# Patient Record
Sex: FEMALE | Race: WHITE | NOT HISPANIC OR LATINO | Employment: OTHER | URBAN - METROPOLITAN AREA
[De-identification: names, ages, dates, MRNs, and addresses within clinical notes are randomized per-mention and may not be internally consistent; named-entity substitution may affect disease eponyms.]

---

## 2023-03-08 LAB
ALBUMIN (G/DL) IN SER/PLAS: 4.2 G/DL (ref 3.4–5)
ANION GAP IN SER/PLAS: 13 MMOL/L (ref 10–20)
CALCIUM (MG/DL) IN SER/PLAS: 9.5 MG/DL (ref 8.6–10.3)
CARBON DIOXIDE, TOTAL (MMOL/L) IN SER/PLAS: 27 MMOL/L (ref 21–32)
CHLORIDE (MMOL/L) IN SER/PLAS: 105 MMOL/L (ref 98–107)
CREATININE (MG/DL) IN SER/PLAS: 1.02 MG/DL (ref 0.5–1.05)
GFR FEMALE: 59 ML/MIN/1.73M2
GLUCOSE (MG/DL) IN SER/PLAS: 87 MG/DL (ref 74–99)
PHOSPHATE (MG/DL) IN SER/PLAS: 4.2 MG/DL (ref 2.5–4.9)
POTASSIUM (MMOL/L) IN SER/PLAS: 4.6 MMOL/L (ref 3.5–5.3)
SODIUM (MMOL/L) IN SER/PLAS: 140 MMOL/L (ref 136–145)
UREA NITROGEN (MG/DL) IN SER/PLAS: 22 MG/DL (ref 6–23)

## 2023-03-09 LAB
ALBUMIN (MG/L) IN URINE: 9.4 MG/L
ALBUMIN/CREATININE (UG/MG) IN URINE: 12.2 UG/MG CRT (ref 0–30)
CREATININE (MG/DL) IN URINE: 77.2 MG/DL (ref 20–320)

## 2023-03-24 LAB
ALBUMIN (G/DL) IN SER/PLAS: 4.4 G/DL (ref 3.4–5)
ANION GAP IN SER/PLAS: 11 MMOL/L (ref 10–20)
CALCIUM (MG/DL) IN SER/PLAS: 9.5 MG/DL (ref 8.6–10.3)
CARBON DIOXIDE, TOTAL (MMOL/L) IN SER/PLAS: 34 MMOL/L (ref 21–32)
CHLORIDE (MMOL/L) IN SER/PLAS: 98 MMOL/L (ref 98–107)
CREATININE (MG/DL) IN SER/PLAS: 1.11 MG/DL (ref 0.5–1.05)
GFR FEMALE: 53 ML/MIN/1.73M2
GLUCOSE (MG/DL) IN SER/PLAS: 86 MG/DL (ref 74–99)
PHOSPHATE (MG/DL) IN SER/PLAS: 3.3 MG/DL (ref 2.5–4.9)
POTASSIUM (MMOL/L) IN SER/PLAS: 3.4 MMOL/L (ref 3.5–5.3)
SODIUM (MMOL/L) IN SER/PLAS: 140 MMOL/L (ref 136–145)
UREA NITROGEN (MG/DL) IN SER/PLAS: 22 MG/DL (ref 6–23)

## 2023-10-11 ENCOUNTER — OFFICE VISIT (OUTPATIENT)
Dept: ORTHOPEDIC SURGERY | Facility: CLINIC | Age: 73
End: 2023-10-11
Payer: MEDICARE

## 2023-10-11 DIAGNOSIS — M17.11 PRIMARY LOCALIZED OSTEOARTHRITIS OF RIGHT KNEE: Primary | ICD-10-CM

## 2023-10-11 PROCEDURE — 1036F TOBACCO NON-USER: CPT | Performed by: STUDENT IN AN ORGANIZED HEALTH CARE EDUCATION/TRAINING PROGRAM

## 2023-10-11 PROCEDURE — 1157F ADVNC CARE PLAN IN RCRD: CPT | Performed by: STUDENT IN AN ORGANIZED HEALTH CARE EDUCATION/TRAINING PROGRAM

## 2023-10-11 PROCEDURE — 1159F MED LIST DOCD IN RCRD: CPT | Performed by: STUDENT IN AN ORGANIZED HEALTH CARE EDUCATION/TRAINING PROGRAM

## 2023-10-11 PROCEDURE — 99213 OFFICE O/P EST LOW 20 MIN: CPT | Performed by: STUDENT IN AN ORGANIZED HEALTH CARE EDUCATION/TRAINING PROGRAM

## 2023-10-11 PROCEDURE — 1126F AMNT PAIN NOTED NONE PRSNT: CPT | Performed by: STUDENT IN AN ORGANIZED HEALTH CARE EDUCATION/TRAINING PROGRAM

## 2023-10-22 PROBLEM — N13.30 HYDRONEPHROSIS: Status: ACTIVE | Noted: 2023-10-22

## 2023-10-22 PROBLEM — M19.90 ARTHRITIS: Status: ACTIVE | Noted: 2023-10-22

## 2023-10-22 PROBLEM — H81.10 BENIGN PAROXYSMAL POSITIONAL VERTIGO: Status: ACTIVE | Noted: 2023-10-22

## 2023-10-22 PROBLEM — H02.9 BENIGN LESION OF EYELID: Status: ACTIVE | Noted: 2023-10-22

## 2023-10-22 PROBLEM — D23.30 SEBACEOUS ADENOMA OF FACE: Status: ACTIVE | Noted: 2023-10-22

## 2023-10-22 PROBLEM — M54.50 LOWER BACK PAIN: Status: ACTIVE | Noted: 2023-10-22

## 2023-10-22 PROBLEM — M17.11 PRIMARY LOCALIZED OSTEOARTHRITIS OF RIGHT KNEE: Status: ACTIVE | Noted: 2023-10-22

## 2023-10-22 PROBLEM — H35.89 MACULAR RPE MOTTLING: Status: ACTIVE | Noted: 2021-05-18

## 2023-10-22 PROBLEM — H25.813 COMBINED FORMS OF AGE-RELATED CATARACT, BILATERAL: Status: ACTIVE | Noted: 2021-05-18

## 2023-10-22 PROBLEM — R31.0 GROSS HEMATURIA: Status: ACTIVE | Noted: 2023-10-22

## 2023-10-22 PROBLEM — M25.551 RIGHT HIP PAIN: Status: ACTIVE | Noted: 2023-10-22

## 2023-10-22 PROBLEM — I10 HYPERTENSION: Status: ACTIVE | Noted: 2023-10-22

## 2023-10-22 PROBLEM — J06.9 URI (UPPER RESPIRATORY INFECTION): Status: ACTIVE | Noted: 2023-10-22

## 2023-10-22 PROBLEM — G89.29 CHRONIC PAIN OF RIGHT KNEE: Status: ACTIVE | Noted: 2021-04-08

## 2023-10-22 PROBLEM — I10 ESSENTIAL HYPERTENSION: Status: ACTIVE | Noted: 2021-04-08

## 2023-10-22 PROBLEM — E66.811 OBESITY, CLASS I, BMI 30-34.9: Status: ACTIVE | Noted: 2022-08-22

## 2023-10-22 PROBLEM — N28.9 NONFUNCTIONING KIDNEY: Status: ACTIVE | Noted: 2023-10-22

## 2023-10-22 PROBLEM — H02.403 ACQUIRED INVOLUTIONAL PTOSIS OF BOTH EYELIDS: Status: ACTIVE | Noted: 2023-10-22

## 2023-10-22 PROBLEM — E66.9 OBESITY, CLASS I, BMI 30-34.9: Status: ACTIVE | Noted: 2022-08-22

## 2023-10-22 PROBLEM — N28.89 URETERAL MASS: Status: ACTIVE | Noted: 2023-10-22

## 2023-10-22 PROBLEM — M25.561 KNEE PAIN, RIGHT: Status: ACTIVE | Noted: 2023-10-22

## 2023-10-22 PROBLEM — R31.9 HEMATURIA: Status: ACTIVE | Noted: 2023-10-22

## 2023-10-22 PROBLEM — G56.20 CUBITAL TUNNEL SYNDROME: Status: ACTIVE | Noted: 2023-10-22

## 2023-10-22 PROBLEM — E87.6 HYPOKALEMIA: Status: ACTIVE | Noted: 2023-10-22

## 2023-10-22 PROBLEM — M53.3 SACRAL PAIN: Status: ACTIVE | Noted: 2023-10-22

## 2023-10-22 PROBLEM — R30.0 BURNING WITH URINATION: Status: ACTIVE | Noted: 2023-10-22

## 2023-10-22 PROBLEM — F41.8 DEPRESSION WITH ANXIETY: Status: ACTIVE | Noted: 2023-10-22

## 2023-10-22 PROBLEM — H02.834 DERMATOCHALASIS OF LEFT UPPER EYELID: Status: ACTIVE | Noted: 2023-10-22

## 2023-10-22 PROBLEM — H02.831 DERMATOCHALASIS OF RIGHT UPPER EYELID: Status: ACTIVE | Noted: 2023-10-22

## 2023-10-22 PROBLEM — Z78.0 ASYMPTOMATIC POSTMENOPAUSAL STATE: Status: ACTIVE | Noted: 2023-10-22

## 2023-10-22 PROBLEM — M25.561 CHRONIC PAIN OF RIGHT KNEE: Status: ACTIVE | Noted: 2021-04-08

## 2023-10-22 PROBLEM — H57.813 BROW PTOSIS, BILATERAL: Status: ACTIVE | Noted: 2023-10-22

## 2023-10-22 RX ORDER — ACETAMINOPHEN 500 MG
1000 TABLET ORAL EVERY 8 HOURS PRN
COMMUNITY
Start: 2021-04-08 | End: 2024-02-29 | Stop reason: ALTCHOICE

## 2023-10-22 RX ORDER — CEPHALEXIN 500 MG/1
500 CAPSULE ORAL
COMMUNITY
Start: 2022-03-04 | End: 2024-04-11 | Stop reason: WASHOUT

## 2023-10-22 RX ORDER — AMLODIPINE BESYLATE 5 MG/1
TABLET ORAL EVERY 24 HOURS
COMMUNITY
Start: 2022-11-21 | End: 2024-02-29 | Stop reason: ALTCHOICE

## 2023-10-22 RX ORDER — HYDROCHLOROTHIAZIDE 25 MG/1
25 TABLET ORAL DAILY
COMMUNITY
End: 2024-04-11 | Stop reason: WASHOUT

## 2023-10-22 RX ORDER — HYDROCHLOROTHIAZIDE 12.5 MG/1
CAPSULE ORAL EVERY 24 HOURS
COMMUNITY
Start: 2022-12-05 | End: 2024-02-29 | Stop reason: ALTCHOICE

## 2023-10-22 RX ORDER — POTASSIUM CHLORIDE 750 MG/1
10 TABLET, EXTENDED RELEASE ORAL DAILY
COMMUNITY
End: 2024-05-01 | Stop reason: SDUPTHER

## 2023-10-22 RX ORDER — FAMOTIDINE 40 MG/1
TABLET, FILM COATED ORAL EVERY 24 HOURS
COMMUNITY
Start: 2022-12-05 | End: 2024-02-29 | Stop reason: ALTCHOICE

## 2023-10-22 RX ORDER — ESCITALOPRAM OXALATE 10 MG/1
10 TABLET ORAL DAILY
COMMUNITY
Start: 2023-08-20 | End: 2024-02-29 | Stop reason: ALTCHOICE

## 2023-10-22 RX ORDER — IBUPROFEN 200 MG
TABLET ORAL
COMMUNITY
End: 2024-02-29 | Stop reason: ALTCHOICE

## 2023-10-22 RX ORDER — TURMERIC 400 MG
CAPSULE ORAL DAILY
COMMUNITY
Start: 2021-06-10

## 2023-10-22 RX ORDER — VIT A/VIT C/VIT E/ZINC/COPPER 4296-226
CAPSULE ORAL
COMMUNITY

## 2023-10-22 RX ORDER — ERYTHROMYCIN 5 MG/G
OINTMENT OPHTHALMIC 4 TIMES DAILY
COMMUNITY
Start: 2023-03-28 | End: 2024-02-29 | Stop reason: ALTCHOICE

## 2023-10-24 ENCOUNTER — OFFICE VISIT (OUTPATIENT)
Dept: ORTHOPEDIC SURGERY | Facility: CLINIC | Age: 73
End: 2023-10-24
Payer: MEDICARE

## 2023-10-24 DIAGNOSIS — M17.11 PRIMARY OSTEOARTHRITIS OF RIGHT KNEE: Primary | ICD-10-CM

## 2023-10-24 PROCEDURE — 1036F TOBACCO NON-USER: CPT | Performed by: FAMILY MEDICINE

## 2023-10-24 PROCEDURE — 1160F RVW MEDS BY RX/DR IN RCRD: CPT | Performed by: FAMILY MEDICINE

## 2023-10-24 PROCEDURE — 1126F AMNT PAIN NOTED NONE PRSNT: CPT | Performed by: FAMILY MEDICINE

## 2023-10-24 PROCEDURE — 20610 DRAIN/INJ JOINT/BURSA W/O US: CPT | Performed by: FAMILY MEDICINE

## 2023-10-24 PROCEDURE — 99203 OFFICE O/P NEW LOW 30 MIN: CPT | Performed by: FAMILY MEDICINE

## 2023-10-24 PROCEDURE — 1159F MED LIST DOCD IN RCRD: CPT | Performed by: FAMILY MEDICINE

## 2023-10-24 RX ORDER — TRIAMCINOLONE ACETONIDE 40 MG/ML
40 INJECTION, SUSPENSION INTRA-ARTICULAR; INTRAMUSCULAR
Status: COMPLETED | OUTPATIENT
Start: 2023-10-24 | End: 2023-10-24

## 2023-10-24 RX ORDER — LIDOCAINE HYDROCHLORIDE 10 MG/ML
4 INJECTION INFILTRATION; PERINEURAL
Status: COMPLETED | OUTPATIENT
Start: 2023-10-24 | End: 2023-10-24

## 2023-10-24 RX ADMIN — LIDOCAINE HYDROCHLORIDE 4 ML: 10 INJECTION INFILTRATION; PERINEURAL at 12:08

## 2023-10-24 RX ADMIN — TRIAMCINOLONE ACETONIDE 40 MG: 40 INJECTION, SUSPENSION INTRA-ARTICULAR; INTRAMUSCULAR at 12:08

## 2023-10-24 NOTE — PROGRESS NOTES
** Please excuse any errors in grammar or translation related to this dictation. Voice recognition software was utilized to prepare this document. **    Assessment & Plan:  Patient referred by Dr. Reg Cuenca for consideration of viscosupplement injection.  Hx of right knee arthritis.  To this point, has been managing symptoms with otc analgesics and steroid injection. Has never received viscosupplement injection previously.  Discussed with patient how a viscosupplement injection differs from steroid along with risks and benefits of the procedure.  Patient is considered a good candidate for completion of viscosupplement injection. We will obtain prior authorization to complete and patient will be scheduled once approved.  If the injection provides symptomatic relief, can repeat every 6 months.  If needed, can also alternate with steroid injections. Was offered to have steroid injection today while awaiting prior auth and she agreed to do so.  All questions answered and patient agreeable to this plan of care.    A copy of today's report will be electronically sent to referring clinician.       Chief complaint: right knee pain    HPI:  74 y/o patient, hx of HTN, presents with right knee pain.  This complaint has been ongoing for a few years.  No mechanism of injury reported at onset. Symptoms have progressively worsened with time, particularly over the past year.  Pain is most prominent at lateral and posterior knee.  She feels the knee may be swollen.  Symptoms are aggravated by walking and standing. To date, patient has tried a variety of treatments to include cortisone injection, advil, biofreeze, and other ointmetns, heating pad with little sustained effect. Previously saw Dr. King for this with last visit being 10/11/23. Last steroid injection completed May 2023 which helped for about 1 month. Currently using advil and topicals for symptom control. Denies previous surgery to this site.     Patient reports  being referred here by Dr. Reg Cuenca to discuss gel injections.  Also reports that she had discussed candidacy for knee replacement however wants to continue with nonoperative treatment at this time.    Exam:  Right Knee examined. No effusion, ecchymosis, or erythema.  AROM from 0 to 130 deg with 5/5 strength. SILT overlying knee. Motion crepitus present. Tenderness along lateral joint line.  No popliteal mass palpated. Negative anterior and posterior drawer.  No laxity to varus or valgus stress at 0 or 30 deg.  No patellar apprehension.-Marquis    Results:  X-rays of right knee obtained May 2023 reviewed and independently interpreted as moderate tricompartmental degenerative changes most prominent in the lateral compartment.    Reviewed patient's previous encounter with Dr. Reg Cuenca    Procedure:  Patient ID: Terra Alexis is a 73 y.o. female.    L Inj/Asp: R knee on 10/24/2023 12:08 PM  Indications: pain  Details: 25 G needle, anterolateral approach  Medications: 40 mg triamcinolone acetonide 40 mg/mL; 4 mL lidocaine 10 mg/mL (1 %)  Outcome: tolerated well, no immediate complications  Procedure, treatment alternatives, risks and benefits explained, specific risks discussed. Consent was given by the patient. Immediately prior to procedure a time out was called to verify the correct patient, procedure, equipment, support staff and site/side marked as required. Patient was prepped and draped in the usual sterile fashion.

## 2023-10-24 NOTE — LETTER
October 24, 2023     Demarcus King MD  [address] [place] [address]    Patient: Terra Alexis   YOB: 1950   Date of Visit: 10/24/2023       Dear Dr. Demarcus King MD:    Thank you for referring Terra Alexis to me for evaluation. Below are my notes for this consultation.  If you have questions, please do not hesitate to call me. I look forward to following your patient along with you.       Sincerely,     Lion Guzman, DO      CC: No Recipients  ______________________________________________________________________________________    ** Please excuse any errors in grammar or translation related to this dictation. Voice recognition software was utilized to prepare this document. **    Assessment & Plan:  Patient referred by Dr. Reg Cuenca for consideration of viscosupplement injection.  Hx of right knee arthritis.  To this point, has been managing symptoms with otc analgesics and steroid injection. Has never received viscosupplement injection previously.  Discussed with patient how a viscosupplement injection differs from steroid along with risks and benefits of the procedure.  Patient is considered a good candidate for completion of viscosupplement injection. We will obtain prior authorization to complete and patient will be scheduled once approved.  If the injection provides symptomatic relief, can repeat every 6 months.  If needed, can also alternate with steroid injections. Was offered to have steroid injection today while awaiting prior auth and she agreed to do so.  All questions answered and patient agreeable to this plan of care.    A copy of today's report will be electronically sent to referring clinician.       Chief complaint: right knee pain    HPI:  74 y/o patient, hx of HTN, presents with right knee pain.  This complaint has been ongoing for a few years.  No mechanism of injury reported at onset. Symptoms have progressively worsened with time, particularly over the  past year.  Pain is most prominent at lateral and posterior knee.  She feels the knee may be swollen.  Symptoms are aggravated by walking and standing. To date, patient has tried a variety of treatments to include cortisone injection, advil, biofreeze, and other ointmetns, heating pad with little sustained effect. Previously saw Dr. King for this with last visit being 10/11/23. Last steroid injection completed May 2023 which helped for about 1 month. Currently using advil and topicals for symptom control. Denies previous surgery to this site.     Patient reports being referred here by Dr. Reg Cuenca to discuss gel injections.  Also reports that she had discussed candidacy for knee replacement however wants to continue with nonoperative treatment at this time.    Exam:  Right Knee examined. No effusion, ecchymosis, or erythema.  AROM from 0 to 130 deg with 5/5 strength. SILT overlying knee. Motion crepitus present. Tenderness along lateral joint line.  No popliteal mass palpated. Negative anterior and posterior drawer.  No laxity to varus or valgus stress at 0 or 30 deg.  No patellar apprehension.-Marquis    Results:  X-rays of right knee obtained May 2023 reviewed and independently interpreted as moderate tricompartmental degenerative changes most prominent in the lateral compartment.    Reviewed patient's previous encounter with Dr. Reg Cuenca    Procedure:  Patient ID: Terra Alexis is a 73 y.o. female.    L Inj/Asp: R knee on 10/24/2023 12:08 PM  Indications: pain  Details: 25 G needle, anterolateral approach  Medications: 40 mg triamcinolone acetonide 40 mg/mL; 4 mL lidocaine 10 mg/mL (1 %)  Outcome: tolerated well, no immediate complications  Procedure, treatment alternatives, risks and benefits explained, specific risks discussed. Consent was given by the patient. Immediately prior to procedure a time out was called to verify the correct patient, procedure, equipment, support staff and site/side  marked as required. Patient was prepped and draped in the usual sterile fashion.

## 2023-10-25 DIAGNOSIS — Z12.11 COLON CANCER SCREENING: ICD-10-CM

## 2023-10-25 RX ORDER — POLYETHYLENE GLYCOL 3350, SODIUM SULFATE ANHYDROUS, SODIUM BICARBONATE, SODIUM CHLORIDE, POTASSIUM CHLORIDE 236; 22.74; 6.74; 5.86; 2.97 G/4L; G/4L; G/4L; G/4L; G/4L
4000 POWDER, FOR SOLUTION ORAL ONCE
Qty: 4000 ML | Refills: 0 | Status: SHIPPED | OUTPATIENT
Start: 2023-10-25 | End: 2023-10-25

## 2023-11-01 ENCOUNTER — LAB (OUTPATIENT)
Dept: LAB | Facility: LAB | Age: 73
End: 2023-11-01
Payer: MEDICARE

## 2023-11-01 DIAGNOSIS — I10 ESSENTIAL (PRIMARY) HYPERTENSION: ICD-10-CM

## 2023-11-01 DIAGNOSIS — Z91.89 OTHER SPECIFIED PERSONAL RISK FACTORS, NOT ELSEWHERE CLASSIFIED: ICD-10-CM

## 2023-11-01 DIAGNOSIS — E55.9 VITAMIN D DEFICIENCY, UNSPECIFIED: Primary | ICD-10-CM

## 2023-11-01 LAB
25(OH)D3 SERPL-MCNC: 34 NG/ML (ref 30–100)
ALBUMIN SERPL BCP-MCNC: 4 G/DL (ref 3.4–5)
ANION GAP SERPL CALC-SCNC: 12 MMOL/L (ref 10–20)
BUN SERPL-MCNC: 34 MG/DL (ref 6–23)
CALCIUM SERPL-MCNC: 9.2 MG/DL (ref 8.6–10.3)
CHLORIDE SERPL-SCNC: 105 MMOL/L (ref 98–107)
CO2 SERPL-SCNC: 27 MMOL/L (ref 21–32)
CREAT SERPL-MCNC: 1.14 MG/DL (ref 0.5–1.05)
ERYTHROCYTE [DISTWIDTH] IN BLOOD BY AUTOMATED COUNT: 13.7 % (ref 11.5–14.5)
GFR SERPL CREATININE-BSD FRML MDRD: 51 ML/MIN/1.73M*2
GLUCOSE SERPL-MCNC: 67 MG/DL (ref 74–99)
HCT VFR BLD AUTO: 40 % (ref 36–46)
HGB BLD-MCNC: 12.7 G/DL (ref 12–16)
MCH RBC QN AUTO: 30.4 PG (ref 26–34)
MCHC RBC AUTO-ENTMCNC: 31.8 G/DL (ref 32–36)
MCV RBC AUTO: 96 FL (ref 80–100)
NRBC BLD-RTO: 0 /100 WBCS (ref 0–0)
PHOSPHATE SERPL-MCNC: 3.8 MG/DL (ref 2.5–4.9)
PLATELET # BLD AUTO: 257 X10*3/UL (ref 150–450)
POTASSIUM SERPL-SCNC: 3.7 MMOL/L (ref 3.5–5.3)
RBC # BLD AUTO: 4.18 X10*6/UL (ref 4–5.2)
SODIUM SERPL-SCNC: 140 MMOL/L (ref 136–145)
WBC # BLD AUTO: 6.2 X10*3/UL (ref 4.4–11.3)

## 2023-11-01 PROCEDURE — 36415 COLL VENOUS BLD VENIPUNCTURE: CPT

## 2023-11-01 PROCEDURE — 85027 COMPLETE CBC AUTOMATED: CPT

## 2023-11-01 PROCEDURE — 82306 VITAMIN D 25 HYDROXY: CPT

## 2023-11-01 PROCEDURE — 80069 RENAL FUNCTION PANEL: CPT

## 2023-11-08 ENCOUNTER — OFFICE VISIT (OUTPATIENT)
Dept: ORTHOPEDIC SURGERY | Facility: CLINIC | Age: 73
End: 2023-11-08
Payer: MEDICARE

## 2023-11-08 DIAGNOSIS — M17.11 PRIMARY OSTEOARTHRITIS OF RIGHT KNEE: Primary | ICD-10-CM

## 2023-11-08 PROCEDURE — 20611 DRAIN/INJ JOINT/BURSA W/US: CPT | Performed by: FAMILY MEDICINE

## 2023-11-08 PROCEDURE — 3077F SYST BP >= 140 MM HG: CPT | Performed by: FAMILY MEDICINE

## 2023-11-08 PROCEDURE — 3079F DIAST BP 80-89 MM HG: CPT | Performed by: FAMILY MEDICINE

## 2023-11-08 PROCEDURE — 1160F RVW MEDS BY RX/DR IN RCRD: CPT | Performed by: FAMILY MEDICINE

## 2023-11-08 PROCEDURE — 1126F AMNT PAIN NOTED NONE PRSNT: CPT | Performed by: FAMILY MEDICINE

## 2023-11-08 PROCEDURE — 1036F TOBACCO NON-USER: CPT | Performed by: FAMILY MEDICINE

## 2023-11-08 PROCEDURE — 1159F MED LIST DOCD IN RCRD: CPT | Performed by: FAMILY MEDICINE

## 2023-11-08 RX ORDER — LIDOCAINE HYDROCHLORIDE 10 MG/ML
2 INJECTION INFILTRATION; PERINEURAL
Status: COMPLETED | OUTPATIENT
Start: 2023-11-08 | End: 2023-11-08

## 2023-11-08 RX ADMIN — LIDOCAINE HYDROCHLORIDE 2 ML: 10 INJECTION INFILTRATION; PERINEURAL at 10:04

## 2023-11-08 NOTE — PROGRESS NOTES
** Please excuse any errors in grammar or translation related to this dictation. Voice recognition software was utilized to prepare this document. **    Assessment & Plan:  Durolane injection completed in right knee as below. Discussed with patient that it can take 2-3 weeks for pain relief to occur. This injection can be repeated again in 6 months if providing symptomatic relief. Patient asked to contact clinic in 5 months to start prior authorization process. If has increasing pain prior to then that is not controlled by oral analgesics, can f/u to have CSI completed.  All questions answered and patient is agreeable to this plan.    Chief complaint: right knee pain    HPI:  11/8/23:  Patient presents for right knee Durolane injection. Reports steroid injection completed last month did help reduce pain symptoms until she aggravated it doing yard work.     10/24/23: 72 y/o patient, hx of HTN, presents with right knee pain.  This complaint has been ongoing for a few years.  No mechanism of injury reported at onset. Symptoms have progressively worsened with time, particularly over the past year.  Pain is most prominent at lateral and posterior knee.  She feels the knee may be swollen.  Symptoms are aggravated by walking and standing. To date, patient has tried a variety of treatments to include cortisone injection, advil, biofreeze, and other ointmetns, heating pad with little sustained effect. Previously saw Dr. King for this with last visit being 10/11/23. Last steroid injection completed May 2023 which helped for about 1 month. Currently using advil and topicals for symptom control. Denies previous surgery to this site.     Patient reports being referred here by Dr. Reg Cuenca to discuss gel injections.  Also reports that she had discussed candidacy for knee replacement however wants to continue with nonoperative treatment at this time.    Exam:  No erythema, warmth or skin breakdown overlying injection site  of R knee.    Results:  X-rays of right knee obtained May 2023 demonstrate moderate tricompartmental degenerative changes most prominent in the lateral compartment.    Procedure:  Patient ID: Terra Alexis is a 73 y.o. female.    L Inj/Asp: R knee on 11/8/2023 10:04 AM  Details: 21 G needle, ultrasound-guided superolateral approach  Medications: 60 mg sodium hyaluronate 60 mg/3 mL; 2 mL lidocaine 10 mg/mL (1 %)  Outcome: tolerated well, no immediate complications  Procedure, treatment alternatives, risks and benefits explained, specific risks discussed. Immediately prior to procedure a time out was called to verify the correct patient, procedure, equipment, support staff and site/side marked as required. Patient was prepped and draped in the usual sterile fashion.

## 2023-11-14 ENCOUNTER — APPOINTMENT (OUTPATIENT)
Dept: GASTROENTEROLOGY | Facility: EXTERNAL LOCATION | Age: 73
End: 2023-11-14
Payer: MEDICARE

## 2023-11-16 ENCOUNTER — APPOINTMENT (OUTPATIENT)
Dept: UROLOGY | Facility: CLINIC | Age: 73
End: 2023-11-16
Payer: MEDICARE

## 2023-12-06 ENCOUNTER — TRANSCRIBE ORDERS (OUTPATIENT)
Dept: ORTHOPEDIC SURGERY | Facility: HOSPITAL | Age: 73
End: 2023-12-06
Payer: MEDICARE

## 2023-12-06 DIAGNOSIS — M54.50 LOW BACK PAIN, UNSPECIFIED BACK PAIN LATERALITY, UNSPECIFIED CHRONICITY, UNSPECIFIED WHETHER SCIATICA PRESENT: ICD-10-CM

## 2023-12-08 ENCOUNTER — DOCUMENTATION (OUTPATIENT)
Dept: ORTHOPEDIC SURGERY | Facility: HOSPITAL | Age: 73
End: 2023-12-08

## 2023-12-08 ENCOUNTER — APPOINTMENT (OUTPATIENT)
Dept: ORTHOPEDIC SURGERY | Facility: CLINIC | Age: 73
End: 2023-12-08
Payer: MEDICARE

## 2023-12-08 NOTE — PROGRESS NOTES
Patient called on 12/07/23 confused and upset (yelling) because [facility] told her she needed X-rays.   I explained that we'll get the x-rays at the time of her visit. She hung up on me while I attempted to explain why her X-Ray was ordered ahead of time.     This Morning 12/08/23   Patient came to - did not want to sign consent that was printed .  Patient indicated she wanted to make changes on consent, crossed out and added words.  Michael the  rep informed the patient that the  consent was required to be seen and no personal changes can be made. She then stated she would not want to be seen and left.     Her Consent form was scanned to her chart under Media.

## 2024-01-25 ENCOUNTER — APPOINTMENT (OUTPATIENT)
Dept: PRIMARY CARE | Facility: CLINIC | Age: 74
End: 2024-01-25
Payer: MEDICARE

## 2024-02-18 NOTE — PROGRESS NOTES
PRIMARY CARE PHYSICIAN: Shannon Rodriguez MD  REFERRING PROVIDER: No referring provider defined for this encounter.       SUBJECTIVE  CHIEF COMPLAINT: Right knee pain    HPI: Terra Alexis is a pleasant 73 y.o. year-old female who is seen today for follow-up evaluation of right knee pain.  The injection which I administered in May provided her with about 1 month of relief.  Tylenol is ineffective at controlling her pain.  Advil does provide her with some relief however, she has been advised to limit the amount that she uses.  She denies instability.  Pain is mostly lateral.  She uses IcyHot and Biofreeze topically.    REVIEW OF SYSTEMS  There has been no interval change in this patient's past medical, surgical, medications, allergies, family history or social history since the most recent visit to a provider within our department.  14 point review of systems was performed, reviewed, and negative except for pertinent positives documented in the history of present illness.    Past Medical History:   Diagnosis Date    Other specified disorders of kidney and ureter 03/04/2022    Ureteral mass    Personal history of malignant neoplasm of cervix uteri 11/21/2022    History of malignant neoplasm of cervix        Allergies   Allergen Reactions    Codeine GI Upset, Other and Nausea/vomiting        Past Surgical History:   Procedure Laterality Date    OTHER SURGICAL HISTORY  10/07/2021    Hysterectomy        Family History   Problem Relation Name Age of Onset    Macular degeneration Mother      Glaucoma Other grandparent     Macular degeneration Other grandparent         Social History     Socioeconomic History    Marital status: Single     Spouse name: Not on file    Number of children: Not on file    Years of education: Not on file    Highest education level: Not on file   Occupational History    Not on file   Tobacco Use    Smoking status: Never    Smokeless tobacco: Never   Substance and Sexual Activity    Alcohol use:  Never    Drug use: Never    Sexual activity: Not on file   Other Topics Concern    Not on file   Social History Narrative    Not on file     Social Determinants of Health     Financial Resource Strain: Not on file   Food Insecurity: Not on file   Transportation Needs: Not on file   Physical Activity: Not on file   Stress: Not on file   Social Connections: Not on file   Intimate Partner Violence: Not on file   Housing Stability: Not on file        CURRENT MEDICATIONS:   Current Outpatient Medications   Medication Sig Dispense Refill    acetaminophen (Tylenol) 500 mg tablet Take 2 tablets (1,000 mg) by mouth every 8 hours if needed (pain).      amLODIPine (Norvasc) 5 mg tablet Take by mouth once every 24 hours.      beneprotein Take by mouth. Protein Oral Powder Refills: 0 Active      cartilage/collagen/bor/hyalur (JOINT HEALTH ORAL) Take by mouth.  Joint Health CAPS Refills: 0 Active      cephalexin (Keflex) 500 mg capsule Take 1 capsule (500 mg) by mouth. TAKE 1 CAPSULE Once Please take after stent removal      erythromycin (Romycin) 5 mg/gram (0.5 %) ophthalmic ointment 4 times a day.  apply 1 inch ribbon to both upper lids 4 times a day      escitalopram (Lexapro) 10 mg tablet Take 1 tablet (10 mg) by mouth once daily.      famotidine (Pepcid) 40 mg tablet Take by mouth once every 24 hours.      hydroCHLOROthiazide (HYDRODiuril) 25 mg tablet Take 1 tablet (25 mg) by mouth once daily.      hydroCHLOROthiazide (Microzide) 12.5 mg capsule Take by mouth once every 24 hours.      MELATONIN ORAL Take by mouth if needed.      NON FORMULARY Multi Collagen Protein daily      NON FORMULARY HA Joint formula daily      potassium chloride CR 10 mEq ER tablet Take 1 tablet (10 mEq) by mouth once daily.      turmeric 400 mg capsule once daily.      vit A/vit C/vit E/zinc/copper (VITAMINS A,C,E-ZINC-COPPER ORAL) Take by mouth.  PreserVision AREDS CAPS Refills: 0 Active      vitamins A,C,E-zinc-copper (PreserVision AREDS) 4,296  mcg-226 mg-90 mg capsule Take by mouth.       No current facility-administered medications for this visit.        OBJECTIVE    PHYSICAL EXAM  There is no height or weight on file to calculate BMI.    General: Well-appearing female in no acute distress.  Awake, alert and oriented.  Pleasant and cooperative.  Respiratory: Non-labored breathing  Mood: Euthymic   Gait: Antalgic  Assistive Device: None     Affected Right Knee  Limb Alignment: Mild valgus  ROM: 5-120  Stable to varus and valgus stress at full extension and 30 degrees of flexion.  Correctable  Skin: Intact, no abrasions or draining sinuses  Effusion: None  Quad Strength: 5/5  Hamstring Strength: 5/5  Patella Crepitus: None  Patella Grind: Negative  Tenderness: Lateral joint line  Sensation: Intact to light touch distally  Motor function: Able to fire TA, EHL, G/S  Pulses: Palpable DP pulse    Unaffected Left Knee  Skin: Intact  ROM: 0-120  Effusion: None  No tenderness to palpation on exam    IMAGING:  None today      ASSESSMENT & PLAN    IMPRESSION:  Terra Alexis is a 73 y.o. female who I am seeing today for right knee pain in the setting of osteoarthritis.  The patient is not yet ready to consider total joint arthroplasty.    PLAN:  Intra-articular corticosteroid injections have been ineffective to this point.  Accordingly, I referred her to my partner for consideration of hyaluronic acid injections.  I believe the patient is an appropriate candidate for hyaluronic acid injections as she has tried several nonsurgical treatment measures which have been ineffective to this point and she is not yet ready to move forward with total joint arthroplasty.    Follow-up with me on an as-needed basis for consideration of total joint arthroplasty.    *This note was created using voice recognition software and was not corrected for typographical or grammatical errors.*

## 2024-02-25 ENCOUNTER — APPOINTMENT (OUTPATIENT)
Dept: RADIOLOGY | Facility: HOSPITAL | Age: 74
End: 2024-02-25
Payer: MEDICARE

## 2024-02-25 ENCOUNTER — HOSPITAL ENCOUNTER (EMERGENCY)
Facility: HOSPITAL | Age: 74
Discharge: HOME | End: 2024-02-25
Attending: EMERGENCY MEDICINE
Payer: MEDICARE

## 2024-02-25 VITALS
TEMPERATURE: 98.2 F | BODY MASS INDEX: 24.99 KG/M2 | DIASTOLIC BLOOD PRESSURE: 74 MMHG | HEIGHT: 65 IN | OXYGEN SATURATION: 95 % | WEIGHT: 150 LBS | SYSTOLIC BLOOD PRESSURE: 140 MMHG | RESPIRATION RATE: 16 BRPM | HEART RATE: 80 BPM

## 2024-02-25 DIAGNOSIS — N39.0 UTI (URINARY TRACT INFECTION), UNCOMPLICATED: ICD-10-CM

## 2024-02-25 DIAGNOSIS — N28.89 MASS OF LEFT KIDNEY: Primary | ICD-10-CM

## 2024-02-25 LAB
ALBUMIN SERPL BCP-MCNC: 4 G/DL (ref 3.4–5)
ALP SERPL-CCNC: 71 U/L (ref 33–136)
ALT SERPL W P-5'-P-CCNC: 12 U/L (ref 7–45)
ANION GAP SERPL CALC-SCNC: 12 MMOL/L (ref 10–20)
APPEARANCE UR: ABNORMAL
AST SERPL W P-5'-P-CCNC: 13 U/L (ref 9–39)
BACTERIA #/AREA URNS AUTO: ABNORMAL /HPF
BASOPHILS # BLD AUTO: 0.02 X10*3/UL (ref 0–0.1)
BASOPHILS NFR BLD AUTO: 0.2 %
BILIRUB SERPL-MCNC: 0.6 MG/DL (ref 0–1.2)
BILIRUB UR STRIP.AUTO-MCNC: NEGATIVE MG/DL
BUN SERPL-MCNC: 21 MG/DL (ref 6–23)
CALCIUM SERPL-MCNC: 9.1 MG/DL (ref 8.6–10.3)
CHLORIDE SERPL-SCNC: 102 MMOL/L (ref 98–107)
CO2 SERPL-SCNC: 28 MMOL/L (ref 21–32)
COLOR UR: ABNORMAL
CREAT SERPL-MCNC: 1.28 MG/DL (ref 0.5–1.05)
EGFRCR SERPLBLD CKD-EPI 2021: 45 ML/MIN/1.73M*2
EOSINOPHIL # BLD AUTO: 0.01 X10*3/UL (ref 0–0.4)
EOSINOPHIL NFR BLD AUTO: 0.1 %
ERYTHROCYTE [DISTWIDTH] IN BLOOD BY AUTOMATED COUNT: 13.6 % (ref 11.5–14.5)
GLUCOSE SERPL-MCNC: 121 MG/DL (ref 74–99)
GLUCOSE UR STRIP.AUTO-MCNC: NEGATIVE MG/DL
HCT VFR BLD AUTO: 36 % (ref 36–46)
HGB BLD-MCNC: 11.8 G/DL (ref 12–16)
HOLD SPECIMEN: NORMAL
IMM GRANULOCYTES # BLD AUTO: 0.05 X10*3/UL (ref 0–0.5)
IMM GRANULOCYTES NFR BLD AUTO: 0.5 % (ref 0–0.9)
KETONES UR STRIP.AUTO-MCNC: NEGATIVE MG/DL
LEUKOCYTE ESTERASE UR QL STRIP.AUTO: NEGATIVE
LYMPHOCYTES # BLD AUTO: 0.83 X10*3/UL (ref 0.8–3)
LYMPHOCYTES NFR BLD AUTO: 8.2 %
MCH RBC QN AUTO: 30.3 PG (ref 26–34)
MCHC RBC AUTO-ENTMCNC: 32.8 G/DL (ref 32–36)
MCV RBC AUTO: 92 FL (ref 80–100)
MONOCYTES # BLD AUTO: 0.81 X10*3/UL (ref 0.05–0.8)
MONOCYTES NFR BLD AUTO: 8 %
NEUTROPHILS # BLD AUTO: 8.4 X10*3/UL (ref 1.6–5.5)
NEUTROPHILS NFR BLD AUTO: 83 %
NITRITE UR QL STRIP.AUTO: NEGATIVE
NRBC BLD-RTO: 0 /100 WBCS (ref 0–0)
PH UR STRIP.AUTO: 6 [PH]
PLATELET # BLD AUTO: 214 X10*3/UL (ref 150–450)
POTASSIUM SERPL-SCNC: 3.5 MMOL/L (ref 3.5–5.3)
PROT SERPL-MCNC: 6.9 G/DL (ref 6.4–8.2)
PROT UR STRIP.AUTO-MCNC: ABNORMAL MG/DL
RBC # BLD AUTO: 3.9 X10*6/UL (ref 4–5.2)
RBC # UR STRIP.AUTO: ABNORMAL /UL
RBC #/AREA URNS AUTO: >20 /HPF
SODIUM SERPL-SCNC: 138 MMOL/L (ref 136–145)
SP GR UR STRIP.AUTO: 1.02
UROBILINOGEN UR STRIP.AUTO-MCNC: <2 MG/DL
WBC # BLD AUTO: 10.1 X10*3/UL (ref 4.4–11.3)
WBC #/AREA URNS AUTO: >50 /HPF

## 2024-02-25 PROCEDURE — 2500000004 HC RX 250 GENERAL PHARMACY W/ HCPCS (ALT 636 FOR OP/ED): Performed by: NURSE PRACTITIONER

## 2024-02-25 PROCEDURE — 74176 CT ABD & PELVIS W/O CONTRAST: CPT | Performed by: RADIOLOGY

## 2024-02-25 PROCEDURE — 36415 COLL VENOUS BLD VENIPUNCTURE: CPT | Performed by: NURSE PRACTITIONER

## 2024-02-25 PROCEDURE — 87086 URINE CULTURE/COLONY COUNT: CPT | Mod: GEALAB | Performed by: NURSE PRACTITIONER

## 2024-02-25 PROCEDURE — 96375 TX/PRO/DX INJ NEW DRUG ADDON: CPT

## 2024-02-25 PROCEDURE — 85025 COMPLETE CBC W/AUTO DIFF WBC: CPT | Performed by: NURSE PRACTITIONER

## 2024-02-25 PROCEDURE — 99284 EMERGENCY DEPT VISIT MOD MDM: CPT | Mod: 25

## 2024-02-25 PROCEDURE — 74176 CT ABD & PELVIS W/O CONTRAST: CPT

## 2024-02-25 PROCEDURE — 84075 ASSAY ALKALINE PHOSPHATASE: CPT | Performed by: NURSE PRACTITIONER

## 2024-02-25 PROCEDURE — 81003 URINALYSIS AUTO W/O SCOPE: CPT | Performed by: NURSE PRACTITIONER

## 2024-02-25 PROCEDURE — 96374 THER/PROPH/DIAG INJ IV PUSH: CPT

## 2024-02-25 RX ORDER — OXYCODONE AND ACETAMINOPHEN 5; 325 MG/1; MG/1
1 TABLET ORAL EVERY 6 HOURS PRN
Qty: 5 TABLET | Refills: 0 | OUTPATIENT
Start: 2024-02-25 | End: 2024-02-28

## 2024-02-25 RX ORDER — CEPHALEXIN 500 MG/1
500 CAPSULE ORAL 4 TIMES DAILY
Qty: 40 CAPSULE | Refills: 0 | Status: SHIPPED | OUTPATIENT
Start: 2024-02-25 | End: 2024-02-29 | Stop reason: ALTCHOICE

## 2024-02-25 RX ORDER — KETOROLAC TROMETHAMINE 15 MG/ML
15 INJECTION, SOLUTION INTRAMUSCULAR; INTRAVENOUS ONCE
Status: COMPLETED | OUTPATIENT
Start: 2024-02-25 | End: 2024-02-25

## 2024-02-25 RX ORDER — ONDANSETRON HYDROCHLORIDE 2 MG/ML
4 INJECTION, SOLUTION INTRAVENOUS ONCE
Status: COMPLETED | OUTPATIENT
Start: 2024-02-25 | End: 2024-02-25

## 2024-02-25 RX ORDER — HYDROMORPHONE HYDROCHLORIDE 1 MG/ML
1 INJECTION, SOLUTION INTRAMUSCULAR; INTRAVENOUS; SUBCUTANEOUS ONCE
Status: COMPLETED | OUTPATIENT
Start: 2024-02-25 | End: 2024-02-25

## 2024-02-25 RX ADMIN — KETOROLAC TROMETHAMINE 15 MG: 15 INJECTION, SOLUTION INTRAMUSCULAR; INTRAVENOUS at 09:29

## 2024-02-25 RX ADMIN — ONDANSETRON 4 MG: 2 INJECTION INTRAMUSCULAR; INTRAVENOUS at 09:29

## 2024-02-25 RX ADMIN — SODIUM CHLORIDE 1000 ML: 9 INJECTION, SOLUTION INTRAVENOUS at 09:29

## 2024-02-25 RX ADMIN — HYDROMORPHONE HYDROCHLORIDE 1 MG: 1 INJECTION, SOLUTION INTRAMUSCULAR; INTRAVENOUS; SUBCUTANEOUS at 09:55

## 2024-02-25 ASSESSMENT — PAIN - FUNCTIONAL ASSESSMENT
PAIN_FUNCTIONAL_ASSESSMENT: 0-10
PAIN_FUNCTIONAL_ASSESSMENT: 0-10

## 2024-02-25 ASSESSMENT — COLUMBIA-SUICIDE SEVERITY RATING SCALE - C-SSRS
2. HAVE YOU ACTUALLY HAD ANY THOUGHTS OF KILLING YOURSELF?: NO
1. IN THE PAST MONTH, HAVE YOU WISHED YOU WERE DEAD OR WISHED YOU COULD GO TO SLEEP AND NOT WAKE UP?: NO
6. HAVE YOU EVER DONE ANYTHING, STARTED TO DO ANYTHING, OR PREPARED TO DO ANYTHING TO END YOUR LIFE?: NO

## 2024-02-25 ASSESSMENT — LIFESTYLE VARIABLES
HAVE PEOPLE ANNOYED YOU BY CRITICIZING YOUR DRINKING: NO
EVER FELT BAD OR GUILTY ABOUT YOUR DRINKING: NO
EVER HAD A DRINK FIRST THING IN THE MORNING TO STEADY YOUR NERVES TO GET RID OF A HANGOVER: NO
HAVE YOU EVER FELT YOU SHOULD CUT DOWN ON YOUR DRINKING: NO

## 2024-02-25 ASSESSMENT — PAIN DESCRIPTION - FREQUENCY: FREQUENCY: CONSTANT/CONTINUOUS

## 2024-02-25 ASSESSMENT — PAIN DESCRIPTION - DESCRIPTORS: DESCRIPTORS: SHARP;SHOOTING

## 2024-02-25 ASSESSMENT — PAIN DESCRIPTION - ORIENTATION: ORIENTATION: LEFT;LOWER

## 2024-02-25 ASSESSMENT — PAIN SCALES - GENERAL
PAINLEVEL_OUTOF10: 10 - WORST POSSIBLE PAIN
PAINLEVEL_OUTOF10: 4
PAINLEVEL_OUTOF10: 3

## 2024-02-25 ASSESSMENT — PAIN DESCRIPTION - PAIN TYPE
TYPE: ACUTE PAIN

## 2024-02-25 ASSESSMENT — PAIN DESCRIPTION - LOCATION
LOCATION: ABDOMEN
LOCATION: BACK

## 2024-02-25 NOTE — ED PROVIDER NOTES
HPI   Chief Complaint   Patient presents with    Flank Pain     Left flank pain with hematuria that has worsened over night. Hx of stones        73-year-old female with a history of kidney stones and blockage of the left kidney presents today with acute left flank pain.  It started last night.  She was experiencing some cramping over the last 2 weeks.  She denies history of urinary tract infections.  She has been working with Dr. Cordova [facility] for her prior history of blockage of her kidney.  He is in [facility] and is not affiliated with the [facility] or [facility].  She endorses a feeling of nausea.  She denies headache.  She denies chest pain.  She denies dyspnea.  She also endorses slight left lower quadrant pain.  She denies right upper, right lower, or or left upper quadrant pain.  She had a normal bowel movement last night.  She has endorsed a decrease in urine output.      History provided by:  Patient and spouse   used: No                        [place] Coma Scale Score: 15                     Patient History   Past Medical History:   Diagnosis Date    Other specified disorders of kidney and ureter 03/04/2022    Ureteral mass    Personal history of malignant neoplasm of cervix uteri 11/21/2022    History of malignant neoplasm of cervix     Past Surgical History:   Procedure Laterality Date    OTHER SURGICAL HISTORY  10/07/2021    Hysterectomy     Family History   Problem Relation Name Age of Onset    Macular degeneration Mother      Glaucoma Other grandparent     Macular degeneration Other grandparent      Social History     Tobacco Use    Smoking status: Never    Smokeless tobacco: Never   Substance Use Topics    Alcohol use: Never    Drug use: Never       Physical Exam   ED Triage Vitals [02/25/24 0900]   Temperature Heart Rate Respirations BP   36.4 °C (97.5 °F) 96 19 177/78      Pulse Ox Temp Source Heart Rate Source Patient Position   100 % Tympanic  -- --      BP Location FiO2 (%)     -- --       Physical Exam  Constitutional:       Appearance: Normal appearance.   HENT:      Head: Normocephalic and atraumatic.      Nose: Nose normal.      Mouth/Throat:      Mouth: Mucous membranes are dry.   Eyes:      Extraocular Movements: Extraocular movements intact.      Pupils: Pupils are equal, round, and reactive to light.   Cardiovascular:      Rate and Rhythm: Normal rate and regular rhythm.      Pulses: Normal pulses.      Heart sounds: Normal heart sounds.   Pulmonary:      Effort: Pulmonary effort is normal.      Breath sounds: Normal breath sounds.   Abdominal:      Tenderness: There is abdominal tenderness. There is left CVA tenderness.      Comments: Tenderness to the left lower quadrant and the left CVA   Musculoskeletal:         General: Normal range of motion.      Cervical back: Normal range of motion.   Skin:     General: Skin is warm.      Capillary Refill: Capillary refill takes less than 2 seconds.   Neurological:      General: No focal deficit present.      Mental Status: She is alert.   Psychiatric:         Mood and Affect: Mood normal.         Behavior: Behavior normal.         ED Course & MDM   Diagnoses as of 02/25/24 1125   Mass of left kidney   UTI (urinary tract infection), uncomplicated       Medical Decision Making  Patient was rating her pain 10 out of 10.  I wrote for 15 mg of Toradol, 1 L normal saline 4 mg of Zofran and basic labs were ordered along with a CT noncontrast with concern for either pyelonephritis, nephrolithiasis, hydronephrosis, or urinary tract infection.  The 15 mg of Toradol barely touch her pain I gave her 1 mg of Dilaudid and she was now pain-free.  Urinalysis had +3 blood in the urine.  The appearance of the urine was hazy.  She had over 50 white blood cells.  I started patient on Keflex.  Her CBC was negative for leukocytosis or left shift and her hemoglobin hematocrit were baseline at 11.8 and 36.  Her platelets were  normal.  CT had abnormal findings with soft tissue mass encasing the distal segment of the left ureter.  This is seen on a study from 12/6/2021.  There was hemorrhagic products within the left ureteral proximal to the mass extending to the left renal pelvis.  There was severe left renal atrophy.  The CT was reviewed by Dr. Guerrero and he offered her 2 approaches.  We could get interventional radiology in to place the nephrostomy tube for relief of pain or we can have patient follow with Dr. Guerrero outpatient for planned nephrectomy and he indicates that the left kidney will have to be removed.  I discussed this with patient and she chose the plan for having the nephrectomy and she did not want a nephrostomy tube at this time.  I sent Percocet every 6 hours to cover her for the next 3 days and I also sent Keflex to cover for 10 days.  She was seen and staffed with attending and she understands the importance of return precautions.  Dr. Guerrero was updated the patient is choosing the plan to see him outpatient and plan nephrectomy.    Amount and/or Complexity of Data Reviewed  Labs: ordered.  Radiology: ordered.        Procedure  Procedures     Lalito Mccall, BASIL-CNP  02/25/24 1126

## 2024-02-26 LAB — BACTERIA UR CULT: NORMAL

## 2024-02-28 ENCOUNTER — APPOINTMENT (OUTPATIENT)
Dept: RADIOLOGY | Facility: HOSPITAL | Age: 74
End: 2024-02-28
Payer: MEDICARE

## 2024-02-28 ENCOUNTER — HOSPITAL ENCOUNTER (EMERGENCY)
Facility: HOSPITAL | Age: 74
Discharge: HOME | End: 2024-02-28
Attending: STUDENT IN AN ORGANIZED HEALTH CARE EDUCATION/TRAINING PROGRAM
Payer: MEDICARE

## 2024-02-28 VITALS
HEART RATE: 82 BPM | SYSTOLIC BLOOD PRESSURE: 145 MMHG | BODY MASS INDEX: 29.07 KG/M2 | DIASTOLIC BLOOD PRESSURE: 71 MMHG | WEIGHT: 154 LBS | OXYGEN SATURATION: 100 % | HEIGHT: 61 IN | TEMPERATURE: 97.9 F | RESPIRATION RATE: 16 BRPM

## 2024-02-28 DIAGNOSIS — N28.89 URETERAL MASS: Primary | ICD-10-CM

## 2024-02-28 DIAGNOSIS — R31.9 HEMATURIA, UNSPECIFIED TYPE: ICD-10-CM

## 2024-02-28 LAB
ALBUMIN SERPL BCP-MCNC: 3.7 G/DL (ref 3.4–5)
ALP SERPL-CCNC: 71 U/L (ref 33–136)
ALT SERPL W P-5'-P-CCNC: 10 U/L (ref 7–45)
ANION GAP SERPL CALC-SCNC: 13 MMOL/L (ref 10–20)
APPEARANCE UR: ABNORMAL
AST SERPL W P-5'-P-CCNC: 10 U/L (ref 9–39)
BASOPHILS # BLD AUTO: 0.03 X10*3/UL (ref 0–0.1)
BASOPHILS NFR BLD AUTO: 0.4 %
BILIRUB SERPL-MCNC: 0.5 MG/DL (ref 0–1.2)
BILIRUB UR STRIP.AUTO-MCNC: NEGATIVE MG/DL
BUN SERPL-MCNC: 20 MG/DL (ref 6–23)
CALCIUM SERPL-MCNC: 9.1 MG/DL (ref 8.6–10.3)
CHLORIDE SERPL-SCNC: 101 MMOL/L (ref 98–107)
CO2 SERPL-SCNC: 27 MMOL/L (ref 21–32)
COLOR UR: ABNORMAL
CREAT SERPL-MCNC: 1.14 MG/DL (ref 0.5–1.05)
EGFRCR SERPLBLD CKD-EPI 2021: 51 ML/MIN/1.73M*2
EOSINOPHIL # BLD AUTO: 0.07 X10*3/UL (ref 0–0.4)
EOSINOPHIL NFR BLD AUTO: 1 %
ERYTHROCYTE [DISTWIDTH] IN BLOOD BY AUTOMATED COUNT: 13.4 % (ref 11.5–14.5)
GLUCOSE SERPL-MCNC: 91 MG/DL (ref 74–99)
GLUCOSE UR STRIP.AUTO-MCNC: NEGATIVE MG/DL
HCT VFR BLD AUTO: 33.1 % (ref 36–46)
HGB BLD-MCNC: 10.8 G/DL (ref 12–16)
IMM GRANULOCYTES # BLD AUTO: 0.04 X10*3/UL (ref 0–0.5)
IMM GRANULOCYTES NFR BLD AUTO: 0.6 % (ref 0–0.9)
KETONES UR STRIP.AUTO-MCNC: ABNORMAL MG/DL
LEUKOCYTE ESTERASE UR QL STRIP.AUTO: NEGATIVE
LYMPHOCYTES # BLD AUTO: 0.92 X10*3/UL (ref 0.8–3)
LYMPHOCYTES NFR BLD AUTO: 13.1 %
MAGNESIUM SERPL-MCNC: 1.92 MG/DL (ref 1.6–2.4)
MCH RBC QN AUTO: 30.2 PG (ref 26–34)
MCHC RBC AUTO-ENTMCNC: 32.6 G/DL (ref 32–36)
MCV RBC AUTO: 93 FL (ref 80–100)
MONOCYTES # BLD AUTO: 0.68 X10*3/UL (ref 0.05–0.8)
MONOCYTES NFR BLD AUTO: 9.7 %
MUCOUS THREADS #/AREA URNS AUTO: ABNORMAL /LPF
NEUTROPHILS # BLD AUTO: 5.28 X10*3/UL (ref 1.6–5.5)
NEUTROPHILS NFR BLD AUTO: 75.2 %
NITRITE UR QL STRIP.AUTO: NEGATIVE
NRBC BLD-RTO: 0 /100 WBCS (ref 0–0)
PH UR STRIP.AUTO: 5 [PH]
PLATELET # BLD AUTO: 222 X10*3/UL (ref 150–450)
POTASSIUM SERPL-SCNC: 3.3 MMOL/L (ref 3.5–5.3)
PROT SERPL-MCNC: 7 G/DL (ref 6.4–8.2)
PROT UR STRIP.AUTO-MCNC: ABNORMAL MG/DL
RBC # BLD AUTO: 3.58 X10*6/UL (ref 4–5.2)
RBC # UR STRIP.AUTO: ABNORMAL /UL
RBC #/AREA URNS AUTO: >20 /HPF
SODIUM SERPL-SCNC: 138 MMOL/L (ref 136–145)
SP GR UR STRIP.AUTO: 1.02
SQUAMOUS #/AREA URNS AUTO: ABNORMAL /HPF
UROBILINOGEN UR STRIP.AUTO-MCNC: <2 MG/DL
WBC # BLD AUTO: 7 X10*3/UL (ref 4.4–11.3)
WBC #/AREA URNS AUTO: ABNORMAL /HPF

## 2024-02-28 PROCEDURE — 2500000004 HC RX 250 GENERAL PHARMACY W/ HCPCS (ALT 636 FOR OP/ED): Performed by: STUDENT IN AN ORGANIZED HEALTH CARE EDUCATION/TRAINING PROGRAM

## 2024-02-28 PROCEDURE — 36415 COLL VENOUS BLD VENIPUNCTURE: CPT | Performed by: STUDENT IN AN ORGANIZED HEALTH CARE EDUCATION/TRAINING PROGRAM

## 2024-02-28 PROCEDURE — 71260 CT THORAX DX C+: CPT

## 2024-02-28 PROCEDURE — 96374 THER/PROPH/DIAG INJ IV PUSH: CPT

## 2024-02-28 PROCEDURE — 2550000001 HC RX 255 CONTRASTS: Performed by: STUDENT IN AN ORGANIZED HEALTH CARE EDUCATION/TRAINING PROGRAM

## 2024-02-28 PROCEDURE — 80053 COMPREHEN METABOLIC PANEL: CPT | Performed by: STUDENT IN AN ORGANIZED HEALTH CARE EDUCATION/TRAINING PROGRAM

## 2024-02-28 PROCEDURE — 99284 EMERGENCY DEPT VISIT MOD MDM: CPT | Mod: 25

## 2024-02-28 PROCEDURE — 96376 TX/PRO/DX INJ SAME DRUG ADON: CPT

## 2024-02-28 PROCEDURE — 74178 CT ABD&PLV WO CNTR FLWD CNTR: CPT

## 2024-02-28 PROCEDURE — 71260 CT THORAX DX C+: CPT | Performed by: RADIOLOGY

## 2024-02-28 PROCEDURE — 81001 URINALYSIS AUTO W/SCOPE: CPT | Performed by: STUDENT IN AN ORGANIZED HEALTH CARE EDUCATION/TRAINING PROGRAM

## 2024-02-28 PROCEDURE — 2500000004 HC RX 250 GENERAL PHARMACY W/ HCPCS (ALT 636 FOR OP/ED)

## 2024-02-28 PROCEDURE — 76376 3D RENDER W/INTRP POSTPROCES: CPT | Performed by: RADIOLOGY

## 2024-02-28 PROCEDURE — 87086 URINE CULTURE/COLONY COUNT: CPT | Mod: GEALAB | Performed by: STUDENT IN AN ORGANIZED HEALTH CARE EDUCATION/TRAINING PROGRAM

## 2024-02-28 PROCEDURE — 83735 ASSAY OF MAGNESIUM: CPT | Performed by: STUDENT IN AN ORGANIZED HEALTH CARE EDUCATION/TRAINING PROGRAM

## 2024-02-28 PROCEDURE — 74178 CT ABD&PLV WO CNTR FLWD CNTR: CPT | Performed by: RADIOLOGY

## 2024-02-28 PROCEDURE — 85025 COMPLETE CBC W/AUTO DIFF WBC: CPT | Performed by: STUDENT IN AN ORGANIZED HEALTH CARE EDUCATION/TRAINING PROGRAM

## 2024-02-28 PROCEDURE — 96375 TX/PRO/DX INJ NEW DRUG ADDON: CPT

## 2024-02-28 RX ORDER — MORPHINE SULFATE 4 MG/ML
4 INJECTION INTRAVENOUS ONCE
Status: COMPLETED | OUTPATIENT
Start: 2024-02-28 | End: 2024-02-28

## 2024-02-28 RX ORDER — MORPHINE SULFATE 4 MG/ML
INJECTION INTRAVENOUS
Status: COMPLETED
Start: 2024-02-28 | End: 2024-02-28

## 2024-02-28 RX ORDER — DOCUSATE SODIUM 100 MG/1
100 CAPSULE, LIQUID FILLED ORAL 2 TIMES DAILY
Qty: 60 CAPSULE | Refills: 0 | Status: SHIPPED | OUTPATIENT
Start: 2024-02-28

## 2024-02-28 RX ORDER — OXYCODONE AND ACETAMINOPHEN 5; 325 MG/1; MG/1
1 TABLET ORAL EVERY 6 HOURS PRN
Qty: 5 TABLET | Refills: 0 | Status: SHIPPED | OUTPATIENT
Start: 2024-02-28 | End: 2024-03-02

## 2024-02-28 RX ORDER — ONDANSETRON HYDROCHLORIDE 2 MG/ML
4 INJECTION, SOLUTION INTRAVENOUS ONCE
Status: COMPLETED | OUTPATIENT
Start: 2024-02-28 | End: 2024-02-28

## 2024-02-28 RX ADMIN — MORPHINE SULFATE 4 MG: 4 INJECTION INTRAVENOUS at 10:55

## 2024-02-28 RX ADMIN — MORPHINE SULFATE 4 MG: 4 INJECTION INTRAVENOUS at 16:14

## 2024-02-28 RX ADMIN — IOHEXOL 80 ML: 350 INJECTION, SOLUTION INTRAVENOUS at 14:22

## 2024-02-28 RX ADMIN — ONDANSETRON 4 MG: 2 INJECTION INTRAMUSCULAR; INTRAVENOUS at 10:55

## 2024-02-28 ASSESSMENT — LIFESTYLE VARIABLES
EVER FELT BAD OR GUILTY ABOUT YOUR DRINKING: NO
HAVE YOU EVER FELT YOU SHOULD CUT DOWN ON YOUR DRINKING: NO
HAVE PEOPLE ANNOYED YOU BY CRITICIZING YOUR DRINKING: NO
EVER HAD A DRINK FIRST THING IN THE MORNING TO STEADY YOUR NERVES TO GET RID OF A HANGOVER: NO

## 2024-02-28 ASSESSMENT — PAIN DESCRIPTION - PAIN TYPE: TYPE: ACUTE PAIN

## 2024-02-28 ASSESSMENT — PAIN DESCRIPTION - DESCRIPTORS: DESCRIPTORS: ACHING

## 2024-02-28 ASSESSMENT — COLUMBIA-SUICIDE SEVERITY RATING SCALE - C-SSRS
1. IN THE PAST MONTH, HAVE YOU WISHED YOU WERE DEAD OR WISHED YOU COULD GO TO SLEEP AND NOT WAKE UP?: NO
6. HAVE YOU EVER DONE ANYTHING, STARTED TO DO ANYTHING, OR PREPARED TO DO ANYTHING TO END YOUR LIFE?: NO
2. HAVE YOU ACTUALLY HAD ANY THOUGHTS OF KILLING YOURSELF?: NO

## 2024-02-28 ASSESSMENT — PAIN DESCRIPTION - ORIENTATION: ORIENTATION: LEFT

## 2024-02-28 ASSESSMENT — PAIN SCALES - GENERAL: PAINLEVEL_OUTOF10: 8

## 2024-02-28 ASSESSMENT — PAIN - FUNCTIONAL ASSESSMENT: PAIN_FUNCTIONAL_ASSESSMENT: 0-10

## 2024-02-28 ASSESSMENT — PAIN DESCRIPTION - LOCATION: LOCATION: ABDOMEN

## 2024-02-28 NOTE — ED PROVIDER NOTES
HPI   Chief Complaint   Patient presents with    Flank Pain       HPI     Patient is a 73-year-old female presents the emergency department today in the setting of flank pain.  Was recently diagnosed with a renal mass with outpatient plans for nephrectomy with Dr. Patten.  Was seen here on the weekend was discharged with Percocet and Keflex for possible UTI as well.  Has been taking her medications as out of pain management medications at this time and has not been unable to get a hold of Dr. Urban to schedule this appointment.  Prompted to come back in today.  Denies any fevers, chills still having urinary output, hematuria noted as well.               [place] Coma Scale Score: 15                     Patient History   Past Medical History:   Diagnosis Date    Other specified disorders of kidney and ureter 03/04/2022    Ureteral mass    Personal history of malignant neoplasm of cervix uteri 11/21/2022    History of malignant neoplasm of cervix     Past Surgical History:   Procedure Laterality Date    OTHER SURGICAL HISTORY  10/07/2021    Hysterectomy     Family History   Problem Relation Name Age of Onset    Macular degeneration Mother      Glaucoma Other grandparent     Macular degeneration Other grandparent      Social History     Tobacco Use    Smoking status: Never    Smokeless tobacco: Never   Substance Use Topics    Alcohol use: Never    Drug use: Never       Physical Exam   ED Triage Vitals [02/28/24 1036]   Temperature Heart Rate Respirations BP   36.6 °C (97.9 °F) 89 18 160/85      Pulse Ox Temp Source Heart Rate Source Patient Position   96 % Temporal Monitor --      BP Location FiO2 (%)     -- --       Physical Exam  Vitals and nursing note reviewed.   Constitutional:       General: She is not in acute distress.     Appearance: She is well-developed.   HENT:      Head: Normocephalic and atraumatic.   Eyes:      Conjunctiva/sclera: Conjunctivae normal.   Cardiovascular:      Rate and Rhythm: Normal rate  and regular rhythm.      Heart sounds: No murmur heard.  Pulmonary:      Effort: Pulmonary effort is normal. No respiratory distress.      Breath sounds: Normal breath sounds.   Abdominal:      Palpations: Abdomen is soft.      Tenderness: There is abdominal tenderness. There is left CVA tenderness.   Musculoskeletal:         General: No swelling.      Cervical back: Neck supple.   Skin:     General: Skin is warm and dry.      Capillary Refill: Capillary refill takes less than 2 seconds.   Neurological:      Mental Status: She is alert.   Psychiatric:         Mood and Affect: Mood normal.         ED Course & MDM   Diagnoses as of 03/02/24 0045   Ureteral mass   Hematuria, unspecified type       Medical Decision Making  Female presenting as above.  Arrival hemodynamically stable afebrile, nontachycardic and mildly hypertensive on bedside assessment she is uncomfortable but nontoxic-appearing.  She has left-sided flank tenderness as well as suprapubic tenderness.  This is continued pain and she is out of her pain management medications repeat labs, urinalysis and pain management pursued here.  Will try to get a hold of Dr. Weaver for plans.  Did discuss the plan for possible IR for nephrostomy tube however patient states unaware if this is an option, we will reconvene with Dr. Urban and present options to her.    \Workup reviewed, discussed with Dr. Weaver who states that patient is actually  urologist patient, discussed with Dr. Ponce who recommends increased scan CT chest and CT urogram which were ordered.  These are pending and patient would be able to see Dr. Ponce close outpatient he states.  Pending CTs for disposition and reconvening with Dr. Chow, signed out.    Procedure  Procedures     Neha Nevarez MD  03/02/24 0046

## 2024-02-28 NOTE — PROGRESS NOTES
This was a signout on February 28, 2024 at 1300 hrs.  The patient was pending a CT angio gram.  I spoke with Dr. Angélica humhprey via epic chat he will see her tomorrow morning at 9 AM.  Patient was given a second dose of morphine for pain.  She feels good to go home.  She will be given a prescription for Percocet and Colace.    Anastasia Vanessa, DO

## 2024-02-28 NOTE — ED TRIAGE NOTES
Patient was seen here on Sunday for left flank pain and hematuria, she was diagnosed with a left kidney mass, sent home with pain meds and a urology follow up. Patient was unable to get a follow up appointment with urology and ran out of her pain meds. Patient is still c/o left flank pain, hematuria and OTC pain meds have not helped.   
Patient

## 2024-02-29 ENCOUNTER — OFFICE VISIT (OUTPATIENT)
Dept: UROLOGY | Facility: CLINIC | Age: 74
End: 2024-02-29
Payer: MEDICARE

## 2024-02-29 ENCOUNTER — ANESTHESIA EVENT (OUTPATIENT)
Dept: OPERATING ROOM | Facility: HOSPITAL | Age: 74
End: 2024-02-29
Payer: MEDICARE

## 2024-02-29 ENCOUNTER — PRE-ADMISSION TESTING (OUTPATIENT)
Dept: PREADMISSION TESTING | Facility: HOSPITAL | Age: 74
End: 2024-02-29
Payer: MEDICARE

## 2024-02-29 DIAGNOSIS — N13.30 HYDRONEPHROSIS, LEFT: ICD-10-CM

## 2024-02-29 DIAGNOSIS — N26.1 ATROPHY OF LEFT KIDNEY: ICD-10-CM

## 2024-02-29 DIAGNOSIS — N28.89 URETERAL MASS: Primary | ICD-10-CM

## 2024-02-29 LAB
BACTERIA UR CULT: NO GROWTH
HOLD SPECIMEN: NORMAL

## 2024-02-29 PROCEDURE — 1126F AMNT PAIN NOTED NONE PRSNT: CPT | Performed by: STUDENT IN AN ORGANIZED HEALTH CARE EDUCATION/TRAINING PROGRAM

## 2024-02-29 PROCEDURE — 1036F TOBACCO NON-USER: CPT | Performed by: STUDENT IN AN ORGANIZED HEALTH CARE EDUCATION/TRAINING PROGRAM

## 2024-02-29 PROCEDURE — 1157F ADVNC CARE PLAN IN RCRD: CPT | Performed by: STUDENT IN AN ORGANIZED HEALTH CARE EDUCATION/TRAINING PROGRAM

## 2024-02-29 PROCEDURE — 99215 OFFICE O/P EST HI 40 MIN: CPT | Performed by: STUDENT IN AN ORGANIZED HEALTH CARE EDUCATION/TRAINING PROGRAM

## 2024-02-29 PROCEDURE — 1159F MED LIST DOCD IN RCRD: CPT | Performed by: STUDENT IN AN ORGANIZED HEALTH CARE EDUCATION/TRAINING PROGRAM

## 2024-02-29 RX ORDER — CEFAZOLIN SODIUM 2 G/100ML
2 INJECTION, SOLUTION INTRAVENOUS ONCE
Status: CANCELLED | OUTPATIENT
Start: 2024-02-29 | End: 2024-02-29

## 2024-02-29 ASSESSMENT — DUKE ACTIVITY SCORE INDEX (DASI)
CAN YOU RUN A SHORT DISTANCE: NO
CAN YOU DO MODERATE WORK AROUND THE HOUSE LIKE VACUUMING, SWEEPING FLOORS OR CARRYING GROCERIES: NO
CAN YOU HAVE SEXUAL RELATIONS: YES
TOTAL_SCORE: 20.7
CAN YOU DO YARD WORK LIKE RAKING LEAVES, WEEDING OR PUSHING A MOWER: NO
CAN YOU WALK INDOORS, SUCH AS AROUND YOUR HOUSE: YES
CAN YOU DO LIGHT WORK AROUND THE HOUSE LIKE DUSTING OR WASHING DISHES: YES
CAN YOU PARTICIPATE IN STRENOUS SPORTS LIKE SWIMMING, SINGLES TENNIS, FOOTBALL, BASKETBALL, OR SKIING: NO
DASI METS SCORE: 5.3
CAN YOU PARTICIPATE IN MODERATE RECREATIONAL ACTIVITIES LIKE GOLF, BOWLING, DANCING, DOUBLES TENNIS OR THROWING A BASEBALL OR FOOTBALL: NO
CAN YOU DO HEAVY WORK AROUND THE HOUSE LIKE SCRUBBING FLOORS OR LIFTING AND MOVING HEAVY FURNITURE: NO
CAN YOU CLIMB A FLIGHT OF STAIRS OR WALK UP A HILL: YES
CAN YOU WALK A BLOCK OR TWO ON LEVEL GROUND: YES
CAN YOU TAKE CARE OF YOURSELF (EAT, DRESS, BATHE, OR USE TOILET): YES

## 2024-02-29 ASSESSMENT — ENCOUNTER SYMPTOMS
HEMATURIA: 1
FLANK PAIN: 1
CONSTIPATION: 1
BACK PAIN: 1

## 2024-02-29 NOTE — PREPROCEDURE INSTRUCTIONS
Medication List            Accurate as of February 29, 2024 12:04 PM. Always use your most recent med list.                cephalexin 500 mg capsule  Commonly known as: Keflex  Medication Adjustments for Surgery: Take morning of surgery with sip of water, no other fluids     docusate sodium 100 mg capsule  Commonly known as: Colace  Take 1 capsule (100 mg) by mouth 2 times a day.  Medication Adjustments for Surgery: Take morning of surgery with sip of water, no other fluids     hydroCHLOROthiazide 25 mg tablet  Commonly known as: HYDRODiuril  Medication Adjustments for Surgery: Take morning of surgery with sip of water, no other fluids     JOINT HEALTH ORAL  Medication Adjustments for Surgery: Take morning of surgery with sip of water, no other fluids     MELATONIN ORAL  Medication Adjustments for Surgery: Continue until night before surgery     oxyCODONE-acetaminophen 5-325 mg tablet  Commonly known as: Percocet  Take 1 tablet by mouth every 6 hours if needed for severe pain (7 - 10) for up to 3 days.  Medication Adjustments for Surgery: Take morning of surgery with sip of water, no other fluids     potassium chloride CR 10 mEq ER tablet  Commonly known as: Klor-Con  Medication Adjustments for Surgery: Take morning of surgery with sip of water, no other fluids     turmeric 400 mg capsule  Medication Adjustments for Surgery: Continue until night before surgery     * VITAMINS A,C,E-ZINC-COPPER ORAL  Medication Adjustments for Surgery: Continue until night before surgery     * PreserVision AREDS 4,296 mcg-226 mg-90 mg capsule  Generic drug: vitamins A,C,E-zinc-copper  Medication Adjustments for Surgery: Continue until night before surgery           * This list has 2 medication(s) that are the same as other medications prescribed for you. Read the directions carefully, and ask your doctor or other care provider to review them with you.                                  NPO Instructions:    Do not eat any food after  midnight the night before your surgery/procedure.  You may have clear liquids until TWO hours before surgery/procedure. This includes water, black tea/coffee, (no milk or cream) apple juice and electrolyte drinks (Gatorade).  You may chew gum up to TWO hours before your surgery/procedure.    Additional Instructions:     Wear  comfortable loose fitting clothing  Do not use moisturizers, creams, lotions or perfume  All jewelry and valuables should be left at home  We will call you the business day before your procedure between 1-3p to confirm your procedure and arrival time  Please park in the back of the hospital, in the ED parking and proceed to the second floor  Please make arrangements to have a responsible adult take you home and stay with you for 24 hours  Please bring your ID and insurance card to your procedure    When checked in to the outpatient unit, you will be asked to change into a hospital gown and remove all clothing, including underwear  An IV will be inserted   Your family or  will be asked to wait in the waiting room or cafeteria and will be notified when able to come sit with you  Please call [phone] with any further questions

## 2024-02-29 NOTE — ANESTHESIA PREPROCEDURE EVALUATION
Patient: Terra Alexis    Procedure Information       Date/Time: 03/01/24 1200    Procedure: Biopsy Ureter (Left)    Location: [facility]    Surgeons: Teddy Ross MD            Relevant Problems   Cardiovascular   (+) Essential hypertension   (+) Hypertension      /Renal   (+) Hydronephrosis   (+) Nonfunctioning kidney      Neuro/Psych   (+) Cubital tunnel syndrome   (+) Depression with anxiety      Musculoskeletal   (+) Primary localized osteoarthritis of right knee      Infectious Disease   (+) URI (upper respiratory infection)      Other   (+) Arthritis       Clinical information reviewed:    Allergies  Meds             Chart reviewed.  No clearances ordered.    There were no vitals filed for this visit.    Past Surgical History:   Procedure Laterality Date    OTHER SURGICAL HISTORY  10/07/2021    Hysterectomy     Past Medical History:   Diagnosis Date    Other specified disorders of kidney and ureter 03/04/2022    Ureteral mass    Personal history of malignant neoplasm of cervix uteri 11/21/2022    History of malignant neoplasm of cervix       Current Outpatient Medications:     cartilage/collagen/bor/hyalur (JOINT HEALTH ORAL), Take by mouth.  Joint Health CAPS Refills: 0 Active, Disp: , Rfl:     cephalexin (Keflex) 500 mg capsule, Take 1 capsule (500 mg) by mouth. TAKE 1 CAPSULE Once Please take after stent removal, Disp: , Rfl:     docusate sodium (Colace) 100 mg capsule, Take 1 capsule (100 mg) by mouth 2 times a day., Disp: 60 capsule, Rfl: 0    hydroCHLOROthiazide (HYDRODiuril) 25 mg tablet, Take 1 tablet (25 mg) by mouth once daily., Disp: , Rfl:     MELATONIN ORAL, Take by mouth if needed., Disp: , Rfl:     oxyCODONE-acetaminophen (Percocet) 5-325 mg tablet, Take 1 tablet by mouth every 6 hours if needed for severe pain (7 - 10) for up to 3 days., Disp: 5 tablet, Rfl: 0    potassium chloride CR 10 mEq ER tablet, Take 1 tablet (10 mEq) by mouth once daily., Disp: , Rfl:      turmeric 400 mg capsule, once daily., Disp: , Rfl:     vit A/vit C/vit E/zinc/copper (VITAMINS A,C,E-ZINC-COPPER ORAL), Take by mouth.  PreserVision AREDS CAPS Refills: 0 Active, Disp: , Rfl:     vitamins A,C,E-zinc-copper (PreserVision AREDS) 4,296 mcg-226 mg-90 mg capsule, Take by mouth., Disp: , Rfl:   No current facility-administered medications for this visit.  Prior to Admission medications    Medication Sig Start Date End Date Taking? Authorizing Provider   cartilage/collagen/bor/hyalur (JOINT HEALTH ORAL) Take by mouth.  Joint Health CAPS Refills: 0 Active   Yes Historical Provider, MD   cephalexin (Keflex) 500 mg capsule Take 1 capsule (500 mg) by mouth. TAKE 1 CAPSULE Once Please take after stent removal 3/4/22  Yes Historical Provider, MD   docusate sodium (Colace) 100 mg capsule Take 1 capsule (100 mg) by mouth 2 times a day. 2/28/24  Yes Anastasia Vanessa, DO   hydroCHLOROthiazide (HYDRODiuril) 25 mg tablet Take 1 tablet (25 mg) by mouth once daily.   Yes Historical Provider, MD   MELATONIN ORAL Take by mouth if needed.   Yes Historical Provider, MD   oxyCODONE-acetaminophen (Percocet) 5-325 mg tablet Take 1 tablet by mouth every 6 hours if needed for severe pain (7 - 10) for up to 3 days. 2/28/24 3/2/24 Yes Anastasia Vanessa, DO   potassium chloride CR 10 mEq ER tablet Take 1 tablet (10 mEq) by mouth once daily.   Yes Historical Provider, MD   turmeric 400 mg capsule once daily. 6/10/21  Yes Historical Provider, MD   vit A/vit C/vit E/zinc/copper (VITAMINS A,C,E-ZINC-COPPER ORAL) Take by mouth.  PreserVision AREDS CAPS Refills: 0 Active   Yes Historical Provider, MD   vitamins A,C,E-zinc-copper (PreserVision AREDS) 4,296 mcg-226 mg-90 mg capsule Take by mouth.   Yes Historical Provider, MD   acetaminophen (Tylenol) 500 mg tablet Take 2 tablets (1,000 mg) by mouth every 8 hours if needed (pain). 4/8/21 2/29/24  Historical Provider, MD   amLODIPine (Norvasc) 5 mg tablet Take by mouth once  every 24 hours. 11/21/22 2/29/24  Historical Provider, MD   beneprotein Take by mouth. Protein Oral Powder Refills: 0 Active  2/29/24  Historical Provider, MD   cephalexin (Keflex) 500 mg capsule Take 1 capsule (500 mg) by mouth 4 times a day for 10 days. 2/25/24 2/29/24  [facility] Jaramillo   erythromycin (Romycin) 5 mg/gram (0.5 %) ophthalmic ointment 4 times a day.  apply 1 inch ribbon to both upper lids 4 times a day 3/28/23 2/29/24  Historical Provider, MD   escitalopram (Lexapro) 10 mg tablet Take 1 tablet (10 mg) by mouth once daily. 8/20/23 2/29/24  Historical Provider, MD   famotidine (Pepcid) 40 mg tablet Take by mouth once every 24 hours. 12/5/22 2/29/24  Historical Provider, MD   hydroCHLOROthiazide (Microzide) 12.5 mg capsule Take by mouth once every 24 hours. 12/5/22 2/29/24  Historical Provider, MD   NON FORMULARY Multi Collagen Protein daily 8/17/21 2/29/24  Historical Provider, MD   NON FORMULARY HA Joint formula daily 6/10/21 2/29/24  Historical Provider, MD   oxyCODONE-acetaminophen (Percocet) 5-325 mg tablet Take 1 tablet by mouth every 6 hours if needed for severe pain (7 - 10) for up to 3 days. 2/25/24 2/28/24  [facility] Jaramillo     Allergies   Allergen Reactions    Codeine GI Upset, Other and Nausea/vomiting     Social History     Tobacco Use    Smoking status: Never    Smokeless tobacco: Never   Substance Use Topics    Alcohol use: Never         Chemistry    Lab Results   Component Value Date/Time     02/28/2024 1053    K 3.3 (L) 02/28/2024 1053     02/28/2024 1053    CO2 27 02/28/2024 1053    BUN 20 02/28/2024 1053    CREATININE 1.14 (H) 02/28/2024 1053    Lab Results   Component Value Date/Time    CALCIUM 9.1 02/28/2024 1053    ALKPHOS 71 02/28/2024 1053    AST 10 02/28/2024 1053    ALT 10 02/28/2024 1053    BILITOT 0.5 02/28/2024 1053          Lab Results   Component Value Date/Time    WBC 7.0 02/28/2024 1053    HGB 10.8 (L) 02/28/2024 1053    HCT 33.1 (L)  02/28/2024 1053     02/28/2024 1053     Lab Results   Component Value Date/Time    PROTIME 10.5 01/31/2022 1039    INR 0.9 01/31/2022 1039       NPO Detail:  No data recorded     Physical Exam    Airway  Mallampati: II  TM distance: >3 FB  Neck ROM: full     Cardiovascular    Dental - normal exam     Pulmonary    Abdominal            Anesthesia Plan    History of general anesthesia?: yes  History of complications of general anesthesia?: no    ASA 3     general     The patient is not a current smoker.    intravenous induction   Anesthetic plan and risks discussed with patient.

## 2024-02-29 NOTE — PROGRESS NOTES
Subjective   Patient ID: Terra Alexis is a 73 y.o. female who presents with few days of severe flank pain and hematuria. Has had constant back pain in the left kidney region for the past few months which she attributed to arthritis. Also reports hematuria, bloating, and constipation since 2/23/2024.    Seen at ED on 2/25/2024 and 2/28/2024 for left flank pain and hematuria. Was discharged with Percocet and Keflex for possible UTI.    CT in ED showed an enlarged left ureteral mass with possible invasion of the iliac vessels and mesenteric vessels.    Patient had hematuria one year ago, saw Dr. Brizuela and Dr. Stephens. At that point she had stricture in ureter. Biopsy was negative. Had two stents placed.  With progression of her situation, the hydronephrosis got worse and a MAG3 scan showed 8% split renal function in the left side.   Had cervical cancer was 25 years ago, she had complete hysterectomy, chemotherapy, and radiation for treatment.     No hx of smoking, she dyes her hair.      2/28/2024, CT chest and CT urography  IMPRESSION:  1.  Solid enhancing mass inseparable from and potentially arising from the distal left ureter which probably encases and could invade  adjacent iliac vessels with mild newly seen pelvic vascular collaterals. The mass is also inseparable from adjacent mesenteric vessels.  2. Marked left-sided hydronephrosis and postobstructive changes left kidney.  3. There is probable blood product in proximal ureter, intrarenal collecting system, urinary bladder potentially distal ureter versus additional tumor within the distal ureter extending to the intramural portion of the bladder trigone. Attention recommended on follow-up assessment.  4. No CT evidence of distant metastases.  5. Probably benign liver hypodensities 1 of which contains fat and 1 of which is too small to characterize. Attention recommended on  follow-up assessment.  6. Grossly stable mild probably benign gallbladder wall  prominence likely related to chronic benign wall prominence. Attention recommended on follow-up assessment. Consider gallbladder ultrasound correlation.  7. Bibasilar platelike atelectasis.  8. Probably benign fissural nodule left lower lung most likely incidental. Attention recommended on follow-up assessment.  9. Incompletely image 14 x 10 mm nodule left submandibular region potentially lymph node. Attention recommended on follow-up assessment.    2/25/2024, CT abdomen pelvis  IMPRESSION:  1.  Soft tissue mass encasing the distal segment of the left ureter as above which corresponds to the area of previously reported distal ureteral mass on prior study from 12/06/2021 with associated severe hydronephrosis and proximal hydroureter.  2. Hemorrhagic products within the left ureter proximal to the mass extending to the left renal pelvis.  3. Severe left renal atrophy.  4. Postoperative changes related to prior hysterectomy. Correlation with surgical history recommended.  5. Suspect cholelithiasis. Correlation with ultrasound findings suggested.      12/06/2021, CT urography  IMPRESSION:  OBSTRUCTING DISTAL LEFT URETERAL FILLING DEFECT DESCRIBED ABOVE IN  GREATER DETAIL IS CAUSING THE MILD LEFT HYDRONEPHROSIS AND  HYDROURETER AS WELL AS DELAYED NEPHROGRAM FROM THE DEGREE OF  OBSTRUCTIVE UROPATHY ON THE LEFT SIDE. While it could theoretically  be blood products, there is measurable enhancement (as I have  annotated on a few axial, sagittal and coronal images from each of  the three phases of this exam) within the filling defect, highly  suspect for urothelial neoplasm     THE ONLY STONE IN THE ENTIRE URINARY TRACT IS A LESS THAN 3 MM (TOO  SMALL TO ACCURATELY MEASURE ATTENUATION) NONOBSTRUCTING LEFT RENAL  STONE     NO OTHER ACUTE OR CONTRIBUTORY ABNORMALITY     NO SYNCHRONOUS ADDITIONAL UROTHELIAL MASS. THE ENTIRE RIGHT SIDE OF  THE UPPER TRACT AND THE ENTIRETY OF THE URINARY BLADDER LUMEN WERE  BOTH WELL OPACIFIED  WITH EXCRETED CONTRAST DURING THE EXCRETORY PHASE  AND CLEAR. UNFORTUNATELY, DUE TO THE OBSTRUCTIVE UROPATHY ON THE LEFT  SIDE, SIGNIFICANT PORTIONS OF THE (NONDEPENDENT) LEFT COLLECTING  SYSTEM AND ESSENTIALLY THE ENTIRETY OF THE LEFT URETER COULD NOT BE  EVALUATED FOR A SYNCHRONOUS MASS. THE LEFT URETERAL MASS IS EVIDENT  DUE TO ITS SIZE AND ABRUPT TRANSITION POINT IN THE CALIBER OF THE  URETER     NO METASTATIC DISEASE IN THE ABDOMEN OR PELVIS     NO RIGHT HYDROURETERONEPHROSIS     NO EVIDENCE OF ACUTE INFLAMMATION ANYWHERE IN THE URINARY TRACT     NO SOLID RENAL PARENCHYMAL MASS     NO VARIANT ANATOMY SUCH AS URACHAL REMNANT OR DUPLICATED/ECTOPIC  URETERS     NO ACUTE UNANTICIPATED PROCESS IN THE ABDOMEN OR PELVIS OUTSIDE THE  URINARY TRACT, ONLY THE 1 CM OR SMALLER, ENTIRELY FAT COMPOSITION  MEDIAL SEGMENT LEFT LOBE HEPATIC LESION MOST CONSISTENT WITH A LIPOMA      2/25/2024 and 2/28/2024, Urinalysis microscopic  Component      Latest Ref Rng 2/25/2024 2/28/2024   WBC, Urine      1-5, NONE /HPF >50 !  11-20 !    RBC, Urine      NONE, 1-2, 3-5 /HPF >20 !  >20 !    Squamous Epithelial Cells, Urine      Reference range not established. /HPF  1-9 (SPARSE)    Mucus, Urine      Reference range not established. /LPF  FEW       Legend:  ! Abnormal    2/25/2024 and 2/28/2024, Urinalysis with reflex culture and microscopic  Component      Latest Ref Rng 2/25/2024 2/28/2024   Color, Urine      Straw, Yellow  Red ! (N)  Eliza ! (N)    Appearance, Urine      Clear  Hazy ! (N)  Hazy ! (N)    Specific Gravity, Urine      1.005 - 1.035  1.018  1.025    pH, Urine      5.0, 5.5, 6.0, 6.5, 7.0, 7.5, 8.0  6.0  5.0    Protein, Urine      NEGATIVE mg/dL 100 (2+) ! (N)  >=500 (3+) ! (N)    Glucose, Urine      NEGATIVE mg/dL NEGATIVE  NEGATIVE    Blood, Urine      NEGATIVE  LARGE (3+) !  LARGE (3+) !    Ketones, Urine      NEGATIVE mg/dL NEGATIVE  5 (TRACE) !    Bilirubin, Urine      NEGATIVE  NEGATIVE  NEGATIVE    Urobilinogen,  Urine      <2.0 mg/dL <2.0  <2.0    Nitrite, Urine      NEGATIVE  NEGATIVE  NEGATIVE    Leukocyte Esterase, Urine      NEGATIVE  NEGATIVE  NEGATIVE       Legend:  ! (N) Normal  ! Abnormal    2/25/2024 and 2/28/2024, CMP  Component      Latest Ref Rn 2/25/2024 2/28/2024   GLUCOSE      74 - 99 mg/dL 121 (H)  91    SODIUM      136 - 145 mmol/L 138  138    POTASSIUM      3.5 - 5.3 mmol/L 3.5  3.3 (L)    CHLORIDE      98 - 107 mmol/L 102  101    Bicarbonate      21 - 32 mmol/L 28  27    Anion Gap      10 - 20 mmol/L 12  13    Blood Urea Nitrogen      6 - 23 mg/dL 21  20    Creatinine      0.50 - 1.05 mg/dL 1.28 (H)  1.14 (H)    Calcium      8.6 - 10.3 mg/dL 9.1  9.1    Albumin      3.4 - 5.0 g/dL 4.0  3.7    Alkaline Phosphatase      33 - 136 U/L 71  71    Total Protein      6.4 - 8.2 g/dL 6.9  7.0    AST      9 - 39 U/L 13  10    Bilirubin Total      0.0 - 1.2 mg/dL 0.6  0.5    ALT      7 - 45 U/L 12  10    EGFR      >60 mL/min/1.73m*2 45 (L)  51 (L)       Legend:  (H) High  (L) Low    2/25/2024 and 2/28/2024, CBC and Auto Differential  Component      Latest Ref Rng 2/25/2024 2/28/2024   WBC      4.4 - 11.3 x10*3/uL 10.1  7.0    RBC      4.00 - 5.20 x10*6/uL 3.90 (L)  3.58 (L)    HEMOGLOBIN      12.0 - 16.0 g/dL 11.8 (L)  10.8 (L)    HEMATOCRIT      36.0 - 46.0 % 36.0  33.1 (L)    MCV      80 - 100 fL 92  93    MCHC      32.0 - 36.0 g/dL 32.8  32.6    Platelets      150 - 450 x10*3/uL 214  222    RED CELL DISTRIBUTION WIDTH      11.5 - 14.5 % 13.6  13.4    Neutrophils %      40.0 - 80.0 % 83.0  75.2    Immature Granulocytes %, Automated      0.0 - 0.9 % 0.5  0.6    Lymphocytes %      13.0 - 44.0 % 8.2  13.1    Monocytes %      2.0 - 10.0 % 8.0  9.7    Eosinophils %      0.0 - 6.0 % 0.1  1.0    Basophils %      0.0 - 2.0 % 0.2  0.4    Neutrophils Absolute      1.60 - 5.50 x10*3/uL 8.40 (H)  5.28    Lymphocytes Absolute      0.80 - 3.00 x10*3/uL 0.83  0.92    Monocytes Absolute      0.05 - 0.80 x10*3/uL 0.81 (H)  0.68     Eosinophils Absolute      0.00 - 0.40 x10*3/uL 0.01  0.07    Basophils Absolute      0.00 - 0.10 x10*3/uL 0.02  0.03    nRBC      0.0 - 0.0 /100 WBCs 0.0  0.0    MCH      26.0 - 34.0 pg 30.3  30.2    Immature Granulocytes Absolute, Automated      0.00 - 0.50 x10*3/uL 0.05  0.04       Legend:  (L) Low  (H) High    Review of Systems   Gastrointestinal:  Positive for constipation.   Genitourinary:  Positive for flank pain and hematuria.        Positive for bloating.   Musculoskeletal:  Positive for back pain.       Objective   Physical Exam  NAD  Abdomen soft nontender  No CVA tenderness bilaterally      Assessment/Plan   73-year-old female with history of ureteral stricture and left ureteral obstruction, now presenting with worsening situation indicative of the hydronephrotic and atrophic left kidney, along with a significantly growing mass in the left distal ureter.  She is in significant pain and has hematuria.    I personally reviewed the medical records of the patient including the note of the referring physician including the CT images as well as report.    I had a long discussion with the patient about her older and recent scans, and approaching the problems of ureteral obstruction and subsequent hematuria and pain, as well as about the differential diagnosis of the mass, cancer being on top of this, and the need for tissue diagnosis in order to decide on the way to proceed with some therapy prior to a potential surgery.  Also explained to her that at this point we do not see any metastatic disease.    I explained to the patient that we will expedite the process by starting with a ureteroscopy and possible stent placement tomorrow.  She is very appreciative and would like to proceed as such.    Diagnoses and all orders for this visit:  Ureteral mass  -     Case Request Operating Room: Biopsy Ureter; Standing  -     Request for Pre-Admission Testing Visit; Future  Other orders  -     Weigh patient; Standing  -      Measure height and length; Standing  -     Place in outpatient/hospital ambulatory surgery; Standing  -     Full code; Standing  -     ceFAZolin in dextrose (iso-os) (Ancef) IVPB 2 g    Plan:  Schedule for biopsy and nephrostomy  Recommended colace once daily to promote bowel movement.    I spent 40 minutes on the direct patient care, including time to review the images and history, by face-to-face time with the patient, coordination of care, and documentation.    Estela Humphrey 02/29/24 10:08 AM     Scribe Attestation  By signing my name below, I, Avinash Higgins attest that this documentation has been prepared under the direction and in the presence of Teddy Ross MD.

## 2024-03-01 ENCOUNTER — ANESTHESIA (OUTPATIENT)
Dept: OPERATING ROOM | Facility: HOSPITAL | Age: 74
End: 2024-03-01
Payer: MEDICARE

## 2024-03-01 ENCOUNTER — HOSPITAL ENCOUNTER (OUTPATIENT)
Facility: HOSPITAL | Age: 74
Setting detail: OUTPATIENT SURGERY
Discharge: HOME | End: 2024-03-01
Attending: STUDENT IN AN ORGANIZED HEALTH CARE EDUCATION/TRAINING PROGRAM | Admitting: STUDENT IN AN ORGANIZED HEALTH CARE EDUCATION/TRAINING PROGRAM
Payer: MEDICARE

## 2024-03-01 ENCOUNTER — APPOINTMENT (OUTPATIENT)
Dept: RADIOLOGY | Facility: HOSPITAL | Age: 74
End: 2024-03-01
Payer: MEDICARE

## 2024-03-01 ENCOUNTER — APPOINTMENT (OUTPATIENT)
Dept: CARDIOLOGY | Facility: HOSPITAL | Age: 74
End: 2024-03-01
Payer: MEDICARE

## 2024-03-01 VITALS
BODY MASS INDEX: 29.07 KG/M2 | TEMPERATURE: 98.4 F | HEART RATE: 73 BPM | HEIGHT: 61 IN | RESPIRATION RATE: 18 BRPM | DIASTOLIC BLOOD PRESSURE: 75 MMHG | OXYGEN SATURATION: 98 % | SYSTOLIC BLOOD PRESSURE: 158 MMHG | WEIGHT: 154 LBS

## 2024-03-01 DIAGNOSIS — N28.89 URETERAL MASS: Primary | ICD-10-CM

## 2024-03-01 DIAGNOSIS — N13.39 OTHER HYDRONEPHROSIS: ICD-10-CM

## 2024-03-01 PROCEDURE — 7100000009 HC PHASE TWO TIME - INITIAL BASE CHARGE: Performed by: STUDENT IN AN ORGANIZED HEALTH CARE EDUCATION/TRAINING PROGRAM

## 2024-03-01 PROCEDURE — 2720000007 HC OR 272 NO HCPCS: Performed by: STUDENT IN AN ORGANIZED HEALTH CARE EDUCATION/TRAINING PROGRAM

## 2024-03-01 PROCEDURE — C1769 GUIDE WIRE: HCPCS | Performed by: STUDENT IN AN ORGANIZED HEALTH CARE EDUCATION/TRAINING PROGRAM

## 2024-03-01 PROCEDURE — 88112 CYTOPATH CELL ENHANCE TECH: CPT | Mod: TC,MCY,GENLAB | Performed by: STUDENT IN AN ORGANIZED HEALTH CARE EDUCATION/TRAINING PROGRAM

## 2024-03-01 PROCEDURE — 74420 UROGRAPHY RTRGR +-KUB: CPT | Performed by: STUDENT IN AN ORGANIZED HEALTH CARE EDUCATION/TRAINING PROGRAM

## 2024-03-01 PROCEDURE — 3600000003 HC OR TIME - INITIAL BASE CHARGE - PROCEDURE LEVEL THREE: Performed by: STUDENT IN AN ORGANIZED HEALTH CARE EDUCATION/TRAINING PROGRAM

## 2024-03-01 PROCEDURE — 3700000002 HC GENERAL ANESTHESIA TIME - EACH INCREMENTAL 1 MINUTE: Performed by: STUDENT IN AN ORGANIZED HEALTH CARE EDUCATION/TRAINING PROGRAM

## 2024-03-01 PROCEDURE — 76000 FLUOROSCOPY <1 HR PHYS/QHP: CPT | Mod: CCI

## 2024-03-01 PROCEDURE — 3600000008 HC OR TIME - EACH INCREMENTAL 1 MINUTE - PROCEDURE LEVEL THREE: Performed by: STUDENT IN AN ORGANIZED HEALTH CARE EDUCATION/TRAINING PROGRAM

## 2024-03-01 PROCEDURE — 2780000003 HC OR 278 NO HCPCS: Performed by: STUDENT IN AN ORGANIZED HEALTH CARE EDUCATION/TRAINING PROGRAM

## 2024-03-01 PROCEDURE — 88342 IMHCHEM/IMCYTCHM 1ST ANTB: CPT | Performed by: PATHOLOGY

## 2024-03-01 PROCEDURE — 88341 IMHCHEM/IMCYTCHM EA ADD ANTB: CPT | Performed by: PATHOLOGY

## 2024-03-01 PROCEDURE — 93010 ELECTROCARDIOGRAM REPORT: CPT | Performed by: STUDENT IN AN ORGANIZED HEALTH CARE EDUCATION/TRAINING PROGRAM

## 2024-03-01 PROCEDURE — 93005 ELECTROCARDIOGRAM TRACING: CPT

## 2024-03-01 PROCEDURE — 52332 CYSTOSCOPY AND TREATMENT: CPT | Performed by: STUDENT IN AN ORGANIZED HEALTH CARE EDUCATION/TRAINING PROGRAM

## 2024-03-01 PROCEDURE — 3700000001 HC GENERAL ANESTHESIA TIME - INITIAL BASE CHARGE: Performed by: STUDENT IN AN ORGANIZED HEALTH CARE EDUCATION/TRAINING PROGRAM

## 2024-03-01 PROCEDURE — 88305 TISSUE EXAM BY PATHOLOGIST: CPT | Performed by: PATHOLOGY

## 2024-03-01 PROCEDURE — 2500000004 HC RX 250 GENERAL PHARMACY W/ HCPCS (ALT 636 FOR OP/ED): Performed by: STUDENT IN AN ORGANIZED HEALTH CARE EDUCATION/TRAINING PROGRAM

## 2024-03-01 PROCEDURE — 2500000004 HC RX 250 GENERAL PHARMACY W/ HCPCS (ALT 636 FOR OP/ED): Performed by: REGISTERED NURSE

## 2024-03-01 PROCEDURE — 2500000005 HC RX 250 GENERAL PHARMACY W/O HCPCS: Performed by: LICENSED PRACTICAL NURSE

## 2024-03-01 PROCEDURE — 52354 CYSTOURETERO W/BIOPSY: CPT | Performed by: STUDENT IN AN ORGANIZED HEALTH CARE EDUCATION/TRAINING PROGRAM

## 2024-03-01 PROCEDURE — 7100000010 HC PHASE TWO TIME - EACH INCREMENTAL 1 MINUTE: Performed by: STUDENT IN AN ORGANIZED HEALTH CARE EDUCATION/TRAINING PROGRAM

## 2024-03-01 PROCEDURE — 7100000001 HC RECOVERY ROOM TIME - INITIAL BASE CHARGE: Performed by: STUDENT IN AN ORGANIZED HEALTH CARE EDUCATION/TRAINING PROGRAM

## 2024-03-01 PROCEDURE — 2550000001 HC RX 255 CONTRASTS: Performed by: STUDENT IN AN ORGANIZED HEALTH CARE EDUCATION/TRAINING PROGRAM

## 2024-03-01 PROCEDURE — 88305 TISSUE EXAM BY PATHOLOGIST: CPT | Mod: TC,SUR,GENLAB | Performed by: STUDENT IN AN ORGANIZED HEALTH CARE EDUCATION/TRAINING PROGRAM

## 2024-03-01 PROCEDURE — 2500000004 HC RX 250 GENERAL PHARMACY W/ HCPCS (ALT 636 FOR OP/ED): Performed by: LICENSED PRACTICAL NURSE

## 2024-03-01 PROCEDURE — 88112 CYTOPATH CELL ENHANCE TECH: CPT | Performed by: PATHOLOGY

## 2024-03-01 PROCEDURE — C2617 STENT, NON-COR, TEM W/O DEL: HCPCS | Performed by: STUDENT IN AN ORGANIZED HEALTH CARE EDUCATION/TRAINING PROGRAM

## 2024-03-01 PROCEDURE — 7100000002 HC RECOVERY ROOM TIME - EACH INCREMENTAL 1 MINUTE: Performed by: STUDENT IN AN ORGANIZED HEALTH CARE EDUCATION/TRAINING PROGRAM

## 2024-03-01 DEVICE — URETERAL STENT
Type: IMPLANTABLE DEVICE | Site: URETER | Status: FUNCTIONAL
Brand: POLARIS™ ULTRA

## 2024-03-01 RX ORDER — ONDANSETRON HYDROCHLORIDE 2 MG/ML
4 INJECTION, SOLUTION INTRAVENOUS ONCE AS NEEDED
Status: COMPLETED | OUTPATIENT
Start: 2024-03-01 | End: 2024-03-01

## 2024-03-01 RX ORDER — SODIUM CHLORIDE, SODIUM LACTATE, POTASSIUM CHLORIDE, CALCIUM CHLORIDE 600; 310; 30; 20 MG/100ML; MG/100ML; MG/100ML; MG/100ML
30 INJECTION, SOLUTION INTRAVENOUS CONTINUOUS
Status: DISCONTINUED | OUTPATIENT
Start: 2024-03-01 | End: 2024-03-01 | Stop reason: HOSPADM

## 2024-03-01 RX ORDER — DEXAMETHASONE SODIUM PHOSPHATE 4 MG/ML
INJECTION, SOLUTION INTRA-ARTICULAR; INTRALESIONAL; INTRAMUSCULAR; INTRAVENOUS; SOFT TISSUE AS NEEDED
Status: DISCONTINUED | OUTPATIENT
Start: 2024-03-01 | End: 2024-03-01

## 2024-03-01 RX ORDER — PHENAZOPYRIDINE HYDROCHLORIDE 100 MG/1
100 TABLET, FILM COATED ORAL 3 TIMES DAILY PRN
Qty: 30 TABLET | Refills: 0 | Status: SHIPPED | OUTPATIENT
Start: 2024-03-01 | End: 2024-04-11 | Stop reason: ALTCHOICE

## 2024-03-01 RX ORDER — KETOROLAC TROMETHAMINE 30 MG/ML
INJECTION, SOLUTION INTRAMUSCULAR; INTRAVENOUS AS NEEDED
Status: DISCONTINUED | OUTPATIENT
Start: 2024-03-01 | End: 2024-03-01

## 2024-03-01 RX ORDER — ONDANSETRON HYDROCHLORIDE 2 MG/ML
INJECTION, SOLUTION INTRAVENOUS AS NEEDED
Status: DISCONTINUED | OUTPATIENT
Start: 2024-03-01 | End: 2024-03-01

## 2024-03-01 RX ORDER — PROPOFOL 10 MG/ML
INJECTION, EMULSION INTRAVENOUS AS NEEDED
Status: DISCONTINUED | OUTPATIENT
Start: 2024-03-01 | End: 2024-03-01

## 2024-03-01 RX ORDER — SODIUM CHLORIDE, SODIUM LACTATE, POTASSIUM CHLORIDE, CALCIUM CHLORIDE 600; 310; 30; 20 MG/100ML; MG/100ML; MG/100ML; MG/100ML
100 INJECTION, SOLUTION INTRAVENOUS CONTINUOUS
Status: DISCONTINUED | OUTPATIENT
Start: 2024-03-01 | End: 2024-03-01 | Stop reason: HOSPADM

## 2024-03-01 RX ORDER — HYDROMORPHONE HYDROCHLORIDE 1 MG/ML
1 INJECTION, SOLUTION INTRAMUSCULAR; INTRAVENOUS; SUBCUTANEOUS EVERY 5 MIN PRN
Status: DISCONTINUED | OUTPATIENT
Start: 2024-03-01 | End: 2024-03-01 | Stop reason: HOSPADM

## 2024-03-01 RX ORDER — ROCURONIUM BROMIDE 10 MG/ML
INJECTION, SOLUTION INTRAVENOUS AS NEEDED
Status: DISCONTINUED | OUTPATIENT
Start: 2024-03-01 | End: 2024-03-01

## 2024-03-01 RX ORDER — OXYCODONE HYDROCHLORIDE 5 MG/1
5 TABLET ORAL EVERY 4 HOURS PRN
Status: DISCONTINUED | OUTPATIENT
Start: 2024-03-01 | End: 2024-03-01 | Stop reason: HOSPADM

## 2024-03-01 RX ORDER — FENTANYL CITRATE 50 UG/ML
INJECTION, SOLUTION INTRAMUSCULAR; INTRAVENOUS AS NEEDED
Status: DISCONTINUED | OUTPATIENT
Start: 2024-03-01 | End: 2024-03-01

## 2024-03-01 RX ORDER — BISMUTH SUBSALICYLATE 262 MG
1 TABLET,CHEWABLE ORAL DAILY
COMMUNITY
End: 2024-04-11 | Stop reason: WASHOUT

## 2024-03-01 RX ORDER — ACETAMINOPHEN 325 MG/1
650 TABLET ORAL EVERY 4 HOURS PRN
Status: DISCONTINUED | OUTPATIENT
Start: 2024-03-01 | End: 2024-03-01 | Stop reason: HOSPADM

## 2024-03-01 RX ORDER — LIDOCAINE HYDROCHLORIDE 20 MG/ML
INJECTION, SOLUTION INFILTRATION; PERINEURAL AS NEEDED
Status: DISCONTINUED | OUTPATIENT
Start: 2024-03-01 | End: 2024-03-01

## 2024-03-01 RX ORDER — OXYBUTYNIN CHLORIDE 5 MG/1
5 TABLET ORAL 3 TIMES DAILY
Qty: 90 TABLET | Refills: 0 | Status: SHIPPED | OUTPATIENT
Start: 2024-03-01 | End: 2024-03-31

## 2024-03-01 RX ORDER — CEFAZOLIN SODIUM 2 G/50ML
2 SOLUTION INTRAVENOUS ONCE
Status: COMPLETED | OUTPATIENT
Start: 2024-03-01 | End: 2024-03-01

## 2024-03-01 RX ADMIN — PROPOFOL 200 MG: 10 INJECTION, EMULSION INTRAVENOUS at 11:54

## 2024-03-01 RX ADMIN — DEXAMETHASONE SODIUM PHOSPHATE 4 MG: 4 INJECTION, SOLUTION INTRAMUSCULAR; INTRAVENOUS at 12:40

## 2024-03-01 RX ADMIN — ROCURONIUM BROMIDE 20 MG: 10 INJECTION, SOLUTION INTRAVENOUS at 12:08

## 2024-03-01 RX ADMIN — HYDROMORPHONE HYDROCHLORIDE 0.25 MG: 1 INJECTION, SOLUTION INTRAMUSCULAR; INTRAVENOUS; SUBCUTANEOUS at 13:01

## 2024-03-01 RX ADMIN — SODIUM CHLORIDE, POTASSIUM CHLORIDE, SODIUM LACTATE AND CALCIUM CHLORIDE 30 ML/HR: 600; 310; 30; 20 INJECTION, SOLUTION INTRAVENOUS at 10:39

## 2024-03-01 RX ADMIN — DEXAMETHASONE SODIUM PHOSPHATE 8 MG: 4 INJECTION, SOLUTION INTRAMUSCULAR; INTRAVENOUS at 11:58

## 2024-03-01 RX ADMIN — SUGAMMADEX 200 MG: 100 INJECTION, SOLUTION INTRAVENOUS at 13:01

## 2024-03-01 RX ADMIN — LIDOCAINE HYDROCHLORIDE 40 MG: 20 INJECTION, SOLUTION INFILTRATION; PERINEURAL at 11:54

## 2024-03-01 RX ADMIN — HYDROMORPHONE HYDROCHLORIDE 0.25 MG: 1 INJECTION, SOLUTION INTRAMUSCULAR; INTRAVENOUS; SUBCUTANEOUS at 12:57

## 2024-03-01 RX ADMIN — CEFAZOLIN SODIUM 2 G: 2 SOLUTION INTRAVENOUS at 11:49

## 2024-03-01 RX ADMIN — PROMETHAZINE HYDROCHLORIDE 6.25 MG: 25 INJECTION INTRAMUSCULAR; INTRAVENOUS at 14:29

## 2024-03-01 RX ADMIN — ROCURONIUM BROMIDE 30 MG: 10 INJECTION, SOLUTION INTRAVENOUS at 11:54

## 2024-03-01 RX ADMIN — FENTANYL CITRATE 50 MCG: 50 INJECTION, SOLUTION INTRAMUSCULAR; INTRAVENOUS at 11:54

## 2024-03-01 RX ADMIN — FENTANYL CITRATE 50 MCG: 50 INJECTION, SOLUTION INTRAMUSCULAR; INTRAVENOUS at 12:34

## 2024-03-01 RX ADMIN — KETOROLAC TROMETHAMINE 15 MG: 30 INJECTION, SOLUTION INTRAMUSCULAR; INTRAVENOUS at 13:01

## 2024-03-01 RX ADMIN — ONDANSETRON 4 MG: 2 INJECTION INTRAMUSCULAR; INTRAVENOUS at 14:03

## 2024-03-01 RX ADMIN — ONDANSETRON 4 MG: 2 INJECTION, SOLUTION INTRAMUSCULAR; INTRAVENOUS at 13:01

## 2024-03-01 SDOH — HEALTH STABILITY: MENTAL HEALTH: CURRENT SMOKER: 0

## 2024-03-01 ASSESSMENT — PAIN - FUNCTIONAL ASSESSMENT
PAIN_FUNCTIONAL_ASSESSMENT: 0-10

## 2024-03-01 ASSESSMENT — PAIN SCALES - GENERAL
PAINLEVEL_OUTOF10: 0 - NO PAIN
PAIN_LEVEL: 2
PAINLEVEL_OUTOF10: 0 - NO PAIN
PAINLEVEL_OUTOF10: 0 - NO PAIN
PAINLEVEL_OUTOF10: 6
PAINLEVEL_OUTOF10: 0 - NO PAIN
PAINLEVEL_OUTOF10: 0 - NO PAIN

## 2024-03-01 NOTE — INTERVAL H&P NOTE
H&P reviewed. The patient was examined and there are no changes to the H&P.  Patient denies any new c/o or concerns, denies any recent falls, denies sob, cp, n/v/d, states she is still experiencing left flank pain.  She has taken one percocet since receiving Rx on 2/29/24.  States she has not had a good bm since last Saturday, took her colace as prescribed and had a small bm this morning.

## 2024-03-01 NOTE — ANESTHESIA PROCEDURE NOTES
Airway  Date/Time: 3/1/2024 11:55 AM  Urgency: elective    Airway not difficult    Staffing  Performed: CRNA   Authorized by: NELSON Cotter    Performed by: NELSON Cotter  Patient location during procedure: OR    Indications and Patient Condition  Indications for airway management: anesthesia  Spontaneous ventilation: present  Sedation level: deep  Preoxygenated: yes  Patient position: sniffing  Mask difficulty assessment: 1 - vent by mask  Planned trial extubation    Final Airway Details  Final airway type: endotracheal airway      Successful airway: ETT  Cuffed: yes   Successful intubation technique: video laryngoscopy  Facilitating devices/methods: intubating stylet  Endotracheal tube insertion site: oral  Blade: Ivis  Blade size: #3  ETT size (mm): 7.5  Cormack-Lehane Classification: grade IIa - partial view of glottis  Placement verified by: chest auscultation and capnometry   Measured from: lips  ETT to lips (cm): 22  Number of attempts at approach: 1    Additional Comments  Atraumatic, dentition and lips intact.

## 2024-03-01 NOTE — OP NOTE
Cystoscopy and left ureteroscopy with ureteral biopsy and ureteral stent insertion with retrograde pyelogram (L) Operative Note     Date: 3/1/2024  OR Location: [facility]    Name: Terra Alexis, : 1950, Age: 73 y.o., MRN: [id number], Sex: female    Diagnosis  Pre-op Diagnosis     * Ureteral mass [N28.89] Post-op Diagnosis     * Ureteral mass [N28.89]     Procedures  Cystoscopy and left ureteroscopy with ureteral biopsy and ureteral stent insertion with retrograde pyelogram  77547 - IA CYSTO/PYELOSCOPY BX&/FULGURATION PELIVC LESION    IA CYSTO W/INSERT URETERAL STENT [01391]  CHG UROGRAPHY RETROGRADE WITH/WO KUB [44930]  Surgeons      * Teddy Ross - Primary    Resident/Fellow/Other Assistant:  Surgeon(s) and Role:    Procedure Summary  Anesthesia: General  ASA: III  Anesthesia Staff: CRNA: BASIL Cotter-CRNA  Estimated Blood Loss: 5mL  Intra-op Medications:   Administrations occurring from 1200 to 1300 on 24:   Medication Name Total Dose   iohexol (OMNIPaque) 240 mg iodine/mL solution 35 mL              Anesthesia Record               Intraprocedure I/O Totals       None           Specimen:   ID Type Source Tests Collected by Time   1 : URINE CYTOLOGY Non-Gynecologic Cytology URINE CATHETERIZED CYTOLOGY CONSULTATION (NON-GYNECOLOGIC) Teddy Ross MD 3/1/2024 1224   2 : LEFT URETER BIOPSY Tissue URETER BIOPSY LEFT SURGICAL PATHOLOGY EXAM Teddy Ross MD 3/1/2024 1228        Staff:   Circulator: Carmen Murrell RN; Kinga Reynolds RN; Hazel Salazar RN  Relief Circulator: Arabella Mcarthur RN  Scrub Person: Carmen Camargo RN; Lisa Gamino RN         Drains and/or Catheters: * None in log *    Tourniquet Times:         Implants:  Implants       Type Name Action Serial No.      Stent STENT, POLARIS ULTRA, 6FR 26CM, W/O WIRE - [id number] Implanted               Findings: Large filling defect in mid to distal ureter, significant amount of old blood in left proximal ureter and  collecting system    Indications: Terra Alexis is an 73 y.o. female who is having surgery for Ureteral mass [N28.89].     The patient was seen in the preoperative area. The risks, benefits, complications, treatment options, non-operative alternatives, expected recovery and outcomes were discussed with the patient. The possibilities of reaction to medication, pulmonary aspiration, injury to surrounding structures, bleeding, recurrent infection, the need for additional procedures, failure to diagnose a condition, and creating a complication requiring transfusion or operation were discussed with the patient. The patient concurred with the proposed plan, giving informed consent.  The site of surgery was properly noted/marked if necessary per policy. The patient has been actively warmed in preoperative area. Preoperative antibiotics have been ordered and given within 1 hours of incision. Venous thrombosis prophylaxis have been ordered including bilateral sequential compression devices    Procedure Details: Patient was consented and antibiotics were started on call to OR.  In the operating room, under general anesthesia, with the patient in dorsolithotomy position, genitalia was prepped and draped in the usual manner.  No.22 cystoscope was inserted down the urethra into the bladder, and cystoscopy revealed no stones or tumors. The ureteral orifices were identified in the normal orthotopic position.  The ureteral catheter was advanced through the cystoscope and a guidewire was inserted through the left ureteral orifice.  The ureteral catheter and the cystoscope were removed and the rigid ureteroscope was inserted parallel to the wire into the left ureter, and advanced to the level of the distal-ureter where I could not be further advanced because of the severely stenotic ureter. NSS was injected and aspirated back and sent for cytology.    Contrast was injected through the ureteroscope visualizing a severe filling  defect and a dilated tortuous ureter and significantly hydronephrotic renal pelvis.      Then another ultra track wire was inserted through the ureteroscope, bypassing the tumor into the renal pelvis, then advancing the ureteroscope was not successful.  The rigid ureteroscope was removed and the ureteral access sheath was inserted and again it could not be advanced beyond the area of tumor.  The flexible ureteroscope was inserted over one of the guidewires however it could not be advanced beyond the tumor.  Using the Piranha forceps, several biopsies were taken from the mucosa of the ureter just distal to the narrowing, unlikely to represent tumor histopathologically.    Then the cystoscope was reinserted over the wire and a 6-26 double-J stent was advanced over the wire, with the proximal curl of visualized in the renal pelvis topography using fluoroscopy, while the distal curl was visualized in the bladder.    The bladder was emptied.  This concluded the procedure.    Patient tolerated the procedure well and was transferred to PACU in stable condition.    Complications:  None; patient tolerated the procedure well.    Disposition: PACU - hemodynamically stable.  Condition: stable         Attending Attestation: I performed the procedure.    Teddy Ross  Phone Number: [phone]

## 2024-03-01 NOTE — DISCHARGE INSTRUCTIONS
You can take ibuprofen for pain/discomfort  If you have any questions or concerns, please call the office

## 2024-03-01 NOTE — BRIEF OP NOTE
Date: 3/1/2024  OR Location: [facility]    Name: Terra Alexis, : 1950, Age: 73 y.o., MRN: [id number], Sex: female    Diagnosis  Pre-op Diagnosis     * Ureteral mass [N28.89] Post-op Diagnosis     * Ureteral mass [N28.89]     Procedures  Cystoscopy and left ureteroscopy with ureteral biopsy and ureteral stent insertion with retrograde pyelogram  30926 - CA CYSTO/PYELOSCOPY BX&/FULGURATION PELIVC LESION    CA CYSTO W/INSERT URETERAL STENT [[id number]]  CHG UROGRAPHY RETROGRADE WITH/WO KUB [[id number]]  Surgeons      * Teddy Ross - Primary    Resident/Fellow/Other Assistant:  Surgeon(s) and Role:    Procedure Summary  Anesthesia: General  ASA: III  Anesthesia Staff: CRNA: BASIL Cotter-CRNA  Estimated Blood Loss: 5mL  Intra-op Medications:   Administrations occurring from 1200 to 1300 on 24:   Medication Name Total Dose   iohexol (OMNIPaque) 240 mg iodine/mL solution 35 mL              Anesthesia Record               Intraprocedure I/O Totals       None           Specimen:   ID Type Source Tests Collected by Time   1 : URINE CYTOLOGY Non-Gynecologic Cytology URINE CATHETERIZED CYTOLOGY CONSULTATION (NON-GYNECOLOGIC) Teddy Ross MD 3/1/2024 1229   2 : LEFT URETER BIOPSY Tissue URETER BIOPSY LEFT SURGICAL PATHOLOGY EXAM Teddy Ross MD 3/1/2024 1228        Staff:   Circulator: Carmen Murrell RN; Kinga Reynolds, JENNIFER; Hazel Salazar RN  Relief Circulator: Arabella Mcarthur RN  Scrub Person: Carmen Camargo RN; Lisa Gamino RN          Findings: Left ureteral filling defect, could not go past area of narrowing    Complications:  None; patient tolerated the procedure well.     Disposition: PACU - hemodynamically stable.  Condition: stable  Specimens Collected:   ID Type Source Tests Collected by Time   1 : URINE CYTOLOGY Non-Gynecologic Cytology URINE CATHETERIZED CYTOLOGY CONSULTATION (NON-GYNECOLOGIC) Teddy Ross MD 3/1/2024 1224   2 : LEFT URETER BIOPSY Tissue URETER BIOPSY LEFT  SURGICAL PATHOLOGY EXAM Teddy Ross MD 3/1/2024 3827     Attending Attestation: I performed the procedure.    Teddy Ross  Phone Number: [phone]

## 2024-03-01 NOTE — ANESTHESIA POSTPROCEDURE EVALUATION
Patient: Terra CUNHA Alexis    Procedure Summary       Date: 03/01/24 Room / Location: [facility] 03 / [facility]    Anesthesia Start: 1149 Anesthesia Stop: 1319    Procedure: Cystoscopy and left ureteroscopy with ureteral biopsy and ureteral stent insertion with retrograde pyelogram (Left: Ureter) Diagnosis:       Ureteral mass      (Ureteral mass [N28.89])    Surgeons: Teddy Ross MD Responsible Provider: NELSON Cotter    Anesthesia Type: general ASA Status: 3            Anesthesia Type: general    Vitals Value Taken Time   /67 03/01/24 1342   Temp 36.9 °C (98.4 °F) 03/01/24 1312   Pulse 76 03/01/24 1342   Resp 18 03/01/24 1342   SpO2 94 % 03/01/24 1342       Anesthesia Post Evaluation    Patient location during evaluation: PACU  Patient participation: complete - patient participated  Level of consciousness: awake and alert  Pain score: 2  Pain management: adequate  Airway patency: patent  Cardiovascular status: acceptable  Respiratory status: acceptable  Hydration status: acceptable  Postoperative Nausea and Vomiting: none        There were no known notable events for this encounter.

## 2024-03-04 LAB
ATRIAL RATE: 79 BPM
LABORATORY COMMENT REPORT: NORMAL
LABORATORY COMMENT REPORT: NORMAL
P AXIS: 8 DEGREES
P OFFSET: 189 MS
P ONSET: 135 MS
PATH REPORT.FINAL DX SPEC: NORMAL
PATH REPORT.GROSS SPEC: NORMAL
PATH REPORT.RELEVANT HX SPEC: NORMAL
PATH REPORT.TOTAL CANCER: NORMAL
PR INTERVAL: 176 MS
Q ONSET: 223 MS
QRS COUNT: 13 BEATS
QRS DURATION: 86 MS
QT INTERVAL: 386 MS
QTC CALCULATION(BAZETT): 442 MS
QTC FREDERICIA: 423 MS
R AXIS: -13 DEGREES
T AXIS: 3 DEGREES
T OFFSET: 416 MS
VENTRICULAR RATE: 79 BPM

## 2024-03-04 ASSESSMENT — PAIN SCALES - GENERAL: PAINLEVEL_OUTOF10: 0 - NO PAIN

## 2024-03-05 DIAGNOSIS — N28.89 URETERAL MASS: ICD-10-CM

## 2024-03-06 DIAGNOSIS — N28.89 URETERAL MASS: Primary | ICD-10-CM

## 2024-03-06 DIAGNOSIS — N36.8 MASS OF URETHRA: ICD-10-CM

## 2024-03-07 RX ORDER — FUROSEMIDE 10 MG/ML
20 INJECTION INTRAMUSCULAR; INTRAVENOUS ONCE
Status: SHIPPED | OUTPATIENT
Start: 2024-03-07

## 2024-03-11 ENCOUNTER — HOSPITAL ENCOUNTER (OUTPATIENT)
Dept: RADIOLOGY | Facility: HOSPITAL | Age: 74
Discharge: HOME | End: 2024-03-11
Payer: MEDICARE

## 2024-03-11 DIAGNOSIS — N28.89 URETERAL MASS: ICD-10-CM

## 2024-03-11 PROCEDURE — A9575 INJ GADOTERATE MEGLUMI 0.1ML: HCPCS | Performed by: STUDENT IN AN ORGANIZED HEALTH CARE EDUCATION/TRAINING PROGRAM

## 2024-03-11 PROCEDURE — 2500000004 HC RX 250 GENERAL PHARMACY W/ HCPCS (ALT 636 FOR OP/ED): Performed by: STUDENT IN AN ORGANIZED HEALTH CARE EDUCATION/TRAINING PROGRAM

## 2024-03-11 PROCEDURE — 74183 MRI ABD W/O CNTR FLWD CNTR: CPT

## 2024-03-11 PROCEDURE — 74183 MRI ABD W/O CNTR FLWD CNTR: CPT | Performed by: RADIOLOGY

## 2024-03-11 PROCEDURE — 2500000004 HC RX 250 GENERAL PHARMACY W/ HCPCS (ALT 636 FOR OP/ED)

## 2024-03-11 RX ORDER — FUROSEMIDE 10 MG/ML
INJECTION INTRAMUSCULAR; INTRAVENOUS
Status: COMPLETED
Start: 2024-03-11 | End: 2024-03-11

## 2024-03-11 RX ORDER — GADOTERATE MEGLUMINE 376.9 MG/ML
13 INJECTION INTRAVENOUS
Status: COMPLETED | OUTPATIENT
Start: 2024-03-11 | End: 2024-03-11

## 2024-03-11 RX ORDER — FUROSEMIDE 10 MG/ML
20 INJECTION INTRAMUSCULAR; INTRAVENOUS ONCE
Status: COMPLETED | OUTPATIENT
Start: 2024-03-11 | End: 2024-03-11

## 2024-03-11 RX ADMIN — FUROSEMIDE 20 MG: 10 INJECTION, SOLUTION INTRAMUSCULAR; INTRAVENOUS at 10:40

## 2024-03-11 RX ADMIN — FUROSEMIDE 20 MG: 10 INJECTION INTRAMUSCULAR; INTRAVENOUS at 10:40

## 2024-03-11 RX ADMIN — GADOTERATE MEGLUMINE 13 ML: 376.9 INJECTION INTRAVENOUS at 10:41

## 2024-03-14 DIAGNOSIS — N28.89 OTHER SPECIFIED DISORDERS OF KIDNEY AND URETER: Primary | ICD-10-CM

## 2024-03-18 ENCOUNTER — LAB (OUTPATIENT)
Dept: LAB | Facility: LAB | Age: 74
End: 2024-03-18
Payer: MEDICARE

## 2024-03-18 DIAGNOSIS — N28.89 OTHER SPECIFIED DISORDERS OF KIDNEY AND URETER: ICD-10-CM

## 2024-03-18 LAB
INR PPP: 1 (ref 0.9–1.1)
LAB AP ASR DISCLAIMER: NORMAL
LABORATORY COMMENT REPORT: NORMAL
PATH REPORT.FINAL DX SPEC: NORMAL
PATH REPORT.GROSS SPEC: NORMAL
PATH REPORT.RELEVANT HX SPEC: NORMAL
PATH REPORT.TOTAL CANCER: NORMAL
PROTHROMBIN TIME: 10.8 SECONDS (ref 9.8–12.8)

## 2024-03-18 PROCEDURE — 85610 PROTHROMBIN TIME: CPT

## 2024-03-18 PROCEDURE — 36415 COLL VENOUS BLD VENIPUNCTURE: CPT

## 2024-03-22 ENCOUNTER — HOSPITAL ENCOUNTER (OUTPATIENT)
Dept: RADIOLOGY | Facility: HOSPITAL | Age: 74
Discharge: HOME | End: 2024-03-22
Payer: MEDICARE

## 2024-03-22 VITALS
RESPIRATION RATE: 16 BRPM | TEMPERATURE: 97.7 F | SYSTOLIC BLOOD PRESSURE: 133 MMHG | DIASTOLIC BLOOD PRESSURE: 75 MMHG | HEART RATE: 70 BPM | OXYGEN SATURATION: 97 %

## 2024-03-22 DIAGNOSIS — N28.89 URETERAL MASS: ICD-10-CM

## 2024-03-22 PROCEDURE — 77012 CT SCAN FOR NEEDLE BIOPSY: CPT | Performed by: RADIOLOGY

## 2024-03-22 PROCEDURE — 2500000004 HC RX 250 GENERAL PHARMACY W/ HCPCS (ALT 636 FOR OP/ED): Performed by: RADIOLOGY

## 2024-03-22 PROCEDURE — 99152 MOD SED SAME PHYS/QHP 5/>YRS: CPT

## 2024-03-22 PROCEDURE — 99152 MOD SED SAME PHYS/QHP 5/>YRS: CPT | Performed by: RADIOLOGY

## 2024-03-22 PROCEDURE — 99153 MOD SED SAME PHYS/QHP EA: CPT | Performed by: RADIOLOGY

## 2024-03-22 PROCEDURE — 88341 IMHCHEM/IMCYTCHM EA ADD ANTB: CPT | Performed by: STUDENT IN AN ORGANIZED HEALTH CARE EDUCATION/TRAINING PROGRAM

## 2024-03-22 PROCEDURE — 77012 CT SCAN FOR NEEDLE BIOPSY: CPT

## 2024-03-22 PROCEDURE — 99153 MOD SED SAME PHYS/QHP EA: CPT

## 2024-03-22 PROCEDURE — 88307 TISSUE EXAM BY PATHOLOGIST: CPT | Performed by: STUDENT IN AN ORGANIZED HEALTH CARE EDUCATION/TRAINING PROGRAM

## 2024-03-22 PROCEDURE — 49180 BIOPSY ABDOMINAL MASS: CPT | Performed by: RADIOLOGY

## 2024-03-22 PROCEDURE — 88342 IMHCHEM/IMCYTCHM 1ST ANTB: CPT | Performed by: STUDENT IN AN ORGANIZED HEALTH CARE EDUCATION/TRAINING PROGRAM

## 2024-03-22 PROCEDURE — 88307 TISSUE EXAM BY PATHOLOGIST: CPT | Mod: TC,SUR | Performed by: STUDENT IN AN ORGANIZED HEALTH CARE EDUCATION/TRAINING PROGRAM

## 2024-03-22 PROCEDURE — 2720000007 HC OR 272 NO HCPCS

## 2024-03-22 PROCEDURE — 88360 TUMOR IMMUNOHISTOCHEM/MANUAL: CPT | Performed by: STUDENT IN AN ORGANIZED HEALTH CARE EDUCATION/TRAINING PROGRAM

## 2024-03-22 PROCEDURE — C1819 TISSUE LOCALIZATION-EXCISION: HCPCS

## 2024-03-22 RX ORDER — FENTANYL CITRATE 50 UG/ML
INJECTION, SOLUTION INTRAMUSCULAR; INTRAVENOUS
Status: COMPLETED | OUTPATIENT
Start: 2024-03-22 | End: 2024-03-22

## 2024-03-22 RX ORDER — MIDAZOLAM HYDROCHLORIDE 1 MG/ML
INJECTION INTRAMUSCULAR; INTRAVENOUS
Status: COMPLETED | OUTPATIENT
Start: 2024-03-22 | End: 2024-03-22

## 2024-03-22 RX ADMIN — MIDAZOLAM HYDROCHLORIDE 1 MG: 1 INJECTION, SOLUTION INTRAMUSCULAR; INTRAVENOUS at 08:46

## 2024-03-22 RX ADMIN — FENTANYL CITRATE 50 MCG: 50 INJECTION, SOLUTION INTRAMUSCULAR; INTRAVENOUS at 08:46

## 2024-03-22 RX ADMIN — MIDAZOLAM HYDROCHLORIDE 1 MG: 1 INJECTION, SOLUTION INTRAMUSCULAR; INTRAVENOUS at 08:41

## 2024-03-22 RX ADMIN — FENTANYL CITRATE 50 MCG: 50 INJECTION, SOLUTION INTRAMUSCULAR; INTRAVENOUS at 08:41

## 2024-03-22 ASSESSMENT — PAIN - FUNCTIONAL ASSESSMENT
PAIN_FUNCTIONAL_ASSESSMENT: 0-10

## 2024-03-22 ASSESSMENT — PAIN SCALES - GENERAL
PAINLEVEL_OUTOF10: 0 - NO PAIN
PAINLEVEL_OUTOF10: 9

## 2024-03-22 NOTE — DISCHARGE INSTRUCTIONS
You received moderate sedation:  - Do not drive a car, or operate any machinery or power tools of any kind.  - Do not drink any alcoholic drinks.  - Do not take any over the counter medications that may cause drowsiness.  - Do not make any important decisions or sign any legal documents.  - You need to have a responsible adult accompany you home.  - You may resume your normal diet.  - We strongly suggest that a responsible adult be with you for the rest of the day and also during the night. This is for your protection and safety.     For questions related to your procedure:  Please call [phone] between the hours of 7:00am-5:00pm Monday through Friday.  Please call [phone] after 5:00pm and on weekends and holidays.     In the event of an emergency call 911 or go to your nearest emergency room.

## 2024-03-22 NOTE — POST-PROCEDURE NOTE
Interventional Radiology Brief Postprocedure Note    Attending: Dr. Ganesh MD    Assistant: Ezio Arita MD   PGY-3, Vascular & Interventional Radiology       Diagnosis: Left ureteral mass     Description of procedure: CT guided biopsy of left ureteral mass x2 cores. No immediate complications.     Anesthesia:  MAC    Complications: None    Estimated Blood Loss: none    Medications (Filter: Administrations occurring from 0830 to 0855 on 03/22/24) As of 03/22/24 0855      fentaNYL PF (Sublimaze) injection (mcg) Total dose:  100 mcg      Date/Time Rate/Dose/Volume Action       03/22/24  0841 50 mcg Given      0846 50 mcg Given               midazolam (Versed) injection (mg) Total dose:  2 mg      Date/Time Rate/Dose/Volume Action       03/22/24  0841 1 mg Given      0846 1 mg Given                       See detailed result report with images in PACS.    The patient tolerated the procedure well without incident or complication and is in stable condition.

## 2024-03-22 NOTE — Clinical Note
L pelvic mass bx done; 2 cores taken; sample sent to lab. Pt received 2mg versed and 100mcg fentanyl for sedation; tolerated well. VSS throughout procedure. Dressing is clean, dry, and intact. Recovery time 2hrs. Pt to RPCU; report to RPCU RN.

## 2024-03-22 NOTE — PRE-PROCEDURE NOTE
Interventional Radiology Preprocedure Note    Indication for procedure: The encounter diagnosis was Ureteral mass.    72 y/o with left distal ureter mass, presents here for biopsy .     Relevant review of systems: NA    Relevant Labs:   Lab Results   Component Value Date    CREATININE 1.14 (H) 02/28/2024    EGFR 51 (L) 02/28/2024    INR 1.0 03/18/2024    PROTIME 10.8 03/18/2024       Planned Sedation/Anesthesia: Moderate    Airway assessment: normal    Directed physical examination:    Focused Exam: Unremarkable.    Mallampati: II (hard and soft palate, upper portion of tonsils anduvula visible)    ASA Score: ASA 2 - Patient with mild systemic disease with no functional limitations    Benefits, risks and alternatives of procedure and planned sedation have been discussed with the patient and/or their representative. All questions answered and they agree to proceed.

## 2024-03-25 ENCOUNTER — APPOINTMENT (OUTPATIENT)
Dept: RADIOLOGY | Facility: HOSPITAL | Age: 74
End: 2024-03-25
Payer: MEDICARE

## 2024-03-29 LAB
LAB AP ASR DISCLAIMER: NORMAL
LABORATORY COMMENT REPORT: NORMAL
PATH REPORT.FINAL DX SPEC: NORMAL
PATH REPORT.GROSS SPEC: NORMAL
PATH REPORT.RELEVANT HX SPEC: NORMAL
PATH REPORT.TOTAL CANCER: NORMAL

## 2024-04-01 ENCOUNTER — PATIENT OUTREACH (OUTPATIENT)
Dept: HEMATOLOGY/ONCOLOGY | Facility: HOSPITAL | Age: 74
End: 2024-04-01
Payer: MEDICARE

## 2024-04-01 NOTE — PROGRESS NOTES
04/01/2024 1409: Patient is referred by Dr. Ross to meet with Dr. Boone for her recently diagnosed invasive carcinoma, may represent urothelial origin vs h/o cervical cancer. Patient presented to the ER in February of this year with c/o flank pain imaging revealed soft tissue mass encasing the distal segment of the left ureter. Ureteral biopsy performed in IR on 03/22/2024 revealed the above diagnosis. History of cervical cancer as below:    1998 Chemoradiation, with weekly cisplatin, and radiation completed on 1/4/99.   2/99 Total abdominal hysterectomy and bilateral salpingo-oophorectomy, with no residual carcinoma found.   1/23/08 Category 1 mammogram  11/25/08 LGSIL ANTHONY 1 consistent with HPV effect  1/13/09 MID VAGINAL APEX, BIOPSY: MILD VAGINAL INTRAEPITHELIAL NEOPLASIA. Appointment confirmed for patient to meet with Dr. Boone 04/03/2024 @ 8:00AM. All of her information is in the system. Dony Macias RN.

## 2024-04-03 ENCOUNTER — LAB (OUTPATIENT)
Dept: LAB | Facility: CLINIC | Age: 74
End: 2024-04-03
Payer: MEDICARE

## 2024-04-03 ENCOUNTER — OFFICE VISIT (OUTPATIENT)
Dept: HEMATOLOGY/ONCOLOGY | Facility: CLINIC | Age: 74
End: 2024-04-03
Payer: MEDICARE

## 2024-04-03 VITALS
RESPIRATION RATE: 18 BRPM | TEMPERATURE: 97.3 F | SYSTOLIC BLOOD PRESSURE: 179 MMHG | WEIGHT: 154.98 LBS | OXYGEN SATURATION: 98 % | DIASTOLIC BLOOD PRESSURE: 89 MMHG | BODY MASS INDEX: 29.28 KG/M2 | HEART RATE: 78 BPM

## 2024-04-03 DIAGNOSIS — C68.9 UROTHELIAL CARCINOMA (MULTI): ICD-10-CM

## 2024-04-03 DIAGNOSIS — R94.6 THYROID FUNCTION STUDY ABNORMALITY: ICD-10-CM

## 2024-04-03 DIAGNOSIS — C68.9 UROTHELIAL CARCINOMA (MULTI): Primary | ICD-10-CM

## 2024-04-03 LAB
ALBUMIN SERPL BCP-MCNC: 4.2 G/DL (ref 3.4–5)
ALP SERPL-CCNC: 78 U/L (ref 33–136)
ALT SERPL W P-5'-P-CCNC: 10 U/L (ref 7–45)
ANION GAP SERPL CALC-SCNC: 14 MMOL/L (ref 10–20)
AST SERPL W P-5'-P-CCNC: 11 U/L (ref 9–39)
BASOPHILS # BLD AUTO: 0.03 X10*3/UL (ref 0–0.1)
BASOPHILS NFR BLD AUTO: 0.5 %
BILIRUB SERPL-MCNC: 0.4 MG/DL (ref 0–1.2)
BUN SERPL-MCNC: 16 MG/DL (ref 6–23)
CALCIUM SERPL-MCNC: 9.4 MG/DL (ref 8.6–10.6)
CHLORIDE SERPL-SCNC: 110 MMOL/L (ref 98–107)
CO2 SERPL-SCNC: 23 MMOL/L (ref 21–32)
CREAT SERPL-MCNC: 1.05 MG/DL (ref 0.5–1.05)
EGFRCR SERPLBLD CKD-EPI 2021: 57 ML/MIN/1.73M*2
EOSINOPHIL # BLD AUTO: 0.17 X10*3/UL (ref 0–0.4)
EOSINOPHIL NFR BLD AUTO: 2.8 %
ERYTHROCYTE [DISTWIDTH] IN BLOOD BY AUTOMATED COUNT: 14.5 % (ref 11.5–14.5)
GLUCOSE SERPL-MCNC: 91 MG/DL (ref 74–99)
HCT VFR BLD AUTO: 35.8 % (ref 36–46)
HGB BLD-MCNC: 11.5 G/DL (ref 12–16)
IMM GRANULOCYTES # BLD AUTO: 0.03 X10*3/UL (ref 0–0.5)
IMM GRANULOCYTES NFR BLD AUTO: 0.5 % (ref 0–0.9)
LYMPHOCYTES # BLD AUTO: 1.16 X10*3/UL (ref 0.8–3)
LYMPHOCYTES NFR BLD AUTO: 19.4 %
MCH RBC QN AUTO: 29.9 PG (ref 26–34)
MCHC RBC AUTO-ENTMCNC: 32.1 G/DL (ref 32–36)
MCV RBC AUTO: 93 FL (ref 80–100)
MONOCYTES # BLD AUTO: 0.41 X10*3/UL (ref 0.05–0.8)
MONOCYTES NFR BLD AUTO: 6.9 %
NEUTROPHILS # BLD AUTO: 4.17 X10*3/UL (ref 1.6–5.5)
NEUTROPHILS NFR BLD AUTO: 69.9 %
NRBC BLD-RTO: ABNORMAL /100{WBCS}
PLATELET # BLD AUTO: 243 X10*3/UL (ref 150–450)
POTASSIUM SERPL-SCNC: 4.2 MMOL/L (ref 3.5–5.3)
PROT SERPL-MCNC: 6.9 G/DL (ref 6.4–8.2)
RBC # BLD AUTO: 3.85 X10*6/UL (ref 4–5.2)
SODIUM SERPL-SCNC: 143 MMOL/L (ref 136–145)
TSH SERPL-ACNC: 2.56 MIU/L (ref 0.44–3.98)
WBC # BLD AUTO: 6 X10*3/UL (ref 4.4–11.3)

## 2024-04-03 PROCEDURE — 85025 COMPLETE CBC W/AUTO DIFF WBC: CPT

## 2024-04-03 PROCEDURE — 99215 OFFICE O/P EST HI 40 MIN: CPT | Performed by: STUDENT IN AN ORGANIZED HEALTH CARE EDUCATION/TRAINING PROGRAM

## 2024-04-03 PROCEDURE — 84443 ASSAY THYROID STIM HORMONE: CPT | Performed by: STUDENT IN AN ORGANIZED HEALTH CARE EDUCATION/TRAINING PROGRAM

## 2024-04-03 PROCEDURE — 99205 OFFICE O/P NEW HI 60 MIN: CPT | Performed by: STUDENT IN AN ORGANIZED HEALTH CARE EDUCATION/TRAINING PROGRAM

## 2024-04-03 PROCEDURE — 3079F DIAST BP 80-89 MM HG: CPT | Performed by: STUDENT IN AN ORGANIZED HEALTH CARE EDUCATION/TRAINING PROGRAM

## 2024-04-03 PROCEDURE — 1125F AMNT PAIN NOTED PAIN PRSNT: CPT | Performed by: STUDENT IN AN ORGANIZED HEALTH CARE EDUCATION/TRAINING PROGRAM

## 2024-04-03 PROCEDURE — 1159F MED LIST DOCD IN RCRD: CPT | Performed by: STUDENT IN AN ORGANIZED HEALTH CARE EDUCATION/TRAINING PROGRAM

## 2024-04-03 PROCEDURE — 84075 ASSAY ALKALINE PHOSPHATASE: CPT | Performed by: STUDENT IN AN ORGANIZED HEALTH CARE EDUCATION/TRAINING PROGRAM

## 2024-04-03 PROCEDURE — 3077F SYST BP >= 140 MM HG: CPT | Performed by: STUDENT IN AN ORGANIZED HEALTH CARE EDUCATION/TRAINING PROGRAM

## 2024-04-03 PROCEDURE — 36415 COLL VENOUS BLD VENIPUNCTURE: CPT

## 2024-04-03 PROCEDURE — 1157F ADVNC CARE PLAN IN RCRD: CPT | Performed by: STUDENT IN AN ORGANIZED HEALTH CARE EDUCATION/TRAINING PROGRAM

## 2024-04-03 RX ORDER — ONDANSETRON HYDROCHLORIDE 2 MG/ML
8 INJECTION, SOLUTION INTRAVENOUS ONCE
Status: CANCELLED | OUTPATIENT
Start: 2024-04-11

## 2024-04-03 RX ORDER — PROCHLORPERAZINE EDISYLATE 5 MG/ML
10 INJECTION INTRAMUSCULAR; INTRAVENOUS EVERY 6 HOURS PRN
Status: CANCELLED | OUTPATIENT
Start: 2024-04-11

## 2024-04-03 RX ORDER — ONDANSETRON HYDROCHLORIDE 2 MG/ML
8 INJECTION, SOLUTION INTRAVENOUS ONCE
Status: CANCELLED | OUTPATIENT
Start: 2024-04-18

## 2024-04-03 RX ORDER — FAMOTIDINE 10 MG/ML
20 INJECTION INTRAVENOUS ONCE AS NEEDED
Status: CANCELLED | OUTPATIENT
Start: 2024-04-18

## 2024-04-03 RX ORDER — PROCHLORPERAZINE MALEATE 10 MG
10 TABLET ORAL EVERY 6 HOURS PRN
Status: CANCELLED | OUTPATIENT
Start: 2024-04-18

## 2024-04-03 RX ORDER — PROCHLORPERAZINE MALEATE 10 MG
10 TABLET ORAL EVERY 6 HOURS PRN
Status: CANCELLED | OUTPATIENT
Start: 2024-04-11

## 2024-04-03 RX ORDER — EPINEPHRINE 0.3 MG/.3ML
0.3 INJECTION SUBCUTANEOUS EVERY 5 MIN PRN
Status: CANCELLED | OUTPATIENT
Start: 2024-04-11

## 2024-04-03 RX ORDER — EPINEPHRINE 0.3 MG/.3ML
0.3 INJECTION SUBCUTANEOUS EVERY 5 MIN PRN
Status: CANCELLED | OUTPATIENT
Start: 2024-04-18

## 2024-04-03 RX ORDER — PROCHLORPERAZINE EDISYLATE 5 MG/ML
10 INJECTION INTRAMUSCULAR; INTRAVENOUS EVERY 6 HOURS PRN
Status: CANCELLED | OUTPATIENT
Start: 2024-04-18

## 2024-04-03 RX ORDER — DIPHENHYDRAMINE HYDROCHLORIDE 50 MG/ML
50 INJECTION INTRAMUSCULAR; INTRAVENOUS AS NEEDED
Status: CANCELLED | OUTPATIENT
Start: 2024-04-11

## 2024-04-03 RX ORDER — FAMOTIDINE 10 MG/ML
20 INJECTION INTRAVENOUS ONCE AS NEEDED
Status: CANCELLED | OUTPATIENT
Start: 2024-04-11

## 2024-04-03 RX ORDER — ALBUTEROL SULFATE 0.83 MG/ML
3 SOLUTION RESPIRATORY (INHALATION) AS NEEDED
Status: CANCELLED | OUTPATIENT
Start: 2024-04-11

## 2024-04-03 RX ORDER — DIPHENHYDRAMINE HYDROCHLORIDE 50 MG/ML
50 INJECTION INTRAMUSCULAR; INTRAVENOUS AS NEEDED
Status: CANCELLED | OUTPATIENT
Start: 2024-04-18

## 2024-04-03 RX ORDER — ALBUTEROL SULFATE 0.83 MG/ML
3 SOLUTION RESPIRATORY (INHALATION) AS NEEDED
Status: CANCELLED | OUTPATIENT
Start: 2024-04-18

## 2024-04-03 ASSESSMENT — PAIN SCALES - GENERAL: PAINLEVEL: 6

## 2024-04-03 NOTE — PROGRESS NOTES
Patient ID: Terra Alexis is a 73 y.o. female.  Diagnosis: irresectable UC  Referral: Dr. Ross    HISTORY OF PRESENT ILLNESS:  04/01/2024 - Patient is referred by Dr. Ross to meet with Dr. Boone for her recently diagnosed invasive carcinoma, may represent urothelial origin vs h/o cervical cancer. Patient presented to the ER in February of this year with c/o flank pain imaging revealed soft tissue mass encasing the distal segment of the left ureter. Ureteral biopsy performed in IR on 03/22/2024 revealed the above diagnosis. History of cervical cancer as below:     Oncologic Summary  1998 Cervical cancer, tx Chemoradiation, with weekly cisplatin, and radiation completed on 1/4/99.   2/99 Total abdominal hysterectomy and bilateral salpingo-oophorectomy, with no residual carcinoma found.   1/23/08 Category 1 mammogram  11/25/08 LGSIL ANTHONY 1 consistent with HPV effect  1/13/09 MID VAGINAL APEX, BIOPSY: MILD VAGINAL INTRAEPITHELIAL NEOPLASIA.  03/2024 - pelvic irresectable distal urothelial mass, bx proven UC    Surgical History:  Terra has a past surgical history that includes Other surgical history (10/07/2021).    Social History:  Terra reports that she has never smoked. She has never used smokeless tobacco. She reports that she does not drink alcohol and does not use drugs.    Family History:    Family History   Problem Relation Name Age of Onset    Macular degeneration Mother      Glaucoma Other grandparent     Macular degeneration Other grandparent      OBJECTIVE:  VS / Pain:  /89   Pulse 78   Temp 36.3 °C (97.3 °F)   Resp 18   Wt 70.3 kg (154 lb 15.7 oz)   SpO2 98%   BMI 29.28 kg/m²   BSA: 1.74 meters squared    Performance Status:  ECOG Score: 0- Fully active, able to carry on all pre-disease performance w/o restriction.    Diagnostic Results   2/28/24 -      Interpreted By:  Sal Zimmerman,   STUDY:  CT UROGRAPHY WITH 3D VOLUME RENDERED IMAGING; CT CHEST W IV CONTRAST;  2/28/2024 2:19  pm      INDICATION:  Signs/Symptoms:renal mass eval; Signs/Symptoms:c/f cancer, renal mass.      COMPARISON:  Selected examinations as far back as December 6, 2021 CT urogram  including October 4, 2022 renal ultrasound, October 26, 2022 Lasix  renogram, February 25, 2024 CT abdomen and pelvis and February 28, 2024 chest CT.      ACCESSION NUMBER(S):  [id number]; [id number]      ORDERING CLINICIAN:  KEVIN GLASGOW      TECHNIQUE:  CT of the chest abdomen and pelvis was performed.  TRIPLE PHASE  UROGRAM TECHNIQUE and single-phase chest technique: Contiguous axial  images of the abdomen and pelvis were obtained at 3mm slice thickness  before, 110sec and 10min after intravenous contrast administration.  Oral ingestion of up to 900ml or water was encouraged prior to the  study. 3D volume rendering as well as multiplanar reformats in  coronal and sagittal reconstructions at 3 mm slice thickness were  performed. 80 mL of Omnipaque 350 followed by 80 mL of saline were  administered intravenously without immediate complication. 700 mL of  oral water ingestion encouraged prior to the examination.      FINDINGS:  CHEST:  Lungs: Compared with February 25, 2024 increased bibasilar platelike  atelectasis.  4 mm flat fissural nodule left lower chest series 205, image 126 not  imaged on December 6, 2021 CT urogram. The pattern is probably benign  fissural nodule most typically due to lymph node. Otherwise no  suspicious nodule, consolidative pneumonia, edema, or effusion.  Visualized portion of the neck and body wall there is a 14 x 10 mm  nodule left submandibular region only partially imaged series 21,  image 1 suggestive of a submandibular lymph node. Visualized portion  of the thyroid is unremarkable. No separate detected mass or  lymphadenopathy.  Cardiovascular structures: Heart size within normal limits. No  aneurysm or pericardial effusion. Mild atherosclerosis involving the  thoracic aorta and coronary arteries.       Esophagus: Unremarkable.      Mediastinal and hilar lymph nodes: Within normal limits.    ABDOMEN:  KIDNEYS AND URETERS:  Redemonstrated severe left-sided hydronephrosis with left renal  postobstructive cortical atrophy and hypoenhancement.      Redemonstrated solid enhancing mass centered along the distal left  ureter measuring 3.1 x 3.5 x 4.1 cm axial series 401, image 98,  coronal series were arterial image 71.      The mass is inseparable from the adjacent vessels including the left  internal iliac vessels which could be directly involved by neoplasm  with newly seen since 2021 mild left pelvic vascular collateral  series 602, image 352. The mass also is inseparable from adjacent  mesenteric vessels..      There is abrupt tapering of the dilated left ureter at the level of  the mass. Redemonstrated extensive tubular intraluminal material  present within the dilated left ureter and intrarenal collecting  system most likely extensive blood product which could obscure  underlying mass.      The left ureter distal to the enhancing mass is mildly prominent  caliber with slight mural prominence which is poorly delineated due  to artifact coronal series 402, image 76. The mural prominence could  be due to neoplasm, or blood product extending to the intramural  portion of the bladder trigone.      BLADDER:  There is curvilinear lobulated structure dependent portion of the  urinary bladder partially adherent to the wall sagittal series 604  images 67 and 68 measuring 2.2 cm craniocaudal diameter most likely  due to blood product.      LIVER:  Stable 1 cm benign-appearing fat containing nodule segment 4A. 5 mm  segment 8 hypodensity image 35 compares with 3 mm 2021 examination  image 29 most likely incidental such as a cyst although too small to  characterize. No suspicious liver lesion is identified.      BILE DUCTS:  Stable appearance of the intra and extrahepatic bile ducts. The  common bile duct measures 7 mm series  support 2 image 54. No detected  obstructing mass or calculus.      GALLBLADDER:  Stable mild prominence of portions of the gallbladder wall including  portion superiorly measuring 4 mm thickness series 402, image 33 no  calculi or surrounding inflammatory change.      PANCREAS:  The pancreas appears unremarkable.     BONE  No suspicious osseous lesions. Scattered degenerative changes.      IMPRESSION:  1.  Solid enhancing mass inseparable from and potentially arising  from the distal left ureter which probably encases and could invade  adjacent iliac vessels with mild newly seen pelvic vascular  collaterals. The mass is also inseparable from adjacent mesenteric  vessels.  2. Marked left-sided hydronephrosis and postobstructive changes left  kidney.  3. There is probable blood product in proximal ureter, intrarenal  collecting system, urinary bladder potentially distal ureter versus  additional tumor within the distal ureter extending to the intramural  portion of the bladder trigone. Attention recommended on follow-up  assessment.  4. No CT evidence of distant metastases.  5. Probably benign liver hypodensities 1 of which contains fat and 1  of which is too small to characterize. Attention recommended on  follow-up assessment.  6. Grossly stable mild probably benign gallbladder wall prominence  likely related to chronic benign wall prominence. Attention  recommended on follow-up assessment. Consider gallbladder ultrasound  correlation.  7. Bibasilar platelike atelectasis.  8. Probably benign fissural nodule left lower lung most likely  incidental. Attention recommended on follow-up assessment.  9. Incompletely image 14 x 10 mm nodule left submandibular region  potentially lymph node. Attention recommended on follow-up assessment.       @=== 03/22/24 ===  CT GUIDED PERCUTANEOUS BIOPSY RETROPERITONEUM  - Impression -  Challenging but technically successful CT-guided biopsy of left  pelvic periureteral lesion as  detailed above.  I was present for and/or performed the critical portions of the  procedure and immediately available throughout the entire procedure.    A. Soft tissue, mass, biopsy:    -- Involved by invasive carcinoma. See note.     Note: Patient's imaging finding of a soft tissue mass encasing the distal ureter and remote history of cervical cancer (per clinical notes) is noted. Histological sections show cores of fibroconnective tissue involved by infiltrative nests of high grade carcinoma cells, which are immunohistochemically positive for pancytokeratin AE1/AE3, CAM5.2, GATA3 and p63 and are negative for PAX8 and ER. The morphological and immunohistochemical findings are not entirely specific. Given the clinical context of a distal ureteral mass, this might represent invasive carcinoma of urothelial origin. Clinical correlation is recommended.    Assessment/Plan   I personally saw patient and counselled all visit  on his diagnosis, estelita history and coordination of his care.   This is a 73 y.o. female  with hx cervical cancer s/p chemoRT 1998, now found with pelvic irresectable distal urothelial mass, bx proven UC. We discussed the clinical significance of diagnosis, goals of care and treatment plan in detail. We would offer her systemic treatment with EV+pembro and then reassess opportunity for surgery. Will do baseline tissue NGS, baseline labs and plan to start soon.   Thank you for the opportunity to be involved in the care of Terra Alexis. Please do not hesitate to reach out with any questions. Thank you.   -------------------------------------------------------------------------------------------------------------------------------  Elgin Boone MD, Msc, FACP  [facility]  Co-Leader Genitourinary () Disease Team  Director of  Medical Oncology Research Program   [facility]  Associate Professor of Medicine  [facility]  [address], [address]  [place] [id number]  Phone: [phone]  [email or web address]

## 2024-04-04 RX ORDER — ONDANSETRON HYDROCHLORIDE 8 MG/1
8 TABLET, FILM COATED ORAL EVERY 8 HOURS PRN
Qty: 30 TABLET | Refills: 5 | Status: SHIPPED | OUTPATIENT
Start: 2024-04-04

## 2024-04-09 NOTE — PROGRESS NOTES
Patient ID: Terra Alexis is a 73 y.o. female.  Diagnosis:  irresectable UC   MedOnc: Dr. Boone   Urologist: Dr. Ross  Gyn: Dr. Nathan Noyola - [facility]     Patient Care Team:  Shannon Rodriguez MD as PCP - General  Elgin Boone MD as Consulting Physician (Hematology and Oncology)    Current Therapy: EV + Pembro    ONCOLOGIC HISTORY  No matching staging information was found for the patient.    Patient is referred by Dr. Ross for recently diagnosed invasive carcinoma, may represent urothelial origin vs h/o cervical cancer. Patient presented to the ER in February 2024 with c/o flank pain imaging revealed soft tissue mass encasing the distal segment of the left ureter. Ureteral biopsy performed in IR on 03/22/2024 revealed the above diagnosis.      1998 Cervical cancer, tx Chemoradiation, with weekly cisplatin, and radiation completed on 1/4/99.   2/1999 Total abdominal hysterectomy and bilateral salpingo-oophorectomy, with no residual carcinoma found.   1/23/08 Category 1 mammogram  11/25/08 LGSIL ANTHONY 1 consistent with HPV effect  1/13/09 MID VAGINAL APEX, BIOPSY: MILD VAGINAL INTRAEPITHELIAL NEOPLASIA.  03/2024 - pelvic irresectable distal urothelial mass, bx proven UC  4/11/24 - Cycle 1 EV + Pembro    Oncology History   Urothelial carcinoma (CMS/HCC)   4/3/2024 Initial Diagnosis    Urothelial carcinoma (CMS/HCC)     4/11/2024 -  Chemotherapy    Enfortumab vedotin + Pembrolizumab, 21 Day Cycles        Other Contributing History  Cervical cancer - s/p chemoradiation and surgery    Subjective      Interval History:  Terra Alexis is a 73 y.o. female who presents today for follow up of UC. Patient of Elgin Boone MD currently here to start EV + Pembro. Has some urinary discomfort likely from stent. Also constipation issues that she is managing with OTC stool softener, becoming more regular. Has some left flank/side pain that radiates, aching and pelvic cramping. Relief with Advil TID. No hematuria  recently. Has arthritic back pain. Recently ran out of BP meds. Does not wish to follow up with PCP. We will monitor BP. Some baseline left leg neuropathy, intermittent, feels it's related to arthritis in back.     Review of Systems   Constitutional:  Negative for appetite change, chills, fatigue, fever and unexpected weight change.   HENT:  Negative.     Eyes: Negative.    Respiratory:  Negative for cough and shortness of breath.    Cardiovascular:  Negative for chest pain and leg swelling.   Gastrointestinal:  Negative for abdominal pain, constipation, diarrhea, nausea and vomiting.   Endocrine: Negative.    Genitourinary:  Positive for dysuria and pelvic pain. Negative for difficulty urinating, hematuria and vaginal bleeding.         Related to stent, pelvic cramping   Musculoskeletal:  Positive for arthralgias, back pain and flank pain. Negative for myalgias and neck pain.   Skin:  Negative for itching, rash and wound.   Neurological:  Positive for numbness. Negative for dizziness, headaches and light-headedness.   Hematological: Negative.    Psychiatric/Behavioral: Negative.       Objective      /81   Pulse 72   Temp 36.8 °C (98.2 °F)   Resp 18   Wt 69.8 kg (153 lb 14.1 oz)   SpO2 97%   BMI 29.08 kg/m²   BSA: 1.73 meters squared    Wt Readings from Last 5 Encounters:   04/11/24 69.8 kg (153 lb 14.1 oz)   04/03/24 70.3 kg (154 lb 15.7 oz)   03/01/24 69.9 kg (154 lb)   02/28/24 69.9 kg (154 lb)   02/25/24 68 kg (150 lb)     Performance Status:  ECOG Score: 0- Fully active, able to carry on all pre-disease performance w/o restriction.  Karnofsky Score: 90 - Able to carry on normal activity; minor signs or symptoms of disease     Physical Exam  Physical Exam  Constitutional:       General: She is not in acute distress.     Appearance: Normal appearance. She is not toxic-appearing.   HENT:      Head: Normocephalic and atraumatic.      Mouth/Throat:      Mouth: Mucous membranes are moist.      Pharynx:  Oropharynx is clear.   Eyes:      Pupils: Pupils are equal, round, and reactive to light.   Pulmonary:      Effort: Pulmonary effort is normal.   Musculoskeletal:         General: Normal range of motion.      Cervical back: Normal range of motion.      Right lower leg: No edema.      Left lower leg: No edema.   Skin:     General: Skin is warm and dry.      Findings: No rash.   Neurological:      General: No focal deficit present.      Mental Status: She is alert and oriented to person, place, and time.      Motor: No weakness.   Psychiatric:         Mood and Affect: Mood normal.         Behavior: Behavior normal.         Thought Content: Thought content normal.         Judgment: Judgment normal.     Allergies  Allergies   Allergen Reactions    Codeine GI Upset, Other and Nausea/vomiting      Medications  Current Outpatient Medications   Medication Instructions    cartilage/collagen/bor/hyalur (JO[employer] HEALTH ORAL) oral,  [employer] Health CAPS Refills: 0 Active<BR>    cephalexin (KEFLEX) 500 mg, oral, TAKE 1 CAPSULE Once Please take after stent removal<BR>    docusate sodium (COLACE) 100 mg, oral, 2 times daily    hydroCHLOROthiazide (HYDRODIURIL) 25 mg, oral, Daily    MELATONIN ORAL oral, As needed    multivitamin tablet 1 tablet, oral, Daily    ondansetron (ZOFRAN) 8 mg, oral, Every 8 hours PRN    phenazopyridine (PYRIDIUM) 100 mg, oral, 3 times daily PRN    potassium chloride CR 10 mEq ER tablet 10 mEq, oral, Daily    turmeric 400 mg capsule Daily    vit A/vit C/vit E/zinc/copper (VITAMINS A,C,E-ZINC-COPPER ORAL) oral,  PreserVision AREDS CAPS Refills: 0 Active<BR>    vitamins A,C,E-zinc-copper (PreserVision AREDS) 4,296 mcg-226 mg-90 mg capsule oral        Diagnostic Results   Recent Labs  No results found for this or any previous visit (from the past 96 hour(s)).    Surgical Path 3/22/24   FINAL DIAGNOSIS   A. Soft tissue, mass, biopsy:    -- Involved by invasive carcinoma. See note.     Note: Patient's imaging  finding of a soft tissue mass encasing the distal ureter and remote history of cervical cancer (per clinical notes) is noted. Histological sections show cores of fibroconnective tissue involved by infiltrative nests of high grade carcinoma cells, which are immunohistochemically positive for pancytokeratin AE1/AE3, CAM5.2, GATA3 and p63 and are negative for PAX8 and ER. The morphological and immunohistochemical findings are not entirely specific. Given the clinical context of a distal ureteral mass, this might represent invasive carcinoma of urothelial origin. Clinical correlation is recommended.       Lab Results   Component Value Date    WBC 6.0 04/03/2024    HGB 11.5 (L) 04/03/2024    HCT 35.8 (L) 04/03/2024    MCV 93 04/03/2024     04/03/2024       Chemistry    Lab Results   Component Value Date/Time     04/03/2024 1126    K 4.2 04/03/2024 1126     (H) 04/03/2024 1126    CO2 23 04/03/2024 1126    BUN 16 04/03/2024 1126    CREATININE 1.05 04/03/2024 1126    Lab Results   Component Value Date/Time    CALCIUM 9.4 04/03/2024 1126    ALKPHOS 78 04/03/2024 1126    AST 11 04/03/2024 1126    ALT 10 04/03/2024 1126    BILITOT 0.4 04/03/2024 1126        Lab Results   Component Value Date    TSH 2.56 04/03/2024     Recent Imaging   2/28/24 CT urography/chest  IMPRESSION:  1.  Solid enhancing mass inseparable from and potentially arising  from the distal left ureter which probably encases and could invade  adjacent iliac vessels with mild newly seen pelvic vascular  collaterals. The mass is also inseparable from adjacent mesenteric  vessels.  2. Marked left-sided hydronephrosis and postobstructive changes left  kidney.  3. There is probable blood product in proximal ureter, intrarenal  collecting system, urinary bladder potentially distal ureter versus  additional tumor within the distal ureter extending to the intramural  portion of the bladder trigone. Attention recommended on follow-up  assessment.  4.  No CT evidence of distant metastases.  5. Probably benign liver hypodensities 1 of which contains fat and 1  of which is too small to characterize. Attention recommended on  follow-up assessment.  6. Grossly stable mild probably benign gallbladder wall prominence  likely related to chronic benign wall prominence. Attention  recommended on follow-up assessment. Consider gallbladder ultrasound  correlation.  7. Bibasilar platelike atelectasis.  8. Probably benign fissural nodule left lower lung most likely  incidental. Attention recommended on follow-up assessment.  9. Incompletely image 14 x 10 mm nodule left submandibular region  potentially lymph node. Attention recommended on follow-up assessment.    Assessment/Plan   Terra Alexis is a 73 y.o.  female with hx cervical cancer s/p chemoRT 1998, now found with pelvic irresectable distal urothelial mass, bx proven UC. We discussed the clinical significance of diagnosis, goals of care and treatment plan in detail. We would offer her systemic treatment with EV+pembro and then reassess opportunity for surgery. Will do baseline tissue NGS, baseline labs and plan to start soon.     She presents in follow up today to start EV + Pembro. Will monitor for constipation and neuropathy. Has zofran as needed. Signaterna monitoring.     Discussed probable side effects of chemotherapy and immunotherapy which include but are not limited to nausea, vomiting, fatigue, hair loss, pancytopenia, febrile neutropenia, bone marrow suppression, weakened immune system, infection complications, inflammation, colitis, skin issues, renal failure, liver failure, neurological complications, neuropathy, secondary malignancies, and potential organ failure and death.      Terra was seen today for follow-up.  Diagnoses and all orders for this visit:  Urothelial carcinoma (CMS/HCC) (Primary)  -     Signatera; Senthil - Miscellaneous Genetics Test; Future  -     Clinic Appointment  Request    Treatment Plan:  Enfortumab vedotin + Pembrolizumab, 21 Day Cycles  - Proceed with Cycle 1 EV + Pembro today  - Signatera today and Q6 weeks  - Scans after cycle 2   - ondansetron as needed for nausea   - Monitor constipation - may need to add Miralax  - Monitor BP - off antihypertensives     Follow up in 3 weeks with NP and Cycle 2, labs prior.     Patient verbalizes understanding of above plan. Time provided for patient's questions. All questions answered to patient's satisfaction in office. Patient instructed to reach out for any new concerning issues at [phone].    Soco Dietz MSN, APRN, A-GNP-C, OCN   Oncology   [facility] [place] [id number]   Epic Secure Chat   Phone: [phone]  [email or web address]

## 2024-04-11 ENCOUNTER — OFFICE VISIT (OUTPATIENT)
Dept: HEMATOLOGY/ONCOLOGY | Facility: CLINIC | Age: 74
End: 2024-04-11
Payer: MEDICARE

## 2024-04-11 ENCOUNTER — INFUSION (OUTPATIENT)
Dept: HEMATOLOGY/ONCOLOGY | Facility: CLINIC | Age: 74
End: 2024-04-11
Payer: MEDICARE

## 2024-04-11 VITALS
HEART RATE: 72 BPM | SYSTOLIC BLOOD PRESSURE: 172 MMHG | OXYGEN SATURATION: 97 % | WEIGHT: 153.88 LBS | DIASTOLIC BLOOD PRESSURE: 81 MMHG | BODY MASS INDEX: 29.08 KG/M2 | TEMPERATURE: 98.2 F | RESPIRATION RATE: 18 BRPM

## 2024-04-11 VITALS — BODY MASS INDEX: 30.29 KG/M2 | HEIGHT: 60 IN

## 2024-04-11 DIAGNOSIS — C68.9 UROTHELIAL CARCINOMA (MULTI): ICD-10-CM

## 2024-04-11 DIAGNOSIS — C68.9 UROTHELIAL CARCINOMA (MULTI): Primary | ICD-10-CM

## 2024-04-11 LAB
ALBUMIN SERPL BCP-MCNC: 3.8 G/DL (ref 3.4–5)
ALP SERPL-CCNC: 74 U/L (ref 33–136)
ALT SERPL W P-5'-P-CCNC: 8 U/L (ref 7–45)
ANION GAP SERPL CALC-SCNC: 13 MMOL/L (ref 10–20)
AST SERPL W P-5'-P-CCNC: 10 U/L (ref 9–39)
BASOPHILS # BLD AUTO: 0.02 X10*3/UL (ref 0–0.1)
BASOPHILS NFR BLD AUTO: 0.4 %
BILIRUB SERPL-MCNC: 0.4 MG/DL (ref 0–1.2)
BUN SERPL-MCNC: 20 MG/DL (ref 6–23)
CALCIUM SERPL-MCNC: 9.2 MG/DL (ref 8.6–10.6)
CHLORIDE SERPL-SCNC: 110 MMOL/L (ref 98–107)
CO2 SERPL-SCNC: 22 MMOL/L (ref 21–32)
CORTIS AM PEAK SERPL-MSCNC: 5.4 UG/DL (ref 5–20)
CREAT SERPL-MCNC: 1.17 MG/DL (ref 0.5–1.05)
EGFRCR SERPLBLD CKD-EPI 2021: 50 ML/MIN/1.73M*2
EOSINOPHIL # BLD AUTO: 0.17 X10*3/UL (ref 0–0.4)
EOSINOPHIL NFR BLD AUTO: 3.2 %
ERYTHROCYTE [DISTWIDTH] IN BLOOD BY AUTOMATED COUNT: 14.5 % (ref 11.5–14.5)
GLUCOSE SERPL-MCNC: 84 MG/DL (ref 74–99)
HBV CORE AB SER QL: NONREACTIVE
HBV SURFACE AB SER-ACNC: <3.1 MIU/ML
HBV SURFACE AG SERPL QL IA: NONREACTIVE
HCT VFR BLD AUTO: 33.2 % (ref 36–46)
HGB BLD-MCNC: 10.5 G/DL (ref 12–16)
IMM GRANULOCYTES # BLD AUTO: 0.02 X10*3/UL (ref 0–0.5)
IMM GRANULOCYTES NFR BLD AUTO: 0.4 % (ref 0–0.9)
LYMPHOCYTES # BLD AUTO: 1.44 X10*3/UL (ref 0.8–3)
LYMPHOCYTES NFR BLD AUTO: 27.4 %
MCH RBC QN AUTO: 30.1 PG (ref 26–34)
MCHC RBC AUTO-ENTMCNC: 31.6 G/DL (ref 32–36)
MCV RBC AUTO: 95 FL (ref 80–100)
MONOCYTES # BLD AUTO: 0.42 X10*3/UL (ref 0.05–0.8)
MONOCYTES NFR BLD AUTO: 8 %
NEUTROPHILS # BLD AUTO: 3.19 X10*3/UL (ref 1.6–5.5)
NEUTROPHILS NFR BLD AUTO: 60.6 %
NRBC BLD-RTO: ABNORMAL /100{WBCS}
PLATELET # BLD AUTO: 230 X10*3/UL (ref 150–450)
POTASSIUM SERPL-SCNC: 4.1 MMOL/L (ref 3.5–5.3)
PROT SERPL-MCNC: 6.2 G/DL (ref 6.4–8.2)
RBC # BLD AUTO: 3.49 X10*6/UL (ref 4–5.2)
SODIUM SERPL-SCNC: 141 MMOL/L (ref 136–145)
TSH SERPL-ACNC: 1.65 MIU/L (ref 0.44–3.98)
WBC # BLD AUTO: 5.3 X10*3/UL (ref 4.4–11.3)

## 2024-04-11 PROCEDURE — 99215 OFFICE O/P EST HI 40 MIN: CPT

## 2024-04-11 PROCEDURE — 84443 ASSAY THYROID STIM HORMONE: CPT | Performed by: STUDENT IN AN ORGANIZED HEALTH CARE EDUCATION/TRAINING PROGRAM

## 2024-04-11 PROCEDURE — 3077F SYST BP >= 140 MM HG: CPT

## 2024-04-11 PROCEDURE — 96417 CHEMO IV INFUS EACH ADDL SEQ: CPT

## 2024-04-11 PROCEDURE — 1157F ADVNC CARE PLAN IN RCRD: CPT

## 2024-04-11 PROCEDURE — 3079F DIAST BP 80-89 MM HG: CPT

## 2024-04-11 PROCEDURE — G2212 PROLONG OUTPT/OFFICE VIS: HCPCS

## 2024-04-11 PROCEDURE — 86706 HEP B SURFACE ANTIBODY: CPT | Performed by: STUDENT IN AN ORGANIZED HEALTH CARE EDUCATION/TRAINING PROGRAM

## 2024-04-11 PROCEDURE — 80053 COMPREHEN METABOLIC PANEL: CPT | Performed by: STUDENT IN AN ORGANIZED HEALTH CARE EDUCATION/TRAINING PROGRAM

## 2024-04-11 PROCEDURE — 82533 TOTAL CORTISOL: CPT | Performed by: STUDENT IN AN ORGANIZED HEALTH CARE EDUCATION/TRAINING PROGRAM

## 2024-04-11 PROCEDURE — 2500000004 HC RX 250 GENERAL PHARMACY W/ HCPCS (ALT 636 FOR OP/ED): Performed by: STUDENT IN AN ORGANIZED HEALTH CARE EDUCATION/TRAINING PROGRAM

## 2024-04-11 PROCEDURE — 82024 ASSAY OF ACTH: CPT | Performed by: STUDENT IN AN ORGANIZED HEALTH CARE EDUCATION/TRAINING PROGRAM

## 2024-04-11 PROCEDURE — 85025 COMPLETE CBC W/AUTO DIFF WBC: CPT | Performed by: STUDENT IN AN ORGANIZED HEALTH CARE EDUCATION/TRAINING PROGRAM

## 2024-04-11 PROCEDURE — 96375 TX/PRO/DX INJ NEW DRUG ADDON: CPT | Mod: INF

## 2024-04-11 PROCEDURE — 87340 HEPATITIS B SURFACE AG IA: CPT | Performed by: STUDENT IN AN ORGANIZED HEALTH CARE EDUCATION/TRAINING PROGRAM

## 2024-04-11 PROCEDURE — 86704 HEP B CORE ANTIBODY TOTAL: CPT | Performed by: STUDENT IN AN ORGANIZED HEALTH CARE EDUCATION/TRAINING PROGRAM

## 2024-04-11 PROCEDURE — 1159F MED LIST DOCD IN RCRD: CPT

## 2024-04-11 PROCEDURE — 96413 CHEMO IV INFUSION 1 HR: CPT

## 2024-04-11 PROCEDURE — 1125F AMNT PAIN NOTED PAIN PRSNT: CPT

## 2024-04-11 RX ORDER — EPINEPHRINE 0.3 MG/.3ML
0.3 INJECTION SUBCUTANEOUS EVERY 5 MIN PRN
Status: DISCONTINUED | OUTPATIENT
Start: 2024-04-11 | End: 2024-04-11 | Stop reason: HOSPADM

## 2024-04-11 RX ORDER — FAMOTIDINE 10 MG/ML
20 INJECTION INTRAVENOUS ONCE AS NEEDED
Status: DISCONTINUED | OUTPATIENT
Start: 2024-04-11 | End: 2024-04-11 | Stop reason: HOSPADM

## 2024-04-11 RX ORDER — DIPHENHYDRAMINE HYDROCHLORIDE 50 MG/ML
50 INJECTION INTRAMUSCULAR; INTRAVENOUS AS NEEDED
Status: DISCONTINUED | OUTPATIENT
Start: 2024-04-11 | End: 2024-04-11 | Stop reason: HOSPADM

## 2024-04-11 RX ORDER — PROCHLORPERAZINE EDISYLATE 5 MG/ML
10 INJECTION INTRAMUSCULAR; INTRAVENOUS EVERY 6 HOURS PRN
Status: DISCONTINUED | OUTPATIENT
Start: 2024-04-11 | End: 2024-04-11 | Stop reason: HOSPADM

## 2024-04-11 RX ORDER — PROCHLORPERAZINE MALEATE 10 MG
10 TABLET ORAL EVERY 6 HOURS PRN
Status: DISCONTINUED | OUTPATIENT
Start: 2024-04-11 | End: 2024-04-11 | Stop reason: HOSPADM

## 2024-04-11 RX ORDER — ALBUTEROL SULFATE 0.83 MG/ML
3 SOLUTION RESPIRATORY (INHALATION) AS NEEDED
Status: DISCONTINUED | OUTPATIENT
Start: 2024-04-11 | End: 2024-04-11 | Stop reason: HOSPADM

## 2024-04-11 RX ORDER — ONDANSETRON HYDROCHLORIDE 2 MG/ML
8 INJECTION, SOLUTION INTRAVENOUS ONCE
Status: COMPLETED | OUTPATIENT
Start: 2024-04-11 | End: 2024-04-11

## 2024-04-11 RX ADMIN — SODIUM CHLORIDE 200 MG: 9 INJECTION, SOLUTION INTRAVENOUS at 14:23

## 2024-04-11 RX ADMIN — ONDANSETRON 8 MG: 2 INJECTION INTRAMUSCULAR; INTRAVENOUS at 13:07

## 2024-04-11 RX ADMIN — ENFORTUMAB VEDOTIN 90 MG: 20 INJECTION, POWDER, LYOPHILIZED, FOR SOLUTION INTRAVENOUS at 13:33

## 2024-04-11 ASSESSMENT — ENCOUNTER SYMPTOMS
UNEXPECTED WEIGHT CHANGE: 0
FLANK PAIN: 1
ENDOCRINE NEGATIVE: 1
PSYCHIATRIC NEGATIVE: 1
ARTHRALGIAS: 1
NUMBNESS: 1
DIFFICULTY URINATING: 0
CHILLS: 0
COUGH: 0
DIARRHEA: 0
SHORTNESS OF BREATH: 0
BACK PAIN: 1
NAUSEA: 0
NECK PAIN: 0
EYES NEGATIVE: 1
WOUND: 0
HEADACHES: 0
CONSTIPATION: 0
DYSURIA: 1
HEMATURIA: 0
LEG SWELLING: 0
MYALGIAS: 0
HEMATOLOGIC/LYMPHATIC NEGATIVE: 1
ABDOMINAL PAIN: 0
DIZZINESS: 0
FEVER: 0
VOMITING: 0
FATIGUE: 0
LIGHT-HEADEDNESS: 0
APPETITE CHANGE: 0

## 2024-04-11 ASSESSMENT — PAIN SCALES - GENERAL: PAINLEVEL: 4

## 2024-04-13 ENCOUNTER — HOSPITAL ENCOUNTER (EMERGENCY)
Facility: HOSPITAL | Age: 74
Discharge: HOME | End: 2024-04-13
Attending: EMERGENCY MEDICINE
Payer: MEDICARE

## 2024-04-13 ENCOUNTER — APPOINTMENT (OUTPATIENT)
Dept: RADIOLOGY | Facility: HOSPITAL | Age: 74
End: 2024-04-13
Payer: MEDICARE

## 2024-04-13 VITALS
TEMPERATURE: 98.8 F | OXYGEN SATURATION: 98 % | HEIGHT: 60 IN | RESPIRATION RATE: 16 BRPM | WEIGHT: 152 LBS | DIASTOLIC BLOOD PRESSURE: 82 MMHG | SYSTOLIC BLOOD PRESSURE: 144 MMHG | BODY MASS INDEX: 29.84 KG/M2 | HEART RATE: 88 BPM

## 2024-04-13 DIAGNOSIS — R31.0 GROSS HEMATURIA: ICD-10-CM

## 2024-04-13 DIAGNOSIS — R19.00 PELVIC MASS: ICD-10-CM

## 2024-04-13 DIAGNOSIS — N39.0 ACUTE UTI: Primary | ICD-10-CM

## 2024-04-13 LAB
ACTH PLAS-MCNC: 6 PG/ML (ref 7.2–63.3)
ALBUMIN SERPL BCP-MCNC: 3.8 G/DL (ref 3.4–5)
ALP SERPL-CCNC: 81 U/L (ref 33–136)
ALT SERPL W P-5'-P-CCNC: 7 U/L (ref 7–45)
ANION GAP SERPL CALC-SCNC: 14 MMOL/L (ref 10–20)
APPEARANCE UR: ABNORMAL
AST SERPL W P-5'-P-CCNC: 14 U/L (ref 9–39)
BASOPHILS # BLD AUTO: 0.03 X10*3/UL (ref 0–0.1)
BASOPHILS NFR BLD AUTO: 0.5 %
BILIRUB SERPL-MCNC: 0.4 MG/DL (ref 0–1.2)
BILIRUB UR STRIP.AUTO-MCNC: NEGATIVE MG/DL
BUN SERPL-MCNC: 18 MG/DL (ref 6–23)
CALCIUM SERPL-MCNC: 9.1 MG/DL (ref 8.6–10.3)
CHLORIDE SERPL-SCNC: 107 MMOL/L (ref 98–107)
CO2 SERPL-SCNC: 21 MMOL/L (ref 21–32)
COLOR UR: ABNORMAL
CREAT SERPL-MCNC: 1.11 MG/DL (ref 0.5–1.05)
EGFRCR SERPLBLD CKD-EPI 2021: 53 ML/MIN/1.73M*2
EOSINOPHIL # BLD AUTO: 0.18 X10*3/UL (ref 0–0.4)
EOSINOPHIL NFR BLD AUTO: 3.1 %
ERYTHROCYTE [DISTWIDTH] IN BLOOD BY AUTOMATED COUNT: 14.1 % (ref 11.5–14.5)
GLUCOSE SERPL-MCNC: 99 MG/DL (ref 74–99)
GLUCOSE UR STRIP.AUTO-MCNC: ABNORMAL MG/DL
HCT VFR BLD AUTO: 35.4 % (ref 36–46)
HGB BLD-MCNC: 11.3 G/DL (ref 12–16)
HOLD SPECIMEN: NORMAL
IMM GRANULOCYTES # BLD AUTO: 0.02 X10*3/UL (ref 0–0.5)
IMM GRANULOCYTES NFR BLD AUTO: 0.3 % (ref 0–0.9)
KETONES UR STRIP.AUTO-MCNC: ABNORMAL MG/DL
LACTATE SERPL-SCNC: 0.7 MMOL/L (ref 0.4–2)
LEUKOCYTE ESTERASE UR QL STRIP.AUTO: NEGATIVE
LYMPHOCYTES # BLD AUTO: 0.57 X10*3/UL (ref 0.8–3)
LYMPHOCYTES NFR BLD AUTO: 9.8 %
MAGNESIUM SERPL-MCNC: 1.88 MG/DL (ref 1.6–2.4)
MCH RBC QN AUTO: 29.9 PG (ref 26–34)
MCHC RBC AUTO-ENTMCNC: 31.9 G/DL (ref 32–36)
MCV RBC AUTO: 94 FL (ref 80–100)
MONOCYTES # BLD AUTO: 0.59 X10*3/UL (ref 0.05–0.8)
MONOCYTES NFR BLD AUTO: 10.2 %
MUCOUS THREADS #/AREA URNS AUTO: ABNORMAL /LPF
NEUTROPHILS # BLD AUTO: 4.41 X10*3/UL (ref 1.6–5.5)
NEUTROPHILS NFR BLD AUTO: 76.1 %
NITRITE UR QL STRIP.AUTO: POSITIVE
NRBC BLD-RTO: 0 /100 WBCS (ref 0–0)
PH UR STRIP.AUTO: 5 [PH]
PLATELET # BLD AUTO: 190 X10*3/UL (ref 150–450)
POTASSIUM SERPL-SCNC: 3.6 MMOL/L (ref 3.5–5.3)
PROT SERPL-MCNC: 6.6 G/DL (ref 6.4–8.2)
PROT UR STRIP.AUTO-MCNC: ABNORMAL MG/DL
RBC # BLD AUTO: 3.78 X10*6/UL (ref 4–5.2)
RBC # UR STRIP.AUTO: ABNORMAL /UL
RBC #/AREA URNS AUTO: >20 /HPF
SODIUM SERPL-SCNC: 138 MMOL/L (ref 136–145)
SP GR UR STRIP.AUTO: 1.02
UROBILINOGEN UR STRIP.AUTO-MCNC: <2 MG/DL
WBC # BLD AUTO: 5.8 X10*3/UL (ref 4.4–11.3)
WBC #/AREA URNS AUTO: >50 /HPF

## 2024-04-13 PROCEDURE — 74176 CT ABD & PELVIS W/O CONTRAST: CPT

## 2024-04-13 PROCEDURE — 2500000001 HC RX 250 WO HCPCS SELF ADMINISTERED DRUGS (ALT 637 FOR MEDICARE OP): Performed by: EMERGENCY MEDICINE

## 2024-04-13 PROCEDURE — 96360 HYDRATION IV INFUSION INIT: CPT

## 2024-04-13 PROCEDURE — 87086 URINE CULTURE/COLONY COUNT: CPT | Mod: GEALAB | Performed by: EMERGENCY MEDICINE

## 2024-04-13 PROCEDURE — 2500000004 HC RX 250 GENERAL PHARMACY W/ HCPCS (ALT 636 FOR OP/ED): Performed by: EMERGENCY MEDICINE

## 2024-04-13 PROCEDURE — 81001 URINALYSIS AUTO W/SCOPE: CPT | Performed by: EMERGENCY MEDICINE

## 2024-04-13 PROCEDURE — 36415 COLL VENOUS BLD VENIPUNCTURE: CPT | Performed by: EMERGENCY MEDICINE

## 2024-04-13 PROCEDURE — 99284 EMERGENCY DEPT VISIT MOD MDM: CPT | Mod: 25

## 2024-04-13 PROCEDURE — 85025 COMPLETE CBC W/AUTO DIFF WBC: CPT | Performed by: EMERGENCY MEDICINE

## 2024-04-13 PROCEDURE — 83735 ASSAY OF MAGNESIUM: CPT | Performed by: EMERGENCY MEDICINE

## 2024-04-13 PROCEDURE — 74176 CT ABD & PELVIS W/O CONTRAST: CPT | Performed by: STUDENT IN AN ORGANIZED HEALTH CARE EDUCATION/TRAINING PROGRAM

## 2024-04-13 PROCEDURE — 83605 ASSAY OF LACTIC ACID: CPT | Performed by: EMERGENCY MEDICINE

## 2024-04-13 PROCEDURE — 80053 COMPREHEN METABOLIC PANEL: CPT | Performed by: EMERGENCY MEDICINE

## 2024-04-13 RX ORDER — CEPHALEXIN 500 MG/1
500 CAPSULE ORAL 3 TIMES DAILY
Qty: 30 CAPSULE | Refills: 0 | Status: SHIPPED | OUTPATIENT
Start: 2024-04-13 | End: 2024-04-23

## 2024-04-13 RX ORDER — CEPHALEXIN 500 MG/1
500 CAPSULE ORAL ONCE
Status: COMPLETED | OUTPATIENT
Start: 2024-04-13 | End: 2024-04-13

## 2024-04-13 RX ADMIN — SODIUM CHLORIDE 1000 ML: 9 INJECTION, SOLUTION INTRAVENOUS at 08:11

## 2024-04-13 RX ADMIN — CEPHALEXIN 500 MG: 500 CAPSULE ORAL at 10:33

## 2024-04-13 ASSESSMENT — PAIN DESCRIPTION - LOCATION: LOCATION: ABDOMEN

## 2024-04-13 ASSESSMENT — LIFESTYLE VARIABLES
EVER HAD A DRINK FIRST THING IN THE MORNING TO STEADY YOUR NERVES TO GET RID OF A HANGOVER: NO
HAVE YOU EVER FELT YOU SHOULD CUT DOWN ON YOUR DRINKING: NO
TOTAL SCORE: 0
EVER FELT BAD OR GUILTY ABOUT YOUR DRINKING: NO
HAVE PEOPLE ANNOYED YOU BY CRITICIZING YOUR DRINKING: NO

## 2024-04-13 ASSESSMENT — COLUMBIA-SUICIDE SEVERITY RATING SCALE - C-SSRS
1. IN THE PAST MONTH, HAVE YOU WISHED YOU WERE DEAD OR WISHED YOU COULD GO TO SLEEP AND NOT WAKE UP?: NO
2. HAVE YOU ACTUALLY HAD ANY THOUGHTS OF KILLING YOURSELF?: NO
6. HAVE YOU EVER DONE ANYTHING, STARTED TO DO ANYTHING, OR PREPARED TO DO ANYTHING TO END YOUR LIFE?: NO

## 2024-04-13 ASSESSMENT — PAIN - FUNCTIONAL ASSESSMENT: PAIN_FUNCTIONAL_ASSESSMENT: 0-10

## 2024-04-13 ASSESSMENT — PAIN DESCRIPTION - ORIENTATION: ORIENTATION: LEFT

## 2024-04-13 ASSESSMENT — PAIN SCALES - GENERAL
PAINLEVEL_OUTOF10: 0 - NO PAIN
PAINLEVEL_OUTOF10: 2
PAINLEVEL_OUTOF10: 8

## 2024-04-13 ASSESSMENT — PAIN DESCRIPTION - DIRECTION: RADIATING_TOWARDS: BACK

## 2024-04-13 NOTE — ED PROVIDER NOTES
HPI   Chief Complaint   Patient presents with    Blood in Urine     Pt having blood in urine since Thursday. Pt having lower abd pain that radiates to lt back.       73-year-old female here for chief complaint of blood in her urine since Thursday.  She started a new chemo treatment her first 1 on Thursday and the blood started shortly thereafter.  The nurse practitioner did not think it was due to the chemotherapy.  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  She also has a stent in her left kidney ureter and states that Dr. Angélica humphrey does not want to take that out yet.  She denies any fevers chills dysuria urgency or frequency.  She had pain in the left lower and left pelvic area.  Currently she does not have any pain though.  That pain started this morning and is now gone.                          [place] Coma Scale Score: 15                     Patient History   Past Medical History:   Diagnosis Date    Other specified disorders of kidney and ureter 03/04/2022    Ureteral mass    Personal history of malignant neoplasm of cervix uteri 11/21/2022    History of malignant neoplasm of cervix     Past Surgical History:   Procedure Laterality Date    OTHER SURGICAL HISTORY  10/07/2021    Hysterectomy     Family History   Problem Relation Name Age of Onset    Macular degeneration Mother      Glaucoma Other grandparent     Macular degeneration Other grandparent      Social History     Tobacco Use    Smoking status: Never    Smokeless tobacco: Never   Vaping Use    Vaping status: Never Used   Substance Use Topics    Alcohol use: Never    Drug use: Never       Physical Exam   ED Triage Vitals [04/13/24 0750]   Temperature Heart Rate Respirations BP   37.2 °C (99 °F) 91 18 151/84      Pulse Ox Temp src Heart Rate Source Patient Position   97 % -- -- --      BP Location FiO2 (%)     Left arm --       Physical Exam  Vitals and nursing note reviewed.   Constitutional:       Appearance: Normal appearance.   HENT:      Head:  Normocephalic and atraumatic.      Nose: Nose normal.      Mouth/Throat:      Mouth: Mucous membranes are moist.   Eyes:      Extraocular Movements: Extraocular movements intact.      Pupils: Pupils are equal, round, and reactive to light.   Cardiovascular:      Rate and Rhythm: Normal rate and regular rhythm.   Pulmonary:      Effort: Pulmonary effort is normal.      Breath sounds: Normal breath sounds.   Abdominal:      General: Abdomen is flat.      Palpations: Abdomen is soft.   Musculoskeletal:         General: Normal range of motion.      Cervical back: Normal range of motion.   Skin:     General: Skin is warm and dry.      Capillary Refill: Capillary refill takes less than 2 seconds.   Neurological:      General: No focal deficit present.      Mental Status: She is alert.   Psychiatric:         Mood and Affect: Mood normal.         ED Course & MDM   Diagnoses as of 04/13/24 1015   Acute UTI   Gross hematuria   Pelvic mass       Medical Decision Making  Medical Decision Making: On exam I cannot reproduce the pain to palpation. Lab work shows slightly high creatinine, 1.1, normal white count. CT shows stent in place, pelvic mass slightly bigger, and inflammatory changes in left ureter. I spoke with Dr. Ross via epic chat regarding this ct findings. We decided to do keflex to cover for possible uti pending culture results. She has enough pain control at home. She feels good to go home, hematuria is due to stent, and will eventually stop.   [unfilled]     EKG interpreted by myself (ED attending physician): N/A    Differential Diagnoses Considered: uti, kidney stone, hydronephrosis    Chronic Medical Conditions Significantly Affecting Care: invasive carcinoma    External Records Reviewed: I reviewed recent and relevant outside records including: previous ct's    Social Determinants of Health Significantly Affecting Care: lives with family     Diagnostic testing considered: US kidneys    Anastasia  GISELA Vanessa.  Emergency Medicine          Procedure  Procedures     Anastasia Vanessa,   04/13/24 1014

## 2024-04-14 LAB — BACTERIA UR CULT: NORMAL

## 2024-04-17 ENCOUNTER — LAB (OUTPATIENT)
Dept: LAB | Facility: LAB | Age: 74
End: 2024-04-17
Payer: MEDICARE

## 2024-04-17 DIAGNOSIS — C68.9 UROTHELIAL CARCINOMA (MULTI): ICD-10-CM

## 2024-04-17 LAB
BASOPHILS # BLD AUTO: 0.03 X10*3/UL (ref 0–0.1)
BASOPHILS NFR BLD AUTO: 0.6 %
EOSINOPHIL # BLD AUTO: 0.14 X10*3/UL (ref 0–0.4)
EOSINOPHIL NFR BLD AUTO: 2.8 %
ERYTHROCYTE [DISTWIDTH] IN BLOOD BY AUTOMATED COUNT: 13.9 % (ref 11.5–14.5)
GLUCOSE SERPL-MCNC: 87 MG/DL (ref 74–99)
HCT VFR BLD AUTO: 35.4 % (ref 36–46)
HGB BLD-MCNC: 10.8 G/DL (ref 12–16)
IMM GRANULOCYTES # BLD AUTO: 0.02 X10*3/UL (ref 0–0.5)
IMM GRANULOCYTES NFR BLD AUTO: 0.4 % (ref 0–0.9)
LYMPHOCYTES # BLD AUTO: 0.85 X10*3/UL (ref 0.8–3)
LYMPHOCYTES NFR BLD AUTO: 16.7 %
MCH RBC QN AUTO: 29.7 PG (ref 26–34)
MCHC RBC AUTO-ENTMCNC: 30.5 G/DL (ref 32–36)
MCV RBC AUTO: 97 FL (ref 80–100)
MONOCYTES # BLD AUTO: 0.53 X10*3/UL (ref 0.05–0.8)
MONOCYTES NFR BLD AUTO: 10.4 %
NEUTROPHILS # BLD AUTO: 3.52 X10*3/UL (ref 1.6–5.5)
NEUTROPHILS NFR BLD AUTO: 69.1 %
NRBC BLD-RTO: 0 /100 WBCS (ref 0–0)
PLATELET # BLD AUTO: 253 X10*3/UL (ref 150–450)
RBC # BLD AUTO: 3.64 X10*6/UL (ref 4–5.2)
WBC # BLD AUTO: 5.1 X10*3/UL (ref 4.4–11.3)

## 2024-04-17 PROCEDURE — 82947 ASSAY GLUCOSE BLOOD QUANT: CPT

## 2024-04-17 PROCEDURE — 85025 COMPLETE CBC W/AUTO DIFF WBC: CPT

## 2024-04-17 PROCEDURE — 36415 COLL VENOUS BLD VENIPUNCTURE: CPT

## 2024-04-18 ENCOUNTER — INFUSION (OUTPATIENT)
Dept: HEMATOLOGY/ONCOLOGY | Facility: CLINIC | Age: 74
End: 2024-04-18
Payer: MEDICARE

## 2024-04-18 ENCOUNTER — APPOINTMENT (OUTPATIENT)
Dept: HEMATOLOGY/ONCOLOGY | Facility: CLINIC | Age: 74
End: 2024-04-18
Payer: MEDICARE

## 2024-04-18 VITALS
RESPIRATION RATE: 18 BRPM | HEART RATE: 83 BPM | TEMPERATURE: 97.7 F | WEIGHT: 150 LBS | BODY MASS INDEX: 29.29 KG/M2 | DIASTOLIC BLOOD PRESSURE: 74 MMHG | OXYGEN SATURATION: 94 % | SYSTOLIC BLOOD PRESSURE: 155 MMHG

## 2024-04-18 DIAGNOSIS — C68.9 UROTHELIAL CARCINOMA (MULTI): ICD-10-CM

## 2024-04-18 PROCEDURE — 96413 CHEMO IV INFUSION 1 HR: CPT

## 2024-04-18 PROCEDURE — 96375 TX/PRO/DX INJ NEW DRUG ADDON: CPT | Mod: INF

## 2024-04-18 PROCEDURE — 2500000004 HC RX 250 GENERAL PHARMACY W/ HCPCS (ALT 636 FOR OP/ED): Performed by: STUDENT IN AN ORGANIZED HEALTH CARE EDUCATION/TRAINING PROGRAM

## 2024-04-18 RX ORDER — FAMOTIDINE 10 MG/ML
20 INJECTION INTRAVENOUS ONCE AS NEEDED
Status: DISCONTINUED | OUTPATIENT
Start: 2024-04-18 | End: 2024-04-18 | Stop reason: HOSPADM

## 2024-04-18 RX ORDER — HEPARIN 100 UNIT/ML
500 SYRINGE INTRAVENOUS AS NEEDED
Status: CANCELLED | OUTPATIENT
Start: 2024-04-18

## 2024-04-18 RX ORDER — DIPHENHYDRAMINE HYDROCHLORIDE 50 MG/ML
50 INJECTION INTRAMUSCULAR; INTRAVENOUS AS NEEDED
Status: DISCONTINUED | OUTPATIENT
Start: 2024-04-18 | End: 2024-04-18 | Stop reason: HOSPADM

## 2024-04-18 RX ORDER — ALBUTEROL SULFATE 0.83 MG/ML
3 SOLUTION RESPIRATORY (INHALATION) AS NEEDED
Status: DISCONTINUED | OUTPATIENT
Start: 2024-04-18 | End: 2024-04-18 | Stop reason: HOSPADM

## 2024-04-18 RX ORDER — EPINEPHRINE 0.3 MG/.3ML
0.3 INJECTION SUBCUTANEOUS EVERY 5 MIN PRN
Status: DISCONTINUED | OUTPATIENT
Start: 2024-04-18 | End: 2024-04-18 | Stop reason: HOSPADM

## 2024-04-18 RX ORDER — ONDANSETRON HYDROCHLORIDE 2 MG/ML
8 INJECTION, SOLUTION INTRAVENOUS ONCE
Status: COMPLETED | OUTPATIENT
Start: 2024-04-18 | End: 2024-04-18

## 2024-04-18 RX ORDER — HEPARIN SODIUM,PORCINE/PF 10 UNIT/ML
50 SYRINGE (ML) INTRAVENOUS AS NEEDED
Status: CANCELLED | OUTPATIENT
Start: 2024-04-18

## 2024-04-18 RX ORDER — PROCHLORPERAZINE EDISYLATE 5 MG/ML
10 INJECTION INTRAMUSCULAR; INTRAVENOUS EVERY 6 HOURS PRN
Status: DISCONTINUED | OUTPATIENT
Start: 2024-04-18 | End: 2024-04-18 | Stop reason: HOSPADM

## 2024-04-18 RX ORDER — PROCHLORPERAZINE MALEATE 10 MG
10 TABLET ORAL EVERY 6 HOURS PRN
Status: DISCONTINUED | OUTPATIENT
Start: 2024-04-18 | End: 2024-04-18 | Stop reason: HOSPADM

## 2024-04-18 RX ADMIN — ENFORTUMAB VEDOTIN 90 MG: 30 INJECTION, POWDER, LYOPHILIZED, FOR SOLUTION INTRAVENOUS at 11:26

## 2024-04-18 RX ADMIN — ONDANSETRON 8 MG: 2 INJECTION INTRAMUSCULAR; INTRAVENOUS at 10:54

## 2024-04-18 ASSESSMENT — PAIN SCALES - GENERAL: PAINLEVEL: 0-NO PAIN

## 2024-04-18 NOTE — SIGNIFICANT EVENT
04/18/24 1033   Prechemo Checklist   Has the patient been in the hospital, ED, or urgent care since last date of service Yes  (Soco Dietz aware)   Chemo/Immuno Consent Signed Yes   Protocol/Indications Verified Yes   Confirmed to previous date/time of medication Yes   Compared to previous dose Yes   All medications are dated accurately Yes   Pregnancy Test Negative Not applicable   Parameters Met Yes   BSA/Weight-Height Verified Yes   Dose Calculations Verified (current, total, cumulative) Yes

## 2024-04-19 DIAGNOSIS — R39.15 URGENCY OF URINATION: ICD-10-CM

## 2024-04-22 RX ORDER — OXYBUTYNIN CHLORIDE 10 MG/1
10 TABLET, EXTENDED RELEASE ORAL DAILY
Qty: 30 TABLET | Refills: 11 | Status: SHIPPED | OUTPATIENT
Start: 2024-04-22 | End: 2025-04-22

## 2024-04-29 ENCOUNTER — NURSE TRIAGE (OUTPATIENT)
Dept: HEMATOLOGY/ONCOLOGY | Facility: HOSPITAL | Age: 74
End: 2024-04-29
Payer: MEDICARE

## 2024-04-29 RX ORDER — DIPHENHYDRAMINE HYDROCHLORIDE 50 MG/ML
50 INJECTION INTRAMUSCULAR; INTRAVENOUS AS NEEDED
Status: CANCELLED | OUTPATIENT
Start: 2024-05-01

## 2024-04-29 RX ORDER — ALBUTEROL SULFATE 0.83 MG/ML
3 SOLUTION RESPIRATORY (INHALATION) AS NEEDED
Status: CANCELLED | OUTPATIENT
Start: 2024-05-01

## 2024-04-29 RX ORDER — FAMOTIDINE 10 MG/ML
20 INJECTION INTRAVENOUS ONCE AS NEEDED
Status: CANCELLED | OUTPATIENT
Start: 2024-05-08

## 2024-04-29 RX ORDER — PROCHLORPERAZINE MALEATE 10 MG
10 TABLET ORAL EVERY 6 HOURS PRN
Status: CANCELLED | OUTPATIENT
Start: 2024-05-08

## 2024-04-29 RX ORDER — ONDANSETRON HYDROCHLORIDE 2 MG/ML
8 INJECTION, SOLUTION INTRAVENOUS ONCE
Status: CANCELLED | OUTPATIENT
Start: 2024-05-08

## 2024-04-29 RX ORDER — EPINEPHRINE 0.3 MG/.3ML
0.3 INJECTION SUBCUTANEOUS EVERY 5 MIN PRN
Status: CANCELLED | OUTPATIENT
Start: 2024-05-08

## 2024-04-29 RX ORDER — PROCHLORPERAZINE MALEATE 10 MG
10 TABLET ORAL EVERY 6 HOURS PRN
Status: CANCELLED | OUTPATIENT
Start: 2024-05-01

## 2024-04-29 RX ORDER — PROCHLORPERAZINE EDISYLATE 5 MG/ML
10 INJECTION INTRAMUSCULAR; INTRAVENOUS EVERY 6 HOURS PRN
Status: CANCELLED | OUTPATIENT
Start: 2024-05-01

## 2024-04-29 RX ORDER — FAMOTIDINE 10 MG/ML
20 INJECTION INTRAVENOUS ONCE AS NEEDED
Status: CANCELLED | OUTPATIENT
Start: 2024-05-01

## 2024-04-29 RX ORDER — EPINEPHRINE 0.3 MG/.3ML
0.3 INJECTION SUBCUTANEOUS EVERY 5 MIN PRN
Status: CANCELLED | OUTPATIENT
Start: 2024-05-01

## 2024-04-29 RX ORDER — ONDANSETRON HYDROCHLORIDE 2 MG/ML
8 INJECTION, SOLUTION INTRAVENOUS ONCE
Status: CANCELLED | OUTPATIENT
Start: 2024-05-01

## 2024-04-29 RX ORDER — ALBUTEROL SULFATE 0.83 MG/ML
3 SOLUTION RESPIRATORY (INHALATION) AS NEEDED
Status: CANCELLED | OUTPATIENT
Start: 2024-05-08

## 2024-04-29 RX ORDER — DIPHENHYDRAMINE HYDROCHLORIDE 50 MG/ML
50 INJECTION INTRAMUSCULAR; INTRAVENOUS AS NEEDED
Status: CANCELLED | OUTPATIENT
Start: 2024-05-08

## 2024-04-29 RX ORDER — PROCHLORPERAZINE EDISYLATE 5 MG/ML
10 INJECTION INTRAMUSCULAR; INTRAVENOUS EVERY 6 HOURS PRN
Status: CANCELLED | OUTPATIENT
Start: 2024-05-08

## 2024-04-29 ASSESSMENT — ENCOUNTER SYMPTOMS
ABDOMINAL PAIN: 0
NAUSEA: 0
LEG SWELLING: 0
HEMATURIA: 0
DIFFICULTY URINATING: 0
NECK PAIN: 0
FEVER: 0
BACK PAIN: 1
CHILLS: 0
WOUND: 0
FLANK PAIN: 1
HEADACHES: 0
DIARRHEA: 0
DIZZINESS: 0
EYES NEGATIVE: 1
NUMBNESS: 1
COUGH: 0
UNEXPECTED WEIGHT CHANGE: 0
DYSURIA: 1
FATIGUE: 0
SHORTNESS OF BREATH: 0
MYALGIAS: 0
HEMATOLOGIC/LYMPHATIC NEGATIVE: 1
ARTHRALGIAS: 1
LIGHT-HEADEDNESS: 0
ENDOCRINE NEGATIVE: 1

## 2024-04-29 NOTE — PROGRESS NOTES
Patient ID: Terra Alexis is a 73 y.o. female.  Diagnosis:  irresectable UC   MedOnc: Dr. Boone   Urologist: Dr. Ross  Gyn: Dr. Nathan Noyola - [facility]     Patient Care Team:  Elgin Boone MD as Consulting Physician (Hematology and Oncology)    Current Therapy: EV + Pembro    ONCOLOGIC HISTORY  No matching staging information was found for the patient.    Patient is referred by Dr. Ross for recently diagnosed invasive carcinoma, may represent urothelial origin vs h/o cervical cancer. Patient presented to the ER in February 2024 with c/o flank pain imaging revealed soft tissue mass encasing the distal segment of the left ureter. Ureteral biopsy performed in IR on 03/22/2024 revealed the above diagnosis.      1998 Cervical cancer, tx Chemoradiation, with weekly cisplatin, and radiation completed on 1/4/99.   2/1999 Total abdominal hysterectomy and bilateral salpingo-oophorectomy, with no residual carcinoma found.   1/23/08 Category 1 mammogram  11/25/08 LGSIL ANTHONY 1 consistent with HPV effect  1/13/09 MID VAGINAL APEX, BIOPSY: MILD VAGINAL INTRAEPITHELIAL NEOPLASIA.  03/2024 - pelvic irresectable distal urothelial mass, bx proven UC  4/11/24 - Cycle 1 EV + Pembro  5/1/24 - Cycle 2 EV + Pembro    Oncology History   Urothelial carcinoma (Multi)   4/3/2024 Initial Diagnosis    Urothelial carcinoma (CMS/HCC)     4/11/2024 -  Chemotherapy    Enfortumab vedotin + Pembrolizumab, 21 Day Cycles        Other Contributing History  Cervical cancer - s/p chemoradiation and surgery    Subjective      Interval History:  Terra Alexis is a 73 y.o. female who presents today for follow up of UC. Patient of Elgin Boone MD currently on EV + Pembro. Tolerating treatment with itching, constipation, poor appetite, fatigue. She isn't sleeping well. ED visit for hematuria following Cycle 1 - resolved - reminded this may happen intermittently. Continues to have urinary discomfort likely from stent and abd cramping and aching.  Constipation is not a new issue - she is only on a stool softener. BP is good today. No issues with neuropathy. Reports a rash/blotchiness developed and was itchy, now resolved - used regular lotion. Patient does not wish to start any medications even supportive meds to control symptoms. She has Miralax at home that she will add to her softener. Vomited 2x - no nausea associated.     Review of Systems   Constitutional:  Positive for appetite change. Negative for chills, fatigue, fever and unexpected weight change.   HENT:  Negative.     Eyes: Negative.    Respiratory:  Negative for cough and shortness of breath.    Cardiovascular:  Negative for chest pain and leg swelling.   Gastrointestinal:  Positive for constipation and vomiting. Negative for abdominal pain, diarrhea and nausea.   Endocrine: Negative.    Genitourinary:  Positive for dysuria and pelvic pain. Negative for difficulty urinating, hematuria and vaginal bleeding.         Related to stent, pelvic cramping   Musculoskeletal:  Positive for arthralgias, back pain and flank pain. Negative for myalgias and neck pain.   Skin:  Negative for itching, rash and wound.   Neurological:  Positive for numbness. Negative for dizziness, headaches and light-headedness.   Hematological: Negative.    Psychiatric/Behavioral:  Positive for sleep disturbance.      Objective      /79   Pulse 92   Temp 36.1 °C (97 °F) (Core)   Resp 18   Wt 67.1 kg (147 lb 14.9 oz)   SpO2 94%   BMI 28.89 kg/m²   BSA: 1.69 meters squared    Wt Readings from Last 5 Encounters:   05/01/24 67.1 kg (147 lb 14.9 oz)   04/18/24 68 kg (150 lb)   04/13/24 68.9 kg (152 lb)   04/11/24 69.8 kg (153 lb 14.1 oz)   04/03/24 70.3 kg (154 lb 15.7 oz)     Performance Status:  ECOG Score: 0- Fully active, able to carry on all pre-disease performance w/o restriction.  Karnofsky Score: 90 - Able to carry on normal activity; minor signs or symptoms of disease     Physical Exam  Physical  Exam  Constitutional:       General: She is not in acute distress.     Appearance: Normal appearance. She is not toxic-appearing.   HENT:      Head: Normocephalic and atraumatic.      Mouth/Throat:      Mouth: Mucous membranes are moist.      Pharynx: Oropharynx is clear.   Eyes:      Pupils: Pupils are equal, round, and reactive to light.   Pulmonary:      Effort: Pulmonary effort is normal.   Musculoskeletal:         General: Normal range of motion.      Cervical back: Normal range of motion.      Right lower leg: No edema.      Left lower leg: No edema.   Skin:     General: Skin is warm and dry.      Findings: No rash.   Neurological:      General: No focal deficit present.      Mental Status: She is alert and oriented to person, place, and time.      Motor: No weakness.   Psychiatric:         Mood and Affect: Mood normal.         Behavior: Behavior normal.         Thought Content: Thought content normal.         Judgment: Judgment normal.     Allergies  Allergies   Allergen Reactions    Codeine GI Upset, Other and Nausea/vomiting    Percocet [Oxycodone-Acetaminophen] Dizziness and Nausea/vomiting      Medications  Current Outpatient Medications   Medication Instructions    cartilage/collagen/bor/hyalur (JO[employer] HEALTH ORAL) oral,  [employer] Health CAPS Refills: 0 Active<BR>    docusate sodium (COLACE) 100 mg, oral, 2 times daily    ondansetron (ZOFRAN) 8 mg, oral, Every 8 hours PRN    oxybutynin XL (DITROPAN-XL) 10 mg, oral, Daily, Do not crush, chew, or split.    potassium chloride CR 10 mEq ER tablet 10 mEq, oral, Daily    turmeric 400 mg capsule Daily    vit A/vit C/vit E/zinc/copper (VITAMINS A,C,E-ZINC-COPPER ORAL) oral,  PreserVision AREDS CAPS Refills: 0 Active<BR>    vitamins A,C,E-zinc-copper (PreserVision AREDS) 4,296 mcg-226 mg-90 mg capsule oral        Diagnostic Results   Recent Labs  Results for orders placed or performed in visit on 04/30/24 (from the past 96 hour(s))   CBC and Auto Differential    Result Value Ref Range    WBC 6.4 4.4 - 11.3 x10*3/uL    nRBC 0.0 0.0 - 0.0 /100 WBCs    RBC 3.60 (L) 4.00 - 5.20 x10*6/uL    Hemoglobin 10.5 (L) 12.0 - 16.0 g/dL    Hematocrit 33.8 (L) 36.0 - 46.0 %    MCV 94 80 - 100 fL    MCH 29.2 26.0 - 34.0 pg    MCHC 31.1 (L) 32.0 - 36.0 g/dL    RDW 13.7 11.5 - 14.5 %    Platelets 354 150 - 450 x10*3/uL    Neutrophils % 62.5 40.0 - 80.0 %    Immature Granulocytes %, Automated 2.4 (H) 0.0 - 0.9 %    Lymphocytes % 19.6 13.0 - 44.0 %    Monocytes % 8.6 2.0 - 10.0 %    Eosinophils % 5.5 0.0 - 6.0 %    Basophils % 1.4 0.0 - 2.0 %    Neutrophils Absolute 3.99 1.60 - 5.50 x10*3/uL    Immature Granulocytes Absolute, Automated 0.15 0.00 - 0.50 x10*3/uL    Lymphocytes Absolute 1.25 0.80 - 3.00 x10*3/uL    Monocytes Absolute 0.55 0.05 - 0.80 x10*3/uL    Eosinophils Absolute 0.35 0.00 - 0.40 x10*3/uL    Basophils Absolute 0.09 0.00 - 0.10 x10*3/uL   Comprehensive metabolic panel   Result Value Ref Range    Glucose 94 74 - 99 mg/dL    Sodium 139 136 - 145 mmol/L    Potassium 3.3 (L) 3.5 - 5.3 mmol/L    Chloride 102 98 - 107 mmol/L    Bicarbonate 24 21 - 32 mmol/L    Anion Gap 16 10 - 20 mmol/L    Urea Nitrogen 20 6 - 23 mg/dL    Creatinine 1.32 (H) 0.50 - 1.05 mg/dL    eGFR 43 (L) >60 mL/min/1.73m*2    Calcium 9.2 8.6 - 10.3 mg/dL    Albumin 3.7 3.4 - 5.0 g/dL    Alkaline Phosphatase 87 33 - 136 U/L    Total Protein 6.3 (L) 6.4 - 8.2 g/dL    AST 15 9 - 39 U/L    Bilirubin, Total 0.3 0.0 - 1.2 mg/dL    ALT 17 7 - 45 U/L       Pathology  Surgical Path 3/22/24   FINAL DIAGNOSIS   A. Soft tissue, mass, biopsy:    -- Involved by invasive carcinoma. See note.     Note: Patient's imaging finding of a soft tissue mass encasing the distal ureter and remote history of cervical cancer (per clinical notes) is noted. Histological sections show cores of fibroconnective tissue involved by infiltrative nests of high grade carcinoma cells, which are immunohistochemically positive for pancytokeratin  AE1/AE3, CAM5.2, GATA3 and p63 and are negative for PAX8 and ER. The morphological and immunohistochemical findings are not entirely specific. Given the clinical context of a distal ureteral mass, this might represent invasive carcinoma of urothelial origin. Clinical correlation is recommended.     Recent Imaging   2/28/24 CT urography/chest  IMPRESSION:  1.  Solid enhancing mass inseparable from and potentially arising  from the distal left ureter which probably encases and could invade  adjacent iliac vessels with mild newly seen pelvic vascular  collaterals. The mass is also inseparable from adjacent mesenteric  vessels.  2. Marked left-sided hydronephrosis and postobstructive changes left  kidney.  3. There is probable blood product in proximal ureter, intrarenal  collecting system, urinary bladder potentially distal ureter versus  additional tumor within the distal ureter extending to the intramural  portion of the bladder trigone. Attention recommended on follow-up  assessment.  4. No CT evidence of distant metastases.  5. Probably benign liver hypodensities 1 of which contains fat and 1  of which is too small to characterize. Attention recommended on  follow-up assessment.  6. Grossly stable mild probably benign gallbladder wall prominence  likely related to chronic benign wall prominence. Attention  recommended on follow-up assessment. Consider gallbladder ultrasound  correlation.  7. Bibasilar platelike atelectasis.  8. Probably benign fissural nodule left lower lung most likely  incidental. Attention recommended on follow-up assessment.  9. Incompletely image 14 x 10 mm nodule left submandibular region  potentially lymph node. Attention recommended on follow-up assessment.    Assessment/Plan   Terra Alexis is a 73 y.o.  female with hx cervical cancer s/p chemoRT 1998, now found with pelvic irresectable distal urothelial mass, bx proven UC. We discussed the clinical significance of diagnosis,  goals of care and treatment plan in detail. We would offer her systemic treatment with EV+pembro and then reassess opportunity for surgery. Will do baseline tissue NGS, baseline labs and plan to start soon.     She presents in follow up today for evaluation prior to C2 EV + Pembro. Having issues with constipation, fatigue, pain, sleep disturbance, rash/pruritus, vomiting, decreased appetite . Feeling well today other than constipation. Offered many supportive options - does not want to add in any medications at this time. Rash has resolved- will monitor. No worsening neuropathy. No signs of infection. I believe constipation is leading to her vomiting, appetite issues, pain, and insomnia. Patient is hoping to resolve on her own. Encouraged Miralax and exercise - may need MOM to get things moving. Offered Senna S - does not want new pills. Recommended protein drinks for her decreased appetite with weight loss. Will monitor for constipation and neuropathy. Signaterna monitoring. Abs acceptable for treatment - needs K replacement - was on in th past with PCP - has now been off - I will restart.     Terra was seen today for follow-up.  Diagnoses and all orders for this visit:  Urothelial carcinoma (Multi)  -     Clinic Appointment Request [facility]; [facility] [id number]  Other orders  -     ondansetron (Zofran) injection 8 mg  -     prochlorperazine (Compazine) tablet 10 mg  -     prochlorperazine (Compazine) injection 10 mg  -     enfortumab vedotin (Padcev) 90 mg in sodium chloride 0.9% 59 mL IV  -     pembrolizumab (Keytruda) 200 mg in sodium chloride 0.9% 108 mL IV  -     sodium chloride 0.9 % bolus 500 mL  -     dextrose 5 % in water (D5W) bolus  -     diphenhydrAMINE (BENADryl) injection 50 mg  -     methylPREDNISolone sod succinate (SOLU-Medrol) 40 mg/mL injection 40 mg  -     famotidine PF (Pepcid) injection 20 mg  -     EPINEPHrine (Epipen) injection syringe 0.3 mg  -     albuterol 2.5 mg /3 mL (0.083  %) nebulizer solution 3 mL  -     ondansetron (Zofran) injection 8 mg  -     prochlorperazine (Compazine) tablet 10 mg  -     prochlorperazine (Compazine) injection 10 mg  -     enfortumab vedotin (Padcev) 90 mg in sodium chloride 0.9% 59 mL IV  -     sodium chloride 0.9 % bolus 500 mL  -     dextrose 5 % in water (D5W) bolus  -     diphenhydrAMINE (BENADryl) injection 50 mg  -     methylPREDNISolone sod succinate (SOLU-Medrol) 40 mg/mL injection 40 mg  -     famotidine PF (Pepcid) injection 20 mg  -     EPINEPHrine (Epipen) injection syringe 0.3 mg  -     albuterol 2.5 mg /3 mL (0.083 %) nebulizer solution 3 mL      Treatment Plan:  Enfortumab vedotin + Pembrolizumab, 21 Day Cycles  Venous Access Orders  - Proceed with Cycle 2 EV + Pembro today  - Signatera Q6 weeks - initial is pending   - Scans after cycle 2   - ondansetron as needed for nausea   - Monitor constipation - add Miralax  - Monitor BP - off antihypertensives   - Potassium replacement 10 meq daily    Follow up in 3 weeks with MD and Cycle 3, labs and scans prior.     Patient verbalizes understanding of above plan. Time provided for patient's questions. All questions answered to patient's satisfaction in office. Patient instructed to reach out for any new concerning issues at [phone].    Soco Dietz MSN, APRN, A-GNP-C, OCN   Oncology   [facility] [address] [address]   Epic Secure Chat   Phone: [phone]  [email or web address]

## 2024-04-29 NOTE — TELEPHONE ENCOUNTER
Offered [place] [facility] [place] Patient declined and will keep Wednesday apt as currently scheduled.

## 2024-04-29 NOTE — TELEPHONE ENCOUNTER
Patient calling in with cramping pain in low abdomen/pelvis for months. States advil helps but only for a few hrs. Oxybutinin not helping at all. Small hard BM this am, difficult to pass. No nausea at this time but 2 episodes of vomiting in the last week. Tolerating fluids well. States no appetite, no energy. Blotchy pink rash with tiny blisters on feet, legs, arms, back and chest.     Additional Information   Commented on: Where is the pain? Is there more than one place where you're having pain?     Across lower abdomen/pelvis   Commented on: How long have they been going on?     months   Commented on: What helps these problems?     2 advil helps for a few hrs. Oxybutynin 10mg qday prescribed by Dr Ponce do not seem to be helping   Commented on: Is the rash raised, flat, a blister? If yes to blisters, are they draining any fluid?     Blotchy, pink with tiny little blisters   Commented on: If the rash itches, is it getting worse? Does anything help relieve the itching?     Epsom salt baths and body lotion helps. Has not tried anything specifically for itching   Commented on: How many times have you thrown up?     2x in last week   Commented on: What color was your vomit?     Clear and sticky   Commented on: Tell me what you've been eating and drinking over the past few days     Salad, orange juice, a couple glasses of water per day, mushroom ravioli, pizza    Protocols used: Pain, Rash, Constipation

## 2024-04-30 ENCOUNTER — LAB (OUTPATIENT)
Dept: LAB | Facility: LAB | Age: 74
End: 2024-04-30
Payer: MEDICARE

## 2024-04-30 DIAGNOSIS — C68.9 UROTHELIAL CARCINOMA (MULTI): ICD-10-CM

## 2024-04-30 LAB
ALBUMIN SERPL BCP-MCNC: 3.7 G/DL (ref 3.4–5)
ALP SERPL-CCNC: 87 U/L (ref 33–136)
ALT SERPL W P-5'-P-CCNC: 17 U/L (ref 7–45)
ANION GAP SERPL CALC-SCNC: 16 MMOL/L (ref 10–20)
AST SERPL W P-5'-P-CCNC: 15 U/L (ref 9–39)
BASOPHILS # BLD AUTO: 0.09 X10*3/UL (ref 0–0.1)
BASOPHILS NFR BLD AUTO: 1.4 %
BILIRUB SERPL-MCNC: 0.3 MG/DL (ref 0–1.2)
BUN SERPL-MCNC: 20 MG/DL (ref 6–23)
CALCIUM SERPL-MCNC: 9.2 MG/DL (ref 8.6–10.3)
CHLORIDE SERPL-SCNC: 102 MMOL/L (ref 98–107)
CO2 SERPL-SCNC: 24 MMOL/L (ref 21–32)
CREAT SERPL-MCNC: 1.32 MG/DL (ref 0.5–1.05)
EGFRCR SERPLBLD CKD-EPI 2021: 43 ML/MIN/1.73M*2
EOSINOPHIL # BLD AUTO: 0.35 X10*3/UL (ref 0–0.4)
EOSINOPHIL NFR BLD AUTO: 5.5 %
ERYTHROCYTE [DISTWIDTH] IN BLOOD BY AUTOMATED COUNT: 13.7 % (ref 11.5–14.5)
GLUCOSE SERPL-MCNC: 94 MG/DL (ref 74–99)
HCT VFR BLD AUTO: 33.8 % (ref 36–46)
HGB BLD-MCNC: 10.5 G/DL (ref 12–16)
IMM GRANULOCYTES # BLD AUTO: 0.15 X10*3/UL (ref 0–0.5)
IMM GRANULOCYTES NFR BLD AUTO: 2.4 % (ref 0–0.9)
LYMPHOCYTES # BLD AUTO: 1.25 X10*3/UL (ref 0.8–3)
LYMPHOCYTES NFR BLD AUTO: 19.6 %
MCH RBC QN AUTO: 29.2 PG (ref 26–34)
MCHC RBC AUTO-ENTMCNC: 31.1 G/DL (ref 32–36)
MCV RBC AUTO: 94 FL (ref 80–100)
MONOCYTES # BLD AUTO: 0.55 X10*3/UL (ref 0.05–0.8)
MONOCYTES NFR BLD AUTO: 8.6 %
NEUTROPHILS # BLD AUTO: 3.99 X10*3/UL (ref 1.6–5.5)
NEUTROPHILS NFR BLD AUTO: 62.5 %
NRBC BLD-RTO: 0 /100 WBCS (ref 0–0)
PLATELET # BLD AUTO: 354 X10*3/UL (ref 150–450)
POTASSIUM SERPL-SCNC: 3.3 MMOL/L (ref 3.5–5.3)
PROT SERPL-MCNC: 6.3 G/DL (ref 6.4–8.2)
RBC # BLD AUTO: 3.6 X10*6/UL (ref 4–5.2)
SODIUM SERPL-SCNC: 139 MMOL/L (ref 136–145)
WBC # BLD AUTO: 6.4 X10*3/UL (ref 4.4–11.3)

## 2024-04-30 PROCEDURE — 85025 COMPLETE CBC W/AUTO DIFF WBC: CPT

## 2024-04-30 PROCEDURE — 80053 COMPREHEN METABOLIC PANEL: CPT

## 2024-04-30 PROCEDURE — 36415 COLL VENOUS BLD VENIPUNCTURE: CPT

## 2024-05-01 ENCOUNTER — OFFICE VISIT (OUTPATIENT)
Dept: HEMATOLOGY/ONCOLOGY | Facility: CLINIC | Age: 74
End: 2024-05-01
Payer: MEDICARE

## 2024-05-01 ENCOUNTER — INFUSION (OUTPATIENT)
Dept: HEMATOLOGY/ONCOLOGY | Facility: CLINIC | Age: 74
End: 2024-05-01
Payer: MEDICARE

## 2024-05-01 VITALS
BODY MASS INDEX: 28.89 KG/M2 | WEIGHT: 147.93 LBS | RESPIRATION RATE: 18 BRPM | OXYGEN SATURATION: 94 % | TEMPERATURE: 97 F | DIASTOLIC BLOOD PRESSURE: 79 MMHG | SYSTOLIC BLOOD PRESSURE: 131 MMHG | HEART RATE: 92 BPM

## 2024-05-01 DIAGNOSIS — C68.9 UROTHELIAL CARCINOMA (MULTI): ICD-10-CM

## 2024-05-01 DIAGNOSIS — E87.6 HYPOKALEMIA: ICD-10-CM

## 2024-05-01 DIAGNOSIS — C68.9 UROTHELIAL CARCINOMA (MULTI): Primary | ICD-10-CM

## 2024-05-01 PROCEDURE — 1125F AMNT PAIN NOTED PAIN PRSNT: CPT

## 2024-05-01 PROCEDURE — 2500000004 HC RX 250 GENERAL PHARMACY W/ HCPCS (ALT 636 FOR OP/ED)

## 2024-05-01 PROCEDURE — 1159F MED LIST DOCD IN RCRD: CPT

## 2024-05-01 PROCEDURE — 3075F SYST BP GE 130 - 139MM HG: CPT

## 2024-05-01 PROCEDURE — 99215 OFFICE O/P EST HI 40 MIN: CPT

## 2024-05-01 PROCEDURE — 96413 CHEMO IV INFUSION 1 HR: CPT

## 2024-05-01 PROCEDURE — 3078F DIAST BP <80 MM HG: CPT

## 2024-05-01 PROCEDURE — 1157F ADVNC CARE PLAN IN RCRD: CPT

## 2024-05-01 PROCEDURE — 96417 CHEMO IV INFUS EACH ADDL SEQ: CPT

## 2024-05-01 PROCEDURE — 96375 TX/PRO/DX INJ NEW DRUG ADDON: CPT | Mod: INF

## 2024-05-01 RX ORDER — PROCHLORPERAZINE MALEATE 10 MG
10 TABLET ORAL EVERY 6 HOURS PRN
Status: DISCONTINUED | OUTPATIENT
Start: 2024-05-01 | End: 2024-05-01 | Stop reason: HOSPADM

## 2024-05-01 RX ORDER — ALBUTEROL SULFATE 0.83 MG/ML
3 SOLUTION RESPIRATORY (INHALATION) AS NEEDED
Status: DISCONTINUED | OUTPATIENT
Start: 2024-05-01 | End: 2024-05-01 | Stop reason: HOSPADM

## 2024-05-01 RX ORDER — POTASSIUM CHLORIDE 750 MG/1
10 TABLET, EXTENDED RELEASE ORAL DAILY
Qty: 30 TABLET | Refills: 11 | Status: SHIPPED | OUTPATIENT
Start: 2024-05-01

## 2024-05-01 RX ORDER — PROCHLORPERAZINE EDISYLATE 5 MG/ML
10 INJECTION INTRAMUSCULAR; INTRAVENOUS EVERY 6 HOURS PRN
Status: DISCONTINUED | OUTPATIENT
Start: 2024-05-01 | End: 2024-05-01 | Stop reason: HOSPADM

## 2024-05-01 RX ORDER — DIPHENHYDRAMINE HYDROCHLORIDE 50 MG/ML
50 INJECTION INTRAMUSCULAR; INTRAVENOUS AS NEEDED
Status: DISCONTINUED | OUTPATIENT
Start: 2024-05-01 | End: 2024-05-01 | Stop reason: HOSPADM

## 2024-05-01 RX ORDER — FAMOTIDINE 10 MG/ML
20 INJECTION INTRAVENOUS ONCE AS NEEDED
Status: DISCONTINUED | OUTPATIENT
Start: 2024-05-01 | End: 2024-05-01 | Stop reason: HOSPADM

## 2024-05-01 RX ORDER — EPINEPHRINE 0.3 MG/.3ML
0.3 INJECTION SUBCUTANEOUS EVERY 5 MIN PRN
Status: DISCONTINUED | OUTPATIENT
Start: 2024-05-01 | End: 2024-05-01 | Stop reason: HOSPADM

## 2024-05-01 RX ORDER — ONDANSETRON HYDROCHLORIDE 2 MG/ML
8 INJECTION, SOLUTION INTRAVENOUS ONCE
Status: COMPLETED | OUTPATIENT
Start: 2024-05-01 | End: 2024-05-01

## 2024-05-01 RX ADMIN — SODIUM CHLORIDE 200 MG: 9 INJECTION, SOLUTION INTRAVENOUS at 12:20

## 2024-05-01 RX ADMIN — ONDANSETRON 8 MG: 2 INJECTION INTRAMUSCULAR; INTRAVENOUS at 10:57

## 2024-05-01 RX ADMIN — ENFORTUMAB VEDOTIN 90 MG: 30 INJECTION, POWDER, LYOPHILIZED, FOR SOLUTION INTRAVENOUS at 11:32

## 2024-05-01 ASSESSMENT — PAIN SCALES - GENERAL: PAINLEVEL: 6

## 2024-05-01 ASSESSMENT — ENCOUNTER SYMPTOMS
CONSTIPATION: 1
VOMITING: 1
LOSS OF SENSATION IN FEET: 0
APPETITE CHANGE: 1
OCCASIONAL FEELINGS OF UNSTEADINESS: 0
SLEEP DISTURBANCE: 1
DEPRESSION: 0

## 2024-05-06 ENCOUNTER — LAB (OUTPATIENT)
Dept: LAB | Facility: LAB | Age: 74
End: 2024-05-06
Payer: MEDICARE

## 2024-05-06 DIAGNOSIS — C68.9 UROTHELIAL CARCINOMA (MULTI): ICD-10-CM

## 2024-05-06 LAB
ALBUMIN SERPL BCP-MCNC: 4 G/DL (ref 3.4–5)
ALP SERPL-CCNC: 95 U/L (ref 33–136)
ALT SERPL W P-5'-P-CCNC: 11 U/L (ref 7–45)
ANION GAP SERPL CALC-SCNC: 14 MMOL/L (ref 10–20)
AST SERPL W P-5'-P-CCNC: 15 U/L (ref 9–39)
BASOPHILS # BLD AUTO: 0.08 X10*3/UL (ref 0–0.1)
BASOPHILS NFR BLD AUTO: 1.5 %
BILIRUB SERPL-MCNC: 0.3 MG/DL (ref 0–1.2)
BUN SERPL-MCNC: 15 MG/DL (ref 6–23)
CALCIUM SERPL-MCNC: 9.4 MG/DL (ref 8.6–10.3)
CHLORIDE SERPL-SCNC: 102 MMOL/L (ref 98–107)
CO2 SERPL-SCNC: 29 MMOL/L (ref 21–32)
CREAT SERPL-MCNC: 1.23 MG/DL (ref 0.5–1.05)
EGFRCR SERPLBLD CKD-EPI 2021: 47 ML/MIN/1.73M*2
EOSINOPHIL # BLD AUTO: 0.08 X10*3/UL (ref 0–0.4)
EOSINOPHIL NFR BLD AUTO: 1.5 %
ERYTHROCYTE [DISTWIDTH] IN BLOOD BY AUTOMATED COUNT: 14.2 % (ref 11.5–14.5)
GLUCOSE SERPL-MCNC: 100 MG/DL (ref 74–99)
HCT VFR BLD AUTO: 37.6 % (ref 36–46)
HGB BLD-MCNC: 11.7 G/DL (ref 12–16)
IMM GRANULOCYTES # BLD AUTO: 0.06 X10*3/UL (ref 0–0.5)
IMM GRANULOCYTES NFR BLD AUTO: 1.1 % (ref 0–0.9)
LYMPHOCYTES # BLD AUTO: 1.25 X10*3/UL (ref 0.8–3)
LYMPHOCYTES NFR BLD AUTO: 23.2 %
MCH RBC QN AUTO: 29.5 PG (ref 26–34)
MCHC RBC AUTO-ENTMCNC: 31.1 G/DL (ref 32–36)
MCV RBC AUTO: 95 FL (ref 80–100)
MONOCYTES # BLD AUTO: 0.63 X10*3/UL (ref 0.05–0.8)
MONOCYTES NFR BLD AUTO: 11.7 %
NEUTROPHILS # BLD AUTO: 3.28 X10*3/UL (ref 1.6–5.5)
NEUTROPHILS NFR BLD AUTO: 61 %
NRBC BLD-RTO: 0 /100 WBCS (ref 0–0)
PLATELET # BLD AUTO: 320 X10*3/UL (ref 150–450)
POTASSIUM SERPL-SCNC: 4.3 MMOL/L (ref 3.5–5.3)
PROT SERPL-MCNC: 6.9 G/DL (ref 6.4–8.2)
RBC # BLD AUTO: 3.96 X10*6/UL (ref 4–5.2)
SODIUM SERPL-SCNC: 141 MMOL/L (ref 136–145)
TSH SERPL-ACNC: 0.74 MIU/L (ref 0.44–3.98)
WBC # BLD AUTO: 5.4 X10*3/UL (ref 4.4–11.3)

## 2024-05-06 PROCEDURE — 80053 COMPREHEN METABOLIC PANEL: CPT

## 2024-05-06 PROCEDURE — 85025 COMPLETE CBC W/AUTO DIFF WBC: CPT

## 2024-05-06 PROCEDURE — 84443 ASSAY THYROID STIM HORMONE: CPT

## 2024-05-06 PROCEDURE — 36415 COLL VENOUS BLD VENIPUNCTURE: CPT

## 2024-05-07 DIAGNOSIS — C68.9 UROTHELIAL CARCINOMA (MULTI): Primary | ICD-10-CM

## 2024-05-07 LAB — SCAN RESULT: NORMAL

## 2024-05-08 ENCOUNTER — INFUSION (OUTPATIENT)
Dept: HEMATOLOGY/ONCOLOGY | Facility: CLINIC | Age: 74
End: 2024-05-08
Payer: MEDICARE

## 2024-05-08 VITALS
WEIGHT: 145.5 LBS | RESPIRATION RATE: 18 BRPM | TEMPERATURE: 96.8 F | SYSTOLIC BLOOD PRESSURE: 148 MMHG | OXYGEN SATURATION: 97 % | BODY MASS INDEX: 28.42 KG/M2 | HEART RATE: 71 BPM | DIASTOLIC BLOOD PRESSURE: 82 MMHG

## 2024-05-08 DIAGNOSIS — C68.9 UROTHELIAL CARCINOMA (MULTI): ICD-10-CM

## 2024-05-08 PROCEDURE — 2500000004 HC RX 250 GENERAL PHARMACY W/ HCPCS (ALT 636 FOR OP/ED)

## 2024-05-08 PROCEDURE — 96375 TX/PRO/DX INJ NEW DRUG ADDON: CPT | Mod: INF

## 2024-05-08 PROCEDURE — 2500000004 HC RX 250 GENERAL PHARMACY W/ HCPCS (ALT 636 FOR OP/ED): Mod: JZ,JG

## 2024-05-08 PROCEDURE — 96413 CHEMO IV INFUSION 1 HR: CPT

## 2024-05-08 RX ORDER — HEPARIN 100 UNIT/ML
500 SYRINGE INTRAVENOUS AS NEEDED
Status: DISCONTINUED | OUTPATIENT
Start: 2024-05-08 | End: 2024-05-08 | Stop reason: HOSPADM

## 2024-05-08 RX ORDER — ALBUTEROL SULFATE 0.83 MG/ML
3 SOLUTION RESPIRATORY (INHALATION) AS NEEDED
Status: DISCONTINUED | OUTPATIENT
Start: 2024-05-08 | End: 2024-05-08 | Stop reason: HOSPADM

## 2024-05-08 RX ORDER — PROCHLORPERAZINE EDISYLATE 5 MG/ML
10 INJECTION INTRAMUSCULAR; INTRAVENOUS EVERY 6 HOURS PRN
Status: DISCONTINUED | OUTPATIENT
Start: 2024-05-08 | End: 2024-05-08 | Stop reason: HOSPADM

## 2024-05-08 RX ORDER — EPINEPHRINE 0.3 MG/.3ML
0.3 INJECTION SUBCUTANEOUS EVERY 5 MIN PRN
Status: DISCONTINUED | OUTPATIENT
Start: 2024-05-08 | End: 2024-05-08 | Stop reason: HOSPADM

## 2024-05-08 RX ORDER — HEPARIN SODIUM,PORCINE/PF 10 UNIT/ML
50 SYRINGE (ML) INTRAVENOUS AS NEEDED
Status: CANCELLED | OUTPATIENT
Start: 2024-05-08

## 2024-05-08 RX ORDER — DIPHENHYDRAMINE HYDROCHLORIDE 50 MG/ML
50 INJECTION INTRAMUSCULAR; INTRAVENOUS AS NEEDED
Status: DISCONTINUED | OUTPATIENT
Start: 2024-05-08 | End: 2024-05-08 | Stop reason: HOSPADM

## 2024-05-08 RX ORDER — ONDANSETRON HYDROCHLORIDE 2 MG/ML
8 INJECTION, SOLUTION INTRAVENOUS ONCE
Status: COMPLETED | OUTPATIENT
Start: 2024-05-08 | End: 2024-05-08

## 2024-05-08 RX ORDER — HEPARIN 100 UNIT/ML
500 SYRINGE INTRAVENOUS AS NEEDED
Status: CANCELLED | OUTPATIENT
Start: 2024-05-08

## 2024-05-08 RX ORDER — HEPARIN SODIUM,PORCINE/PF 10 UNIT/ML
50 SYRINGE (ML) INTRAVENOUS AS NEEDED
Status: DISCONTINUED | OUTPATIENT
Start: 2024-05-08 | End: 2024-05-08 | Stop reason: HOSPADM

## 2024-05-08 RX ORDER — PROCHLORPERAZINE MALEATE 10 MG
10 TABLET ORAL EVERY 6 HOURS PRN
Status: DISCONTINUED | OUTPATIENT
Start: 2024-05-08 | End: 2024-05-08 | Stop reason: HOSPADM

## 2024-05-08 RX ORDER — FAMOTIDINE 10 MG/ML
20 INJECTION INTRAVENOUS ONCE AS NEEDED
Status: DISCONTINUED | OUTPATIENT
Start: 2024-05-08 | End: 2024-05-08 | Stop reason: HOSPADM

## 2024-05-08 RX ADMIN — ENFORTUMAB VEDOTIN 90 MG: 30 INJECTION, POWDER, LYOPHILIZED, FOR SOLUTION INTRAVENOUS at 10:22

## 2024-05-08 RX ADMIN — ONDANSETRON 8 MG: 2 INJECTION INTRAMUSCULAR; INTRAVENOUS at 10:05

## 2024-05-13 ENCOUNTER — NURSE TRIAGE (OUTPATIENT)
Dept: ADMISSION | Facility: HOSPITAL | Age: 74
End: 2024-05-13
Payer: MEDICARE

## 2024-05-13 ENCOUNTER — LAB (OUTPATIENT)
Dept: LAB | Facility: LAB | Age: 74
End: 2024-05-13
Payer: MEDICARE

## 2024-05-13 DIAGNOSIS — C68.9 UROTHELIAL CARCINOMA (MULTI): Primary | ICD-10-CM

## 2024-05-13 DIAGNOSIS — C68.9 UROTHELIAL CARCINOMA (MULTI): ICD-10-CM

## 2024-05-13 LAB
APPEARANCE UR: ABNORMAL
BILIRUB UR STRIP.AUTO-MCNC: NEGATIVE MG/DL
COLOR UR: YELLOW
GLUCOSE UR STRIP.AUTO-MCNC: NEGATIVE MG/DL
KETONES UR STRIP.AUTO-MCNC: NEGATIVE MG/DL
LEUKOCYTE ESTERASE UR QL STRIP.AUTO: ABNORMAL
NITRITE UR QL STRIP.AUTO: ABNORMAL
PH UR STRIP.AUTO: 6.5 [PH]
PROT UR STRIP.AUTO-MCNC: ABNORMAL MG/DL
RBC # UR STRIP.AUTO: ABNORMAL /UL
SP GR UR STRIP.AUTO: 1.02
UROBILINOGEN UR STRIP.AUTO-MCNC: ABNORMAL MG/DL

## 2024-05-13 PROCEDURE — 81003 URINALYSIS AUTO W/O SCOPE: CPT

## 2024-05-13 PROCEDURE — 87086 URINE CULTURE/COLONY COUNT: CPT

## 2024-05-13 PROCEDURE — 87186 SC STD MICRODIL/AGAR DIL: CPT

## 2024-05-13 ASSESSMENT — ENCOUNTER SYMPTOMS
EYES NEGATIVE: 1
NUMBNESS: 1
FATIGUE: 0
WOUND: 0
NAUSEA: 0
SLEEP DISTURBANCE: 1
FLANK PAIN: 1
DIFFICULTY URINATING: 0
DIARRHEA: 0
LIGHT-HEADEDNESS: 0
HEADACHES: 0
APPETITE CHANGE: 1
FEVER: 0
DYSURIA: 1
CHILLS: 0
COUGH: 0
ARTHRALGIAS: 1
HEMATOLOGIC/LYMPHATIC NEGATIVE: 1
LEG SWELLING: 0
HEMATURIA: 0
ABDOMINAL PAIN: 0
MYALGIAS: 0
BACK PAIN: 1
ENDOCRINE NEGATIVE: 1
DIZZINESS: 0
SHORTNESS OF BREATH: 0
NECK PAIN: 0

## 2024-05-13 NOTE — TELEPHONE ENCOUNTER
Patient called with concerns that have been lasting a few months but worsening.  She states she is constantly urinating/dribbling.  She states she stands up and urinates constantly.  No painful urination.  No blood in urine.  Clear/light yellow urine.  No odor.  Also complains of chills right before urinating. No fever. Stent bothering her and walks abnormal.    Also, complains of constant cramping in abdomen for many months - rating cramping discomfort 9/10, taking Advil with slight relief x few hrs.  Previous thought related to no BM but now having +BM.    Also, complaining of vaginal itching intermittent, using wipes and vagisil. No fever.  Drinking and eating decreased.  Forcing self to eat.  No chest pain, no SOB.  Patient states she is housebound and lays around all day due to her symptoms.  Has CT, FUV and infusion next week but does not feel like she can wait.  Message sent to team    Additional Information   Commented on: Please tell me more about the problem you are having. the more detail you provide, the better.     Urinary frequency and constant urination.  Abdominal cramping.   Commented on: When did these problems start?     Few months ago and now worsening    Protocols used: Other

## 2024-05-13 NOTE — PROGRESS NOTES
Patient ID: Terra Alexis is a 73 y.o. female.  Diagnosis:  irresectable UC   MedOnc: Dr. Boone   Urologist: Dr. Ross  Gyn: Dr. Nathan Noyola - [facility]     Patient Care Team:  Elgin Boone MD as Consulting Physician (Hematology and Oncology)    Current Therapy: EV + Pembro    ONCOLOGIC HISTORY  No matching staging information was found for the patient.    Patient is referred by Dr. Ross for recently diagnosed invasive carcinoma, may represent urothelial origin vs h/o cervical cancer. Patient presented to the ER in February 2024 with c/o flank pain imaging revealed soft tissue mass encasing the distal segment of the left ureter. Ureteral biopsy performed in IR on 03/22/2024 revealed the above diagnosis.      1998 Cervical cancer, tx Chemoradiation, with weekly cisplatin, and radiation completed on 1/4/99.   2/1999 Total abdominal hysterectomy and bilateral salpingo-oophorectomy, with no residual carcinoma found.   1/23/08 Category 1 mammogram  11/25/08 LGSIL ANTHONY 1 consistent with HPV effect  1/13/09 MID VAGINAL APEX, BIOPSY: MILD VAGINAL INTRAEPITHELIAL NEOPLASIA.  03/2024 - pelvic irresectable distal urothelial mass, bx proven UC  4/11/24 - Cycle 1 EV + Pembro  5/1/24 - Cycle 2 EV + Pembro  5/14/24 - sick visit call    Oncology History   Urothelial carcinoma (Multi)   4/3/2024 Initial Diagnosis    Urothelial carcinoma (CMS/HCC)     4/11/2024 -  Chemotherapy    Enfortumab vedotin + Pembrolizumab, 21 Day Cycles        Other Contributing History  Cervical cancer - s/p chemoradiation and surgery    Subjective      Interval History:  Terra Alexis is a 73 y.o. female who presents today for follow up of UC. Patient of Dr. Boone currently on EV + Pembro. Continues to have urinary symptoms and cramping pain, not on oxybutynin. Stent bothers her- not leaving the house due to pain and fatigue. Sleep is interrupted. Also has UTI on UA - culture pending, low grade fevers . Cramping did not improve with resolved  constipation. She also is complaining of vaginal itching, no discharge, tried vagasil without relief. Decreased appetite- added protein shakes - ,yesterday only had OJ crackers and soup. Some things tast bland, some things are too strong. She doesn't feel hungry and is losing weight. Some headaches, none today. No other new concerns at this time.     Review of Systems   Constitutional:  Positive for appetite change and unexpected weight change. Negative for chills, fatigue and fever.   HENT:  Negative.     Eyes: Negative.    Respiratory:  Negative for cough and shortness of breath.    Cardiovascular:  Negative for chest pain and leg swelling.   Gastrointestinal:  Negative for abdominal pain, constipation, diarrhea, nausea and vomiting.   Endocrine: Negative.    Genitourinary:  Positive for dysuria and pelvic pain. Negative for difficulty urinating, hematuria, vaginal bleeding and vaginal discharge.         Related to stent, pelvic cramping, vaginal itching   Musculoskeletal:  Positive for arthralgias, back pain and flank pain. Negative for myalgias and neck pain.   Skin:  Negative for itching, rash and wound.   Neurological:  Positive for numbness. Negative for dizziness, headaches and light-headedness.   Hematological: Negative.    Psychiatric/Behavioral:  Positive for sleep disturbance.      Objective      There were no vitals taken for this visit.  BSA: There is no height or weight on file to calculate BSA.    Wt Readings from Last 5 Encounters:   05/08/24 66 kg (145 lb 8.1 oz)   05/01/24 67.1 kg (147 lb 14.9 oz)   04/18/24 68 kg (150 lb)   04/13/24 68.9 kg (152 lb)   04/11/24 69.8 kg (153 lb 14.1 oz)     Performance Status:  ECOG Score: 0- Fully active, able to carry on all pre-disease performance w/o restriction.  Karnofsky Score: 90 - Able to carry on normal activity; minor signs or symptoms of disease     Physical Exam  Physical Exam  Constitutional:       Comments: Alert and oriented x3, no signs of  distress, alert and cooperative, voice is clear, no slurred speech, speaking in full sentences with organized thought pattern. No coughing or audible wheezing during phone visit.     Allergies  Allergies   Allergen Reactions    Codeine GI Upset, Other and Nausea/vomiting    Percocet [Oxycodone-Acetaminophen] Dizziness and Nausea/vomiting      Medications  Current Outpatient Medications   Medication Instructions    cartilage/collagen/bor/hyalur (JOINT HEALTH ORAL) oral,  Joint Health CAPS Refills: 0 Active<BR>    docusate sodium (COLACE) 100 mg, oral, 2 times daily    ondansetron (ZOFRAN) 8 mg, oral, Every 8 hours PRN    oxybutynin XL (DITROPAN-XL) 10 mg, oral, Daily, Do not crush, chew, or split.    potassium chloride CR 10 mEq ER tablet 10 mEq, oral, Daily    turmeric 400 mg capsule Daily    vit A/vit C/vit E/zinc/copper (VITAMINS A,C,E-ZINC-COPPER ORAL) oral,  PreserVision AREDS CAPS Refills: 0 Active<BR>    vitamins A,C,E-zinc-copper (PreserVision AREDS) 4,296 mcg-226 mg-90 mg capsule oral        Diagnostic Results   Recent Labs  No results found for this or any previous visit (from the past 96 hour(s)).    Component  Ref Range & Units 1 d ago  (5/13/24) 1 mo ago  (4/13/24) 2 mo ago  (2/28/24) 2 mo ago  (2/25/24)   Color, Urine  Straw, Yellow Yellow Eliza Normal (N) Eliza Normal (N) Red Normal (N)   Appearance, Urine  Clear Hazy Normal (N) Hazy Normal (N) Hazy Normal (N) Hazy Normal (N)   Specific Gravity, Urine  1.005 - 1.035 1.025 1.024 1.025 1.018   pH, Urine  5.0, 5.5, 6.0, 6.5, 7.0, 7.5, 8.0 6.5 5.0 5.0 6.0   Protein, Urine  NEGATIVE, 10 (TRACE) mg/dL 100 (2+) Abnormal  100 (2+) Normal (N) R >=500 (3+) Normal (N) R 100 (2+) Normal (N) R   Glucose, Urine  NEGATIVE mg/dL NEGATIVE 50 (1+) Abnormal  NEGATIVE NEGATIVE   Blood, Urine  NEGATIVE 0.2 (2+) Abnormal  LARGE (3+) Abnormal  LARGE (3+) Abnormal  LARGE (3+) Abnormal    Ketones, Urine  NEGATIVE mg/dL NEGATIVE 5 (TRACE) Abnormal  5 (TRACE) Abnormal  NEGATIVE    Bilirubin, Urine  NEGATIVE NEGATIVE NEGATIVE NEGATIVE NEGATIVE   Urobilinogen, Urine  NORM mg/dL NORM <2.0 R <2.0 R <2.0 R   Nitrite, Urine  NEGATIVE 2+ Abnormal  POSITIVE Abnormal  NEGATIVE NEGATIVE   Leukocyte Esterase, Urine  NEGATIVE 500 Alice/µL Abnormal         Genetics   Signatera 4/11/24 0.45     Pathology  Surgical Path 3/22/24   FINAL DIAGNOSIS   A. Soft tissue, mass, biopsy:    -- Involved by invasive carcinoma. See note.     Note: Patient's imaging finding of a soft tissue mass encasing the distal ureter and remote history of cervical cancer (per clinical notes) is noted. Histological sections show cores of fibroconnective tissue involved by infiltrative nests of high grade carcinoma cells, which are immunohistochemically positive for pancytokeratin AE1/AE3, CAM5.2, GATA3 and p63 and are negative for PAX8 and ER. The morphological and immunohistochemical findings are not entirely specific. Given the clinical context of a distal ureteral mass, this might represent invasive carcinoma of urothelial origin. Clinical correlation is recommended.     Recent Imaging   2/28/24 CT urography/chest  IMPRESSION:  1.  Solid enhancing mass inseparable from and potentially arising  from the distal left ureter which probably encases and could invade  adjacent iliac vessels with mild newly seen pelvic vascular  collaterals. The mass is also inseparable from adjacent mesenteric  vessels.  2. Marked left-sided hydronephrosis and postobstructive changes left  kidney.  3. There is probable blood product in proximal ureter, intrarenal  collecting system, urinary bladder potentially distal ureter versus  additional tumor within the distal ureter extending to the intramural  portion of the bladder trigone. Attention recommended on follow-up  assessment.  4. No CT evidence of distant metastases.  5. Probably benign liver hypodensities 1 of which contains fat and 1  of which is too small to characterize. Attention recommended  on  follow-up assessment.  6. Grossly stable mild probably benign gallbladder wall prominence  likely related to chronic benign wall prominence. Attention  recommended on follow-up assessment. Consider gallbladder ultrasound  correlation.  7. Bibasilar platelike atelectasis.  8. Probably benign fissural nodule left lower lung most likely  incidental. Attention recommended on follow-up assessment.  9. Incompletely image 14 x 10 mm nodule left submandibular region  potentially lymph node. Attention recommended on follow-up assessment.    Assessment/Plan   Terra Alexis is a 73 y.o.  female with hx cervical cancer s/p chemoRT 1998, now found with pelvic irresectable distal urothelial mass, bx proven UC. We discussed the clinical significance of diagnosis, goals of care and treatment plan in detail. We would offer her systemic treatment with EV+pembro and then reassess opportunity for surgery. Will do baseline tissue NGS, baseline labs and plan to start soon.     She presents in follow up today on EV + Pembro - having some side effects. Urinary symptoms, pelvic pain and cramping, decreased appetite and fatigue. She has a UTI on UA, culture pending. Patient having increased bladder pain and cramping. She is not taking oxybutynin. Recommended to continue protein drinks for her decreased appetite with weight loss - if continuing with chemo after scans we will consult dietician.     Continue to monitor for constipation and neuropathy. Signaterna monitoring.      There are no diagnoses linked to this encounter.    Treatment Plan:  Enfortumab vedotin + Pembrolizumab, 21 Day Cycles  Venous Access Orders  - Continue EV + Pembro   - Signatera Q6 weeks   - Scans after cycle 2   - Restart oxybutynin daily  - Oxycodone 5 mg every 6 hours as needed for abd pain  - Macrobid for 7 days for UTI - culture pending  - OTC Monistat for possible yeast infection - monitor   - ondansetron as needed for nausea   - Monitor  constipation - add Miralax  - Monitor BP - off antihypertensives   - Potassium replacement 10 meq daily  - Dietician consult if continuing chemo     Follow up with MD and Cycle 3 next week, labs and scans prior.       Verbal consent was requested and obtained from patient on this date for a telehealth visit.    Patient verbalizes understanding of above plan. Time provided for patient's questions. All questions answered to patient's satisfaction in office. Patient instructed to reach out for any new concerning issues at [phone].    Soco Dietz MSN, APRN, A-GNP-C, OCN   Oncology   [facility] [address] [address]   Epic Secure Chat   Phone: [phone]  [email or web address]

## 2024-05-13 NOTE — TELEPHONE ENCOUNTER
RN called patient back.  Advised patient of advice and information above. Patient only wants to do virtual visit - scheduled for tomorrow at 1030.  Advised to get urine sample today prior to visit tomorrow - patient agreed and will go today for sample.  Terra states she does drink protein shakes and will continue to try to have one a day. Denies additional questions/concerns at this time.

## 2024-05-14 ENCOUNTER — TELEMEDICINE (OUTPATIENT)
Dept: HEMATOLOGY/ONCOLOGY | Facility: HOSPITAL | Age: 74
End: 2024-05-14
Payer: MEDICARE

## 2024-05-14 DIAGNOSIS — N30.01 ACUTE CYSTITIS WITH HEMATURIA: ICD-10-CM

## 2024-05-14 DIAGNOSIS — C68.9 UROTHELIAL CARCINOMA (MULTI): Primary | ICD-10-CM

## 2024-05-14 DIAGNOSIS — R39.89 BLADDER PAIN: ICD-10-CM

## 2024-05-14 LAB — HOLD SPECIMEN: NORMAL

## 2024-05-14 PROCEDURE — 1159F MED LIST DOCD IN RCRD: CPT

## 2024-05-14 PROCEDURE — 99442 PR PHYS/QHP TELEPHONE EVALUATION 11-20 MIN: CPT

## 2024-05-14 PROCEDURE — 1157F ADVNC CARE PLAN IN RCRD: CPT

## 2024-05-14 RX ORDER — OXYCODONE HYDROCHLORIDE 5 MG/1
5 TABLET ORAL EVERY 6 HOURS PRN
Qty: 15 TABLET | Refills: 0 | Status: SHIPPED | OUTPATIENT
Start: 2024-05-14

## 2024-05-14 RX ORDER — NITROFURANTOIN 25; 75 MG/1; MG/1
100 CAPSULE ORAL 2 TIMES DAILY
Qty: 14 CAPSULE | Refills: 0 | Status: SHIPPED | OUTPATIENT
Start: 2024-05-14 | End: 2024-05-21

## 2024-05-14 ASSESSMENT — ENCOUNTER SYMPTOMS
UNEXPECTED WEIGHT CHANGE: 1
VOMITING: 0
CONSTIPATION: 0

## 2024-05-16 LAB
BACTERIA UR CULT: ABNORMAL
BACTERIA UR CULT: ABNORMAL

## 2024-05-21 ENCOUNTER — LAB (OUTPATIENT)
Dept: LAB | Facility: LAB | Age: 74
End: 2024-05-21
Payer: MEDICARE

## 2024-05-21 ENCOUNTER — HOSPITAL ENCOUNTER (OUTPATIENT)
Dept: RADIOLOGY | Facility: HOSPITAL | Age: 74
Discharge: HOME | End: 2024-05-21
Payer: MEDICARE

## 2024-05-21 DIAGNOSIS — C68.9 UROTHELIAL CARCINOMA (MULTI): ICD-10-CM

## 2024-05-21 LAB
ALBUMIN SERPL BCP-MCNC: 3.8 G/DL (ref 3.4–5)
ALP SERPL-CCNC: 92 U/L (ref 33–136)
ALT SERPL W P-5'-P-CCNC: 20 U/L (ref 7–45)
ANION GAP SERPL CALC-SCNC: 15 MMOL/L (ref 10–20)
AST SERPL W P-5'-P-CCNC: 22 U/L (ref 9–39)
BASOPHILS # BLD AUTO: 0.07 X10*3/UL (ref 0–0.1)
BASOPHILS NFR BLD AUTO: 1.1 %
BILIRUB SERPL-MCNC: 0.4 MG/DL (ref 0–1.2)
BUN SERPL-MCNC: 15 MG/DL (ref 6–23)
CALCIUM SERPL-MCNC: 9.4 MG/DL (ref 8.6–10.3)
CHLORIDE SERPL-SCNC: 97 MMOL/L (ref 98–107)
CO2 SERPL-SCNC: 26 MMOL/L (ref 21–32)
CREAT SERPL-MCNC: 1.28 MG/DL (ref 0.5–1.05)
EGFRCR SERPLBLD CKD-EPI 2021: 45 ML/MIN/1.73M*2
EOSINOPHIL # BLD AUTO: 0.11 X10*3/UL (ref 0–0.4)
EOSINOPHIL NFR BLD AUTO: 1.8 %
ERYTHROCYTE [DISTWIDTH] IN BLOOD BY AUTOMATED COUNT: 15.6 % (ref 11.5–14.5)
GLUCOSE SERPL-MCNC: 91 MG/DL (ref 74–99)
HCT VFR BLD AUTO: 35.3 % (ref 36–46)
HGB BLD-MCNC: 10.7 G/DL (ref 12–16)
IMM GRANULOCYTES # BLD AUTO: 0.16 X10*3/UL (ref 0–0.5)
IMM GRANULOCYTES NFR BLD AUTO: 2.6 % (ref 0–0.9)
LYMPHOCYTES # BLD AUTO: 0.96 X10*3/UL (ref 0.8–3)
LYMPHOCYTES NFR BLD AUTO: 15.4 %
MCH RBC QN AUTO: 29.2 PG (ref 26–34)
MCHC RBC AUTO-ENTMCNC: 30.3 G/DL (ref 32–36)
MCV RBC AUTO: 96 FL (ref 80–100)
MONOCYTES # BLD AUTO: 0.77 X10*3/UL (ref 0.05–0.8)
MONOCYTES NFR BLD AUTO: 12.4 %
NEUTROPHILS # BLD AUTO: 4.16 X10*3/UL (ref 1.6–5.5)
NEUTROPHILS NFR BLD AUTO: 66.7 %
NRBC BLD-RTO: 0 /100 WBCS (ref 0–0)
PLATELET # BLD AUTO: 297 X10*3/UL (ref 150–450)
POTASSIUM SERPL-SCNC: 4.3 MMOL/L (ref 3.5–5.3)
PROT SERPL-MCNC: 6.5 G/DL (ref 6.4–8.2)
RBC # BLD AUTO: 3.66 X10*6/UL (ref 4–5.2)
SODIUM SERPL-SCNC: 134 MMOL/L (ref 136–145)
WBC # BLD AUTO: 6.2 X10*3/UL (ref 4.4–11.3)

## 2024-05-21 PROCEDURE — 85025 COMPLETE CBC W/AUTO DIFF WBC: CPT

## 2024-05-21 PROCEDURE — 74177 CT ABD & PELVIS W/CONTRAST: CPT | Performed by: STUDENT IN AN ORGANIZED HEALTH CARE EDUCATION/TRAINING PROGRAM

## 2024-05-21 PROCEDURE — 36415 COLL VENOUS BLD VENIPUNCTURE: CPT

## 2024-05-21 PROCEDURE — 71260 CT THORAX DX C+: CPT | Performed by: STUDENT IN AN ORGANIZED HEALTH CARE EDUCATION/TRAINING PROGRAM

## 2024-05-21 PROCEDURE — 74177 CT ABD & PELVIS W/CONTRAST: CPT

## 2024-05-21 PROCEDURE — 80053 COMPREHEN METABOLIC PANEL: CPT

## 2024-05-21 PROCEDURE — 2550000001 HC RX 255 CONTRASTS

## 2024-05-21 RX ADMIN — IOHEXOL 75 ML: 350 INJECTION, SOLUTION INTRAVENOUS at 10:09

## 2024-05-23 ENCOUNTER — OFFICE VISIT (OUTPATIENT)
Dept: HEMATOLOGY/ONCOLOGY | Facility: CLINIC | Age: 74
End: 2024-05-23
Payer: MEDICARE

## 2024-05-23 ENCOUNTER — INFUSION (OUTPATIENT)
Dept: HEMATOLOGY/ONCOLOGY | Facility: CLINIC | Age: 74
End: 2024-05-23
Payer: MEDICARE

## 2024-05-23 VITALS
HEART RATE: 101 BPM | OXYGEN SATURATION: 94 % | RESPIRATION RATE: 18 BRPM | SYSTOLIC BLOOD PRESSURE: 145 MMHG | BODY MASS INDEX: 27.47 KG/M2 | TEMPERATURE: 97.5 F | DIASTOLIC BLOOD PRESSURE: 92 MMHG | WEIGHT: 140.65 LBS

## 2024-05-23 DIAGNOSIS — C68.9 UROTHELIAL CARCINOMA (MULTI): Primary | ICD-10-CM

## 2024-05-23 DIAGNOSIS — C68.9 UROTHELIAL CARCINOMA (MULTI): ICD-10-CM

## 2024-05-23 PROCEDURE — 99214 OFFICE O/P EST MOD 30 MIN: CPT | Performed by: STUDENT IN AN ORGANIZED HEALTH CARE EDUCATION/TRAINING PROGRAM

## 2024-05-23 PROCEDURE — 96417 CHEMO IV INFUS EACH ADDL SEQ: CPT

## 2024-05-23 PROCEDURE — 2500000004 HC RX 250 GENERAL PHARMACY W/ HCPCS (ALT 636 FOR OP/ED): Performed by: STUDENT IN AN ORGANIZED HEALTH CARE EDUCATION/TRAINING PROGRAM

## 2024-05-23 PROCEDURE — 1157F ADVNC CARE PLAN IN RCRD: CPT | Performed by: STUDENT IN AN ORGANIZED HEALTH CARE EDUCATION/TRAINING PROGRAM

## 2024-05-23 PROCEDURE — 3077F SYST BP >= 140 MM HG: CPT | Performed by: STUDENT IN AN ORGANIZED HEALTH CARE EDUCATION/TRAINING PROGRAM

## 2024-05-23 PROCEDURE — 96413 CHEMO IV INFUSION 1 HR: CPT

## 2024-05-23 PROCEDURE — 96375 TX/PRO/DX INJ NEW DRUG ADDON: CPT | Mod: INF

## 2024-05-23 PROCEDURE — 1159F MED LIST DOCD IN RCRD: CPT | Performed by: STUDENT IN AN ORGANIZED HEALTH CARE EDUCATION/TRAINING PROGRAM

## 2024-05-23 PROCEDURE — 3080F DIAST BP >= 90 MM HG: CPT | Performed by: STUDENT IN AN ORGANIZED HEALTH CARE EDUCATION/TRAINING PROGRAM

## 2024-05-23 PROCEDURE — 1125F AMNT PAIN NOTED PAIN PRSNT: CPT | Performed by: STUDENT IN AN ORGANIZED HEALTH CARE EDUCATION/TRAINING PROGRAM

## 2024-05-23 PROCEDURE — 1036F TOBACCO NON-USER: CPT | Performed by: STUDENT IN AN ORGANIZED HEALTH CARE EDUCATION/TRAINING PROGRAM

## 2024-05-23 RX ORDER — ONDANSETRON HYDROCHLORIDE 2 MG/ML
8 INJECTION, SOLUTION INTRAVENOUS ONCE
Status: COMPLETED | OUTPATIENT
Start: 2024-05-23 | End: 2024-05-23

## 2024-05-23 RX ORDER — ALBUTEROL SULFATE 0.83 MG/ML
3 SOLUTION RESPIRATORY (INHALATION) AS NEEDED
Status: CANCELLED | OUTPATIENT
Start: 2024-05-23

## 2024-05-23 RX ORDER — PROCHLORPERAZINE MALEATE 10 MG
10 TABLET ORAL EVERY 6 HOURS PRN
Status: CANCELLED | OUTPATIENT
Start: 2024-05-23

## 2024-05-23 RX ORDER — ALBUTEROL SULFATE 0.83 MG/ML
3 SOLUTION RESPIRATORY (INHALATION) AS NEEDED
Status: CANCELLED | OUTPATIENT
Start: 2024-05-30

## 2024-05-23 RX ORDER — FAMOTIDINE 10 MG/ML
20 INJECTION INTRAVENOUS ONCE AS NEEDED
Status: DISCONTINUED | OUTPATIENT
Start: 2024-05-23 | End: 2024-05-23 | Stop reason: HOSPADM

## 2024-05-23 RX ORDER — EPINEPHRINE 0.3 MG/.3ML
0.3 INJECTION SUBCUTANEOUS EVERY 5 MIN PRN
Status: DISCONTINUED | OUTPATIENT
Start: 2024-05-23 | End: 2024-05-23 | Stop reason: HOSPADM

## 2024-05-23 RX ORDER — ONDANSETRON HYDROCHLORIDE 2 MG/ML
8 INJECTION, SOLUTION INTRAVENOUS ONCE
Status: CANCELLED | OUTPATIENT
Start: 2024-05-23

## 2024-05-23 RX ORDER — ONDANSETRON HYDROCHLORIDE 2 MG/ML
8 INJECTION, SOLUTION INTRAVENOUS ONCE
Status: CANCELLED | OUTPATIENT
Start: 2024-05-30

## 2024-05-23 RX ORDER — EPINEPHRINE 0.3 MG/.3ML
0.3 INJECTION SUBCUTANEOUS EVERY 5 MIN PRN
Status: CANCELLED | OUTPATIENT
Start: 2024-05-23

## 2024-05-23 RX ORDER — PROCHLORPERAZINE EDISYLATE 5 MG/ML
10 INJECTION INTRAMUSCULAR; INTRAVENOUS EVERY 6 HOURS PRN
Status: DISCONTINUED | OUTPATIENT
Start: 2024-05-23 | End: 2024-05-23 | Stop reason: HOSPADM

## 2024-05-23 RX ORDER — FAMOTIDINE 10 MG/ML
20 INJECTION INTRAVENOUS ONCE AS NEEDED
Status: CANCELLED | OUTPATIENT
Start: 2024-05-23

## 2024-05-23 RX ORDER — PROCHLORPERAZINE EDISYLATE 5 MG/ML
10 INJECTION INTRAMUSCULAR; INTRAVENOUS EVERY 6 HOURS PRN
Status: CANCELLED | OUTPATIENT
Start: 2024-05-30

## 2024-05-23 RX ORDER — DIPHENHYDRAMINE HYDROCHLORIDE 50 MG/ML
50 INJECTION INTRAMUSCULAR; INTRAVENOUS AS NEEDED
Status: DISCONTINUED | OUTPATIENT
Start: 2024-05-23 | End: 2024-05-23 | Stop reason: HOSPADM

## 2024-05-23 RX ORDER — PROCHLORPERAZINE MALEATE 10 MG
10 TABLET ORAL EVERY 6 HOURS PRN
Status: CANCELLED | OUTPATIENT
Start: 2024-05-30

## 2024-05-23 RX ORDER — DIPHENHYDRAMINE HYDROCHLORIDE 50 MG/ML
50 INJECTION INTRAMUSCULAR; INTRAVENOUS AS NEEDED
Status: CANCELLED | OUTPATIENT
Start: 2024-05-30

## 2024-05-23 RX ORDER — EPINEPHRINE 0.3 MG/.3ML
0.3 INJECTION SUBCUTANEOUS EVERY 5 MIN PRN
Status: CANCELLED | OUTPATIENT
Start: 2024-05-30

## 2024-05-23 RX ORDER — FAMOTIDINE 10 MG/ML
20 INJECTION INTRAVENOUS ONCE AS NEEDED
Status: CANCELLED | OUTPATIENT
Start: 2024-05-30

## 2024-05-23 RX ORDER — DIPHENHYDRAMINE HYDROCHLORIDE 50 MG/ML
50 INJECTION INTRAMUSCULAR; INTRAVENOUS AS NEEDED
Status: CANCELLED | OUTPATIENT
Start: 2024-05-23

## 2024-05-23 RX ORDER — ALBUTEROL SULFATE 0.83 MG/ML
3 SOLUTION RESPIRATORY (INHALATION) AS NEEDED
Status: DISCONTINUED | OUTPATIENT
Start: 2024-05-23 | End: 2024-05-23 | Stop reason: HOSPADM

## 2024-05-23 RX ORDER — PROCHLORPERAZINE MALEATE 10 MG
10 TABLET ORAL EVERY 6 HOURS PRN
Status: DISCONTINUED | OUTPATIENT
Start: 2024-05-23 | End: 2024-05-23 | Stop reason: HOSPADM

## 2024-05-23 RX ORDER — PROCHLORPERAZINE EDISYLATE 5 MG/ML
10 INJECTION INTRAMUSCULAR; INTRAVENOUS EVERY 6 HOURS PRN
Status: CANCELLED | OUTPATIENT
Start: 2024-05-23

## 2024-05-23 RX ADMIN — SODIUM CHLORIDE 200 MG: 9 INJECTION, SOLUTION INTRAVENOUS at 15:45

## 2024-05-23 RX ADMIN — ONDANSETRON 8 MG: 2 INJECTION INTRAMUSCULAR; INTRAVENOUS at 14:26

## 2024-05-23 RX ADMIN — ENFORTUMAB VEDOTIN 80 MG: 30 INJECTION, POWDER, LYOPHILIZED, FOR SOLUTION INTRAVENOUS at 14:50

## 2024-05-23 ASSESSMENT — ENCOUNTER SYMPTOMS
WOUND: 0
FATIGUE: 0
VOMITING: 0
MYALGIAS: 0
LIGHT-HEADEDNESS: 0
ARTHRALGIAS: 1
SHORTNESS OF BREATH: 0
CONSTIPATION: 0
UNEXPECTED WEIGHT CHANGE: 1
SLEEP DISTURBANCE: 1
DIFFICULTY URINATING: 0
COUGH: 0
ABDOMINAL PAIN: 0
ENDOCRINE NEGATIVE: 1
DIZZINESS: 0
FLANK PAIN: 1
HEADACHES: 0
NECK PAIN: 0
DYSURIA: 1
CHILLS: 0
FEVER: 0
HEMATURIA: 0
NAUSEA: 0
APPETITE CHANGE: 1
NUMBNESS: 1
DIARRHEA: 0
BACK PAIN: 1
EYES NEGATIVE: 1
LEG SWELLING: 0
HEMATOLOGIC/LYMPHATIC NEGATIVE: 1

## 2024-05-23 ASSESSMENT — PAIN SCALES - GENERAL: PAINLEVEL: 8

## 2024-05-23 NOTE — PROGRESS NOTES
Patient ID: Terra Alexis is a 73 y.o. female.  Diagnosis:  irresectable UC   MedOnc: Dr. Boone   Urologist: Dr. Ross  Gyn: Dr. Nathan Noyola - [facility]     Patient Care Team:  No Assigned Pcp Generic Provider, MD as PCP - General (General Practice)  Elgin Boone MD as Consulting Physician (Hematology and Oncology)    Current Therapy: EV + Pembro    ONCOLOGIC HISTORY  No matching staging information was found for the patient.    Patient is referred by Dr. Ross for recently diagnosed invasive carcinoma, may represent urothelial origin vs h/o cervical cancer. Patient presented to the ER in February 2024 with c/o flank pain imaging revealed soft tissue mass encasing the distal segment of the left ureter. Ureteral biopsy performed in IR on 03/22/2024 revealed the above diagnosis.      1998 Cervical cancer, tx Chemoradiation, with weekly cisplatin, and radiation completed on 1/4/99.   2/1999 Total abdominal hysterectomy and bilateral salpingo-oophorectomy, with no residual carcinoma found.   1/23/08 Category 1 mammogram  11/25/08 LGSIL ANTHONY 1 consistent with HPV effect  1/13/09 MID VAGINAL APEX, BIOPSY: MILD VAGINAL INTRAEPITHELIAL NEOPLASIA.  03/2024 - pelvic irresectable distal urothelial mass, bx proven UC  4/11/24 - Cycle 1 EV + Pembro  5/1/24 - Cycle 2 EV + Pembro  5/23/24 - Scans show DE. C3 EV+pembro    Oncology History   Urothelial carcinoma (Multi)   4/3/2024 Initial Diagnosis    Urothelial carcinoma (CMS/HCC)     4/11/2024 -  Chemotherapy    Enfortumab vedotin + Pembrolizumab, 21 Day Cycles        Other Contributing History  Cervical cancer - s/p chemoradiation and surgery    Subjective      Interval History:  Terra Alexis is a 73 y.o. female who presents today for follow up of UC. Patient of Dr. Boone currently on EV + Pembro. Continues to have urinary symptoms and cramping pain, not on oxybutynin. Stent bothers her- not leaving the house due to pain and fatigue. Sleep is interrupted. Also has UTI on  UA - culture pending, low grade fevers . Cramping did not improve with resolved constipation. She also is complaining of vaginal itching, no discharge, tried vagasil without relief. Decreased appetite- added protein shakes - ,yesterday only had OJ crackers and soup. Some things tast bland, some things are too strong. She doesn't feel hungry and is losing weight. Some headaches, none today. No other new concerns at this time.     Review of Systems   Constitutional:  Positive for appetite change and unexpected weight change. Negative for chills, fatigue and fever.   HENT:  Negative.     Eyes: Negative.    Respiratory:  Negative for cough and shortness of breath.    Cardiovascular:  Negative for chest pain and leg swelling.   Gastrointestinal:  Negative for abdominal pain, constipation, diarrhea, nausea and vomiting.   Endocrine: Negative.    Genitourinary:  Positive for dysuria and pelvic pain. Negative for difficulty urinating, hematuria, vaginal bleeding and vaginal discharge.         Related to stent, pelvic cramping, vaginal itching   Musculoskeletal:  Positive for arthralgias, back pain and flank pain. Negative for myalgias and neck pain.   Skin:  Negative for itching, rash and wound.   Neurological:  Positive for numbness. Negative for dizziness, headaches and light-headedness.   Hematological: Negative.    Psychiatric/Behavioral:  Positive for sleep disturbance.      Objective      There were no vitals taken for this visit.  BSA: There is no height or weight on file to calculate BSA.    Wt Readings from Last 5 Encounters:   05/08/24 66 kg (145 lb 8.1 oz)   05/01/24 67.1 kg (147 lb 14.9 oz)   04/18/24 68 kg (150 lb)   04/13/24 68.9 kg (152 lb)   04/11/24 69.8 kg (153 lb 14.1 oz)     Performance Status:  ECOG Score: 0- Fully active, able to carry on all pre-disease performance w/o restriction.  Karnofsky Score: 90 - Able to carry on normal activity; minor signs or symptoms of disease     Physical  Exam  Physical Exam  Constitutional:       Comments: Alert and oriented x3, no signs of distress, alert and cooperative, voice is clear, no slurred speech, speaking in full sentences with organized thought pattern. No coughing or audible wheezing during phone visit.     Allergies  Allergies   Allergen Reactions    Codeine GI Upset, Other and Nausea/vomiting    Percocet [Oxycodone-Acetaminophen] Dizziness and Nausea/vomiting      Medications  Current Outpatient Medications   Medication Instructions    cartilage/collagen/bor/hyalur (JOINT HEALTH ORAL) oral,  Joint Health CAPS Refills: 0 Active<BR>    docusate sodium (COLACE) 100 mg, oral, 2 times daily    ondansetron (ZOFRAN) 8 mg, oral, Every 8 hours PRN    oxybutynin XL (DITROPAN-XL) 10 mg, oral, Daily, Do not crush, chew, or split.    oxyCODONE (ROXICODONE) 5 mg, oral, Every 6 hours PRN    potassium chloride CR 10 mEq ER tablet 10 mEq, oral, Daily    turmeric 400 mg capsule Daily    vit A/vit C/vit E/zinc/copper (VITAMINS A,C,E-ZINC-COPPER ORAL) oral,  PreserVision AREDS CAPS Refills: 0 Active<BR>    vitamins A,C,E-zinc-copper (PreserVision AREDS) 4,296 mcg-226 mg-90 mg capsule oral        Diagnostic Results   Recent Labs  Results for orders placed or performed in visit on 05/21/24 (from the past 96 hour(s))   CBC and Auto Differential   Result Value Ref Range    WBC 6.2 4.4 - 11.3 x10*3/uL    nRBC 0.0 0.0 - 0.0 /100 WBCs    RBC 3.66 (L) 4.00 - 5.20 x10*6/uL    Hemoglobin 10.7 (L) 12.0 - 16.0 g/dL    Hematocrit 35.3 (L) 36.0 - 46.0 %    MCV 96 80 - 100 fL    MCH 29.2 26.0 - 34.0 pg    MCHC 30.3 (L) 32.0 - 36.0 g/dL    RDW 15.6 (H) 11.5 - 14.5 %    Platelets 297 150 - 450 x10*3/uL    Neutrophils % 66.7 40.0 - 80.0 %    Immature Granulocytes %, Automated 2.6 (H) 0.0 - 0.9 %    Lymphocytes % 15.4 13.0 - 44.0 %    Monocytes % 12.4 2.0 - 10.0 %    Eosinophils % 1.8 0.0 - 6.0 %    Basophils % 1.1 0.0 - 2.0 %    Neutrophils Absolute 4.16 1.60 - 5.50 x10*3/uL    Immature  Granulocytes Absolute, Automated 0.16 0.00 - 0.50 x10*3/uL    Lymphocytes Absolute 0.96 0.80 - 3.00 x10*3/uL    Monocytes Absolute 0.77 0.05 - 0.80 x10*3/uL    Eosinophils Absolute 0.11 0.00 - 0.40 x10*3/uL    Basophils Absolute 0.07 0.00 - 0.10 x10*3/uL   Comprehensive Metabolic Panel   Result Value Ref Range    Glucose 91 74 - 99 mg/dL    Sodium 134 (L) 136 - 145 mmol/L    Potassium 4.3 3.5 - 5.3 mmol/L    Chloride 97 (L) 98 - 107 mmol/L    Bicarbonate 26 21 - 32 mmol/L    Anion Gap 15 10 - 20 mmol/L    Urea Nitrogen 15 6 - 23 mg/dL    Creatinine 1.28 (H) 0.50 - 1.05 mg/dL    eGFR 45 (L) >60 mL/min/1.73m*2    Calcium 9.4 8.6 - 10.3 mg/dL    Albumin 3.8 3.4 - 5.0 g/dL    Alkaline Phosphatase 92 33 - 136 U/L    Total Protein 6.5 6.4 - 8.2 g/dL    AST 22 9 - 39 U/L    Bilirubin, Total 0.4 0.0 - 1.2 mg/dL    ALT 20 7 - 45 U/L       Component  Ref Range & Units 1 d ago  (5/13/24) 1 mo ago  (4/13/24) 2 mo ago  (2/28/24) 2 mo ago  (2/25/24)   Color, Urine  Straw, Yellow Yellow Eliza Normal (N) Eliza Normal (N) Red Normal (N)   Appearance, Urine  Clear Hazy Normal (N) Hazy Normal (N) Hazy Normal (N) Hazy Normal (N)   Specific Gravity, Urine  1.005 - 1.035 1.025 1.024 1.025 1.018   pH, Urine  5.0, 5.5, 6.0, 6.5, 7.0, 7.5, 8.0 6.5 5.0 5.0 6.0   Protein, Urine  NEGATIVE, 10 (TRACE) mg/dL 100 (2+) Abnormal  100 (2+) Normal (N) R >=500 (3+) Normal (N) R 100 (2+) Normal (N) R   Glucose, Urine  NEGATIVE mg/dL NEGATIVE 50 (1+) Abnormal  NEGATIVE NEGATIVE   Blood, Urine  NEGATIVE 0.2 (2+) Abnormal  LARGE (3+) Abnormal  LARGE (3+) Abnormal  LARGE (3+) Abnormal    Ketones, Urine  NEGATIVE mg/dL NEGATIVE 5 (TRACE) Abnormal  5 (TRACE) Abnormal  NEGATIVE   Bilirubin, Urine  NEGATIVE NEGATIVE NEGATIVE NEGATIVE NEGATIVE   Urobilinogen, Urine  NORM mg/dL NORM <2.0 R <2.0 R <2.0 R   Nitrite, Urine  NEGATIVE 2+ Abnormal  POSITIVE Abnormal  NEGATIVE NEGATIVE   Leukocyte Esterase, Urine  NEGATIVE 500 Alice/µL Abnormal         Genetics    Carmen 4/11/24 0.45     Pathology  Surgical Path 3/22/24   FINAL DIAGNOSIS   A. Soft tissue, mass, biopsy:    -- Involved by invasive carcinoma. See note.     Note: Patient's imaging finding of a soft tissue mass encasing the distal ureter and remote history of cervical cancer (per clinical notes) is noted. Histological sections show cores of fibroconnective tissue involved by infiltrative nests of high grade carcinoma cells, which are immunohistochemically positive for pancytokeratin AE1/AE3, CAM5.2, GATA3 and p63 and are negative for PAX8 and ER. The morphological and immunohistochemical findings are not entirely specific. Given the clinical context of a distal ureteral mass, this might represent invasive carcinoma of urothelial origin. Clinical correlation is recommended.     Recent Imaging   2/28/24 CT urography/chest  IMPRESSION:  1.  Solid enhancing mass inseparable from and potentially arising  from the distal left ureter which probably encases and could invade  adjacent iliac vessels with mild newly seen pelvic vascular  collaterals. The mass is also inseparable from adjacent mesenteric  vessels.  2. Marked left-sided hydronephrosis and postobstructive changes left  kidney.  3. There is probable blood product in proximal ureter, intrarenal  collecting system, urinary bladder potentially distal ureter versus  additional tumor within the distal ureter extending to the intramural  portion of the bladder trigone. Attention recommended on follow-up  assessment.  4. No CT evidence of distant metastases.  5. Probably benign liver hypodensities 1 of which contains fat and 1  of which is too small to characterize. Attention recommended on  follow-up assessment.  6. Grossly stable mild probably benign gallbladder wall prominence  likely related to chronic benign wall prominence. Attention  recommended on follow-up assessment. Consider gallbladder ultrasound  correlation.  7. Bibasilar platelike atelectasis.  8.  Probably benign fissural nodule left lower lung most likely  incidental. Attention recommended on follow-up assessment.  9. Incompletely image 14 x 10 mm nodule left submandibular region  potentially lymph node. Attention recommended on follow-up assessment.    Assessment/Plan   Terra Alexis is a 73 y.o.  female with hx cervical cancer s/p chemoRT 1998, now found with pelvic irresectable distal urothelial mass, bx proven UC. Now post EV/pembro with some tumor shrinkage (around 30%). Baseline signtera+. We will do 2 more cycles and likely reassess resectability with Dr Francis.  I saw and evaluated the patient. I personally obtained the key and critical portions of the history and physical exam or was physically present for key and critical portions performed by the resident/fellow. I reviewed the resident/fellow's documentation and discussed the patient with the resident/fellow. I agree with the resident/fellow's medical decision making as documented in the note.   Elgin Boone MD MSc FACP  [employer] [facility] Cancer Research   in Medicine [employer] [employer]  Director Clinical  Medical Oncology Research Program   [place] [facility]  Cell [phone]  Office [phone]

## 2024-05-28 ENCOUNTER — LAB (OUTPATIENT)
Dept: LAB | Facility: LAB | Age: 74
End: 2024-05-28
Payer: MEDICARE

## 2024-05-28 DIAGNOSIS — C68.9 UROTHELIAL CARCINOMA (MULTI): ICD-10-CM

## 2024-05-28 LAB
BASOPHILS # BLD AUTO: 0.06 X10*3/UL (ref 0–0.1)
BASOPHILS NFR BLD AUTO: 0.9 %
EOSINOPHIL # BLD AUTO: 0.02 X10*3/UL (ref 0–0.4)
EOSINOPHIL NFR BLD AUTO: 0.3 %
ERYTHROCYTE [DISTWIDTH] IN BLOOD BY AUTOMATED COUNT: 15.8 % (ref 11.5–14.5)
GLUCOSE SERPL-MCNC: 101 MG/DL (ref 74–99)
HCT VFR BLD AUTO: 37.3 % (ref 36–46)
HGB BLD-MCNC: 11.5 G/DL (ref 12–16)
IMM GRANULOCYTES # BLD AUTO: 0.04 X10*3/UL (ref 0–0.5)
IMM GRANULOCYTES NFR BLD AUTO: 0.6 % (ref 0–0.9)
LYMPHOCYTES # BLD AUTO: 1.16 X10*3/UL (ref 0.8–3)
LYMPHOCYTES NFR BLD AUTO: 18.2 %
MCH RBC QN AUTO: 29.2 PG (ref 26–34)
MCHC RBC AUTO-ENTMCNC: 30.8 G/DL (ref 32–36)
MCV RBC AUTO: 95 FL (ref 80–100)
MONOCYTES # BLD AUTO: 0.8 X10*3/UL (ref 0.05–0.8)
MONOCYTES NFR BLD AUTO: 12.6 %
NEUTROPHILS # BLD AUTO: 4.29 X10*3/UL (ref 1.6–5.5)
NEUTROPHILS NFR BLD AUTO: 67.4 %
NRBC BLD-RTO: 0 /100 WBCS (ref 0–0)
PLATELET # BLD AUTO: 286 X10*3/UL (ref 150–450)
RBC # BLD AUTO: 3.94 X10*6/UL (ref 4–5.2)
WBC # BLD AUTO: 6.4 X10*3/UL (ref 4.4–11.3)

## 2024-05-28 PROCEDURE — 82947 ASSAY GLUCOSE BLOOD QUANT: CPT

## 2024-05-28 PROCEDURE — 36415 COLL VENOUS BLD VENIPUNCTURE: CPT

## 2024-05-28 PROCEDURE — 85025 COMPLETE CBC W/AUTO DIFF WBC: CPT

## 2024-05-30 ENCOUNTER — INFUSION (OUTPATIENT)
Dept: HEMATOLOGY/ONCOLOGY | Facility: CLINIC | Age: 74
End: 2024-05-30
Payer: MEDICARE

## 2024-05-30 ENCOUNTER — APPOINTMENT (OUTPATIENT)
Dept: HEMATOLOGY/ONCOLOGY | Facility: CLINIC | Age: 74
End: 2024-05-30
Payer: MEDICARE

## 2024-05-30 VITALS
TEMPERATURE: 97.7 F | WEIGHT: 139.99 LBS | HEART RATE: 79 BPM | BODY MASS INDEX: 27.34 KG/M2 | DIASTOLIC BLOOD PRESSURE: 76 MMHG | SYSTOLIC BLOOD PRESSURE: 129 MMHG | OXYGEN SATURATION: 96 % | RESPIRATION RATE: 18 BRPM

## 2024-05-30 DIAGNOSIS — C68.9 UROTHELIAL CARCINOMA (MULTI): ICD-10-CM

## 2024-05-30 PROCEDURE — 2500000004 HC RX 250 GENERAL PHARMACY W/ HCPCS (ALT 636 FOR OP/ED): Performed by: STUDENT IN AN ORGANIZED HEALTH CARE EDUCATION/TRAINING PROGRAM

## 2024-05-30 PROCEDURE — 2500000004 HC RX 250 GENERAL PHARMACY W/ HCPCS (ALT 636 FOR OP/ED): Mod: JZ,JG | Performed by: STUDENT IN AN ORGANIZED HEALTH CARE EDUCATION/TRAINING PROGRAM

## 2024-05-30 PROCEDURE — 96413 CHEMO IV INFUSION 1 HR: CPT

## 2024-05-30 PROCEDURE — 96375 TX/PRO/DX INJ NEW DRUG ADDON: CPT | Mod: INF

## 2024-05-30 RX ORDER — EPINEPHRINE 0.3 MG/.3ML
0.3 INJECTION SUBCUTANEOUS EVERY 5 MIN PRN
Status: DISCONTINUED | OUTPATIENT
Start: 2024-05-30 | End: 2024-05-30 | Stop reason: HOSPADM

## 2024-05-30 RX ORDER — ONDANSETRON HYDROCHLORIDE 2 MG/ML
8 INJECTION, SOLUTION INTRAVENOUS ONCE
Status: COMPLETED | OUTPATIENT
Start: 2024-05-30 | End: 2024-05-30

## 2024-05-30 RX ORDER — FAMOTIDINE 10 MG/ML
20 INJECTION INTRAVENOUS ONCE AS NEEDED
Status: DISCONTINUED | OUTPATIENT
Start: 2024-05-30 | End: 2024-05-30 | Stop reason: HOSPADM

## 2024-05-30 RX ORDER — DIPHENHYDRAMINE HYDROCHLORIDE 50 MG/ML
50 INJECTION INTRAMUSCULAR; INTRAVENOUS AS NEEDED
Status: DISCONTINUED | OUTPATIENT
Start: 2024-05-30 | End: 2024-05-30 | Stop reason: HOSPADM

## 2024-05-30 RX ORDER — ALBUTEROL SULFATE 0.83 MG/ML
3 SOLUTION RESPIRATORY (INHALATION) AS NEEDED
Status: DISCONTINUED | OUTPATIENT
Start: 2024-05-30 | End: 2024-05-30 | Stop reason: HOSPADM

## 2024-05-30 RX ORDER — HEPARIN SODIUM,PORCINE/PF 10 UNIT/ML
50 SYRINGE (ML) INTRAVENOUS AS NEEDED
OUTPATIENT
Start: 2024-05-30

## 2024-05-30 RX ORDER — HEPARIN 100 UNIT/ML
500 SYRINGE INTRAVENOUS AS NEEDED
OUTPATIENT
Start: 2024-05-30

## 2024-05-30 RX ORDER — PROCHLORPERAZINE EDISYLATE 5 MG/ML
10 INJECTION INTRAMUSCULAR; INTRAVENOUS EVERY 6 HOURS PRN
Status: DISCONTINUED | OUTPATIENT
Start: 2024-05-30 | End: 2024-05-30 | Stop reason: HOSPADM

## 2024-05-30 RX ORDER — PROCHLORPERAZINE MALEATE 10 MG
10 TABLET ORAL EVERY 6 HOURS PRN
Status: DISCONTINUED | OUTPATIENT
Start: 2024-05-30 | End: 2024-05-30 | Stop reason: HOSPADM

## 2024-05-30 RX ADMIN — ONDANSETRON 8 MG: 2 INJECTION INTRAMUSCULAR; INTRAVENOUS at 10:26

## 2024-05-30 RX ADMIN — ENFORTUMAB VEDOTIN 80 MG: 20 INJECTION, POWDER, LYOPHILIZED, FOR SOLUTION INTRAVENOUS at 11:12

## 2024-06-04 ENCOUNTER — NURSE TRIAGE (OUTPATIENT)
Dept: ADMISSION | Facility: HOSPITAL | Age: 74
End: 2024-06-04
Payer: MEDICARE

## 2024-06-04 NOTE — TELEPHONE ENCOUNTER
"Pt offered ACC visit- pt refused.  She is not taking zofran \"I don't  have nausea, I'm just throwing up\"  She has taken 2 doses of pepto bismol without relief.   Pt yelled at this RN- \"I am not going anywhere, I am just going to take imodium\"  Encouraged pt to call back or report to ED if new/worsening symptoms.   Team updated.   "

## 2024-06-04 NOTE — TELEPHONE ENCOUNTER
"Pt called very upset- she has had n/v/d x 2 days.   Emesis is bile colored to clear with bits of food- no bloody or coffee ground emesis.   Diarrhea is explosive, uncontrolled, liquid. This morning 1st stool was dark brown, 2nd stool was black. Pt denies any bright red blood, foul odor.   Pt took 2 doses of pepto bismol which she threw back up. No other medications being taken.   Pt denies fever, headache.   Pt does have chills and extreme fatigue.   Pt unable to quantify how much liquid she is taking in \"everything I take in I either throw up or have diarrhea\". Urine is pale yellow in color and normal quantity- no burning, pain, frequency.   Advised pt on ED for evaluation pt states \" I am not going to ED and you can tell them that\"  Last Infusion 5/23 (enfortumab vedotin/pembro)- next FUV and infusion 6/13.     Pt was very angry that she had to call 5 to 6 times to reach  and  message was breaking up- supportive listening provided-   "

## 2024-06-07 ASSESSMENT — ENCOUNTER SYMPTOMS
SLEEP DISTURBANCE: 1
HEMATURIA: 0
LEG SWELLING: 0
NECK PAIN: 0
HEADACHES: 0
MYALGIAS: 0
NUMBNESS: 1
LIGHT-HEADEDNESS: 0
UNEXPECTED WEIGHT CHANGE: 1
CHILLS: 0
ENDOCRINE NEGATIVE: 1
ARTHRALGIAS: 1
COUGH: 0
APPETITE CHANGE: 1
FLANK PAIN: 1
FEVER: 0
WOUND: 0
SHORTNESS OF BREATH: 0
HEMATOLOGIC/LYMPHATIC NEGATIVE: 1
DYSURIA: 1
NAUSEA: 0
DIFFICULTY URINATING: 0
BACK PAIN: 1
DIZZINESS: 0

## 2024-06-07 NOTE — PROGRESS NOTES
Patient ID: Terra Alexis is a 73 y.o. female.  Diagnosis:  irresectable UC   MedOnc: Dr. Boone   Urologist: Dr. Ross  Gyn: Dr. Nathan Noyola - [facility]     Patient Care Team:  No Assigned Pcp Generic Provider, MD as PCP - General (General Practice)  Shannon Rodriguez MD as PCP - [facility] ACO Attributed Provider  Elgin Boone MD as Consulting Physician (Hematology and Oncology)    Current Therapy: EV + Pembro    ONCOLOGIC HISTORY  No matching staging information was found for the patient.    Patient is referred by Dr. Ross for recently diagnosed invasive carcinoma, may represent urothelial origin vs h/o cervical cancer. Patient presented to the ER in February 2024 with c/o flank pain imaging revealed soft tissue mass encasing the distal segment of the left ureter. Ureteral biopsy performed in IR on 03/22/2024 revealed the above diagnosis.      1998 Cervical cancer, tx Chemoradiation, with weekly cisplatin, and radiation completed on 1/4/99.   2/1999 Total abdominal hysterectomy and bilateral salpingo-oophorectomy, with no residual carcinoma found.   1/23/08 Category 1 mammogram  11/25/08 LGSIL ANTHONY 1 consistent with HPV effect  1/13/09 MID VAGINAL APEX, BIOPSY: MILD VAGINAL INTRAEPITHELIAL NEOPLASIA.  03/2024 - pelvic irresectable distal urothelial mass, bx proven UC  4/11/24 - Cycle 1 EV + Pembro  5/1/24 - Cycle 2 EV + Pembro  5/14/24 - sick visit call  5/23/24 - Scans show VT. C3 EV (reduced to 1.125 mg/kg)+pembro   6/13/24 - C4 EV (1.125 mg/kg) + Pembro    Oncology History   Urothelial carcinoma (Multi)   4/3/2024 Initial Diagnosis    Urothelial carcinoma (CMS/HCC)     4/11/2024 -  Chemotherapy    Enfortumab vedotin + Pembrolizumab, 21 Day Cycles        Other Contributing History  Cervical cancer - s/p chemoradiation and surgery    Subjective      Interval History:  Terra Alexis is a 73 y.o. female who presents today for follow up of UC. Patient of Dr. Boone currently on EV + Pembro. After cycle 3 she has  had many GI symptoms. Diarrhea, vomiting, no taste, acid reflux, can't standing cooking or smells, abd cramping. Diarrhea relieved with imodium but then bloated and backed up, feels gassy but can't pass it and then has diarrhea again. Continues to lost weight and struggles to eat. Appetite is gone and taste is bad. Lactose intolerant and boost may be contributing to diarrhea. She noticed numbness in her finger tips and a little in her big toes. Still able to button buttons, not painful. She has had some blood tinged urine intermittently. She feels like she has a sunburn on her arms and thighs, no rash, not hot or painful just soreness. Her eyes are itchy. She denies signs of infection including UTI symptoms. No issues with breathing, cough. Stent bothers her and bladder pain - not taking any supportive medications.     Review of Systems   Constitutional:  Positive for appetite change, fatigue and unexpected weight change. Negative for chills and fever.   HENT:  Negative.     Eyes:  Positive for eye problems.   Respiratory:  Negative for cough and shortness of breath.    Cardiovascular:  Negative for chest pain and leg swelling.   Gastrointestinal:  Positive for abdominal pain, constipation, diarrhea and vomiting. Negative for blood in stool and nausea.        Reflux, food aversion, bloating   Endocrine: Negative.    Genitourinary:  Positive for dysuria and pelvic pain. Negative for difficulty urinating, hematuria, vaginal bleeding and vaginal discharge.         Related to stent, pelvic cramping   Musculoskeletal:  Positive for arthralgias, back pain and flank pain. Negative for myalgias and neck pain.   Skin:  Negative for itching, rash and wound.        Soreness on arms and thighs   Neurological:  Positive for numbness. Negative for dizziness, headaches and light-headedness.   Hematological: Negative.    Psychiatric/Behavioral:  Positive for sleep disturbance.      Objective      /85   Pulse 100   Temp 36.6  °C (97.9 °F)   Resp 18   Wt 61.6 kg (135 lb 12.9 oz)   SpO2 95%   BMI 26.52 kg/m²   BSA: 1.61 meters squared    Wt Readings from Last 5 Encounters:   06/13/24 61.6 kg (135 lb 12.9 oz)   05/30/24 63.5 kg (139 lb 15.9 oz)   05/23/24 63.8 kg (140 lb 10.5 oz)   05/08/24 66 kg (145 lb 8.1 oz)   05/01/24 67.1 kg (147 lb 14.9 oz)     Performance Status:  ECOG Score: 0- Fully active, able to carry on all pre-disease performance w/o restriction.  Karnofsky Score: 90 - Able to carry on normal activity; minor signs or symptoms of disease     Physical Exam  Physical Exam  Constitutional:       General: She is not in acute distress.     Appearance: Normal appearance. She is not toxic-appearing.   HENT:      Head: Normocephalic and atraumatic.      Mouth/Throat:      Mouth: Mucous membranes are moist.      Pharynx: Oropharynx is clear.   Eyes:      Pupils: Pupils are equal, round, and reactive to light.   Pulmonary:      Effort: Pulmonary effort is normal.   Musculoskeletal:         General: Normal range of motion.      Cervical back: Normal range of motion.      Right lower leg: No edema.      Left lower leg: No edema.   Skin:     General: Skin is warm and dry.      Findings: No rash.   Neurological:      General: No focal deficit present.      Mental Status: She is alert and oriented to person, place, and time.      Motor: No weakness.   Psychiatric:         Mood and Affect: Mood normal.         Behavior: Behavior normal.         Thought Content: Thought content normal.         Judgment: Judgment normal.     Allergies  Allergies   Allergen Reactions    Codeine GI Upset, Other and Nausea/vomiting    Percocet [Oxycodone-Acetaminophen] Dizziness and Nausea/vomiting      Medications  Current Outpatient Medications   Medication Instructions    cartilage/collagen/bor/hyalur (JOINT HEALTH ORAL) oral,  Joint Health CAPS Refills: 0 Active<BR>    docusate sodium (COLACE) 100 mg, oral, 2 times daily    ondansetron (ZOFRAN) 8 mg, oral,  Every 8 hours PRN    oxybutynin XL (DITROPAN-XL) 10 mg, oral, Daily, Do not crush, chew, or split.    oxyCODONE (ROXICODONE) 5 mg, oral, Every 6 hours PRN    potassium chloride CR 10 mEq ER tablet 10 mEq, oral, Daily    turmeric 400 mg capsule Daily    vit A/vit C/vit E/zinc/copper (VITAMINS A,C,E-ZINC-COPPER ORAL) oral,  PreserVision AREDS CAPS Refills: 0 Active<BR>    vitamins A,C,E-zinc-copper (PreserVision AREDS) 4,296 mcg-226 mg-90 mg capsule oral        Diagnostic Results   Recent Labs  Results for orders placed or performed in visit on 06/11/24 (from the past 96 hour(s))   CBC and Auto Differential   Result Value Ref Range    WBC 3.7 (L) 4.4 - 11.3 x10*3/uL    nRBC 0.0 0.0 - 0.0 /100 WBCs    RBC 3.79 (L) 4.00 - 5.20 x10*6/uL    Hemoglobin 10.9 (L) 12.0 - 16.0 g/dL    Hematocrit 34.8 (L) 36.0 - 46.0 %    MCV 92 80 - 100 fL    MCH 28.8 26.0 - 34.0 pg    MCHC 31.3 (L) 32.0 - 36.0 g/dL    RDW 16.0 (H) 11.5 - 14.5 %    Platelets 257 150 - 450 x10*3/uL    Neutrophils % 57.9 40.0 - 80.0 %    Immature Granulocytes %, Automated 0.8 0.0 - 0.9 %    Lymphocytes % 27.3 13.0 - 44.0 %    Monocytes % 12.9 2.0 - 10.0 %    Eosinophils % 0.3 0.0 - 6.0 %    Basophils % 0.8 0.0 - 2.0 %    Neutrophils Absolute 2.16 1.60 - 5.50 x10*3/uL    Immature Granulocytes Absolute, Automated 0.03 0.00 - 0.50 x10*3/uL    Lymphocytes Absolute 1.02 0.80 - 3.00 x10*3/uL    Monocytes Absolute 0.48 0.05 - 0.80 x10*3/uL    Eosinophils Absolute 0.01 0.00 - 0.40 x10*3/uL    Basophils Absolute 0.03 0.00 - 0.10 x10*3/uL   Comprehensive metabolic panel   Result Value Ref Range    Glucose 87 74 - 99 mg/dL    Sodium 141 136 - 145 mmol/L    Potassium 3.7 3.5 - 5.3 mmol/L    Chloride 103 98 - 107 mmol/L    Bicarbonate 28 21 - 32 mmol/L    Anion Gap 14 10 - 20 mmol/L    Urea Nitrogen 9 6 - 23 mg/dL    Creatinine 0.99 0.50 - 1.05 mg/dL    eGFR 61 >60 mL/min/1.73m*2    Calcium 9.4 8.6 - 10.3 mg/dL    Albumin 3.8 3.4 - 5.0 g/dL    Alkaline Phosphatase 87 33 -  136 U/L    Total Protein 6.2 (L) 6.4 - 8.2 g/dL    AST 25 9 - 39 U/L    Bilirubin, Total 0.3 0.0 - 1.2 mg/dL    ALT 24 7 - 45 U/L       Genetics   Signatera 4/11/24 0.45     Pathology  Surgical Path 3/22/24   FINAL DIAGNOSIS   A. Soft tissue, mass, biopsy:    -- Involved by invasive carcinoma. See note.     Note: Patient's imaging finding of a soft tissue mass encasing the distal ureter and remote history of cervical cancer (per clinical notes) is noted. Histological sections show cores of fibroconnective tissue involved by infiltrative nests of high grade carcinoma cells, which are immunohistochemically positive for pancytokeratin AE1/AE3, CAM5.2, GATA3 and p63 and are negative for PAX8 and ER. The morphological and immunohistochemical findings are not entirely specific. Given the clinical context of a distal ureteral mass, this might represent invasive carcinoma of urothelial origin. Clinical correlation is recommended.     Recent Imaging   CT chest abdomen pelvis w IV contrast    Result Date: 5/22/2024  Impression: CHEST: 1. No intrathoracic metastatic disease.   ABDOMEN-PELVIS: 1. Redemonstrated distal left ureteric partially necrotic enhancing soft tissue mass consistent with the known invasive urothelial carcinoma measuring 3.6 x  3.0 x 2.7 cm with a stable relation to the adjacent iliac vessels. The mass has mildly decreased in size from prior MRI dated 03/11/2024 as it was measuring 4.0 x 3.9 x 3.7 cm allowing for differences in techniques. Left-sided double-J catheter noted in place with no significant hydronephrosis. Improved previously seen surrounding inflammatory changes. 2. No new concerning findings in the abdomen or pelvis. No new enlarged retroperitoneal or pelvic lymphadenopathy 3. Hepatic steatosis.     MACRO: None   Signed by: Александр Carrillo 5/22/2024 9:13 AM Dictation workstation:   [id number]      Assessment/Plan   Terra Alexis is a 73 y.o.  female with hx cervical cancer s/p  chemoRT 1998, now found with pelvic irresectable distal urothelial mass, bx proven UC. Now post EV/pembro with some tumor shrinkage (around 30%). Baseline signtera+. We will do 2 more cycles and likely reassess resectability with Dr Francis. Gabriel monitoring.      She presents in follow up today on EV + Pembro - continues to have side effects despite dose reduction. Mostly GI issues - diarrhea, food aversion, vomiting without nausea, reflux, cramping, gas pains. She has lost a significant amount of weight and has no appetite, taste changes. She has lactose intolerance - Boost may have been contributing to diarrhea. Diarrhea resolves with imodium but then she is bloated and backed up. She has grade 1 neuropathy in her fingers. Offered additional dose reduction today - patient declined. She is not using supportive meds for nausea or bladder spasms - I have stressed I think these will be helpful. Also recommended trying a half dose of imodium to prevent her from getting backed up. She may benefit from IVF, IV antiemetics, and IV steroids if her diarrhea/GI continue this cycle - discussed acute care clinic as an option for her and patient is agreeable to go to [facility] if needed. Patient may have continued issues with GI side effects - she is aware of the risk but would like to proceed with the same dose as last cycle to attempt to get enough tumor shrinkage to get to the OR.       Terra was seen today for follow-up.  Diagnoses and all orders for this visit:  Urothelial carcinoma (Multi) (Primary)  -     CBC and Auto Differential; Future  -     Comprehensive metabolic panel; Future  -     Clinic Appointment Request [facility]; [facility] [id number]    Treatment Plan:  Enfortumab vedotin + Pembrolizumab, 21 Day Cycles  Venous Access Orders  - Continue with C4 EV + Pembro   - Dose reduction due to toxicities EV to 1.125 mg/kg  - Scans again after C4   - Signatera Q6 weeks   - 1/2 tab of imodium at onset of  diarrhea  - Make own lactose free protein shakes  - Encouraged oxybutynin and antiemetics - patient resistant   - Oxycodone 5 mg every 6 hours as needed for abd pain - not taking  - monitor neuropathy   - Dietician consult - deferred - reconsider   - Great candidate for acute care if toxicities continue this cycle    Follow up in 3 weeks with MD and Cycle 5, labs and scans prior. T  Low threshold for evaluation in ACC if GI symptoms continue.     Patient verbalizes understanding of above plan. Time provided for patient's questions. All questions answered to patient's satisfaction in office. Patient instructed to reach out for any new concerning issues at [phone].    Soco Dietz MSN, APRN, A-GNP-C, OCN   Oncology   [facility] [place] [id number]   Epic Secure Chat   Phone: [phone]  [email or web address]

## 2024-06-11 ENCOUNTER — LAB (OUTPATIENT)
Dept: LAB | Facility: LAB | Age: 74
End: 2024-06-11
Payer: MEDICARE

## 2024-06-11 DIAGNOSIS — C68.9 UROTHELIAL CARCINOMA (MULTI): ICD-10-CM

## 2024-06-11 LAB
ALBUMIN SERPL BCP-MCNC: 3.8 G/DL (ref 3.4–5)
ALP SERPL-CCNC: 87 U/L (ref 33–136)
ALT SERPL W P-5'-P-CCNC: 24 U/L (ref 7–45)
ANION GAP SERPL CALC-SCNC: 14 MMOL/L (ref 10–20)
AST SERPL W P-5'-P-CCNC: 25 U/L (ref 9–39)
BASOPHILS # BLD AUTO: 0.03 X10*3/UL (ref 0–0.1)
BASOPHILS NFR BLD AUTO: 0.8 %
BILIRUB SERPL-MCNC: 0.3 MG/DL (ref 0–1.2)
BUN SERPL-MCNC: 9 MG/DL (ref 6–23)
CALCIUM SERPL-MCNC: 9.4 MG/DL (ref 8.6–10.3)
CHLORIDE SERPL-SCNC: 103 MMOL/L (ref 98–107)
CO2 SERPL-SCNC: 28 MMOL/L (ref 21–32)
CREAT SERPL-MCNC: 0.99 MG/DL (ref 0.5–1.05)
EGFRCR SERPLBLD CKD-EPI 2021: 61 ML/MIN/1.73M*2
EOSINOPHIL # BLD AUTO: 0.01 X10*3/UL (ref 0–0.4)
EOSINOPHIL NFR BLD AUTO: 0.3 %
ERYTHROCYTE [DISTWIDTH] IN BLOOD BY AUTOMATED COUNT: 16 % (ref 11.5–14.5)
GLUCOSE SERPL-MCNC: 87 MG/DL (ref 74–99)
HCT VFR BLD AUTO: 34.8 % (ref 36–46)
HGB BLD-MCNC: 10.9 G/DL (ref 12–16)
IMM GRANULOCYTES # BLD AUTO: 0.03 X10*3/UL (ref 0–0.5)
IMM GRANULOCYTES NFR BLD AUTO: 0.8 % (ref 0–0.9)
LYMPHOCYTES # BLD AUTO: 1.02 X10*3/UL (ref 0.8–3)
LYMPHOCYTES NFR BLD AUTO: 27.3 %
MCH RBC QN AUTO: 28.8 PG (ref 26–34)
MCHC RBC AUTO-ENTMCNC: 31.3 G/DL (ref 32–36)
MCV RBC AUTO: 92 FL (ref 80–100)
MONOCYTES # BLD AUTO: 0.48 X10*3/UL (ref 0.05–0.8)
MONOCYTES NFR BLD AUTO: 12.9 %
NEUTROPHILS # BLD AUTO: 2.16 X10*3/UL (ref 1.6–5.5)
NEUTROPHILS NFR BLD AUTO: 57.9 %
NRBC BLD-RTO: 0 /100 WBCS (ref 0–0)
PLATELET # BLD AUTO: 257 X10*3/UL (ref 150–450)
POTASSIUM SERPL-SCNC: 3.7 MMOL/L (ref 3.5–5.3)
PROT SERPL-MCNC: 6.2 G/DL (ref 6.4–8.2)
RBC # BLD AUTO: 3.79 X10*6/UL (ref 4–5.2)
SODIUM SERPL-SCNC: 141 MMOL/L (ref 136–145)
WBC # BLD AUTO: 3.7 X10*3/UL (ref 4.4–11.3)

## 2024-06-11 PROCEDURE — 85025 COMPLETE CBC W/AUTO DIFF WBC: CPT

## 2024-06-11 PROCEDURE — 80053 COMPREHEN METABOLIC PANEL: CPT

## 2024-06-11 PROCEDURE — 36415 COLL VENOUS BLD VENIPUNCTURE: CPT

## 2024-06-13 ENCOUNTER — INFUSION (OUTPATIENT)
Dept: HEMATOLOGY/ONCOLOGY | Facility: CLINIC | Age: 74
End: 2024-06-13
Payer: MEDICARE

## 2024-06-13 ENCOUNTER — OFFICE VISIT (OUTPATIENT)
Dept: HEMATOLOGY/ONCOLOGY | Facility: CLINIC | Age: 74
End: 2024-06-13
Payer: MEDICARE

## 2024-06-13 VITALS
BODY MASS INDEX: 26.52 KG/M2 | TEMPERATURE: 97.9 F | WEIGHT: 135.8 LBS | RESPIRATION RATE: 18 BRPM | OXYGEN SATURATION: 95 % | SYSTOLIC BLOOD PRESSURE: 138 MMHG | DIASTOLIC BLOOD PRESSURE: 85 MMHG | HEART RATE: 100 BPM

## 2024-06-13 DIAGNOSIS — C68.9 UROTHELIAL CARCINOMA (MULTI): ICD-10-CM

## 2024-06-13 DIAGNOSIS — C68.9 UROTHELIAL CARCINOMA (MULTI): Primary | ICD-10-CM

## 2024-06-13 PROCEDURE — 1125F AMNT PAIN NOTED PAIN PRSNT: CPT

## 2024-06-13 PROCEDURE — 2500000004 HC RX 250 GENERAL PHARMACY W/ HCPCS (ALT 636 FOR OP/ED)

## 2024-06-13 PROCEDURE — 96417 CHEMO IV INFUS EACH ADDL SEQ: CPT

## 2024-06-13 PROCEDURE — 96413 CHEMO IV INFUSION 1 HR: CPT

## 2024-06-13 PROCEDURE — 1036F TOBACCO NON-USER: CPT

## 2024-06-13 PROCEDURE — 1159F MED LIST DOCD IN RCRD: CPT

## 2024-06-13 PROCEDURE — 3075F SYST BP GE 130 - 139MM HG: CPT

## 2024-06-13 PROCEDURE — 3079F DIAST BP 80-89 MM HG: CPT

## 2024-06-13 PROCEDURE — 99215 OFFICE O/P EST HI 40 MIN: CPT | Mod: 25

## 2024-06-13 PROCEDURE — 1157F ADVNC CARE PLAN IN RCRD: CPT

## 2024-06-13 PROCEDURE — 1160F RVW MEDS BY RX/DR IN RCRD: CPT

## 2024-06-13 PROCEDURE — 2500000004 HC RX 250 GENERAL PHARMACY W/ HCPCS (ALT 636 FOR OP/ED): Mod: JZ,JG

## 2024-06-13 PROCEDURE — 99215 OFFICE O/P EST HI 40 MIN: CPT

## 2024-06-13 PROCEDURE — 96375 TX/PRO/DX INJ NEW DRUG ADDON: CPT | Mod: INF

## 2024-06-13 RX ORDER — PROCHLORPERAZINE MALEATE 10 MG
10 TABLET ORAL EVERY 6 HOURS PRN
Status: DISCONTINUED | OUTPATIENT
Start: 2024-06-13 | End: 2024-06-13 | Stop reason: HOSPADM

## 2024-06-13 RX ORDER — ONDANSETRON HYDROCHLORIDE 2 MG/ML
8 INJECTION, SOLUTION INTRAVENOUS ONCE
Status: CANCELLED | OUTPATIENT
Start: 2024-06-13

## 2024-06-13 RX ORDER — PROCHLORPERAZINE EDISYLATE 5 MG/ML
10 INJECTION INTRAMUSCULAR; INTRAVENOUS EVERY 6 HOURS PRN
OUTPATIENT
Start: 2024-06-20

## 2024-06-13 RX ORDER — FAMOTIDINE 10 MG/ML
20 INJECTION INTRAVENOUS ONCE AS NEEDED
Status: DISCONTINUED | OUTPATIENT
Start: 2024-06-13 | End: 2024-06-13 | Stop reason: HOSPADM

## 2024-06-13 RX ORDER — PROCHLORPERAZINE MALEATE 10 MG
10 TABLET ORAL EVERY 6 HOURS PRN
Status: CANCELLED | OUTPATIENT
Start: 2024-06-13

## 2024-06-13 RX ORDER — EPINEPHRINE 0.3 MG/.3ML
0.3 INJECTION SUBCUTANEOUS EVERY 5 MIN PRN
Status: DISCONTINUED | OUTPATIENT
Start: 2024-06-13 | End: 2024-06-13 | Stop reason: HOSPADM

## 2024-06-13 RX ORDER — PROCHLORPERAZINE EDISYLATE 5 MG/ML
10 INJECTION INTRAMUSCULAR; INTRAVENOUS EVERY 6 HOURS PRN
Status: DISCONTINUED | OUTPATIENT
Start: 2024-06-13 | End: 2024-06-13 | Stop reason: HOSPADM

## 2024-06-13 RX ORDER — ONDANSETRON HYDROCHLORIDE 2 MG/ML
8 INJECTION, SOLUTION INTRAVENOUS ONCE
OUTPATIENT
Start: 2024-06-20

## 2024-06-13 RX ORDER — ALBUTEROL SULFATE 0.83 MG/ML
3 SOLUTION RESPIRATORY (INHALATION) AS NEEDED
OUTPATIENT
Start: 2024-06-20

## 2024-06-13 RX ORDER — PROCHLORPERAZINE EDISYLATE 5 MG/ML
10 INJECTION INTRAMUSCULAR; INTRAVENOUS EVERY 6 HOURS PRN
Status: CANCELLED | OUTPATIENT
Start: 2024-06-13

## 2024-06-13 RX ORDER — ALBUTEROL SULFATE 0.83 MG/ML
3 SOLUTION RESPIRATORY (INHALATION) AS NEEDED
Status: DISCONTINUED | OUTPATIENT
Start: 2024-06-13 | End: 2024-06-13 | Stop reason: HOSPADM

## 2024-06-13 RX ORDER — EPINEPHRINE 0.3 MG/.3ML
0.3 INJECTION SUBCUTANEOUS EVERY 5 MIN PRN
Status: CANCELLED | OUTPATIENT
Start: 2024-06-13

## 2024-06-13 RX ORDER — EPINEPHRINE 0.3 MG/.3ML
0.3 INJECTION SUBCUTANEOUS EVERY 5 MIN PRN
OUTPATIENT
Start: 2024-06-20

## 2024-06-13 RX ORDER — PROCHLORPERAZINE MALEATE 10 MG
10 TABLET ORAL EVERY 6 HOURS PRN
OUTPATIENT
Start: 2024-06-20

## 2024-06-13 RX ORDER — DIPHENHYDRAMINE HYDROCHLORIDE 50 MG/ML
50 INJECTION INTRAMUSCULAR; INTRAVENOUS AS NEEDED
OUTPATIENT
Start: 2024-06-20

## 2024-06-13 RX ORDER — ALBUTEROL SULFATE 0.83 MG/ML
3 SOLUTION RESPIRATORY (INHALATION) AS NEEDED
Status: CANCELLED | OUTPATIENT
Start: 2024-06-13

## 2024-06-13 RX ORDER — FAMOTIDINE 10 MG/ML
20 INJECTION INTRAVENOUS ONCE AS NEEDED
Status: CANCELLED | OUTPATIENT
Start: 2024-06-13

## 2024-06-13 RX ORDER — DIPHENHYDRAMINE HYDROCHLORIDE 50 MG/ML
50 INJECTION INTRAMUSCULAR; INTRAVENOUS AS NEEDED
Status: DISCONTINUED | OUTPATIENT
Start: 2024-06-13 | End: 2024-06-13 | Stop reason: HOSPADM

## 2024-06-13 RX ORDER — FAMOTIDINE 10 MG/ML
20 INJECTION INTRAVENOUS ONCE AS NEEDED
OUTPATIENT
Start: 2024-06-20

## 2024-06-13 RX ORDER — DIPHENHYDRAMINE HYDROCHLORIDE 50 MG/ML
50 INJECTION INTRAMUSCULAR; INTRAVENOUS AS NEEDED
Status: CANCELLED | OUTPATIENT
Start: 2024-06-13

## 2024-06-13 RX ORDER — ONDANSETRON HYDROCHLORIDE 2 MG/ML
8 INJECTION, SOLUTION INTRAVENOUS ONCE
Status: COMPLETED | OUTPATIENT
Start: 2024-06-13 | End: 2024-06-13

## 2024-06-13 ASSESSMENT — ENCOUNTER SYMPTOMS
DIARRHEA: 1
FATIGUE: 1
DEPRESSION: 0
EYE PROBLEMS: 1
LOSS OF SENSATION IN FEET: 0
CONSTIPATION: 1
OCCASIONAL FEELINGS OF UNSTEADINESS: 0
ABDOMINAL PAIN: 1
VOMITING: 1
BLOOD IN STOOL: 0

## 2024-06-13 ASSESSMENT — PAIN SCALES - GENERAL: PAINLEVEL: 5

## 2024-06-13 NOTE — SIGNIFICANT EVENT
06/13/24 1129   Prechemo Checklist   Has the patient been in the hospital, ED, or urgent care since last date of service No   Chemo/Immuno Consent Completed and Signed Yes   Protocol/Indications Verified Yes   Confirmed to previous date/time of medication Yes   Compared to previous dose Yes   Pregnancy Test Negative Not applicable   Parameters Met Yes   BSA/Weight-Height Verified Yes   Dose Calculations Verified (current, total, cumulative) Yes

## 2024-06-14 ENCOUNTER — TELEPHONE (OUTPATIENT)
Dept: ADMISSION | Facility: HOSPITAL | Age: 74
End: 2024-06-14
Payer: MEDICARE

## 2024-06-14 NOTE — TELEPHONE ENCOUNTER
The patient called back to confirm with Jo-Ann that she can do the CT and MD visit on 7/1.  Message routed to Jo-Ann as a confirmation

## 2024-06-19 ENCOUNTER — LAB (OUTPATIENT)
Dept: LAB | Facility: LAB | Age: 74
End: 2024-06-19
Payer: MEDICARE

## 2024-06-19 DIAGNOSIS — C68.9 UROTHELIAL CARCINOMA (MULTI): ICD-10-CM

## 2024-06-19 LAB
BASOPHILS # BLD AUTO: 0.04 X10*3/UL (ref 0–0.1)
BASOPHILS NFR BLD AUTO: 1 %
EOSINOPHIL # BLD AUTO: 0 X10*3/UL (ref 0–0.4)
EOSINOPHIL NFR BLD AUTO: 0 %
ERYTHROCYTE [DISTWIDTH] IN BLOOD BY AUTOMATED COUNT: 16.3 % (ref 11.5–14.5)
GLUCOSE SERPL-MCNC: 99 MG/DL (ref 74–99)
HCT VFR BLD AUTO: 38.1 % (ref 36–46)
HGB BLD-MCNC: 11.9 G/DL (ref 12–16)
IMM GRANULOCYTES # BLD AUTO: 0.03 X10*3/UL (ref 0–0.5)
IMM GRANULOCYTES NFR BLD AUTO: 0.7 % (ref 0–0.9)
LYMPHOCYTES # BLD AUTO: 1.01 X10*3/UL (ref 0.8–3)
LYMPHOCYTES NFR BLD AUTO: 24.8 %
MCH RBC QN AUTO: 29.5 PG (ref 26–34)
MCHC RBC AUTO-ENTMCNC: 31.2 G/DL (ref 32–36)
MCV RBC AUTO: 94 FL (ref 80–100)
MONOCYTES # BLD AUTO: 0.58 X10*3/UL (ref 0.05–0.8)
MONOCYTES NFR BLD AUTO: 14.3 %
NEUTROPHILS # BLD AUTO: 2.41 X10*3/UL (ref 1.6–5.5)
NEUTROPHILS NFR BLD AUTO: 59.2 %
NRBC BLD-RTO: 0 /100 WBCS (ref 0–0)
PLATELET # BLD AUTO: 269 X10*3/UL (ref 150–450)
RBC # BLD AUTO: 4.04 X10*6/UL (ref 4–5.2)
WBC # BLD AUTO: 4.1 X10*3/UL (ref 4.4–11.3)

## 2024-06-19 PROCEDURE — 36415 COLL VENOUS BLD VENIPUNCTURE: CPT

## 2024-06-19 PROCEDURE — 82947 ASSAY GLUCOSE BLOOD QUANT: CPT

## 2024-06-19 PROCEDURE — 85025 COMPLETE CBC W/AUTO DIFF WBC: CPT

## 2024-06-20 ENCOUNTER — INFUSION (OUTPATIENT)
Dept: HEMATOLOGY/ONCOLOGY | Facility: CLINIC | Age: 74
End: 2024-06-20
Payer: MEDICARE

## 2024-06-20 VITALS
RESPIRATION RATE: 16 BRPM | SYSTOLIC BLOOD PRESSURE: 130 MMHG | DIASTOLIC BLOOD PRESSURE: 77 MMHG | TEMPERATURE: 97.9 F | WEIGHT: 130.29 LBS | HEART RATE: 118 BPM | BODY MASS INDEX: 25.45 KG/M2

## 2024-06-20 DIAGNOSIS — C68.9 UROTHELIAL CARCINOMA (MULTI): ICD-10-CM

## 2024-06-20 PROCEDURE — 96413 CHEMO IV INFUSION 1 HR: CPT

## 2024-06-20 PROCEDURE — 96375 TX/PRO/DX INJ NEW DRUG ADDON: CPT | Mod: INF

## 2024-06-20 PROCEDURE — 2500000004 HC RX 250 GENERAL PHARMACY W/ HCPCS (ALT 636 FOR OP/ED)

## 2024-06-20 RX ORDER — ONDANSETRON HYDROCHLORIDE 2 MG/ML
8 INJECTION, SOLUTION INTRAVENOUS ONCE
Status: COMPLETED | OUTPATIENT
Start: 2024-06-20 | End: 2024-06-20

## 2024-06-20 RX ORDER — ALBUTEROL SULFATE 0.83 MG/ML
3 SOLUTION RESPIRATORY (INHALATION) AS NEEDED
Status: DISCONTINUED | OUTPATIENT
Start: 2024-06-20 | End: 2024-06-20 | Stop reason: HOSPADM

## 2024-06-20 RX ORDER — PROCHLORPERAZINE EDISYLATE 5 MG/ML
10 INJECTION INTRAMUSCULAR; INTRAVENOUS EVERY 6 HOURS PRN
Status: DISCONTINUED | OUTPATIENT
Start: 2024-06-20 | End: 2024-06-20 | Stop reason: HOSPADM

## 2024-06-20 RX ORDER — EPINEPHRINE 0.3 MG/.3ML
0.3 INJECTION SUBCUTANEOUS EVERY 5 MIN PRN
Status: DISCONTINUED | OUTPATIENT
Start: 2024-06-20 | End: 2024-06-20 | Stop reason: HOSPADM

## 2024-06-20 RX ORDER — FAMOTIDINE 10 MG/ML
20 INJECTION INTRAVENOUS ONCE AS NEEDED
Status: DISCONTINUED | OUTPATIENT
Start: 2024-06-20 | End: 2024-06-20 | Stop reason: HOSPADM

## 2024-06-20 RX ORDER — PROCHLORPERAZINE MALEATE 10 MG
10 TABLET ORAL EVERY 6 HOURS PRN
Status: DISCONTINUED | OUTPATIENT
Start: 2024-06-20 | End: 2024-06-20 | Stop reason: HOSPADM

## 2024-06-20 RX ORDER — DIPHENHYDRAMINE HYDROCHLORIDE 50 MG/ML
50 INJECTION INTRAMUSCULAR; INTRAVENOUS AS NEEDED
Status: DISCONTINUED | OUTPATIENT
Start: 2024-06-20 | End: 2024-06-20 | Stop reason: HOSPADM

## 2024-06-20 ASSESSMENT — PAIN SCALES - GENERAL: PAINLEVEL: 6

## 2024-06-27 ENCOUNTER — APPOINTMENT (OUTPATIENT)
Dept: RADIOLOGY | Facility: HOSPITAL | Age: 74
End: 2024-06-27
Payer: MEDICARE

## 2024-07-01 ENCOUNTER — HOSPITAL ENCOUNTER (OUTPATIENT)
Dept: RADIOLOGY | Facility: CLINIC | Age: 74
Discharge: HOME | End: 2024-07-01
Payer: MEDICARE

## 2024-07-01 ENCOUNTER — OFFICE VISIT (OUTPATIENT)
Dept: HEMATOLOGY/ONCOLOGY | Facility: CLINIC | Age: 74
End: 2024-07-01
Payer: MEDICARE

## 2024-07-01 VITALS
TEMPERATURE: 96.4 F | BODY MASS INDEX: 24.41 KG/M2 | OXYGEN SATURATION: 96 % | RESPIRATION RATE: 18 BRPM | DIASTOLIC BLOOD PRESSURE: 75 MMHG | WEIGHT: 125 LBS | HEART RATE: 117 BPM | SYSTOLIC BLOOD PRESSURE: 135 MMHG

## 2024-07-01 DIAGNOSIS — R19.7 DIARRHEA, UNSPECIFIED TYPE: ICD-10-CM

## 2024-07-01 DIAGNOSIS — C68.9 UROTHELIAL CARCINOMA (MULTI): ICD-10-CM

## 2024-07-01 DIAGNOSIS — T45.1X5A CHEMOTHERAPY INDUCED NAUSEA AND VOMITING: Primary | ICD-10-CM

## 2024-07-01 DIAGNOSIS — R11.2 CHEMOTHERAPY INDUCED NAUSEA AND VOMITING: Primary | ICD-10-CM

## 2024-07-01 DIAGNOSIS — C68.9 UROTHELIAL CARCINOMA (MULTI): Primary | ICD-10-CM

## 2024-07-01 PROCEDURE — 3075F SYST BP GE 130 - 139MM HG: CPT | Performed by: STUDENT IN AN ORGANIZED HEALTH CARE EDUCATION/TRAINING PROGRAM

## 2024-07-01 PROCEDURE — 99215 OFFICE O/P EST HI 40 MIN: CPT | Performed by: STUDENT IN AN ORGANIZED HEALTH CARE EDUCATION/TRAINING PROGRAM

## 2024-07-01 PROCEDURE — 2550000001 HC RX 255 CONTRASTS

## 2024-07-01 PROCEDURE — 3078F DIAST BP <80 MM HG: CPT | Performed by: STUDENT IN AN ORGANIZED HEALTH CARE EDUCATION/TRAINING PROGRAM

## 2024-07-01 PROCEDURE — 74177 CT ABD & PELVIS W/CONTRAST: CPT | Performed by: RADIOLOGY

## 2024-07-01 PROCEDURE — 1126F AMNT PAIN NOTED NONE PRSNT: CPT | Performed by: STUDENT IN AN ORGANIZED HEALTH CARE EDUCATION/TRAINING PROGRAM

## 2024-07-01 PROCEDURE — 71260 CT THORAX DX C+: CPT | Performed by: RADIOLOGY

## 2024-07-01 PROCEDURE — 74177 CT ABD & PELVIS W/CONTRAST: CPT

## 2024-07-01 PROCEDURE — 1157F ADVNC CARE PLAN IN RCRD: CPT | Performed by: STUDENT IN AN ORGANIZED HEALTH CARE EDUCATION/TRAINING PROGRAM

## 2024-07-01 RX ORDER — ONDANSETRON HYDROCHLORIDE 8 MG/1
8 TABLET, FILM COATED ORAL 2 TIMES DAILY PRN
Qty: 30 TABLET | Refills: 1 | Status: SHIPPED | OUTPATIENT
Start: 2024-07-01 | End: 2024-07-22

## 2024-07-01 RX ORDER — DIPHENOXYLATE HYDROCHLORIDE AND ATROPINE SULFATE 2.5; .025 MG/1; MG/1
1 TABLET ORAL 4 TIMES DAILY PRN
Qty: 20 TABLET | Refills: 2 | Status: SHIPPED | OUTPATIENT
Start: 2024-07-01 | End: 2024-07-16

## 2024-07-01 ASSESSMENT — ENCOUNTER SYMPTOMS
SLEEP DISTURBANCE: 1
ABDOMINAL PAIN: 1
LIGHT-HEADEDNESS: 0
DIZZINESS: 0
LEG SWELLING: 0
BACK PAIN: 1
CONSTIPATION: 1
CHILLS: 0
EYE PROBLEMS: 1
FEVER: 0
MYALGIAS: 0
BLOOD IN STOOL: 0
NECK PAIN: 0
NAUSEA: 0
NUMBNESS: 1
APPETITE CHANGE: 1
ARTHRALGIAS: 1
VOMITING: 1
ENDOCRINE NEGATIVE: 1
WOUND: 0
COUGH: 0
DIFFICULTY URINATING: 0
DYSURIA: 1
FATIGUE: 1
SHORTNESS OF BREATH: 0
HEMATOLOGIC/LYMPHATIC NEGATIVE: 1
HEMATURIA: 0
FLANK PAIN: 1
UNEXPECTED WEIGHT CHANGE: 1
DIARRHEA: 1
HEADACHES: 0

## 2024-07-01 ASSESSMENT — PAIN SCALES - GENERAL: PAINLEVEL: 0-NO PAIN

## 2024-07-01 NOTE — PATIENT INSTRUCTIONS
Labs before Friday.  Schedule MRI to be completed in 2-3 weeks.  Bring [employer] lab kit to infusion nurse on 7/5/24

## 2024-07-01 NOTE — PROGRESS NOTES
Patient ID: Terra Alexis is a 73 y.o. female.  Diagnosis:  irresectable UC   MedOnc: Dr. Boone   Urologist: Dr. Ross  Gyn: Dr. Nathan Noyola - [facility]     Patient Care Team:  No Assigned Pcp Generic Provider, MD as PCP - General (General Practice)  Shannon Rodriguez MD as PCP - [facility] ACO Attributed Provider  Elgin Boone MD as Consulting Physician (Hematology and Oncology)    Current Therapy: EV + Pembro    ONCOLOGIC HISTORY  No matching staging information was found for the patient.    Patient is referred by Dr. Ross for recently diagnosed invasive carcinoma, may represent urothelial origin vs h/o cervical cancer. Patient presented to the ER in February 2024 with c/o flank pain imaging revealed soft tissue mass encasing the distal segment of the left ureter. Ureteral biopsy performed in IR on 03/22/2024 revealed the above diagnosis.      1998 Cervical cancer, tx Chemoradiation, with weekly cisplatin, and radiation completed on 1/4/99.   2/1999 Total abdominal hysterectomy and bilateral salpingo-oophorectomy, with no residual carcinoma found.   1/23/08 Category 1 mammogram  11/25/08 LGSIL ANTHONY 1 consistent with HPV effect  1/13/09 MID VAGINAL APEX, BIOPSY: MILD VAGINAL INTRAEPITHELIAL NEOPLASIA.  03/2024 - pelvic irresectable distal urothelial mass, bx proven UC  4/11/24 - Cycle 1 EV + Pembro  5/1/24 - Cycle 2 EV + Pembro  5/14/24 - sick visit call  5/23/24 - Scans show CO. C3 EV (reduced to 1.125 mg/kg)+pembro   6/13/24 - C4 EV (1.125 mg/kg) + Pembro  7/5/24 - C5 EV pembro     Oncology History   Urothelial carcinoma (Multi)   4/3/2024 Initial Diagnosis    Urothelial carcinoma (CMS/HCC)     4/11/2024 -  Chemotherapy    Enfortumab vedotin + Pembrolizumab, 21 Day Cycles        Other Contributing History  Cervical cancer - s/p chemoradiation and surgery    Subjective      Interval History:  Terra Alexis is a 73 y.o. female who presents today for follow up of UC. Patient of Dr. Boone currently on EV + Pembro.  After cycle 3 she has had many GI symptoms. Diarrhea, vomiting, no taste, acid reflux, can't standing cooking or smells, abd cramping. Diarrhea relieved with imodium but then bloated and backed up, feels gassy but can't pass it and then has diarrhea again. Continues to lost weight and struggles to eat. Appetite is gone and taste is bad. Lactose intolerant and boost may be contributing to diarrhea. She noticed numbness in her finger tips and a little in her big toes. Still able to button buttons, not painful. She has had some blood tinged urine intermittently. She feels like she has a sunburn on her arms and thighs, no rash, not hot or painful just soreness. Her eyes are itchy. She denies signs of infection including UTI symptoms. No issues with breathing, cough. Stent bothers her and bladder pain - not taking any supportive medications.     Review of Systems   Constitutional:  Positive for appetite change, fatigue and unexpected weight change. Negative for chills and fever.   HENT:  Negative.     Eyes:  Positive for eye problems.   Respiratory:  Negative for cough and shortness of breath.    Cardiovascular:  Negative for chest pain and leg swelling.   Gastrointestinal:  Positive for abdominal pain, constipation, diarrhea and vomiting. Negative for blood in stool and nausea.        Reflux, food aversion, bloating   Endocrine: Negative.    Genitourinary:  Positive for dysuria and pelvic pain. Negative for difficulty urinating, hematuria, vaginal bleeding and vaginal discharge.         Related to stent, pelvic cramping   Musculoskeletal:  Positive for arthralgias, back pain and flank pain. Negative for myalgias and neck pain.   Skin:  Negative for itching, rash and wound.        Soreness on arms and thighs   Neurological:  Positive for numbness. Negative for dizziness, headaches and light-headedness.   Hematological: Negative.    Psychiatric/Behavioral:  Positive for sleep disturbance.      Objective      There were no  vitals taken for this visit.  BSA: There is no height or weight on file to calculate BSA.    Wt Readings from Last 5 Encounters:   06/20/24 59.1 kg (130 lb 4.7 oz)   06/13/24 61.6 kg (135 lb 12.9 oz)   05/30/24 63.5 kg (139 lb 15.9 oz)   05/23/24 63.8 kg (140 lb 10.5 oz)   05/08/24 66 kg (145 lb 8.1 oz)     Performance Status:  ECOG Score: 0- Fully active, able to carry on all pre-disease performance w/o restriction.  Karnofsky Score: 90 - Able to carry on normal activity; minor signs or symptoms of disease     Physical Exam  Physical Exam  Constitutional:       General: She is not in acute distress.     Appearance: Normal appearance. She is not toxic-appearing.   HENT:      Head: Normocephalic and atraumatic.      Mouth/Throat:      Mouth: Mucous membranes are moist.      Pharynx: Oropharynx is clear.   Eyes:      Pupils: Pupils are equal, round, and reactive to light.   Pulmonary:      Effort: Pulmonary effort is normal.   Musculoskeletal:         General: Normal range of motion.      Cervical back: Normal range of motion.      Right lower leg: No edema.      Left lower leg: No edema.   Skin:     General: Skin is warm and dry.      Findings: No rash.   Neurological:      General: No focal deficit present.      Mental Status: She is alert and oriented to person, place, and time.      Motor: No weakness.   Psychiatric:         Mood and Affect: Mood normal.         Behavior: Behavior normal.         Thought Content: Thought content normal.         Judgment: Judgment normal.     Allergies  Allergies   Allergen Reactions    Codeine GI Upset, Other and Nausea/vomiting    Percocet [Oxycodone-Acetaminophen] Dizziness and Nausea/vomiting      Medications  Current Outpatient Medications   Medication Instructions    cartilage/collagen/bor/hyalur (JOINT HEALTH ORAL) oral,  Joint Health CAPS Refills: 0 Active<BR>    docusate sodium (COLACE) 100 mg, oral, 2 times daily    ondansetron (ZOFRAN) 8 mg, oral, Every 8 hours PRN     oxybutynin XL (DITROPAN-XL) 10 mg, oral, Daily, Do not crush, chew, or split.    oxyCODONE (ROXICODONE) 5 mg, oral, Every 6 hours PRN    potassium chloride CR 10 mEq ER tablet 10 mEq, oral, Daily    turmeric 400 mg capsule Daily    vit A/vit C/vit E/zinc/copper (VITAMINS A,C,E-ZINC-COPPER ORAL) oral,  PreserVision AREDS CAPS Refills: 0 Active<BR>    vitamins A,C,E-zinc-copper (PreserVision AREDS) 4,296 mcg-226 mg-90 mg capsule oral        Diagnostic Results   Recent Labs  No results found for this or any previous visit (from the past 96 hour(s)).      Genetics   Signatera 4/11/24 0.45     Pathology  Surgical Path 3/22/24   FINAL DIAGNOSIS   A. Soft tissue, mass, biopsy:    -- Involved by invasive carcinoma. See note.     Note: Patient's imaging finding of a soft tissue mass encasing the distal ureter and remote history of cervical cancer (per clinical notes) is noted. Histological sections show cores of fibroconnective tissue involved by infiltrative nests of high grade carcinoma cells, which are immunohistochemically positive for pancytokeratin AE1/AE3, CAM5.2, GATA3 and p63 and are negative for PAX8 and ER. The morphological and immunohistochemical findings are not entirely specific. Given the clinical context of a distal ureteral mass, this might represent invasive carcinoma of urothelial origin. Clinical correlation is recommended.     Recent Imaging   CT chest abdomen pelvis w IV contrast    Result Date: 5/22/2024  Impression: CHEST: 1. No intrathoracic metastatic disease.   ABDOMEN-PELVIS: 1. Redemonstrated distal left ureteric partially necrotic enhancing soft tissue mass consistent with the known invasive urothelial carcinoma measuring 3.6 x  3.0 x 2.7 cm with a stable relation to the adjacent iliac vessels. The mass has mildly decreased in size from prior MRI dated 03/11/2024 as it was measuring 4.0 x 3.9 x 3.7 cm allowing for differences in techniques. Left-sided double-J catheter noted in place with no  significant hydronephrosis. Improved previously seen surrounding inflammatory changes. 2. No new concerning findings in the abdomen or pelvis. No new enlarged retroperitoneal or pelvic lymphadenopathy 3. Hepatic steatosis.     MACRO: None   Signed by: Александр Carrillo 5/22/2024 9:13 AM Dictation workstation:   [id number]      Assessment/Plan   Terra Alexis is a 73 y.o.  female with hx cervical cancer s/p chemoRT 1998, now found with pelvic irresectable distal urothelial mass, bx proven UC. Now post EV/pembro with tumor shrinkage (around 30%). Baseline signtera+. We will do 2 more cycles and likely reassess resectability with Dr Francis. Signatera monitoring.    C5 and will get MRI pelvis to assess with Dr Francis resectability. Some labs pending   I saw and evaluated the patient. I personally obtained the key and critical portions of the history and physical exam or was physically present for key and critical portions performed by the resident/fellow. I reviewed the resident/fellow's documentation and discussed the patient with the resident/fellow. I agree with the resident/fellow's medical decision making as documented in the note.   Elgin Boone MD MSc FACP  [facility] [facility] in Cancer Research   in Medicine [facility] [employer]  Director Clinical  Medical Oncology Research Program   [facility]  Cell [phone]  Office [phone]

## 2024-07-02 ENCOUNTER — LAB (OUTPATIENT)
Dept: LAB | Facility: LAB | Age: 74
End: 2024-07-02
Payer: MEDICARE

## 2024-07-02 DIAGNOSIS — C68.9 UROTHELIAL CARCINOMA (MULTI): ICD-10-CM

## 2024-07-02 LAB
ALBUMIN SERPL BCP-MCNC: 4 G/DL (ref 3.4–5)
ALP SERPL-CCNC: 77 U/L (ref 33–136)
ALT SERPL W P-5'-P-CCNC: 20 U/L (ref 7–45)
ANION GAP SERPL CALC-SCNC: 18 MMOL/L (ref 10–20)
AST SERPL W P-5'-P-CCNC: 25 U/L (ref 9–39)
BASOPHILS # BLD AUTO: 0.04 X10*3/UL (ref 0–0.1)
BASOPHILS NFR BLD AUTO: 1.1 %
BILIRUB SERPL-MCNC: 0.5 MG/DL (ref 0–1.2)
BUN SERPL-MCNC: 13 MG/DL (ref 6–23)
CALCIUM SERPL-MCNC: 9.6 MG/DL (ref 8.6–10.3)
CHLORIDE SERPL-SCNC: 100 MMOL/L (ref 98–107)
CO2 SERPL-SCNC: 23 MMOL/L (ref 21–32)
CREAT SERPL-MCNC: 1.1 MG/DL (ref 0.5–1.05)
EGFRCR SERPLBLD CKD-EPI 2021: 53 ML/MIN/1.73M*2
EOSINOPHIL # BLD AUTO: 0 X10*3/UL (ref 0–0.4)
EOSINOPHIL NFR BLD AUTO: 0 %
ERYTHROCYTE [DISTWIDTH] IN BLOOD BY AUTOMATED COUNT: 16.6 % (ref 11.5–14.5)
GLUCOSE SERPL-MCNC: 84 MG/DL (ref 74–99)
HCT VFR BLD AUTO: 36.5 % (ref 36–46)
HGB BLD-MCNC: 11.3 G/DL (ref 12–16)
IMM GRANULOCYTES # BLD AUTO: 0.05 X10*3/UL (ref 0–0.5)
IMM GRANULOCYTES NFR BLD AUTO: 1.3 % (ref 0–0.9)
LYMPHOCYTES # BLD AUTO: 0.87 X10*3/UL (ref 0.8–3)
LYMPHOCYTES NFR BLD AUTO: 23.3 %
MCH RBC QN AUTO: 29 PG (ref 26–34)
MCHC RBC AUTO-ENTMCNC: 31 G/DL (ref 32–36)
MCV RBC AUTO: 94 FL (ref 80–100)
MONOCYTES # BLD AUTO: 0.41 X10*3/UL (ref 0.05–0.8)
MONOCYTES NFR BLD AUTO: 11 %
NEUTROPHILS # BLD AUTO: 2.37 X10*3/UL (ref 1.6–5.5)
NEUTROPHILS NFR BLD AUTO: 63.3 %
NRBC BLD-RTO: 0 /100 WBCS (ref 0–0)
PLATELET # BLD AUTO: 302 X10*3/UL (ref 150–450)
POTASSIUM SERPL-SCNC: 3.2 MMOL/L (ref 3.5–5.3)
PROT SERPL-MCNC: 6.7 G/DL (ref 6.4–8.2)
RBC # BLD AUTO: 3.9 X10*6/UL (ref 4–5.2)
SODIUM SERPL-SCNC: 138 MMOL/L (ref 136–145)
T4 FREE SERPL-MCNC: 1.38 NG/DL (ref 0.61–1.12)
TSH SERPL-ACNC: 0.02 MIU/L (ref 0.44–3.98)
WBC # BLD AUTO: 3.7 X10*3/UL (ref 4.4–11.3)

## 2024-07-02 PROCEDURE — 36415 COLL VENOUS BLD VENIPUNCTURE: CPT

## 2024-07-02 PROCEDURE — 84439 ASSAY OF FREE THYROXINE: CPT

## 2024-07-02 PROCEDURE — 85025 COMPLETE CBC W/AUTO DIFF WBC: CPT

## 2024-07-02 PROCEDURE — 84443 ASSAY THYROID STIM HORMONE: CPT

## 2024-07-02 PROCEDURE — 80053 COMPREHEN METABOLIC PANEL: CPT

## 2024-07-03 RX ORDER — ALBUTEROL SULFATE 0.83 MG/ML
3 SOLUTION RESPIRATORY (INHALATION) AS NEEDED
OUTPATIENT
Start: 2024-07-12

## 2024-07-03 RX ORDER — PROCHLORPERAZINE EDISYLATE 5 MG/ML
10 INJECTION INTRAMUSCULAR; INTRAVENOUS EVERY 6 HOURS PRN
OUTPATIENT
Start: 2024-07-12

## 2024-07-03 RX ORDER — DIPHENHYDRAMINE HYDROCHLORIDE 50 MG/ML
50 INJECTION INTRAMUSCULAR; INTRAVENOUS AS NEEDED
OUTPATIENT
Start: 2024-07-12

## 2024-07-03 RX ORDER — ONDANSETRON HYDROCHLORIDE 2 MG/ML
8 INJECTION, SOLUTION INTRAVENOUS ONCE
OUTPATIENT
Start: 2024-07-12

## 2024-07-03 RX ORDER — ONDANSETRON HYDROCHLORIDE 2 MG/ML
8 INJECTION, SOLUTION INTRAVENOUS ONCE
Status: CANCELLED | OUTPATIENT
Start: 2024-07-05

## 2024-07-03 RX ORDER — PROCHLORPERAZINE EDISYLATE 5 MG/ML
10 INJECTION INTRAMUSCULAR; INTRAVENOUS EVERY 6 HOURS PRN
Status: CANCELLED | OUTPATIENT
Start: 2024-07-05

## 2024-07-03 RX ORDER — EPINEPHRINE 0.3 MG/.3ML
0.3 INJECTION SUBCUTANEOUS EVERY 5 MIN PRN
OUTPATIENT
Start: 2024-07-12

## 2024-07-03 RX ORDER — FAMOTIDINE 10 MG/ML
20 INJECTION INTRAVENOUS ONCE AS NEEDED
Status: CANCELLED | OUTPATIENT
Start: 2024-07-05

## 2024-07-03 RX ORDER — FAMOTIDINE 10 MG/ML
20 INJECTION INTRAVENOUS ONCE AS NEEDED
OUTPATIENT
Start: 2024-07-12

## 2024-07-03 RX ORDER — EPINEPHRINE 0.3 MG/.3ML
0.3 INJECTION SUBCUTANEOUS EVERY 5 MIN PRN
Status: CANCELLED | OUTPATIENT
Start: 2024-07-05

## 2024-07-03 RX ORDER — PROCHLORPERAZINE MALEATE 10 MG
10 TABLET ORAL EVERY 6 HOURS PRN
OUTPATIENT
Start: 2024-07-12

## 2024-07-03 RX ORDER — PROCHLORPERAZINE MALEATE 10 MG
10 TABLET ORAL EVERY 6 HOURS PRN
Status: CANCELLED | OUTPATIENT
Start: 2024-07-05

## 2024-07-03 RX ORDER — ALBUTEROL SULFATE 0.83 MG/ML
3 SOLUTION RESPIRATORY (INHALATION) AS NEEDED
Status: CANCELLED | OUTPATIENT
Start: 2024-07-05

## 2024-07-03 RX ORDER — DIPHENHYDRAMINE HYDROCHLORIDE 50 MG/ML
50 INJECTION INTRAMUSCULAR; INTRAVENOUS AS NEEDED
Status: CANCELLED | OUTPATIENT
Start: 2024-07-05

## 2024-07-05 ENCOUNTER — TELEPHONE (OUTPATIENT)
Dept: HEMATOLOGY/ONCOLOGY | Facility: HOSPITAL | Age: 74
End: 2024-07-05

## 2024-07-05 ENCOUNTER — INFUSION (OUTPATIENT)
Dept: HEMATOLOGY/ONCOLOGY | Facility: CLINIC | Age: 74
End: 2024-07-05
Payer: MEDICARE

## 2024-07-05 ENCOUNTER — NUTRITION (OUTPATIENT)
Dept: HEMATOLOGY/ONCOLOGY | Facility: CLINIC | Age: 74
End: 2024-07-05

## 2024-07-05 VITALS
DIASTOLIC BLOOD PRESSURE: 70 MMHG | RESPIRATION RATE: 16 BRPM | SYSTOLIC BLOOD PRESSURE: 123 MMHG | WEIGHT: 123.3 LBS | TEMPERATURE: 97.3 F | BODY MASS INDEX: 24.08 KG/M2 | HEART RATE: 87 BPM

## 2024-07-05 VITALS — WEIGHT: 123.24 LBS | HEIGHT: 60 IN | BODY MASS INDEX: 24.19 KG/M2

## 2024-07-05 DIAGNOSIS — C68.9 UROTHELIAL CARCINOMA (MULTI): ICD-10-CM

## 2024-07-05 PROCEDURE — 96375 TX/PRO/DX INJ NEW DRUG ADDON: CPT | Mod: INF

## 2024-07-05 PROCEDURE — 96413 CHEMO IV INFUSION 1 HR: CPT

## 2024-07-05 PROCEDURE — 2500000004 HC RX 250 GENERAL PHARMACY W/ HCPCS (ALT 636 FOR OP/ED): Mod: JZ,JG | Performed by: STUDENT IN AN ORGANIZED HEALTH CARE EDUCATION/TRAINING PROGRAM

## 2024-07-05 PROCEDURE — 2500000004 HC RX 250 GENERAL PHARMACY W/ HCPCS (ALT 636 FOR OP/ED): Performed by: STUDENT IN AN ORGANIZED HEALTH CARE EDUCATION/TRAINING PROGRAM

## 2024-07-05 PROCEDURE — 96417 CHEMO IV INFUS EACH ADDL SEQ: CPT

## 2024-07-05 RX ORDER — ONDANSETRON HYDROCHLORIDE 2 MG/ML
8 INJECTION, SOLUTION INTRAVENOUS ONCE
Status: COMPLETED | OUTPATIENT
Start: 2024-07-05 | End: 2024-07-05

## 2024-07-05 RX ORDER — EPINEPHRINE 0.3 MG/.3ML
0.3 INJECTION SUBCUTANEOUS EVERY 5 MIN PRN
Status: DISCONTINUED | OUTPATIENT
Start: 2024-07-05 | End: 2024-07-05 | Stop reason: HOSPADM

## 2024-07-05 RX ORDER — ALBUTEROL SULFATE 0.83 MG/ML
3 SOLUTION RESPIRATORY (INHALATION) AS NEEDED
Status: DISCONTINUED | OUTPATIENT
Start: 2024-07-05 | End: 2024-07-05 | Stop reason: HOSPADM

## 2024-07-05 RX ORDER — DIPHENHYDRAMINE HYDROCHLORIDE 50 MG/ML
50 INJECTION INTRAMUSCULAR; INTRAVENOUS AS NEEDED
Status: DISCONTINUED | OUTPATIENT
Start: 2024-07-05 | End: 2024-07-05 | Stop reason: HOSPADM

## 2024-07-05 RX ORDER — PROCHLORPERAZINE MALEATE 10 MG
10 TABLET ORAL EVERY 6 HOURS PRN
Status: DISCONTINUED | OUTPATIENT
Start: 2024-07-05 | End: 2024-07-05 | Stop reason: HOSPADM

## 2024-07-05 RX ORDER — PROCHLORPERAZINE EDISYLATE 5 MG/ML
10 INJECTION INTRAMUSCULAR; INTRAVENOUS EVERY 6 HOURS PRN
Status: DISCONTINUED | OUTPATIENT
Start: 2024-07-05 | End: 2024-07-05 | Stop reason: HOSPADM

## 2024-07-05 RX ORDER — FAMOTIDINE 10 MG/ML
20 INJECTION INTRAVENOUS ONCE AS NEEDED
Status: DISCONTINUED | OUTPATIENT
Start: 2024-07-05 | End: 2024-07-05 | Stop reason: HOSPADM

## 2024-07-05 ASSESSMENT — PAIN SCALES - GENERAL: PAINLEVEL: 0-NO PAIN

## 2024-07-05 NOTE — PROGRESS NOTES
NUTRITION NOTE    Nutrition Assessment     Reason for Visit:  Terra Alexis is a 73 y.o. female with pelvic irresectable distal urothelial mass, bx proven UC     Current Therapy: EV + Pembro,  21 Day Cycles     Noted: 1998 Cervical cancer, tx Chemoradiation, with weekly cisplatin, and radiation completed on 1/4/99.   2/1999 Total abdominal hysterectomy and bilateral salpingo-oophorectomy, with no residual carcinoma found.     Referred to this service for poor appetite and intake- wt loss.  She was to be assessed today during infusion, however, her infusion was complete and she left before being seen.    Pt will be scheduled with RDN for consult next Friday, 7/12.  In the interim, pt was sent Taste and Smell Changes (AND) handout via [employer] with specific instructions on the use of the bicarb and salt rinses.    PMH noted: HTN, Depression and anxiety    Lab Results   Component Value Date/Time    GLUCOSE 84 07/02/2024 1118     07/02/2024 1118    K 3.2 (L) 07/02/2024 1118     07/02/2024 1118    CO2 23 07/02/2024 1118    ANIONGAP 18 07/02/2024 1118    BUN 13 07/02/2024 1118    CREATININE 1.10 (H) 07/02/2024 1118    EGFR 53 (L) 07/02/2024 1118    CALCIUM 9.6 07/02/2024 1118    ALBUMIN 4.0 07/02/2024 1118    ALKPHOS 77 07/02/2024 1118    PROT 6.7 07/02/2024 1118    AST 25 07/02/2024 1118    BILITOT 0.5 07/02/2024 1118    ALT 20 07/02/2024 1118     Lab Results   Component Value Date/Time    VITD25 34 11/01/2023 1040       Anthropometrics:  Anthropometrics  Height: 152.4 cm (5')  Weight: 55.9 kg (123 lb 3.8 oz)  BMI (Calculated): 24.07  IBW/kg (Dietitian Calculated): 45.5 kg  Percent of IBW: 122.9 %  Weight Change  Weight History / % Weight Change: Loss of 14.4 kg (20.5%) in 3 months  Significant Weight Loss: Yes    Wt Readings from Last 25 Encounters:   07/05/24 55.9 kg (123 lb 4.8 oz)   07/01/24 56.7 kg (125 lb)   06/20/24 59.1 kg (130 lb 4.7 oz)   06/13/24 61.6 kg (135 lb 12.9 oz)   05/30/24 63.5 kg (139 lb  15.9 oz)   05/23/24 63.8 kg (140 lb 10.5 oz)   05/08/24 66 kg (145 lb 8.1 oz)   05/01/24 67.1 kg (147 lb 14.9 oz)   04/18/24 68 kg (150 lb)   04/13/24 68.9 kg (152 lb)   04/11/24 69.8 kg (153 lb 14.1 oz)   04/03/24 70.3 kg (154 lb 15.7 oz)   03/01/24 69.9 kg (154 lb)   02/28/24 69.9 kg (154 lb)   02/25/24 68 kg (150 lb)     07/24/23 67.1 kg (148 lb)   03/13/23 68 kg (150 lb)   01/23/23 71.7 kg (158 lb)     12/05/22 72.6 kg (160 lb)   11/21/22 73.5 kg (162 lb 2 oz)   11/07/22 74.8 kg (165 lb)   03/04/22 71.2 kg (157 lb)   02/11/22 69 kg (152 lb 1.9 oz)   01/14/22 69 kg (152 lb 1.9 oz)              Food And Nutrient Intake:  Food and Nutrient History  Food and Nutrient History: poor appetite  Food Intolerance: Lactose  GI Symptoms: anorexia, reflux, vomiting, diarrhea (abd pain and cramping)  Oral Problems: dysgeusia (smell aversions in addition)   Diarrhea relieved with imodium but then bloated and backed up, feels gassy but can't pass it and then has diarrhea again.  (All above information per chart review)                                                           Nutrition Focused Physical Exam Findings:                          Energy Needs  Calculated Energy Needs Using Equations  Height: 152.4 cm (5')        Nutrition Diagnosis             Nutrition Interventions/Recommendations   Nutrition Prescription       Food and Nutrition Delivery       Nutrition Education       Coordination of Care       Patient Instructions   Taste and Smell Changes (AND)- sent via [employer] [facility]    Nutrition Monitoring and Evaluation              Time Spent  Prep time on day of patient encounter: 10 minutes  Time spent directly with patient, family or caregiver: 0 minutes  Additional Time Spent on Patient Care Activities: 5 minutes  Documentation Time: 5 minutes  Other Time Spent: 0 minutes  Total: 20 minutes

## 2024-07-05 NOTE — TELEPHONE ENCOUNTER
M for patient to call me back. Anna was double booked at 3:30p on Thursday 7/11/24 , so I called ildefonso about moving her phone appointment to either 8:30a or 12pm? Left number to call me back to tell me which time she would prefer on Thursday 7/11 (8:30a or 12p)?    Ok to ask for Jo-Ann BONILLA RN  Thanks,  JO-ANN VELOZ RN

## 2024-07-07 LAB
AP SUMMARY REPORT: NORMAL
SCAN RESULT: NORMAL

## 2024-07-09 DIAGNOSIS — C68.9 UROTHELIAL CARCINOMA (MULTI): Primary | ICD-10-CM

## 2024-07-09 NOTE — TELEPHONE ENCOUNTER
Spoke to patient we will not change her appointment time per her request on Thursday.  IVA VELOZ RN

## 2024-07-09 NOTE — TELEPHONE ENCOUNTER
Tried calling patient about moving her phone visit up to 12:30p on Thursday 7/11. Got to  twice.  Will try again later.  IVA VELOZ RN

## 2024-07-11 ENCOUNTER — TELEMEDICINE (OUTPATIENT)
Dept: HEMATOLOGY/ONCOLOGY | Facility: HOSPITAL | Age: 74
End: 2024-07-11
Payer: MEDICARE

## 2024-07-11 ENCOUNTER — LAB (OUTPATIENT)
Dept: LAB | Facility: LAB | Age: 74
End: 2024-07-11
Payer: MEDICARE

## 2024-07-11 DIAGNOSIS — C68.9 UROTHELIAL CARCINOMA (MULTI): Primary | ICD-10-CM

## 2024-07-11 DIAGNOSIS — C68.9 UROTHELIAL CARCINOMA (MULTI): ICD-10-CM

## 2024-07-11 DIAGNOSIS — R79.89 LOW TSH LEVEL: ICD-10-CM

## 2024-07-11 LAB
ALBUMIN SERPL BCP-MCNC: 3.6 G/DL (ref 3.4–5)
ALP SERPL-CCNC: 70 U/L (ref 33–136)
ALT SERPL W P-5'-P-CCNC: 13 U/L (ref 7–45)
ANION GAP SERPL CALC-SCNC: 17 MMOL/L (ref 10–20)
AST SERPL W P-5'-P-CCNC: 23 U/L (ref 9–39)
BASOPHILS # BLD AUTO: 0.04 X10*3/UL (ref 0–0.1)
BASOPHILS NFR BLD AUTO: 0.7 %
BILIRUB SERPL-MCNC: 0.5 MG/DL (ref 0–1.2)
BUN SERPL-MCNC: 12 MG/DL (ref 6–23)
CALCIUM SERPL-MCNC: 9.2 MG/DL (ref 8.6–10.3)
CHLORIDE SERPL-SCNC: 101 MMOL/L (ref 98–107)
CO2 SERPL-SCNC: 23 MMOL/L (ref 21–32)
CREAT SERPL-MCNC: 1.1 MG/DL (ref 0.5–1.05)
EGFRCR SERPLBLD CKD-EPI 2021: 53 ML/MIN/1.73M*2
EOSINOPHIL # BLD AUTO: 0 X10*3/UL (ref 0–0.4)
EOSINOPHIL NFR BLD AUTO: 0 %
ERYTHROCYTE [DISTWIDTH] IN BLOOD BY AUTOMATED COUNT: 17.6 % (ref 11.5–14.5)
GLUCOSE SERPL-MCNC: 90 MG/DL (ref 74–99)
HCT VFR BLD AUTO: 37.6 % (ref 36–46)
HGB BLD-MCNC: 12.1 G/DL (ref 12–16)
IMM GRANULOCYTES # BLD AUTO: 0.03 X10*3/UL (ref 0–0.5)
IMM GRANULOCYTES NFR BLD AUTO: 0.6 % (ref 0–0.9)
LYMPHOCYTES # BLD AUTO: 1.32 X10*3/UL (ref 0.8–3)
LYMPHOCYTES NFR BLD AUTO: 24.5 %
MCH RBC QN AUTO: 29.7 PG (ref 26–34)
MCHC RBC AUTO-ENTMCNC: 32.2 G/DL (ref 32–36)
MCV RBC AUTO: 92 FL (ref 80–100)
MONOCYTES # BLD AUTO: 0.72 X10*3/UL (ref 0.05–0.8)
MONOCYTES NFR BLD AUTO: 13.4 %
NEUTROPHILS # BLD AUTO: 3.28 X10*3/UL (ref 1.6–5.5)
NEUTROPHILS NFR BLD AUTO: 60.8 %
NRBC BLD-RTO: 0 /100 WBCS (ref 0–0)
PLATELET # BLD AUTO: 316 X10*3/UL (ref 150–450)
POTASSIUM SERPL-SCNC: 3.2 MMOL/L (ref 3.5–5.3)
PROT SERPL-MCNC: 6.2 G/DL (ref 6.4–8.2)
RBC # BLD AUTO: 4.07 X10*6/UL (ref 4–5.2)
SODIUM SERPL-SCNC: 138 MMOL/L (ref 136–145)
WBC # BLD AUTO: 5.4 X10*3/UL (ref 4.4–11.3)

## 2024-07-11 PROCEDURE — 1157F ADVNC CARE PLAN IN RCRD: CPT | Performed by: NURSE PRACTITIONER

## 2024-07-11 PROCEDURE — 1160F RVW MEDS BY RX/DR IN RCRD: CPT | Performed by: NURSE PRACTITIONER

## 2024-07-11 PROCEDURE — 1159F MED LIST DOCD IN RCRD: CPT | Performed by: NURSE PRACTITIONER

## 2024-07-11 PROCEDURE — 99213 OFFICE O/P EST LOW 20 MIN: CPT | Performed by: NURSE PRACTITIONER

## 2024-07-11 PROCEDURE — 80053 COMPREHEN METABOLIC PANEL: CPT

## 2024-07-11 PROCEDURE — 85025 COMPLETE CBC W/AUTO DIFF WBC: CPT

## 2024-07-11 PROCEDURE — 36415 COLL VENOUS BLD VENIPUNCTURE: CPT

## 2024-07-11 PROCEDURE — 99443 PR PHYS/QHP TELEPHONE EVALUATION 21-30 MIN: CPT | Performed by: NURSE PRACTITIONER

## 2024-07-11 NOTE — PROGRESS NOTES
Consent:  Verbal consent was requested and obtained from patient on this date for a telehealth visit.    Patient ID: Terra Alexis is a 73 y.o. female.    Diagnosis:  irresectable UC   MedOnc: Dr. Boone   Urologist: Dr. Ross  Gyn: Dr. Nathan Noyola - [facility]      Patient Care Team:  No Assigned Pcp Generic Provider, MD as PCP - General (General Practice)  Shannon Rodriguez MD as PCP - [facility] ACO Attributed Provider  Elgin Boone MD as Consulting Physician (Hematology and Oncology)     Current Therapy: EV + Pembro     ONCOLOGIC HISTORY  No matching staging information was found for the patient.     Patient is referred by Dr. Ross for recently diagnosed invasive carcinoma, may represent urothelial origin vs h/o cervical cancer. Patient presented to the ER in February 2024 with c/o flank pain imaging revealed soft tissue mass encasing the distal segment of the left ureter. Ureteral biopsy performed in IR on 03/22/2024 revealed the above diagnosis.      1998 Cervical cancer, tx Chemoradiation, with weekly cisplatin, and radiation completed on 1/4/99.   2/1999 Total abdominal hysterectomy and bilateral salpingo-oophorectomy, with no residual carcinoma found.   1/23/08 Category 1 mammogram  11/25/08 LGSIL ANTHONY 1 consistent with HPV effect  1/13/09 MID VAGINAL APEX, BIOPSY: MILD VAGINAL INTRAEPITHELIAL NEOPLASIA.  03/2024 - pelvic irresectable distal urothelial mass, bx proven UC  4/11/24 - Cycle 1 EV + Pembro  5/1/24 - Cycle 2 EV + Pembro  5/14/24 - sick visit call  5/23/24 - Scans show [place]. C3 EV (reduced to 1.125 mg/kg)+pembro   6/13/24 - C4 EV (1.125 mg/kg) + Pembro  7/5/24 - C5 EV pembro      Patient's Oncology History documentation       Oncology History   Urothelial carcinoma (Multi)   4/3/2024 Initial Diagnosis     Urothelial carcinoma (CMS/HCC)      4/11/2024 -  Chemotherapy     Enfortumab vedotin + Pembrolizumab, 21 Day Cycles            Other Contributing History  Cervical cancer - s/p chemoradiation and  "surgery      Subjective    HPI  \"Feel lousy\", Not much energy. Doesn't sleep well. Can do ADL's, light house work.   Eating a little better. Can smell, but taste is off since treatment.   Denies mouth sores/coating in mouth.   +vomiting about one a week. Last time she vomited was Sunday. Does not get any warning of nausea first. \"Bloated\" Not new. +constipation, having small, marble sized stools. Hasn't had a good BM \"for months\". Uses Miralax a couple days ago and Exlax the next day. +passing gas.    +drippy nose/itchy eyes, Can be hard to open in the morning. Using OTC drops. Eyes are not red.   Denies cough. Denies sob/chest pain.   +itching, had rash on arms/legs that was blotchy: had this first cycle or two but not recently.   +neuropathy in fingertips and big toes. Can still button/zipper. Some trouble with small buttons. Can still dial phone and type on computer.       Objective    BSA: There is no height or weight on file to calculate BSA.  There were no vitals taken for this visit.     Physical Exam  No exam since this was a phone visit   A&O with clear and fluent speech     Performance Status:  Symptomatic; fully ambulatory    Lab Results   Component Value Date    WBC 5.4 07/11/2024    HGB 12.1 07/11/2024    HCT 37.6 07/11/2024    MCV 92 07/11/2024     07/11/2024     Lab Results   Component Value Date    GLUCOSE 90 07/11/2024    CALCIUM 9.2 07/11/2024     07/11/2024    K 3.2 (L) 07/11/2024    CO2 23 07/11/2024     07/11/2024    BUN 12 07/11/2024    CREATININE 1.10 (H) 07/11/2024       Lab Results   Component Value Date    ALT 13 07/11/2024    AST 23 07/11/2024    ALKPHOS 70 07/11/2024    BILITOT 0.5 07/11/2024      Assessment/Plan    Terra Alexis is a 73 y.o.  female with hx cervical cancer s/p chemoRT 1998, now found with pelvic irresectable distal urothelial mass, bx proven UC. Now post EV/pembro with tumor shrinkage (around 30%). Baseline signtera+. We will do 2 more " cycles and likely reassess resectability with Dr Francis. Signatera monitoring.    C5 and will get MRI pelvis to assess with Dr Francis resectability. Scheduled for MRI on 7/17.     C5D8 on 7/12.     K+ low at 3.2. Patient has 10 MEQ of K at home, but admits to not taking it every day. Let her know her K+ is low and encouraged her to take it daily.     Re: constipation, discussed taking miralax daily or every other day, but she is concerned she may get diarrhea. Let her know constipation may be contributing to her bloating and intermittent vomiting.     Drippy nose/itchy eyes: can try benadryl at night. It may help her sleep too.     Neuropathy stable. She can still do ADL's.     7/1 looked hyperthyroid; monitor for now.     7/17 scheduled for MRI pelvis. Staff message sent to Dr. Ross and Dr. Boone to define follow up/review of MRI. Tentatively requested follow up for patient with Dr. Boone week of 7/22 with labs.     5;45pm update. Dr. Ross is having his office get Terra in before end of the month to review MRI/ check for surgery. I placed a request for follow up visit with Dr. Boone week of 7/22 as well with labs. Including recheck of thyroid. Left patient a VM with this update. Left office # if she has questions.     Oncology History   Urothelial carcinoma (Multi)   4/3/2024 Initial Diagnosis    Urothelial carcinoma (CMS/HCC)     4/11/2024 -  Chemotherapy    Enfortumab vedotin + Pembrolizumab, 21 Day Cycles                     BASIL Maravilla-CNP

## 2024-07-12 ENCOUNTER — INFUSION (OUTPATIENT)
Dept: HEMATOLOGY/ONCOLOGY | Facility: CLINIC | Age: 74
End: 2024-07-12
Payer: MEDICARE

## 2024-07-12 ENCOUNTER — NUTRITION (OUTPATIENT)
Dept: HEMATOLOGY/ONCOLOGY | Facility: CLINIC | Age: 74
End: 2024-07-12

## 2024-07-12 ENCOUNTER — NUTRITION (OUTPATIENT)
Dept: HEMATOLOGY/ONCOLOGY | Facility: CLINIC | Age: 74
End: 2024-07-12
Payer: MEDICARE

## 2024-07-12 VITALS
BODY MASS INDEX: 24.99 KG/M2 | RESPIRATION RATE: 18 BRPM | TEMPERATURE: 95.7 F | DIASTOLIC BLOOD PRESSURE: 63 MMHG | SYSTOLIC BLOOD PRESSURE: 112 MMHG | OXYGEN SATURATION: 95 % | HEART RATE: 75 BPM | WEIGHT: 127.98 LBS

## 2024-07-12 VITALS — BODY MASS INDEX: 25.13 KG/M2 | HEIGHT: 60 IN | WEIGHT: 127.98 LBS

## 2024-07-12 DIAGNOSIS — C68.9 UROTHELIAL CARCINOMA (MULTI): ICD-10-CM

## 2024-07-12 PROCEDURE — 2500000004 HC RX 250 GENERAL PHARMACY W/ HCPCS (ALT 636 FOR OP/ED): Mod: JZ,JG | Performed by: STUDENT IN AN ORGANIZED HEALTH CARE EDUCATION/TRAINING PROGRAM

## 2024-07-12 PROCEDURE — 96413 CHEMO IV INFUSION 1 HR: CPT

## 2024-07-12 PROCEDURE — 2500000004 HC RX 250 GENERAL PHARMACY W/ HCPCS (ALT 636 FOR OP/ED): Performed by: STUDENT IN AN ORGANIZED HEALTH CARE EDUCATION/TRAINING PROGRAM

## 2024-07-12 PROCEDURE — 96375 TX/PRO/DX INJ NEW DRUG ADDON: CPT | Mod: INF

## 2024-07-12 RX ORDER — PROCHLORPERAZINE EDISYLATE 5 MG/ML
10 INJECTION INTRAMUSCULAR; INTRAVENOUS EVERY 6 HOURS PRN
Status: DISCONTINUED | OUTPATIENT
Start: 2024-07-12 | End: 2024-07-12 | Stop reason: HOSPADM

## 2024-07-12 RX ORDER — FAMOTIDINE 10 MG/ML
20 INJECTION INTRAVENOUS ONCE AS NEEDED
Status: DISCONTINUED | OUTPATIENT
Start: 2024-07-12 | End: 2024-07-12 | Stop reason: HOSPADM

## 2024-07-12 RX ORDER — ALBUTEROL SULFATE 0.83 MG/ML
3 SOLUTION RESPIRATORY (INHALATION) AS NEEDED
Status: DISCONTINUED | OUTPATIENT
Start: 2024-07-12 | End: 2024-07-12 | Stop reason: HOSPADM

## 2024-07-12 RX ORDER — EPINEPHRINE 0.3 MG/.3ML
0.3 INJECTION SUBCUTANEOUS EVERY 5 MIN PRN
Status: DISCONTINUED | OUTPATIENT
Start: 2024-07-12 | End: 2024-07-12 | Stop reason: HOSPADM

## 2024-07-12 RX ORDER — ONDANSETRON HYDROCHLORIDE 2 MG/ML
8 INJECTION, SOLUTION INTRAVENOUS ONCE
Status: COMPLETED | OUTPATIENT
Start: 2024-07-12 | End: 2024-07-12

## 2024-07-12 RX ORDER — DIPHENHYDRAMINE HYDROCHLORIDE 50 MG/ML
50 INJECTION INTRAMUSCULAR; INTRAVENOUS AS NEEDED
Status: DISCONTINUED | OUTPATIENT
Start: 2024-07-12 | End: 2024-07-12 | Stop reason: HOSPADM

## 2024-07-12 RX ORDER — PROCHLORPERAZINE MALEATE 10 MG
10 TABLET ORAL EVERY 6 HOURS PRN
Status: DISCONTINUED | OUTPATIENT
Start: 2024-07-12 | End: 2024-07-12 | Stop reason: HOSPADM

## 2024-07-12 ASSESSMENT — PAIN SCALES - GENERAL: PAINLEVEL: 4

## 2024-07-12 NOTE — PROGRESS NOTES
NUTRITION NOTE    Nutrition Assessment     Reason for Visit:  Terra Alexis is a 73 y.o. female with pelvic  distal urothelial mass, bx proven UC     Current Therapy: EV + Pembro,  21 Day Cycles     Noted: 1998 Cervical cancer, tx Chemoradiation, with weekly cisplatin, and radiation completed on 1/4/99.   2/1999 Total abdominal hysterectomy and bilateral salpingo-oophorectomy, with no residual carcinoma found.     Referred to this service for poor appetite and intake- wt loss.  She was assessed today after infusion, as she was not able to be seen at last infusion.  (Meeting was shortened today, as pt was finished with tx.)    Note she was sent Taste and Smell Changes (AND) handout via [facility] with specific instructions on the use of the bicarb and salt rinses.  Today she states that she does not use the MyChart.    PMH noted: HTN, Depression and anxiety    Lab Results   Component Value Date/Time    GLUCOSE 90 07/11/2024 1011     07/11/2024 1011    K 3.2 (L) 07/11/2024 1011     07/11/2024 1011    CO2 23 07/11/2024 1011    ANIONGAP 17 07/11/2024 1011    BUN 12 07/11/2024 1011    CREATININE 1.10 (H) 07/11/2024 1011    EGFR 53 (L) 07/11/2024 1011    CALCIUM 9.2 07/11/2024 1011    ALBUMIN 3.6 07/11/2024 1011    ALKPHOS 70 07/11/2024 1011    PROT 6.2 (L) 07/11/2024 1011    AST 23 07/11/2024 1011    BILITOT 0.5 07/11/2024 1011    ALT 13 07/11/2024 1011   Pt reports she has oral K+ but was not taking it when she was vomiting.     Lab Results   Component Value Date/Time    VITD25 34 11/01/2023 1040       Anthropometrics:  Anthropometrics  Height: 152.4 cm (5')  Weight: 58 kg (127 lb 15.6 oz)  BMI (Calculated): 24.99  IBW/kg (Dietitian Calculated): 45.5 kg  Weight Change  Weight History / % Weight Change: Wt loss h/o 13.9 kg (19..9%) in 3 months  Significant Weight Loss: Yes  Interpretation of Weight Loss: >7.5% in 3 months    Wt Readings from Last 25 Encounters:   7/12/24 58 kg- Gain of 2.1 kg in 1 week but  "question validity of this wt    07/05/24 55.9 kg (123 lb 4.8 oz)   07/01/24 56.7 kg (125 lb)   06/20/24 59.1 kg (130 lb 4.7 oz)   06/13/24 61.6 kg (135 lb 12.9 oz)   05/30/24 63.5 kg (139 lb 15.9 oz)   05/23/24 63.8 kg (140 lb 10.5 oz)   05/08/24 66 kg (145 lb 8.1 oz)   05/01/24 67.1 kg (147 lb 14.9 oz)   04/18/24 68 kg (150 lb)   04/13/24 68.9 kg (152 lb)   04/11/24 69.8 kg (153 lb 14.1 oz)   04/03/24 70.3 kg (154 lb 15.7 oz)   03/01/24 69.9 kg (154 lb)   02/28/24 69.9 kg (154 lb)   02/25/24 68 kg (150 lb)     07/24/23 67.1 kg (148 lb)   03/13/23 68 kg (150 lb)   01/23/23 71.7 kg (158 lb)     12/05/22 72.6 kg (160 lb)   11/21/22 73.5 kg (162 lb 2 oz)   11/07/22 74.8 kg (165 lb)   03/04/22 71.2 kg (157 lb)   02/11/22 69 kg (152 lb 1.9 oz)   01/14/22 69 kg (152 lb 1.9 oz)              Food And Nutrient Intake:  Food and Nutrient History  Food and Nutrient History: Pt with poor appetite and intake with GI side effects.  SHe does not elaborate on how she is eating although states she is a little better this week  Energy Intake: Poor < 50 %  Fluid Intake: States she has been trying to drink more fluids. Has a cup of water at the bedside.  Food Intolerance: Lactose  GI Symptoms: constipation, diarrhea, vomiting (but not necessarily Nausea)  GI Symptoms greater than 2 weeks: yes  Oral Problems: dysgeusia   Pt reports a h/o Ex-lax (which used to work for her- but no longer does)  States she goes back and for the between constipation and diarrhea.  She has used colace and Miralax in the past without success.  Therefore, the last time she was constipated it \"worked itself out\"- but she ended up with diarrhea nonetheless.    Reports she took 3 total tabs of imodium over a period of days and then she was bloated and backed up, felt gassy but couldn't pass it and then had diarrhea again.  Reports 2 BMs this a.m.- regular                                                      Medication and Complementary/Alternative Medicine " "Use  Prescription Medication Use: Reports that \"all the medications they gave her made her sick\".  She is not taking any anti nausea meds      Nutrition Focused Physical Exam Findings:      Subcutaneous Fat Loss  Orbital Fat Pads: Mild-Moderate (slight dark circles and slight hollowing)  Buccal Fat Pads: Well nourished (full, rounded cheeks)  Triceps: Defer  Ribs: Defer    Muscle Wasting  Temporalis: Mild-Moderate (slight depression)  Pectoralis (Clavicular Region): Defer  Deltoid/Trapezius: Defer  Interosseous: Defer  Trapezius/Infraspinatus/Supraspinatus (Scapular Region): Defer  Quadriceps: Defer  Gastrocnemius: Defer              Energy Needs  Calculated Energy Needs Using Equations  Height: 152.4 cm (5')  Estimated Energy Needs  Total Energy Estimated Needs (kCal): 1625 kCal  Total Estimated Energy Need per Day (kCal/kg): 28 kCal/kg  Estimated Fluid Needs  Total Fluid Estimated Needs (mL): 1740 mL  Total Fluid Estimated Needs (mL/kg): 30 mL/kg  Estimated Protein Needs  Total Protein Estimated Needs (g): 75 g  Total Protein Estimated Needs (g/kg): 1.3 g/kg        Nutrition Diagnosis   Malnutrition Diagnosis  Patient has Malnutrition Diagnosis: Yes  Diagnosis Status: New  Malnutrition Diagnosis: Severe malnutrition related to chronic disease or condition  As Evidenced by: intake of < or = 75% of  estimated energy  requirement for  > or = 1 month with signifincat wt loss as documented and mild-moderate depletion of adipose and muscle stores per NFPE.    Nutrition Diagnosis  Patient has Nutrition Diagnosis: Yes  Diagnosis Status (1): New  Nutrition Diagnosis 1: Altered GI function  Related to (1): side effects from tx including antin nausea meds given  As Evidenced by (1): pt report of GI symptoms as above    Nutrition Interventions/Recommendations   Nutrition Prescription  Individualized Nutrition Prescription Provided for : Regular bland YOLY    Food and Nutrition Delivery  Food and Nutrition Delivery  Meals & " Snacks: Diets modified for specific foods or ingredients, Fiber-modified diet  Goals: No greasy, fatty, spicy foods with N/V post tx (Encouraged low fiber as needed with uncontrolled diarrhea post tx)  Feeding Assistance: Mouth care  Goals: Bicarb and salt rinses 4-6 times daily and prior to by mouth (2 spoonfuls of Lemon ice prior to by mouth)    Nutrition Education       Coordination of Care       Patient Instructions   Low Fiber Diet (AND)    Nutrition Monitoring and Evaluation   Food/Nutrient Related History Monitoring  Monitoring and Evaluation Plan: Fluid intake, Amount of food, Fiber intake  Fluid Intake: Estimated fluid intake (Encouraged trial of [employer] milk)  Criteria: Meet daily hydration needs and make up for losses as needed  Amount of Food: Estimated amout of food  Criteria: Meet energy and  protein needs  Estimated fiber intake: Estimated fiber intake  Criteria: 8-12 g with malabsorption after tx  Body Composition/Growth/Weight History  Monitoring and Evaluation Plan: Weight  Weight: Weight change  Criteria: Maintenance or slow gain  Biochemical Data, Medical Tests and Procedures  Monitoring and Evaluation Plan: Electrolyte/renal panel  Criteria: WNL including K+  Nutrition Focused Physical Findings  Monitoring and Evaluation Plan: Adipose, Digestive System, Muscles  Adipose: Loss of subcutaneous fat  Criteria: No further loss  Digestive System: Constipation, Vomiting  Criteria: Manage with laxative protocol to have BM every day or qod and by day 3 (Consider daily miralax daily for maintenance; Senna-S PRN)  Muscles: Muscle atrophy  Criteria: No further loss          Time Spent  Prep time on day of patient encounter: 5 minutes  Time spent directly with patient, family or caregiver: 20 minutes  Additional Time Spent on Patient Care Activities: 0 minutes  Documentation Time: 20 minutes  Other Time Spent: 0 minutes  Total: 45 minutes

## 2024-07-17 ENCOUNTER — HOSPITAL ENCOUNTER (OUTPATIENT)
Dept: RADIOLOGY | Facility: CLINIC | Age: 74
Discharge: HOME | End: 2024-07-17
Payer: MEDICARE

## 2024-07-17 ENCOUNTER — APPOINTMENT (OUTPATIENT)
Dept: RADIOLOGY | Facility: HOSPITAL | Age: 74
End: 2024-07-17
Payer: MEDICARE

## 2024-07-17 VITALS — HEIGHT: 65 IN | BODY MASS INDEX: 20.97 KG/M2

## 2024-07-17 DIAGNOSIS — T45.1X5A CHEMOTHERAPY INDUCED NAUSEA AND VOMITING: ICD-10-CM

## 2024-07-17 DIAGNOSIS — R11.2 CHEMOTHERAPY INDUCED NAUSEA AND VOMITING: ICD-10-CM

## 2024-07-17 DIAGNOSIS — C68.9 UROTHELIAL CARCINOMA (MULTI): ICD-10-CM

## 2024-07-17 DIAGNOSIS — R19.7 DIARRHEA, UNSPECIFIED TYPE: ICD-10-CM

## 2024-07-17 PROCEDURE — 2550000001 HC RX 255 CONTRASTS: Performed by: STUDENT IN AN ORGANIZED HEALTH CARE EDUCATION/TRAINING PROGRAM

## 2024-07-17 PROCEDURE — 72197 MRI PELVIS W/O & W/DYE: CPT

## 2024-07-17 PROCEDURE — A9575 INJ GADOTERATE MEGLUMI 0.1ML: HCPCS | Performed by: STUDENT IN AN ORGANIZED HEALTH CARE EDUCATION/TRAINING PROGRAM

## 2024-07-17 RX ORDER — GADOTERATE MEGLUMINE 376.9 MG/ML
0.2 INJECTION INTRAVENOUS
Status: COMPLETED | OUTPATIENT
Start: 2024-07-17 | End: 2024-07-17

## 2024-07-22 ENCOUNTER — TELEPHONE (OUTPATIENT)
Dept: ADMISSION | Facility: HOSPITAL | Age: 74
End: 2024-07-22
Payer: MEDICARE

## 2024-07-22 ENCOUNTER — LAB (OUTPATIENT)
Dept: LAB | Facility: LAB | Age: 74
End: 2024-07-22
Payer: MEDICARE

## 2024-07-22 DIAGNOSIS — R30.0 DYSURIA: ICD-10-CM

## 2024-07-22 DIAGNOSIS — R30.0 DYSURIA: Primary | ICD-10-CM

## 2024-07-22 LAB
APPEARANCE UR: ABNORMAL
BACTERIA #/AREA URNS AUTO: ABNORMAL /HPF
BILIRUB UR STRIP.AUTO-MCNC: NEGATIVE MG/DL
COLOR UR: ABNORMAL
GLUCOSE UR STRIP.AUTO-MCNC: NORMAL MG/DL
KETONES UR STRIP.AUTO-MCNC: NEGATIVE MG/DL
LEUKOCYTE ESTERASE UR QL STRIP.AUTO: ABNORMAL
MUCOUS THREADS #/AREA URNS AUTO: ABNORMAL /LPF
NITRITE UR QL STRIP.AUTO: ABNORMAL
PH UR STRIP.AUTO: 7 [PH]
PROT UR STRIP.AUTO-MCNC: ABNORMAL MG/DL
RBC # UR STRIP.AUTO: ABNORMAL /UL
RBC #/AREA URNS AUTO: >20 /HPF
SP GR UR STRIP.AUTO: 1.02
SQUAMOUS #/AREA URNS AUTO: ABNORMAL /HPF
UROBILINOGEN UR STRIP.AUTO-MCNC: ABNORMAL MG/DL
WBC #/AREA URNS AUTO: >50 /HPF
WBC CLUMPS #/AREA URNS AUTO: ABNORMAL /HPF

## 2024-07-22 PROCEDURE — 87181 SC STD AGAR DILUTION PER AGT: CPT

## 2024-07-22 PROCEDURE — 81001 URINALYSIS AUTO W/SCOPE: CPT

## 2024-07-22 PROCEDURE — 87186 SC STD MICRODIL/AGAR DIL: CPT

## 2024-07-22 PROCEDURE — 87086 URINE CULTURE/COLONY COUNT: CPT

## 2024-07-22 NOTE — TELEPHONE ENCOUNTER
Pt states she is having burning, pain, urgency and itching in vaginal area. Symptoms started a few days ago.   Pt denies fever, body aches, chills.   Pt did not reach out to urology- states she was given an antibiotic in the past by Dr. Boone's team.   Last FUV 7/11 with next planned FUV 7/29

## 2024-07-23 ENCOUNTER — TELEPHONE (OUTPATIENT)
Dept: HEMATOLOGY/ONCOLOGY | Facility: CLINIC | Age: 74
End: 2024-07-23
Payer: MEDICARE

## 2024-07-23 DIAGNOSIS — N39.0 URINARY TRACT INFECTION WITHOUT HEMATURIA, SITE UNSPECIFIED: Primary | ICD-10-CM

## 2024-07-23 RX ORDER — NITROFURANTOIN (MACROCRYSTALS) 100 MG/1
100 CAPSULE ORAL 2 TIMES DAILY
Qty: 10 CAPSULE | Refills: 0 | Status: SHIPPED | OUTPATIENT
Start: 2024-07-23 | End: 2024-07-28

## 2024-07-23 NOTE — TELEPHONE ENCOUNTER
Called patient to let her know urine looks like infection.   Sensitivity is pending.   Had ecoli last time.   No allergy to antibiotics.     Sent script for macrobid 100 mg bid for 5 days to her preferred pharmacy.     She is aware that if sensitivity calls for something else, I'll call her and change antibiotic.

## 2024-07-24 ENCOUNTER — APPOINTMENT (OUTPATIENT)
Dept: UROLOGY | Facility: CLINIC | Age: 74
End: 2024-07-24
Payer: MEDICARE

## 2024-07-24 DIAGNOSIS — Z96.0 URETERAL STENT PRESENT: ICD-10-CM

## 2024-07-24 DIAGNOSIS — C66.2 CANCER OF LEFT URETER (MULTI): Primary | ICD-10-CM

## 2024-07-24 PROCEDURE — G2211 COMPLEX E/M VISIT ADD ON: HCPCS | Performed by: STUDENT IN AN ORGANIZED HEALTH CARE EDUCATION/TRAINING PROGRAM

## 2024-07-24 PROCEDURE — 99215 OFFICE O/P EST HI 40 MIN: CPT | Performed by: STUDENT IN AN ORGANIZED HEALTH CARE EDUCATION/TRAINING PROGRAM

## 2024-07-24 PROCEDURE — 1157F ADVNC CARE PLAN IN RCRD: CPT | Performed by: STUDENT IN AN ORGANIZED HEALTH CARE EDUCATION/TRAINING PROGRAM

## 2024-07-24 NOTE — PROGRESS NOTES
Subjective   Patient ID: Terra Alexis is a 74 y.o. female.    HPI  75 y/o F s/p cystoscopy and left ureteroscopy with ureteral biopsy and ureteral stent insertion with retrograde pyelogram 3/1/24.     Seeing Dr. Boone for recently diagnosed invasive carcinoma, may represent urothelial origin vs h/o cervical cancer.     Patient presented to the ER in February 2024 with c/o flank pain imaging revealed soft tissue mass encasing the distal segment of the left ureter. Ureteral biopsy performed in IR on 03/22/2024 revealed the above diagnosis.       Now post EV/pembro with tumor shrinkage (around 30%). Baseline signtera+. Will do 2 more cycles and likely reassess resectability with Dr Francis. Signatera monitoring.      She notes discomfort  due to stent, has urgency at times. Tolerable.       Pathology  Surgical Path 3/22/24   FINAL DIAGNOSIS   A. Soft tissue, mass, biopsy:    -- Involved by invasive carcinoma. See note.       MR pelvis w and wo IV contrast  Result Date: 7/21/2024    1. A 2.9 x 2.5 x 3.6 cm diffusion restricting enhancing mass encasing the left distal ureteral stent anterior to the left sacral ala, compatible with known urothelial neoplasm, with similar measurements on prior CT 07/01/2024 but has decreased in size when compared to prior MRI from 03/22/2024. The mass completely encases the left internal iliac artery and vein which are likely occluded.  Left common iliac vein and left external iliac vein abuts the mass but remain patent.  There is at least partial encasement of the left S1 and S2 nerve roots. The mass abuts the left sacrum without definite evidence of osseous extension/destruction. 2. No evidence of lymphadenopathy or other metastatic disease in the pelvis.      CT chest abdomen pelvis w IV contrast  Result Date: 7/3/2024      1. Interval decrease in the size of the mass lesion along the distal left ureter with hypodensity throughout the lesion and component of central necrosis noted.  Lesion is intimate with the iliac vasculature with findings appearing stable from prior imaging. The lesion measures 2.7 x 1.9 cm and on the prior examination measured 3.0 x 2.7 cm in the similar distribution   2. No lymphadenopathy in the abdomen/pelvis.   3. A few scattered hypodensities within the liver suggesting cysts. However, there is a single hypodensity within segment 4B measuring 6 mm with attention to this area on follow-up.       Review of Systems    Objective   Physical Exam  NAD, alert, oriented  Abdomen soft nontender, no CVA tenderness    Assessment/Plan   75 y/o F s/p cystoscopy and left ureteroscopy with ureteral biopsy and ureteral stent insertion with retrograde pyelogram 3/1/24.     Seeing Dr. Boone for recently diagnosed invasive carcinoma, may represent urothelial origin vs h/o cervical cancer.     Patient presented to the ER in February 2024 with c/o flank pain imaging revealed soft tissue mass encasing the distal segment of the left ureter. Ureteral biopsy performed in IR on 03/22/2024 revealed the above diagnosis.       Now post EV/pembro with tumor shrinkage (around 30%). Baseline signtera+. Will do 2 more cycles and likely reassess resectability. Signatera monitoring.       She notes discomfort  due to stent, has urgency at times. Tolerable.     I personally reviewed the MRI and reviewed with the patient, demonstrating the images and using charts to illustrate and explain the results.  A 2.9 x 2.5 x 3.6 cm diffusion restricting enhancing mass encasing the left distal ureteral stent anterior to the left sacral ala, compatible with known urothelial neoplasm, with similar measurements on prior CT 07/01/2024 but has decreased in size when compared to prior MRI from 03/22/2024. The mass completely encases the left internal iliac artery and vein which are likely occluded.  Left common iliac vein and left external iliac vein abuts the mass but remain patent.  There is at least partial  encasement of the left S1 and S2 nerve roots. The mass abuts the left sacrum without definite evidence of osseous extension/destruction.  No evidence of lymphadenopathy or other metastatic disease in the pelvis.      I explained the MRI in detail and the location and characteristics of the mass. We would need to consult with an experienced vascular surgeon if resection of the mass with iliac vessels including reconstruction if needed is feasible as well as have a discussion with the neurosurgeon to discuss the partial encasement of the nerves.     Since she will be having additional chemotherapy, this could also represent further shrinkage and better possibility of resection. I will touch base with Dr. Boone to determine further plan.     She would likely need additional scans before determining if surgery is possible.     Regarding the double J stent, I would not recommend removal at this point since we still have no clear plan regarding surgery and removal of stent would cause additional pain. She agrees with plan.     Plan:  Will discuss with neurosurgery and vascular surgery, as well as with Dr. Boone to determine further plan and timing of potential surgery.   FUV 6 weeks.     Diagnoses and all orders for this visit:  Cancer of left ureter (Multi)  Ureteral stent present    I spent 40 min on direct patient care including time to review the medical records, face to face time with the patient, coordinating care and documentation.      Scribe Attestation  By signing my name below, IHarriett Scribe attest that this documentation has been prepared under the direction and in the presence of Teddy Ross MD.

## 2024-07-24 NOTE — H&P (VIEW-ONLY)
Subjective   Patient ID: Terra Alexis is a 74 y.o. female.    HPI  75 y/o F s/p cystoscopy and left ureteroscopy with ureteral biopsy and ureteral stent insertion with retrograde pyelogram 3/1/24.     Seeing Dr. Boone for recently diagnosed invasive carcinoma, may represent urothelial origin vs h/o cervical cancer.     Patient presented to the ER in February 2024 with c/o flank pain imaging revealed soft tissue mass encasing the distal segment of the left ureter. Ureteral biopsy performed in IR on 03/22/2024 revealed the above diagnosis.       Now post EV/pembro with tumor shrinkage (around 30%). Baseline signtera+. Will do 2 more cycles and likely reassess resectability with Dr Francis. Signatera monitoring.      She notes discomfort  due to stent, has urgency at times. Tolerable.       Pathology  Surgical Path 3/22/24   FINAL DIAGNOSIS   A. Soft tissue, mass, biopsy:    -- Involved by invasive carcinoma. See note.       MR pelvis w and wo IV contrast  Result Date: 7/21/2024    1. A 2.9 x 2.5 x 3.6 cm diffusion restricting enhancing mass encasing the left distal ureteral stent anterior to the left sacral ala, compatible with known urothelial neoplasm, with similar measurements on prior CT 07/01/2024 but has decreased in size when compared to prior MRI from 03/22/2024. The mass completely encases the left internal iliac artery and vein which are likely occluded.  Left common iliac vein and left external iliac vein abuts the mass but remain patent.  There is at least partial encasement of the left S1 and S2 nerve roots. The mass abuts the left sacrum without definite evidence of osseous extension/destruction. 2. No evidence of lymphadenopathy or other metastatic disease in the pelvis.      CT chest abdomen pelvis w IV contrast  Result Date: 7/3/2024      1. Interval decrease in the size of the mass lesion along the distal left ureter with hypodensity throughout the lesion and component of central necrosis noted.  Lesion is intimate with the iliac vasculature with findings appearing stable from prior imaging. The lesion measures 2.7 x 1.9 cm and on the prior examination measured 3.0 x 2.7 cm in the similar distribution   2. No lymphadenopathy in the abdomen/pelvis.   3. A few scattered hypodensities within the liver suggesting cysts. However, there is a single hypodensity within segment 4B measuring 6 mm with attention to this area on follow-up.       Review of Systems    Objective   Physical Exam  NAD, alert, oriented  Abdomen soft nontender, no CVA tenderness    Assessment/Plan   73 y/o F s/p cystoscopy and left ureteroscopy with ureteral biopsy and ureteral stent insertion with retrograde pyelogram 3/1/24.     Seeing Dr. Boone for recently diagnosed invasive carcinoma, may represent urothelial origin vs h/o cervical cancer.     Patient presented to the ER in February 2024 with c/o flank pain imaging revealed soft tissue mass encasing the distal segment of the left ureter. Ureteral biopsy performed in IR on 03/22/2024 revealed the above diagnosis.       Now post EV/pembro with tumor shrinkage (around 30%). Baseline signtera+. Will do 2 more cycles and likely reassess resectability. Signatera monitoring.       She notes discomfort  due to stent, has urgency at times. Tolerable.     I personally reviewed the MRI and reviewed with the patient, demonstrating the images and using charts to illustrate and explain the results.  A 2.9 x 2.5 x 3.6 cm diffusion restricting enhancing mass encasing the left distal ureteral stent anterior to the left sacral ala, compatible with known urothelial neoplasm, with similar measurements on prior CT 07/01/2024 but has decreased in size when compared to prior MRI from 03/22/2024. The mass completely encases the left internal iliac artery and vein which are likely occluded.  Left common iliac vein and left external iliac vein abuts the mass but remain patent.  There is at least partial  encasement of the left S1 and S2 nerve roots. The mass abuts the left sacrum without definite evidence of osseous extension/destruction.  No evidence of lymphadenopathy or other metastatic disease in the pelvis.      I explained the MRI in detail and the location and characteristics of the mass. We would need to consult with an experienced vascular surgeon if resection of the mass with iliac vessels including reconstruction if needed is feasible as well as have a discussion with the neurosurgeon to discuss the partial encasement of the nerves.     Since she will be having additional chemotherapy, this could also represent further shrinkage and better possibility of resection. I will touch base with Dr. Boone to determine further plan.     She would likely need additional scans before determining if surgery is possible.     Regarding the double J stent, I would not recommend removal at this point since we still have no clear plan regarding surgery and removal of stent would cause additional pain. She agrees with plan.     Plan:  Will discuss with neurosurgery and vascular surgery, as well as with Dr. Boone to determine further plan and timing of potential surgery.   FUV 6 weeks.     Diagnoses and all orders for this visit:  Cancer of left ureter (Multi)  Ureteral stent present    I spent 40 min on direct patient care including time to review the medical records, face to face time with the patient, coordinating care and documentation.      Scribe Attestation  By signing my name below, IHarriett Scribe attest that this documentation has been prepared under the direction and in the presence of Teddy Ross MD.

## 2024-07-25 ENCOUNTER — DOCUMENTATION (OUTPATIENT)
Dept: HEMATOLOGY/ONCOLOGY | Facility: CLINIC | Age: 74
End: 2024-07-25
Payer: MEDICARE

## 2024-07-25 DIAGNOSIS — N30.00 ACUTE CYSTITIS WITHOUT HEMATURIA: Primary | ICD-10-CM

## 2024-07-25 LAB
BACTERIA UR CULT: ABNORMAL
BACTERIA UR CULT: ABNORMAL
SCAN RESULT: NORMAL

## 2024-07-25 RX ORDER — CIPROFLOXACIN 500 MG/1
500 TABLET ORAL 2 TIMES DAILY
Qty: 14 TABLET | Refills: 0 | Status: SHIPPED | OUTPATIENT
Start: 2024-07-25 | End: 2024-08-01

## 2024-07-25 NOTE — PROGRESS NOTES
Spoke with patient and informed her due to her urine culture results, her antibiotic has been changed. She is aware to stop Macrodantin & begin Cipro as ordered. She verbalized understanding.

## 2024-07-26 ENCOUNTER — LAB (OUTPATIENT)
Dept: LAB | Facility: LAB | Age: 74
End: 2024-07-26
Payer: MEDICARE

## 2024-07-26 ENCOUNTER — TELEPHONE (OUTPATIENT)
Dept: HEMATOLOGY/ONCOLOGY | Facility: HOSPITAL | Age: 74
End: 2024-07-26

## 2024-07-26 DIAGNOSIS — E03.9 HYPOTHYROIDISM, UNSPECIFIED TYPE: Primary | ICD-10-CM

## 2024-07-26 DIAGNOSIS — C68.9 UROTHELIAL CARCINOMA (MULTI): ICD-10-CM

## 2024-07-26 DIAGNOSIS — R79.89 LOW TSH LEVEL: ICD-10-CM

## 2024-07-26 LAB
ALBUMIN SERPL BCP-MCNC: 3.5 G/DL (ref 3.4–5)
ALP SERPL-CCNC: 66 U/L (ref 33–136)
ALT SERPL W P-5'-P-CCNC: 11 U/L (ref 7–45)
ANION GAP SERPL CALC-SCNC: 14 MMOL/L (ref 10–20)
AST SERPL W P-5'-P-CCNC: 22 U/L (ref 9–39)
BASOPHILS # BLD AUTO: 0.05 X10*3/UL (ref 0–0.1)
BASOPHILS NFR BLD AUTO: 0.9 %
BILIRUB SERPL-MCNC: 0.4 MG/DL (ref 0–1.2)
BUN SERPL-MCNC: 10 MG/DL (ref 6–23)
CALCIUM SERPL-MCNC: 8.8 MG/DL (ref 8.6–10.3)
CHLORIDE SERPL-SCNC: 106 MMOL/L (ref 98–107)
CO2 SERPL-SCNC: 23 MMOL/L (ref 21–32)
CREAT SERPL-MCNC: 1.05 MG/DL (ref 0.5–1.05)
EGFRCR SERPLBLD CKD-EPI 2021: 56 ML/MIN/1.73M*2
EOSINOPHIL # BLD AUTO: 0.11 X10*3/UL (ref 0–0.4)
EOSINOPHIL NFR BLD AUTO: 2.1 %
ERYTHROCYTE [DISTWIDTH] IN BLOOD BY AUTOMATED COUNT: 18.7 % (ref 11.5–14.5)
GLUCOSE SERPL-MCNC: 88 MG/DL (ref 74–99)
HCT VFR BLD AUTO: 35.5 % (ref 36–46)
HGB BLD-MCNC: 11.2 G/DL (ref 12–16)
IMM GRANULOCYTES # BLD AUTO: 0.05 X10*3/UL (ref 0–0.5)
IMM GRANULOCYTES NFR BLD AUTO: 0.9 % (ref 0–0.9)
LYMPHOCYTES # BLD AUTO: 1.34 X10*3/UL (ref 0.8–3)
LYMPHOCYTES NFR BLD AUTO: 25.3 %
MCH RBC QN AUTO: 29.4 PG (ref 26–34)
MCHC RBC AUTO-ENTMCNC: 31.5 G/DL (ref 32–36)
MCV RBC AUTO: 93 FL (ref 80–100)
MONOCYTES # BLD AUTO: 0.49 X10*3/UL (ref 0.05–0.8)
MONOCYTES NFR BLD AUTO: 9.3 %
NEUTROPHILS # BLD AUTO: 3.25 X10*3/UL (ref 1.6–5.5)
NEUTROPHILS NFR BLD AUTO: 61.5 %
NRBC BLD-RTO: 0 /100 WBCS (ref 0–0)
PLATELET # BLD AUTO: 285 X10*3/UL (ref 150–450)
POTASSIUM SERPL-SCNC: 3.4 MMOL/L (ref 3.5–5.3)
PROT SERPL-MCNC: 6.1 G/DL (ref 6.4–8.2)
RBC # BLD AUTO: 3.81 X10*6/UL (ref 4–5.2)
SODIUM SERPL-SCNC: 140 MMOL/L (ref 136–145)
T4 FREE SERPL-MCNC: <0.25 NG/DL (ref 0.61–1.12)
TSH SERPL-ACNC: 52 MIU/L (ref 0.44–3.98)
WBC # BLD AUTO: 5.3 X10*3/UL (ref 4.4–11.3)

## 2024-07-26 PROCEDURE — 36415 COLL VENOUS BLD VENIPUNCTURE: CPT

## 2024-07-26 RX ORDER — LEVOTHYROXINE SODIUM 25 UG/1
25 TABLET ORAL DAILY
Qty: 30 TABLET | Refills: 2 | Status: SHIPPED | OUTPATIENT
Start: 2024-07-26

## 2024-07-26 NOTE — TELEPHONE ENCOUNTER
----- Message from Elgin Boone sent at 7/26/2024  2:54 PM EDT -----  Yep she needs some synthroid adjustment, can you help?  ----- Message -----  From: ISSAC Maravilla  Sent: 7/26/2024   2:52 PM EDT  To: Elgin Boone MD    She sees you on 7/29. See her thyroid results below.  ----- Message -----  From: Lab, Background User  Sent: 7/26/2024   2:19 PM EDT  To: ISSAC Maravilla        Notified Dr. Boone of tsh results.   Called patient and informed her.   Placed script for levothyroxine for 25 mcg per day.   Instructed her to take daily in the morning on empty stomach with water. Wait 30-60 min to eat. Space out 4 hrs from antacids/calcium.   She verbalized understanding.   Places order for tsh in about 6 weeks.

## 2024-07-29 ENCOUNTER — APPOINTMENT (OUTPATIENT)
Dept: HEMATOLOGY/ONCOLOGY | Facility: CLINIC | Age: 74
End: 2024-07-29
Payer: MEDICARE

## 2024-07-29 ENCOUNTER — OFFICE VISIT (OUTPATIENT)
Dept: HEMATOLOGY/ONCOLOGY | Facility: CLINIC | Age: 74
End: 2024-07-29
Payer: MEDICARE

## 2024-07-29 VITALS
TEMPERATURE: 96.3 F | DIASTOLIC BLOOD PRESSURE: 76 MMHG | RESPIRATION RATE: 18 BRPM | HEART RATE: 74 BPM | OXYGEN SATURATION: 98 % | WEIGHT: 134 LBS | SYSTOLIC BLOOD PRESSURE: 135 MMHG | BODY MASS INDEX: 22.3 KG/M2

## 2024-07-29 DIAGNOSIS — C68.9 UROTHELIAL CARCINOMA (MULTI): ICD-10-CM

## 2024-07-29 PROCEDURE — 99214 OFFICE O/P EST MOD 30 MIN: CPT | Performed by: STUDENT IN AN ORGANIZED HEALTH CARE EDUCATION/TRAINING PROGRAM

## 2024-07-29 PROCEDURE — 3075F SYST BP GE 130 - 139MM HG: CPT | Performed by: STUDENT IN AN ORGANIZED HEALTH CARE EDUCATION/TRAINING PROGRAM

## 2024-07-29 PROCEDURE — 1157F ADVNC CARE PLAN IN RCRD: CPT | Performed by: STUDENT IN AN ORGANIZED HEALTH CARE EDUCATION/TRAINING PROGRAM

## 2024-07-29 PROCEDURE — 1036F TOBACCO NON-USER: CPT | Performed by: STUDENT IN AN ORGANIZED HEALTH CARE EDUCATION/TRAINING PROGRAM

## 2024-07-29 PROCEDURE — 1126F AMNT PAIN NOTED NONE PRSNT: CPT | Performed by: STUDENT IN AN ORGANIZED HEALTH CARE EDUCATION/TRAINING PROGRAM

## 2024-07-29 PROCEDURE — 1159F MED LIST DOCD IN RCRD: CPT | Performed by: STUDENT IN AN ORGANIZED HEALTH CARE EDUCATION/TRAINING PROGRAM

## 2024-07-29 PROCEDURE — 3078F DIAST BP <80 MM HG: CPT | Performed by: STUDENT IN AN ORGANIZED HEALTH CARE EDUCATION/TRAINING PROGRAM

## 2024-07-29 ASSESSMENT — ENCOUNTER SYMPTOMS
ARTHRALGIAS: 1
CONSTIPATION: 1
ENDOCRINE NEGATIVE: 1
CHILLS: 0
COUGH: 0
BLOOD IN STOOL: 0
DYSURIA: 1
MYALGIAS: 0
BACK PAIN: 1
APPETITE CHANGE: 1
HEADACHES: 0
NECK PAIN: 0
EYE PROBLEMS: 1
HEMATOLOGIC/LYMPHATIC NEGATIVE: 1
ABDOMINAL PAIN: 1
SLEEP DISTURBANCE: 1
SHORTNESS OF BREATH: 0
NUMBNESS: 1
NAUSEA: 0
WOUND: 0
LIGHT-HEADEDNESS: 0
DIARRHEA: 1
FEVER: 0
DIFFICULTY URINATING: 0
FLANK PAIN: 1
LEG SWELLING: 0
DIZZINESS: 0
FATIGUE: 1
HEMATURIA: 0
UNEXPECTED WEIGHT CHANGE: 1
VOMITING: 1

## 2024-07-29 ASSESSMENT — PAIN SCALES - GENERAL: PAINLEVEL: 0-NO PAIN

## 2024-07-29 NOTE — PROGRESS NOTES
Patient ID: Terra Alexis is a 74 y.o. female.  Diagnosis:  irresectable UC   MedOnc: Dr. Boone   Urologist: Dr. Ross  Gyn: Dr. Nathan Noyola - [facility]     Patient Care Team:  No Assigned Pcp Generic Provider, MD as PCP - General (General Practice)  Shannon Rodriguez MD as PCP - [facility] Attributed Provider  Elgin Boone MD as Consulting Physician (Hematology and Oncology)    Current Therapy: EV + Pembro    ONCOLOGIC HISTORY  No matching staging information was found for the patient.    Patient is referred by Dr. Ross for recently diagnosed invasive carcinoma, may represent urothelial origin vs h/o cervical cancer. Patient presented to the ER in February 2024 with c/o flank pain imaging revealed soft tissue mass encasing the distal segment of the left ureter. Ureteral biopsy performed in IR on 03/22/2024 revealed the above diagnosis.      1998 Cervical cancer, tx Chemoradiation, with weekly cisplatin, and radiation completed on 1/4/99.   2/1999 Total abdominal hysterectomy and bilateral salpingo-oophorectomy, with no residual carcinoma found.   1/23/08 Category 1 mammogram  11/25/08 LGSIL ANTHONY 1 consistent with HPV effect  1/13/09 MID VAGINAL APEX, BIOPSY: MILD VAGINAL INTRAEPITHELIAL NEOPLASIA.  03/2024 - pelvic irresectable distal urothelial mass, bx proven UC  4/11/24 - Cycle 1 EV + Pembro  5/1/24 - Cycle 2 EV + Pembro  5/14/24 - sick visit call  5/23/24 - Scans show IA. C3 EV (reduced to 1.125 mg/kg)+pembro   6/13/24 - C4 EV (1.125 mg/kg) + Pembro  7/5/24 - C5 EV pembro     Other Contributing History  Cervical cancer - s/p chemoradiation and surgery      Review of Systems   Constitutional:  Positive for appetite change, fatigue and unexpected weight change. Negative for chills and fever.   HENT:  Negative.     Eyes:  Positive for eye problems.   Respiratory:  Negative for cough and shortness of breath.    Cardiovascular:  Negative for chest pain and leg swelling.   Gastrointestinal:  Positive for  abdominal pain, constipation, diarrhea and vomiting. Negative for blood in stool and nausea.        Reflux, food aversion, bloating   Endocrine: Negative.    Genitourinary:  Positive for dysuria and pelvic pain. Negative for difficulty urinating, hematuria, vaginal bleeding and vaginal discharge.         Related to stent, pelvic cramping   Musculoskeletal:  Positive for arthralgias, back pain and flank pain. Negative for myalgias and neck pain.   Skin:  Negative for itching, rash and wound.        Soreness on arms and thighs   Neurological:  Positive for numbness. Negative for dizziness, headaches and light-headedness.   Hematological: Negative.    Psychiatric/Behavioral:  Positive for sleep disturbance.      Objective      /76   Pulse 74   Temp 35.7 °C (96.3 °F) (Core)   Resp 18   Wt 60.8 kg (134 lb)   SpO2 98%   BMI 22.30 kg/m²   BSA: 1.67 meters squared    Wt Readings from Last 5 Encounters:   07/29/24 60.8 kg (134 lb)   07/12/24 58 kg (127 lb 15.6 oz)   07/12/24 58 kg (127 lb 15.6 oz)   07/05/24 55.9 kg (123 lb 3.8 oz)   07/05/24 55.9 kg (123 lb 4.8 oz)     Performance Status:  ECOG Score: 0- Fully active, able to carry on all pre-disease performance w/o restriction.  Karnofsky Score: 90 - Able to carry on normal activity; minor signs or symptoms of disease     Physical Exam  Physical Exam  Constitutional:       General: She is not in acute distress.     Appearance: Normal appearance. She is not toxic-appearing.   HENT:      Head: Normocephalic and atraumatic.      Mouth/Throat:      Mouth: Mucous membranes are moist.      Pharynx: Oropharynx is clear.   Eyes:      Pupils: Pupils are equal, round, and reactive to light.   Pulmonary:      Effort: Pulmonary effort is normal.   Musculoskeletal:         General: Normal range of motion.      Cervical back: Normal range of motion.      Right lower leg: No edema.      Left lower leg: No edema.   Skin:     General: Skin is warm and dry.      Findings: No  rash.   Neurological:      General: No focal deficit present.      Mental Status: She is alert and oriented to person, place, and time.      Motor: No weakness.   Psychiatric:         Mood and Affect: Mood normal.         Behavior: Behavior normal.         Thought Content: Thought content normal.         Judgment: Judgment normal.     Allergies  Allergies   Allergen Reactions    Codeine GI Upset, Other and Nausea/vomiting    Percocet [Oxycodone-Acetaminophen] Dizziness and Nausea/vomiting      Medications  Current Outpatient Medications   Medication Instructions    ciprofloxacin (CIPRO) 500 mg, oral, 2 times daily    levothyroxine (SYNTHROID, LEVOXYL) 25 mcg, oral, Daily, Take on an empty stomach at the same time each day, either 30 to 60 minutes prior to breakfast    ondansetron (ZOFRAN) 8 mg, oral, Every 8 hours PRN    potassium chloride CR 10 mEq ER tablet 10 mEq, oral, Daily    vitamins A,C,E-zinc-copper (PreserVision AREDS) 4,296 mcg-226 mg-90 mg capsule oral        Diagnostic Results   Recent Labs  Results for orders placed or performed in visit on 07/26/24 (from the past 96 hour(s))   CBC and Auto Differential   Result Value Ref Range    WBC 5.3 4.4 - 11.3 x10*3/uL    nRBC 0.0 0.0 - 0.0 /100 WBCs    RBC 3.81 (L) 4.00 - 5.20 x10*6/uL    Hemoglobin 11.2 (L) 12.0 - 16.0 g/dL    Hematocrit 35.5 (L) 36.0 - 46.0 %    MCV 93 80 - 100 fL    MCH 29.4 26.0 - 34.0 pg    MCHC 31.5 (L) 32.0 - 36.0 g/dL    RDW 18.7 (H) 11.5 - 14.5 %    Platelets 285 150 - 450 x10*3/uL    Neutrophils % 61.5 40.0 - 80.0 %    Immature Granulocytes %, Automated 0.9 0.0 - 0.9 %    Lymphocytes % 25.3 13.0 - 44.0 %    Monocytes % 9.3 2.0 - 10.0 %    Eosinophils % 2.1 0.0 - 6.0 %    Basophils % 0.9 0.0 - 2.0 %    Neutrophils Absolute 3.25 1.60 - 5.50 x10*3/uL    Immature Granulocytes Absolute, Automated 0.05 0.00 - 0.50 x10*3/uL    Lymphocytes Absolute 1.34 0.80 - 3.00 x10*3/uL    Monocytes Absolute 0.49 0.05 - 0.80 x10*3/uL    Eosinophils  Absolute 0.11 0.00 - 0.40 x10*3/uL    Basophils Absolute 0.05 0.00 - 0.10 x10*3/uL   Comprehensive Metabolic Panel   Result Value Ref Range    Glucose 88 74 - 99 mg/dL    Sodium 140 136 - 145 mmol/L    Potassium 3.4 (L) 3.5 - 5.3 mmol/L    Chloride 106 98 - 107 mmol/L    Bicarbonate 23 21 - 32 mmol/L    Anion Gap 14 10 - 20 mmol/L    Urea Nitrogen 10 6 - 23 mg/dL    Creatinine 1.05 0.50 - 1.05 mg/dL    eGFR 56 (L) >60 mL/min/1.73m*2    Calcium 8.8 8.6 - 10.3 mg/dL    Albumin 3.5 3.4 - 5.0 g/dL    Alkaline Phosphatase 66 33 - 136 U/L    Total Protein 6.1 (L) 6.4 - 8.2 g/dL    AST 22 9 - 39 U/L    Bilirubin, Total 0.4 0.0 - 1.2 mg/dL    ALT 11 7 - 45 U/L   TSH with reflex to Free T4 if abnormal   Result Value Ref Range    Thyroid Stimulating Hormone 52.00 (H) 0.44 - 3.98 mIU/L   Thyroxine, Free   Result Value Ref Range    Thyroxine, Free <0.25 (L) 0.61 - 1.12 ng/dL         Genetics   Signatera 4/11/24 0.45     Pathology  Surgical Path 3/22/24   FINAL DIAGNOSIS   A. Soft tissue, mass, biopsy:    -- Involved by invasive carcinoma. See note.     Note: Patient's imaging finding of a soft tissue mass encasing the distal ureter and remote history of cervical cancer (per clinical notes) is noted. Histological sections show cores of fibroconnective tissue involved by infiltrative nests of high grade carcinoma cells, which are immunohistochemically positive for pancytokeratin AE1/AE3, CAM5.2, GATA3 and p63 and are negative for PAX8 and ER. The morphological and immunohistochemical findings are not entirely specific. Given the clinical context of a distal ureteral mass, this might represent invasive carcinoma of urothelial origin. Clinical correlation is recommended.     Recent Imaging   MRI Pelvis -   1. A 2.9 x 2.5 x 3.6 cm diffusion restricting enhancing mass encasing  the left distal ureteral stent anterior to the left sacral ala,  compatible with known urothelial neoplasm, with similar measurements  on prior CT 07/01/2024  but has decreased in size when compared to  prior MRI from 03/22/2024. The mass completely encases the left  internal iliac artery and vein which are likely occluded.  Left  common iliac vein and left external iliac vein abuts the mass but  remain patent.  There is at least partial encasement of the left S1  and S2 nerve roots. The mass abuts the left sacrum without definite  evidence of osseous extension/destruction.  2. No evidence of lymphadenopathy or other metastatic disease in the  pelvis.    Assessment/Plan   Terra Alexis is a 74 y.o.  female with hx cervical cancer s/p chemoRT 1998, now found with pelvic irresectable distal urothelial mass, bx proven UC. Now post EV/pembro with tumor shrinkage (around 30%). Baseline signatera+, now negative. It will be challenging surgery given vascular involvement of the cancer but pt interested in curative approach and no evidence of metastatic disease and and ctDNA decline so if feasible, I support local therapy for her.   Tentative date 8/15 and we will see her afterwards for discussing of next steps.   I saw and evaluated the patient. I personally obtained the key and critical portions of the history and physical exam or was physically present for key and critical portions performed by the resident/fellow. I reviewed the resident/fellow's documentation and discussed the patient with the resident/fellow. I agree with the resident/fellow's medical decision making as documented in the note.   Elgin Boone MD MSc FACP  [facility] Cancer Research   in Medicine [employer]  Director Clinical  Medical Oncology Research Program   [facility]  Cell [phone]  Office [phone]

## 2024-07-31 ENCOUNTER — PREP FOR PROCEDURE (OUTPATIENT)
Dept: UROLOGY | Facility: HOSPITAL | Age: 74
End: 2024-07-31
Payer: MEDICARE

## 2024-07-31 DIAGNOSIS — C66.2 CANCER OF LEFT URETER (MULTI): Primary | ICD-10-CM

## 2024-07-31 DIAGNOSIS — R31.9 GENITOURINARY BLEEDING: ICD-10-CM

## 2024-07-31 RX ORDER — ALVIMOPAN 12 MG/1
12 CAPSULE ORAL ONCE
OUTPATIENT
Start: 2024-07-31 | End: 2024-07-31

## 2024-07-31 RX ORDER — HEPARIN SODIUM 5000 [USP'U]/ML
5000 INJECTION, SOLUTION INTRAVENOUS; SUBCUTANEOUS ONCE
OUTPATIENT
Start: 2024-07-31 | End: 2024-07-31

## 2024-07-31 RX ORDER — ACETAMINOPHEN 325 MG/1
975 TABLET ORAL ONCE
OUTPATIENT
Start: 2024-07-31 | End: 2024-07-31

## 2024-07-31 RX ORDER — GABAPENTIN 100 MG/1
300 CAPSULE ORAL ONCE
OUTPATIENT
Start: 2024-07-31 | End: 2024-07-31

## 2024-08-02 ENCOUNTER — TELEMEDICINE (OUTPATIENT)
Dept: VASCULAR SURGERY | Facility: HOSPITAL | Age: 74
End: 2024-08-02
Payer: MEDICARE

## 2024-08-02 DIAGNOSIS — N28.89 URETERAL MASS: Primary | ICD-10-CM

## 2024-08-02 DIAGNOSIS — Z01.818 PREOP EXAMINATION: ICD-10-CM

## 2024-08-02 DIAGNOSIS — I74.5 ILIAC ARTERY OCCLUSION (MULTI): Primary | ICD-10-CM

## 2024-08-02 DIAGNOSIS — M79.605 PAIN IN LEFT LEG: ICD-10-CM

## 2024-08-02 PROCEDURE — 1157F ADVNC CARE PLAN IN RCRD: CPT | Performed by: SURGERY

## 2024-08-02 PROCEDURE — 99443 PR PHYS/QHP TELEPHONE EVALUATION 21-30 MIN: CPT | Performed by: SURGERY

## 2024-08-06 NOTE — PROGRESS NOTES
Vascular Surgery Clinic Note    CC: Virtual, pre-operative visit    History Of Present Illness:   Terra Alexis is a 74 y.o. female here for pre-operative visit for upcoming joint surgery with Urology. She is scheduled for Nephroureterectomy on 08/15/24 for urothelial carcinoma mass that is abutting on left external iliac vessels and encasing the internal iliacsand vascular surgery, per Dr. Ross.  Vascular surgery was asked to help with tumor resection and iliac reconstruction.       Medical History:  Patient Active Problem List   Diagnosis    Acquired involutional ptosis of both eyelids    Benign lesion of eyelid    Benign paroxysmal positional vertigo    Brow ptosis, bilateral    Burning with urination    Chronic pain of right knee    Combined forms of age-related cataract, bilateral    Cubital tunnel syndrome    Dermatochalasis of left upper eyelid    Dermatochalasis of right upper eyelid    Essential hypertension    Gross hematuria    Hematuria    Hydronephrosis    Macular RPE mottling    Malignant neoplasm of cervix uteri (Multi)    Obesity, Class I, BMI 30-34.9    Sebaceous adenoma of face    Personal history of malignant neoplasm of cervix uteri    Ureteral mass    URI (upper respiratory infection)    Vaginal dysplasia    Arthritis    Asymptomatic postmenopausal state    Depression with anxiety    Hypertension    Hypokalemia    Knee pain, right    Lower back pain    Nonfunctioning kidney    Primary localized osteoarthritis of right knee    Right hip pain    Sacral pain    Urothelial carcinoma (Multi)    Acute UTI    Pelvic mass    Cancer of left ureter (Multi)        SH:    Social Determinants of Health     Tobacco Use: Low Risk  (7/29/2024)    Patient History     Smoking Tobacco Use: Never     Smokeless Tobacco Use: Never     Passive Exposure: Not on file   Alcohol Use: Not on file   Financial Resource Strain: Unknown (8/17/2021)    [facility]    Overall Financial  Resource Strain (CARDIA)     Difficulty of Paying Living Expenses: Patient declined   Food Insecurity: Not on file   Transportation Needs: Not on file   Physical Activity: Not on file   Stress: Not on file   Social Connections: Not on file   Intimate Partner Violence: Not on file   Depression: Not on file   Housing Stability: Not on file   Utilities: Not on file   Digital Equity: Not on file   Health Literacy: Not on file        FH:  Family History   Problem Relation Name Age of Onset    Macular degeneration Mother      Glaucoma Other grandparent     Macular degeneration Other grandparent         Allergies:   Allergies   Allergen Reactions    Codeine GI Upset, Other and Nausea/vomiting    Percocet [Oxycodone-Acetaminophen] Dizziness and Nausea/vomiting       Meds:   Current Outpatient Medications   Medication Instructions    levothyroxine (SYNTHROID, LEVOXYL) 25 mcg, oral, Daily, Take on an empty stomach at the same time each day, either 30 to 60 minutes prior to breakfast    ondansetron (ZOFRAN) 8 mg, oral, Every 8 hours PRN    potassium chloride CR 10 mEq ER tablet 10 mEq, oral, Daily    vitamins A,C,E-zinc-copper (PreserVision AREDS) 4,296 mcg-226 mg-90 mg capsule oral       ROS:  All systems were reviewed and noted to be negative, other than described above.     Objective:    Vitals: unable to obtain, virtual visit.  Exam: unable to obtain, virtual visit.     Imaging Reviewed:  MR Pelvis 07/17/24   A 2.9 x 2.5 x 3.6 cm diffusion restricting enhancing mass encasing the left distal ureteral stent anterior to the left sacral ala, compatible with known urothelial neoplasm, with similar measurements on prior CT 07/01/2024 but has decreased in size when compared to prior MRI from 03/22/2024. The mass completely encases the left internal iliac artery and vein which are likely occluded.  Left common iliac vein and left external iliac vein abuts the mass but  remain patent.  There is at least partial encasement of the  left S1 and S2 nerve roots. The mass abuts the left sacrum without definite evidence of osseous extension/destruction.    CT Chest/AB/Pel 07/01/24   Interval decrease in the size of the mass lesion along the distal left ureter with hypodensity throughout the lesion and component of central necrosis noted. Lesion is intimate with the iliac vasculature  with findings appearing stable from prior imaging. The lesion measures 2.7 x 1.9 cm and on the prior examination measured 3.0 x 2.7 cm in the similar distribution    Assessment & Plan:  Urothelial carcinoma with planned joint surgery with urology: nephroureterectomy by Dr. Teddy Ross, and left external iliac artery bypass with with left external iliac vein ligation by Dr. Raf Michael on 08/15/24.   - Vascular BL LE vein mapping ordered.    - Risks and benefits of surgery reviewed.     Sandra Mcclellan, [facility]  Vascular Surgery

## 2024-08-07 ENCOUNTER — APPOINTMENT (OUTPATIENT)
Dept: VASCULAR MEDICINE | Facility: HOSPITAL | Age: 74
End: 2024-08-07
Payer: MEDICARE

## 2024-08-08 ENCOUNTER — HOSPITAL ENCOUNTER (OUTPATIENT)
Dept: VASCULAR MEDICINE | Facility: CLINIC | Age: 74
Discharge: HOME | End: 2024-08-08
Payer: MEDICARE

## 2024-08-08 DIAGNOSIS — Z01.818 ENCOUNTER FOR OTHER PREPROCEDURAL EXAMINATION: ICD-10-CM

## 2024-08-08 DIAGNOSIS — M79.605 PAIN IN LEFT LEG: ICD-10-CM

## 2024-08-08 DIAGNOSIS — I74.5 ILIAC ARTERY OCCLUSION (MULTI): ICD-10-CM

## 2024-08-08 PROCEDURE — 93970 EXTREMITY STUDY: CPT

## 2024-08-08 PROCEDURE — 93970 EXTREMITY STUDY: CPT | Performed by: INTERNAL MEDICINE

## 2024-08-13 ENCOUNTER — PRE-ADMISSION TESTING (OUTPATIENT)
Dept: PREADMISSION TESTING | Facility: HOSPITAL | Age: 74
DRG: 657 | End: 2024-08-13
Payer: MEDICARE

## 2024-08-13 VITALS
BODY MASS INDEX: 26.14 KG/M2 | DIASTOLIC BLOOD PRESSURE: 87 MMHG | OXYGEN SATURATION: 96 % | TEMPERATURE: 97.7 F | WEIGHT: 133.16 LBS | HEIGHT: 60 IN | SYSTOLIC BLOOD PRESSURE: 142 MMHG | HEART RATE: 77 BPM

## 2024-08-13 DIAGNOSIS — C66.2 CANCER OF LEFT URETER (MULTI): ICD-10-CM

## 2024-08-13 DIAGNOSIS — R31.9 GENITOURINARY BLEEDING: ICD-10-CM

## 2024-08-13 DIAGNOSIS — Z41.9 SURGERY, ELECTIVE: Primary | ICD-10-CM

## 2024-08-13 LAB
ABO GROUP (TYPE) IN BLOOD: NORMAL
ALBUMIN SERPL BCP-MCNC: 4.1 G/DL (ref 3.4–5)
ALP SERPL-CCNC: 70 U/L (ref 33–136)
ALT SERPL W P-5'-P-CCNC: 18 U/L (ref 7–45)
ANION GAP SERPL CALC-SCNC: 15 MMOL/L (ref 10–20)
ANTIBODY SCREEN: NORMAL
APPEARANCE UR: CLEAR
APTT PPP: 31 SECONDS (ref 27–38)
AST SERPL W P-5'-P-CCNC: 33 U/L (ref 9–39)
BILIRUB SERPL-MCNC: 0.5 MG/DL (ref 0–1.2)
BILIRUB UR STRIP.AUTO-MCNC: NEGATIVE MG/DL
BUN SERPL-MCNC: 22 MG/DL (ref 6–23)
CALCIUM SERPL-MCNC: 9.9 MG/DL (ref 8.6–10.6)
CHLORIDE SERPL-SCNC: 105 MMOL/L (ref 98–107)
CO2 SERPL-SCNC: 23 MMOL/L (ref 21–32)
COLOR UR: ABNORMAL
CREAT SERPL-MCNC: 1.1 MG/DL (ref 0.5–1.05)
EGFRCR SERPLBLD CKD-EPI 2021: 53 ML/MIN/1.73M*2
ERYTHROCYTE [DISTWIDTH] IN BLOOD BY AUTOMATED COUNT: 18.9 % (ref 11.5–14.5)
GLUCOSE SERPL-MCNC: 82 MG/DL (ref 74–99)
GLUCOSE UR STRIP.AUTO-MCNC: NORMAL MG/DL
HCT VFR BLD AUTO: 35.4 % (ref 36–46)
HGB BLD-MCNC: 11.4 G/DL (ref 12–16)
HOLD SPECIMEN: NORMAL
INR PPP: 0.9 (ref 0.9–1.1)
KETONES UR STRIP.AUTO-MCNC: NEGATIVE MG/DL
LEUKOCYTE ESTERASE UR QL STRIP.AUTO: ABNORMAL
MCH RBC QN AUTO: 30.6 PG (ref 26–34)
MCHC RBC AUTO-ENTMCNC: 32.2 G/DL (ref 32–36)
MCV RBC AUTO: 95 FL (ref 80–100)
NITRITE UR QL STRIP.AUTO: NEGATIVE
NRBC BLD-RTO: 0 /100 WBCS (ref 0–0)
PH UR STRIP.AUTO: 6.5 [PH]
PLATELET # BLD AUTO: 234 X10*3/UL (ref 150–450)
POTASSIUM SERPL-SCNC: 3.9 MMOL/L (ref 3.5–5.3)
PROT SERPL-MCNC: 6.9 G/DL (ref 6.4–8.2)
PROT UR STRIP.AUTO-MCNC: ABNORMAL MG/DL
PROTHROMBIN TIME: 10 SECONDS (ref 9.8–12.8)
RBC # BLD AUTO: 3.72 X10*6/UL (ref 4–5.2)
RBC # UR STRIP.AUTO: NEGATIVE /UL
RBC #/AREA URNS AUTO: NORMAL /HPF
RH FACTOR (ANTIGEN D): NORMAL
SODIUM SERPL-SCNC: 139 MMOL/L (ref 136–145)
SP GR UR STRIP.AUTO: 1.01
UROBILINOGEN UR STRIP.AUTO-MCNC: NORMAL MG/DL
WBC # BLD AUTO: 6.4 X10*3/UL (ref 4.4–11.3)
WBC #/AREA URNS AUTO: NORMAL /HPF

## 2024-08-13 PROCEDURE — 87086 URINE CULTURE/COLONY COUNT: CPT | Performed by: STUDENT IN AN ORGANIZED HEALTH CARE EDUCATION/TRAINING PROGRAM

## 2024-08-13 PROCEDURE — 81001 URINALYSIS AUTO W/SCOPE: CPT | Performed by: STUDENT IN AN ORGANIZED HEALTH CARE EDUCATION/TRAINING PROGRAM

## 2024-08-13 PROCEDURE — 87081 CULTURE SCREEN ONLY: CPT | Performed by: ANESTHESIOLOGY

## 2024-08-13 PROCEDURE — 99205 OFFICE O/P NEW HI 60 MIN: CPT | Performed by: NURSE ANESTHETIST, CERTIFIED REGISTERED

## 2024-08-13 PROCEDURE — 85027 COMPLETE CBC AUTOMATED: CPT | Performed by: STUDENT IN AN ORGANIZED HEALTH CARE EDUCATION/TRAINING PROGRAM

## 2024-08-13 PROCEDURE — 86900 BLOOD TYPING SEROLOGIC ABO: CPT | Performed by: STUDENT IN AN ORGANIZED HEALTH CARE EDUCATION/TRAINING PROGRAM

## 2024-08-13 PROCEDURE — 80053 COMPREHEN METABOLIC PANEL: CPT | Performed by: STUDENT IN AN ORGANIZED HEALTH CARE EDUCATION/TRAINING PROGRAM

## 2024-08-13 PROCEDURE — 85610 PROTHROMBIN TIME: CPT | Performed by: STUDENT IN AN ORGANIZED HEALTH CARE EDUCATION/TRAINING PROGRAM

## 2024-08-13 PROCEDURE — 36415 COLL VENOUS BLD VENIPUNCTURE: CPT

## 2024-08-13 PROCEDURE — 86923 COMPATIBILITY TEST ELECTRIC: CPT

## 2024-08-13 RX ORDER — CHLORHEXIDINE GLUCONATE ORAL RINSE 1.2 MG/ML
15 SOLUTION DENTAL DAILY
Qty: 473 ML | Refills: 0 | Status: SHIPPED | OUTPATIENT
Start: 2024-08-13 | End: 2024-08-21 | Stop reason: HOSPADM

## 2024-08-13 RX ORDER — CHLORHEXIDINE GLUCONATE 40 MG/ML
1 SOLUTION TOPICAL DAILY
Qty: 473 ML | Refills: 0 | Status: SHIPPED | OUTPATIENT
Start: 2024-08-13 | End: 2024-08-21 | Stop reason: HOSPADM

## 2024-08-13 ASSESSMENT — ENCOUNTER SYMPTOMS
MUSCULOSKELETAL NEGATIVE: 1
NECK NEGATIVE: 1
EYES NEGATIVE: 1
CONSTITUTIONAL NEGATIVE: 1
GASTROINTESTINAL NEGATIVE: 1
ENDOCRINE NEGATIVE: 1
NUMBNESS: 1
CARDIOVASCULAR NEGATIVE: 1

## 2024-08-13 ASSESSMENT — DUKE ACTIVITY SCORE INDEX (DASI)
CAN YOU PARTICIPATE IN STRENOUS SPORTS LIKE SWIMMING, SINGLES TENNIS, FOOTBALL, BASKETBALL, OR SKIING: NO
CAN YOU TAKE CARE OF YOURSELF (EAT, DRESS, BATHE, OR USE TOILET): YES
TOTAL_SCORE: 24.2
CAN YOU WALK A BLOCK OR TWO ON LEVEL GROUND: YES
CAN YOU DO HEAVY WORK AROUND THE HOUSE LIKE SCRUBBING FLOORS OR LIFTING AND MOVING HEAVY FURNITURE: NO
CAN YOU WALK INDOORS, SUCH AS AROUND YOUR HOUSE: YES
CAN YOU HAVE SEXUAL RELATIONS: YES
CAN YOU DO YARD WORK LIKE RAKING LEAVES, WEEDING OR PUSHING A MOWER: NO
CAN YOU PARTICIPATE IN MODERATE RECREATIONAL ACTIVITIES LIKE GOLF, BOWLING, DANCING, DOUBLES TENNIS OR THROWING A BASEBALL OR FOOTBALL: NO
CAN YOU DO LIGHT WORK AROUND THE HOUSE LIKE DUSTING OR WASHING DISHES: YES
CAN YOU RUN A SHORT DISTANCE: NO
DASI METS SCORE: 5.7
CAN YOU CLIMB A FLIGHT OF STAIRS OR WALK UP A HILL: YES
CAN YOU DO MODERATE WORK AROUND THE HOUSE LIKE VACUUMING, SWEEPING FLOORS OR CARRYING GROCERIES: YES

## 2024-08-13 NOTE — PREPROCEDURE INSTRUCTIONS
Medication List            Accurate as of August 13, 2024 10:07 AM. Always use your most recent med list.                levothyroxine 25 mcg tablet  Commonly known as: Synthroid, Levoxyl  Take 1 tablet (25 mcg) by mouth early in the morning.. Take on an empty stomach at the same time each day, either 30 to 60 minutes prior to breakfast  Medication Adjustments for Surgery: Take morning of surgery with sip of water, no other fluids  Notes to patient: Take as prescribed     ondansetron 8 mg tablet  Commonly known as: Zofran  Take 1 tablet (8 mg) by mouth every 8 hours if needed for nausea or vomiting.  Medication Adjustments for Surgery: Other (Comment)  Notes to patient: Take as prescribed     potassium chloride CR 10 mEq ER tablet  Commonly known as: Klor-Con  Take 1 tablet (10 mEq) by mouth once daily.  Medication Adjustments for Surgery: Take morning of surgery with sip of water, no other fluids  Notes to patient: Take as prescribed     PreserVision AREDS 4,296 mcg-226 mg-90 mg capsule  Generic drug: vitamins A,C,E-zinc-copper  Notes to patient: Take as prescribed                              NPO Instructions:    Do not eat any food after midnight the night before your surgery/procedure.  You may have clear liquids until TWO hours before surgery/procedure. This includes water, black tea/coffee, (no milk or cream) apple juice and electrolyte drinks (Gatorade).    Additional Instructions:         Thank you for visiting [facility] today for your pre-procedure evaluation, you were seen by Camila Loredo CRNA    This summary includes instructions and information to aid you during your perioperative period.  Please read carefully. If you have any questions about your visit today, please call the number listed above.  If you become ill or have any changes to your health before your surgery, please contact your primary care provider and alert your surgeon.    Preparing for your Surgery        Exercises  Preoperative Deep Breathing Exercises  Why it is important to do deep breathing exercises before my surgery?  Deep breathing exercises strengthen your breathing muscles.  This helps you to recover after your surgery and decreases the chance of breathing complications.  How are the deep breathing exercises done?  Sit straight with your back supported.  Breathe in deeply and slowly through your nose. Your lower rib cage should expand and your abdomen may move forward.  Hold that breath for 3 to 5 seconds.  Breathe out through pursed lips, slowly and completely.  Rest and repeat 10 times every hour while awake.  Rest longer if you become dizzy or lightheaded.       Incentive Spirometer    What is an incentive spirometer?  An incentive spirometer is a device used before and after surgery to “exercise” your lungs.  It helps you to take deeper breaths to expand your lungs.  Below is an example of a basic incentive spirometer.  The device you receive may differ slightly but they all function the same.    Why do I need to use an incentive spirometer?  Using your incentive spirometer prepares your lungs for surgery and helps prevent lung problems after surgery.  How do I use my incentive spirometer?  When you're using your incentive spirometer, make sure to breathe through your mouth. If you breathe through your nose, the incentive spirometer won't work properly. You can hold your nose if you have trouble.  If you feel dizzy at any time, stop and rest. Try again at a later time.  Follow the steps below:  Set up your incentive spirometer, expand the flexible tubing and connect to the outlet.  Sit upright in a chair or bed. Hold the incentive spirometer at eye level.   Put the mouthpiece in your mouth and close your lips tightly around it. Slowly breathe out (exhale) completely.  Breathe in (inhale) slowly through your mouth as deeply as you can. As you take a breath, you will see the piston rise inside the  large column. While the piston rises, the indicator should move upwards. It should stay in between the 2 arrows (see Figure).  Try to get the piston as high as you can, while keeping the indicator between the arrows.   If the indicator doesn't stay between the arrows, you're breathing either too fast or too slow.  When you get it as high as you can, hold your breath for 10 seconds, or as long as possible. While you're holding your breath, the piston will slowly fall to the base of the spirometer.  Once the piston reaches the bottom of the spirometer, breathe out slowly through your mouth. Rest for a few seconds.  Repeat 10 times. Try to get the piston to the same level with each breath.  Repeat every hour while awake  You can carefully clean the outside of the mouthpiece with an alcohol wipe or soap and water.      Preoperative Brain Exercises    What are brain exercises?  A brain exercise is any activity that engages your thinking (cognitive) skills.    What types of activities are considered brain exercises?  Jigsaw puzzles, crossword puzzles, word jumble, memory games, word search, and many more.  Many can be found free online or on your phone via a mobile sujey.    Why should I do brain exercises before my surgery?  More recent research has shown brain exercise before surgery can lower the risk of postoperative delirium (confusion) which can be especially important for older adults.  Patients who did brain exercises for 5 to 10 hours the days before surgery, cut their risk of postoperative delirium in half up to 1 week after surgery.    Sit-to-Stand Exercise    What is the sit-to-stand exercise?  The sit-to-stand exercise strengthens the muscles of your lower body and muscles in the center of your body (core muscles for stability) helping to maintain and improve your strength and mobility.  How do I do the sit-to-stand exercise?  The goal is to do this exercise without using your arms or hands.  If this is too  difficult, use your arms and hands or a chair with armrests to help slowly push yourself to the standing position and lower yourself back to the sitting position. As the movement becomes easier use your arms and hands less.    Steps to the sit-to-stand exercise  Sit up tall in a sturdy chair, knees bent, feet flat on the floor shoulder-width apart.  Shift your hips/pelvis forward in the chair to correctly position yourself for the next movement.  Lean forward at your hips.  Stand up straight putting equal weight on both feet.  Check to be sure you are properly aligned with the chair, in a slow controlled movement sit back down.  Repeat this exercise 10-15 times.  If needed you can do it fewer times until your strength improves.  Rest for 1 minute.  Do another 10-15 sit-to-stand exercises.  Try to do this in the morning and evening.        Instructions    Preoperative Fasting Guidelines    Why must I stop eating and drinking near surgery time?  With sedation, food or liquid in your stomach can enter your lungs causing serious complications  Food can increase nausea and vomiting  When do I need to stop eating and drinking before my surgery?      Do not eat any food or drink any liquids after midnight the night before your surgery/procedure.  You may have sips of water to take medications.            Simple things you can do to help prevent blood clots     Blood clots are blockages that can form in the body's veins. When a blood clot forms in your deep veins, it may be called a deep vein thrombosis, or DVT for short. Blood clots can happen in any part of the body where blood flows, but they are most common in the arms and legs. If a piece of a blood clot breaks free and travels to the lungs, it is called a pulmonary embolus (PE). A PE can be a very serious problem.         Being in the hospital or having surgery can raise your chances of getting a blood clot because you may not be well enough to move around as much as  you normally do.         Ways you can help prevent blood clots in the hospital       Wearing SCDs  SCDs stands for Sequential Compression Devices.   SCDs are special sleeves that wrap around your legs. They attach to a pump that fills them with air to gently squeeze your legs every few minutes.  This helps return the blood in your legs to your heart.   SCDs should only be taken off when walking or bathing. SCDs may not be comfortable, but they can help save your life.              Pump SCD leg sleeves  Wearing compression stockings - if your doctor orders them. These special snug-fitting stockings gently squeeze your legs to help blood flow.       Walking. Walking helps move the blood in your legs.   If your doctor says it is ok, try walking the halls at least   5 times a day. Ask us to help you get up, so you don't fall.      Taking any blood-thinning medicines your doctor orders.              Ways you can help prevent blood clots at home         Wearing compression stockings - if your doctor orders them.   Walking - to help move the blood in your legs.    Taking any blood-thinning medicines your doctor orders.      Signs of a blood clot or PE    Tell your doctor or nurse right away if you have any of the problems listed below.         If you are at home, seek medical care right away. Call 911 for chest pain or problems breathing.            Signs of a blood clot (DVT) - such as pain, swelling, redness, or warmth in your arm or legs.  Signs of a pulmonary embolism (PE) - such as chest pain or feeling short of breath      Tobacco and Alcohol;  Do not drink alcohol or smoke within 24 hours of surgery.  It is best to quit smoking for as long as possible before any surgery or procedure.       Other Instructions  Why did I have my nose, under my arms, and groin swabbed? The purpose of the swab is to identify Staphylococcus aureus inside your nose or on your skin.  The swab was sent to the laboratory for culture.  MYRNA  "positive swab/culture for Staphylococcus aureus is called colonization or carriage.   What is Staphylococcus aureus? Staphylococcus aureus, also known as \"staph\", is a germ found on the skin or in the nose of healthy people.  Sometimes Staphylococcus aureus can get into the body and cause an infection.  This can be minor (such as pimples, boils, or other skin problems).  It might also be serious (such as a blood infection, pneumonia, or a surgical site infection). What is Staphylococcus aureus colonization or carriage? Colonization or carriage means that a person has the germ but is not sick from it.  These bacteria can be spread on the hands or when breathing or sneezing. How is Staphylococcus aureus spread? It is most often spread by close contact with a person or item that carries it. What happens if my culture is positive for Staphylococcus aureus? Your doctor/medical team will use this information to guide any antibiotic treatment which may be necessary.  Regardless of the culture results, we will clean the inside of your nose with a betadine swab just before you have your surgery. Will I get an infection if I have Staphylococcus aureus in my nose or on my skin? Anyone can get an infection with Staphylococcus aureus.  However, the best way to reduce your risk of infection is to follow the instructions provided to you for the use of your CHG soap and dental rinse.  , Body Wash; What is a home preoperative antibacterial shower? This shower is a way of cleaning the skin with a germ-killing solution before surgery.  The solution contains chlorhexidine, commonly known as CHG.  CHG is a skin cleanser with germ-killing ability.  Let your doctor know if you are allergic to chlorhexidine. Why do I need to take a preoperative antibacterial shower? Skin is not sterile.  It is best to try to make your skin as free of germs as possible before surgery.  Proper cleansing with a germ-killing soap before surgery can lower the " number of germs on your skin.  This helps to reduce the risk of infection at the surgical site.  Following the instructions listed below will help you prepare your skin for surgery.   How do I use the solution? Steps:  Begin using your CHG soap 5 days before your scheduled surgery on ___________.   First, wash and rinse your hair using the CHG soap. Keep CHG soap away from ear canals and eyes.  Rinse completely, do not condition.  Hair extensions should be removed. , Oral/Dental Rinse: What is oral/dental rinse?  It is a mouthwash. It is a way of cleaning the mouth with a germ-killing solution before your surgery.  The solution contains chlorhexidine, commonly known as CHG. It is used inside the mouth to kill a bacteria known as Staphylococcus aureus.  Let your doctor know if you are allergic to Chlorhexidine. Why do I need to use CHG oral/dental rinse? The CHG oral/dental rinse helps to kill a bacteria in your mouth known as Staphylococcus aureus.  This reduces the risk of infection at the surgical site.  Using your CHG oral/dental rinse STEPS: Use your CHG oral/dental rinse after you brush your teeth the night before (at bedtime) and the morning of your surgery.  Follow all directions on your prescription label.  Use the cap on the container to measure 15 ml.  Swish (gargle if you can) the mouthwash in your mouth for at least 30 seconds, (do not swallow) and spit out.  After you use your CHG rinse, do not rinse your mouth with water, drink or eat.  Please refer to the prescription label for the appropriate time to resume oral intake What side effects might I have using the CHG oral/dental rinse? CHG rinse will stick to plaque on the teeth.  Brush and floss just before use.  Teeth brushing will help avoid staining of plaque during use.             The Week before Surgery        Seven days before Surgery  Check your [facility] medication instructions  Do the exercises provided to you by [facility]   Arrange for a responsible,  adult licensed  to take you home after surgery and stay with you for 24 hours.  You will not be permitted to drive yourself home if you have received any anesthetic/sedation  Six days before surgery  Check your [facility] medication instructions  Do the exercises provided to you by [facility]   Start using Chlorhexidene (CHG) body wash if prescribed  Five days before surgery  Check your [facility] medication instructions  Do the exercises provided to you by [facility]   Continue to use CHG body wash if prescribed  Three days before surgery  Check your [facility] medication instructions  Do the exercises provided to you by [facility]   Continue to use CHG body wash if prescribed  Two days before surgery  Check your [facility] medication instructions  Do the exercises provided to you by [facility]   Continue to use CHG body wash if prescribed    The Day before Surgery       Check your [facility] medication and all other [facility] instructions including when to stop eating and drinking  You will be called with your arrival time for surgery in the late afternoon.  If you do not receive a call please reach out to your surgeon's office.  Do not smoke or drink 24 hours before surgery  Prepare items to bring with you to the hospital  Shower with your chlorhexidine wash if prescribed  Brush your teeth and use your chlorhexidine dental rinse if prescribed    The Day of Surgery       Check your [facility] medication instructions  Ensure you follow the instructions for when to stop eating and drinking  Shower, if prescribed use CHG.  Do not apply any lotions, creams, moisturizers, perfume or deodorant  Brush your teeth and use your CHG dental rinse if prescribed  Wear loose comfortable clothing  Avoid make-up  Remove  jewelry and piercings, consider professional piercing removal with a plastic spacer if needed  Bring photo ID and Insurance card  Bring an accurate medication list that includes medication dose, frequency and allergies  Bring a copy of your advanced directives (will, health care  power of )  Bring any devices and controllers as well as medical devices you have been provided with for surgery (CPAP, slings, braces, etc.)  Dentures, eyeglasses, and contacts will be removed before surgery, please bring cases for contacts or glasses

## 2024-08-13 NOTE — CPM/PAT H&P
CPM/PAT Evaluation       Name: Terra Alexis (Terra Alexis)  /Age: 1950/74 y.o.     In-Person       Chief Complaint:   74 y.o.  female  scheduled for nephroureterectomy on 8/15 with Dr. Ross and left external iliac bypass with left external iliac vein ligation with Dr. Michael.    Presents to [facility] today for perioperative risk stratification and optimization    HPI    Past Medical History:   Diagnosis Date    Other specified disorders of kidney and ureter 2022    Ureteral mass    Personal history of malignant neoplasm of cervix uteri 2022    History of malignant neoplasm of cervix       Past Surgical History:   Procedure Laterality Date    OTHER SURGICAL HISTORY  10/07/2021    Hysterectomy       Patient Sexual activity questions deferred to the physician.    Family History   Problem Relation Name Age of Onset    Macular degeneration Mother      Glaucoma Other grandparent     Macular degeneration Other grandparent        Allergies   Allergen Reactions    Codeine GI Upset, Other and Nausea/vomiting    Percocet [Oxycodone-Acetaminophen] Dizziness and Nausea/vomiting       Prior to Admission medications    Medication Sig Start Date End Date Taking? Authorizing Provider   levothyroxine (Synthroid, Levoxyl) 25 mcg tablet Take 1 tablet (25 mcg) by mouth early in the morning.. Take on an empty stomach at the same time each day, either 30 to 60 minutes prior to breakfast 24  Yes ISSAC Maravilla   potassium chloride CR 10 mEq ER tablet Take 1 tablet (10 mEq) by mouth once daily. 24  Yes ISSAC Fitzgerald   vitamins A,C,E-zinc-copper (PreserVision AREDS) 4,296 mcg-226 mg-90 mg capsule Take by mouth.   Yes Historical Provider, MD   ondansetron (Zofran) 8 mg tablet Take 1 tablet (8 mg) by mouth every 8 hours if needed for nausea or vomiting.  Patient not taking: Reported on 2024   Elgin Boone MD        [facility] ROS:   Constitutional:   neg    Neuro/Psych:     numbness  Eyes:   neg    Ears:   neg    Nose:   neg    Mouth:   neg    Throat:   Neck:   neg    Cardio:   neg    Respiratory:   Endocrine:   neg    GI:   neg    :   neg    Musculoskeletal:   neg    Hematologic:   neg    Skin:  neg        Physical Exam  Vitals reviewed.   HENT:      Head: Normocephalic and atraumatic.      Nose: Nose normal.   Eyes:      Pupils: Pupils are equal, round, and reactive to light.   Cardiovascular:      Rate and Rhythm: Normal rate and regular rhythm.      Pulses: Normal pulses.   Pulmonary:      Effort: Pulmonary effort is normal.   Abdominal:      Palpations: Abdomen is soft.   Musculoskeletal:      Cervical back: Normal range of motion.   Skin:     General: Skin is warm and dry.   Neurological:      General: No focal deficit present.      Mental Status: She is alert.   Psychiatric:         Mood and Affect: Mood normal.          PAT AIRWAY:   Airway:     Mallampati::  III    TM distance::  >3 FB    Neck ROM::  Full  normal        Visit Vitals  /87   Pulse 77   Temp 36.5 °C (97.7 °F) (Temporal)       DASI Risk Score      Flowsheet Row Most Recent Value   DASI SCORE 24.2   METS Score (Will be calculated only when all the questions are answered) 5.7          Caprini DVT Assessment    No data to display       Modified Frailty Index    No data to display       CHADS2 Stroke Risk  Current as of 14 minutes ago        N/A 3 to 100%: High Risk   2 to < 3%: Medium Risk   0 to < 2%: Low Risk     Last Change: N/A          This score determines the patient's risk of having a stroke if the patient has atrial fibrillation.        This score is not applicable to this patient. Components are not calculated.          Revised Cardiac Risk Index    No data to display       Apfel Simplified Score    No data to display       Risk Analysis Index Results This Encounter    No data found in the last 1 encounters.       Stop Bang Score      Flowsheet Row Most Recent Value   Do you snore loudly? 1   Do you  often feel tired or fatigued after your sleep? 0   Has anyone ever observed you stop breathing in your sleep? 0   Do you have or are you being treated for high blood pressure? 0   Recent BMI (Calculated) 26.2   Is BMI greater than 35 kg/m2? 0=No   Age older than 50 years old? 1=Yes   Is your neck circumference greater than 17 inches (Male) or 16 inches (Female)? 0   Gender - Male 0=No   STOP-BANG Total Score 2              Assessment & Plan:    74 y.o.  female  scheduled for nephroureterectomy on 8/15 with Dr. Ross and left external iliac bypass with left external iliac vein ligation with Dr. Michael.    Presents to [facility] today for perioperative risk stratification and optimization    Neuro:   numbness in fingers and feet s/p chemo therapy     No neurologic diagnosis, however, the patient is at increased risk for perioperative delirium secondary to  age, type and duration of surgery, Patient instructed on and provided cognitive exercises  Patient is at increased risk for perioperative CVA secondary to  increased age, operative time > 2.5 hours    Neuro surgery consulted  for partial encasement of nerves during surgery    HEENT:  No HEENT diagnosis or significant findings on chart review or clinical presentation and evaluation. No further preoperative testing/intervention indicated at this time.    Cardiovascular:  No CV diagnosis or significant findings on chart review or clinical presentation and evaluation. No further preoperative testing or intervention is indicated at this time.  METS: 5.7  RCRI: 1 point, 6.0% risk for postoperative MACE   RASHID: 0.3% risk for 30 day postoperative MACE      Pulmonary:  No pulmonary diagnosis or significant findings on chart review or clinical presentation.  No further preoperative testing is indicated at this time.  age > 60, site of surgery, major surgery, duration of surgery > 2 hours  Stop Bang score is 2 placing patient at low  risk for ZACH  ARISCAT: 26-44 points, 13.3% risk of  in-hospital postoperative pulmonary complication  PRODIGY: Moderate risk for opioid induced respiratory depression  Pumonary toilet education discussed, patient also provided deep breathing exercises and incentive spirometry educational handout    Renal:  ureteral CA.  Underwent cysto and left ureteral biopsy with ureteral stent 3/1/24.  Has had chemotherapy with some tumor shrinkage.     A 2.9 x 2.5 x 3.6 cm diffusion restricting enhancing mass encasing the left distal ureteral stent anterior to the left sacral ala, compatible with known urothelial neoplasm, with similar measurements on prior CT 07/01/2024 but has decreased in size when compared to prior MRI from 03/22/2024. The mass completely encases the left internal iliac artery and vein which are likely occluded. Left common iliac vein and left external iliac vein abuts the mass but remain patent. There is at least partial encasement of the left S1 and S2 nerve roots. The mass abuts the left sacrum without definite evidence of osseous extension/destruction. No evidence of lymphadenopathy or other metastatic disease in the pelvis.     MR Pelvis 07/17/24   A 2.9 x 2.5 x 3.6 cm diffusion restricting enhancing mass encasing the left distal ureteral stent anterior to the left sacral ala, compatible with known urothelial neoplasm, with similar measurements on prior CT 07/01/2024 but has decreased in size when compared to prior MRI from 03/22/2024. The mass completely encases the left internal iliac artery and vein which are likely occluded.  Left common iliac vein and left external iliac vein abuts the mass but  remain patent.  There is at least partial encasement of the left S1 and S2 nerve roots. The mass abuts the left sacrum without definite evidence of osseous extension/destruction.     CT Chest/AB/Pel 07/01/24   Interval decrease in the size of the mass lesion along the distal left ureter with hypodensity throughout the lesion and component of central necrosis  noted. Lesion is intimate with the iliac vasculature  with findings appearing stable from prior imaging. The lesion measures 2.7 x 1.9 cm and on the prior examination measured 3.0 x 2.7 cm in the similar distribution       Endocrine:  No endocrine diagnosis or significant findings on chart review or clinical presentation and evaluation. No further testing or intervention is indicated at this time.    Hematologic:  No hematologic diagnosis, however patient is at an increased risk for DVT  Caprini Score 8, patient at High risk for perioperative DVT.  Patient provided with VTE education/handout.    Gastrointestinal:   No GI diagnosis or significant findings on chart review or clinical presentation and evaluation.       Infectious disease:   No infectious diagnosis or significant findings on chart review or clinical presentation and evaluation.   Prescription provided for CHG body wash and dental rinse. CHG use instructions reviewed and provided to patient.  Staph screen collected    Musculoskeletal:   No diagnosis or significant findings on chart review or clinical presentation and evaluation.     Anesthesia/Airway:  No anesthesia complications PAT Evaluation     Patient summary reviewed and Nursing notes reviewed    Airway   Mallampati: III  TM distance: >3 FB  Neck ROM: full  Dental - negative    Pulmonary - negative  Cardiovascular - negative    Neuro/Psych   GI/Hepatic/Renal     Endo/Other   (+) hypothyroidism                   Medication instructions and NPO guidelines reviewed with the patient.  All questions or concerns discussed and addressed.

## 2024-08-14 ENCOUNTER — ANESTHESIA EVENT (OUTPATIENT)
Dept: OPERATING ROOM | Facility: HOSPITAL | Age: 74
End: 2024-08-14
Payer: MEDICARE

## 2024-08-14 ENCOUNTER — TELEPHONE (OUTPATIENT)
Dept: UROLOGY | Facility: HOSPITAL | Age: 74
End: 2024-08-14
Payer: MEDICARE

## 2024-08-14 DIAGNOSIS — C66.2 CANCER OF LEFT URETER (MULTI): Primary | ICD-10-CM

## 2024-08-14 LAB — BACTERIA UR CULT: NORMAL

## 2024-08-14 NOTE — PROGRESS NOTES
Pharmacy Medication History Review    Terra Alexis is a 74 y.o. female who is planned to be admitted for Cancer of left ureter (Multi). Pharmacy called the patient prior to their scheduled procedure and reviewed the patient's [id number] medications for accuracy.    Medications ADDED:  none  Medications CHANGED:  none  Medications REMOVED:   none    Please review updated prior to admission medication list and comments regarding how patient may be taking medications differently by going to Admission tab --> Admission Orders --> Admit Orders / Review prior to admission medications.     Preferred pharmacy and allergies to be confirmed with patient by nursing the day of procedure.     Sources used to complete the med history include spoke with patient, surescripts, oarrs, care everywhere    Below are additional concerns with the patient's PTA list.  none    Noemí Caraballo CPhT    [facility]  Please reach out via Secure Chat for questions

## 2024-08-14 NOTE — TELEPHONE ENCOUNTER
I called the patient to check on her the day before surgery.  I reviewed with her that I have communicated with the vascular surgeon, Dr. Michael, as well as Dr. Araiza, neurosurgeon and peripheral nerve specialist, regarding the surgery tomorrow.  I reviewed with her the vascular and nerve root involvement, and our plan to address those with the specialists mentioned above.  I reviewed all the risks and benefits again, including bleeding, complications related to lower extremity vascular and nerve supply, and risk of transfusion, and possible postop course.    The patient expressed her preparedness were surgery and her gratitude for the call.

## 2024-08-15 ENCOUNTER — HOSPITAL ENCOUNTER (OUTPATIENT)
Dept: NEUROLOGY | Facility: HOSPITAL | Age: 74
Setting detail: SURGERY ADMIT
Discharge: HOME | End: 2024-08-15
Payer: MEDICARE

## 2024-08-15 ENCOUNTER — HOSPITAL ENCOUNTER (INPATIENT)
Facility: HOSPITAL | Age: 74
DRG: 657 | End: 2024-08-15
Attending: STUDENT IN AN ORGANIZED HEALTH CARE EDUCATION/TRAINING PROGRAM | Admitting: STUDENT IN AN ORGANIZED HEALTH CARE EDUCATION/TRAINING PROGRAM
Payer: MEDICARE

## 2024-08-15 ENCOUNTER — ANESTHESIA (OUTPATIENT)
Dept: OPERATING ROOM | Facility: HOSPITAL | Age: 74
End: 2024-08-15
Payer: MEDICARE

## 2024-08-15 ENCOUNTER — APPOINTMENT (OUTPATIENT)
Dept: RADIOLOGY | Facility: HOSPITAL | Age: 74
DRG: 657 | End: 2024-08-15
Payer: MEDICARE

## 2024-08-15 DIAGNOSIS — G89.18 POST-OP PAIN: ICD-10-CM

## 2024-08-15 DIAGNOSIS — Z90.5 HISTORY OF NEPHRECTOMY: ICD-10-CM

## 2024-08-15 DIAGNOSIS — C66.2 CANCER OF LEFT URETER (MULTI): Primary | ICD-10-CM

## 2024-08-15 DIAGNOSIS — R19.00 PELVIC MASS: ICD-10-CM

## 2024-08-15 LAB
ABO GROUP (TYPE) IN BLOOD: NORMAL
ACT BLD: 129 SEC (ref 83–199)
ANION GAP BLDA CALCULATED.4IONS-SCNC: 11 MMO/L (ref 10–25)
ANION GAP BLDA CALCULATED.4IONS-SCNC: 11 MMO/L (ref 10–25)
ANION GAP BLDA CALCULATED.4IONS-SCNC: 12 MMO/L (ref 10–25)
ANION GAP BLDA CALCULATED.4IONS-SCNC: ABNORMAL MMOL/L
ANION GAP SERPL CALC-SCNC: 13 MMOL/L (ref 10–20)
BASE EXCESS BLDA CALC-SCNC: -6.1 MMOL/L (ref -2–3)
BASE EXCESS BLDA CALC-SCNC: -6.3 MMOL/L (ref -2–3)
BASE EXCESS BLDA CALC-SCNC: -8.2 MMOL/L (ref -2–3)
BASE EXCESS BLDA CALC-SCNC: -8.5 MMOL/L (ref -2–3)
BLOOD EXPIRATION DATE: NORMAL
BODY TEMPERATURE: 37 DEGREES CELSIUS
BUN SERPL-MCNC: 15 MG/DL (ref 6–23)
CA-I BLDA-SCNC: 1.16 MMOL/L (ref 1.1–1.33)
CA-I BLDA-SCNC: 1.19 MMOL/L (ref 1.1–1.33)
CA-I BLDA-SCNC: 1.21 MMOL/L (ref 1.1–1.33)
CA-I BLDA-SCNC: 1.29 MMOL/L (ref 1.1–1.33)
CALCIUM SERPL-MCNC: 7.7 MG/DL (ref 8.6–10.6)
CHLORIDE BLDA-SCNC: 108 MMOL/L (ref 98–107)
CHLORIDE BLDA-SCNC: 109 MMOL/L (ref 98–107)
CHLORIDE BLDA-SCNC: 113 MMOL/L (ref 98–107)
CHLORIDE BLDA-SCNC: ABNORMAL MMOL/L
CHLORIDE SERPL-SCNC: 109 MMOL/L (ref 98–107)
CO2 SERPL-SCNC: 20 MMOL/L (ref 21–32)
CREAT SERPL-MCNC: 1 MG/DL (ref 0.5–1.05)
DISPENSE STATUS: NORMAL
EGFRCR SERPLBLD CKD-EPI 2021: 60 ML/MIN/1.73M*2
ERYTHROCYTE [DISTWIDTH] IN BLOOD BY AUTOMATED COUNT: 18.4 % (ref 11.5–14.5)
GLUCOSE BLDA-MCNC: 120 MG/DL (ref 74–99)
GLUCOSE BLDA-MCNC: 132 MG/DL (ref 74–99)
GLUCOSE BLDA-MCNC: 137 MG/DL (ref 74–99)
GLUCOSE BLDA-MCNC: 82 MG/DL (ref 74–99)
GLUCOSE SERPL-MCNC: 131 MG/DL (ref 74–99)
HCO3 BLDA-SCNC: 16.9 MMOL/L (ref 22–26)
HCO3 BLDA-SCNC: 18.6 MMOL/L (ref 22–26)
HCO3 BLDA-SCNC: 18.8 MMOL/L (ref 22–26)
HCO3 BLDA-SCNC: 18.9 MMOL/L (ref 22–26)
HCT VFR BLD AUTO: 20.7 % (ref 36–46)
HCT VFR BLD EST: 25 % (ref 36–46)
HCT VFR BLD EST: 26 % (ref 36–46)
HCT VFR BLD EST: 30 % (ref 36–46)
HCT VFR BLD EST: 35 % (ref 36–46)
HGB BLD-MCNC: 7 G/DL (ref 12–16)
HGB BLDA-MCNC: 11.6 G/DL (ref 12–16)
HGB BLDA-MCNC: 8.3 G/DL (ref 12–16)
HGB BLDA-MCNC: 8.8 G/DL (ref 12–16)
HGB BLDA-MCNC: 9.9 G/DL (ref 12–16)
INHALED O2 CONCENTRATION: 47 %
INHALED O2 CONCENTRATION: 50 %
INHALED O2 CONCENTRATION: 50 %
INHALED O2 CONCENTRATION: 60 %
LACTATE BLDA-SCNC: 0.5 MMOL/L (ref 0.4–2)
LACTATE BLDA-SCNC: 0.6 MMOL/L (ref 0.4–2)
LACTATE BLDA-SCNC: 0.8 MMOL/L (ref 0.4–2)
LACTATE BLDA-SCNC: 0.8 MMOL/L (ref 0.4–2)
MCH RBC QN AUTO: 30.6 PG (ref 26–34)
MCHC RBC AUTO-ENTMCNC: 33.8 G/DL (ref 32–36)
MCV RBC AUTO: 90 FL (ref 80–100)
NRBC BLD-RTO: 0 /100 WBCS (ref 0–0)
OXYHGB MFR BLDA: 97.6 % (ref 94–98)
PCO2 BLDA: 32 MM HG (ref 38–42)
PCO2 BLDA: 33 MM HG (ref 38–42)
PCO2 BLDA: 35 MM HG (ref 38–42)
PCO2 BLDA: 45 MM HG (ref 38–42)
PH BLDA: 7.23 PH (ref 7.38–7.42)
PH BLDA: 7.33 PH (ref 7.38–7.42)
PH BLDA: 7.34 PH (ref 7.38–7.42)
PH BLDA: 7.36 PH (ref 7.38–7.42)
PLATELET # BLD AUTO: 139 X10*3/UL (ref 150–450)
PO2 BLDA: 129 MM HG (ref 85–95)
PO2 BLDA: 154 MM HG (ref 85–95)
PO2 BLDA: 185 MM HG (ref 85–95)
PO2 BLDA: 186 MM HG (ref 85–95)
POTASSIUM BLDA-SCNC: 3.1 MMOL/L (ref 3.5–5.3)
POTASSIUM BLDA-SCNC: 3.4 MMOL/L (ref 3.5–5.3)
POTASSIUM BLDA-SCNC: 3.9 MMOL/L (ref 3.5–5.3)
POTASSIUM BLDA-SCNC: 4.7 MMOL/L (ref 3.5–5.3)
POTASSIUM SERPL-SCNC: 3.7 MMOL/L (ref 3.5–5.3)
PRODUCT BLOOD TYPE: 7300
PRODUCT CODE: NORMAL
RBC # BLD AUTO: 2.29 X10*6/UL (ref 4–5.2)
RH FACTOR (ANTIGEN D): NORMAL
SAO2 % BLDA: 98 % (ref 94–100)
SAO2 % BLDA: 98 % (ref 94–100)
SAO2 % BLDA: 99 % (ref 94–100)
SAO2 % BLDA: 99 % (ref 94–100)
SODIUM BLDA-SCNC: 134 MMOL/L (ref 136–145)
SODIUM BLDA-SCNC: 135 MMOL/L (ref 136–145)
SODIUM BLDA-SCNC: 136 MMOL/L (ref 136–145)
SODIUM BLDA-SCNC: 138 MMOL/L (ref 136–145)
SODIUM SERPL-SCNC: 138 MMOL/L (ref 136–145)
STAPHYLOCOCCUS SPEC CULT: NORMAL
UNIT ABO: NORMAL
UNIT NUMBER: NORMAL
UNIT RH: NORMAL
UNIT VOLUME: 213
UNIT VOLUME: 216
UNIT VOLUME: 218
UNIT VOLUME: 220
UNIT VOLUME: 251
WBC # BLD AUTO: 7.3 X10*3/UL (ref 4.4–11.3)

## 2024-08-15 PROCEDURE — 2500000004 HC RX 250 GENERAL PHARMACY W/ HCPCS (ALT 636 FOR OP/ED)

## 2024-08-15 PROCEDURE — 2500000005 HC RX 250 GENERAL PHARMACY W/O HCPCS: Performed by: SURGERY

## 2024-08-15 PROCEDURE — 2500000005 HC RX 250 GENERAL PHARMACY W/O HCPCS

## 2024-08-15 PROCEDURE — 2500000004 HC RX 250 GENERAL PHARMACY W/ HCPCS (ALT 636 FOR OP/ED): Performed by: STUDENT IN AN ORGANIZED HEALTH CARE EDUCATION/TRAINING PROGRAM

## 2024-08-15 PROCEDURE — 3700000001 HC GENERAL ANESTHESIA TIME - INITIAL BASE CHARGE: Performed by: STUDENT IN AN ORGANIZED HEALTH CARE EDUCATION/TRAINING PROGRAM

## 2024-08-15 PROCEDURE — 99222 1ST HOSP IP/OBS MODERATE 55: CPT

## 2024-08-15 PROCEDURE — 82435 ASSAY OF BLOOD CHLORIDE: CPT

## 2024-08-15 PROCEDURE — 2500000004 HC RX 250 GENERAL PHARMACY W/ HCPCS (ALT 636 FOR OP/ED): Performed by: SURGERY

## 2024-08-15 PROCEDURE — P9045 ALBUMIN (HUMAN), 5%, 250 ML: HCPCS | Mod: JZ,JG

## 2024-08-15 PROCEDURE — 50548 LAPARO REMOVE W/URETER: CPT | Performed by: PHYSICIAN ASSISTANT

## 2024-08-15 PROCEDURE — 2780000003 HC OR 278 NO HCPCS: Performed by: STUDENT IN AN ORGANIZED HEALTH CARE EDUCATION/TRAINING PROGRAM

## 2024-08-15 PROCEDURE — 37799 UNLISTED PX VASCULAR SURGERY: CPT | Performed by: STUDENT IN AN ORGANIZED HEALTH CARE EDUCATION/TRAINING PROGRAM

## 2024-08-15 PROCEDURE — 44310 ILEOSTOMY/JEJUNOSTOMY: CPT | Performed by: SURGERY

## 2024-08-15 PROCEDURE — 2500000004 HC RX 250 GENERAL PHARMACY W/ HCPCS (ALT 636 FOR OP/ED): Mod: JG

## 2024-08-15 PROCEDURE — 71045 X-RAY EXAM CHEST 1 VIEW: CPT | Performed by: RADIOLOGY

## 2024-08-15 PROCEDURE — 0TJ54ZZ INSPECTION OF KIDNEY, PERCUTANEOUS ENDOSCOPIC APPROACH: ICD-10-PCS | Performed by: STUDENT IN AN ORGANIZED HEALTH CARE EDUCATION/TRAINING PROGRAM

## 2024-08-15 PROCEDURE — 3600000018 HC OR TIME - INITIAL BASE CHARGE - PROCEDURE LEVEL SIX: Performed by: STUDENT IN AN ORGANIZED HEALTH CARE EDUCATION/TRAINING PROGRAM

## 2024-08-15 PROCEDURE — 85027 COMPLETE CBC AUTOMATED: CPT | Performed by: STUDENT IN AN ORGANIZED HEALTH CARE EDUCATION/TRAINING PROGRAM

## 2024-08-15 PROCEDURE — 44139 MOBILIZATION OF COLON: CPT | Performed by: SURGERY

## 2024-08-15 PROCEDURE — 85347 COAGULATION TIME ACTIVATED: CPT

## 2024-08-15 PROCEDURE — 0TT10ZZ RESECTION OF LEFT KIDNEY, OPEN APPROACH: ICD-10-PCS | Performed by: STUDENT IN AN ORGANIZED HEALTH CARE EDUCATION/TRAINING PROGRAM

## 2024-08-15 PROCEDURE — 1200000003 HC ONCOLOGY  ROOM WITH TELEMETRY DAILY

## 2024-08-15 PROCEDURE — 95861 NEEDLE EMG 2 EXTREMITIES: CPT

## 2024-08-15 PROCEDURE — 3700000002 HC GENERAL ANESTHESIA TIME - EACH INCREMENTAL 1 MINUTE: Performed by: STUDENT IN AN ORGANIZED HEALTH CARE EDUCATION/TRAINING PROGRAM

## 2024-08-15 PROCEDURE — 2500000004 HC RX 250 GENERAL PHARMACY W/ HCPCS (ALT 636 FOR OP/ED): Performed by: ANESTHESIOLOGY

## 2024-08-15 PROCEDURE — 50548 LAPARO REMOVE W/URETER: CPT | Performed by: STUDENT IN AN ORGANIZED HEALTH CARE EDUCATION/TRAINING PROGRAM

## 2024-08-15 PROCEDURE — 7100000001 HC RECOVERY ROOM TIME - INITIAL BASE CHARGE: Performed by: STUDENT IN AN ORGANIZED HEALTH CARE EDUCATION/TRAINING PROGRAM

## 2024-08-15 PROCEDURE — 71045 X-RAY EXAM CHEST 1 VIEW: CPT

## 2024-08-15 PROCEDURE — 8E0W4CZ ROBOTIC ASSISTED PROCEDURE OF TRUNK REGION, PERCUTANEOUS ENDOSCOPIC APPROACH: ICD-10-PCS | Performed by: STUDENT IN AN ORGANIZED HEALTH CARE EDUCATION/TRAINING PROGRAM

## 2024-08-15 PROCEDURE — 0TT70ZZ RESECTION OF LEFT URETER, OPEN APPROACH: ICD-10-PCS | Performed by: STUDENT IN AN ORGANIZED HEALTH CARE EDUCATION/TRAINING PROGRAM

## 2024-08-15 PROCEDURE — 3600000017 HC OR TIME - EACH INCREMENTAL 1 MINUTE - PROCEDURE LEVEL SIX: Performed by: STUDENT IN AN ORGANIZED HEALTH CARE EDUCATION/TRAINING PROGRAM

## 2024-08-15 PROCEDURE — 0DBN0ZZ EXCISION OF SIGMOID COLON, OPEN APPROACH: ICD-10-PCS | Performed by: SURGERY

## 2024-08-15 PROCEDURE — 49203 PR EXCISION/DESTRUCTION OPEN ABDOMINAL TUMOR 5 CM/<: CPT | Performed by: SURGERY

## 2024-08-15 PROCEDURE — 0DBP0ZZ EXCISION OF RECTUM, OPEN APPROACH: ICD-10-PCS | Performed by: SURGERY

## 2024-08-15 PROCEDURE — 44145 PARTIAL REMOVAL OF COLON: CPT | Performed by: SURGERY

## 2024-08-15 PROCEDURE — 82435 ASSAY OF BLOOD CHLORIDE: CPT | Performed by: STUDENT IN AN ORGANIZED HEALTH CARE EDUCATION/TRAINING PROGRAM

## 2024-08-15 PROCEDURE — A4312 CATH W/O BAG 2-WAY SILICONE: HCPCS | Performed by: STUDENT IN AN ORGANIZED HEALTH CARE EDUCATION/TRAINING PROGRAM

## 2024-08-15 PROCEDURE — 7100000002 HC RECOVERY ROOM TIME - EACH INCREMENTAL 1 MINUTE: Performed by: STUDENT IN AN ORGANIZED HEALTH CARE EDUCATION/TRAINING PROGRAM

## 2024-08-15 PROCEDURE — 0D1B0Z4 BYPASS ILEUM TO CUTANEOUS, OPEN APPROACH: ICD-10-PCS | Performed by: SURGERY

## 2024-08-15 PROCEDURE — P9016 RBC LEUKOCYTES REDUCED: HCPCS

## 2024-08-15 PROCEDURE — 96372 THER/PROPH/DIAG INJ SC/IM: CPT | Performed by: STUDENT IN AN ORGANIZED HEALTH CARE EDUCATION/TRAINING PROGRAM

## 2024-08-15 PROCEDURE — 2500000005 HC RX 250 GENERAL PHARMACY W/O HCPCS: Performed by: ANESTHESIOLOGY

## 2024-08-15 PROCEDURE — 84132 ASSAY OF SERUM POTASSIUM: CPT | Performed by: STUDENT IN AN ORGANIZED HEALTH CARE EDUCATION/TRAINING PROGRAM

## 2024-08-15 PROCEDURE — 36430 TRANSFUSION BLD/BLD COMPNT: CPT

## 2024-08-15 PROCEDURE — 2500000001 HC RX 250 WO HCPCS SELF ADMINISTERED DRUGS (ALT 637 FOR MEDICARE OP): Performed by: STUDENT IN AN ORGANIZED HEALTH CARE EDUCATION/TRAINING PROGRAM

## 2024-08-15 PROCEDURE — 2720000007 HC OR 272 NO HCPCS: Performed by: STUDENT IN AN ORGANIZED HEALTH CARE EDUCATION/TRAINING PROGRAM

## 2024-08-15 PROCEDURE — 2500000005 HC RX 250 GENERAL PHARMACY W/O HCPCS: Performed by: STUDENT IN AN ORGANIZED HEALTH CARE EDUCATION/TRAINING PROGRAM

## 2024-08-15 RX ORDER — DEXAMETHASONE 0.5 MG/1
0.5 TABLET ORAL ONCE
Status: DISCONTINUED | OUTPATIENT
Start: 2024-08-15 | End: 2024-08-15

## 2024-08-15 RX ORDER — ALBUMIN HUMAN 50 G/1000ML
SOLUTION INTRAVENOUS AS NEEDED
Status: DISCONTINUED | OUTPATIENT
Start: 2024-08-15 | End: 2024-08-15

## 2024-08-15 RX ORDER — METRONIDAZOLE 500 MG/100ML
500 INJECTION, SOLUTION INTRAVENOUS EVERY 8 HOURS
Status: COMPLETED | OUTPATIENT
Start: 2024-08-15 | End: 2024-08-16

## 2024-08-15 RX ORDER — METRONIDAZOLE 500 MG/100ML
INJECTION, SOLUTION INTRAVENOUS AS NEEDED
Status: DISCONTINUED | OUTPATIENT
Start: 2024-08-15 | End: 2024-08-15

## 2024-08-15 RX ORDER — ROCURONIUM BROMIDE 10 MG/ML
INJECTION, SOLUTION INTRAVENOUS AS NEEDED
Status: DISCONTINUED | OUTPATIENT
Start: 2024-08-15 | End: 2024-08-15

## 2024-08-15 RX ORDER — ROPIVACAINE HYDROCHLORIDE 5 MG/ML
INJECTION, SOLUTION EPIDURAL; INFILTRATION; PERINEURAL AS NEEDED
Status: DISCONTINUED | OUTPATIENT
Start: 2024-08-15 | End: 2024-08-15

## 2024-08-15 RX ORDER — HEPARIN SODIUM 5000 [USP'U]/ML
5000 INJECTION, SOLUTION INTRAVENOUS; SUBCUTANEOUS ONCE
Status: COMPLETED | OUTPATIENT
Start: 2024-08-15 | End: 2024-08-15

## 2024-08-15 RX ORDER — HYDROMORPHONE HYDROCHLORIDE 1 MG/ML
INJECTION, SOLUTION INTRAMUSCULAR; INTRAVENOUS; SUBCUTANEOUS AS NEEDED
Status: DISCONTINUED | OUTPATIENT
Start: 2024-08-15 | End: 2024-08-15

## 2024-08-15 RX ORDER — HYDRALAZINE HYDROCHLORIDE 20 MG/ML
5 INJECTION INTRAMUSCULAR; INTRAVENOUS EVERY 30 MIN PRN
Status: DISCONTINUED | OUTPATIENT
Start: 2024-08-15 | End: 2024-08-15 | Stop reason: HOSPADM

## 2024-08-15 RX ORDER — OXYCODONE HYDROCHLORIDE 5 MG/1
10 TABLET ORAL EVERY 4 HOURS PRN
Status: DISCONTINUED | OUTPATIENT
Start: 2024-08-15 | End: 2024-08-21 | Stop reason: HOSPADM

## 2024-08-15 RX ORDER — WATER 1 ML/ML
IRRIGANT IRRIGATION AS NEEDED
Status: DISCONTINUED | OUTPATIENT
Start: 2024-08-15 | End: 2024-08-15 | Stop reason: HOSPADM

## 2024-08-15 RX ORDER — SODIUM CHLORIDE, SODIUM LACTATE, POTASSIUM CHLORIDE, CALCIUM CHLORIDE 600; 310; 30; 20 MG/100ML; MG/100ML; MG/100ML; MG/100ML
INJECTION, SOLUTION INTRAVENOUS CONTINUOUS PRN
Status: DISCONTINUED | OUTPATIENT
Start: 2024-08-15 | End: 2024-08-15

## 2024-08-15 RX ORDER — ALVIMOPAN 12 MG/1
12 CAPSULE ORAL 2 TIMES DAILY
Status: DISCONTINUED | OUTPATIENT
Start: 2024-08-16 | End: 2024-08-19

## 2024-08-15 RX ORDER — LIDOCAINE HYDROCHLORIDE 10 MG/ML
INJECTION INFILTRATION; PERINEURAL AS NEEDED
Status: DISCONTINUED | OUTPATIENT
Start: 2024-08-15 | End: 2024-08-15

## 2024-08-15 RX ORDER — HYDROMORPHONE HYDROCHLORIDE 1 MG/ML
0.5 INJECTION, SOLUTION INTRAMUSCULAR; INTRAVENOUS; SUBCUTANEOUS EVERY 5 MIN PRN
Status: DISCONTINUED | OUTPATIENT
Start: 2024-08-15 | End: 2024-08-15 | Stop reason: HOSPADM

## 2024-08-15 RX ORDER — FENTANYL CITRATE 50 UG/ML
INJECTION, SOLUTION INTRAMUSCULAR; INTRAVENOUS AS NEEDED
Status: DISCONTINUED | OUTPATIENT
Start: 2024-08-15 | End: 2024-08-15

## 2024-08-15 RX ORDER — ONDANSETRON 4 MG/1
4 TABLET, ORALLY DISINTEGRATING ORAL EVERY 8 HOURS PRN
Status: DISCONTINUED | OUTPATIENT
Start: 2024-08-15 | End: 2024-08-21 | Stop reason: HOSPADM

## 2024-08-15 RX ORDER — LEVOTHYROXINE SODIUM 25 UG/1
25 TABLET ORAL DAILY
Status: DISCONTINUED | OUTPATIENT
Start: 2024-08-16 | End: 2024-08-21 | Stop reason: HOSPADM

## 2024-08-15 RX ORDER — ESMOLOL HYDROCHLORIDE 10 MG/ML
INJECTION INTRAVENOUS AS NEEDED
Status: DISCONTINUED | OUTPATIENT
Start: 2024-08-15 | End: 2024-08-15

## 2024-08-15 RX ORDER — POLYMYXIN B 500000 [USP'U]/1
INJECTION, POWDER, LYOPHILIZED, FOR SOLUTION INTRAMUSCULAR; INTRATHECAL; INTRAVENOUS; OPHTHALMIC AS NEEDED
Status: DISCONTINUED | OUTPATIENT
Start: 2024-08-15 | End: 2024-08-15 | Stop reason: HOSPADM

## 2024-08-15 RX ORDER — SODIUM CHLORIDE, SODIUM LACTATE, POTASSIUM CHLORIDE, CALCIUM CHLORIDE 600; 310; 30; 20 MG/100ML; MG/100ML; MG/100ML; MG/100ML
50 INJECTION, SOLUTION INTRAVENOUS CONTINUOUS
Status: DISCONTINUED | OUTPATIENT
Start: 2024-08-15 | End: 2024-08-21 | Stop reason: HOSPADM

## 2024-08-15 RX ORDER — GLYCOPYRROLATE 0.2 MG/ML
INJECTION INTRAMUSCULAR; INTRAVENOUS AS NEEDED
Status: DISCONTINUED | OUTPATIENT
Start: 2024-08-15 | End: 2024-08-15

## 2024-08-15 RX ORDER — ACETAMINOPHEN 500 MG
5 TABLET ORAL NIGHTLY
Status: DISCONTINUED | OUTPATIENT
Start: 2024-08-15 | End: 2024-08-21 | Stop reason: HOSPADM

## 2024-08-15 RX ORDER — ONDANSETRON HYDROCHLORIDE 2 MG/ML
4 INJECTION, SOLUTION INTRAVENOUS EVERY 8 HOURS PRN
Status: DISCONTINUED | OUTPATIENT
Start: 2024-08-15 | End: 2024-08-21 | Stop reason: HOSPADM

## 2024-08-15 RX ORDER — ACETAMINOPHEN 325 MG/1
975 TABLET ORAL EVERY 6 HOURS
Status: DISCONTINUED | OUTPATIENT
Start: 2024-08-15 | End: 2024-08-21 | Stop reason: HOSPADM

## 2024-08-15 RX ORDER — LABETALOL HYDROCHLORIDE 5 MG/ML
5 INJECTION, SOLUTION INTRAVENOUS ONCE AS NEEDED
Status: DISCONTINUED | OUTPATIENT
Start: 2024-08-15 | End: 2024-08-15 | Stop reason: HOSPADM

## 2024-08-15 RX ORDER — ACETAMINOPHEN 325 MG/1
975 TABLET ORAL ONCE
Status: COMPLETED | OUTPATIENT
Start: 2024-08-15 | End: 2024-08-15

## 2024-08-15 RX ORDER — ONDANSETRON HYDROCHLORIDE 2 MG/ML
INJECTION, SOLUTION INTRAVENOUS AS NEEDED
Status: DISCONTINUED | OUTPATIENT
Start: 2024-08-15 | End: 2024-08-15

## 2024-08-15 RX ORDER — ONDANSETRON HYDROCHLORIDE 2 MG/ML
4 INJECTION, SOLUTION INTRAVENOUS ONCE AS NEEDED
Status: DISCONTINUED | OUTPATIENT
Start: 2024-08-15 | End: 2024-08-15 | Stop reason: HOSPADM

## 2024-08-15 RX ORDER — ROPIVACAINE IN 0.9% SOD CHL/PF 0.2 %
PLASTIC BAG, INJECTION (ML) EPIDURAL CONTINUOUS PRN
Status: DISCONTINUED | OUTPATIENT
Start: 2024-08-15 | End: 2024-08-15

## 2024-08-15 RX ORDER — CEFAZOLIN 1 G/1
INJECTION, POWDER, FOR SOLUTION INTRAVENOUS AS NEEDED
Status: DISCONTINUED | OUTPATIENT
Start: 2024-08-15 | End: 2024-08-15

## 2024-08-15 RX ORDER — NALOXONE HYDROCHLORIDE 0.4 MG/ML
0.2 INJECTION, SOLUTION INTRAMUSCULAR; INTRAVENOUS; SUBCUTANEOUS EVERY 5 MIN PRN
Status: DISCONTINUED | OUTPATIENT
Start: 2024-08-15 | End: 2024-08-16

## 2024-08-15 RX ORDER — POTASSIUM CHLORIDE 14.9 MG/ML
INJECTION INTRAVENOUS CONTINUOUS PRN
Status: DISCONTINUED | OUTPATIENT
Start: 2024-08-15 | End: 2024-08-15

## 2024-08-15 RX ORDER — PHENYLEPHRINE 10 MG/250 ML(40 MCG/ML)IN 0.9 % SOD.CHLORIDE INTRAVENOUS
CONTINUOUS PRN
Status: DISCONTINUED | OUTPATIENT
Start: 2024-08-15 | End: 2024-08-15

## 2024-08-15 RX ORDER — PROPOFOL 10 MG/ML
INJECTION, EMULSION INTRAVENOUS AS NEEDED
Status: DISCONTINUED | OUTPATIENT
Start: 2024-08-15 | End: 2024-08-15

## 2024-08-15 RX ORDER — NEOSTIGMINE METHYLSULFATE 1 MG/ML
INJECTION INTRAVENOUS AS NEEDED
Status: DISCONTINUED | OUTPATIENT
Start: 2024-08-15 | End: 2024-08-15

## 2024-08-15 RX ORDER — ALVIMOPAN 12 MG/1
12 CAPSULE ORAL ONCE
Status: COMPLETED | OUTPATIENT
Start: 2024-08-15 | End: 2024-08-15

## 2024-08-15 RX ORDER — OXYCODONE HYDROCHLORIDE 5 MG/1
5 TABLET ORAL EVERY 4 HOURS PRN
Status: DISCONTINUED | OUTPATIENT
Start: 2024-08-15 | End: 2024-08-21 | Stop reason: HOSPADM

## 2024-08-15 RX ORDER — HYDROMORPHONE HYDROCHLORIDE 1 MG/ML
0.1 INJECTION, SOLUTION INTRAMUSCULAR; INTRAVENOUS; SUBCUTANEOUS EVERY 5 MIN PRN
Status: DISCONTINUED | OUTPATIENT
Start: 2024-08-15 | End: 2024-08-15 | Stop reason: HOSPADM

## 2024-08-15 RX ORDER — CEFAZOLIN SODIUM 2 G/100ML
2 INJECTION, SOLUTION INTRAVENOUS EVERY 8 HOURS
Status: COMPLETED | OUTPATIENT
Start: 2024-08-15 | End: 2024-08-16

## 2024-08-15 RX ORDER — PHENYLEPHRINE HYDROCHLORIDE 10 MG/ML
INJECTION INTRAVENOUS AS NEEDED
Status: DISCONTINUED | OUTPATIENT
Start: 2024-08-15 | End: 2024-08-15

## 2024-08-15 RX ORDER — HYDROMORPHONE HYDROCHLORIDE 1 MG/ML
0.2 INJECTION, SOLUTION INTRAMUSCULAR; INTRAVENOUS; SUBCUTANEOUS EVERY 5 MIN PRN
Status: DISCONTINUED | OUTPATIENT
Start: 2024-08-15 | End: 2024-08-15 | Stop reason: HOSPADM

## 2024-08-15 RX ORDER — HYDROMORPHONE HYDROCHLORIDE 1 MG/ML
0.2 INJECTION, SOLUTION INTRAMUSCULAR; INTRAVENOUS; SUBCUTANEOUS EVERY 2 HOUR PRN
Status: DISCONTINUED | OUTPATIENT
Start: 2024-08-15 | End: 2024-08-21 | Stop reason: HOSPADM

## 2024-08-15 RX ORDER — ROPIVACAINE IN 0.9% SOD CHL/PF 0.2 %
14 PLASTIC BAG, INJECTION (ML) EPIDURAL CONTINUOUS
Status: CANCELLED | OUTPATIENT
Start: 2024-08-15

## 2024-08-15 RX ORDER — DEXMEDETOMIDINE HYDROCHLORIDE 4 UG/ML
INJECTION, SOLUTION INTRAVENOUS CONTINUOUS PRN
Status: DISCONTINUED | OUTPATIENT
Start: 2024-08-15 | End: 2024-08-15

## 2024-08-15 RX ORDER — LIDOCAINE HYDROCHLORIDE 10 MG/ML
0.1 INJECTION INFILTRATION; PERINEURAL ONCE
Status: DISCONTINUED | OUTPATIENT
Start: 2024-08-15 | End: 2024-08-15 | Stop reason: HOSPADM

## 2024-08-15 RX ORDER — GABAPENTIN 300 MG/1
300 CAPSULE ORAL ONCE
Status: COMPLETED | OUTPATIENT
Start: 2024-08-15 | End: 2024-08-15

## 2024-08-15 RX ORDER — SODIUM CHLORIDE, SODIUM LACTATE, POTASSIUM CHLORIDE, CALCIUM CHLORIDE 600; 310; 30; 20 MG/100ML; MG/100ML; MG/100ML; MG/100ML
100 INJECTION, SOLUTION INTRAVENOUS CONTINUOUS
Status: DISCONTINUED | OUTPATIENT
Start: 2024-08-15 | End: 2024-08-15 | Stop reason: HOSPADM

## 2024-08-15 RX ORDER — HEPARIN SODIUM 5000 [USP'U]/ML
5000 INJECTION, SOLUTION INTRAVENOUS; SUBCUTANEOUS EVERY 8 HOURS
Status: DISCONTINUED | OUTPATIENT
Start: 2024-08-15 | End: 2024-08-21 | Stop reason: HOSPADM

## 2024-08-15 RX ORDER — CEFAZOLIN SODIUM 2 G/100ML
2 INJECTION, SOLUTION INTRAVENOUS ONCE
Status: DISCONTINUED | OUTPATIENT
Start: 2024-08-15 | End: 2024-08-15 | Stop reason: HOSPADM

## 2024-08-15 RX ORDER — HYDRALAZINE HYDROCHLORIDE 20 MG/ML
10 INJECTION INTRAMUSCULAR; INTRAVENOUS EVERY 6 HOURS PRN
Status: DISCONTINUED | OUTPATIENT
Start: 2024-08-15 | End: 2024-08-21 | Stop reason: HOSPADM

## 2024-08-15 RX ORDER — NALOXONE HYDROCHLORIDE 0.4 MG/ML
0.2 INJECTION, SOLUTION INTRAMUSCULAR; INTRAVENOUS; SUBCUTANEOUS EVERY 5 MIN PRN
Status: DISCONTINUED | OUTPATIENT
Start: 2024-08-15 | End: 2024-08-21 | Stop reason: HOSPADM

## 2024-08-15 RX ORDER — SODIUM CHLORIDE 0.9 G/100ML
IRRIGANT IRRIGATION AS NEEDED
Status: DISCONTINUED | OUTPATIENT
Start: 2024-08-15 | End: 2024-08-15 | Stop reason: HOSPADM

## 2024-08-15 ASSESSMENT — PAIN DESCRIPTION - LOCATION
LOCATION: ABDOMEN
LOCATION: ABDOMEN

## 2024-08-15 ASSESSMENT — COLUMBIA-SUICIDE SEVERITY RATING SCALE - C-SSRS
6. HAVE YOU EVER DONE ANYTHING, STARTED TO DO ANYTHING, OR PREPARED TO DO ANYTHING TO END YOUR LIFE?: NO
1. IN THE PAST MONTH, HAVE YOU WISHED YOU WERE DEAD OR WISHED YOU COULD GO TO SLEEP AND NOT WAKE UP?: NO
2. HAVE YOU ACTUALLY HAD ANY THOUGHTS OF KILLING YOURSELF?: NO
1. IN THE PAST MONTH, HAVE YOU WISHED YOU WERE DEAD OR WISHED YOU COULD GO TO SLEEP AND NOT WAKE UP?: NO
6. HAVE YOU EVER DONE ANYTHING, STARTED TO DO ANYTHING, OR PREPARED TO DO ANYTHING TO END YOUR LIFE?: NO
2. HAVE YOU ACTUALLY HAD ANY THOUGHTS OF KILLING YOURSELF?: NO

## 2024-08-15 ASSESSMENT — PAIN SCALES - GENERAL
PAINLEVEL_OUTOF10: 5 - MODERATE PAIN
PAINLEVEL_OUTOF10: 7
PAINLEVEL_OUTOF10: 2
PAINLEVEL_OUTOF10: 4
PAINLEVEL_OUTOF10: 2
PAINLEVEL_OUTOF10: 2
PAINLEVEL_OUTOF10: 8
PAINLEVEL_OUTOF10: 2
PAINLEVEL_OUTOF10: 6
PAIN_LEVEL: 2
PAINLEVEL_OUTOF10: 2
PAINLEVEL_OUTOF10: 2
PAINLEVEL_OUTOF10: 5 - MODERATE PAIN

## 2024-08-15 ASSESSMENT — PAIN - FUNCTIONAL ASSESSMENT
PAIN_FUNCTIONAL_ASSESSMENT: 0-10
PAIN_FUNCTIONAL_ASSESSMENT: UNABLE TO SELF-REPORT
PAIN_FUNCTIONAL_ASSESSMENT: 0-10

## 2024-08-15 ASSESSMENT — PAIN DESCRIPTION - DESCRIPTORS: DESCRIPTORS: ACHING

## 2024-08-15 NOTE — ANESTHESIA PROCEDURE NOTES
Arterial Line:    Date/Time: 8/15/2024 8:13 AM    Staffing  Performed: attending and resident   Authorized by: Talisha Bender MD    Performed by: Chuck Fregoso MD    An arterial line was placed. Procedure performed using surface landmarks.in the OB for the following indication(s): continuous blood pressure monitoring and blood sampling needed.    A 20 gauge (size) (length), Angiocath (type) catheter was placed into the Left ulnar artery, secured by TegadermEvents:  patient tolerated procedure well with no complications.      Additional notes:  1st attempt Unsuccessful at right radial. 2nd attempt by attending on left radial successfully placed

## 2024-08-15 NOTE — ANESTHESIA PROCEDURE NOTES
Peripheral IV  Date/Time: 8/15/2024 8:15 AM      Placement  Needle size: 18 G  Laterality: left  Location: forearm  Attempts: 1

## 2024-08-15 NOTE — ANESTHESIA PROCEDURE NOTES
Airway  Date/Time: 8/15/2024 8:07 AM  Urgency: elective    Airway not difficult    Staffing  Performed: resident   Authorized by: Talisha Bender MD    Performed by: Muna Flores MD  Patient location during procedure: OR    Indications and Patient Condition  Indications for airway management: anesthesia  Spontaneous Ventilation: absent  Sedation level: deep  Preoxygenated: yes  Patient position: sniffing  MILS maintained throughout  Mask difficulty assessment: 1 - vent by mask    Final Airway Details  Final airway type: endotracheal airway      Successful airway: ETT  Cuffed: yes   Successful intubation technique: video laryngoscopy  Endotracheal tube insertion site: oral  Blade: Ivis  Blade size: #3  ETT size (mm): 7.0  Cormack-Lehane Classification: grade I - full view of glottis  Placement verified by: capnometry   Measured from: teeth  ETT to teeth (cm): 21  Number of attempts at approach: 1

## 2024-08-15 NOTE — ANESTHESIA PROCEDURE NOTES
Peripheral IV  Date/Time: 8/15/2024 8:15 AM      Placement  Needle size: 16 G  Laterality: right  Location: hand  Site prep: alcohol  Attempts: 1

## 2024-08-15 NOTE — ANESTHESIA PROCEDURE NOTES
Peripheral Block    Patient location during procedure: pre-op  Start time: 8/15/2024 6:40 AM  End time: 8/15/2024 7:10 AM  Reason for block: at surgeon's request and post-op pain management  Staffing  Performed: fellow   Authorized by: Ross Gorman DO    Performed by: Britt Vences MD  Preanesthetic Checklist  Completed: patient identified, IV checked, site marked, risks and benefits discussed, surgical consent, monitors and equipment checked, pre-op evaluation and timeout performed   Timeout performed at: 8/15/2024 6:39 AM  Peripheral Block  Patient position: sitting  Prep: ChloraPrep  Patient monitoring: heart rate and continuous pulse ox  Block type: MATT  Laterality: B/L  Injection technique: catheter  Guidance: ultrasound guided  Local infiltration: ropivacaine  Needle  Needle type: Evodentalohy   Needle gauge: 26 G  Needle length: 8 cm  Needle localization: ultrasound guidance     image stored in chart  Assessment  Injection assessment: negative aspiration for heme, no paresthesia on injection, incremental injection and local visualized surrounding nerve on ultrasound  Heart rate change: no  Slow fractionated injection: no  Additional Notes  Erector spinae plane block: Informed consent obtained.  Risks, benefits, and alternatives discussed.  ASA monitors placed, and timeout performed.  Patient positioned, prepped with chlorhexidine, and draped with sterile towels.  Ultrasound guidance used to visualize the erector spine a muscle above the TP/costal junction at T9.  Good visualization of the needle throughout duration of the procedure.  Aspiration was negative.  10 cc of 0.5 percent ropivacaine injected with visualization of spread bilaterally.  Catheters threaded and secured. Patient tolerated procedure well.    Timeout by JENNIFER Gonzales

## 2024-08-15 NOTE — BRIEF OP NOTE
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]     Procedures  Nephroureterectomy Robot-Assisted  18123 - VT LAPAROSCOPY NEPHRECTOMY W/TOTAL URETERECTOMY    Exploration Laparotomy, Open Left Ureteral Tumor Resection and Excision of pelvic mass    Partial sigmoidectomy    Surgeons   Panel 1:     * Teddy Ross - Primary  Panel 2:     * Teddy Ross - Primary    Resident/Fellow/Other Assistant:  Surgeons and Role:  Panel 1:     * Tank Wong MD - Resident - Assisting     * Veronica Andre PA-C - MAR[facility] First Assist  Panel 2:     * Raf Michael MD - Assisting     * Bernard Denton MD MPH - Assisting     * Cornelius Roach MD - Resident - Assisting     * Yifan Good MD - Resident - Observing    Procedure Summary  Anesthesia: General  ASA: III  Anesthesia Staff: Anesthesiologist: Oj Rashid MD; Marty Bose MD; Talisha Bender MD  Anesthesia Resident: Chuck Fregoso MD; Renetta Craig MD; Muna Flores MD  Estimated Blood Loss: 350mL  Intra-op Medications:   Administrations occurring from 0715 to 1405 on 08/15/24:   Medication Name Total Dose   sodium chloride 0.9 % irrigation solution 1,000 mL   sterile water irrigation solution 1,000 mL   polymyxin B injection 500,000 Units   gelatin absorbable (Gelfoam) 100 sponge 2 each   thrombin (recombinant) (Recothrom) topical solution 10,000 Units   heparin (porcine) 5,000 Units in sodium chloride 0.9% 500 mL irrigation 5,000 Units   acetaminophen (Tylenol) tablet 975 mg 975 mg   alvimopan (Entereg) capsule 12 mg 12 mg   gabapentin (Neurontin) capsule 300 mg 300 mg   heparin (porcine) injection 5,000 Units 5,000 Units              Anesthesia Record               Intraprocedure I/O Totals          Intake     Dexmedetomidine 0.00 mL    The total shown is the total volume documented since Anesthesia Start was filed.    LR bolus 2000.00 mL    Phenylephrine Drip 0.00 mL    The total shown is the total volume documented since Anesthesia Start was filed.    potassium chloride 20 mEq in 100 mL IV premix 100.00 mL    Total Intake 2100 mL       Output    Urine 500 mL    Est. Blood Loss 300 mL    Total Output 800 mL       Net    Net Volume 1300 mL          Specimen:   ID Type Source Tests Collected by Time   1 : sigmoid colon Tissue COLON - SIGMOID RESECTION SURGICAL PATHOLOGY EXAM Teddy Ross MD 8/15/2024 1459   2 : LEFT PERIURETERAL TUMOR BIOPSY Tissue URETER RESECTION LEFT SURGICAL PATHOLOGY EXAM Teddy Ross MD 8/15/2024 1311   3 : LEFT URETER SEGMENT Tissue URETER RESECTION LEFT SURGICAL PATHOLOGY EXAM Joseed MYRNA Ross MD 8/15/2024 1353   4 : LEFT KIDNEY AND PROXIMAL URETER Tissue KIDNEY RADICAL NEPHROURETERECTOMY LEFT SURGICAL PATHOLOGY EXAM Teddy Ross MD 8/15/2024 1426        Staff:   Circulator: Marta  Scrub Person: Jose  Scrub Person: Ezio  Circulator: Mo  Relief Scrub: Campbell  Relief Circulator: Keri  Relief Scrub: Farzana  Relief Scrub: Mo  Relief Circulator: Ezio  Relief Scrub: Noemí          Findings: left atrophic kidney removed without complications. JJ stent removed. Pelvic mass abutting ureter without invasion, however dense adhesions present. Nerve roots intact and not disrupted. Mass involving sigmoid colon and mesentery requiring colonic resection with primary anastomosis and loop ileostomy.     Complications:  None; patient tolerated the procedure well.     Disposition: PACU - hemodynamically stable.  Condition: stable  Specimens Collected:   ID Type Source Tests Collected by Time   1 : sigmoid colon Tissue COLON - SIGMOID RESECTION SURGICAL PATHOLOGY EXAM Teddy Ross MD 8/15/2024 1459   2 : LEFT PERIURETERAL TUMOR BIOPSY Tissue URETER RESECTION LEFT SURGICAL PATHOLOGY  EXAM Teddy Ross MD 8/15/2024 1311   3 : LEFT URETER SEGMENT Tissue URETER RESECTION LEFT SURGICAL PATHOLOGY EXAM Teddy Ross MD 8/15/2024 1353   4 : LEFT KIDNEY AND PROXIMAL URETER Tissue KIDNEY RADICAL NEPHROURETERECTOMY LEFT SURGICAL PATHOLOGY EXAM Teddy Ross MD 8/15/2024 1426     Attending Attestation:     Teddy Ross  Phone Number: [phone]

## 2024-08-15 NOTE — ANESTHESIA PROCEDURE NOTES
Central Venous Line:    Date/Time: 8/15/2024 8:15 AM    A central venous line was placed in the OR for the following indication(s): central venous access and CVP monitoring.  Staffing  Performed: resident   Authorized by: Talisha Bender MD    Performed by: Chuck Fregoso MD    Sterility preparation included the following: provider hand hygiene performed prior to central venous catheter insertion, all 5 sterile barriers used (gloves, gown, cap, mask, large sterile drape) during central venous catheter insertion, antiseptic used during central venous catheter insertion and skin prep agent completely dried prior to procedure.  The patient was placed in supine position.    Left subclavian vein was prepped.    The site was prepped with Chlorhexidine.  Size: 9 Fr   Length: 11.5 (16 cm)  Catheter type: introducer   Number of Lumens: double lumen    This catheter was not an oximetric catheter.    During the procedure, the following specific steps were taken: target vein identified, needle advanced into vein and blood aspirated and guidewire advanced into vein.  Seldinger technique used.  Procedure performed using surface landmarks.    Intravenous verification was obtained by venous blood return and manometry.      Post insertion care included: all ports aspirated, all ports flushed easily, guidewire removed intact, Biopatch applied, line sutured in place and dressing applied.    During the procedure the patient experienced: patient tolerated procedure well with no complications.

## 2024-08-15 NOTE — ANESTHESIA POSTPROCEDURE EVALUATION
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    Anesthesia Post Evaluation    Patient location during evaluation: PACU  Patient participation: complete - patient participated  Level of consciousness: awake and alert  Pain score: 2  Pain management: adequate  Multimodal analgesia pain management approach  Airway patency: fixed obstruction  Cardiovascular status: acceptable and blood pressure returned to baseline  Respiratory status: acceptable and face mask  Hydration status: acceptable  Postoperative Nausea and Vomiting: none        No notable events documented.

## 2024-08-15 NOTE — INTERVAL H&P NOTE
H&P reviewed. The patient was examined and there are no changes to the H&P.    Tank Wong MD  Urology Resident (PGY-5)  Adult Pager [phone]  Pediatric Pager [phone]

## 2024-08-15 NOTE — ANESTHESIA PREPROCEDURE EVALUATION
Patient: Terra Alexis    Procedure Information       Date/Time: 08/15/24 0715    Procedures:       Nephroureterectomy Robot-Assisted (Left) - Dr. Michael will do a vascular excision and reconstruction      Creation Bypass Graft Aortoiliac Artery (Left) - Left external iliac artery bypass      Ligation Vein Lower Extremity (Left) - Left external iliac vein ligation    Location: [facility] OR 16 / [facility] OR    Surgeons: Teddy Ross MD; Raf Michael MD            Relevant Problems   Cardiac   (+) Essential hypertension   (+) Hypertension      Neuro   (+) Cubital tunnel syndrome   (+) Depression with anxiety      /Renal   (+) Acute UTI   (+) Hydronephrosis      Musculoskeletal   (+) Primary localized osteoarthritis of right knee      ID   (+) Acute UTI   (+) URI (upper respiratory infection)      GYN   (+) Malignant neoplasm of cervix uteri (Multi)       Clinical information reviewed:   Tobacco  Allergies  Meds   Med Hx  Surg Hx  OB Status  Fam Hx  Soc   Hx        NPO Detail:  NPO/Void Status  Carbohydrate Drink Given Prior to Surgery? : N  Date of Last Liquid: 08/14/24  Time of Last Liquid: 0000  Date of Last Solid: 08/14/24  Time of Last Solid: 0000  Last Intake Type: Clear fluids  Time of Last Void: 0548         Physical Exam    Airway  Mallampati: III  TM distance: >3 FB  Neck ROM: full     Cardiovascular    Dental    Pulmonary    Abdominal            Anesthesia Plan    History of general anesthesia?: yes  History of complications of general anesthesia?: no    ASA 3     general     intravenous induction   Anesthetic plan and risks discussed with patient.  Use of blood products discussed with patient who.

## 2024-08-15 NOTE — CONSULTS
Terra Alexis is a 74 y.o. year old female patient who presents for Robotic Nephroureterectomy, Creation Bypass Graft Aortoiliac Artery and Ligation Vein Lower Extremity with Dr. Ross/Dr. Michael. Acute Pain consulted for block for postoperative pain control.     Anticipated Postop Pain Issues -   Palliative: typically relieved with IV analgesics and regional local anesthetics  Provocative: typically with movement  Quality: typically burning and aching  Radiation: typically none  Severity: typically severe 8-10/10  Timing: typically constant    Past Medical History:   Diagnosis Date    Other specified disorders of kidney and ureter 03/04/2022    Ureteral mass    Personal history of malignant neoplasm of cervix uteri 11/21/2022    History of malignant neoplasm of cervix        Past Surgical History:   Procedure Laterality Date    OTHER SURGICAL HISTORY  10/07/2021    Hysterectomy        Family History   Problem Relation Name Age of Onset    Macular degeneration Mother      Glaucoma Other grandparent     Macular degeneration Other grandparent         Social History     Socioeconomic History    Marital status: Single     Spouse name: Not on file    Number of children: Not on file    Years of education: Not on file    Highest education level: Not on file   Occupational History    Not on file   Tobacco Use    Smoking status: Never    Smokeless tobacco: Never   Vaping Use    Vaping status: Never Used   Substance and Sexual Activity    Alcohol use: Never    Drug use: Never    Sexual activity: Defer   Other Topics Concern    Not on file   Social History Narrative    Not on file     Social Determinants of Health     Financial Resource Strain: Unknown (8/17/2021)    Received from [facility], [facility]    Overall Financial Resource Strain (CARDIA)     Difficulty of Paying Living Expenses: Patient declined   Food Insecurity: Not on file   Transportation Needs: Not on file   Physical Activity: Not on file    Stress: Not on file   Social Connections: Not on file   Intimate Partner Violence: Not on file   Housing Stability: Not on file        Allergies   Allergen Reactions    Codeine Hallucinations, GI Upset and Nausea/vomiting    Percocet [Oxycodone-Acetaminophen] Dizziness and Nausea/vomiting         Review of Systems  Gen: No fatigue, anorexia, insomnia, fever.   Eyes: No vision loss, double vision, drainage, eye pain.   ENT: No pharyngitis, dry mouth, no hearing changes or ear discharge  Cardiac: No chest pain, palpitations, syncope, near syncope.   Pulmonary: No shortness of breath, cough, hemoptysis.   Heme/lymph: No swollen glands, fever, bleeding.   GI: No abdominal pain, change in bowel habits, melena, hematemesis, hematochezia, nausea, vomiting, diarrhea.   : No discharge, dysuria, frequency, urgency, hematuria.  Endo: No polyuria or weight loss.   Musculoskeletal: Negative for any pain or loss of ROM/weakness  Skin: No rashes or lesions  Neuro: Normal speech, no numbness or weakness. No gait difficulties  Review of systems is otherwise negative unless stated above or in history of present illness.    Physical Exam:  Constitutional:  no distress, alert and cooperative  Eyes: clear sclera  Head/Neck: No apparent injury, trachea midline  Respiratory/Thorax: Patent airways, thorax symmetric, breathing comfortably  Cardiovascular: no pitting edema  Gastrointestinal: Nondistended  Musculoskeletal: ROM intact  Extremities: no clubbing  Neurological: alert, alonso x4  Psychological: Appropriate affect    No results found for this or any previous visit (from the past 24 hour(s)).     Plan:    - Bilateral MATT blocks performed preoperatively on 8/15  - Ambit ball with Ropivacaine 0.2%/NaCl 0.9% 500mL, Rate 7 cc/hr bilaterally  - Ambit medication will not interfere with pain medication prescribed by the primary team.   - Please be aware of local anesthetic toxic dose and absorption variability before considering lidocaine  patches  - Acute pain service will follow while catheters in place  - Rest of pain management per primary team    Acute Pain Resident  pg [phone]  [phone]

## 2024-08-15 NOTE — CONSULTS
Surgical Oncology  - HISTORY AND PHYSICAL / CONSULT    Patient Name: Terra Alexis  MRN: [id number]  Admit Date: 815  : 1950  AGE: 74 y.o.   GENDER: female    CHIEF COMPLAINT/REASON FOR CONSULT:  Intraoperative mass    HPI:  Terra Alexis is a 74 y.o. female presented for robotic nephroureterectomy, creation bypass graft of the aortoiliac artery and ligation vein lower extremity with urology. Surgical oncology consulted intraoperatively for the patients previously diagnosed carcinoma, found to be invading the distal colon.     PMH:  Past Medical History:   Diagnosis Date    Other specified disorders of kidney and ureter 2022    Ureteral mass    Personal history of malignant neoplasm of cervix uteri 2022    History of malignant neoplasm of cervix       PSH:  Cystoscopy, left ureteroscopy, urethral biopsy and stent insertion    Fam:  Family History   Problem Relation Name Age of Onset    Macular degeneration Mother      Glaucoma Other grandparent     Macular degeneration Other grandparent        Soc:   Social History     Socioeconomic History    Marital status: Single     Spouse name: Not on file    Number of children: Not on file    Years of education: Not on file    Highest education level: Not on file   Occupational History    Not on file   Tobacco Use    Smoking status: Never    Smokeless tobacco: Never   Vaping Use    Vaping status: Never Used   Substance and Sexual Activity    Alcohol use: Never    Drug use: Never    Sexual activity: Defer   Other Topics Concern    Not on file   Social History Narrative    Not on file     Social Determinants of Health     Financial Resource Strain: Unknown (2021)    [facility], [facility]    Overall Financial Resource Strain (CARDIA)     Difficulty of Paying Living Expenses: Patient declined   Food Insecurity: Not on file   Transportation Needs: Not on file   Physical Activity: Not on file   Stress: Not on file   Social  Connections: Not on file   Intimate Partner Violence: Not on file   Housing Stability: Not on file       Meds:  No current facility-administered medications on file prior to encounter.     Current Outpatient Medications on File Prior to Encounter   Medication Sig Dispense Refill    levothyroxine (Synthroid, Levoxyl) 25 mcg tablet Take 1 tablet (25 mcg) by mouth early in the morning.. Take on an empty stomach at the same time each day, either 30 to 60 minutes prior to breakfast 30 tablet 2    potassium chloride CR 10 mEq ER tablet Take 1 tablet (10 mEq) by mouth once daily. 30 tablet 11    vitamins A,C,E-zinc-copper (PreserVision AREDS) 4,296 mcg-226 mg-90 mg capsule Take 1 capsule by mouth 2 times a day.      ondansetron (Zofran) 8 mg tablet Take 1 tablet (8 mg) by mouth every 8 hours if needed for nausea or vomiting. (Patient not taking: Reported on 7/11/2024) 30 tablet 5       Allergies:  Allergies   Allergen Reactions    Codeine Hallucinations, GI Upset and Nausea/vomiting    Percocet [Oxycodone-Acetaminophen] Dizziness and Nausea/vomiting         ROS: 12-pt ROS negative other than noted in HPI    REVIEW OF SYSTEMS:  Review of Systems   Unable to perform ROS: Other (Patient intraoperative, sedated and intubated)     PHYSICAL EXAM:  Physical Exam  Vitals reviewed: unable to perform, patient currently on the operating room table.   Musculoskeletal:         General: Injury: Surgical oncology will join the case.           IMAGING SUMMARY:  MR pelvis w and wo IV contrast    Result Date: 7/21/2024  1. A 2.9 x 2.5 x 3.6 cm diffusion restricting enhancing mass encasing the left distal ureteral stent anterior to the left sacral ala, compatible with known urothelial neoplasm, with similar measurements on prior CT 07/01/2024 but has decreased in size when compared to prior MRI from 03/22/2024. The mass completely encases the left internal iliac artery and vein which are likely occluded.  Left common iliac vein and left  external iliac vein abuts the mass but remain patent.  There is at least partial encasement of the left S1 and S2 nerve roots. The mass abuts the left sacrum without definite evidence of osseous extension/destruction. 2. No evidence of lymphadenopathy or other metastatic disease in the pelvis.   I personally reviewed the images/study and I agree with the findings as stated by Juan Antonio Simms DO, PGY-3. This study was interpreted at [facility], [place], [place].   MACRO: None   Signed by: Bela Iverson 7/21/2024 9:14 PM Dictation workstation:   [id number]      LABS:  Results for orders placed or performed during the hospital encounter of 08/15/24 (from the past 24 hour(s))   Prepare RBC: 4 Units   Result Value Ref Range    PRODUCT CODE L3963Z39     Unit Number [id number]     Unit ABO B     Unit RH POS     XM INTEP COMP     Dispense Status IS     Blood Expiration Date 9/16/2024 11:59:00 PM EDT     PRODUCT BLOOD TYPE 7300     UNIT VOLUME 350     PRODUCT CODE S5137Y04     Unit Number [id number]     Unit ABO B     Unit RH POS     XM INTEP COMP     Dispense Status IS     Blood Expiration Date 9/16/2024 11:59:00 PM EDT     PRODUCT BLOOD TYPE 7300     UNIT VOLUME 350     PRODUCT CODE F9683Y66     Unit Number [id number]     Unit ABO B     Unit RH POS     XM INTEP COMP     Dispense Status IS     Blood Expiration Date 9/17/2024 11:59:00 PM EDT     PRODUCT BLOOD TYPE 7300     UNIT VOLUME 350     PRODUCT CODE L0318N37     Unit Number [id number]     Unit ABO B     Unit RH POS     XM INTEP COMP     Dispense Status IS     Blood Expiration Date 9/16/2024 11:59:00 PM EDT     PRODUCT BLOOD TYPE 7300     UNIT VOLUME 350    Prepare Platelets: 1 Units   Result Value Ref Range    PRODUCT CODE IA478Y18     Unit Number [id number]     Unit ABO B     Unit RH POS     Dispense Status IS     Blood Expiration Date 8/15/2024 11:59:00 PM EDT     PRODUCT BLOOD TYPE 7300     UNIT VOLUME 251     Prepare Plasma: 4 Units   Result Value Ref Range    PRODUCT CODE X2410A92     Unit Number [id number]     Unit ABO B     Unit RH POS     Dispense Status IS     Blood Expiration Date 8/20/2024 11:38:00 AM EDT     PRODUCT BLOOD TYPE 7300     UNIT VOLUME 213     PRODUCT CODE I1235T94     Unit Number [id number]     Unit ABO B     Unit RH POS     Dispense Status IS     Blood Expiration Date 8/20/2024 11:38:00 AM EDT     PRODUCT BLOOD TYPE 7300     UNIT VOLUME 218     PRODUCT CODE Y5278K88     Unit Number [id number]     Unit ABO B     Unit RH POS     Dispense Status IS     Blood Expiration Date 8/20/2024 11:38:00 AM EDT     PRODUCT BLOOD TYPE 7300     UNIT VOLUME 220     PRODUCT CODE W8171Q20     Unit Number [id number]     Unit ABO B     Unit RH POS     Dispense Status IS     Blood Expiration Date 8/20/2024 11:38:00 AM EDT     PRODUCT BLOOD TYPE 7300     UNIT VOLUME 216    BLOOD GAS ARTERIAL FULL PANEL   Result Value Ref Range    POCT pH, Arterial 7.33 (L) 7.38 - 7.42 pH    POCT pCO2, Arterial 32 (L) 38 - 42 mm Hg    POCT pO2, Arterial 185 (H) 85 - 95 mm Hg    POCT SO2, Arterial 99 94 - 100 %    POCT Oxy Hemoglobin, Arterial 97.6 94.0 - 98.0 %    POCT Hematocrit Calculated, Arterial 26.0 (L) 36.0 - 46.0 %    POCT Sodium, Arterial 138 136 - 145 mmol/L    POCT Potassium, Arterial 3.1 (L) 3.5 - 5.3 mmol/L    POCT Chloride, Arterial 113 (H) 98 - 107 mmol/L    POCT Ionized Calcium, Arterial 1.21 1.10 - 1.33 mmol/L    POCT Glucose, Arterial 82 74 - 99 mg/dL    POCT Lactate, Arterial 0.6 0.4 - 2.0 mmol/L    POCT Base Excess, Arterial -8.2 (L) -2.0 - 3.0 mmol/L    POCT HCO3 Calculated, Arterial 16.9 (L) 22.0 - 26.0 mmol/L    POCT Hemoglobin, Arterial 8.8 (L) 12.0 - 16.0 g/dL    POCT Anion Gap, Arterial 11 10 - 25 mmo/L    Patient Temperature 37.0 degrees Celsius    FiO2 47 %   BLOOD GAS ARTERIAL FULL PANEL   Result Value Ref Range    POCT pH, Arterial 7.23 (LL) 7.38 - 7.42 pH    POCT pCO2, Arterial 45 (H) 38  - 42 mm Hg    POCT pO2, Arterial 154 (H) 85 - 95 mm Hg    POCT SO2, Arterial 98 94 - 100 %    POCT Oxy Hemoglobin, Arterial 97.6 94.0 - 98.0 %    POCT Hematocrit Calculated, Arterial 35.0 (L) 36.0 - 46.0 %    POCT Sodium, Arterial 135 (L) 136 - 145 mmol/L    POCT Potassium, Arterial 3.4 (L) 3.5 - 5.3 mmol/L    POCT Chloride, Arterial 109 (H) 98 - 107 mmol/L    POCT Ionized Calcium, Arterial 1.29 1.10 - 1.33 mmol/L    POCT Glucose, Arterial 132 (H) 74 - 99 mg/dL    POCT Lactate, Arterial 0.5 0.4 - 2.0 mmol/L    POCT Base Excess, Arterial -8.5 (L) -2.0 - 3.0 mmol/L    POCT HCO3 Calculated, Arterial 18.8 (L) 22.0 - 26.0 mmol/L    POCT Hemoglobin, Arterial 11.6 (L) 12.0 - 16.0 g/dL    POCT Anion Gap, Arterial 11 10 - 25 mmo/L    Patient Temperature 37.0 degrees Celsius    FiO2 50 %   ACTIVATED CLOTTING TIME LOW   Result Value Ref Range    POCT Activated Clotting Time Low Range 129 83 - 199 sec   Verify ABO/Rh Group Test (VERAB)   Result Value Ref Range    ABO TYPE B     Rh TYPE POS    BLOOD GAS ARTERIAL FULL PANEL   Result Value Ref Range    POCT pH, Arterial 7.34 (L) 7.38 - 7.42 pH    POCT pCO2, Arterial 35 (L) 38 - 42 mm Hg    POCT pO2, Arterial 129 (H) 85 - 95 mm Hg    POCT SO2, Arterial 98 94 - 100 %    POCT Oxy Hemoglobin, Arterial 97.6 94.0 - 98.0 %    POCT Hematocrit Calculated, Arterial 25.0 (L) 36.0 - 46.0 %    POCT Sodium, Arterial 136 136 - 145 mmol/L    POCT Potassium, Arterial 3.9 3.5 - 5.3 mmol/L    POCT Chloride, Arterial      POCT Ionized Calcium, Arterial 1.19 1.10 - 1.33 mmol/L    POCT Glucose, Arterial 120 (H) 74 - 99 mg/dL    POCT Lactate, Arterial 0.8 0.4 - 2.0 mmol/L    POCT Base Excess, Arterial -6.3 (L) -2.0 - 3.0 mmol/L    POCT HCO3 Calculated, Arterial 18.9 (L) 22.0 - 26.0 mmol/L    POCT Hemoglobin, Arterial 8.3 (L) 12.0 - 16.0 g/dL    POCT Anion Gap, Arterial      Patient Temperature 37.0 degrees Celsius    FiO2 50 %   BLOOD GAS ARTERIAL FULL PANEL   Result Value Ref Range    POCT pH, Arterial  7.36 (L) 7.38 - 7.42 pH    POCT pCO2, Arterial 33 (L) 38 - 42 mm Hg    POCT pO2, Arterial 186 (H) 85 - 95 mm Hg    POCT SO2, Arterial 99 94 - 100 %    POCT Oxy Hemoglobin, Arterial 97.6 94.0 - 98.0 %    POCT Hematocrit Calculated, Arterial 30.0 (L) 36.0 - 46.0 %    POCT Sodium, Arterial 134 (L) 136 - 145 mmol/L    POCT Potassium, Arterial 4.7 3.5 - 5.3 mmol/L    POCT Chloride, Arterial 108 (H) 98 - 107 mmol/L    POCT Ionized Calcium, Arterial 1.16 1.10 - 1.33 mmol/L    POCT Glucose, Arterial 137 (H) 74 - 99 mg/dL    POCT Lactate, Arterial 0.8 0.4 - 2.0 mmol/L    POCT Base Excess, Arterial -6.1 (L) -2.0 - 3.0 mmol/L    POCT HCO3 Calculated, Arterial 18.6 (L) 22.0 - 26.0 mmol/L    POCT Hemoglobin, Arterial 9.9 (L) 12.0 - 16.0 g/dL    POCT Anion Gap, Arterial 12 10 - 25 mmo/L    Patient Temperature 37.0 degrees Celsius    FiO2 60 %       I have reviewed all laboratory and imaging results ordered/pertinent for this encounter.    Assessment/Plan      ASSESSMENT/PLAN:  Terra Alexis is a 74 y.o. 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     Hospital Day: 1     Plan:   > Surgical Oncology will join the case, currently ongoing.     Patient's exam, labs, and findings discussed and seen with Dr. Denton, who agrees with the plan as described above.      Alex Milton MD [facility] - PGY1  Surgical Oncology  Surg Onc pager: [phone]

## 2024-08-16 LAB
ANION GAP SERPL CALC-SCNC: 11 MMOL/L (ref 10–20)
BUN SERPL-MCNC: 15 MG/DL (ref 6–23)
CALCIUM SERPL-MCNC: 7.8 MG/DL (ref 8.6–10.6)
CHLORIDE SERPL-SCNC: 107 MMOL/L (ref 98–107)
CO2 SERPL-SCNC: 23 MMOL/L (ref 21–32)
CREAT SERPL-MCNC: 1.22 MG/DL (ref 0.5–1.05)
EGFRCR SERPLBLD CKD-EPI 2021: 47 ML/MIN/1.73M*2
ERYTHROCYTE [DISTWIDTH] IN BLOOD BY AUTOMATED COUNT: 18.3 % (ref 11.5–14.5)
GLUCOSE SERPL-MCNC: 99 MG/DL (ref 74–99)
HCT VFR BLD AUTO: 27.5 % (ref 36–46)
HGB BLD-MCNC: 8.5 G/DL (ref 12–16)
MAGNESIUM SERPL-MCNC: 1.61 MG/DL (ref 1.6–2.4)
MCH RBC QN AUTO: 29.9 PG (ref 26–34)
MCHC RBC AUTO-ENTMCNC: 30.9 G/DL (ref 32–36)
MCV RBC AUTO: 97 FL (ref 80–100)
NRBC BLD-RTO: 0 /100 WBCS (ref 0–0)
PLATELET # BLD AUTO: 149 X10*3/UL (ref 150–450)
POTASSIUM SERPL-SCNC: 3.5 MMOL/L (ref 3.5–5.3)
RBC # BLD AUTO: 2.84 X10*6/UL (ref 4–5.2)
SODIUM SERPL-SCNC: 137 MMOL/L (ref 136–145)
WBC # BLD AUTO: 7.2 X10*3/UL (ref 4.4–11.3)

## 2024-08-16 PROCEDURE — 2500000004 HC RX 250 GENERAL PHARMACY W/ HCPCS (ALT 636 FOR OP/ED)

## 2024-08-16 PROCEDURE — 82374 ASSAY BLOOD CARBON DIOXIDE: CPT | Performed by: STUDENT IN AN ORGANIZED HEALTH CARE EDUCATION/TRAINING PROGRAM

## 2024-08-16 PROCEDURE — 2500000004 HC RX 250 GENERAL PHARMACY W/ HCPCS (ALT 636 FOR OP/ED): Performed by: STUDENT IN AN ORGANIZED HEALTH CARE EDUCATION/TRAINING PROGRAM

## 2024-08-16 PROCEDURE — 83735 ASSAY OF MAGNESIUM: CPT

## 2024-08-16 PROCEDURE — 99024 POSTOP FOLLOW-UP VISIT: CPT | Performed by: SURGERY

## 2024-08-16 PROCEDURE — 85027 COMPLETE CBC AUTOMATED: CPT | Performed by: STUDENT IN AN ORGANIZED HEALTH CARE EDUCATION/TRAINING PROGRAM

## 2024-08-16 PROCEDURE — 1200000003 HC ONCOLOGY  ROOM WITH TELEMETRY DAILY

## 2024-08-16 PROCEDURE — 36415 COLL VENOUS BLD VENIPUNCTURE: CPT | Performed by: STUDENT IN AN ORGANIZED HEALTH CARE EDUCATION/TRAINING PROGRAM

## 2024-08-16 PROCEDURE — 2500000001 HC RX 250 WO HCPCS SELF ADMINISTERED DRUGS (ALT 637 FOR MEDICARE OP): Performed by: STUDENT IN AN ORGANIZED HEALTH CARE EDUCATION/TRAINING PROGRAM

## 2024-08-16 PROCEDURE — 2500000002 HC RX 250 W HCPCS SELF ADMINISTERED DRUGS (ALT 637 FOR MEDICARE OP, ALT 636 FOR OP/ED)

## 2024-08-16 PROCEDURE — 99231 SBSQ HOSP IP/OBS SF/LOW 25: CPT

## 2024-08-16 RX ORDER — HYDROMORPHONE HYDROCHLORIDE 1 MG/ML
0.4 INJECTION, SOLUTION INTRAMUSCULAR; INTRAVENOUS; SUBCUTANEOUS EVERY 4 HOURS PRN
Status: DISCONTINUED | OUTPATIENT
Start: 2024-08-16 | End: 2024-08-21 | Stop reason: HOSPADM

## 2024-08-16 RX ORDER — MAGNESIUM SULFATE HEPTAHYDRATE 40 MG/ML
4 INJECTION, SOLUTION INTRAVENOUS ONCE
Status: COMPLETED | OUTPATIENT
Start: 2024-08-16 | End: 2024-08-16

## 2024-08-16 RX ORDER — POTASSIUM CHLORIDE 750 MG/1
10 TABLET, FILM COATED, EXTENDED RELEASE ORAL DAILY
Status: DISCONTINUED | OUTPATIENT
Start: 2024-08-16 | End: 2024-08-21 | Stop reason: HOSPADM

## 2024-08-16 RX ORDER — POTASSIUM CHLORIDE 14.9 MG/ML
20 INJECTION INTRAVENOUS
Status: COMPLETED | OUTPATIENT
Start: 2024-08-16 | End: 2024-08-16

## 2024-08-16 SDOH — SOCIAL STABILITY: SOCIAL INSECURITY: ABUSE: ADULT

## 2024-08-16 SDOH — SOCIAL STABILITY: SOCIAL INSECURITY: DO YOU FEEL UNSAFE GOING BACK TO THE PLACE WHERE YOU ARE LIVING?: NO

## 2024-08-16 SDOH — SOCIAL STABILITY: SOCIAL INSECURITY: DO YOU FEEL ANYONE HAS EXPLOITED OR TAKEN ADVANTAGE OF YOU FINANCIALLY OR OF YOUR PERSONAL PROPERTY?: NO

## 2024-08-16 SDOH — SOCIAL STABILITY: SOCIAL INSECURITY: DOES ANYONE TRY TO KEEP YOU FROM HAVING/CONTACTING OTHER FRIENDS OR DOING THINGS OUTSIDE YOUR HOME?: NO

## 2024-08-16 SDOH — SOCIAL STABILITY: SOCIAL INSECURITY: ARE YOU OR HAVE YOU BEEN THREATENED OR ABUSED PHYSICALLY, EMOTIONALLY, OR SEXUALLY BY ANYONE?: NO

## 2024-08-16 SDOH — SOCIAL STABILITY: SOCIAL INSECURITY: HAVE YOU HAD THOUGHTS OF HARMING ANYONE ELSE?: NO

## 2024-08-16 SDOH — SOCIAL STABILITY: SOCIAL INSECURITY: ARE THERE ANY APPARENT SIGNS OF INJURIES/BEHAVIORS THAT COULD BE RELATED TO ABUSE/NEGLECT?: NO

## 2024-08-16 SDOH — SOCIAL STABILITY: SOCIAL INSECURITY: HAS ANYONE EVER THREATENED TO HURT YOUR FAMILY OR YOUR PETS?: NO

## 2024-08-16 SDOH — SOCIAL STABILITY: SOCIAL INSECURITY: HAVE YOU HAD ANY THOUGHTS OF HARMING ANYONE ELSE?: NO

## 2024-08-16 SDOH — SOCIAL STABILITY: SOCIAL INSECURITY: WERE YOU ABLE TO COMPLETE ALL THE BEHAVIORAL HEALTH SCREENINGS?: YES

## 2024-08-16 ASSESSMENT — ACTIVITIES OF DAILY LIVING (ADL)
GROOMING: INDEPENDENT
JUDGMENT_ADEQUATE_SAFELY_COMPLETE_DAILY_ACTIVITIES: YES
PATIENT'S MEMORY ADEQUATE TO SAFELY COMPLETE DAILY ACTIVITIES?: YES
HEARING - LEFT EAR: FUNCTIONAL
LACK_OF_TRANSPORTATION: PATIENT UNABLE TO ANSWER
TOILETING: INDEPENDENT
DRESSING YOURSELF: INDEPENDENT
WALKS IN HOME: INDEPENDENT
FEEDING YOURSELF: INDEPENDENT
ADEQUATE_TO_COMPLETE_ADL: YES
BATHING: INDEPENDENT
HEARING - RIGHT EAR: FUNCTIONAL

## 2024-08-16 ASSESSMENT — COGNITIVE AND FUNCTIONAL STATUS - GENERAL
MOVING TO AND FROM BED TO CHAIR: A LITTLE
DAILY ACTIVITIY SCORE: 24
DAILY ACTIVITIY SCORE: 24
TURNING FROM BACK TO SIDE WHILE IN FLAT BAD: A LITTLE
MOVING FROM LYING ON BACK TO SITTING ON SIDE OF FLAT BED WITH BEDRAILS: A LITTLE
WALKING IN HOSPITAL ROOM: A LITTLE
CLIMB 3 TO 5 STEPS WITH RAILING: A LITTLE
WALKING IN HOSPITAL ROOM: A LITTLE
CLIMB 3 TO 5 STEPS WITH RAILING: A LITTLE
MOBILITY SCORE: 18
MOBILITY SCORE: 18
MOVING FROM LYING ON BACK TO SITTING ON SIDE OF FLAT BED WITH BEDRAILS: A LITTLE
STANDING UP FROM CHAIR USING ARMS: A LITTLE
PATIENT BASELINE BEDBOUND: NO
TURNING FROM BACK TO SIDE WHILE IN FLAT BAD: A LITTLE
MOVING TO AND FROM BED TO CHAIR: A LITTLE
STANDING UP FROM CHAIR USING ARMS: A LITTLE

## 2024-08-16 ASSESSMENT — PAIN SCALES - GENERAL
PAINLEVEL_OUTOF10: 7
PAINLEVEL_OUTOF10: 8
PAINLEVEL_OUTOF10: 4
PAINLEVEL_OUTOF10: 7
PAINLEVEL_OUTOF10: 9
PAINLEVEL_OUTOF10: 0 - NO PAIN
PAINLEVEL_OUTOF10: 7
PAINLEVEL_OUTOF10: 3
PAINLEVEL_OUTOF10: 9
PAINLEVEL_OUTOF10: 5 - MODERATE PAIN
PAINLEVEL_OUTOF10: 8

## 2024-08-16 ASSESSMENT — PAIN DESCRIPTION - LOCATION
LOCATION: ABDOMEN
LOCATION: ABDOMEN

## 2024-08-16 ASSESSMENT — PAIN - FUNCTIONAL ASSESSMENT
PAIN_FUNCTIONAL_ASSESSMENT: 0-10

## 2024-08-16 ASSESSMENT — PATIENT HEALTH QUESTIONNAIRE - PHQ9
1. LITTLE INTEREST OR PLEASURE IN DOING THINGS: NOT AT ALL
SUM OF ALL RESPONSES TO PHQ9 QUESTIONS 1 & 2: 0
2. FEELING DOWN, DEPRESSED OR HOPELESS: NOT AT ALL

## 2024-08-16 ASSESSMENT — LIFESTYLE VARIABLES
HOW MANY STANDARD DRINKS CONTAINING ALCOHOL DO YOU HAVE ON A TYPICAL DAY: PATIENT DOES NOT DRINK
SKIP TO QUESTIONS 9-10: 1
HOW OFTEN DO YOU HAVE A DRINK CONTAINING ALCOHOL: NEVER
AUDIT-C TOTAL SCORE: 0
HOW OFTEN DO YOU HAVE 6 OR MORE DRINKS ON ONE OCCASION: NEVER
AUDIT-C TOTAL SCORE: 0

## 2024-08-16 NOTE — OP NOTE
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]     Procedures  Nephroureterectomy Robot-Assisted  13391 - WI LAPAROSCOPY NEPHRECTOMY W/TOTAL URETERECTOMY    Exploration Laparotomy, Open Left Ureteral Tumor Resection and Excision of pelvic mass    Partial sigmoidectomy    Surgeons   Panel 1:     * Teddy Ross - Primary  Panel 2:     * Raf Michael MD - Assisting     * Bernard Denton MD MPH - Assisting    Resident/Fellow/Other Assistant:  Surgeons and Role:  Panel 1:     * Tank Wong MD - Resident - Assisting     * Veronica Andre PA-C - MARIO First Assist  Panel 2:     * Raf Michael MD - Assisting     * Bernard Denton MD MPH - Assisting     * Cornelius Roach MD - Resident - Assisting     * Yifan Good MD - Resident - Observing    Procedure Summary  Anesthesia: General  ASA: III  Anesthesia Staff: Anesthesiologist: Oj Rashid MD; Marty Bose MD; Talisha Bender MD  Anesthesia Resident: Chuck Fregoso MD; Renetta Craig MD; Muna Flores MD  Estimated Blood Loss: 350mL  Intra-op Medications:   Administrations occurring from 0715 to 1405 on 08/15/24:   Medication Name Total Dose   sodium chloride 0.9 % irrigation solution 1,000 mL   sterile water irrigation solution 1,000 mL   polymyxin B injection 500,000 Units   gelatin absorbable (Gelfoam) 100 sponge 2 each   thrombin (recombinant) (Recothrom) topical solution 10,000 Units   heparin (porcine) 5,000 Units in sodium chloride 0.9% 500 mL irrigation 5,000 Units   acetaminophen (Tylenol) tablet 975 mg 975 mg   alvimopan (Entereg) capsule 12 mg 12 mg   gabapentin (Neurontin) capsule 300 mg 300 mg   heparin (porcine) injection 5,000 Units 5,000 Units               Anesthesia Record               Intraprocedure I/O Totals          Intake    Dexmedetomidine 31.30 mL    The total shown is the total volume documented since Anesthesia Start was filed.    LR bolus 5000.00 mL    Phenylephrine Drip 0.00 mL    The total shown is the total volume documented since Anesthesia Start was filed.    LR infusion 0.00 mL    albumin human bottle 5% 1000.00 mL    metroNIDAZOLE (Flagyl)  mg/100 mL (premix) 100.00 mL    potassium chloride 20 mEq in 100 mL IV premix 100.00 mL    Total Intake 6231.3 mL       Output    Urine 500 mL    Est. Blood Loss 350 mL    Total Output 850 mL       Net    Net Volume 5381.3 mL          Specimen:   ID Type Source Tests Collected by Time   1 : sigmoid colon Tissue COLON - SIGMOID RESECTION SURGICAL PATHOLOGY EXAM Rashed MYRNA Ross MD 8/15/2024 1459   2 : LEFT PERIURETERAL TUMOR BIOPSY Tissue URETER RESECTION LEFT SURGICAL PATHOLOGY EXAM Rashed MYRNA Ross MD 8/15/2024 1311   3 : LEFT URETER SEGMENT Tissue URETER RESECTION LEFT SURGICAL PATHOLOGY EXAM Rashed MYRNA Ross MD 8/15/2024 1353   4 : LEFT KIDNEY AND PROXIMAL URETER Tissue KIDNEY RADICAL NEPHROURETERECTOMY LEFT SURGICAL PATHOLOGY EXAM Teddy Ross MD 8/15/2024 1426        Staff:   Circulator: Marta  Scrub Person: Jose  Scrub Person: Ezio  Circulator: Mo  Relief Scrub: [place]  Relief Circulator: Keri  Relief Scrub: Farzana  Relief Scrub: Mo  Relief Circulator: Ezio  Relief Scrub: Noemí         Drains and/or Catheters:   Ileostomy Loop RUQ (Active)   Stomal Appliance 1 piece 08/15/24 1915   Site/Stoma Assessment Clean;Intact 08/15/24 1915   Peristomal Assessment Clean;Pink 08/15/24 1915   Output (mL) 0 mL 08/15/24 1800       Urethral Catheter Non-latex 16 Fr. (Active)   Site Assessment Clean;Skin intact 08/15/24 1915   Collection Container Standard drainage bag 08/15/24 1915   Securement Method Securing device (Describe) 08/15/24 1915   Output (mL) 45 mL 08/15/24 1900          Findings: left atrophic kidney removed without complications. JJ stent removed. Pelvic mass abutting ureter without invasion, however dense adhesions present. Nerve roots intact and not disrupted. Mass involving sigmoid colon and mesentery requiring colonic resection with primary anastomosis and loop ileostomy.        Indications: Terra Alexis is an 74 y.o. 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     The patient was seen in the preoperative area. The risks, benefits, complications, treatment options, non-operative alternatives, expected recovery and outcomes were discussed with the patient. The possibilities of reaction to medication, pulmonary aspiration, injury to surrounding structures, bleeding, recurrent infection, the need for additional procedures, failure to diagnose a condition, and creating a complication requiring transfusion or operation were discussed with the patient. The patient concurred with the proposed plan, giving informed consent.  The site of surgery was properly noted/marked if necessary per policy. The patient has been actively warmed in preoperative area. Preoperative antibiotics have been ordered and given within 1 hours of incision. Venous thrombosis prophylaxis have been ordered including bilateral sequential compression devices and chemical prophylaxis    Procedure Details:   Patient was brought to the operating room and placed in the supine position.  General anesthesia was induced.  Once anesthetized, a Alexis catheter was placed and he was then positioned in the flank position with the left flank up.  The bony prominences were appropriately padded and she was secured to the surgical table with 3 inch silk tape.  The left flank and abdomen were prepped and draped in usual sterile fashion.  A small supraumbilical 1.5 cm incision was made before using Bovie electrocautery to divide subcutaneous tissue exposing the fascia.  Th veriss needle was  inserted before drop test confirmed entrance into the abdomen without injury to surrounding internal organs. We insufflated the abdomen to 15 mmHg.  We then inserted the laparoscope via visiport. We noticed large amount of alveolar tissue with concern of insufflating pre-peritoneal space. Visiport removed and the veress was introduced in the [place]. Negative drop test, insufflation to 15mmHg. We then introduced the 8mm robotic trocar into the abdomen utilizing the visiport. This was successful. No bowel or omental injuries noted.  No adhesions were noted.  A 8 mm subcostal port and 8 mm left lower quadrant port were placed under direct vision. Additional 12mm assistant port was placed.  We mobilized the left colon and splenorenal attachments and reflected the bowel medially exposing the retroperitoneum.  We identified the left gonadal vein in addition to the ureter before carrying out our dissection cephalad until we encountered the renal hilum.  A single renal vein was skeletonized.  Both the vein and renal artery were divided with the endoscopic stapler. We then freed the lateral and upper pole attachments.  We copiously irrigated and inspected with placement of fibrillar to the renal bed.  No bleeding was noted.  The kidney was remained in the abdomen with the ureter intact to assist in distal ureteral dissection. The abdomen was desufflated and trocars removed. Trocar sites were stapled and covered with Tegaderms.     The patient was then repositioned in supine fashion. The bony prominences were appropriately padded. She was prepped and draped in typical sterile fashion.  We then marked a midline incision from the xiphoid to the pubic symphysis.  Using a scalpel we made a lower midline incision extending 2 fingerbreadths above the umbilicus.  Using Bovie electrocautery we divided the subcutaneous tissues exposing the fascia.  We incised the fascia.  The peritoneum was visible, and was sharply incised  taking extra care not to injure the bowel.  We then proceeded to carefully open the rest of the peritoneum.  We needed to extend our incision cephalad which we did using Bovie electrocautery.  The urachus was ligated and divided using silk ties and Bovie electrocautery.  We then proceeded to optimize retraction utilizing the Omni retractor.  At this time Dr. Michael's vascular surgery team was present and assisting in exposure of the left pelvic sidewall and vasculature.  The left kidney, which was free in the abdomen except for the ureteral attachment was noted.  At this time we double clipped the ureter and divided it.  The kidney was then sent off the field for pathologic review.  The proximal ureteral stump was tagged to assist in retraction.  Using a combination of sharp, electrocautery, and blunt dissection were used to traced the ureter into the pelvis.  Dense amount of scar tissue, inflammation, and mass were noted along the iliac and ureter. Vascular dissection was performed by Dr. Michael, and was extremely laborious considering the extent of inflammation secondary to the history of radiation. Below the pelvic brim the mass was noted to abut the sacral nerve roots, at which time the neurosurgery team perform peripheral nerve evaluation, ensuring no damage to these nerves.  A frozen section of the pelvic mass was sent off the field, which showed poorly differentiated carcinoma.  We continued to free the ureter, however it was encased in tumor and could not be safely removed in its entirety down to the bladder cuff due to risk of vascular injury. It was evident however that the mass was extrinsic to the ureter, as the ureter appeared intact while the mass was encasing the ureter and could be dissected off of it. We were satisfied with the extent of tumor resected laterally, as the remainder of the iliac vessels appeared to be encased with dense fibrotic tissue rather than tumor. At this time it was decided not to  proceed with vascular dissection and bypass as dissection was not possible and abandoned distal ureterectomy down to the bladder, however we were able to excise the ureter distal to the location of the tumor.  We did sharply excise the dissected ureteral stump, as well as removing of the JJ stent in its entirety.  Meticulous hemostasis was obtained, and thrombin-soaked Gelfoam was placed along the pelvic vasculature.    Because of the extent of the adhesions on at least half of the ureter and the iliac vessels, the nephroureterectomy part and excision of the pelvic mass required at least 50% more time than the usual.    There was residual mass that invaded the sigmoid mesentery along with part of the sigmoid bowel wall, and the mass was around 1.5cm in size. At this time surgical oncology was consulted to assess the bowel.  Due to serosal tear and invasion of mass into sigmoid wall, they elected to perform a sigmoid resection with primary anastomosis as well as a loop ileostomy.  See Dr. Denton's note for details of that portion of the procedure.     Fascia was closed with #1 non-looped PDS sutures in a bidirectional fashion, with interrupted #2 Vicryl figure-of-eight interrupted sutures.  Interrupted 3-0 Vicryl deep dermal stitches were placed.  Skin was closed with staples.  Island dressing applied.  Ileostomy appliance in place.  The patient was awakened and taken to the PACU in stable condition.       Complications:  None; patient tolerated the procedure well.    Disposition: PACU - hemodynamically stable.  Condition: stable         Additional Details:     I certify that the services provided by the physician assistant including prepping the patient, docking the robot, exchanging robotic arms and providing retraction of the operative site for which payment is claimed were medically necessary. No qualified resident assist was available as listed residents are precluded from furnishing above services due to  involvement in educational activities during the case.        Attending Attestation:     Teddy Ross  Phone Number: [phone]

## 2024-08-16 NOTE — CONSULTS
Wound Care Consult     Visit Date: 8/16/2024      Patient Name: Terra Alexis         MRN: [id number]           YOB: 1950     Reason for Consult: Ileostomy assessment and pouch check        Ostomy type: ileostomy       Size: not measured     Color: red  Protruding: budded  Dusty: unable to determine    Functioning: bowel sweat  Mucocutaneous junction: not assessed  Peristomal skin: not assessed  Pouching: Clean, dry, and intact pouch from PACU in place  Ostomy Education: Patient too tired and painful for ostomy pouch change lesson at this time. She is agreeable to pouch change lesson Sunday afternoon with her .  Plan: Assess stoma/pouching Meet with patient for pouch change lesson Sunday afternoon with her      Wound Team Plan: Ostomy/wound team will follow while inpatient for ileostomy care.       Keri Denson RN, CWON  8/16/2024  5:55 PM

## 2024-08-16 NOTE — PROGRESS NOTES
Surgical Oncology Progress Note      08/16/24    Summary:  Terra Alexis is a 74F soft tissue mass encasing distal segment of ureter. Intra-op consult 8/15 for segmental sigmoid resection with diverting ileostomy    Subjective    Subjective:  NAOE. No major complaints.        Objective    Objective:  Vital signs:   Temp:  [35.7 °C (96.3 °F)-36.3 °C (97.3 °F)] 36 °C (96.8 °F)  Heart Rate:  [68-84] 72  Resp:  [10-22] 16  BP: (107-163)/(55-83) 127/70  Arterial Line BP 1: (112-168)/(53-80) 154/68    Physical Exam:  GEN: No acute distress. Alert, awake and conversive.  HEENT: Sclera anicteric. Moist mucous membranes.  RESP: Breathing non-labored, equal chest rise. On 3L NC.   CV: HDS  GI: Abdomen soft, nondistended, DLI appears pink and viable with intact appliance and bowel sweat. Appropriately tender.   : Alexis in place  MSK: No gross deformities. Moves all extremities spontaneously.  NEURO: Alert and oriented x3. No focal deficits.  PSYCH: Appropriate mood and affect.    I/O last 2 completed shifts:  In: 6331.3 (105.2 mL/kg) [I.V.:131.3 (2.2 mL/kg); IV Piggyback:6200]  Out: 950 (15.8 mL/kg) [Urine:600 (0.4 mL/kg/hr); Blood:350]  Weight: 60.2 kg      Labs:  Lab Results   Component Value Date    WBC 7.3 08/15/2024    HGB 7.0 (L) 08/15/2024    HCT 20.7 (L) 08/15/2024    MCV 90 08/15/2024     (L) 08/15/2024     Lab Results   Component Value Date    GLUCOSE 131 (H) 08/15/2024    CALCIUM 7.7 (L) 08/15/2024     08/15/2024    K 3.7 08/15/2024    CO2 20 (L) 08/15/2024     (H) 08/15/2024    BUN 15 08/15/2024    CREATININE 1.00 08/15/2024     Imaging:  XR chest 1 view    Result Date: 8/15/2024  STUDY: XR CHEST 1 VIEW;  8/15/2024 5:09 pm   INDICATION: Signs/Symptoms:r/o PTX post subclavian line removal.   COMPARISON: CT chest 07/01/2024.   ACCESSION NUMBER(S): [id number]   ORDERING CLINICIAN: CHAPIS ROBLES   FINDINGS: AP radiograph of the chest was provided.   CARDIOMEDIASTINAL SILHOUETTE:  Cardiomediastinal silhouette is mildly enlarged..   LUNGS: Mild central pulmonary vascular congestion and bronchovascular prominence. Hazy opacification involving the right mid lung zone, likely atelectasis. No evidence of pneumothorax or pleural effusion. Subcutaneous emphysematous changes along the right lateral chest wall.   ABDOMEN: No remarkable upper abdominal findings.   BONES: No acute osseous changes.       1.  Mild central pulmonary vascular congestion and bronchovascular prominence. Findings could reflect a degree of interstitial edema. 2. Right mid lung airspace opacity, likely representing atelectasis. Underlying infectious process can not definitively be excluded. 3. No evidence of pneumothorax.   I personally reviewed the images/study and I agree with the findings as stated by resident Chance Walker. This study was interpreted at [facility], [place], [place].   MACRO: None     Dictation workstation:   [id number]     Assessment/Plan    Assessment and Plan:  Terra Alexis is a 74F soft tissue mass encasing distal segment of ureter. Intra-op consult 8/15 for segmental sigmoid resection with diverting ileostomy    POD1 and appropriate for course. Doing well and pain mostly controlled this morning.     Recommendations:  - Continue wound care for ostomy  - Rest of care per primary    D/w Dr. Bertin Wright MD  PGY-1 General Surgery  Surgical Oncology [id number]

## 2024-08-16 NOTE — SIGNIFICANT EVENT
S:    POD 0 from segmental sigmoid resection with diverting ileostomy for intraabdominal soft tissue problem. Doing well overall. Endorsing 8/10 abdominal pain, responsive to her pain regimen.   Denies shortness of breath, chest pain, nausea, vomiting.     O:   Vital signs are stable, normotensive, afebrile, no new or worsening oxygen requirement, not tachycardic  Visit Vitals  /63   Pulse 79   Temp 36.2 °C (97.2 °F)   Resp 10        Constitutional: no acute distress  Skin: warm and dry overall   Neuro: A/O x4, no gross deficits   HEENT: Atraumatic, no scleral icterus  Cardiac: RRR  Pulmonary: Unlabored respirations   Abdomen: Non distended, soft, appropriately tender, Stoma patent, with serosanguinous outyput in pouch.   GI: Voiding  Surgical Site: Dressing clean dry and intact with minimal amounts of strikethrough, appropriately tender    A/P:  Overall, patient is doing well postoperatively with no acute concerns.  Will continue to optimize pain control as needed.  Will continue to monitor clinical exam, vitals, I&O's, and labs when available.  Will follow up on the patient in the a.m. or sooner as needed.    Ezekiel Gurrola MD   PGY-1, General Surgery

## 2024-08-16 NOTE — PROGRESS NOTES
VASCULAR SURGERY PROGRESS NOTE  Assessment/Plan   Terra Alexis is 74 y.o. female with history of invasive carcinoma of the urothelium who is s/p medical therapy who presented for resection of tumor with the urology team. She is now s/p open left nephrectomy and total ureterectomy and segmental sigmoid resection with diverting ileostomy (Dr. Denton).     Vascular surgery was brought on board due the tumor's possible invasion and wrapping around of the external iliac vessels. The left external iliac artery was completely mobilized and the left iliac vein was not involved with the tumor. As the iliac vessels were not violated during the surgery and the patient remains with a stable pulse exam this AM, Vascular Surgery will sign off at this time.     Plan:  Rest of care per primary  Vascular Surgery will sign off  Please call with any questions or concerns or if the patient develops changes to pulse exam.     D/w attending, Dr. Alec Mccord MD  PGY-1    Subjective   Patient states she is feeling well this AM. She is curious about the outcome of her surgery and the details surrounding the removal of a portion of her colon.     Objective   Vitals:  Heart Rate:  [67-84]   Temp:  [35.7 °C (96.3 °F)-36.3 °C (97.3 °F)]   Resp:  [10-22]   BP: (107-163)/(55-78)   SpO2:  [91 %-100 %]     Exam:  Constitutional: No acute distress, resting comfortably  Neuro:  AOx3, grossly intact  ENMT: moist mucous membranes  CV: no tachycardia  Pulm: non-labored on room air  GI: soft, non-tender, non-distended  Skin: warm and dry  Musculoskeletal: moving all extremities  Pulse Exam: Palpable PT, DP bilaterally    Labs:  Results from last 7 days   Lab Units 08/15/24  1736 08/13/24  1034   WBC AUTO x10*3/uL 7.3 6.4   HEMOGLOBIN g/dL 7.0* 11.4*   PLATELETS AUTO x10*3/uL 139* 234      Results from last 7 days   Lab Units 08/15/24  1736 08/13/24  1034   SODIUM mmol/L 138 139   POTASSIUM mmol/L 3.7 3.9   CHLORIDE mmol/L 109* 105    CO2 mmol/L 20* 23   BUN mg/dL 15 22   CREATININE mg/dL 1.00 1.10*   GLUCOSE mg/dL 131* 82      Results from last 7 days   Lab Units 08/13/24  1034   INR  0.9   PROTIME seconds 10.0   APTT seconds 31

## 2024-08-16 NOTE — CARE PLAN
Problem: Fall/Injury  Goal: Not fall by end of shift  Outcome: Progressing  Goal: Be free from injury by end of the shift  Outcome: Progressing  Goal: Verbalize understanding of personal risk factors for fall in the hospital  Outcome: Progressing  Goal: Verbalize understanding of risk factor reduction measures to prevent injury from fall in the home  Outcome: Progressing  Goal: Use assistive devices by end of the shift  Outcome: Progressing  Goal: Pace activities to prevent fatigue by end of the shift  Outcome: Progressing     Problem: Pain  Goal: Takes deep breaths with improved pain control throughout the shift  Outcome: Progressing  Goal: Turns in bed with improved pain control throughout the shift  Outcome: Progressing  Goal: Walks with improved pain control throughout the shift  Outcome: Progressing  Goal: Performs ADL's with improved pain control throughout shift  Outcome: Progressing  Goal: Participates in PT with improved pain control throughout the shift  Outcome: Progressing  Goal: Free from opioid side effects throughout the shift  Outcome: Progressing  Goal: Free from acute confusion related to pain meds throughout the shift  Outcome: Progressing       The clinical goals for the shift include patient will remain hds and vss throughtout shift

## 2024-08-16 NOTE — PROGRESS NOTES
[facility] [place]  Daily Progress Note      Patient: Terra Alexis  Age/Sex: 74 y.o., female  MRN: [id number]  Date of surgery: 8/15/24  Admit Date: 8/15/2024   Code Status: Full Code  Length of Stay: 1      Interval History/Overnight Events:   NAEON  Afebrile, VSS  Denies nausea, vomiting, chest pain or shortness of breath  Received 1 u PRBCs for postop hgb 7  Tolerating small sips of liquids with   Pain adequately controlled      Medications:    Current Facility-Administered Medications   Medication Dose Route Frequency Provider Last Rate Last Admin    acetaminophen (Tylenol) tablet 975 mg  975 mg oral q6h Tank Wong MD   975 mg at 08/16/24 0615    alvimopan (Entereg) capsule 12 mg  12 mg oral BID Tank Wong MD        ceFAZolin (Ancef) 2 g in dextrose (iso)  mL  2 g intravenous q8h Tank Wong MD   Stopped at 08/15/24 2328    heparin (porcine) injection 5,000 Units  5,000 Units subcutaneous q8h Tank Wong MD   5,000 Units at 08/16/24 0615    hydrALAZINE (Apresoline) injection 10 mg  10 mg intravenous q6h PRN Tank Wong MD        HYDROmorphone (Dilaudid) injection 0.2 mg  0.2 mg intravenous q2h PRN Tank Wong MD        lactated Ringer's infusion  100 mL/hr intravenous Continuous Tank Wong  mL/hr at 08/16/24 0427 100 mL/hr at 08/16/24 0427    levothyroxine (Synthroid, Levoxyl) tablet 25 mcg  25 mcg oral Daily Tank Wong MD   25 mcg at 08/16/24 0615    melatonin tablet 5 mg  5 mg oral Nightly Tank Wong MD   5 mg at 08/15/24 2259    metroNIDAZOLE (Flagyl) 500 mg in sodium chloride (iso)  mL  500 mg intravenous q8h Tank Wong MD   Stopped at 08/16/24 0155    naloxone (Narcan) injection 0.2 mg  0.2 mg intravenous q5 min PRN Tank Wong MD        naloxone (Narcan) injection 0.2 mg  0.2 mg intravenous q5 min PRN Tank Wong MD        naloxone (Narcan) injection 0.2 mg  0.2 mg intravenous q5 min PRN Tank Wong MD        ondansetron ODT  (Zofran-ODT) disintegrating tablet 4 mg  4 mg oral q8h PRN Tank Wong MD        Or    ondansetron (Zofran) injection 4 mg  4 mg intravenous q8h PRN Tank Wong MD        oxyCODONE (Roxicodone) immediate release tablet 10 mg  10 mg oral q4h PRN Tank Wong MD   10 mg at 08/16/24 0616    oxyCODONE (Roxicodone) immediate release tablet 5 mg  5 mg oral q4h PRN Tank Wong MD           Objective    Vitals:    08/15/24 2200 08/15/24 2244 08/16/24 0110 08/16/24 0630   BP: 141/63 136/75 127/70 116/70   BP Location:   Right arm Right arm   Patient Position:   Lying Lying   Pulse:  71 72 67   Resp:  14 16 18   Temp:  36.3 °C (97.3 °F) 36 °C (96.8 °F) 36 °C (96.8 °F)   TempSrc:  Temporal Temporal Temporal   SpO2:  92% 95% 96%   Weight:       Height:         08/14 1900 - 08/16 0659  In: 7933 [I.V.:1183]  Out: 1410 [Urine:1060]    Physical Exam                                                                                                                        General: resting comfortably in bed  Neuro: alert and oriented, no obvious focal deficit   Head/Neck: normocephalic, atraumatic  ENT: moist mucous membranes  CV: regular rate   Pulm: nonlabored breathing on room air, no conversational dyspnea  Abd: soft, nondistended, appropriately tender, incisions clean/dry/intact with suture and skin glue, CEDRIC drain serosanguinous. Ileostomy with small amount of bowel sweat. B/l QL catheters in place.   : rojas catheter draining clear yellow urine  MSK: GONZALEZ, no gross deformities  Psych: mood and behavior normal    Labs    Lab Results   Component Value Date    WBC 7.3 08/15/2024    HGB 7.0 (L) 08/15/2024    HCT 20.7 (L) 08/15/2024    MCV 90 08/15/2024     (L) 08/15/2024      Lab Results   Component Value Date    GLUCOSE 131 (H) 08/15/2024    CALCIUM 7.7 (L) 08/15/2024     08/15/2024    K 3.7 08/15/2024    CO2 20 (L) 08/15/2024     (H) 08/15/2024    BUN 15 08/15/2024    CREATININE 1.00 08/15/2024         Imaging  === 07/01/24 ===    CT CHEST ABDOMEN PELVIS W IV CONTRAST    - Impression -  1. Interval decrease in the size of the mass lesion along the distal  left ureter with hypodensity throughout the lesion and component of  central necrosis noted. Lesion is intimate with the iliac vasculature  with findings appearing stable from prior imaging. The lesion  measures 2.7 x 1.9 cm and on the prior examination measured 3.0 x 2.7  cm in the similar distribution    2. No lymphadenopathy in the abdomen/pelvis.    3. A few scattered hypodensities within the liver suggesting cysts.  However, there is a single hypodensity within segment 4B measuring 6  mm with attention to this area on follow-up.    4. No metastatic disease in the chest.      MACRO:  None    Signed by: Germain Mcmahan 7/3/2024 9:08 AM  Dictation workstation:   [id number]      Assessment & Plan  Terra CUNHA Rojas is a74F soft tissue mass encasing distal segment of ureter now s/p open left nephrectomy and total ureterectomy and segmental sigmoid resection with diverting ileostomy (Dr. Denton). She is recovering appropriately with adequate pain control.    Plan 8/16:  -Tolerating sips of clears, will advance to limited clears today  -Rojas will remain in place today given prolonged operative course  -OOB to chair today, ambulating if able   -Ancef/metronidazole for 24 hours postop, EOT today  -Surgical oncology following      Neuro  -pain control: Scheduled tylenol, oxycodone 5-10 mgs prn, dilaudid IV 0.2 mg for breakthrough  -B/l QL catheters in place per APS  -melatonin nightly     Cardiovascular: Hx HTN  -regular vitals/monitoring    Pulm  -encourage IS, OOB    GI  -diet: limited clears  -continue entereg until return of bowel function  -bowel regimen per surgical oncology     /Renal  -Daily BMP  -IVF: on LR @100/hr  -rojas to remain in place until tomorrow      Endo: hypothyroidism  -continue home levothyroxine    Heme/ID  -Daily  CBC    MSK  -PT/OT  -encourage ambulation as tolerated    Prophylaxis: SCDs, SQH  Dispo: Pending PT evaluation and postoperative course, possibly late next week      Seen with chief resident Dr. White and discussed with attending Dr. Skip Wood MD  [facility] [place]  Adult Urology Pager: [phone]  Pediatric Urology Pager: [phone]

## 2024-08-16 NOTE — OP NOTE
OPERATIVE NOTE                  Preoperative Diagnosis  Left ureteral cancer      Postoperative Diagnosis                 Same    Procedures:  Left pelvic dissection and resection of tumor.    Indications for Surgery:  This is a 74 year old female who underwent immunotherapy and radiation therapy for left ureteral cancer. She was noted to have a 3 cm mass in the left pelvis that appeared to involve  the iliac artery and vein. She now submits for the resection of the tumor.    Surgeon(s):    MD Teddy Flores MD Luke Rothermel, MD    Assistant(s):  Rob Duran MD    Anesthesia: General    Anesthesiologist(s):    Anesthesiologist: Oj Rashid MD; Marty Bose MD; Talisha Bender MD  Anesthesia Resident: Chuck Fregoso MD; Renetta Craig MD; Muna Flores MD     Blood Loss:   350 mL    Complications:    * No complications entered in OR log *     Findings:  Positive tumor margin near the distal sigmoid colon.     Postoperative Condition: stable.      Sponge, Needle, Instrument Count: were correct.      Specimen(s):   ID Type Source Tests Collected by Time   1 : sigmoid colon Tissue COLON - SIGMOID RESECTION SURGICAL PATHOLOGY EXAM Joseed MYRNA Ross MD 8/15/2024 1459   2 : LEFT PERIURETERAL TUMOR BIOPSY Tissue URETER RESECTION LEFT SURGICAL PATHOLOGY EXAM Teddy Ross MD 8/15/2024 1311   3 : LEFT URETER SEGMENT Tissue URETER RESECTION LEFT SURGICAL PATHOLOGY EXAM Joseed MYRNA Ross MD 8/15/2024 1353   4 : LEFT KIDNEY AND PROXIMAL URETER Tissue KIDNEY RADICAL NEPHROURETERECTOMY LEFT SURGICAL PATHOLOGY EXAM Rashed MYRNA Ross MD 8/15/2024 1426        Drains and/or Catheters:   Ileostomy Loop RUQ (Active)   Stomal Appliance Clean;Dry;Intact 08/16/24 0105   Site/Stoma Assessment Pink 08/16/24 0105   Peristomal Assessment CESAR 08/16/24 0105   Output (mL) 0 mL 08/16/24 0630       Urethral Catheter Non-latex 16 Fr. (Active)   Site Assessment Clean;Skin intact  08/16/24 0105   Collection Container Standard drainage bag 08/16/24 0105   Securement Method Securing device (Describe) 08/16/24 0105   Reason for Continuing Urinary Catheterization surgical procedures: urological/gynecological, pelvic oncology, anal, prolonged surgical procedure 08/16/24 0105   Output (mL) 75 mL 08/16/24 0630       Operative Procedure Details:  After Dr. Ross completed robotic left kidney dissection the patient was placed in a supine position and prepared and draped in the usual sterile fashion again.  A midline abdominal incision was made just above the umbilicus down to the symphysis pubis.  The peritoneal cavity was entered and the left pelvis was exposed.  The small bowel was retracted to the right and the white line of Toldt was divided to mobilize the left colon and the sigmoid colon.  At this time the ureter was divided in the distal segment and the kidney was passed off.  Dissection was then continued distally tracing the ureter.  There were intense inflammatory changes surrounding the ureter and the left iliac artery and vein.  Dissection was carried out and the tumor was  from the left iliac artery and vein.  The left external iliac artery was completely mobilized to facilitate tumor resection.  The iliac vein was not involved with the tumor. The tumor was also noted to be densely adhered to the sigmoid colon.  The tumor was peeled away from the colon but on gross inspection there was residual tumor on the left lateral wall of the sigmoid colon.  Frozen section was obtained which demonstrated poorly differentiated carcinoma, either urothelial or squamous cell.  The ureter was dissected further distally where the ureter itself was noted to be free of cancer on gross inspection.  The ureter was divided at this point and the specimen was sent off.  On palpation of the sigmoid colon there was about 1.5 cm nodule was noted.  Thus the decision was made to ask for colorectal  consultation.      I was present for the aforementioned portion of the operation.  Bertin Gill and RIKY operated as co-surgeons. The remainder of the operation was completed by Dr. Ross and Bertin.    Raf Michael MD  Phone: [phone]

## 2024-08-16 NOTE — PROGRESS NOTES
Postop Pain HPI -   Palliative: relieved with IV analgesics and regional local anesthetics  Provocative: movement  Quality:  burning and aching  Radiation:  none  Severity:  9/10  Timing: constant    24-HOUR OPIOID CONSUMPTION:  Oxycodone 10mg x 2    Scheduled medications  acetaminophen, 975 mg, oral, q6h  alvimopan, 12 mg, oral, BID  ceFAZolin, 2 g, intravenous, q8h  heparin (porcine), 5,000 Units, subcutaneous, q8h  levothyroxine, 25 mcg, oral, Daily  melatonin, 5 mg, oral, Nightly  metroNIDAZOLE, 500 mg, intravenous, q8h      Continuous medications  lactated Ringer's, 100 mL/hr, Last Rate: 100 mL/hr (08/16/24 8518)      PRN medications  PRN medications: hydrALAZINE, HYDROmorphone, naloxone, ondansetron ODT **OR** ondansetron, oxyCODONE, oxyCODONE     Physical Exam:  Constitutional:  no distress, alert and cooperative  Eyes: clear sclera  Head/Neck: No apparent injury, trachea midline  Respiratory/Thorax: Patent airways, thorax symmetric, breathing comfortably  Cardiovascular: no pitting edema  Gastrointestinal: Nondistended  Musculoskeletal: ROM intact  Extremities: no clubbing  Neurological: alert, alonso x4  Psychological: Appropriate affect    Results for orders placed or performed during the hospital encounter of 08/15/24 (from the past 24 hour(s))   BLOOD GAS ARTERIAL FULL PANEL   Result Value Ref Range    POCT pH, Arterial 7.23 (LL) 7.38 - 7.42 pH    POCT pCO2, Arterial 45 (H) 38 - 42 mm Hg    POCT pO2, Arterial 154 (H) 85 - 95 mm Hg    POCT SO2, Arterial 98 94 - 100 %    POCT Oxy Hemoglobin, Arterial 97.6 94.0 - 98.0 %    POCT Hematocrit Calculated, Arterial 35.0 (L) 36.0 - 46.0 %    POCT Sodium, Arterial 135 (L) 136 - 145 mmol/L    POCT Potassium, Arterial 3.4 (L) 3.5 - 5.3 mmol/L    POCT Chloride, Arterial 109 (H) 98 - 107 mmol/L    POCT Ionized Calcium, Arterial 1.29 1.10 - 1.33 mmol/L    POCT Glucose, Arterial 132 (H) 74 - 99 mg/dL    POCT Lactate, Arterial 0.5 0.4 - 2.0 mmol/L    POCT Base Excess,  Arterial -8.5 (L) -2.0 - 3.0 mmol/L    POCT HCO3 Calculated, Arterial 18.8 (L) 22.0 - 26.0 mmol/L    POCT Hemoglobin, Arterial 11.6 (L) 12.0 - 16.0 g/dL    POCT Anion Gap, Arterial 11 10 - 25 mmo/L    Patient Temperature 37.0 degrees Celsius    FiO2 50 %   ACTIVATED CLOTTING TIME LOW   Result Value Ref Range    POCT Activated Clotting Time Low Range 129 83 - 199 sec   Verify ABO/Rh Group Test (VERAB)   Result Value Ref Range    ABO TYPE B     Rh TYPE POS    BLOOD GAS ARTERIAL FULL PANEL   Result Value Ref Range    POCT pH, Arterial 7.34 (L) 7.38 - 7.42 pH    POCT pCO2, Arterial 35 (L) 38 - 42 mm Hg    POCT pO2, Arterial 129 (H) 85 - 95 mm Hg    POCT SO2, Arterial 98 94 - 100 %    POCT Oxy Hemoglobin, Arterial 97.6 94.0 - 98.0 %    POCT Hematocrit Calculated, Arterial 25.0 (L) 36.0 - 46.0 %    POCT Sodium, Arterial 136 136 - 145 mmol/L    POCT Potassium, Arterial 3.9 3.5 - 5.3 mmol/L    POCT Chloride, Arterial      POCT Ionized Calcium, Arterial 1.19 1.10 - 1.33 mmol/L    POCT Glucose, Arterial 120 (H) 74 - 99 mg/dL    POCT Lactate, Arterial 0.8 0.4 - 2.0 mmol/L    POCT Base Excess, Arterial -6.3 (L) -2.0 - 3.0 mmol/L    POCT HCO3 Calculated, Arterial 18.9 (L) 22.0 - 26.0 mmol/L    POCT Hemoglobin, Arterial 8.3 (L) 12.0 - 16.0 g/dL    POCT Anion Gap, Arterial      Patient Temperature 37.0 degrees Celsius    FiO2 50 %   BLOOD GAS ARTERIAL FULL PANEL   Result Value Ref Range    POCT pH, Arterial 7.36 (L) 7.38 - 7.42 pH    POCT pCO2, Arterial 33 (L) 38 - 42 mm Hg    POCT pO2, Arterial 186 (H) 85 - 95 mm Hg    POCT SO2, Arterial 99 94 - 100 %    POCT Oxy Hemoglobin, Arterial 97.6 94.0 - 98.0 %    POCT Hematocrit Calculated, Arterial 30.0 (L) 36.0 - 46.0 %    POCT Sodium, Arterial 134 (L) 136 - 145 mmol/L    POCT Potassium, Arterial 4.7 3.5 - 5.3 mmol/L    POCT Chloride, Arterial 108 (H) 98 - 107 mmol/L    POCT Ionized Calcium, Arterial 1.16 1.10 - 1.33 mmol/L    POCT Glucose, Arterial 137 (H) 74 - 99 mg/dL    POCT  Lactate, Arterial 0.8 0.4 - 2.0 mmol/L    POCT Base Excess, Arterial -6.1 (L) -2.0 - 3.0 mmol/L    POCT HCO3 Calculated, Arterial 18.6 (L) 22.0 - 26.0 mmol/L    POCT Hemoglobin, Arterial 9.9 (L) 12.0 - 16.0 g/dL    POCT Anion Gap, Arterial 12 10 - 25 mmo/L    Patient Temperature 37.0 degrees Celsius    FiO2 60 %   CBC   Result Value Ref Range    WBC 7.3 4.4 - 11.3 x10*3/uL    nRBC 0.0 0.0 - 0.0 /100 WBCs    RBC 2.29 (L) 4.00 - 5.20 x10*6/uL    Hemoglobin 7.0 (L) 12.0 - 16.0 g/dL    Hematocrit 20.7 (L) 36.0 - 46.0 %    MCV 90 80 - 100 fL    MCH 30.6 26.0 - 34.0 pg    MCHC 33.8 32.0 - 36.0 g/dL    RDW 18.4 (H) 11.5 - 14.5 %    Platelets 139 (L) 150 - 450 x10*3/uL   Basic metabolic panel   Result Value Ref Range    Glucose 131 (H) 74 - 99 mg/dL    Sodium 138 136 - 145 mmol/L    Potassium 3.7 3.5 - 5.3 mmol/L    Chloride 109 (H) 98 - 107 mmol/L    Bicarbonate 20 (L) 21 - 32 mmol/L    Anion Gap 13 10 - 20 mmol/L    Urea Nitrogen 15 6 - 23 mg/dL    Creatinine 1.00 0.50 - 1.05 mg/dL    eGFR 60 (L) >60 mL/min/1.73m*2    Calcium 7.7 (L) 8.6 - 10.6 mg/dL      Terra Alexis is a 74 y.o. year old female patient who presents for Robotic Nephroureterectomy, Creation Bypass Graft Aortoiliac Artery and Ligation Vein Lower Extremity with Dr. Ross/Dr. Michael. Acute Pain consulted for block for postoperative pain control.     Plan:     - Bilateral MATT blocks performed preoperatively on 8/15  - Ambit ball with Ropivacaine 0.2%/NaCl 0.9% 500mL, Rate 7 cc/hr bilaterally  - Ambit medication will not interfere with pain medication prescribed by the primary team.   - Please be aware of local anesthetic toxic dose and absorption variability before considering lidocaine patches  - Acute pain service will follow while catheters in place  - Rest of pain management per primary team     Acute Pain Resident  pg [phone] ph [phone]

## 2024-08-17 LAB
ANION GAP SERPL CALC-SCNC: 8 MMOL/L (ref 10–20)
BLOOD EXPIRATION DATE: NORMAL
BUN SERPL-MCNC: 16 MG/DL (ref 6–23)
CALCIUM SERPL-MCNC: 7.9 MG/DL (ref 8.6–10.6)
CHLORIDE SERPL-SCNC: 106 MMOL/L (ref 98–107)
CO2 SERPL-SCNC: 24 MMOL/L (ref 21–32)
CREAT SERPL-MCNC: 1.13 MG/DL (ref 0.5–1.05)
DISPENSE STATUS: NORMAL
EGFRCR SERPLBLD CKD-EPI 2021: 51 ML/MIN/1.73M*2
ERYTHROCYTE [DISTWIDTH] IN BLOOD BY AUTOMATED COUNT: 18.6 % (ref 11.5–14.5)
GLUCOSE BLD MANUAL STRIP-MCNC: 92 MG/DL (ref 74–99)
GLUCOSE SERPL-MCNC: 71 MG/DL (ref 74–99)
HCT VFR BLD AUTO: 28 % (ref 36–46)
HGB BLD-MCNC: 8.8 G/DL (ref 12–16)
MAGNESIUM SERPL-MCNC: 2.6 MG/DL (ref 1.6–2.4)
MCH RBC QN AUTO: 30.2 PG (ref 26–34)
MCHC RBC AUTO-ENTMCNC: 31.4 G/DL (ref 32–36)
MCV RBC AUTO: 96 FL (ref 80–100)
NRBC BLD-RTO: 0 /100 WBCS (ref 0–0)
PLATELET # BLD AUTO: 164 X10*3/UL (ref 150–450)
POTASSIUM SERPL-SCNC: 4.1 MMOL/L (ref 3.5–5.3)
PRODUCT BLOOD TYPE: 7300
PRODUCT CODE: NORMAL
RBC # BLD AUTO: 2.91 X10*6/UL (ref 4–5.2)
SODIUM SERPL-SCNC: 134 MMOL/L (ref 136–145)
UNIT ABO: NORMAL
UNIT NUMBER: NORMAL
UNIT RH: NORMAL
UNIT VOLUME: 350
WBC # BLD AUTO: 8.7 X10*3/UL (ref 4.4–11.3)
XM INTEP: NORMAL

## 2024-08-17 PROCEDURE — 2500000005 HC RX 250 GENERAL PHARMACY W/O HCPCS: Performed by: STUDENT IN AN ORGANIZED HEALTH CARE EDUCATION/TRAINING PROGRAM

## 2024-08-17 PROCEDURE — 85027 COMPLETE CBC AUTOMATED: CPT

## 2024-08-17 PROCEDURE — 2500000002 HC RX 250 W HCPCS SELF ADMINISTERED DRUGS (ALT 637 FOR MEDICARE OP, ALT 636 FOR OP/ED)

## 2024-08-17 PROCEDURE — 82947 ASSAY GLUCOSE BLOOD QUANT: CPT

## 2024-08-17 PROCEDURE — 83735 ASSAY OF MAGNESIUM: CPT

## 2024-08-17 PROCEDURE — 2500000001 HC RX 250 WO HCPCS SELF ADMINISTERED DRUGS (ALT 637 FOR MEDICARE OP): Performed by: STUDENT IN AN ORGANIZED HEALTH CARE EDUCATION/TRAINING PROGRAM

## 2024-08-17 PROCEDURE — 2500000004 HC RX 250 GENERAL PHARMACY W/ HCPCS (ALT 636 FOR OP/ED): Performed by: STUDENT IN AN ORGANIZED HEALTH CARE EDUCATION/TRAINING PROGRAM

## 2024-08-17 PROCEDURE — 36415 COLL VENOUS BLD VENIPUNCTURE: CPT

## 2024-08-17 PROCEDURE — 99231 SBSQ HOSP IP/OBS SF/LOW 25: CPT

## 2024-08-17 PROCEDURE — 1200000003 HC ONCOLOGY  ROOM WITH TELEMETRY DAILY

## 2024-08-17 RX ORDER — PANTOPRAZOLE SODIUM 40 MG/10ML
40 INJECTION, POWDER, LYOPHILIZED, FOR SOLUTION INTRAVENOUS ONCE
Status: COMPLETED | OUTPATIENT
Start: 2024-08-17 | End: 2024-08-17

## 2024-08-17 ASSESSMENT — COGNITIVE AND FUNCTIONAL STATUS - GENERAL
CLIMB 3 TO 5 STEPS WITH RAILING: A LITTLE
CLIMB 3 TO 5 STEPS WITH RAILING: A LITTLE
WALKING IN HOSPITAL ROOM: A LITTLE
MOVING TO AND FROM BED TO CHAIR: A LITTLE
TURNING FROM BACK TO SIDE WHILE IN FLAT BAD: A LITTLE
STANDING UP FROM CHAIR USING ARMS: A LITTLE
MOVING TO AND FROM BED TO CHAIR: A LITTLE
MOVING FROM LYING ON BACK TO SITTING ON SIDE OF FLAT BED WITH BEDRAILS: A LITTLE
MOVING FROM LYING ON BACK TO SITTING ON SIDE OF FLAT BED WITH BEDRAILS: A LITTLE
WALKING IN HOSPITAL ROOM: A LITTLE
STANDING UP FROM CHAIR USING ARMS: A LITTLE
TURNING FROM BACK TO SIDE WHILE IN FLAT BAD: A LITTLE

## 2024-08-17 ASSESSMENT — PAIN SCALES - GENERAL
PAINLEVEL_OUTOF10: 7
PAINLEVEL_OUTOF10: 9
PAINLEVEL_OUTOF10: 7
PAINLEVEL_OUTOF10: 3

## 2024-08-17 ASSESSMENT — PAIN DESCRIPTION - DESCRIPTORS
DESCRIPTORS: SORE
DESCRIPTORS: SORE

## 2024-08-17 ASSESSMENT — PAIN - FUNCTIONAL ASSESSMENT
PAIN_FUNCTIONAL_ASSESSMENT: 0-10

## 2024-08-17 ASSESSMENT — PAIN DESCRIPTION - LOCATION
LOCATION: ABDOMEN
LOCATION: ABDOMEN

## 2024-08-17 NOTE — PROGRESS NOTES
[facility] [place]  Daily Progress Note      Patient: Terra Alexis  Age/Sex: 74 y.o., female  MRN: [id number]  Date of surgery: 8/15/24  Admit Date: 8/15/2024   Code Status: Full Code  Length of Stay: 2      Interval History/Overnight Events:   NAEON  Afebrile, VSS  Denies nausea, vomiting, chest pain or shortness of breath  Tolerating p.o. intake, however reporting some heartburn  Denies any nausea, vomiting; has not passed flatus and is currently belching  Has ambulated somewhat      Medications:    Current Facility-Administered Medications   Medication Dose Route Frequency Provider Last Rate Last Admin    acetaminophen (Tylenol) tablet 975 mg  975 mg oral q6h Tank Wong MD   975 mg at 08/17/24 0914    alvimopan (Entereg) capsule 12 mg  12 mg oral BID Tank Wong MD        heparin (porcine) injection 5,000 Units  5,000 Units subcutaneous q8h Tank Wong MD   5,000 Units at 08/17/24 0519    hydrALAZINE (Apresoline) injection 10 mg  10 mg intravenous q6h PRN Tank Wong MD        HYDROmorphone (Dilaudid) injection 0.2 mg  0.2 mg intravenous q2h PRN Tank Wong MD   0.2 mg at 08/16/24 0905    HYDROmorphone (Dilaudid) injection 0.4 mg  0.4 mg intravenous q4h PRN Concetta Wood MD   0.4 mg at 08/16/24 1814    lactated Ringer's infusion  100 mL/hr intravenous Continuous Tank Wong  mL/hr at 08/17/24 0907 100 mL/hr at 08/17/24 0907    levothyroxine (Synthroid, Levoxyl) tablet 25 mcg  25 mcg oral Daily Tank Wong MD   25 mcg at 08/17/24 0519    melatonin tablet 5 mg  5 mg oral Nightly Tank Wong MD   5 mg at 08/16/24 2159    naloxone (Narcan) injection 0.2 mg  0.2 mg intravenous q5 min PRN Tank Wong MD        ondansetron ODT (Zofran-ODT) disintegrating tablet 4 mg  4 mg oral q8h PRN Tank Wong MD        Or    ondansetron (Zofran) injection 4 mg  4 mg intravenous q8h PRN Tank Wong MD        oxyCODONE (Roxicodone) immediate release tablet 10 mg  10 mg oral  q4h PRN Tank Wong MD   10 mg at 08/17/24 0914    oxyCODONE (Roxicodone) immediate release tablet 5 mg  5 mg oral q4h PRN Tank Wong MD        potassium chloride CR (Klor-Con) ER tablet 10 mEq  10 mEq oral Daily Concetta Wood MD   10 mEq at 08/17/24 0914       Objective    Vitals:    08/16/24 2151 08/17/24 0212 08/17/24 0521 08/17/24 0910   BP: 131/69 114/64 136/73 122/62   BP Location: Right arm Right arm Right arm Right arm   Patient Position: Lying Lying Lying Lying   Pulse: 68 72 66 75   Resp: 16 16 16 17   Temp: 36.5 °C (97.7 °F) 36.7 °C (98.1 °F) 36.6 °C (97.9 °F) 36.1 °C (97 °F)   TempSrc: Temporal Temporal Temporal Temporal   SpO2: 95% 94% 93% 92%   Weight:       Height:         08/15 1900 - 08/17 0659  In: 4378.3 [P.O.:300; I.V.:3128.3]  Out: 2310 [Urine:2185]    Physical Exam                                                                                                                        General: resting comfortably in bed  Neuro: alert and oriented, no obvious focal deficit   Head/Neck: normocephalic, atraumatic  ENT: moist mucous membranes  CV: regular rate   Pulm: nonlabored breathing on room air, no conversational dyspnea  Abd: soft, nondistended, appropriately tender, incisions clean/dry/intact with suture and staples, CEDRIC drain serosanguinous. Ileostomy with dark green output. B/l QL catheters in place.   : rojas catheter draining clear yellow urine  MSK: GONZALEZ, no gross deformities  Psych: mood and behavior normal    Labs    Lab Results   Component Value Date    WBC 8.7 08/17/2024    HGB 8.8 (L) 08/17/2024    HCT 28.0 (L) 08/17/2024    MCV 96 08/17/2024     08/17/2024      Lab Results   Component Value Date    GLUCOSE 71 (L) 08/17/2024    CALCIUM 7.9 (L) 08/17/2024     (L) 08/17/2024    K 4.1 08/17/2024    CO2 24 08/17/2024     08/17/2024    BUN 16 08/17/2024    CREATININE 1.13 (H) 08/17/2024        Imaging  === 07/01/24 ===    CT CHEST ABDOMEN PELVIS W IV  CONTRAST    - Impression -  1. Interval decrease in the size of the mass lesion along the distal  left ureter with hypodensity throughout the lesion and component of  central necrosis noted. Lesion is intimate with the iliac vasculature  with findings appearing stable from prior imaging. The lesion  measures 2.7 x 1.9 cm and on the prior examination measured 3.0 x 2.7  cm in the similar distribution    2. No lymphadenopathy in the abdomen/pelvis.    3. A few scattered hypodensities within the liver suggesting cysts.  However, there is a single hypodensity within segment 4B measuring 6  mm with attention to this area on follow-up.    4. No metastatic disease in the chest.      MACRO:  None    Signed by: Germain Mcmahan 7/3/2024 9:08 AM  Dictation workstation:   [id number]      Assessment & Plan  Terra Alexis is a74F soft tissue mass encasing distal segment of ureter now s/p open left nephrectomy and total ureterectomy and segmental sigmoid resection with diverting ileostomy (Dr. Denton). She is recovering appropriately with adequate pain control.    Plan 8/16:  -Tolerating sips of clears, will advance to limited clears today  -Alexis will remain in place today given prolonged operative course  -OOB to chair today, ambulating if able   -Ancef/metronidazole for 24 hours postop, EOT today  -Surgical oncology following      Neuro  -pain control: Scheduled tylenol, oxycodone 5-10 mgs prn, dilaudid IV 0.2 mg for breakthrough  -B/l QL catheters in place per APS  -melatonin nightly     Cardiovascular: Hx HTN  -regular vitals/monitoring    Pulm  -encourage IS, OOB    GI  -diet: limited clears  -continue entereg until return of bowel function  -bowel regimen per surgical oncology     /Renal  -Daily BMP  -IVF: on LR @100/hr  -Will remove once based off patient preference, she would like to keep it in for now until she is ambulating more    Endo: hypothyroidism  -continue home levothyroxine    Heme/ID  -Daily  CBC    MSK  -PT/OT  -encourage ambulation as tolerated    Prophylaxis: SCDs, SQH  Dispo: Pending PT evaluation and postoperative course, possibly late next week      discussed with attending Dr. Skip Avalos MD  [facility] [place]  Adult Urology Pager: [phone]  Pediatric Urology Pager: [phone]

## 2024-08-17 NOTE — PROGRESS NOTES
Surgical Oncology Progress Note      08/17/24    Summary:  Terra Alexis is a 74F soft tissue mass encasing distal segment of ureter. Intra-op consult 8/15 for segmental sigmoid resection with diverting ileostomy    Subjective    Subjective:  NAOE. No major complaints.      Objective    Objective:  Vital signs:   Temp:  [36.1 °C (97 °F)-36.7 °C (98.1 °F)] 36.1 °C (97 °F)  Heart Rate:  [66-75] 75  Resp:  [16-17] 17  BP: (114-136)/(62-73) 122/62  FiO2 (%):  [28 %] 28 %    Physical Exam:  GEN: No acute distress. Alert, awake and conversive.  HEENT: Sclera anicteric. Moist mucous membranes.  RESP: Breathing non-labored, equal chest rise. On 3L NC.   CV: HDS  GI: Abdomen soft, nondistended, DLI appears pink and viable with intact appliance and bowel sweat. Appropriately tender.   : Alexis in place  MSK: No gross deformities. Moves all extremities spontaneously.  NEURO: Alert and oriented x3. No focal deficits.  PSYCH: Appropriate mood and affect.    I/O last 2 completed shifts:  In: 2776.7 (46.1 mL/kg) [P.O.:300; I.V.:2076.7 (34.5 mL/kg); IV Piggyback:400]  Out: 1850 (30.7 mL/kg) [Urine:1725 (1.2 mL/kg/hr); Stool:125]  Weight: 60.2 kg      Labs:  Lab Results   Component Value Date    WBC 8.7 08/17/2024    HGB 8.8 (L) 08/17/2024    HCT 28.0 (L) 08/17/2024    MCV 96 08/17/2024     08/17/2024     Lab Results   Component Value Date    GLUCOSE 71 (L) 08/17/2024    CALCIUM 7.9 (L) 08/17/2024     (L) 08/17/2024    K 4.1 08/17/2024    CO2 24 08/17/2024     08/17/2024    BUN 16 08/17/2024    CREATININE 1.13 (H) 08/17/2024     Imaging:  XR chest 1 view    Result Date: 8/16/2024  Interpreted By:  Yousuf Tsai and Ritchie Brandon STUDY: XR CHEST 1 VIEW;  8/15/2024 5:09 pm   INDICATION: Signs/Symptoms:r/o PTX post subclavian line removal.   COMPARISON: CT chest 07/01/2024.   ACCESSION NUMBER(S): [id number]   ORDERING CLINICIAN: CHAPIS ROBLES   FINDINGS: AP radiograph of the chest was provided.    CARDIOMEDIASTINAL SILHOUETTE: Cardiomediastinal silhouette is mildly enlarged..   LUNGS: Mild central pulmonary vascular congestion and bronchovascular prominence. Hazy opacification involving the right mid lung zone, likely atelectasis. No evidence of pneumothorax or pleural effusion. Subcutaneous emphysematous changes along the right lateral chest wall. There is no significant pneumothorax identified.   ABDOMEN: No remarkable upper abdominal findings.   BONES: No acute osseous changes.       1.  Mild central pulmonary vascular congestion and bronchovascular prominence. Findings could reflect a degree of interstitial edema. 2. Right mid lung airspace opacity, likely representing atelectasis. Underlying infectious process can not definitively be excluded. 3. No evidence of pneumothorax.   I personally reviewed the images/study and I agree with the findings as stated by resident Chance Walker. This study was interpreted at [facility], .   MACRO: None   Signed by: Yousuf Tsai 8/16/2024 9:30 AM Dictation workstation:   [id number]     Assessment/Plan    Assessment and Plan:  Terra Rojas is a 74F soft tissue mass encasing distal segment of ureter. Intra-op consult 8/15 for segmental sigmoid resection with diverting ileostomy    POD2 and appropriate for course. Doing well and pain mostly controlled this morning.     Recommendations:  - Continue wound care for ostomy  - Rest of care per primary  - Diet per primary   - DC rojas as tolerated     D/w Dr. Phuong Milton MD  PGY-1 General Surgery  Surgical Oncology [id number]

## 2024-08-17 NOTE — PROGRESS NOTES
Postop Pain HPI -   Palliative: relieved with IV analgesics and regional local anesthetics  Provocative: movement  Quality:  burning and aching  Radiation:  none  Severity:  9/10  Timing: constant    24-HOUR OPIOID CONSUMPTION:  Dilaudid 0.4mg x 1  Dilaudid 0.2mg x 1  Oxycodone 10mg x 6     Scheduled medications  acetaminophen, 975 mg, oral, q6h  alvimopan, 12 mg, oral, BID  heparin (porcine), 5,000 Units, subcutaneous, q8h  levothyroxine, 25 mcg, oral, Daily  melatonin, 5 mg, oral, Nightly  potassium chloride CR, 10 mEq, oral, Daily      Continuous medications  lactated Ringer's, 100 mL/hr, Last Rate: 100 mL/hr (08/17/24 0907)      PRN medications  PRN medications: hydrALAZINE, HYDROmorphone, HYDROmorphone, naloxone, ondansetron ODT **OR** ondansetron, oxyCODONE, oxyCODONE     Physical Exam:  Constitutional:  no distress, alert and cooperative  Eyes: clear sclera  Head/Neck: No apparent injury, trachea midline  Respiratory/Thorax: Patent airways, thorax symmetric, breathing comfortably  Cardiovascular: no pitting edema  Gastrointestinal: Nondistended  Musculoskeletal: ROM intact  Extremities: no clubbing  Neurological: alert, alonso x4  Psychological: Appropriate affect    Results for orders placed or performed during the hospital encounter of 08/15/24 (from the past 24 hour(s))   Basic metabolic panel   Result Value Ref Range    Glucose 71 (L) 74 - 99 mg/dL    Sodium 134 (L) 136 - 145 mmol/L    Potassium 4.1 3.5 - 5.3 mmol/L    Chloride 106 98 - 107 mmol/L    Bicarbonate 24 21 - 32 mmol/L    Anion Gap 8 (L) 10 - 20 mmol/L    Urea Nitrogen 16 6 - 23 mg/dL    Creatinine 1.13 (H) 0.50 - 1.05 mg/dL    eGFR 51 (L) >60 mL/min/1.73m*2    Calcium 7.9 (L) 8.6 - 10.6 mg/dL   Magnesium   Result Value Ref Range    Magnesium 2.60 (H) 1.60 - 2.40 mg/dL   CBC   Result Value Ref Range    WBC 8.7 4.4 - 11.3 x10*3/uL    nRBC 0.0 0.0 - 0.0 /100 WBCs    RBC 2.91 (L) 4.00 - 5.20 x10*6/uL    Hemoglobin 8.8 (L) 12.0 - 16.0 g/dL     Hematocrit 28.0 (L) 36.0 - 46.0 %    MCV 96 80 - 100 fL    MCH 30.2 26.0 - 34.0 pg    MCHC 31.4 (L) 32.0 - 36.0 g/dL    RDW 18.6 (H) 11.5 - 14.5 %    Platelets 164 150 - 450 x10*3/uL        Terra Alexis is a 74 y.o. year old female patient who presents for Robotic Nephroureterectomy, Creation Bypass Graft Aortoiliac Artery and Ligation Vein Lower Extremity with Dr. Ross/Dr. Michael. Acute Pain consulted for block for postoperative pain control.     Plan:     - Bilateral MATT blocks performed preoperatively on 8/15  - Ambit ball with Ropivacaine 0.2%/NaCl 0.9% 500mL, Rate 7 cc/hr bilaterally  - Ambit medication will not interfere with pain medication prescribed by the primary team.   - Please be aware of local anesthetic toxic dose and absorption variability before considering lidocaine patches  - Acute pain service will follow while catheters in place  - Refill ambit medication.   - Hold AM lovenox on POD 3 8/17  - Rest of pain management per primary team     Acute Pain Resident  pg [phone] ph [phone]

## 2024-08-17 NOTE — CARE PLAN
The patient's goals for the shift include      The clinical goals for the shift include patient will remain hds and vss throughtout shift

## 2024-08-17 NOTE — OP NOTE
Sigmoid colectomy, mobilization of the splenic flexure, diverting loop ileostomy     Date: 8/15/2024  OR Location: 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]     Procedures  Partial Sigmoid Colectomy    Mobilization of the Splenic Flexure of the Colon    Diverting Loop Ileostomy      Surgeons   Bernard Denton MD    Resident/Fellow/Other Assistant:  Cornelius Roach MD    Procedure Summary  Anesthesia: General  ASA: III  Anesthesia Staff: Anesthesiologist: Oj Rashid MD; Marty Bose MD; Talisha Bender MD  Anesthesia Resident: Chuck Fregoso MD; Renetta Craig MD; Muna Flores MD  Estimated Blood Loss: 10mL  Intra-op Medications:   Administrations occurring from 0715 to 1405 on 08/15/24:   Medication Name Total Dose   sodium chloride 0.9 % irrigation solution 1,000 mL   sterile water irrigation solution 1,000 mL   polymyxin B injection 500,000 Units   gelatin absorbable (Gelfoam) 100 sponge 2 each   thrombin (recombinant) (Recothrom) topical solution 10,000 Units   heparin (porcine) 5,000 Units in sodium chloride 0.9% 500 mL irrigation 5,000 Units   acetaminophen (Tylenol) tablet 975 mg 975 mg   alvimopan (Entereg) capsule 12 mg 12 mg   gabapentin (Neurontin) capsule 300 mg 300 mg   heparin (porcine) injection 5,000 Units 5,000 Units              Anesthesia Record               Intraprocedure I/O Totals          Intake    Dexmedetomidine 31.30 mL    The total shown is the total volume documented since Anesthesia Start was filed.    LR bolus 5000.00 mL    Phenylephrine Drip 0.00 mL    The total shown is the total volume documented since Anesthesia Start was filed.    LR infusion 0.00 mL    albumin human bottle 5% 1000.00 mL    metroNIDAZOLE (Flagyl)  mg/100 mL (premix) 100.00 mL     potassium chloride 20 mEq in 100 mL IV premix 100.00 mL    Total Intake 6231.3 mL       Output    Urine 500 mL    Est. Blood Loss 350 mL    Total Output 850 mL       Net    Net Volume 5381.3 mL          Specimen:   ID Type Source Tests Collected by Time   1 : sigmoid colon Tissue COLON - SIGMOID RESECTION SURGICAL PATHOLOGY EXAM Teddy Ross MD 8/15/2024 1459   2 : LEFT PERIURETERAL TUMOR BIOPSY Tissue URETER RESECTION LEFT SURGICAL PATHOLOGY EXAM Teddy Ross MD 8/15/2024 1311   3 : LEFT URETER SEGMENT Tissue URETER RESECTION LEFT SURGICAL PATHOLOGY EXAM Teddy Ross MD 8/15/2024 1353   4 : LEFT KIDNEY AND PROXIMAL URETER Tissue KIDNEY RADICAL NEPHROURETERECTOMY LEFT SURGICAL PATHOLOGY EXAM Teddy Ross MD 8/15/2024 1426        Staff:   Circulator: Marta  Scrub Person: Joes Elliottub Person: Ezio  Circulator: Mo  Palomo Scrub: [place]  Relief Circulator: Keri  Relief Scrub: Farzana  Relief Scrub: Mo  Palomo Circulator: Ezio Culver Scrub: Noemí         Drains and/or Catheters:   Ileostomy Loop RUQ (Active)   Stomal Appliance 1 piece;Clean;Dry;Intact 08/16/24 2013   Site/Stoma Assessment Clean;Intact 08/17/24 0029   Peristomal Assessment CESAR 08/17/24 0029   Treatment Placement checked 08/16/24 1754   Output (mL) 125 mL 08/17/24 0527   Output Description Liquid 08/16/24 2013       Urethral Catheter Non-latex 16 Fr. (Active)   Site Assessment Clean;Skin intact 08/17/24 0153   Collection Container Standard drainage bag 08/16/24 2013   Securement Method Securing device (Describe) 08/16/24 2013   Reason for Continuing Urinary Catheterization surgical procedures: urological/gynecological, pelvic oncology, anal, prolonged surgical procedure 08/16/24 2013   Output (mL) 1000 mL 08/17/24 0500         Findings: Left pelvic sidewall mass was noted to be intimately involved with the mesentery of the sigmoid colon at the junction of the colon and rectum.  Resection of this mass left a portion of  tumor on the mesentery near the colonic wall.  As such, the decision was made to resect that segmental portion of the sigmoid colon to clear the tumor, and then perform a colorectal anastomosis with diverting loop ileostomy.    Indications: Terra Alexis is an 74 y.o. 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  Please see Dr. Ross's dictation for details of the case.  I was called and in the middle of this case to assist with management of the colon.    Procedure Details: I presented into the case after resection of the pelvic sidewall tumor had been performed.  There was evidence of a rim of tumor still left on the mesentery of the colon and this had been sent for frozen section and was noted to be viable carcinoma.  As such, I evaluated the colon and rectum lysing adhesions bringing the the rectum into the surgical field.  Notably, the left retroperitoneum had already been dissected during the nephrectomy.  The splenic flexure however had not been taken down.  After evaluating the bowel and running the small intestine from the ligament of Treitz to the ileocecal valve and not knowing any other pathology, the decision was made for segmental resection of the sigmoid colon to clear the residual disease off of the mesentery.  It was necessary to take the colon due to the proximity of the disease to the visceral wall.  About 10 cm of sigmoid colon was removed and this was done using a 75 mm blue load LANA stapler at the proximal and distal ends.  The specimen was passed off.  The remaining ends of bowel were evaluated and noted to be well-perfused.  At this point, to allow for better mobilization of the descending colon, the splenic flexure was mobilized off of the spleen itself.  This was done using meticulous dissection to avoid injury to the spleen.  This allowed for an easy anastomosis being performed in an isoperistaltic side-to-side anastomosis using 75 mm blue load LANA stapler.   The common channel was closed using a TA blue stapler.  Staple lines were oversewn, there was a residual tip of the rectum that was noted to be slightly ischemic that was then excised using an additional fire of a LANA blue load stapler.  That staple line was oversewn.  At this point, a segment of the terminal ileum about 15 cm from the ileocecal valve was mobilized and brought to the abdominal wall.  The patient was not marked for a stoma prior to the procedure and therefore we did our best to identify an area of the abdominal wall that would be ideal for stoma placement.  This was done on the right side of the abdomen just above the level of the umbilicus.The skin was incised and the underlying subcutaneous tissues were dissected down to the fascia.  The fascia was then incised using electrocautery.  The underlying muscles were split and cauterized.  The posterior wall of the fascia was also incised taking care to protect the underlying bowel.  The fascial defect allowed for 2 finger breaths to be easily placed through.  At this point the designated loop of ileum was brought up and a stoma dante was placed underneath.  The stoma dante was secured to the skin using 2-0 nylon sutures.  At this point in the case, Dr. Ross and his team closed the midline fascia and skin.  Please see their dictation for details of this procedure.  Once the skin was closed, the incisions were covered and we matured the ileostomy by brooking this with 3-0 chromic sutures.  This created a nice rosebud appearance and at the end of the case a stoma appliance was applied over top.  The patient tolerated this portion of the procedure well.  The stoma itself was well-perfused at the end of this case.    Complications:  None; patient tolerated the procedure well.    Disposition: PACU - hemodynamically stable.  Condition: stable     I spoke to the patient's family after this case to describe the operative findings and need for fecal diversion.  They expressed understanding    Attending Attestation: I was present and scrubbed for the key portions of the procedure.    Teddy Ross  Phone Number: [phone]

## 2024-08-17 NOTE — CARE PLAN
Problem: Fall/Injury  Goal: Not fall by end of shift  Outcome: Progressing  Goal: Be free from injury by end of the shift  Outcome: Progressing  Goal: Verbalize understanding of personal risk factors for fall in the hospital  Outcome: Progressing  Goal: Verbalize understanding of risk factor reduction measures to prevent injury from fall in the home  Outcome: Progressing  Goal: Use assistive devices by end of the shift  Outcome: Progressing  Goal: Pace activities to prevent fatigue by end of the shift  Outcome: Progressing     Problem: Pain  Goal: Takes deep breaths with improved pain control throughout the shift  Outcome: Progressing  Goal: Turns in bed with improved pain control throughout the shift  Outcome: Progressing  Goal: Walks with improved pain control throughout the shift  Outcome: Progressing  Goal: Performs ADL's with improved pain control throughout shift  Outcome: Progressing  Goal: Participates in PT with improved pain control throughout the shift  Outcome: Progressing  Goal: Free from opioid side effects throughout the shift  Outcome: Progressing  Goal: Free from acute confusion related to pain meds throughout the shift  Outcome: Progressing   The patient's goals for the shift include      The clinical goals for the shift include Pain <3/10    Over the shift, the patient did not make progress toward the following goals. Barriers to progression include None noted. Recommendations to address these barriers include Continue POC.

## 2024-08-18 VITALS
OXYGEN SATURATION: 97 % | SYSTOLIC BLOOD PRESSURE: 163 MMHG | HEART RATE: 62 BPM | BODY MASS INDEX: 26.06 KG/M2 | DIASTOLIC BLOOD PRESSURE: 80 MMHG | RESPIRATION RATE: 18 BRPM | TEMPERATURE: 98.2 F | WEIGHT: 132.72 LBS | HEIGHT: 60 IN

## 2024-08-18 LAB
ANION GAP SERPL CALC-SCNC: 10 MMOL/L (ref 10–20)
BUN SERPL-MCNC: 14 MG/DL (ref 6–23)
CALCIUM SERPL-MCNC: 8 MG/DL (ref 8.6–10.6)
CHLORIDE SERPL-SCNC: 107 MMOL/L (ref 98–107)
CO2 SERPL-SCNC: 23 MMOL/L (ref 21–32)
CREAT SERPL-MCNC: 1 MG/DL (ref 0.5–1.05)
EGFRCR SERPLBLD CKD-EPI 2021: 60 ML/MIN/1.73M*2
ERYTHROCYTE [DISTWIDTH] IN BLOOD BY AUTOMATED COUNT: 18.6 % (ref 11.5–14.5)
GLUCOSE SERPL-MCNC: 71 MG/DL (ref 74–99)
HCT VFR BLD AUTO: 29.8 % (ref 36–46)
HGB BLD-MCNC: 9.1 G/DL (ref 12–16)
MCH RBC QN AUTO: 30.4 PG (ref 26–34)
MCHC RBC AUTO-ENTMCNC: 30.5 G/DL (ref 32–36)
MCV RBC AUTO: 100 FL (ref 80–100)
NRBC BLD-RTO: 0 /100 WBCS (ref 0–0)
PLATELET # BLD AUTO: 197 X10*3/UL (ref 150–450)
POTASSIUM SERPL-SCNC: 3.8 MMOL/L (ref 3.5–5.3)
RBC # BLD AUTO: 2.99 X10*6/UL (ref 4–5.2)
SODIUM SERPL-SCNC: 136 MMOL/L (ref 136–145)
WBC # BLD AUTO: 7.3 X10*3/UL (ref 4.4–11.3)

## 2024-08-18 PROCEDURE — 80048 BASIC METABOLIC PNL TOTAL CA: CPT | Performed by: STUDENT IN AN ORGANIZED HEALTH CARE EDUCATION/TRAINING PROGRAM

## 2024-08-18 PROCEDURE — 99231 SBSQ HOSP IP/OBS SF/LOW 25: CPT

## 2024-08-18 PROCEDURE — 2500000002 HC RX 250 W HCPCS SELF ADMINISTERED DRUGS (ALT 637 FOR MEDICARE OP, ALT 636 FOR OP/ED)

## 2024-08-18 PROCEDURE — 85027 COMPLETE CBC AUTOMATED: CPT | Performed by: STUDENT IN AN ORGANIZED HEALTH CARE EDUCATION/TRAINING PROGRAM

## 2024-08-18 PROCEDURE — 2500000004 HC RX 250 GENERAL PHARMACY W/ HCPCS (ALT 636 FOR OP/ED): Performed by: STUDENT IN AN ORGANIZED HEALTH CARE EDUCATION/TRAINING PROGRAM

## 2024-08-18 PROCEDURE — 2500000001 HC RX 250 WO HCPCS SELF ADMINISTERED DRUGS (ALT 637 FOR MEDICARE OP): Performed by: STUDENT IN AN ORGANIZED HEALTH CARE EDUCATION/TRAINING PROGRAM

## 2024-08-18 PROCEDURE — 36415 COLL VENOUS BLD VENIPUNCTURE: CPT | Performed by: STUDENT IN AN ORGANIZED HEALTH CARE EDUCATION/TRAINING PROGRAM

## 2024-08-18 PROCEDURE — 1200000003 HC ONCOLOGY  ROOM WITH TELEMETRY DAILY

## 2024-08-18 ASSESSMENT — PAIN - FUNCTIONAL ASSESSMENT
PAIN_FUNCTIONAL_ASSESSMENT: 0-10

## 2024-08-18 ASSESSMENT — PAIN SCALES - GENERAL
PAINLEVEL_OUTOF10: 7
PAINLEVEL_OUTOF10: 3
PAINLEVEL_OUTOF10: 3
PAINLEVEL_OUTOF10: 7
PAINLEVEL_OUTOF10: 7
PAINLEVEL_OUTOF10: 5 - MODERATE PAIN

## 2024-08-18 ASSESSMENT — PAIN DESCRIPTION - LOCATION
LOCATION: ABDOMEN
LOCATION: ABDOMEN

## 2024-08-18 ASSESSMENT — PAIN DESCRIPTION - DESCRIPTORS: DESCRIPTORS: SORE

## 2024-08-18 NOTE — PROGRESS NOTES
Postop Pain HPI -   Palliative: relieved with IV analgesics and regional local anesthetics  Provocative: movement  Quality:  burning and aching  Radiation:  none  Severity:  5/10  Timing: constant    24-HOUR OPIOID CONSUMPTION:  Oxycodone 10mg x 4    Scheduled medications  acetaminophen, 975 mg, oral, q6h  alvimopan, 12 mg, oral, BID  heparin (porcine), 5,000 Units, subcutaneous, q8h  levothyroxine, 25 mcg, oral, Daily  melatonin, 5 mg, oral, Nightly  potassium chloride CR, 10 mEq, oral, Daily      Continuous medications  lactated Ringer's, 50 mL/hr, Last Rate: 50 mL/hr (08/18/24 1102)      PRN medications  PRN medications: hydrALAZINE, HYDROmorphone, HYDROmorphone, naloxone, ondansetron ODT **OR** ondansetron, oxyCODONE, oxyCODONE     Physical Exam:  Constitutional:  no distress, alert and cooperative  Eyes: clear sclera  Head/Neck: No apparent injury, trachea midline  Respiratory/Thorax: Patent airways, thorax symmetric, breathing comfortably  Cardiovascular: no pitting edema  Gastrointestinal: Nondistended  Musculoskeletal: ROM intact  Extremities: no clubbing  Neurological: alert, alonso x4  Psychological: Appropriate affect    Results for orders placed or performed during the hospital encounter of 08/15/24 (from the past 24 hour(s))   POCT GLUCOSE   Result Value Ref Range    POCT Glucose 92 74 - 99 mg/dL   CBC   Result Value Ref Range    WBC 7.3 4.4 - 11.3 x10*3/uL    nRBC 0.0 0.0 - 0.0 /100 WBCs    RBC 2.99 (L) 4.00 - 5.20 x10*6/uL    Hemoglobin 9.1 (L) 12.0 - 16.0 g/dL    Hematocrit 29.8 (L) 36.0 - 46.0 %     80 - 100 fL    MCH 30.4 26.0 - 34.0 pg    MCHC 30.5 (L) 32.0 - 36.0 g/dL    RDW 18.6 (H) 11.5 - 14.5 %    Platelets 197 150 - 450 x10*3/uL   Basic metabolic panel   Result Value Ref Range    Glucose 71 (L) 74 - 99 mg/dL    Sodium 136 136 - 145 mmol/L    Potassium 3.8 3.5 - 5.3 mmol/L    Chloride 107 98 - 107 mmol/L    Bicarbonate 23 21 - 32 mmol/L    Anion Gap 10 10 - 20 mmol/L    Urea Nitrogen 14 6  - 23 mg/dL    Creatinine 1.00 0.50 - 1.05 mg/dL    eGFR 60 (L) >60 mL/min/1.73m*2    Calcium 8.0 (L) 8.6 - 10.6 mg/dL        Plan:     - Bilateral MATT blocks performed preoperatively on 8/15  - Catheters removed. Acute Pain Service will sign off at this time. Please let us know if any further questions or concerns.   - Rest of pain management per primary team     Acute Pain Resident  pg [phone] ph [phone]

## 2024-08-18 NOTE — PROGRESS NOTES
Surgical Oncology Progress Note      08/18/24    Summary:  Terra Alexis is a 74F soft tissue mass encasing distal segment of ureter. Intra-op consult 8/15 for segmental sigmoid resection with diverting ileostomy    Subjective    Subjective:  NAOE. No major complaints. Doing well overnight, some minor expected left leg weakness postoperatively.      Objective    Objective:  Vital signs:   Temp:  [36.1 °C (97 °F)-36.8 °C (98.2 °F)] 36.7 °C (98.1 °F)  Heart Rate:  [71-79] 71  Resp:  [17-18] 18  BP: (122-149)/(62-81) 149/81  FiO2 (%):  [28 %] 28 %    Physical Exam:  GEN: No acute distress. Alert, awake and conversive.  HEENT: Sclera anicteric. Moist mucous membranes.  RESP: Breathing non-labored, equal chest rise. On 3L NC.   CV: HDS  GI: Abdomen soft, nondistended, DLI appears pink and viable with intact appliance and bowel sweat. Appropriately tender. Colostomy with bilious output.   : Voiding spontaneously   MSK: No gross deformities. Moves all extremities spontaneously.  NEURO: Alert and oriented x3. No focal deficits.  PSYCH: Appropriate mood and affect.    I/O last 2 completed shifts:  In: 2546.7 (42.3 mL/kg) [I.V.:2546.7 (42.3 mL/kg)]  Out: 1575 (26.2 mL/kg) [Urine:1225 (0.8 mL/kg/hr); Stool:350]  Weight: 60.2 kg      Labs:  Lab Results   Component Value Date    WBC 8.7 08/17/2024    HGB 8.8 (L) 08/17/2024    HCT 28.0 (L) 08/17/2024    MCV 96 08/17/2024     08/17/2024     Lab Results   Component Value Date    GLUCOSE 71 (L) 08/17/2024    CALCIUM 7.9 (L) 08/17/2024     (L) 08/17/2024    K 4.1 08/17/2024    CO2 24 08/17/2024     08/17/2024    BUN 16 08/17/2024    CREATININE 1.13 (H) 08/17/2024     Imaging:  No results found.    Assessment/Plan    Assessment and Plan:  Terra Alexis is a 74F soft tissue mass encasing distal segment of ureter. Intra-op consult 8/15 for segmental sigmoid resection with diverting ileostomy    POD3 and appropriate for course. Doing well and pain mostly  controlled this morning.     Recommendations:  - Continue wound care for ostomy  - Rest of care per primary  - Diet per primary     D/w Dr. Phuong Milton MD  PGY-1 General Surgery  Surgical Oncology [id number]

## 2024-08-18 NOTE — CONSULTS
"  Wound Care Consult     Visit Date: 8/18/2024      Patient Name: Terra Alexis         MRN: [id number]           YOB: 1950     Reason for Consult: Pouch change and lesson with  for pouch change           Pertinent Labs:   Albumin   Date Value Ref Range Status   08/13/2024 4.1 3.4 - 5.0 g/dL Final     ALBUMIN (MG/L) IN URINE   Date Value Ref Range Status   03/08/2023 9.4 Not Established mg/L Final       Wound Assessment:  Wound 03/22/24 Incision Pelvis Left;Anterior (Active)   Site Assessment Clean;Dry;Intact 08/17/24 0915   Katherine-Wound Assessment Clean;Dry;Intact 08/17/24 0915   State of Healing Closed wound edges 08/15/24 2200   Margins Attached edges 08/15/24 2200   Closure Staples 08/17/24 0915   Sutures/Staple Line Approximated 08/17/24 0915   Drainage Description None 08/17/24 0915   Drainage Amount None 08/17/24 0915   Dressing Open to air 08/17/24 0915   Dressing Status Clean;Dry 08/16/24 0423       Wound 08/15/24 Incision Flank Left (Active)   Site Assessment Clean;Dry 08/17/24 2012   Katherine-Wound Assessment Clean;Dry 08/17/24 2012   Closure Staples 08/17/24 2012   Sutures/Staple Line Approximated 08/17/24 2012   Drainage Description None 08/17/24 2012   Drainage Amount None 08/17/24 2012   Dressing Open to air 08/17/24 2012   Dressing Changed New 08/15/24 1058   Dressing Status Clean;Dry 08/15/24 2200       Wound Abdomen Medial (Active)   Margins Attached edges 08/17/24 2012   Drainage Description None 08/17/24 2012   Drainage Amount None 08/17/24 2012   Dressing Open to air 08/17/24 2012   Dressing Changed New 08/15/24 1628   Dressing Status Clean;Dry;Occlusive 08/16/24 2000       Wound Team Summary Assessment:  Ostomy type: ileostomy       Size: 1 1/8\"    Color: red  Protruding: budded  Dusty: intact and sutured    Functioning: bilious liquid stool  Mucocutaneous junction: intact, sutures visible  Peristomal skin: intact, small unroofed blister above where flange is. Small fluid filled " blister below edge of flange.  Pouching: changed  Ostomy Education:  at bedside and both were open to a lesson.  very engaged with learning. Participated with removing the back of adhesive. Willing for another lesson tomorrow 8/19/24    Plan: Assess stoma/pouching Meet with patient for pouch change lesson tomorrow, Monday 8/19/24.     SUZANNE GERHARDT, RN, BSN, CWOCN  8/18/2024  4:28 PM

## 2024-08-18 NOTE — PROGRESS NOTES
[facility] [place]  Daily Progress Note      Patient: Terra Alexis  Age/Sex: 74 y.o., female  MRN: [id number]  Date of surgery: 8/15/24  Admit Date: 8/15/2024   Code Status: Full Code  Length of Stay: 3      Interval History/Overnight Events:   NAEON  Afebrile, VSS  Mild episode of nausea yesterday that has since resolved  Has been OOB to the chair multiple times yesterday  Reports difficulty with hip flexion on left      Medications:    Current Facility-Administered Medications   Medication Dose Route Frequency Provider Last Rate Last Admin    acetaminophen (Tylenol) tablet 975 mg  975 mg oral q6h Tank Wong MD   975 mg at 08/17/24 0914    alvimopan (Entereg) capsule 12 mg  12 mg oral BID Tank Wong MD        heparin (porcine) injection 5,000 Units  5,000 Units subcutaneous q8h Tank Wong MD   5,000 Units at 08/18/24 0513    hydrALAZINE (Apresoline) injection 10 mg  10 mg intravenous q6h PRN Tank Wong MD        HYDROmorphone (Dilaudid) injection 0.2 mg  0.2 mg intravenous q2h PRN Tank Wong MD   0.2 mg at 08/16/24 0905    HYDROmorphone (Dilaudid) injection 0.4 mg  0.4 mg intravenous q4h PRN Concetta Wood MD   0.4 mg at 08/16/24 1814    lactated Ringer's infusion  100 mL/hr intravenous Continuous Tank Wong  mL/hr at 08/18/24 0331 100 mL/hr at 08/18/24 0331    levothyroxine (Synthroid, Levoxyl) tablet 25 mcg  25 mcg oral Daily Tank Wong MD   25 mcg at 08/18/24 0512    melatonin tablet 5 mg  5 mg oral Nightly Tank Wong MD   5 mg at 08/17/24 2146    naloxone (Narcan) injection 0.2 mg  0.2 mg intravenous q5 min PRN Tank Wong MD        ondansetron ODT (Zofran-ODT) disintegrating tablet 4 mg  4 mg oral q8h PRN Tank Wong MD   4 mg at 08/17/24 2146    Or    ondansetron (Zofran) injection 4 mg  4 mg intravenous q8h PRN Tank Wong MD        oxyCODONE (Roxicodone) immediate release tablet 10 mg  10 mg oral q4h PRN Tank Wong MD   10 mg at 08/18/24  0512    oxyCODONE (Roxicodone) immediate release tablet 5 mg  5 mg oral q4h PRN Tank Wong MD        potassium chloride CR (Klor-Con) ER tablet 10 mEq  10 mEq oral Daily Concetta Wood MD   10 mEq at 08/17/24 0914       Objective    Vitals:    08/17/24 1737 08/17/24 2100 08/18/24 0513 08/18/24 0918   BP: 138/77 142/68 149/81 151/76   BP Location: Right arm Right arm  Right arm   Patient Position: Lying Lying  Lying   Pulse: 72 79 71 72   Resp: 17 18 18 18   Temp: 36.5 °C (97.7 °F) 36.8 °C (98.2 °F) 36.7 °C (98.1 °F) 36.3 °C (97.3 °F)   TempSrc: Temporal Temporal Temporal Temporal   SpO2: 96% 96% 96% 95%   Weight:       Height:         08/16 1900 - 08/18 0659  In: 3791.7 [I.V.:3791.7]  Out: 3050 [Urine:2575]    Physical Exam                                                                                                                        General: resting comfortably in bed  Neuro: alert and oriented, no obvious focal deficit   Head/Neck: normocephalic, atraumatic  ENT: moist mucous membranes  CV: regular rate   Pulm: nonlabored breathing on room air, no conversational dyspnea  Abd: soft, nondistended, appropriately tender, incisions clean/dry/intact with suture and staples, CEDRIC drain serosanguinous. Ileostomy with dark green output. B/l QL catheters in place.   : rojas catheter draining clear yellow urine  MSK: GONZALEZ, no gross deformities  Psych: mood and behavior normal    Labs    Lab Results   Component Value Date    WBC 8.7 08/17/2024    HGB 8.8 (L) 08/17/2024    HCT 28.0 (L) 08/17/2024    MCV 96 08/17/2024     08/17/2024      Lab Results   Component Value Date    GLUCOSE 71 (L) 08/17/2024    CALCIUM 7.9 (L) 08/17/2024     (L) 08/17/2024    K 4.1 08/17/2024    CO2 24 08/17/2024     08/17/2024    BUN 16 08/17/2024    CREATININE 1.13 (H) 08/17/2024        Imaging  === 07/01/24 ===    CT CHEST ABDOMEN PELVIS W IV CONTRAST    - Impression -  1. Interval decrease in the size of the mass  lesion along the distal  left ureter with hypodensity throughout the lesion and component of  central necrosis noted. Lesion is intimate with the iliac vasculature  with findings appearing stable from prior imaging. The lesion  measures 2.7 x 1.9 cm and on the prior examination measured 3.0 x 2.7  cm in the similar distribution    2. No lymphadenopathy in the abdomen/pelvis.    3. A few scattered hypodensities within the liver suggesting cysts.  However, there is a single hypodensity within segment 4B measuring 6  mm with attention to this area on follow-up.    4. No metastatic disease in the chest.      MACRO:  None    Signed by: Germain Mcmahan 7/3/2024 9:08 AM  Dictation workstation:   [id number]      Assessment & Plan  Terra Alexis is a74F soft tissue mass encasing distal segment of ureter now s/p open left nephrectomy and total ureterectomy and segmental sigmoid resection with diverting ileostomy (Dr. Denton). She is recovering appropriately with adequate pain control.    Plan 8/18:  -Advance diet as tolerated  -Alexis is out  -OOB to chair today, ambulating if able   -Surgical oncology following      Neuro  -pain control: Scheduled tylenol, oxycodone 5-10 mgs prn, dilaudid IV 0.2 mg for breakthrough  -B/l QL catheters in place per APS  -melatonin nightly     Cardiovascular: Hx HTN  -regular vitals/monitoring    Pulm  -encourage IS, OOB    GI  -will advance as tolerated  -continue entereg until return of bowel function  -bowel regimen per surgical oncology     /Renal  -Daily BMP  -IVF: on LR @100/hr    Endo: hypothyroidism  -continue home levothyroxine    Heme/ID  -Daily CBC    MSK  -PT/OT  -encourage ambulation as tolerated    Prophylaxis: SCDs, SQH  Dispo: Pending PT evaluation and postoperative course, possibly late next week      discussed with attending Dr. Skip Betts MD  [facility] [place]  Adult Urology Pager: [phone]  Pediatric Urology Pager: [phone]

## 2024-08-19 LAB
ANION GAP SERPL CALC-SCNC: 10 MMOL/L (ref 10–20)
BUN SERPL-MCNC: 11 MG/DL (ref 6–23)
CALCIUM SERPL-MCNC: 8.5 MG/DL (ref 8.6–10.6)
CHLORIDE SERPL-SCNC: 106 MMOL/L (ref 98–107)
CO2 SERPL-SCNC: 24 MMOL/L (ref 21–32)
CREAT SERPL-MCNC: 0.94 MG/DL (ref 0.5–1.05)
EGFRCR SERPLBLD CKD-EPI 2021: 64 ML/MIN/1.73M*2
ERYTHROCYTE [DISTWIDTH] IN BLOOD BY AUTOMATED COUNT: 17.8 % (ref 11.5–14.5)
GLUCOSE SERPL-MCNC: 75 MG/DL (ref 74–99)
HCT VFR BLD AUTO: 31.6 % (ref 36–46)
HGB BLD-MCNC: 9.9 G/DL (ref 12–16)
MCH RBC QN AUTO: 30.6 PG (ref 26–34)
MCHC RBC AUTO-ENTMCNC: 31.3 G/DL (ref 32–36)
MCV RBC AUTO: 98 FL (ref 80–100)
NRBC BLD-RTO: 0 /100 WBCS (ref 0–0)
PLATELET # BLD AUTO: 252 X10*3/UL (ref 150–450)
POTASSIUM SERPL-SCNC: 4 MMOL/L (ref 3.5–5.3)
RBC # BLD AUTO: 3.24 X10*6/UL (ref 4–5.2)
SODIUM SERPL-SCNC: 136 MMOL/L (ref 136–145)
WBC # BLD AUTO: 6.1 X10*3/UL (ref 4.4–11.3)

## 2024-08-19 PROCEDURE — 2500000004 HC RX 250 GENERAL PHARMACY W/ HCPCS (ALT 636 FOR OP/ED): Performed by: STUDENT IN AN ORGANIZED HEALTH CARE EDUCATION/TRAINING PROGRAM

## 2024-08-19 PROCEDURE — 2500000001 HC RX 250 WO HCPCS SELF ADMINISTERED DRUGS (ALT 637 FOR MEDICARE OP): Performed by: STUDENT IN AN ORGANIZED HEALTH CARE EDUCATION/TRAINING PROGRAM

## 2024-08-19 PROCEDURE — 2500000002 HC RX 250 W HCPCS SELF ADMINISTERED DRUGS (ALT 637 FOR MEDICARE OP, ALT 636 FOR OP/ED)

## 2024-08-19 PROCEDURE — 1200000003 HC ONCOLOGY  ROOM WITH TELEMETRY DAILY

## 2024-08-19 PROCEDURE — 83735 ASSAY OF MAGNESIUM: CPT

## 2024-08-19 PROCEDURE — 97161 PT EVAL LOW COMPLEX 20 MIN: CPT | Mod: GP

## 2024-08-19 PROCEDURE — 36415 COLL VENOUS BLD VENIPUNCTURE: CPT | Performed by: STUDENT IN AN ORGANIZED HEALTH CARE EDUCATION/TRAINING PROGRAM

## 2024-08-19 PROCEDURE — 80048 BASIC METABOLIC PNL TOTAL CA: CPT | Performed by: STUDENT IN AN ORGANIZED HEALTH CARE EDUCATION/TRAINING PROGRAM

## 2024-08-19 PROCEDURE — 85027 COMPLETE CBC AUTOMATED: CPT | Performed by: STUDENT IN AN ORGANIZED HEALTH CARE EDUCATION/TRAINING PROGRAM

## 2024-08-19 ASSESSMENT — PAIN DESCRIPTION - DESCRIPTORS
DESCRIPTORS: SORE
DESCRIPTORS: SORE

## 2024-08-19 ASSESSMENT — COGNITIVE AND FUNCTIONAL STATUS - GENERAL
HELP NEEDED FOR BATHING: A LITTLE
CLIMB 3 TO 5 STEPS WITH RAILING: A LITTLE
MOVING TO AND FROM BED TO CHAIR: A LITTLE
MOVING TO AND FROM BED TO CHAIR: A LITTLE
DRESSING REGULAR LOWER BODY CLOTHING: A LITTLE
DRESSING REGULAR UPPER BODY CLOTHING: A LITTLE
DRESSING REGULAR LOWER BODY CLOTHING: A LITTLE
TOILETING: A LITTLE
STANDING UP FROM CHAIR USING ARMS: A LITTLE
PERSONAL GROOMING: A LITTLE
STANDING UP FROM CHAIR USING ARMS: A LITTLE
DRESSING REGULAR UPPER BODY CLOTHING: A LITTLE
TOILETING: A LITTLE
CLIMB 3 TO 5 STEPS WITH RAILING: A LITTLE
MOVING FROM LYING ON BACK TO SITTING ON SIDE OF FLAT BED WITH BEDRAILS: A LITTLE
PERSONAL GROOMING: A LITTLE
MOBILITY SCORE: 19
TURNING FROM BACK TO SIDE WHILE IN FLAT BAD: A LOT
TURNING FROM BACK TO SIDE WHILE IN FLAT BAD: A LITTLE
WALKING IN HOSPITAL ROOM: A LITTLE
MOVING TO AND FROM BED TO CHAIR: A LITTLE
TURNING FROM BACK TO SIDE WHILE IN FLAT BAD: A LITTLE
MOBILITY SCORE: 17
HELP NEEDED FOR BATHING: A LITTLE
DAILY ACTIVITIY SCORE: 19
WALKING IN HOSPITAL ROOM: A LITTLE
WALKING IN HOSPITAL ROOM: A LITTLE
STANDING UP FROM CHAIR USING ARMS: A LITTLE

## 2024-08-19 ASSESSMENT — PAIN SCALES - GENERAL
PAINLEVEL_OUTOF10: 7
PAINLEVEL_OUTOF10: 4
PAINLEVEL_OUTOF10: 7
PAINLEVEL_OUTOF10: 4
PAINLEVEL_OUTOF10: 7

## 2024-08-19 ASSESSMENT — PAIN - FUNCTIONAL ASSESSMENT
PAIN_FUNCTIONAL_ASSESSMENT: 0-10

## 2024-08-19 ASSESSMENT — PAIN DESCRIPTION - LOCATION: LOCATION: ABDOMEN

## 2024-08-19 ASSESSMENT — ACTIVITIES OF DAILY LIVING (ADL): ADL_ASSISTANCE: INDEPENDENT

## 2024-08-19 NOTE — PROGRESS NOTES
Surgical Oncology Progress Note      08/19/24    Summary:  Terra Alexis is a 74F soft tissue mass encasing distal segment of ureter. Intra-op consult 8/15 for segmental sigmoid resection with diverting ileostomy    Subjective    Subjective:  NAOE. Complains of bilateral swollen legs today. Tolerated her FLD yesterday (drank chicken broth and coffee).      Objective    Objective:  Vital signs:   Temp:  [36.3 °C (97.3 °F)-36.8 °C (98.2 °F)] 36.6 °C (97.9 °F)  Heart Rate:  [62-72] 70  Resp:  [16-20] 16  BP: (124-163)/(62-80) 145/77    Physical Exam:  GEN: No acute distress. Alert, awake and conversive.  HEENT: Sclera anicteric. Moist mucous membranes.  RESP: Breathing non-labored, equal chest rise.   CV: HDS  GI: Abdomen soft, nondistended, DLI appears pink and viable with intact appliance with green enteric output. Appropriately tender.   : Catheter draining CYU.   MSK: No gross deformities. Moves all extremities spontaneously. Swollen legs bilaterally. No pitting edema appreciated.   NEURO: Alert and oriented x3. No focal deficits.  PSYCH: Appropriate mood and affect.    I/O last 2 completed shifts:  In: 2148.5 (35.7 mL/kg) [P.O.:740; I.V.:1408.5 (23.4 mL/kg)]  Out: 2300 (38.2 mL/kg) [Urine:1900 (1.3 mL/kg/hr); Stool:400]  Weight: 60.2 kg      Labs:  Lab Results   Component Value Date    WBC 7.3 08/18/2024    HGB 9.1 (L) 08/18/2024    HCT 29.8 (L) 08/18/2024     08/18/2024     08/18/2024     Lab Results   Component Value Date    GLUCOSE 71 (L) 08/18/2024    CALCIUM 8.0 (L) 08/18/2024     08/18/2024    K 3.8 08/18/2024    CO2 23 08/18/2024     08/18/2024    BUN 14 08/18/2024    CREATININE 1.00 08/18/2024     Imaging:  No results found.    Assessment/Plan    Assessment and Plan:  Terra Alexis is a 74F soft tissue mass encasing distal segment of ureter. Intra-op consult 8/15 for segmental sigmoid resection with diverting ileostomy    Overall doing well. Swollen legs bilaterally;  primary team is aware. Can consider diet advancement as she has tolerated her diet yesterday.     Recommendations:  - Continue wound care for ostomy  - Recommend advancing diet  - Rest of care per primary    D/w Dr. Bertin Wright MD  PGY-1 General Surgery  Surgical Oncology [id number]

## 2024-08-19 NOTE — CONSULTS
"Nutrition Initial Assessment:   Nutrition Assessment    Reason for Assessment: Admission nursing screening    Patient is a 74 y.o. 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     Taken to OR (8/15) now s/p open left nephrectomy, total ureterectomy, segmental sigmoid resection, & diverting ileostomy.    Nutrition History:  Energy Intake: Good > 75 %  Food and Nutrient History: Pt sitting up in chair w/  at bedside during visit. Reports that prior to surgery she was eating consistent with her baseline. Notes drastic improvement in appetite & PO intake after she finished chemo. States she typically consumes x2 meals/day with stable portions. Has not been using Boost recently but was drinking it during chemo. Is amenable to vanilla Ensure Plus this admission. Denies further needs.  Vitamin/Herbal Supplement Use: Preservision (eye supplement), MVI  Food Allergies/Intolerances:  None  GI Symptoms: None  Oral Problems: None     Anthropometrics:  Height: 151.1 cm (4' 11.5\")   Weight: 60.2 kg (132 lb 11.5 oz)   BMI (Calculated): 26.37  IBW/kg (Dietitian Calculated): 45.5 kg  Percent of IBW: 132 %     Weight History:   Wt Readings from Last 30 Encounters:   08/15/24 60.2 kg (132 lb 11.5 oz) --> Admit wt   07/17/24 57.2 kg (126 lb)   06/13/24 61.6 kg (135 lb 12.9 oz)   05/23/24 63.8 kg (140 lb 10.5 oz)   02/25/24 68 kg (150 lb)   07/24/23 67.1 kg (148 lb)     Pt reports she lost weight during treatment but has since stabilized.     Weight Change %:  Weight History / % Weight Change: 6% x 3 months, 11% x 6 months  Significant Weight Loss: Yes  Interpretation of Weight Loss: >10% in 6 months    Nutrition Focused Physical Exam Findings:  Subcutaneous Fat Loss:   Orbital Fat Pads: Well nourished (slightly bulging fat pads)  Buccal Fat Pads: Well nourished (full, rounded cheeks)  Triceps: Well nourished (ample fat tissue)  Muscle Wasting:  Temporalis: Well nourished " (well-defined muscle)  Pectoralis (Clavicular Region): Well nourished (clavicle not visible)  Deltoid/Trapezius: Mild-Moderate (slight protrusion of acromion process)  Interosseous: Mild-Moderate (slightly depressed area between thumb and forefinger)  Edema:  Edema: none  Physical Findings:  Hair: Positive (Hair loss)  Eyes: Negative  Mouth: Negative  Nails: Negative  Skin: Negative (L flank, Medial abdomen, & L anterior pelvis incisions)    Nutrition Significant Labs:  BMP Trend:   Results from last 7 days   Lab Units 08/19/24  0629 08/18/24  0749 08/17/24  0735 08/16/24  0842   GLUCOSE mg/dL 75 71* 71* 99   CALCIUM mg/dL 8.5* 8.0* 7.9* 7.8*   SODIUM mmol/L 136 136 134* 137   POTASSIUM mmol/L 4.0 3.8 4.1 3.5   CO2 mmol/L 24 23 24 23   CHLORIDE mmol/L 106 107 106 107   BUN mg/dL 11 14 16 15   CREATININE mg/dL 0.94 1.00 1.13* 1.22*      Nutrition Specific Medications:  Scheduled medications  heparin (porcine), 5,000 Units, subcutaneous, q8h  levothyroxine, 25 mcg, oral, Daily    Continuous medications  lactated Ringer's, 50 mL/hr, Last Rate: 50 mL/hr (08/19/24 0522)    I/O:   +Ileostomy: 400mL output x 24hrs    Dietary Orders (From admission, onward)       Start     Ordered    08/19/24 0612  Adult diet Regular, Full Liquid  Diet effective now        Question Answer Comment   Diet type Regular    Diet type Full Liquid        08/19/24 0611              Estimated Needs:   Total Energy Estimated Needs (kCal):  (2207-7605)  Method for Estimating Needs: 30-35 kcals/kg x IBW  Total Protein Estimated Needs (g):  (60-70)  Method for Estimating Needs: 1.3-1.5 g/kg x IBW  Total Fluid Estimated Needs (mL):  (9794-1963)  Method for Estimating Needs: 1mL/kcal or per team        Nutrition Diagnosis   Malnutrition Diagnosis  Patient has Malnutrition Diagnosis: No    Nutrition Diagnosis  Patient has Nutrition Diagnosis: Yes  Diagnosis Status (1): New  Nutrition Diagnosis 1: Increased nutrient needs  Related to (1): increased  "metabolic demand  As Evidenced by (1): L ureteral cancer now s/p surgical intervention       Nutrition Interventions/Recommendations         Nutrition Prescription:  Continue full liquid diet & advance as tolerated  Will provide vanilla Ensure Plus for added nutrition            Nutrition Interventions:   Interventions: Medical food supplement  Medical Food Supplement: Commercial beverage  Goal: Drink Ensure Plus at least BID      Nutrition Education:   Provided pt with \"Diet and Nutrition after Ileostomy Placement\" educational handout. Discussed that current diet aims to allow healing while consuming easily digested foods, especially during the first 6 weeks after surgery. Identified that plant based foods are sources of dietary fiber. Stressed to the patient the importance of opting for low fiber foods that are more easily digested & reduce the amount of food waste moving through the intestines. Discussed the benefit of manually breaking down fiber prior to ingestion via cooking, pureeing, & chewing foods well. Encouraged 5-6 smaller meals throughout the day with the biggest meal being mid-day. Also instructed pt on the importance of adequate fluid intake throughout the day. Cautioned against acidic, fried, greasy foods that may be harder to digest. Instructed pt on foods that can result in increased output, thicken stool, cause gas, or produce odors. Discussed lists of foods recommended/not recommended. Pt denies any additional questions at completion of education.    Nutrition Monitoring and Evaluation   Food/Nutrient Related History Monitoring  Monitoring and Evaluation Plan: Energy intake  Energy Intake: Estimated energy intake  Criteria: Meet >50% EENs    Time Spent (min): 90 minutes  "

## 2024-08-19 NOTE — PROGRESS NOTES
U[facility] [place]  Daily Progress Note      Patient: Terra Alexis  Age/Sex: 74 y.o., female  MRN: [id number]  Date of surgery: 8/15/24  Admit Date: 8/15/2024   Code Status: Full Code  Length of Stay: 4      Interval History/Overnight Events:   NAEON, feels well. Endorses occasional dizziness.   Afebrile, VSS  Endorses Voiding a lot, denies N/V. OOB in chair all day yesterday but did not walk.   Denies gas, Ileostomy output 400(50)      Medications:    Current Facility-Administered Medications   Medication Dose Route Frequency Provider Last Rate Last Admin    acetaminophen (Tylenol) tablet 975 mg  975 mg oral q6h Tank Wong MD   975 mg at 08/18/24 1721    heparin (porcine) injection 5,000 Units  5,000 Units subcutaneous q8h Takn Wong MD   5,000 Units at 08/19/24 0516    hydrALAZINE (Apresoline) injection 10 mg  10 mg intravenous q6h PRN Tank Wong MD        HYDROmorphone (Dilaudid) injection 0.2 mg  0.2 mg intravenous q2h PRN Tank Wong MD   0.2 mg at 08/16/24 0905    HYDROmorphone (Dilaudid) injection 0.4 mg  0.4 mg intravenous q4h PRN Concetta Wood MD   0.4 mg at 08/16/24 1814    lactated Ringer's infusion  50 mL/hr intravenous Continuous Kristal Avalos MD 50 mL/hr at 08/19/24 0522 50 mL/hr at 08/19/24 0522    levothyroxine (Synthroid, Levoxyl) tablet 25 mcg  25 mcg oral Daily Tank Wong MD   25 mcg at 08/19/24 0516    melatonin tablet 5 mg  5 mg oral Nightly Tank Wong MD   5 mg at 08/18/24 2129    naloxone (Narcan) injection 0.2 mg  0.2 mg intravenous q5 min PRN Tank Wong MD        ondansetron ODT (Zofran-ODT) disintegrating tablet 4 mg  4 mg oral q8h PRN Tank Wong MD   4 mg at 08/17/24 2146    Or    ondansetron (Zofran) injection 4 mg  4 mg intravenous q8h PRN Tank Wong MD        oxyCODONE (Roxicodone) immediate release tablet 10 mg  10 mg oral q4h PRN Tank Wong MD   10 mg at 08/19/24 0516    oxyCODONE (Roxicodone) immediate release tablet 5 mg  5  mg oral q4h PRN Tank Wong MD        potassium chloride CR (Klor-Con) ER tablet 10 mEq  10 mEq oral Daily Concetta Wood MD   10 mEq at 08/18/24 1057       Objective    Vitals:    08/18/24 1720 08/18/24 2119 08/19/24 0500 08/19/24 0908   BP: 135/62 163/80 145/77 160/79   BP Location: Right arm Right arm  Right arm   Patient Position: Sitting Lying  Lying   Pulse: 64 62 70 84   Resp: 20 18 16 16   Temp:  36.8 °C (98.2 °F) 36.6 °C (97.9 °F) 36.5 °C (97.7 °F)   TempSrc:  Temporal Temporal Temporal   SpO2: 97% 97% 95% 93%   Weight:       Height:         08/17 1900 - 08/19 0659  In: 3282.2 [P.O.:740; I.V.:2542.2]  Out: 2850 [Urine:2350]    Physical Exam                                                                                                                        General: resting comfortably in bed  Neuro: alert and oriented, no obvious focal deficit   Head/Neck: normocephalic, atraumatic  ENT: moist mucous membranes  CV: regular rate, normotensive   Pulm: nonlabored breathing on room air, no conversational dyspnea  Abd: soft, nondistended, appropriately tender, incisions clean/dry/intact with suture and staples. Ileostomy with small green stool output.  : external catheter draining clear yellow urine  MSK: GONZALEZ, no gross deformities  Psych: mood and behavior normal    Labs    Lab Results   Component Value Date    WBC 7.3 08/18/2024    HGB 9.1 (L) 08/18/2024    HCT 29.8 (L) 08/18/2024     08/18/2024     08/18/2024      Lab Results   Component Value Date    GLUCOSE 71 (L) 08/18/2024    CALCIUM 8.0 (L) 08/18/2024     08/18/2024    K 3.8 08/18/2024    CO2 23 08/18/2024     08/18/2024    BUN 14 08/18/2024    CREATININE 1.00 08/18/2024        Imaging  === 07/01/24 ===    CT CHEST ABDOMEN PELVIS W IV CONTRAST    - Impression -  1. Interval decrease in the size of the mass lesion along the distal  left ureter with hypodensity throughout the lesion and component of  central necrosis  noted. Lesion is intimate with the iliac vasculature  with findings appearing stable from prior imaging. The lesion  measures 2.7 x 1.9 cm and on the prior examination measured 3.0 x 2.7  cm in the similar distribution    2. No lymphadenopathy in the abdomen/pelvis.    3. A few scattered hypodensities within the liver suggesting cysts.  However, there is a single hypodensity within segment 4B measuring 6  mm with attention to this area on follow-up.    4. No metastatic disease in the chest.      MACRO:  None    Signed by: Germain Mcmahan 7/3/2024 9:08 AM  Dictation workstation:   [id number]      Assessment & Plan  Terra Alexis is a74F soft tissue mass encasing distal segment of ureter now s/p open left nephrectomy and total ureterectomy and segmental sigmoid resection with diverting ileostomy (Dr. Denton). She is recovering appropriately with adequate pain control.    Plan 8/19:  -Advance to FLD   -OOB to chair today, work with PT/OT   -Surgical oncology following, appreciate recommendations   -Discontinue Entereg   -Wound ostomy team to meet for pouch change education       Neuro  -pain control: Scheduled tylenol, oxycodone 5-10 mgs prn, dilaudid IV 0.2/0.4 mg for breakthrough  -B/l QL catheters removed 8/18  -melatonin nightly     Cardiovascular: Hx HTN  -regular vitals/monitoring  -PRN hydralazine with hold parameters     Pulm  -encourage IS, OOB    GI  -FLD, advance as tolerated   -PRN Zofran for nausea   -bowel regimen per surgical oncology   -Continue wound ostomy education   -Surgical oncology following , appreciate recommendations    - Continue wound care for ostomy  - Recommend advancing diet    /Renal  -Daily BMP  -Replete electrolytes as indicated   -IVF: on LR @50/hr      Endo: hypothyroidism  -continue home levothyroxine    Heme/ID  -Daily CBC    MSK  -PT/OT  -encourage ambulation as tolerated    Prophylaxis: SCDs, SQH  Dispo: Pending PT evaluation and postoperative course, may need acute  rehab. Possible discharge mid-late week.      Seen with chief Dr. Comer and discussed with attending Dr. Skip Menchaca MD, [facility]   [facility] [place]  Adult Urology Pager: [phone]  Pediatric Urology Pager: [phone]

## 2024-08-19 NOTE — PROGRESS NOTES
Physical Therapy    Physical Therapy Evaluation    Patient Name: Terra Alexis  MRN: [id number]  Today's Date: 8/19/2024   Time in: 1011  Time out: 1037       Assessment/Plan   PT Assessment  PT Assessment Results: Decreased strength, Decreased range of motion, Decreased endurance, Impaired balance, Decreased mobility, Pain  Rehab Prognosis: Good  End of Session Communication: Bedside nurse  Assessment Comment: Pt is a 75 y/o female s/p open left nephrectomy and total ureterectomy and segmental sigmoid resection with diverting ileostomy on 8/15. Pt presented with decreased endurance, strength, mobility, strength, and limited by pain. Pt performed mobility and transfer today with a walker and CGA. Recommend low-intensity PT upon d/c.  End of Session Patient Position: Up in chair, Alarm off, not on at start of session  IP OR SWING BED PT PLAN  Inpatient or Swing Bed: Inpatient  PT Plan  Treatment/Interventions: Bed mobility, Transfer training, Gait training, Stair training, Balance training, Strengthening, Endurance training, Therapeutic exercise, Therapeutic activity, Range of motion  PT Plan: Ongoing PT  PT Frequency: 3 times per week  PT Discharge Recommendations: Low intensity level of continued care  Equipment Recommended upon Discharge: Wheeled walker  PT Recommended Transfer Status: Contact guard  PT - OK to Discharge: Yes      Subjective   General Visit Information:  Reason for Referral: s/p open left nephrectomy and total ureterectomy and segmental sigmoid resection with diverting ileostomy on 8/15  Past Medical History Relevant to Rehab: HTN, depression/anxiety, OA of R knee, ureteral mass  Prior to Session Communication: Bedside nurse  Patient Position Received: Bed, 3 rail up, Alarm off, not on at start of session  General Comment: pt sitting up in bed and finished breakfast. enthusiastic and agreeable to particiate in therapy.   Home Living:  Home Living  Type of Home: House  Lives With:  ([facility])  Home  Adaptive Equipment:  (pt plans on borrowing a walker)  Home Layout: Two level, 1/2 bath on main level, Bed/bath upstairs, Able to live on main level with bedroom/bathroom  Home Access:  (3 steps to enter)  Prior Level of Function:  Prior Function Per Pt/Caregiver Report  Level of [place]: Independent with ADLs and functional transfers, Independent with homemaking with ambulation  Receives Help From:  (fiance and dtr)  ADL Assistance: Independent  Homemaking Assistance: Independent (since chemo, fiance does yard work and pt does cooking and laundry)  Ambulatory Assistance: Independent  Vocational: Retired (used to work as a )  Prior Function Comments: (-) drive since chemo  Precautions:  Precautions  Medical Precautions: Fall precautions  Post-Surgical Precautions: Abdominal surgery precautions  Vital Signs:  Vital Signs  Heart Rate:  (pre-treatment 100 bpm, post-treatment 109 bpm)    Objective     Pain:  Pain Assessment  Pain Assessment: 0-10  0-10 (Numeric) Pain Score: 7  Pain Type: Surgical pain  Pain Location: Abdomen  Cognition:  Cognition  Overall Cognitive Status: Within Functional Limits      Strength:     RUE   RUE : Within Functional Limits  LUE   LUE: Within Functional Limits  AROM RLE (degrees)  RLE AROM Comment: WFL  Strength RLE  RLE Overall Strength: Greater than or equal to 3/5 as evidenced by functional mobility  AROM LLE (degrees)  LLE AROM Comment: WFL (L hip flexion AROM < R due to pain at the surgical site.)  Strength LLE  LLE Overall Strength: Greater than or equal to 3/5 as evidenced by functional mobility    General Assessments:            Sensation  Light Touch:  (decreased sensation in bottom of the feet)  Sensation Comment: pt reported N/T in hands/feet     Static Sitting Balance  Static Sitting-Balance Support: Feet supported  Static Sitting-Level of Assistance: Close supervision  Dynamic Sitting Balance  Dynamic Sitting-Balance Support: Feet supported, Bilateral  upper extremity supported  Dynamic Sitting-Level of Assistance: Close supervision  Static Standing Balance  Static Standing-Balance Support: Bilateral upper extremity supported (with a walker)  Static Standing-Level of Assistance: Contact guard  Dynamic Standing Balance  Dynamic Standing-Balance Support: Bilateral upper extremity supported (with a walker)  Dynamic Standing-Level of Assistance: Contact guard    Functional Assessments:  Bed Mobility  Bed Mobility: Yes  Bed Mobility 1  Bed Mobility 1: Supine to sitting  Level of Assistance 1: Minimum assistance  Bed Mobility Comments 1: min assist with trunk to sit up. increased time and effort to perform due to pain. verbal and tactile cues for sequencing.  Transfers  Transfer: Yes  Transfer 1  Technique 1: Sit to stand, Stand to sit  Transfer Device 1: Walker  Transfer Level of Assistance 1: Contact guard  Trials/Comments 1: increased time, effort, forward trunk flexion to perform sit to stand. control eccentric descent with stand to sit. verbal cues for hand placement and safety.  Ambulation/Gait Training  Ambulation/Gait Training Performed: Yes  Ambulation/Gait Training 1  Device 1: Rolling walker  Assistance 1: Contact guard  Quality of Gait 1: Narrow base of support, Forward flexed posture (shuffling gait at first few steps but progressed to be able to take bigger steps with good foot clearance and B step through pattern. decreased speed.)  Comments/Distance (ft) 1: 125 ft. verbal cues for safety and sequencing.          Outcome Measures:  [facility] Basic Mobility  Turning from your back to your side while in a flat bed without using bedrails: A little  Moving from lying on your back to sitting on the side of a flat bed without using bedrails: A lot  Moving to and from bed to chair (including a wheelchair): A little  Standing up from a chair using your arms (e.g. wheelchair or bedside chair): A little  To walk in hospital room: A little  Climbing 3-5 steps with  railing: A little  Basic Mobility - Total Score: 17                               Encounter Problems       Encounter Problems (Active)       Balance       Maintain static and dynamic standing balance with a walker modified independent. (Progressing)       Start:  08/19/24    Expected End:  09/09/24               Mobility       Patient will ambulate 150 ft with a walker modified independent. (Progressing)       Start:  08/19/24    Expected End:  09/09/24            Patient will ascend and descend a flight of stairs with LRD/ railing modified independent. (Progressing)       Start:  08/19/24    Expected End:  09/09/24               PT Transfers       Patient will perform bed mobility independent. (Progressing)       Start:  08/19/24    Expected End:  09/09/24            Patient will transfer sit to and from stand with a walker modified independent. (Progressing)       Start:  08/19/24    Expected End:  09/09/24                   Education Documentation  Precautions, taught by RAMBO Lomeli at 8/19/2024 12:01 PM.  Learner: Patient  Readiness: Acceptance  Method: Explanation  Response: Verbalizes Understanding    Body Mechanics, taught by RAMBO Lomeli at 8/19/2024 12:01 PM.  Learner: Patient  Readiness: Acceptance  Method: Explanation  Response: Verbalizes Understanding    Mobility Training, taught by RAMBO Lomeli at 8/19/2024 12:01 PM.  Learner: Patient  Readiness: Acceptance  Method: Explanation  Response: Verbalizes Understanding    Education Comments  No comments found.            08/19/24 at 12:42 PM   RAMBO LOMELI

## 2024-08-19 NOTE — CONSULTS
Wound Care Consult     Visit Date: 8/19/2024      Patient Name: Terra Alexis         MRN: [id number]           YOB: 1950     Reason for Consult: ostomy care/ teaching             Pertinent Labs:   Albumin   Date Value Ref Range Status   08/13/2024 4.1 3.4 - 5.0 g/dL Final     ALBUMIN (MG/L) IN URINE   Date Value Ref Range Status   03/08/2023 9.4 Not Established mg/L Final       Wound Assessment:  Wound 03/22/24 Incision Pelvis Left;Anterior (Active)   Site Assessment Clean;Dry 08/19/24 0923   Katherine-Wound Assessment Clean;Dry;Intact 08/17/24 0915   State of Healing Closed wound edges 08/15/24 2200   Margins Attached edges 08/15/24 2200   Closure Staples 08/19/24 0923   Sutures/Staple Line Approximated 08/19/24 0923   Drainage Description None 08/17/24 0915   Drainage Amount None 08/17/24 0915   Dressing Open to air 08/17/24 0915   Dressing Status Clean;Dry 08/16/24 0423       Wound 08/15/24 Incision Flank Left (Active)   Site Assessment Clean;Dry 08/19/24 0923   Katherine-Wound Assessment Dry;Clean 08/19/24 0923   Closure Staples 08/19/24 0923   Sutures/Staple Line Approximated 08/19/24 0923   Drainage Description None 08/19/24 0923   Drainage Amount None 08/19/24 0923   Dressing Open to air 08/19/24 0923   Dressing Changed New 08/15/24 1058   Dressing Status Clean;Dry 08/15/24 2200       Wound Abdomen Medial (Active)   Margins Attached edges 08/18/24 2039   Drainage Description None 08/19/24 0923   Drainage Amount None 08/19/24 0923   Dressing Open to air 08/19/24 0923   Dressing Changed New 08/15/24 1628   Dressing Status Clean;Dry;Occlusive 08/16/24 2000      08/19/24 1604   Ileostomy Loop RUQ   Placement Date/Time: 08/15/24 1628   Hand Hygiene Completed: Yes  Ileostomy Type: Loop  Location: RUQ   Site/Stoma Assessment Red   Peristomal Assessment Intact   Treatment Pouch change   Output (mL) 125 mL   Output Description Bilous;Liquid       Wound Team Summary Assessment:   Ostomy type: ileostomy        "Size: 1 1/8\"    Color: red  Protruding: budded  Dusty: intact and sutured    Functioning: bilious liquid stool  Mucocutaneous junction: intact, sutures visible  Peristomal skin: intact, small unroofed blister above where flange is. Small fluid filled blister below edge of flange.  Pouching: barrier ring with one piece drainable pouch   Ostomy Education:  at bedside and both were open to a lesson.  changed pouch today. Patient open to basic ostomy care, closed pouch tail, engaged asking questions. Dietician spoke with them today. Discussed need for hydration. Would benefit from additional hands on lesson         Wound Team Plan: Ostomy team will follow      Michelle WONG   8/19/2024  4:05 PM        "

## 2024-08-20 ENCOUNTER — HOME HEALTH ADMISSION (OUTPATIENT)
Dept: HOME HEALTH SERVICES | Facility: HOME HEALTH | Age: 74
End: 2024-08-20
Payer: MEDICARE

## 2024-08-20 LAB
ANION GAP SERPL CALC-SCNC: 11 MMOL/L (ref 10–20)
BUN SERPL-MCNC: 8 MG/DL (ref 6–23)
CALCIUM SERPL-MCNC: 8.6 MG/DL (ref 8.6–10.6)
CHLORIDE SERPL-SCNC: 105 MMOL/L (ref 98–107)
CO2 SERPL-SCNC: 24 MMOL/L (ref 21–32)
CREAT SERPL-MCNC: 1.09 MG/DL (ref 0.5–1.05)
EGFRCR SERPLBLD CKD-EPI 2021: 54 ML/MIN/1.73M*2
ERYTHROCYTE [DISTWIDTH] IN BLOOD BY AUTOMATED COUNT: 17.7 % (ref 11.5–14.5)
GLUCOSE SERPL-MCNC: 74 MG/DL (ref 74–99)
HCT VFR BLD AUTO: 32 % (ref 36–46)
HGB BLD-MCNC: 10.1 G/DL (ref 12–16)
MAGNESIUM SERPL-MCNC: 1.84 MG/DL (ref 1.6–2.4)
MAGNESIUM SERPL-MCNC: 2.01 MG/DL (ref 1.6–2.4)
MCH RBC QN AUTO: 30.6 PG (ref 26–34)
MCHC RBC AUTO-ENTMCNC: 31.6 G/DL (ref 32–36)
MCV RBC AUTO: 97 FL (ref 80–100)
NRBC BLD-RTO: 0 /100 WBCS (ref 0–0)
PLATELET # BLD AUTO: 294 X10*3/UL (ref 150–450)
POTASSIUM SERPL-SCNC: 4 MMOL/L (ref 3.5–5.3)
RBC # BLD AUTO: 3.3 X10*6/UL (ref 4–5.2)
SODIUM SERPL-SCNC: 136 MMOL/L (ref 136–145)
WBC # BLD AUTO: 6.4 X10*3/UL (ref 4.4–11.3)

## 2024-08-20 PROCEDURE — 97165 OT EVAL LOW COMPLEX 30 MIN: CPT | Mod: GO

## 2024-08-20 PROCEDURE — 85027 COMPLETE CBC AUTOMATED: CPT | Performed by: STUDENT IN AN ORGANIZED HEALTH CARE EDUCATION/TRAINING PROGRAM

## 2024-08-20 PROCEDURE — 2500000004 HC RX 250 GENERAL PHARMACY W/ HCPCS (ALT 636 FOR OP/ED)

## 2024-08-20 PROCEDURE — 2500000002 HC RX 250 W HCPCS SELF ADMINISTERED DRUGS (ALT 637 FOR MEDICARE OP, ALT 636 FOR OP/ED)

## 2024-08-20 PROCEDURE — 2500000001 HC RX 250 WO HCPCS SELF ADMINISTERED DRUGS (ALT 637 FOR MEDICARE OP): Performed by: STUDENT IN AN ORGANIZED HEALTH CARE EDUCATION/TRAINING PROGRAM

## 2024-08-20 PROCEDURE — 1170000001 HC PRIVATE ONCOLOGY ROOM DAILY

## 2024-08-20 PROCEDURE — 80048 BASIC METABOLIC PNL TOTAL CA: CPT | Performed by: STUDENT IN AN ORGANIZED HEALTH CARE EDUCATION/TRAINING PROGRAM

## 2024-08-20 PROCEDURE — 83735 ASSAY OF MAGNESIUM: CPT

## 2024-08-20 PROCEDURE — 36415 COLL VENOUS BLD VENIPUNCTURE: CPT | Performed by: STUDENT IN AN ORGANIZED HEALTH CARE EDUCATION/TRAINING PROGRAM

## 2024-08-20 PROCEDURE — 2500000004 HC RX 250 GENERAL PHARMACY W/ HCPCS (ALT 636 FOR OP/ED): Performed by: STUDENT IN AN ORGANIZED HEALTH CARE EDUCATION/TRAINING PROGRAM

## 2024-08-20 RX ORDER — MAGNESIUM SULFATE HEPTAHYDRATE 40 MG/ML
4 INJECTION, SOLUTION INTRAVENOUS ONCE
Status: COMPLETED | OUTPATIENT
Start: 2024-08-20 | End: 2024-08-20

## 2024-08-20 ASSESSMENT — PAIN - FUNCTIONAL ASSESSMENT
PAIN_FUNCTIONAL_ASSESSMENT: 0-10

## 2024-08-20 ASSESSMENT — COGNITIVE AND FUNCTIONAL STATUS - GENERAL
WALKING IN HOSPITAL ROOM: A LITTLE
TURNING FROM BACK TO SIDE WHILE IN FLAT BAD: A LITTLE
DRESSING REGULAR LOWER BODY CLOTHING: A LITTLE
DAILY ACTIVITIY SCORE: 23
PERSONAL GROOMING: A LITTLE
DRESSING REGULAR UPPER BODY CLOTHING: A LITTLE
MOVING TO AND FROM BED TO CHAIR: A LITTLE
HELP NEEDED FOR BATHING: A LITTLE
HELP NEEDED FOR BATHING: A LITTLE
TOILETING: A LITTLE
STANDING UP FROM CHAIR USING ARMS: A LITTLE

## 2024-08-20 ASSESSMENT — PAIN SCALES - GENERAL
PAINLEVEL_OUTOF10: 0 - NO PAIN
PAINLEVEL_OUTOF10: 4
PAINLEVEL_OUTOF10: 7
PAINLEVEL_OUTOF10: 3
PAINLEVEL_OUTOF10: 7
PAINLEVEL_OUTOF10: 0 - NO PAIN

## 2024-08-20 ASSESSMENT — ACTIVITIES OF DAILY LIVING (ADL)
ADL_ASSISTANCE: INDEPENDENT
BATHING_ASSISTANCE: STAND BY

## 2024-08-20 NOTE — DOCUMENTATION CLARIFICATION NOTE
"    PATIENT:               HARRIS LUNDBERG  ACCT #:                  [id number]  MRN:                       [id number]  :                       1950  ADMIT DATE:       8/15/2024 5:37 AM  DISCH DATE:  RESPONDING PROVIDER #:        [id number]          PROVIDER RESPONSE TEXT:    Acute blood loss anemia    CDI QUERY TEXT:    Clarification        Instruction:    Based on your assessment of the patient and the clinical information, please provide the requested documentation by clicking on the appropriate radio button and enter any additional information if prompted.    Question: Is there a diagnosis indicative of the lab values and clinical indicators    When answering this query, please exercise your independent professional judgment. The fact that a question is being asked, does not imply that any particular answer is desired or expected.    The patient's clinical indicators include:  Clinical Information:  The  Urology Progress Note, Dr. Betts:  \"74F soft tissue mass encasing distal segment of ureter now s/p open left nephrectomy and total ureterectomy and segmental sigmoid resection with diverting ileostomy, Dr. Denton.\"      Clinical Indicators:  The 8/15 Op Note, OLENA Andre PA-C:  \"Estimated Blood Loss: 350mL\"    The 8/15 Op Note, Dr. Ross:  \"Blood Loss:  350 mL\"    The 8/15 Op Note, Dr. Denton:  \"Estimated Blood Loss: 10mL\"    The  Urology Progress Note, Dr. Wood:  \"Received 1 u PRBCs for postop hgb 7\"    The patient has the following labs:  Date: RBC/H/H  24: 3.72/11.4/35.4  8/15/24: 2.29/7.0/20.7  24: 2.84/8.5/27.5  24: 2.91/8.8/28.0  24: 2.99/9.1/29.8  24: 3.24/9.9/31.6      Treatment:  -Monitored with post op CBCs  -1u PRBCs      Risk Factors:  -Surgery on 8/15/24  Options provided:  -- Acute blood loss anemia  -- Other - I will add my own diagnosis  -- Refer to Clinical Documentation Reviewer    Query created by: Monique Saez on 2024 10:52 " AM      Electronically signed by:  MARY ROQUE MD 8/20/2024 4:42 PM

## 2024-08-20 NOTE — PROGRESS NOTES
[facility] [place] [facility]  Daily Progress Note      Patient: Terra Alexis  Age/Sex: 74 y.o., female  MRN: [id number]  Date of surgery: 8/15/24  Admit Date: 8/15/2024   Code Status: Full Code  Length of Stay: 5      Interval History/Overnight Events:   NAEON, feels well, had a good night sleep.   Afebrile, VSS  Denies N/V. Worked with PT, stood on own with walker.   Tolerated FLD  BL LE with mild swelling, dressing on BL heels removed.    Ileostomy output 125(400) with gas      Medications:    Current Facility-Administered Medications   Medication Dose Route Frequency Provider Last Rate Last Admin    acetaminophen (Tylenol) tablet 975 mg  975 mg oral q6h Tank Wong MD   975 mg at 08/19/24 2333    heparin (porcine) injection 5,000 Units  5,000 Units subcutaneous q8h Tank Wong MD   5,000 Units at 08/20/24 0612    hydrALAZINE (Apresoline) injection 10 mg  10 mg intravenous q6h PRN Tank Wong MD        HYDROmorphone (Dilaudid) injection 0.2 mg  0.2 mg intravenous q2h PRN Tank Wong MD   0.2 mg at 08/19/24 1212    HYDROmorphone (Dilaudid) injection 0.4 mg  0.4 mg intravenous q4h PRN Concetta Wood MD   0.4 mg at 08/16/24 1814    lactated Ringer's infusion  50 mL/hr intravenous Continuous Kristal Avalos MD 50 mL/hr at 08/19/24 2054 50 mL/hr at 08/19/24 2054    levothyroxine (Synthroid, Levoxyl) tablet 25 mcg  25 mcg oral Daily Tank Wong MD   25 mcg at 08/20/24 0612    melatonin tablet 5 mg  5 mg oral Nightly Tank Wong MD   5 mg at 08/19/24 2051    naloxone (Narcan) injection 0.2 mg  0.2 mg intravenous q5 min PRN Tank Wong MD        ondansetron ODT (Zofran-ODT) disintegrating tablet 4 mg  4 mg oral q8h PRN Tank Wong MD   4 mg at 08/17/24 2146    Or    ondansetron (Zofran) injection 4 mg  4 mg intravenous q8h PRN Tank Wong MD   4 mg at 08/19/24 1544    oxyCODONE (Roxicodone) immediate release tablet 10 mg  10 mg oral q4h PRN Tank Wong MD   10 mg at 08/20/24 0812     oxyCODONE (Roxicodone) immediate release tablet 5 mg  5 mg oral q4h PRN Tank Wong MD   5 mg at 08/19/24 0933    potassium chloride CR (Klor-Con) ER tablet 10 mEq  10 mEq oral Daily Concetta Wood MD   10 mEq at 08/20/24 0813       Objective    Vitals:    08/19/24 1227 08/19/24 1642 08/19/24 2112 08/20/24 0519   BP: 132/72 150/78 152/81 130/76   BP Location: Left arm Right arm Right arm Right arm   Patient Position: Sitting Sitting Lying Lying   Pulse: 86 80 77 76   Resp: 16 16 16 16   Temp: 36.5 °C (97.7 °F) 36.3 °C (97.3 °F) 36.2 °C (97.2 °F) 36.3 °C (97.3 °F)   TempSrc: Temporal Temporal Temporal Temporal   SpO2: 93% 93% 96% 96%   Weight:       Height:         08/18 1900 - 08/20 0659  In: 517.5 [I.V.:517.5]  Out: 2625 [Urine:2350]    Physical Exam                                                                                                                        General: resting comfortably in bed  Neuro: alert and oriented, no obvious focal deficit   Head/Neck: normocephalic, atraumatic  ENT: moist mucous membranes  CV: regular rate, normotensive   Pulm: nonlabored breathing on room air, no conversational dyspnea  Abd: soft, nondistended, appropriately tender, incisions clean/dry/intact with suture and staples. Ileostomy with dark brown-green stool output and gas.  : external catheter draining clear yellow urine  MSK: GONZALEZ, no gross deformities  Psych: mood and behavior normal    Labs    Lab Results   Component Value Date    WBC 6.1 08/19/2024    HGB 9.9 (L) 08/19/2024    HCT 31.6 (L) 08/19/2024    MCV 98 08/19/2024     08/19/2024      Lab Results   Component Value Date    GLUCOSE 75 08/19/2024    CALCIUM 8.5 (L) 08/19/2024     08/19/2024    K 4.0 08/19/2024    CO2 24 08/19/2024     08/19/2024    BUN 11 08/19/2024    CREATININE 0.94 08/19/2024        Imaging  === 07/01/24 ===    CT CHEST ABDOMEN PELVIS W IV CONTRAST    - Impression -  1. Interval decrease in the size of the mass  lesion along the distal  left ureter with hypodensity throughout the lesion and component of  central necrosis noted. Lesion is intimate with the iliac vasculature  with findings appearing stable from prior imaging. The lesion  measures 2.7 x 1.9 cm and on the prior examination measured 3.0 x 2.7  cm in the similar distribution    2. No lymphadenopathy in the abdomen/pelvis.    3. A few scattered hypodensities within the liver suggesting cysts.  However, there is a single hypodensity within segment 4B measuring 6  mm with attention to this area on follow-up.    4. No metastatic disease in the chest.      MACRO:  None    Signed by: Germain Mcmahan 7/3/2024 9:08 AM  Dictation workstation:   [id number]      Assessment & Plan  Terra Alexis is a 74F soft tissue mass encasing distal segment of ureter now s/p open left nephrectomy and total ureterectomy and segmental sigmoid resection with diverting ileostomy (Dr. Denton). She is recovering appropriately with adequate pain control.    Plan 8/20:  -Advance to regular diet   -OOB to chair today, work with PT/OT   -Surgical oncology following, appreciate recommendations   -Continue wound ostomy care  -Possible discharge tomorrow vs Thursday    Neuro  -pain control: Scheduled tylenol, oxycodone 5-10 mgs prn, dilaudid IV 0.2/0.4 mg for breakthrough  -B/l QL catheters removed 8/18  -melatonin nightly     Cardiovascular: Hx HTN  -regular vitals/monitoring  -PRN hydralazine with hold parameters     Pulm  -encourage IS, OOB    GI  -Regular diet   -PRN Zofran for nausea   -bowel regimen per surgical oncology   -Continue wound ostomy education   -Surgical oncology following , appreciate recommendations    - Continue wound care for ostomy  - Recommend advancing diet    /Renal  -Daily BMP  -Replete electrolytes as indicated   -IVF: on LR @50/hr      Endo: hypothyroidism  -continue home levothyroxine    Heme/ID  -Daily CBC    MSK  -PT/OT  -encourage ambulation as  tolerated    Prophylaxis: SCDs, SQH  Dispo: Possible discharge tomorrow vs Thursday home with home care.       Seen with chief Dr. Comer and discussed with attending Dr. Skip Menchaca MD, [facility]   [facility] [place]  Adult Urology Pager: [phone]  Pediatric Urology Pager: [phone]

## 2024-08-20 NOTE — PROGRESS NOTES
08/20/24 1000   Discharge Planning   Living Arrangements Spouse/significant other   Support Systems Spouse/significant other   Assistance Needed none   Type of Residence Private residence   Who is requesting discharge planning? Provider   Home or Post Acute Services In home services   Type of Home Care Services Home nursing visits   Expected Discharge Disposition  Services   Does the patient need discharge transport arranged? No     Pt is a 74 y.o. female with soft tissue mass encasing distal segment of ureter now s/p open left nephrectomy and total ureterectomy and segmental sigmoid resection with diverting ileostomy. ADOD 8/21.    Met with pt at bedside and introduced self as care coordinator and role in discharge planning. Pt lives at home with her fiance. Pt reports she will be staying on the first floor of home where there is a partial bath to avoid steps. She states her friend will be bringing her a wheeled walker and a bedside commode. Pt agreeable to [facility]. Pt states she does not currently have a PCP. Pharmacy [facility] in [place]. Pt has Medicare (primary) and [facility] [place] (secondary). Pt reports she does not need transport set up at discharge. Care team to continue to follow through hospital stay for discharge planning needs. Latesha Syed RN TCC

## 2024-08-20 NOTE — PROGRESS NOTES
Surgical Oncology Progress Note      08/20/24    Summary:  Terra Alexis is a 74F soft tissue mass encasing distal segment of ureter. Intra-op consult 8/15 for segmental sigmoid resection with diverting ileostomy    Subjective    Subjective:  NAOE. Had some nausea yesterday after eating tomato soup. No emesis. No major new complaints this morning. Denies nausea this morning.       Objective    Objective:  Vital signs:   Temp:  [36.2 °C (97.2 °F)-36.5 °C (97.7 °F)] 36.3 °C (97.3 °F)  Heart Rate:  [76-86] 76  Resp:  [16] 16  BP: (130-160)/(72-81) 130/76    Physical Exam:  GEN: No acute distress. Alert, awake and conversive.  HEENT: Sclera anicteric. Moist mucous membranes.  RESP: Breathing non-labored, equal chest rise.   CV: HDS  GI: Abdomen soft, nondistended, DLI appears pink and viable with intact appliance with green enteric output. Appropriately tender.   : Catheter draining CYU.   MSK: No gross deformities. Moves all extremities spontaneously. Swollen legs bilaterally. No pitting edema appreciated.   NEURO: Alert and oriented x3. No focal deficits.  PSYCH: Appropriate mood and affect.    I/O last 2 completed shifts:  In: 517.5 (8.6 mL/kg) [I.V.:517.5 (8.6 mL/kg)]  Out: 1725 (28.7 mL/kg) [Urine:1450 (1 mL/kg/hr); Stool:275]  Weight: 60.2 kg      Labs:  Lab Results   Component Value Date    WBC 6.1 08/19/2024    HGB 9.9 (L) 08/19/2024    HCT 31.6 (L) 08/19/2024    MCV 98 08/19/2024     08/19/2024     Lab Results   Component Value Date    GLUCOSE 75 08/19/2024    CALCIUM 8.5 (L) 08/19/2024     08/19/2024    K 4.0 08/19/2024    CO2 24 08/19/2024     08/19/2024    BUN 11 08/19/2024    CREATININE 0.94 08/19/2024     Imaging:  No results found.    Assessment/Plan    Assessment and Plan:  Terra Alexis is a 74F soft tissue mass encasing distal segment of ureter. Intra-op consult 8/15 for segmental sigmoid resection with diverting ileostomy    Recommendations:  - Continue wound care for  ostomy  - Recommend advancing diet  - Rest of care per primary    D/w Dr. Bertin Wright MD  PGY-1 General Surgery  Surgical Oncology [id number]

## 2024-08-20 NOTE — PROGRESS NOTES
Occupational Therapy    Evaluation    Patient Name: Terra Alexis  MRN: [id number]  Today's Date: 8/20/2024  Room: [address] [place], [place] [address]  Time Calculation  Start Time: 1519  Stop Time: 1541  Time Calculation (min): 22 min    Assessment  IP OT Assessment  OT Assessment: Pt presents near reported baseline demonstrating IND-SBA for functional mobility, transfers, ADLs, simulated IADLs. Pt has good social support from fiance and friends and is IND at baseline. Pt has no skilled OT needs at this time and is safe to return home with support of her fiance.  Prognosis: Excellent  Barriers to Discharge: None  Evaluation/Treatment Tolerance: Patient tolerated treatment well  Medical Staff Made Aware: Yes  End of Session Communication: Bedside nurse  End of Session Patient Position: Up in chair, Alarm off, not on at start of session  Plan:  Inpatient Plan  No Skilled OT: Safe to return home  OT Frequency: OT eval only  OT Discharge Recommendations: No further acute OT, No OT needed after discharge  Equipment Recommended upon Discharge: Wheeled walker  OT Recommended Transfer Status: Assist of 1  OT - OK to Discharge: Yes  OT Assessment  Prognosis: Excellent  Barriers to Discharge: None  Evaluation/Treatment Tolerance: Patient tolerated treatment well  Medical Staff Made Aware: Yes  Strengths: Ability to acquire knowledge, Attitude of self, Capable of completing ADLs semi/independent, Coping skills, Insight into problems, Premorbid level of function, Support of Caregivers    Subjective   Current Problem:  1. Cancer of left ureter (Multi)  Surgical Pathology Exam    Surgical Pathology Exam    Referral to Home Health    CANCELED: Referral to Home Health    CANCELED: Referral to Home Health      2. Pelvic mass  Stoma care        General:  Reason for Referral: s/p open left nephrectomy and total ureterectomy and segmental sigmoid resection with diverting ileostomy on 8/15  Past Medical History Relevant to Rehab: HTN, depression/anxiety, OA  of R knee, ureteral mass  Prior to Session Communication: Bedside nurse  Patient Position Received: Bed, 3 rail up, Alarm off, caregiver present  Family/Caregiver Present: Yes  Caregiver Feedback: Pt's jake present and supportive throughout  General Comment: Pt sitting up in bed with mobility aid. Pleasant and agreeable to OT eval.   Precautions:  Medical Precautions: Fall precautions  Post-Surgical Precautions: Abdominal surgery precautions  Vital Signs:  Heart Rate: 92  SpO2: 98 %  Pain:  Pain Assessment  Pain Assessment: 0-10  0-10 (Numeric) Pain Score: 0 - No pain  Lines/Tubes/Drains:  Ileostomy Loop RUQ (Active)   Number of days: 4       External Urinary Catheter (Active)   Number of days: 2     Objective   Cognition:  Overall Cognitive Status: Within Functional Limits  Orientation Level: Oriented X4    Home Living:  Type of Home: House  Lives With: Significant other (fiance)  Home Adaptive Equipment: Walker rolling or standard (will have FWW once home)  Home Layout: Two level, 1/2 bath on main level, Bed/bath upstairs, Stairs to alternate level without rails  Alternate Level Stairs-Rails: None  Alternate Level Stairs-Number of Steps: full flight  Home Access: Stairs to enter without rails  Entrance Stairs-Rails: None  Entrance Stairs-Number of Steps: 2+1  Bathroom Shower/Tub: Tub/shower unit  Bathroom Toilet: Standard  Bathroom Equipment: Grab bars in shower  Home Living Comments: Upon entering home, pt has 2 steps up to main level   Prior Function:  Level of [place]: Independent with ADLs and functional transfers, Independent with homemaking with ambulation  ADL Assistance: Independent  Homemaking Assistance: Independent  Ambulatory Assistance: Independent  Hand Dominance: Right  IADL History:  Homemaking Responsibilities: Yes  Meal Prep Responsibility: Primary  Laundry Responsibility: Primary  Cleaning Responsibility: Primary  Homemaking Comments: Pt's jake completes yard work/outdoor  maintenance  Current License: Yes  Mode of Transportation: Car  Occupation: Retired  Type of Occupation:   Leisure and Hobbies: Home remodeling, legacy books  ADL:  Eating Assistance: Independent (Anticipated)  Grooming Assistance: Independent (Anticipated)  Bathing Assistance: Stand by (Anticipated)  Bathing Deficit: Supervision/safety  UE Dressing Assistance: Independent (Anticipated)  LE Dressing Assistance: Independent (As demonstrated)  Toileting Assistance with Device: Independent (As simulated on toilet)  Activity Tolerance:  Endurance: Tolerates 10 - 20 min exercise with multiple rests  Balance:  Dynamic Sitting Balance  Dynamic Sitting-Comments: SBA  Dynamic Standing Balance  Dynamic Standing-Comments: SBA with FWW  Static Sitting Balance  Static Sitting-Comment/Number of Minutes: IND  Static Standing Balance  Static Standing-Comment/Number of Minutes: SBA  Bed Mobility/Transfers: Bed Mobility  Bed Mobility: No  Functional Mobility  Functional Mobility Performed: Yes  Functional Mobility 1  Comments 1: Pt ambulated MAX household distance from bedside onto ocampo and back using FWW and SBA for safety (fatigue reported at end of ambulation)   and Transfers  Transfer: Yes  Transfer 1  Transfer From 1: Bed to  Transfer to 1: Stand  Technique 1: Sit to stand  Transfer Level of Assistance 1: Close supervision  Transfers 2  Transfer From 2: Stand to, Chair with arms to  Transfer to 2: Chair with arms, Stand  Technique 2: Stand to sit, Sit to stand  Transfer Level of Assistance 2: Close supervision, Minimal verbal cues  Trials/Comments 2: VCs for positioning  Transfers 3  Transfer From 3: Stand to, Toilet to  Transfer to 3: Toilet, Stand  Technique 3: Stand to sit, Sit to stand  Transfer Level of Assistance 3: Close supervision  Transfers 4  Transfer From 4: Stand to  Transfer to 4: Chair with arms  Technique 4: Stand to sit  Transfer Device 4: Walker  Transfer Level of Assistance 4: Close  supervision  IADL's:   Homemaking Responsibilities: Yes  Meal Prep Responsibility: Primary  Laundry Responsibility: Primary  Cleaning Responsibility: Primary  Homemaking Comments: Pt's fiance completes yard work/outdoor maintenance  Current License: Yes  Mode of Transportation: Car  Occupation: Retired  Type of Occupation:   Leisure and Hobbies: Home remodeling, legacy books  Vision: Vision - Basic Assessment  Current Vision: Other (Comment) (One par for reading, one pair for driving/distance)   and Vision - Complex Assessment  Ocular Range of Motion: Within Functional Limits  Sensation:  Light Touch: Partial deficits in the RUE, Partial deficits in the LUE (Reports N/T in tips of all digits)  Strength:  Strength Comments: BUE WFL- grossly 4/5  Perception:  Inattention/Neglect: Appears intact  Coordination:  Movements are Fluid and Coordinated: Yes   Hand Function:  Hand Function  Gross Grasp: Functional  Coordination: Functional  Extremities: RUE   RUE : Within Functional Limits, LUE   LUE: Within Functional Limits    Outcome Measures: [facility] Daily Activity  Putting on and taking off regular lower body clothing: None  Bathing (including washing, rinsing, drying): A little  Putting on and taking off regular upper body clothing: None  Toileting, which includes using toilet, bedpan or urinal: None  Taking care of personal grooming such as brushing teeth: None  Eating Meals: None  Daily Activity - Total Score: 23         ,     OT Adult Other Outcome Measures  4AT: -    Education Documentation  Body Mechanics, taught by Kenan Espinoza OT at 8/20/2024  4:05 PM.  Learner: Significant Other, Patient  Readiness: Acceptance  Method: Explanation  Response: Verbalizes Understanding    Precautions, taught by Kenan Espinoza OT at 8/20/2024  4:05 PM.  Learner: Significant Other, Patient  Readiness: Acceptance  Method: Explanation  Response: Verbalizes Understanding    ADL Training, taught by  Kenan Espinoza, OT at 8/20/2024  4:05 PM.  Learner: Significant Other, Patient  Readiness: Acceptance  Method: Explanation  Response: Verbalizes Understanding    Education Comments  No comments found.      08/20/24 at 4:06 PM   Kenan Espinoza OT   Rehab Office: [phone]

## 2024-08-21 ENCOUNTER — DOCUMENTATION (OUTPATIENT)
Dept: HOME HEALTH SERVICES | Facility: HOME HEALTH | Age: 74
End: 2024-08-21
Payer: MEDICARE

## 2024-08-21 VITALS
HEART RATE: 84 BPM | WEIGHT: 132.72 LBS | TEMPERATURE: 97.3 F | OXYGEN SATURATION: 96 % | HEIGHT: 60 IN | SYSTOLIC BLOOD PRESSURE: 153 MMHG | RESPIRATION RATE: 18 BRPM | BODY MASS INDEX: 26.06 KG/M2 | DIASTOLIC BLOOD PRESSURE: 72 MMHG

## 2024-08-21 PROBLEM — G89.18 POST-OP PAIN: Status: ACTIVE | Noted: 2024-08-21

## 2024-08-21 PROBLEM — C18.7: Status: ACTIVE | Noted: 2024-08-21

## 2024-08-21 LAB
ANION GAP SERPL CALC-SCNC: 11 MMOL/L (ref 10–20)
BUN SERPL-MCNC: 10 MG/DL (ref 6–23)
CALCIUM SERPL-MCNC: 8.5 MG/DL (ref 8.6–10.6)
CHLORIDE SERPL-SCNC: 103 MMOL/L (ref 98–107)
CO2 SERPL-SCNC: 24 MMOL/L (ref 21–32)
CREAT SERPL-MCNC: 0.89 MG/DL (ref 0.5–1.05)
EGFRCR SERPLBLD CKD-EPI 2021: 69 ML/MIN/1.73M*2
ERYTHROCYTE [DISTWIDTH] IN BLOOD BY AUTOMATED COUNT: 17.7 % (ref 11.5–14.5)
GLUCOSE SERPL-MCNC: 78 MG/DL (ref 74–99)
HCT VFR BLD AUTO: 30.3 % (ref 36–46)
HGB BLD-MCNC: 9.8 G/DL (ref 12–16)
MAGNESIUM SERPL-MCNC: 1.96 MG/DL (ref 1.6–2.4)
MCH RBC QN AUTO: 30.7 PG (ref 26–34)
MCHC RBC AUTO-ENTMCNC: 32.3 G/DL (ref 32–36)
MCV RBC AUTO: 95 FL (ref 80–100)
NRBC BLD-RTO: 0 /100 WBCS (ref 0–0)
PLATELET # BLD AUTO: 265 X10*3/UL (ref 150–450)
POTASSIUM SERPL-SCNC: 4.1 MMOL/L (ref 3.5–5.3)
RBC # BLD AUTO: 3.19 X10*6/UL (ref 4–5.2)
SODIUM SERPL-SCNC: 134 MMOL/L (ref 136–145)
WBC # BLD AUTO: 7.1 X10*3/UL (ref 4.4–11.3)

## 2024-08-21 PROCEDURE — 80048 BASIC METABOLIC PNL TOTAL CA: CPT | Performed by: STUDENT IN AN ORGANIZED HEALTH CARE EDUCATION/TRAINING PROGRAM

## 2024-08-21 PROCEDURE — 2500000004 HC RX 250 GENERAL PHARMACY W/ HCPCS (ALT 636 FOR OP/ED): Performed by: STUDENT IN AN ORGANIZED HEALTH CARE EDUCATION/TRAINING PROGRAM

## 2024-08-21 PROCEDURE — 2500000001 HC RX 250 WO HCPCS SELF ADMINISTERED DRUGS (ALT 637 FOR MEDICARE OP)

## 2024-08-21 PROCEDURE — 2500000001 HC RX 250 WO HCPCS SELF ADMINISTERED DRUGS (ALT 637 FOR MEDICARE OP): Performed by: STUDENT IN AN ORGANIZED HEALTH CARE EDUCATION/TRAINING PROGRAM

## 2024-08-21 PROCEDURE — 2500000005 HC RX 250 GENERAL PHARMACY W/O HCPCS: Performed by: STUDENT IN AN ORGANIZED HEALTH CARE EDUCATION/TRAINING PROGRAM

## 2024-08-21 PROCEDURE — 2500000002 HC RX 250 W HCPCS SELF ADMINISTERED DRUGS (ALT 637 FOR MEDICARE OP, ALT 636 FOR OP/ED)

## 2024-08-21 PROCEDURE — 36415 COLL VENOUS BLD VENIPUNCTURE: CPT | Performed by: STUDENT IN AN ORGANIZED HEALTH CARE EDUCATION/TRAINING PROGRAM

## 2024-08-21 PROCEDURE — 83735 ASSAY OF MAGNESIUM: CPT

## 2024-08-21 PROCEDURE — 85027 COMPLETE CBC AUTOMATED: CPT | Performed by: STUDENT IN AN ORGANIZED HEALTH CARE EDUCATION/TRAINING PROGRAM

## 2024-08-21 RX ORDER — ACETAMINOPHEN 325 MG/1
650 TABLET ORAL EVERY 6 HOURS PRN
Qty: 20 TABLET | Refills: 0 | Status: ON HOLD | OUTPATIENT
Start: 2024-08-21 | End: 2024-08-27

## 2024-08-21 RX ORDER — OXYCODONE HYDROCHLORIDE 5 MG/1
5 TABLET ORAL EVERY 6 HOURS PRN
Qty: 12 TABLET | Refills: 0 | Status: SHIPPED | OUTPATIENT
Start: 2024-08-21 | End: 2024-08-30 | Stop reason: HOSPADM

## 2024-08-21 RX ORDER — CALCIUM CARBONATE 200(500)MG
500 TABLET,CHEWABLE ORAL DAILY
Status: DISCONTINUED | OUTPATIENT
Start: 2024-08-21 | End: 2024-08-21 | Stop reason: HOSPADM

## 2024-08-21 ASSESSMENT — COGNITIVE AND FUNCTIONAL STATUS - GENERAL
STANDING UP FROM CHAIR USING ARMS: A LITTLE
HELP NEEDED FOR BATHING: A LITTLE
DRESSING REGULAR LOWER BODY CLOTHING: A LITTLE
WALKING IN HOSPITAL ROOM: A LITTLE
DRESSING REGULAR UPPER BODY CLOTHING: A LITTLE
PERSONAL GROOMING: A LITTLE
MOVING TO AND FROM BED TO CHAIR: A LITTLE
TURNING FROM BACK TO SIDE WHILE IN FLAT BAD: A LITTLE
TOILETING: A LITTLE

## 2024-08-21 ASSESSMENT — PAIN SCALES - GENERAL
PAINLEVEL_OUTOF10: 2
PAINLEVEL_OUTOF10: 2

## 2024-08-21 ASSESSMENT — PAIN - FUNCTIONAL ASSESSMENT: PAIN_FUNCTIONAL_ASSESSMENT: 0-10

## 2024-08-21 NOTE — DISCHARGE SUMMARY
Discharge Diagnosis  Cancer of left ureter (Multi)    Issues Requiring Follow-Up  Surgical pathology    Test Results Pending At Discharge  Pending Labs       Order Current Status    Surgical Pathology Exam In process            Hospital Course  Patient is a 74 year old female that presented on 8/15 for robotic converted to open left partial nephrectomy and total ureterectomy with Dr. Ross and segmental sigmoid resection with diverting ileostomy by Dr. Denton. Patient's hospital course was uneventful; patient tolerated advancement of diet, pain was well controlled, patient met ambulation goals and was weaned of all supplemental oxygen. Patient was discharged to home and will follow up with Dr. Ross on 9/4.      Pertinent Physical Exam At Time of Discharge  General: resting comfortably in bed  Neuro: alert and oriented, no obvious focal deficit   Head/Neck: normocephalic, atraumatic  ENT: moist mucous membranes  CV: regular rate, normotensive   Pulm: nonlabored breathing on room air, no conversational dyspnea  Abd: soft, nondistended, appropriately tender, incisions clean/dry/intact with suture and staples. Ileostomy with dark brown-green stool output and gas.  : external catheter draining clear yellow urine  MSK: GONZALEZ, no gross deformities  Psych: mood and behavior normal    Home Medications     Medication List      START taking these medications     acetaminophen 325 mg tablet; Commonly known as: Tylenol; Take 2 tablets   (650 mg) by mouth every 6 hours if needed for mild pain (1 - 3) for up to   5 days.   oxyCODONE 5 mg immediate release tablet; Commonly known as: Roxicodone;   Take 1 tablet (5 mg) by mouth every 6 hours if needed for severe pain (7 -   10) for up to 3 days.     CONTINUE taking these medications     levothyroxine 25 mcg tablet; Commonly known as: Synthroid, Levoxyl; Take   1 tablet (25 mcg) by mouth early in the morning.. Take on an empty stomach   at the same time each day, either 30  to 60 minutes prior to breakfast   potassium chloride CR 10 mEq ER tablet; Commonly known as: Klor-Con;   Take 1 tablet (10 mEq) by mouth once daily.   PreserVision AREDS 4,296 mcg-226 mg-90 mg capsule; Generic drug:   vitamins A,C,E-zinc-copper     STOP taking these medications     chlorhexidine 0.12 % solution; Commonly known as: Peridex   chlorhexidine 4 % external liquid; Commonly known as: Hibiclens   ondansetron 8 mg tablet; Commonly known as: Zofran       Outpatient Follow-Up  Future Appointments   Date Time Provider Department Center   9/4/2024 11:20 AM Teddy Ross MD [id number] [facility]   9/9/2024  2:40 PM Elgin Boone MD [id number] [facility]       Concetta Wood MD

## 2024-08-21 NOTE — CARE PLAN
The patient's goals for the shift include      The clinical goals for the shift include Pt will continue to have good pain management    Problem: Fall/Injury  Goal: Be free from injury by end of the shift  Outcome: Progressing     Problem: Fall/Injury  Goal: Use assistive devices by end of the shift  Outcome: Progressing

## 2024-08-21 NOTE — PROGRESS NOTES
Physical Therapy                 Therapy Communication Note    Patient Name: Terra Alexis  MRN: [id number]  Today's Date: 8/21/2024     Discipline: Physical Therapy    Missed Visit Reason: Missed Visit Reason: Patient refused (Pt preparing for D/C, reports no PT needs prior to going home.)    Missed Time: Attempt 1154    Comment:

## 2024-08-21 NOTE — HH CARE COORDINATION
Home Care received a Referral for Nursing. We have processed the referral for a Start of Care on 08/22-08/23.     If you have any questions or concerns, please feel free to contact us at [phone]. Follow the prompts, enter your five digit zip code, and you will be directed to your care team on [facility] 1.

## 2024-08-21 NOTE — CONSULTS
Wound Care Consult     Visit Date: 8/21/2024      Patient Name: Terra Alexis         MRN: [id number]           YOB: 1950     Reason for Consult: ostomy care/ education              Pertinent Labs:   Albumin   Date Value Ref Range Status   08/13/2024 4.1 3.4 - 5.0 g/dL Final     ALBUMIN (MG/L) IN URINE   Date Value Ref Range Status   03/08/2023 9.4 Not Established mg/L Final       Wound Assessment:  Wound 03/22/24 Incision Pelvis Left;Anterior (Active)   Site Assessment Clean;Dry 08/21/24 0100   Katherine-Wound Assessment Intact;Dry;Clean 08/21/24 0100   State of Healing Closed wound edges 08/15/24 2200   Margins Attached edges 08/15/24 2200   Closure Staples 08/21/24 0916   Sutures/Staple Line Approximated 08/21/24 0916   Drainage Description None 08/21/24 0100   Drainage Amount Scant 08/21/24 0916   Dressing ABD 08/21/24 0916   Dressing Status Clean;Dry 08/16/24 0423       Wound 08/15/24 Incision Flank Left (Active)   Site Assessment Dry;Clean 08/21/24 0916   Katherine-Wound Assessment Dry 08/21/24 0916   Closure Open to air;Staples 08/21/24 0916   Sutures/Staple Line Approximated 08/21/24 0916   Drainage Description None 08/21/24 0100   Drainage Amount None 08/21/24 0100   Dressing Open to air 08/21/24 0916   Dressing Changed New 08/15/24 1058   Dressing Status Clean;Dry 08/15/24 2200       Wound Abdomen Medial (Active)   Margins Attached edges 08/21/24 0916   Drainage Description None 08/21/24 0916   Drainage Amount None 08/19/24 2355   Dressing Open to air 08/21/24 0916   Dressing Changed New 08/15/24 1628   Dressing Status Clean;Dry;Occlusive 08/16/24 2000      08/21/24 1400   Ileostomy Loop RUQ   Placement Date/Time: 08/15/24 1628   Hand Hygiene Completed: Yes  Ileostomy Type: Loop  Location: RUQ   Stomal Appliance 1 piece   Site/Stoma Assessment Red   Peristomal Assessment Intact   Treatment Pouch change   Output Description Black;Liquid       Wound Team Summary Assessment:   Ostomy type: ileostomy     "   Size: 1 3/8\"  oval   Color: red  Protruding: budded  Dusty: (2) sutures and dusty removed easily    Functioning: black liquid stool  Mucocutaneous junction: intact, sutures visible  Peristomal skin: intact,   Pouching: barrier ring with one piece drainable pouch   Ostomy Education:  at bedside and both were open to a lesson.  changed pouch today. Patient discussed / reviewed  basic ostomy care, closed pouch tail, engaged asking questions. Dietician spoke with them today. Discussed need for hydration. Homegoing supplies placed at bedside . Patient would benefit from Home Care to reinforce teaching      Wound Team Plan: Ostomy Team will follow      Michelle WONG   8/21/2024  2:40 PM        "

## 2024-08-21 NOTE — PROGRESS NOTES
"     Surgical Oncology Progress Note      08/21/24    Summary:  Terra Alexis is a 74F soft tissue mass encasing distal segment of ureter. Intra-op consult 8/15 for segmental sigmoid resection with diverting ileostomy    Subjective    Subjective:  NAOE. Continues to have increasing ostomy output.     Objective    Objective:  Vital signs:   /74 (BP Location: Right arm, Patient Position: Lying)   Pulse 77   Temp 36.6 °C (97.9 °F) (Temporal)   Resp 18   Ht 1.511 m (4' 11.5\")   Wt 60.2 kg (132 lb 11.5 oz)   SpO2 94%   BMI 26.36 kg/m²     Physical Exam:  GEN: No acute distress. Alert, awake and conversive.  HEENT: Sclera anicteric. Moist mucous membranes.  RESP: Breathing non-labored, equal chest rise.   CV: HDS  GI: Abdomen soft, nondistended, DLI appears pink and viable with intact appliance with green enteric output. Appropriately tender.   MSK: No gross deformities. Moves all extremities spontaneously. Swollen legs bilaterally. No pitting edema appreciated.   NEURO: Alert and oriented x3. No focal deficits.  PSYCH: Appropriate mood and affect.    I/O last 2 completed shifts:  In: - (0 mL/kg)   Out: 1825 (30.3 mL/kg) [Urine:950 (0.7 mL/kg/hr); Stool:875]  Weight: 60.2 kg      Labs:  Lab Results   Component Value Date    WBC 7.1 08/21/2024    HGB 9.8 (L) 08/21/2024    HCT 30.3 (L) 08/21/2024    MCV 95 08/21/2024     08/21/2024     Lab Results   Component Value Date    GLUCOSE 78 08/21/2024    CALCIUM 8.5 (L) 08/21/2024     (L) 08/21/2024    K 4.1 08/21/2024    CO2 24 08/21/2024     08/21/2024    BUN 10 08/21/2024    CREATININE 0.89 08/21/2024     Imaging:  No results found.    Assessment/Plan    Assessment and Plan:  Terra Alexis is a 74F soft tissue mass encasing distal segment of ureter. Intra-op consult 8/15 for segmental sigmoid resection with diverting ileostomy    Recommendations:  - Continue wound care for ostomy; Encourage patient recording of ostomy output and changing of " appliance   - Continue diet   - Rest of care per primary    D/w Dr. Bertin Mann MD  PGY-2 General Surgery  Surgical Oncology [id number]

## 2024-08-23 ENCOUNTER — HOME CARE VISIT (OUTPATIENT)
Dept: HOME HEALTH SERVICES | Facility: HOME HEALTH | Age: 74
End: 2024-08-23
Payer: MEDICARE

## 2024-08-23 PROCEDURE — 169592 NO-PAY CLAIM PROCEDURE

## 2024-08-23 PROCEDURE — G0299 HHS/HOSPICE OF RN EA 15 MIN: HCPCS | Mod: HHH

## 2024-08-24 ENCOUNTER — APPOINTMENT (OUTPATIENT)
Dept: CARDIOLOGY | Facility: HOSPITAL | Age: 74
End: 2024-08-24
Payer: MEDICARE

## 2024-08-24 ENCOUNTER — APPOINTMENT (OUTPATIENT)
Dept: RADIOLOGY | Facility: HOSPITAL | Age: 74
End: 2024-08-24
Payer: MEDICARE

## 2024-08-24 ENCOUNTER — HOSPITAL ENCOUNTER (EMERGENCY)
Facility: HOSPITAL | Age: 74
Discharge: OTHER INSTITUTION | End: 2024-08-24
Attending: EMERGENCY MEDICINE
Payer: MEDICARE

## 2024-08-24 VITALS
HEIGHT: 60 IN | RESPIRATION RATE: 18 BRPM | BODY MASS INDEX: 25.32 KG/M2 | SYSTOLIC BLOOD PRESSURE: 156 MMHG | DIASTOLIC BLOOD PRESSURE: 88 MMHG | TEMPERATURE: 98.6 F | OXYGEN SATURATION: 95 % | WEIGHT: 129 LBS | HEART RATE: 87 BPM

## 2024-08-24 VITALS
WEIGHT: 130 LBS | BODY MASS INDEX: 25.52 KG/M2 | TEMPERATURE: 97.9 F | HEART RATE: 74 BPM | OXYGEN SATURATION: 96 % | SYSTOLIC BLOOD PRESSURE: 113 MMHG | HEIGHT: 60 IN | RESPIRATION RATE: 13 BRPM | DIASTOLIC BLOOD PRESSURE: 70 MMHG

## 2024-08-24 DIAGNOSIS — I26.99 OTHER ACUTE PULMONARY EMBOLISM, UNSPECIFIED WHETHER ACUTE COR PULMONALE PRESENT (MULTI): Primary | ICD-10-CM

## 2024-08-24 DIAGNOSIS — K56.7 ILEUS (MULTI): ICD-10-CM

## 2024-08-24 LAB
ALBUMIN SERPL BCP-MCNC: 3.6 G/DL (ref 3.4–5)
ALP SERPL-CCNC: 112 U/L (ref 33–136)
ALT SERPL W P-5'-P-CCNC: 13 U/L (ref 7–45)
AMORPH CRY #/AREA UR COMP ASSIST: ABNORMAL /HPF
ANION GAP SERPL CALC-SCNC: 13 MMOL/L (ref 10–20)
APPEARANCE UR: ABNORMAL
APTT PPP: 30 SECONDS (ref 27–38)
AST SERPL W P-5'-P-CCNC: 28 U/L (ref 9–39)
BASOPHILS # BLD AUTO: 0.06 X10*3/UL (ref 0–0.1)
BASOPHILS NFR BLD AUTO: 0.6 %
BILIRUB SERPL-MCNC: 0.5 MG/DL (ref 0–1.2)
BILIRUB UR STRIP.AUTO-MCNC: NEGATIVE MG/DL
BNP SERPL-MCNC: 15 PG/ML (ref 0–99)
BUN SERPL-MCNC: 10 MG/DL (ref 6–23)
CALCIUM SERPL-MCNC: 10.1 MG/DL (ref 8.6–10.3)
CARDIAC TROPONIN I PNL SERPL HS: 5 NG/L (ref 0–13)
CHLORIDE SERPL-SCNC: 95 MMOL/L (ref 98–107)
CO2 SERPL-SCNC: 29 MMOL/L (ref 21–32)
COLOR UR: ABNORMAL
CREAT SERPL-MCNC: 1.17 MG/DL (ref 0.5–1.05)
EGFRCR SERPLBLD CKD-EPI 2021: 49 ML/MIN/1.73M*2
EOSINOPHIL # BLD AUTO: 0.27 X10*3/UL (ref 0–0.4)
EOSINOPHIL NFR BLD AUTO: 2.5 %
ERYTHROCYTE [DISTWIDTH] IN BLOOD BY AUTOMATED COUNT: 17.9 % (ref 11.5–14.5)
GLUCOSE SERPL-MCNC: 120 MG/DL (ref 74–99)
GLUCOSE UR STRIP.AUTO-MCNC: NORMAL MG/DL
HCT VFR BLD AUTO: 36 % (ref 36–46)
HGB BLD-MCNC: 11.6 G/DL (ref 12–16)
IMM GRANULOCYTES # BLD AUTO: 0.3 X10*3/UL (ref 0–0.5)
IMM GRANULOCYTES NFR BLD AUTO: 2.8 % (ref 0–0.9)
INR PPP: 1 (ref 0.9–1.1)
KETONES UR STRIP.AUTO-MCNC: NEGATIVE MG/DL
LACTATE SERPL-SCNC: 1.3 MMOL/L (ref 0.4–2)
LEUKOCYTE ESTERASE UR QL STRIP.AUTO: NEGATIVE
LIPASE SERPL-CCNC: 18 U/L (ref 9–82)
LYMPHOCYTES # BLD AUTO: 0.89 X10*3/UL (ref 0.8–3)
LYMPHOCYTES NFR BLD AUTO: 8.2 %
MCH RBC QN AUTO: 30.7 PG (ref 26–34)
MCHC RBC AUTO-ENTMCNC: 32.2 G/DL (ref 32–36)
MCV RBC AUTO: 95 FL (ref 80–100)
MONOCYTES # BLD AUTO: 0.53 X10*3/UL (ref 0.05–0.8)
MONOCYTES NFR BLD AUTO: 4.9 %
MUCOUS THREADS #/AREA URNS AUTO: ABNORMAL /LPF
NEUTROPHILS # BLD AUTO: 8.8 X10*3/UL (ref 1.6–5.5)
NEUTROPHILS NFR BLD AUTO: 81 %
NITRITE UR QL STRIP.AUTO: NEGATIVE
NRBC BLD-RTO: 0 /100 WBCS (ref 0–0)
PH UR STRIP.AUTO: 7 [PH]
PLATELET # BLD AUTO: 348 X10*3/UL (ref 150–450)
POTASSIUM SERPL-SCNC: 4 MMOL/L (ref 3.5–5.3)
PROT SERPL-MCNC: 6.6 G/DL (ref 6.4–8.2)
PROT UR STRIP.AUTO-MCNC: ABNORMAL MG/DL
PROTHROMBIN TIME: 11.3 SECONDS (ref 9.8–12.8)
RBC # BLD AUTO: 3.78 X10*6/UL (ref 4–5.2)
RBC # UR STRIP.AUTO: NEGATIVE /UL
RBC #/AREA URNS AUTO: >20 /HPF
SODIUM SERPL-SCNC: 133 MMOL/L (ref 136–145)
SP GR UR STRIP.AUTO: 1.05
SQUAMOUS #/AREA URNS AUTO: ABNORMAL /HPF
UFH PPP CHRO-ACNC: 0.9 IU/ML
UROBILINOGEN UR STRIP.AUTO-MCNC: NORMAL MG/DL
WBC # BLD AUTO: 10.9 X10*3/UL (ref 4.4–11.3)
WBC #/AREA URNS AUTO: ABNORMAL /HPF
YEAST BUDDING #/AREA UR COMP ASSIST: PRESENT /HPF

## 2024-08-24 PROCEDURE — 71275 CT ANGIOGRAPHY CHEST: CPT

## 2024-08-24 PROCEDURE — 99285 EMERGENCY DEPT VISIT HI MDM: CPT | Mod: 25

## 2024-08-24 PROCEDURE — 36415 COLL VENOUS BLD VENIPUNCTURE: CPT | Performed by: NURSE PRACTITIONER

## 2024-08-24 PROCEDURE — 81001 URINALYSIS AUTO W/SCOPE: CPT | Performed by: NURSE PRACTITIONER

## 2024-08-24 PROCEDURE — 2550000001 HC RX 255 CONTRASTS: Performed by: NURSE PRACTITIONER

## 2024-08-24 PROCEDURE — 93971 EXTREMITY STUDY: CPT | Performed by: STUDENT IN AN ORGANIZED HEALTH CARE EDUCATION/TRAINING PROGRAM

## 2024-08-24 PROCEDURE — 84075 ASSAY ALKALINE PHOSPHATASE: CPT | Performed by: NURSE PRACTITIONER

## 2024-08-24 PROCEDURE — 96375 TX/PRO/DX INJ NEW DRUG ADDON: CPT

## 2024-08-24 PROCEDURE — 85520 HEPARIN ASSAY: CPT | Performed by: NURSE PRACTITIONER

## 2024-08-24 PROCEDURE — 84484 ASSAY OF TROPONIN QUANT: CPT | Performed by: NURSE PRACTITIONER

## 2024-08-24 PROCEDURE — 96374 THER/PROPH/DIAG INJ IV PUSH: CPT

## 2024-08-24 PROCEDURE — 2500000001 HC RX 250 WO HCPCS SELF ADMINISTERED DRUGS (ALT 637 FOR MEDICARE OP): Performed by: EMERGENCY MEDICINE

## 2024-08-24 PROCEDURE — 85730 THROMBOPLASTIN TIME PARTIAL: CPT | Performed by: NURSE PRACTITIONER

## 2024-08-24 PROCEDURE — 2500000004 HC RX 250 GENERAL PHARMACY W/ HCPCS (ALT 636 FOR OP/ED): Performed by: NURSE PRACTITIONER

## 2024-08-24 PROCEDURE — 2500000004 HC RX 250 GENERAL PHARMACY W/ HCPCS (ALT 636 FOR OP/ED): Performed by: EMERGENCY MEDICINE

## 2024-08-24 PROCEDURE — 85610 PROTHROMBIN TIME: CPT | Performed by: NURSE PRACTITIONER

## 2024-08-24 PROCEDURE — 2500000005 HC RX 250 GENERAL PHARMACY W/O HCPCS: Performed by: NURSE PRACTITIONER

## 2024-08-24 PROCEDURE — 83690 ASSAY OF LIPASE: CPT | Performed by: NURSE PRACTITIONER

## 2024-08-24 PROCEDURE — 74177 CT ABD & PELVIS W/CONTRAST: CPT | Performed by: RADIOLOGY

## 2024-08-24 PROCEDURE — 93971 EXTREMITY STUDY: CPT

## 2024-08-24 PROCEDURE — 71275 CT ANGIOGRAPHY CHEST: CPT | Performed by: RADIOLOGY

## 2024-08-24 PROCEDURE — 85025 COMPLETE CBC W/AUTO DIFF WBC: CPT | Performed by: NURSE PRACTITIONER

## 2024-08-24 PROCEDURE — 74177 CT ABD & PELVIS W/CONTRAST: CPT

## 2024-08-24 PROCEDURE — 93005 ELECTROCARDIOGRAM TRACING: CPT

## 2024-08-24 PROCEDURE — 83605 ASSAY OF LACTIC ACID: CPT | Performed by: NURSE PRACTITIONER

## 2024-08-24 PROCEDURE — 83880 ASSAY OF NATRIURETIC PEPTIDE: CPT | Performed by: NURSE PRACTITIONER

## 2024-08-24 RX ORDER — FAMOTIDINE 20 MG/1
20 TABLET, FILM COATED ORAL ONCE
Status: COMPLETED | OUTPATIENT
Start: 2024-08-24 | End: 2024-08-24

## 2024-08-24 RX ORDER — CETIRIZINE HYDROCHLORIDE 1 MG/ML
10 SOLUTION ORAL ONCE
Status: DISCONTINUED | OUTPATIENT
Start: 2024-08-24 | End: 2024-08-25 | Stop reason: HOSPADM

## 2024-08-24 RX ORDER — HEPARIN SODIUM 10000 [USP'U]/100ML
0-4500 INJECTION, SOLUTION INTRAVENOUS CONTINUOUS
Status: DISCONTINUED | OUTPATIENT
Start: 2024-08-24 | End: 2024-08-25 | Stop reason: HOSPADM

## 2024-08-24 RX ORDER — ONDANSETRON HYDROCHLORIDE 2 MG/ML
4 INJECTION, SOLUTION INTRAVENOUS ONCE
Status: COMPLETED | OUTPATIENT
Start: 2024-08-24 | End: 2024-08-24

## 2024-08-24 RX ORDER — MORPHINE SULFATE 4 MG/ML
4 INJECTION INTRAVENOUS ONCE
Status: COMPLETED | OUTPATIENT
Start: 2024-08-24 | End: 2024-08-24

## 2024-08-24 RX ORDER — CALCIUM CARBONATE 200(500)MG
500 TABLET,CHEWABLE ORAL ONCE
Status: COMPLETED | OUTPATIENT
Start: 2024-08-24 | End: 2024-08-24

## 2024-08-24 RX ADMIN — ONDANSETRON 4 MG: 2 INJECTION, SOLUTION INTRAMUSCULAR; INTRAVENOUS at 20:52

## 2024-08-24 RX ADMIN — FAMOTIDINE 20 MG: 20 TABLET, FILM COATED ORAL at 20:52

## 2024-08-24 RX ADMIN — CALCIUM CARBONATE (ANTACID) CHEW TAB 500 MG 500 MG: 500 CHEW TAB at 20:05

## 2024-08-24 RX ADMIN — MORPHINE SULFATE 4 MG: 4 INJECTION INTRAVENOUS at 16:43

## 2024-08-24 RX ADMIN — IOHEXOL 75 ML: 350 INJECTION, SOLUTION INTRAVENOUS at 17:02

## 2024-08-24 RX ADMIN — Medication 3 L/MIN: at 16:45

## 2024-08-24 RX ADMIN — HEPARIN SODIUM 1100 UNITS/HR: 10000 INJECTION, SOLUTION INTRAVENOUS at 18:08

## 2024-08-24 RX ADMIN — ONDANSETRON 4 MG: 2 INJECTION, SOLUTION INTRAMUSCULAR; INTRAVENOUS at 16:43

## 2024-08-24 ASSESSMENT — ENCOUNTER SYMPTOMS
PAIN LOCATION: ABDOMEN
PERSON REPORTING PAIN: PATIENT
PAIN LOCATION - EXACERBATING FACTORS: MOVEMENT
HIGHEST PAIN SEVERITY IN PAST 24 HOURS: 10/10
PAIN LOCATION - PAIN DURATION: CONSTANT
PAIN LOCATION - PAIN FREQUENCY: CONSTANT
ABDOMINAL PAIN: 1
PAIN: 1
PAIN LOCATION - PAIN SEVERITY: 6/10
LAST BOWEL MOVEMENT: 67075
MUSCLE WEAKNESS: 1
LOWER EXTREMITY EDEMA: 1
PAIN LOCATION - PAIN QUALITY: DEEP
APPETITE LEVEL: POOR
SUBJECTIVE PAIN PROGRESSION: WAXING AND WANING
PAIN LOCATION - RELIEVING FACTORS: MEDICATION
LOWEST PAIN SEVERITY IN PAST 24 HOURS: 6/10
CHANGE IN APPETITE: DECREASED
PAIN SEVERITY GOAL: 2/10

## 2024-08-24 ASSESSMENT — PAIN - FUNCTIONAL ASSESSMENT: PAIN_FUNCTIONAL_ASSESSMENT: 0-10

## 2024-08-24 ASSESSMENT — PAIN SCALES - GENERAL: PAINLEVEL_OUTOF10: 7

## 2024-08-24 ASSESSMENT — COLUMBIA-SUICIDE SEVERITY RATING SCALE - C-SSRS
6. HAVE YOU EVER DONE ANYTHING, STARTED TO DO ANYTHING, OR PREPARED TO DO ANYTHING TO END YOUR LIFE?: NO
2. HAVE YOU ACTUALLY HAD ANY THOUGHTS OF KILLING YOURSELF?: NO
1. IN THE PAST MONTH, HAVE YOU WISHED YOU WERE DEAD OR WISHED YOU COULD GO TO SLEEP AND NOT WAKE UP?: NO

## 2024-08-24 ASSESSMENT — LIFESTYLE VARIABLES
EVER FELT BAD OR GUILTY ABOUT YOUR DRINKING: NO
EVER HAD A DRINK FIRST THING IN THE MORNING TO STEADY YOUR NERVES TO GET RID OF A HANGOVER: NO
HAVE PEOPLE ANNOYED YOU BY CRITICIZING YOUR DRINKING: NO
HAVE YOU EVER FELT YOU SHOULD CUT DOWN ON YOUR DRINKING: NO
TOTAL SCORE: 0

## 2024-08-24 ASSESSMENT — ACTIVITIES OF DAILY LIVING (ADL)
AMBULATION ASSISTANCE: 1
ENTERING_EXITING_HOME: SUPERVISION
OASIS_M1830: 03
AMBULATION ASSISTANCE: STAND BY ASSIST

## 2024-08-24 ASSESSMENT — PAIN DESCRIPTION - DESCRIPTORS
DESCRIPTORS: ACHING
DESCRIPTORS: ACHING

## 2024-08-24 ASSESSMENT — PAIN DESCRIPTION - PAIN TYPE: TYPE: ACUTE PAIN

## 2024-08-24 ASSESSMENT — PAIN DESCRIPTION - LOCATION: LOCATION: ABDOMEN

## 2024-08-24 NOTE — PROGRESS NOTES
The patient was seen by the midlevel/resident.  I have personally saw the patient and made/approved the management plan and take responsibility for the patient management.  I reviewed the EKG's (when done) and agree with the interpretation.  I have seen and examined the patient; agree with the workup, evaluation, MDM, and diagnosis.  The care plan has been discussed with the midlevel/resident; I have reviewed the note and agree with the documented findings.     Patient just had surgery with Dr. Ross and another physician downtown.  She complains of abdominal pain was found to have an ileus and also pulmonary embolus.  We started some blood thinners spoke to Dr. Garcia who is covering who recommends transfer.  Updated patient and fiancé at bedside.  Awaiting transfer patient endorsed oncoming physician Dr. Prince at 1800.     Diagnoses as of 08/24/24 2058   Other acute pulmonary embolism, unspecified whether acute cor pulmonale present (Multi)   Ileus (Multi)     Taqueria Garcia MD

## 2024-08-24 NOTE — ED PROVIDER NOTES
"Emergency Department Encounter  [facility] EMERGENCY MEDICINE    Patient: Terra Alexis  MRN: [id number]  : 1950  Date of Evaluation: 2024  ED Provider: ISSAC Prieto    ED care was supervised by Dr. Garcia who independently examined and evaluated the patient. Please see their attestation note for further details.    Limitations to history: none  Independent Historian: self,   Records reviewed: Care everywhere, paper chart, Inpatient and outpatient notes    Chief Complaint       Chief Complaint   Patient presents with    Abdominal Pain     Rosebud    (Location/Symptom, Timing/Onset, Context/Setting, Quality, Duration, Modifying Factors, Severity) Note limiting factors.   Terra Alexis is a 74 y.o. female with past medical history of cervical cancer, urothelial invasive carcinoma, last chemotherapy approximately 3.5 weeks ago, recent surgery performed on August 15 which was open left partial nephrectomy and total ureterectomy with Dr. Ross and segmental sigmoid resection with diverting ileostomy by Dr. Denton.  Discharged home on Wednesday from the hospital, reports abdominal pain lower abdomen that started yesterday with watery output in ileostomy and decreased output.  Also with midsternal chest burning, described as \"heartburn\" that radiates up, patient was noted to be hypoxic on initial evaluation and does have left lower extremity edema, patient states that she was told that this was to be expected due to the surgery.  Had been receiving anticoagulants in the hospital but is not currently on any anticoagulants.  Denies any fevers, chills.  Denies any headaches, visual changes, syncope.    ROS:     Review of Systems  14 systems reviewed and otherwise acutely negative except as in the [place].          Past History     Past Medical History:   Diagnosis Date    Malignant tumor of sigmoid colon (Multi) 2024    Other specified disorders of kidney and ureter " 03/04/2022    Ureteral mass    Personal history of malignant neoplasm of cervix uteri 11/21/2022    History of malignant neoplasm of cervix     Past Surgical History:   Procedure Laterality Date    NEPHRECTOMY Left 08/15/2024    ex-lap, left ureteral tumor resection, left nephrectomy, excision of pelvic mass, partial sigmoidectomy with loop ileostomy    OTHER SURGICAL HISTORY  10/07/2021    Hysterectomy     Social History     Socioeconomic History    Marital status: Single   Tobacco Use    Smoking status: Never    Smokeless tobacco: Never   Vaping Use    Vaping status: Never Used   Substance and Sexual Activity    Alcohol use: Never    Drug use: Never    Sexual activity: Defer     Social Determinants of Health     Financial Resource Strain: Patient Unable To Answer (8/16/2024)    Overall Financial Resource Strain (CARDIA)     Difficulty of Paying Living Expenses: Patient unable to answer   Transportation Needs: Patient Unable To Answer (8/16/2024)    PRAPARE - Transportation     Lack of Transportation (Medical): Patient unable to answer     Lack of Transportation (Non-Medical): Patient unable to answer   Housing Stability: Patient Unable To Answer (8/16/2024)    Housing Stability Vital Sign     Unable to Pay for Housing in the Last Year: Patient unable to answer     Number of Times Moved in the Last Year: 1     Homeless in the Last Year: Patient unable to answer       Medications/Allergies     Previous Medications    ACETAMINOPHEN (TYLENOL) 325 MG TABLET    Take 2 tablets (650 mg) by mouth every 6 hours if needed for mild pain (1 - 3) for up to 5 days.    LEVOTHYROXINE (SYNTHROID, LEVOXYL) 25 MCG TABLET    Take 1 tablet (25 mcg) by mouth early in the morning.. Take on an empty stomach at the same time each day, either 30 to 60 minutes prior to breakfast    OXYCODONE (ROXICODONE) 5 MG IMMEDIATE RELEASE TABLET    Take 1 tablet (5 mg) by mouth every 6 hours if needed for severe pain (7 - 10) for up to 3 days.     POTASSIUM CHLORIDE CR 10 MEQ ER TABLET    Take 1 tablet (10 mEq) by mouth once daily.    VITAMINS A,C,E-ZINC-COPPER (PRESERVISION AREDS) 4,296 MCG-226 MG-90 MG CAPSULE    Take 1 capsule by mouth 2 times a day.     Allergies   Allergen Reactions    Codeine Hallucinations, GI Upset and Nausea/vomiting    Percocet [Oxycodone-Acetaminophen] Dizziness and Nausea/vomiting        Physical Exam       ED Triage Vitals [08/24/24 1621]   Temperature Heart Rate Respirations BP   36.6 °C (97.9 °F) 100 20 129/60      Pulse Ox Temp Source Heart Rate Source Patient Position   94 % Temporal Monitor --      BP Location FiO2 (%)     -- --         Physical Exam    GENERAL:  The patient appears nourished and normally developed. Vital signs as documented.     HEENT:  Head normocephalic, atraumatic, EOMs intact, PERRLA, Mucous membranes moist. Nares patent without copious rhinorrhea.  No lymphadenopathy.    PULMONARY:  Lungs are clear to auscultation, mildly tachypneic, able to speak full sentences, no accessory muscle use    CARDIAC:   Normal rate. No murmurs, rubs or gallops    ABDOMEN:  Soft, non-distended, mild tenderness on palpation to left lower quadrant, right lower quadrant, ileostomy bag with liquidy yellowish-brown contents, BS positive x 4 quadrants, No rebound or guarding, no peritoneal signs, no CVA tenderness, no masses or organomegaly=    MUSCULOSKELETAL:   Able to ambulate, Non edematous, with no obvious deformities. Pulses intact distal    SKIN: No significant rashes, warm, pale, dry    NEURO:  No obvious neurological deficits, normal sensation and strength bilaterally.  Able to follow commands, NIH 0, CN 2-12 intact.    Diagnostics   Labs:  Results for orders placed or performed during the hospital encounter of 08/24/24   CBC and Auto Differential   Result Value Ref Range    WBC 10.9 4.4 - 11.3 x10*3/uL    nRBC 0.0 0.0 - 0.0 /100 WBCs    RBC 3.78 (L) 4.00 - 5.20 x10*6/uL    Hemoglobin 11.6 (L) 12.0 - 16.0 g/dL     Hematocrit 36.0 36.0 - 46.0 %    MCV 95 80 - 100 fL    MCH 30.7 26.0 - 34.0 pg    MCHC 32.2 32.0 - 36.0 g/dL    RDW 17.9 (H) 11.5 - 14.5 %    Platelets 348 150 - 450 x10*3/uL    Neutrophils % 81.0 40.0 - 80.0 %    Immature Granulocytes %, Automated 2.8 (H) 0.0 - 0.9 %    Lymphocytes % 8.2 13.0 - 44.0 %    Monocytes % 4.9 2.0 - 10.0 %    Eosinophils % 2.5 0.0 - 6.0 %    Basophils % 0.6 0.0 - 2.0 %    Neutrophils Absolute 8.80 (H) 1.60 - 5.50 x10*3/uL    Immature Granulocytes Absolute, Automated 0.30 0.00 - 0.50 x10*3/uL    Lymphocytes Absolute 0.89 0.80 - 3.00 x10*3/uL    Monocytes Absolute 0.53 0.05 - 0.80 x10*3/uL    Eosinophils Absolute 0.27 0.00 - 0.40 x10*3/uL    Basophils Absolute 0.06 0.00 - 0.10 x10*3/uL   Comprehensive metabolic panel   Result Value Ref Range    Glucose 120 (H) 74 - 99 mg/dL    Sodium 133 (L) 136 - 145 mmol/L    Potassium 4.0 3.5 - 5.3 mmol/L    Chloride 95 (L) 98 - 107 mmol/L    Bicarbonate 29 21 - 32 mmol/L    Anion Gap 13 10 - 20 mmol/L    Urea Nitrogen 10 6 - 23 mg/dL    Creatinine 1.17 (H) 0.50 - 1.05 mg/dL    eGFR 49 (L) >60 mL/min/1.73m*2    Calcium 10.1 8.6 - 10.3 mg/dL    Albumin 3.6 3.4 - 5.0 g/dL    Alkaline Phosphatase 112 33 - 136 U/L    Total Protein 6.6 6.4 - 8.2 g/dL    AST 28 9 - 39 U/L    Bilirubin, Total 0.5 0.0 - 1.2 mg/dL    ALT 13 7 - 45 U/L   Lactate   Result Value Ref Range    Lactate 1.3 0.4 - 2.0 mmol/L   Lipase   Result Value Ref Range    Lipase 18 9 - 82 U/L   Protime-INR   Result Value Ref Range    Protime 11.3 9.8 - 12.8 seconds    INR 1.0 0.9 - 1.1   Troponin I, High Sensitivity   Result Value Ref Range    Troponin I, High Sensitivity 5 0 - 13 ng/L   aPTT - baseline   Result Value Ref Range    aPTT 30 27 - 38 seconds     Radiographs:  Lower extremity venous duplex left   Final Result   No evidence of acute DVT in the left lower extremity.             MACRO:   None.        Signed by: Tank Leyva 8/24/2024 5:59 PM   Dictation workstation:   [id number]     "  CT angio chest for pulmonary embolism   Final Result   1.  Positive study for acute pulmonary embolism. Multiple emboli   involving lobar and segmental branches of both lungs.   2. Cardiomegaly with small pericardial effusion. No right heart   strain otherwise.   3. Small layering pleural effusions. Increased bibasilar atelectasis   or infiltrates. Superimposed infarct not excluded..             Signed by: Parveen High 8/24/2024 5:41 PM   Dictation workstation:   [id number]      CT abdomen pelvis w IV contrast   Final Result   1.  Interval left nephro ureterectomy, partial colonic resection, and   ileostomy.   2. Increased distention of the stomach and proximal small bowel with   gradual tapering distally and no discrete transition point to   indicate obstruction. This could reflect ileus.   3. Small volume ascites without definite fluid collection. Peritoneal   hyperenhancement in the pelvis raises the question of peritonitis.   4. Anasarca and postoperative extensive subcutaneous emphysema.        MACRO:   None.        Signed by: Parveen High 8/24/2024 5:40 PM   Dictation workstation:   [id number]          Procedures:   Procedures     EKG: Independently reviewed by this provider at 1746  Indication: Chest tightness  Rate: 86  Rhythm: Normal sinus rhythm  Interval: Normal intervals  Axis: Normal axis  ST Segment: No ST elevation    Assessment   In brief, Terra Alexis is a 74 y.o. female who presented to the emergency department 9 days postoperative with abdominal pain getting worse since yesterday and \"heartburn\" and shortness of breath noted to be hypoxic on initial evaluation    Plan   IV, lab work, EKG, imaging    Differentials   PE  ACS  Infection   perforation  Obstruction    ED Course     Diagnoses as of 08/24/24 1806   Other acute pulmonary embolism, unspecified whether acute cor pulmonale present (Multi)   Ileus (Multi)       Visit Vitals  /60   Pulse 100   Temp 36.6 °C (97.9 °F) (Temporal) "   Resp 20   Ht 1.524 m (5')   Wt 59 kg (130 lb)   SpO2 (!) 89%   BMI 25.39 kg/m²   OB Status Hysterectomy   Smoking Status Never   BSA 1.58 m²       Medications   oxygen (O2) therapy (3 L/min inhalation Start 8/24/24 1645)   heparin bolus from bag 4,720 Units (has no administration in time range)   heparin 25,000 Units in dextrose 5% 250 mL (100 Units/mL) infusion (premix) (has no administration in time range)   heparin bolus from bag 2,000-4,000 Units (has no administration in time range)   ondansetron (Zofran) injection 4 mg (4 mg intravenous Given 8/24/24 1643)   morphine injection 4 mg (4 mg intravenous Given 8/24/24 1643)   iohexol (OMNIPaque) 350 mg iodine/mL solution 75 mL (75 mL intravenous Given 8/24/24 1702)       Plan of care discussed, patient was noted to be 86 to 89% on room air, does not wear oxygen at baseline and was placed on 2 L nasal cannula and came up to 94 to 96%.  Mildly tachypneic without any obvious accessory muscle use.  Does have some abdominal tenderness on palpation, no guarding or rebound.  Sent for multiple images, does have left lower extremity edema.  Results reviewed and discussed with CT scan positive for pulmonary embolus multiple emboli involving lobar and segmental branches of both lungs, cardiomegaly with small pericardial effusion, no right heart strain otherwise and abdominal CT showing ileus.  Consulted our urology on-call who does not perform these types of surgeries, recommended transfer to main campus where patient was recently discharged from.  Spoke with urology, surgical oncology () and Dr. Velasco (ED): Plan is for ED to ED transfer and will have surgical services and urology evaluate patient at [facility] [place]  Recommend NG tube, discussed with patient and she declined, states that her symptoms feel better after belching in CT scan and with morphine administration.      Final Impression      1. Other acute pulmonary embolism, unspecified whether acute cor pulmonale  present (Multi)    2. Ileus (Multi)          DISPOSITION  Disposition: Transfer to [facility]  Patient condition is stable    Comment: Please note this report has been produced using speech recognition software and may contain errors related to that system including errors in grammar, punctuation, and spelling, as well as words and phrases that may be inappropriate.  If there are any questions or concerns please feel free to contact the dictating provider for clarification.    BASIL Prieto-ISSAC Ramos  08/24/24 1826       ISSAC Prieto  08/24/24 1837

## 2024-08-25 ENCOUNTER — HOME CARE VISIT (OUTPATIENT)
Dept: HOME HEALTH SERVICES | Facility: HOME HEALTH | Age: 74
End: 2024-08-25
Payer: MEDICARE

## 2024-08-25 ENCOUNTER — HOSPITAL ENCOUNTER (INPATIENT)
Facility: HOSPITAL | Age: 74
LOS: 5 days | Discharge: HOME HEALTH | End: 2024-08-30
Attending: EMERGENCY MEDICINE | Admitting: SURGERY
Payer: MEDICARE

## 2024-08-25 ENCOUNTER — APPOINTMENT (OUTPATIENT)
Dept: RADIOLOGY | Facility: HOSPITAL | Age: 74
End: 2024-08-25
Payer: MEDICARE

## 2024-08-25 DIAGNOSIS — C18.7: ICD-10-CM

## 2024-08-25 DIAGNOSIS — R19.00 PELVIC MASS: ICD-10-CM

## 2024-08-25 DIAGNOSIS — I26.99 PE (PULMONARY THROMBOEMBOLISM) (MULTI): ICD-10-CM

## 2024-08-25 DIAGNOSIS — C68.9 UROTHELIAL CANCER (MULTI): Primary | ICD-10-CM

## 2024-08-25 DIAGNOSIS — Z90.5 HISTORY OF NEPHRECTOMY: ICD-10-CM

## 2024-08-25 DIAGNOSIS — M79.89 SWELLING OF CALF: ICD-10-CM

## 2024-08-25 LAB
HOLD SPECIMEN: NORMAL
UFH PPP CHRO-ACNC: 0.3 IU/ML
UFH PPP CHRO-ACNC: 0.5 IU/ML
UFH PPP CHRO-ACNC: 0.5 IU/ML
UFH PPP CHRO-ACNC: 1.4 IU/ML

## 2024-08-25 PROCEDURE — 1200000003 HC ONCOLOGY  ROOM WITH TELEMETRY DAILY

## 2024-08-25 PROCEDURE — 2500000001 HC RX 250 WO HCPCS SELF ADMINISTERED DRUGS (ALT 637 FOR MEDICARE OP)

## 2024-08-25 PROCEDURE — 99285 EMERGENCY DEPT VISIT HI MDM: CPT | Mod: 25

## 2024-08-25 PROCEDURE — 2500000004 HC RX 250 GENERAL PHARMACY W/ HCPCS (ALT 636 FOR OP/ED)

## 2024-08-25 PROCEDURE — 96374 THER/PROPH/DIAG INJ IV PUSH: CPT | Mod: 59

## 2024-08-25 PROCEDURE — 2500000004 HC RX 250 GENERAL PHARMACY W/ HCPCS (ALT 636 FOR OP/ED): Performed by: STUDENT IN AN ORGANIZED HEALTH CARE EDUCATION/TRAINING PROGRAM

## 2024-08-25 PROCEDURE — 74018 RADEX ABDOMEN 1 VIEW: CPT

## 2024-08-25 PROCEDURE — 99285 EMERGENCY DEPT VISIT HI MDM: CPT | Performed by: EMERGENCY MEDICINE

## 2024-08-25 PROCEDURE — 36415 COLL VENOUS BLD VENIPUNCTURE: CPT | Performed by: EMERGENCY MEDICINE

## 2024-08-25 PROCEDURE — 2500000004 HC RX 250 GENERAL PHARMACY W/ HCPCS (ALT 636 FOR OP/ED): Performed by: EMERGENCY MEDICINE

## 2024-08-25 PROCEDURE — 85520 HEPARIN ASSAY: CPT | Performed by: EMERGENCY MEDICINE

## 2024-08-25 PROCEDURE — 93010 ELECTROCARDIOGRAM REPORT: CPT | Performed by: EMERGENCY MEDICINE

## 2024-08-25 PROCEDURE — 96376 TX/PRO/DX INJ SAME DRUG ADON: CPT

## 2024-08-25 PROCEDURE — 96375 TX/PRO/DX INJ NEW DRUG ADDON: CPT

## 2024-08-25 PROCEDURE — 74018 RADEX ABDOMEN 1 VIEW: CPT | Performed by: RADIOLOGY

## 2024-08-25 PROCEDURE — 96365 THER/PROPH/DIAG IV INF INIT: CPT

## 2024-08-25 RX ORDER — SODIUM CHLORIDE, SODIUM LACTATE, POTASSIUM CHLORIDE, CALCIUM CHLORIDE 600; 310; 30; 20 MG/100ML; MG/100ML; MG/100ML; MG/100ML
50 INJECTION, SOLUTION INTRAVENOUS CONTINUOUS
Status: DISCONTINUED | OUTPATIENT
Start: 2024-08-25 | End: 2024-08-28

## 2024-08-25 RX ORDER — FLUCONAZOLE 2 MG/ML
200 INJECTION, SOLUTION INTRAVENOUS ONCE
Status: COMPLETED | OUTPATIENT
Start: 2024-08-25 | End: 2024-08-25

## 2024-08-25 RX ORDER — ONDANSETRON HYDROCHLORIDE 2 MG/ML
4 INJECTION, SOLUTION INTRAVENOUS EVERY 6 HOURS PRN
Status: DISCONTINUED | OUTPATIENT
Start: 2024-08-25 | End: 2024-08-30 | Stop reason: HOSPADM

## 2024-08-25 RX ORDER — ONDANSETRON HYDROCHLORIDE 2 MG/ML
4 INJECTION, SOLUTION INTRAVENOUS ONCE
Status: COMPLETED | OUTPATIENT
Start: 2024-08-25 | End: 2024-08-25

## 2024-08-25 RX ORDER — METOCLOPRAMIDE 10 MG/1
5 TABLET ORAL EVERY 6 HOURS PRN
Status: DISCONTINUED | OUTPATIENT
Start: 2024-08-25 | End: 2024-08-26

## 2024-08-25 RX ORDER — PANTOPRAZOLE SODIUM 40 MG/10ML
40 INJECTION, POWDER, LYOPHILIZED, FOR SOLUTION INTRAVENOUS DAILY
Status: DISCONTINUED | OUTPATIENT
Start: 2024-08-25 | End: 2024-08-28

## 2024-08-25 RX ORDER — LEVOTHYROXINE SODIUM 25 UG/1
25 TABLET ORAL DAILY
Status: DISCONTINUED | OUTPATIENT
Start: 2024-08-25 | End: 2024-08-30 | Stop reason: HOSPADM

## 2024-08-25 RX ORDER — HEPARIN SODIUM 10000 [USP'U]/100ML
0-4500 INJECTION, SOLUTION INTRAVENOUS CONTINUOUS
Status: DISPENSED | OUTPATIENT
Start: 2024-08-25 | End: 2024-08-27

## 2024-08-25 RX ORDER — FAMOTIDINE 40 MG/1
20 TABLET, FILM COATED ORAL ONCE
Status: COMPLETED | OUTPATIENT
Start: 2024-08-25 | End: 2024-08-25

## 2024-08-25 RX ORDER — ACETAMINOPHEN 10 MG/ML
1000 INJECTION, SOLUTION INTRAVENOUS 2 TIMES DAILY PRN
Status: DISCONTINUED | OUTPATIENT
Start: 2024-08-25 | End: 2024-08-26

## 2024-08-25 RX ORDER — METOCLOPRAMIDE HYDROCHLORIDE 5 MG/ML
5 INJECTION INTRAMUSCULAR; INTRAVENOUS EVERY 6 HOURS PRN
Status: DISCONTINUED | OUTPATIENT
Start: 2024-08-25 | End: 2024-08-27

## 2024-08-25 SDOH — SOCIAL STABILITY: SOCIAL INSECURITY: DO YOU FEEL ANYONE HAS EXPLOITED OR TAKEN ADVANTAGE OF YOU FINANCIALLY OR OF YOUR PERSONAL PROPERTY?: NO

## 2024-08-25 SDOH — SOCIAL STABILITY: SOCIAL INSECURITY: HAVE YOU HAD ANY THOUGHTS OF HARMING ANYONE ELSE?: NO

## 2024-08-25 SDOH — SOCIAL STABILITY: SOCIAL INSECURITY: ABUSE: ADULT

## 2024-08-25 SDOH — SOCIAL STABILITY: SOCIAL INSECURITY: WERE YOU ABLE TO COMPLETE ALL THE BEHAVIORAL HEALTH SCREENINGS?: YES

## 2024-08-25 SDOH — SOCIAL STABILITY: SOCIAL INSECURITY: HAS ANYONE EVER THREATENED TO HURT YOUR FAMILY OR YOUR PETS?: NO

## 2024-08-25 SDOH — SOCIAL STABILITY: SOCIAL INSECURITY: ARE YOU OR HAVE YOU BEEN THREATENED OR ABUSED PHYSICALLY, EMOTIONALLY, OR SEXUALLY BY ANYONE?: NO

## 2024-08-25 SDOH — SOCIAL STABILITY: SOCIAL INSECURITY: DO YOU FEEL UNSAFE GOING BACK TO THE PLACE WHERE YOU ARE LIVING?: NO

## 2024-08-25 SDOH — SOCIAL STABILITY: SOCIAL INSECURITY: DOES ANYONE TRY TO KEEP YOU FROM HAVING/CONTACTING OTHER FRIENDS OR DOING THINGS OUTSIDE YOUR HOME?: NO

## 2024-08-25 SDOH — SOCIAL STABILITY: SOCIAL INSECURITY: HAVE YOU HAD THOUGHTS OF HARMING ANYONE ELSE?: NO

## 2024-08-25 SDOH — SOCIAL STABILITY: SOCIAL INSECURITY: ARE THERE ANY APPARENT SIGNS OF INJURIES/BEHAVIORS THAT COULD BE RELATED TO ABUSE/NEGLECT?: NO

## 2024-08-25 ASSESSMENT — ACTIVITIES OF DAILY LIVING (ADL)
HEARING - LEFT EAR: FUNCTIONAL
DRESSING YOURSELF: INDEPENDENT
ADEQUATE_TO_COMPLETE_ADL: YES
BATHING: INDEPENDENT
WALKS IN HOME: INDEPENDENT
FEEDING YOURSELF: INDEPENDENT
HEARING - RIGHT EAR: FUNCTIONAL
TOILETING: INDEPENDENT
PATIENT'S MEMORY ADEQUATE TO SAFELY COMPLETE DAILY ACTIVITIES?: YES
GROOMING: INDEPENDENT
ASSISTIVE_DEVICE: EYEGLASSES;WALKER
LACK_OF_TRANSPORTATION: NO
JUDGMENT_ADEQUATE_SAFELY_COMPLETE_DAILY_ACTIVITIES: YES

## 2024-08-25 ASSESSMENT — LIFESTYLE VARIABLES
AUDIT-C TOTAL SCORE: 1
AUDIT-C TOTAL SCORE: 1
TOTAL SCORE: 0
SKIP TO QUESTIONS 9-10: 1
HOW MANY STANDARD DRINKS CONTAINING ALCOHOL DO YOU HAVE ON A TYPICAL DAY: 1 OR 2
HAVE YOU EVER FELT YOU SHOULD CUT DOWN ON YOUR DRINKING: NO
HOW OFTEN DO YOU HAVE 6 OR MORE DRINKS ON ONE OCCASION: NEVER
HOW OFTEN DO YOU HAVE A DRINK CONTAINING ALCOHOL: MONTHLY OR LESS
HAVE PEOPLE ANNOYED YOU BY CRITICIZING YOUR DRINKING: NO
EVER FELT BAD OR GUILTY ABOUT YOUR DRINKING: NO
EVER HAD A DRINK FIRST THING IN THE MORNING TO STEADY YOUR NERVES TO GET RID OF A HANGOVER: NO

## 2024-08-25 ASSESSMENT — COGNITIVE AND FUNCTIONAL STATUS - GENERAL
PATIENT BASELINE BEDBOUND: NO
TURNING FROM BACK TO SIDE WHILE IN FLAT BAD: A LITTLE
MOVING TO AND FROM BED TO CHAIR: A LITTLE
HELP NEEDED FOR BATHING: A LITTLE
STANDING UP FROM CHAIR USING ARMS: A LITTLE
CLIMB 3 TO 5 STEPS WITH RAILING: A LITTLE
WALKING IN HOSPITAL ROOM: A LITTLE
MOBILITY SCORE: 18
DRESSING REGULAR LOWER BODY CLOTHING: A LITTLE
DRESSING REGULAR UPPER BODY CLOTHING: A LITTLE
MOVING FROM LYING ON BACK TO SITTING ON SIDE OF FLAT BED WITH BEDRAILS: A LITTLE
DAILY ACTIVITIY SCORE: 19
TOILETING: A LITTLE
PERSONAL GROOMING: A LITTLE

## 2024-08-25 ASSESSMENT — PAIN SCALES - GENERAL
PAINLEVEL_OUTOF10: 0 - NO PAIN
PAINLEVEL_OUTOF10: 0 - NO PAIN
PAINLEVEL_OUTOF10: 5 - MODERATE PAIN

## 2024-08-25 ASSESSMENT — PATIENT HEALTH QUESTIONNAIRE - PHQ9
2. FEELING DOWN, DEPRESSED OR HOPELESS: NOT AT ALL
SUM OF ALL RESPONSES TO PHQ9 QUESTIONS 1 & 2: 0
1. LITTLE INTEREST OR PLEASURE IN DOING THINGS: NOT AT ALL

## 2024-08-25 ASSESSMENT — PAIN - FUNCTIONAL ASSESSMENT
PAIN_FUNCTIONAL_ASSESSMENT: 0-10
PAIN_FUNCTIONAL_ASSESSMENT: 0-10

## 2024-08-25 ASSESSMENT — PAIN DESCRIPTION - PROGRESSION: CLINICAL_PROGRESSION: GRADUALLY IMPROVING

## 2024-08-25 NOTE — H&P
Surgical Oncology Consult/H&P Note    Patient Name: Terra Alexis  MRN: [id number]  Admit Date: 825  : 1950  AGE: 74 y.o.   GENDER: female  ==============================================================================  TODAY'S ASSESSMENT AND PLAN OF CARE:  74 year-old female with a past medical history of hypothyroidism, cervical cancer s/p TAHBSO, and urothelial cancer s/p chemotherapy, and left partial nephrectomy, total ureterectomy (Dr. Ross, Urology) and partial sigmoid colectomy w/ DLI (Dr. Harper, Surgical Oncology) on 8/15, who presents as a transfer for [facility] ED with 1 day of abdominal pain, nausea, and emesis. At OSH, patient was noted to have hypoxia on exam and a CTA chest was completed, which demonstrated an acute PE involving the lobar and segmental branches of bilateral lungs. She was started on a heparin gtt at the OSH. CT abd/pel demonstrated gastric and small bowel distention, concerning for an ileus. Surgical oncology was consulted for evaluation of ileus in setting of recent sigmoid resection w/ DLI.     Per patient, she had been feeling better and was in her usual state until yesterday afternoon. She started having lower abdominal pain, nausea, and multiple episodes of emesis. Most recent emesis episode was prior to [facility] arrival. She had been tolerating PO intake and had gas and stool in her ileostomy bag. She stated that she had a full breakfast this morning and felt fine until the afternoon. Patient is unsure about how long she's been having watery ileostomy output, but states that it had been thicker when she was discharged on . She denies fevers, chills, shortness of breath, chest pain.     Discussed with patient the risks and benefits of an NG tube placement. Patient refused at the time of evaluation, however we reached a compromise that if patient has one more episode of emesis, NG tube will need to be placed. She agreed to the plan of care.     Plan:  -  Admit to surgical oncology team  - NPO  - NG tube placement if another episode of emesis  - mIVF  - monitor electrolytes, replete PRN  - continue therapeutic heparin gtt for PE    Discussed with attending Dr. Romulo High MD  General Surgery Resident  Surgical Oncology  Johnny [id number] / Mignon [id number]    ==============================================================================  CHIEF COMPLAINT / EVENTS LAST 24HRS / HPI:  Nausea, Emesis, Abdominal Pain    PHYSICAL EXAM:  Heart Rate:  []   Temperature:  [36.6 °C (97.9 °F)-36.7 °C (98.1 °F)]   Respirations:  [12-20]   BP: (104-146)/(60-82)   Height:  [152.4 cm (5')]   Weight:  [59 kg (130 lb)]   Pulse Ox:  [89 %-98 %]     Physical Exam  General: ill appearing, no acute distress  HEENT: atraumatic, normocephalic  CV: regular rate and rhythm   Pulm: non-labored breathing on NC  GI: abdomen soft, moderately distended, mildly tender to palpation near incision areas, no signs of peritonitis. Laparotomy and port site incisions closed with staples, all c/d/I. Ileostomy stoma pink and viable with watery output, gas present in stoma appliance.   : deferred  Skin: warm, dry  Extremities: GONZALEZ  Psych: appropriate mood and affect    IMAGING SUMMARY:   CT Abd/Pel 8/24/24:  IMPRESSION:  1.  Interval left nephro ureterectomy, partial colonic resection, and  ileostomy.  2. Increased distention of the stomach and proximal small bowel with  gradual tapering distally and no discrete transition point to  indicate obstruction. This could reflect ileus.  3. Small volume ascites without definite fluid collection. Peritoneal  hyperenhancement in the pelvis raises the question of peritonitis.  4. Anasarca and postoperative extensive subcutaneous emphysema..    CTA Chest 8/24/24:  IMPRESSION:  1.  Positive study for acute pulmonary embolism. Multiple emboli  involving lobar and segmental branches of both lungs.  2. Cardiomegaly with small pericardial effusion. No right  heart  strain otherwise.  3. Small layering pleural effusions. Increased bibasilar atelectasis  or infiltrates. Superimposed infarct not excluded..    LABS:  Results from last 7 days   Lab Units 08/24/24  1644 08/21/24  0514 08/20/24  0542   WBC AUTO x10*3/uL 10.9 7.1 6.4   HEMOGLOBIN g/dL 11.6* 9.8* 10.1*   HEMATOCRIT % 36.0 30.3* 32.0*   PLATELETS AUTO x10*3/uL 348 265 294   NEUTROS PCT AUTO % 81.0  --   --    LYMPHS PCT AUTO % 8.2  --   --    MONOS PCT AUTO % 4.9  --   --    EOS PCT AUTO % 2.5  --   --      Results from last 7 days   Lab Units 08/24/24  1644   APTT seconds 30   INR  1.0     Results from last 7 days   Lab Units 08/24/24 1644 08/21/24  0514 08/20/24  0542   SODIUM mmol/L 133* 134* 136   POTASSIUM mmol/L 4.0 4.1 4.0   CHLORIDE mmol/L 95* 103 105   CO2 mmol/L 29 24 24   BUN mg/dL 10 10 8   CREATININE mg/dL 1.17* 0.89 1.09*   CALCIUM mg/dL 10.1 8.5* 8.6   PROTEIN TOTAL g/dL 6.6  --   --    BILIRUBIN TOTAL mg/dL 0.5  --   --    ALK PHOS U/L 112  --   --    ALT U/L 13  --   --    AST U/L 28  --   --    GLUCOSE mg/dL 120* 78 74     Results from last 7 days   Lab Units 08/24/24  1644   BILIRUBIN TOTAL mg/dL 0.5         I have reviewed all medications, laboratory results, and imaging pertinent for today's encounter.

## 2024-08-25 NOTE — CONSULTS
"History Of Present Illness  Terra Alexis is a 74 y.o. female with history of cervical cancer s/p cisplatin chemotherapy and radiation therapy as well as TAHBSO in 1998-9, hypothyroidism, and recently discovered left pelvic sidewall mass involving the sigmoid colon, iliac vessels, and ureter s/p left robotic nephroureterectomy, sigmoid colectomy, diverting loop ileostomy, and soft tissue mass resection on 8/15 w/ Juan Ross, Bertin, and Alec (final surgical pathology pending). Her recovery was overall uneventful and she was discharged on 8/21. She presented to the [facility] ED on 8/24 with 1d of abdominal pain, watery ileostomy output, and \"heartburn.\" Workup revealed hypoxia (improved on 2LNC) and CT showing bilateral lobar and segmental PEs as well as concern for ileus. She was transferred to [facility] for further management.    On interview, she notes that her most bothersome symptom is the nausea. She declines pain other than \"heartburn.\" She is unable to describe her ileostomy output but states that the current thin light brown liquid output with gas is similar to how it has been over the prior week.     Past Medical History  She has a past medical history of Malignant tumor of sigmoid colon (Multi) (08/21/2024), Other specified disorders of kidney and ureter (03/04/2022), and Personal history of malignant neoplasm of cervix uteri (11/21/2022).    Surgical History  She has a past surgical history that includes Other surgical history (10/07/2021) and Nephrectomy (Left, 08/15/2024).     Social History  She reports that she has never smoked. She has never used smokeless tobacco. She reports that she does not drink alcohol and does not use drugs.    Family History  Family History   Problem Relation Name Age of Onset    Macular degeneration Mother      Glaucoma Other grandparent     Macular degeneration Other grandparent         Allergies  Codeine and Percocet [oxycodone-acetaminophen]    Review of Systems:  As " above     Physical Exam  Constitutional: awake and alert, resting comfortably in bed in no acute distress  Head/neck: normocephalic / atraumatic  Eyes: EOMI, sclerae anicteric  ENT: moist mucous membranes  Pulm: Breathing comfortably on 2LNC  CV: Regular rate  Abd: Soft, moderate tenderness just left of her midline incision, nondistended  : No CVA or suprapubic tenderness  Extremities: No lower extremity edema  Psych: Appropriate mood and behavior     Last Recorded Vitals  There were no vitals taken for this visit.    Relevant Results    Results for orders placed or performed during the hospital encounter of 08/24/24 (from the past 24 hour(s))   CBC and Auto Differential   Result Value Ref Range    WBC 10.9 4.4 - 11.3 x10*3/uL    nRBC 0.0 0.0 - 0.0 /100 WBCs    RBC 3.78 (L) 4.00 - 5.20 x10*6/uL    Hemoglobin 11.6 (L) 12.0 - 16.0 g/dL    Hematocrit 36.0 36.0 - 46.0 %    MCV 95 80 - 100 fL    MCH 30.7 26.0 - 34.0 pg    MCHC 32.2 32.0 - 36.0 g/dL    RDW 17.9 (H) 11.5 - 14.5 %    Platelets 348 150 - 450 x10*3/uL    Neutrophils % 81.0 40.0 - 80.0 %    Immature Granulocytes %, Automated 2.8 (H) 0.0 - 0.9 %    Lymphocytes % 8.2 13.0 - 44.0 %    Monocytes % 4.9 2.0 - 10.0 %    Eosinophils % 2.5 0.0 - 6.0 %    Basophils % 0.6 0.0 - 2.0 %    Neutrophils Absolute 8.80 (H) 1.60 - 5.50 x10*3/uL    Immature Granulocytes Absolute, Automated 0.30 0.00 - 0.50 x10*3/uL    Lymphocytes Absolute 0.89 0.80 - 3.00 x10*3/uL    Monocytes Absolute 0.53 0.05 - 0.80 x10*3/uL    Eosinophils Absolute 0.27 0.00 - 0.40 x10*3/uL    Basophils Absolute 0.06 0.00 - 0.10 x10*3/uL   Comprehensive metabolic panel   Result Value Ref Range    Glucose 120 (H) 74 - 99 mg/dL    Sodium 133 (L) 136 - 145 mmol/L    Potassium 4.0 3.5 - 5.3 mmol/L    Chloride 95 (L) 98 - 107 mmol/L    Bicarbonate 29 21 - 32 mmol/L    Anion Gap 13 10 - 20 mmol/L    Urea Nitrogen 10 6 - 23 mg/dL    Creatinine 1.17 (H) 0.50 - 1.05 mg/dL    eGFR 49 (L) >60 mL/min/1.73m*2    Calcium  10.1 8.6 - 10.3 mg/dL    Albumin 3.6 3.4 - 5.0 g/dL    Alkaline Phosphatase 112 33 - 136 U/L    Total Protein 6.6 6.4 - 8.2 g/dL    AST 28 9 - 39 U/L    Bilirubin, Total 0.5 0.0 - 1.2 mg/dL    ALT 13 7 - 45 U/L   Lactate   Result Value Ref Range    Lactate 1.3 0.4 - 2.0 mmol/L   Lipase   Result Value Ref Range    Lipase 18 9 - 82 U/L   Protime-INR   Result Value Ref Range    Protime 11.3 9.8 - 12.8 seconds    INR 1.0 0.9 - 1.1   Troponin I, High Sensitivity   Result Value Ref Range    Troponin I, High Sensitivity 5 0 - 13 ng/L   aPTT - baseline   Result Value Ref Range    aPTT 30 27 - 38 seconds   B-Type Natriuretic Peptide   Result Value Ref Range    BNP 15 0 - 99 pg/mL   Urinalysis with Reflex Culture and Microscopic   Result Value Ref Range    Color, Urine Light-Orange (N) Light-Yellow, Yellow, Dark-Yellow    Appearance, Urine Ex.Turbid (N) Clear    Specific Gravity, Urine 1.047 (N) 1.005 - 1.035    pH, Urine 7.0 5.0, 5.5, 6.0, 6.5, 7.0, 7.5, 8.0    Protein, Urine 30 (1+) (A) NEGATIVE, 10 (TRACE), 20 (TRACE) mg/dL    Glucose, Urine Normal Normal mg/dL    Blood, Urine NEGATIVE NEGATIVE    Ketones, Urine NEGATIVE NEGATIVE mg/dL    Bilirubin, Urine NEGATIVE NEGATIVE    Urobilinogen, Urine Normal Normal mg/dL    Nitrite, Urine NEGATIVE NEGATIVE    Leukocyte Esterase, Urine NEGATIVE NEGATIVE   Urinalysis Microscopic   Result Value Ref Range    WBC, Urine NONE 1-5, NONE /HPF    RBC, Urine >20 (A) NONE, 1-2, 3-5 /HPF    Squamous Epithelial Cells, Urine 1-9 (SPARSE) Reference range not established. /HPF    Budding Yeast, Urine PRESENT (A) NONE /HPF    Mucus, Urine 1+ Reference range not established. /LPF    Amorphous Crystals, Urine 1+ NONE, 1+, 2+ /HPF   Heparin Assay, UFH   Result Value Ref Range    Heparin Unfractionated 0.9 See Comment Below for Therapeutic Ranges IU/mL       Lower extremity venous duplex left    Result Date: 8/24/2024  Interpreted By:  Tank Leyva STUDY: [facility] US LOWER EXTREMITY VENOUS DUPLEX  LEFT;  8/24/2024 5:37 pm   INDICATION: Signs/Symptoms:LLE swelling s/p surgery.   COMPARISON: None.   ACCESSION NUMBER(S): [id number]   ORDERING CLINICIAN: ALISON DAS   TECHNIQUE: Grayscale, color and spectral Doppler sonographic images of the left lower extremity deep venous system. The right common femoral vein was imaged for comparison.   FINDINGS: THIGH VEINS: There is normal compressibility of the left common femoral vein, saphenous-femoral junction, femoral vein and popliteal vein. There is normal spontaneous and phasic variation by spectral doppler.   CALF VEINS: The posterior tibial and peroneal veins demonstrate normal color flow and compressibility.   CONTRALATERAL COMPARISON: The right common femoral vein is patent       No evidence of acute DVT in the left lower extremity.     MACRO: None.   Signed by: Tank Leyva 8/24/2024 5:59 PM Dictation workstation:   [id number]    CT angio chest for pulmonary embolism    Result Date: 8/24/2024  Interpreted By:  Parveen High, STUDY: CT ANGIO CHEST FOR PULMONARY EMBOLISM;  8/24/2024 5:01 pm   INDICATION: Signs/Symptoms:recent surgery, chest tightness, hypoxic.   COMPARISON: 07/01/2024   ACCESSION NUMBER(S): [id number]   ORDERING CLINICIAN: ALISON DAS   TECHNIQUE: Helical data acquisition of the chest was obtained with  75 mL Omnipaque 350. Images were reformatted in axial, coronal, and sagittal planes.MIP reformatted images were also generated.   FINDINGS: LUNGS and AIRWAYS: Small layering pleural effusions. Bands of bibasilar atelectasis have increased.   Central airways are patent. No bronchiectasis.   MEDIASTINUM and MEI, LOWER NECK AND AXILLA: The visualized thyroid gland is grossly unremarkable.   No thoracic lymphadenopathy by CT criteria.   Esophagus is not dilated.   HEART and VESSELS: Severe cardiomegaly. No right heart strain.   New small pericardial effusion.   No large central saddle pulmonary embolus. There are lobar and segmental  emboli involving the right middle lobe, lingula, and left lower lobe. There are also additional segmental emboli involving the right upper and right lower lobes.   Mild coronary atherosclerosis.   Thoracic aorta and great vessels are atherosclerotic but otherwise patent without aneurysm. Great vessels are tortuous but otherwise patent.   UPPER ABDOMEN: Separate report.   CHEST WALL and OSSEOUS STRUCTURES: No suspicious osseous lesions. Multilevel degenerative changes of the thoracic spine.         1.  Positive study for acute pulmonary embolism. Multiple emboli involving lobar and segmental branches of both lungs. 2. Cardiomegaly with small pericardial effusion. No right heart strain otherwise. 3. Small layering pleural effusions. Increased bibasilar atelectasis or infiltrates. Superimposed infarct not excluded..     Signed by: Parveen High 8/24/2024 5:41 PM Dictation workstation:   [id number]    CT abdomen pelvis w IV contrast    Result Date: 8/24/2024  Interpreted By:  Parveen High, STUDY: CT ABDOMEN PELVIS W IV CONTRAST;  8/24/2024 5:01 pm   INDICATION: Signs/Symptoms:recent left nephrectomy, bowel resection with worsening pain.   COMPARISON: 07/01/2024   ACCESSION NUMBER(S): [id number]   ORDERING CLINICIAN: ALISON DAS   TECHNIQUE: CT of the abdomen and pelvis was performed.  Contiguous axial images were obtained at 3 mm slice thickness through the abdomen and pelvis. Coronal and sagittal reconstructions at 3 mm slice thickness were performed. 75 mL Omnipaque 350 administered intravenously without immediate complication.   FINDINGS: LOWER CHEST: Small pleural effusions with bibasilar atelectasis or infiltrates.   ABDOMEN:   LIVER: A few low-attenuation lesions are stable.   BILE DUCTS: Not dilated.   GALLBLADDER: Mildly contracted.   PANCREAS: Unremarkable   SPLEEN: Unremarkable   ADRENAL GLANDS: Unremarkable   KIDNEYS AND URETERS: Unremarkable right kidney without hydronephrosis. Left nephrectomy  changes.   PELVIS:   BLADDER: Partially distended. Small amount of internal air likely from previous instrumentation.   REPRODUCTIVE ORGANS: Uterus is absent.   BOWEL: Distal colonic anastomosis. Diverting loop ileostomy. Distended stomach and proximal small bowel with gradual tapering distally and no discrete transition point. Proximal small bowel is dilated to 3.7 cm. The colon is decompressed. Unremarkable appendix.    Unremarkable mesentery.     VESSELS: Aortoiliac system is patent without aneurysm.  Major visceral branches are patent.Mild atherosclerosis. IVC and major branches are grossly patent. Major portal venous branches are patent.   PERITONEUM AND RETROPERITONEUM: No free air. Scattered small volume ascites. There is peritoneal hyperenhancement in the pelvis. No abdominal or pelvic lymphadenopathy.   BONE AND ABDOMINAL WALL: Anasarca. Scattered subcutaneous emphysema. Degenerative changes of the spine.       1.  Interval left nephro ureterectomy, partial colonic resection, and ileostomy. 2. Increased distention of the stomach and proximal small bowel with gradual tapering distally and no discrete transition point to indicate obstruction. This could reflect ileus. 3. Small volume ascites without definite fluid collection. Peritoneal hyperenhancement in the pelvis raises the question of peritonitis. 4. Anasarca and postoperative extensive subcutaneous emphysema.   MACRO: None.   Signed by: Parveen High 8/24/2024 5:40 PM Dictation workstation:   [id number]    Interoperative monitoring - IOM    Result Date: 8/19/2024  SSEPs:  Throughout the surgical procedure the SSEPs remained stable and recordings at the end of the procedure did not differ significantly from the baseline. Free run EMG:  Traces showed  EMG activity in TA/GST which was reported to the surgeon Surface EEG:  Traces showed mixed frequencies throughout the procedure with no significant asymmetry and no epileptiform activity, and confirmed  Absence of suppression of EEG from anesthetic effect sufficient to compromise SSEP recordings. Results of the neurophysiologic monitoring were communicated to the surgical team throughout the procedure. This intraoperative neurophysiological monitoring study provided technically successful results, and did not demonstrate any potentially significant changes during the operative procedure. I personally reviewed the recordings and I agree with the findings as stated above.     XR chest 1 view    Result Date: 8/16/2024  Interpreted By:  Yousuf Tsai and Ritchie Brandon STUDY: XR CHEST 1 VIEW;  8/15/2024 5:09 pm   INDICATION: Signs/Symptoms:r/o PTX post subclavian line removal.   COMPARISON: CT chest 07/01/2024.   ACCESSION NUMBER(S): [id number]   ORDERING CLINICIAN: CHAPIS ROBLES   FINDINGS: AP radiograph of the chest was provided.   CARDIOMEDIASTINAL SILHOUETTE: Cardiomediastinal silhouette is mildly enlarged..   LUNGS: Mild central pulmonary vascular congestion and bronchovascular prominence. Hazy opacification involving the right mid lung zone, likely atelectasis. No evidence of pneumothorax or pleural effusion. Subcutaneous emphysematous changes along the right lateral chest wall. There is no significant pneumothorax identified.   ABDOMEN: No remarkable upper abdominal findings.   BONES: No acute osseous changes.       1.  Mild central pulmonary vascular congestion and bronchovascular prominence. Findings could reflect a degree of interstitial edema. 2. Right mid lung airspace opacity, likely representing atelectasis. Underlying infectious process can not definitively be excluded. 3. No evidence of pneumothorax.   I personally reviewed the images/study and I agree with the findings as stated by resident Chance Walker. This study was interpreted at [facility], [place], [place].   MACRO: None   Signed by: Yousuf Tsai 8/16/2024 9:30 AM Dictation workstation:    [id number]    Vascular US lower extremity vein mapping bilateral    Result Date: 8/8/2024           [address] [address] Tel [phone] and Fax [phone]  Vascular Lab Report [facility] US LOWER EXTREMITY VEIN MAPPING BILATERAL  Patient Name:      HARRIS LUNDBERG      Reading Physician:  [id number] Frida Harris MD, RPVI Study Date:        8/8/2024             Ordering Physician: [id number] ROLAND RAMIREZ MRN/PID:           [id number]             Technologist:       Anastasia [facility] Accession#:        [id number]         Technologist 2: Date of Birth/Age: 1950 / 74 years Encounter#:         [id number] Gender:            F Admission Status:  Outpatient           Location Performed: [facility]  Diagnosis/ICD: Encounter for other preprocedural examination-Z01.818; 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 Right lower extremity vein: Vein diameter measurements are as listed below. Left Lower Vein Mapping: The left great saphenous vein and small saphenous vein appear widely patent with no evidence of thrombosis or fibrosis. Left lower extremity vein: Vein diameter measurements are as listed below.  Imaging & Doppler Findings:  Right          Compress Thrombus  Diam SFJ              Yes      None   5.2 mm Prox Thigh GSV   Yes      None   2.8 mm Mid Thigh GSV    Yes      None   2.8 mm Knee GSV         Yes      None   2.7 mm Prox Calf GSV    Yes      None   2.3 mm Mid Calf GSV     Yes      None   2.0 mm Dist Calf GSV    Yes      None   2.3 mm SSV Prox         Yes      None   1.1 mm SSV Mid          Yes      None   1.9 mm SSV Distal       Yes      None   2.1 mm  Left           Compress Thrombus  Diam  "SFJ              Yes      None   4.7 mm Prox Thigh GSV   Yes      None   5.2 mm Mid Thigh GSV    Yes      None   3.4 mm Knee GSV         Yes      None   3.5 mm Prox Calf GSV    Yes      None   2.3 mm Mid Calf GSV     Yes      None   2.3 mm Dist Calf GSV    Yes      None   2.3 mm SSV Prox         Yes      None   1.5 mm SSV Mid          Yes      None   2.9 mm SSV Distal       Yes      None   2.0 mm  [id number] Frida Harris MD, RPVI Electronically signed by [id number] Frida Harris MD, RPVI on 8/8/2024 at 5:43:09 PM  ** Final **        Assessment/Plan   Assessment & Plan      Terra Alexis is a 74 y.o. female w/ Hx cervical cancer s/p chemoradiation+TAHBSO 1999, hypothyroidism, and left pelvic sidewall mass involving the sigmoid colon, iliac vessels, and ureter s/p left robotic nephroureterectomy, sigmoid colectomy, diverting loop ileostomy, and soft tissue mass resection on 8/15 w/ Drs Cody, Bertin, and Alec (final surgical pathology pending). Her recovery was overall uneventful and she was discharged on 8/21. She presented to the [facility] ED on 8/24 with 1d of abdominal pain, watery ileostomy output, and \"heartburn.\" Workup revealed hypoxia (improved on 2LNC) and CT showing bilateral lobar and segmental PEs as well as concern for ileus. She was transferred to [facility] for further management.    Her workup is most significant for evidence of bilateral PEs on CT with new mild oxygen requirement of 2LNC as well as symptoms and imaging consistent with ileus.     We again discussed the utility of NGT placement (patient declined at OSH). She remains adamant that she does not want an NGT and that she only wants medication to help her nausea.    Her CT also commented on possible peritonitis but her abdominal exam is reassuring with only appropriate tenderness over the recent surgical site. Will continue to monitor for signs/symptoms but clinical concern is overall low at this time.    Recommendations:  - Continue heparin drip " for PEs   - Diet per surgical oncology (patient refusing NGT at this time)  - Will continue to follow while in house  - Please page with any questions or concerns     To be discussed with attending: Dr.Ghandour Shemar Pollack MD   Urologic Surgery  Adult Urology: [id number]    Pediatric Urology: [id number]     ADDENDUM:    Updated recs:  - agree with admission and expectant management of ileus  - cont treatment of Pe's with heparin drip  - urology available for any further questions  - will cont to follow    Discussed with Dr. Cody Betts MD  Urology - PGY2  Pager [phone]; Peds pager [phone]

## 2024-08-25 NOTE — PROGRESS NOTES
Surgical Oncology Progress Note      08/25/24    Summary:  Terra Alexis is a 74F with a PMHx of hypothyroidism, cervical cancer s/p TAHBSO, and urothelial cancer s/p left partial nephrectomy, total ureterectomy and partial sigmoid colectomy w/ DLI on 8/15, who presents with PE and ileus.     Subjective    Subjective:  Admitted to surgical oncology. States she was feeling off since yesterday morning and by the evening started vomiting. An NGT was placed in the ED which had 1L of output dark, bilious output.        Objective    Objective:  Vital signs:   Temperature:  [36.6 °C (97.9 °F)-36.7 °C (98.1 °F)] 36.7 °C (98.1 °F)  Heart Rate:  [] 86  Respirations:  [12-20] 20  BP: (104-155)/(60-84) 114/71  FiO2 (%):  [28 %-36 %] 28 %    Physical Exam:  General: ill appearing, no acute distress  HEENT: atraumatic, normocephalic  CV: regular rate and rhythm   Pulm: non-labored breathing on NC  GI: abdomen soft, moderately distended, mildly tender to palpation near incision areas, no signs of peritonitis. Laparotomy and port site incisions closed with staples, all c/d/I. Ileostomy stoma pink and viable with watery dark output, gas present in stoma appliance.   : deferred  Skin: warm, dry  Extremities: GONZALEZ  Psych: appropriate mood and affect    No intake/output data recorded.     Labs:  Lab Results   Component Value Date    WBC 10.9 08/24/2024    HGB 11.6 (L) 08/24/2024    HCT 36.0 08/24/2024    MCV 95 08/24/2024     08/24/2024     Lab Results   Component Value Date    GLUCOSE 120 (H) 08/24/2024    CALCIUM 10.1 08/24/2024     (L) 08/24/2024    K 4.0 08/24/2024    CO2 29 08/24/2024    CL 95 (L) 08/24/2024    BUN 10 08/24/2024    CREATININE 1.17 (H) 08/24/2024     Lab Results   Component Value Date    ALT 13 08/24/2024    AST 28 08/24/2024    ALKPHOS 112 08/24/2024    BILITOT 0.5 08/24/2024        Imaging:  XR abdomen 1 view    Result Date: 8/25/2024  Interpreted By:  Nathan Denson, STUDY: XR ABDOMEN 1  VIEW   INDICATION: Signs/Symptoms:NG tube placement.   COMPARISON: CT performed August 24   ACCESSION NUMBER(S): [id number]   ORDERING CLINICIAN: JASMYN VALADEZ   FINDINGS: Nasogastric tube over the gastric fundus.   Band of airspace disease left lung similar to prior CT.       Nasogastric tube over the gastric fundus.   Signed by: Nathan Denson 8/25/2024 9:03 AM Dictation workstation:   [id number]    Lower extremity venous duplex left    Result Date: 8/24/2024  Interpreted By:  Tank Leyva, STUDY: [facility] US LOWER EXTREMITY VENOUS DUPLEX LEFT;  8/24/2024 5:37 pm   INDICATION: Signs/Symptoms:LLE swelling s/p surgery.   COMPARISON: None.   ACCESSION NUMBER(S): [id number]   ORDERING CLINICIAN: ALISON DAS   TECHNIQUE: Grayscale, color and spectral Doppler sonographic images of the left lower extremity deep venous system. The right common femoral vein was imaged for comparison.   FINDINGS: THIGH VEINS: There is normal compressibility of the left common femoral vein, saphenous-femoral junction, femoral vein and popliteal vein. There is normal spontaneous and phasic variation by spectral doppler.   CALF VEINS: The posterior tibial and peroneal veins demonstrate normal color flow and compressibility.   CONTRALATERAL COMPARISON: The right common femoral vein is patent       No evidence of acute DVT in the left lower extremity.     MACRO: None.   Signed by: Tank Leyva 8/24/2024 5:59 PM Dictation workstation:   [id number]    CT angio chest for pulmonary embolism    Result Date: 8/24/2024  Interpreted By:  Parveen High, STUDY: CT ANGIO CHEST FOR PULMONARY EMBOLISM;  8/24/2024 5:01 pm   INDICATION: Signs/Symptoms:recent surgery, chest tightness, hypoxic.   COMPARISON: 07/01/2024   ACCESSION NUMBER(S): [id number]   ORDERING CLINICIAN: ALISON DAS   TECHNIQUE: Helical data acquisition of the chest was obtained with  75 mL Omnipaque 350. Images were reformatted in axial, coronal, and sagittal  planes.MIP reformatted images were also generated.   FINDINGS: LUNGS and AIRWAYS: Small layering pleural effusions. Bands of bibasilar atelectasis have increased.   Central airways are patent. No bronchiectasis.   MEDIASTINUM and MEI, LOWER NECK AND AXILLA: The visualized thyroid gland is grossly unremarkable.   No thoracic lymphadenopathy by CT criteria.   Esophagus is not dilated.   HEART and VESSELS: Severe cardiomegaly. No right heart strain.   New small pericardial effusion.   No large central saddle pulmonary embolus. There are lobar and segmental emboli involving the right middle lobe, lingula, and left lower lobe. There are also additional segmental emboli involving the right upper and right lower lobes.   Mild coronary atherosclerosis.   Thoracic aorta and great vessels are atherosclerotic but otherwise patent without aneurysm. Great vessels are tortuous but otherwise patent.   UPPER ABDOMEN: Separate report.   CHEST WALL and OSSEOUS STRUCTURES: No suspicious osseous lesions. Multilevel degenerative changes of the thoracic spine.         1.  Positive study for acute pulmonary embolism. Multiple emboli involving lobar and segmental branches of both lungs. 2. Cardiomegaly with small pericardial effusion. No right heart strain otherwise. 3. Small layering pleural effusions. Increased bibasilar atelectasis or infiltrates. Superimposed infarct not excluded..     Signed by: Parveen High 8/24/2024 5:41 PM Dictation workstation:   [id number]    CT abdomen pelvis w IV contrast    Result Date: 8/24/2024  Interpreted By:  Parveen High, STUDY: CT ABDOMEN PELVIS W IV CONTRAST;  8/24/2024 5:01 pm   INDICATION: Signs/Symptoms:recent left nephrectomy, bowel resection with worsening pain.   COMPARISON: 07/01/2024   ACCESSION NUMBER(S): [id number]   ORDERING CLINICIAN: ALISON DAS   TECHNIQUE: CT of the abdomen and pelvis was performed.  Contiguous axial images were obtained at 3 mm slice thickness through the  abdomen and pelvis. Coronal and sagittal reconstructions at 3 mm slice thickness were performed. 75 mL Omnipaque 350 administered intravenously without immediate complication.   FINDINGS: LOWER CHEST: Small pleural effusions with bibasilar atelectasis or infiltrates.   ABDOMEN:   LIVER: A few low-attenuation lesions are stable.   BILE DUCTS: Not dilated.   GALLBLADDER: Mildly contracted.   PANCREAS: Unremarkable   SPLEEN: Unremarkable   ADRENAL GLANDS: Unremarkable   KIDNEYS AND URETERS: Unremarkable right kidney without hydronephrosis. Left nephrectomy changes.   PELVIS:   BLADDER: Partially distended. Small amount of internal air likely from previous instrumentation.   REPRODUCTIVE ORGANS: Uterus is absent.   BOWEL: Distal colonic anastomosis. Diverting loop ileostomy. Distended stomach and proximal small bowel with gradual tapering distally and no discrete transition point. Proximal small bowel is dilated to 3.7 cm. The colon is decompressed. Unremarkable appendix.    Unremarkable mesentery.     VESSELS: Aortoiliac system is patent without aneurysm.  Major visceral branches are patent.Mild atherosclerosis. IVC and major branches are grossly patent. Major portal venous branches are patent.   PERITONEUM AND RETROPERITONEUM: No free air. Scattered small volume ascites. There is peritoneal hyperenhancement in the pelvis. No abdominal or pelvic lymphadenopathy.   BONE AND ABDOMINAL WALL: Anasarca. Scattered subcutaneous emphysema. Degenerative changes of the spine.       1.  Interval left nephro ureterectomy, partial colonic resection, and ileostomy. 2. Increased distention of the stomach and proximal small bowel with gradual tapering distally and no discrete transition point to indicate obstruction. This could reflect ileus. 3. Small volume ascites without definite fluid collection. Peritoneal hyperenhancement in the pelvis raises the question of peritonitis. 4. Anasarca and postoperative extensive subcutaneous  emphysema.   MACRO: None.   Signed by: Parveen High 8/24/2024 5:40 PM Dictation workstation:   [id number]         Assessment/Plan    Assessment and Plan:  Terra Alexis is a 74F with a PMHx of hypothyroidism, cervical cancer s/p TAHBSO, and urothelial cancer s/p left partial nephrectomy, total ureterectomy and partial sigmoid colectomy w/ DLI on 8/15, who presents with PE and ileus.     Plan Today:     Neuro:  - Multimodal pain: acetaminophen PRN    CV:  - q4VS  - continue heparin gtt   - will need vascular consult when ready for discharge to follow up management of PE     Pulmonary:  -encourage incentive spirometry    GI:  - Maintain NGT to LIWS  - Wound/ostomy nursing consult   -  Nausea- zofran PRN; reglan PRN    :  -Urology following; appreciate recs:  - UA growing budding yeast; per urology 1x dose of IV fluconazole 200 mg.    ID:  - See  for mgmt of UTI     HEME:  -no acute concerns    ENDO:  -continue home synthroid    FEN:  -IVF LR 75ml/h  -replete electrolytes PRN    PROPHYLAXIS: SCDs, PPI    DISPO: OT/PT, floor status    Patient's exam, labs, and findings discussed and seen with Dr. Sebastian, who agrees with the plan as described above.      Rene Wright MD  PGY-1 General Surgery  Surgical Oncology [id number]

## 2024-08-25 NOTE — ED PROVIDER NOTES
"Emergency Department Provider Note        History of Present Illness     History provided by: Patient  Limitations to History: None  External Records Reviewed with Brief Summary:  ED note from 8/24 when patient was seen in [facility] prior to transfer to [facility].  Patient was worked up for abdominal pain in the setting of recent surgery on August 15, patient underwent open left partial nephrectomy and total ureterectomy with urology and segmental sigmoid resection with diverting ileostomy with surgery.  Patient was seen in [facility] for N/V and \"heartburn\" with severe abdominal pain poorly controlled by the pain medication she was discharged with (discharged on 8/21).     At [facility] the patient had a full workup which was notable for radiographic signs of peritonitis and ileus as well as multiple lobar and segmental pulmonary emboli  HPI:  Terra Alexis is a 74 y.o. female with the above surgeries on 8/15 and medical history of cervical cancer, urothelial invasive carcinoma with last chemotherapy session approximately 3.5 weeks ago.   Patient continues to deny headaches and fevers, chest pain is primarily burning \"heartburn\" pain, denies shortness of breath.  Patient is still reporting nausea and notes copious vomiting prior to arrival at [facility]    Physical Exam   Triage vitals:  T 36.7 °C (98.1 °F)  HR 80  /78  RR 20  O2 96 % Supplemental oxygen  General: Awake, alert, in no acute distress, appears chronically ill, non-diaphoretic  Eyes: Gaze conjugate.  No scleral icterus or injection  HENT: Normo-cephalic, atraumatic. No stridor  CV: Regular rate, regular rhythm.  Capillary refill <2 seconds in hands bilaterally.  Radial pulses +2 bilateral  Resp: Breathing non-labored, speaking in full sentences.  Clear to auscultation bilaterally  GI: Mild rigidity, many postsurgical wounds, appear well-approximated and not infected.  Patient is exquisitely tender in the left lower quadrant of the abdomen.  Mild tenderness " throughout but difficult to appropriately differentiate abdominal pain versus cutaneous pain 2/2 s/p laparotomy, possible rebound tenderness again difficult to complete a full exam.  MSK/Extremities: No gross bony deformities. Moving all extremities   Skin: Warm. Appropriate color   Neuro: Alert. Oriented. Face symmetric. Speech is fluent.  Gross strength and sensation intact in b/l UE and LEs  Psych: Appropriate mood and affect     Medical Decision Making & ED Course   Medical Decision Makin y.o. female with history of cervical and bladder cancer with full workup prior to arrival.  Patient is showing radiographic signs of peritonitis and ileus with no evidence of SBO; additionally, the patient has evidence of multiple pulmonary emboli (lobar and segmental) in the setting of recent abdominal surgery with multiple procedures performed.  In the ED we treated the patient for nausea with Zofran and heartburn symptoms which she reports was well-controlled with Pepcid administered at [facility]  ----     Social Determinants of Health which Significantly Impact Care: None identified     EKG Independent Interpretation: EKG obtained on  at 1746 at previous facility shows normal sinus rhythm with ventricular rate of 86, upright axis, all intervals normal.  Decreased inferior and anterolateral QRS amplitude and repolarization is noted which may signify previous infarct.  Additionally, leads V1 and V2 are notable for deep S waves with significant negative amplitude.    Independent Result Review and Interpretation:  All labs are obtained and interpreted under [facility] care    Chronic conditions affecting the patient's care: As documented above in [facility]    The patient was discussed with the following consultants/services:  ACS and urology    Care Considerations: As documented above in [facility]    ED Course:     Disposition   As a result of their workup, the patient will require admission to the hospital.  The patient was informed  of her diagnosis.  The patient was given the opportunity to ask questions and I answered them. The patient agreed to be admitted to the hospital.    Procedures   Procedures    Patient seen and discussed with ED attending physician.    Jamaal Polanco MD  Emergency Medicine       Jamaal Polanco MD  Resident  08/25/24 [id number]

## 2024-08-26 ENCOUNTER — APPOINTMENT (OUTPATIENT)
Dept: RADIOLOGY | Facility: HOSPITAL | Age: 74
End: 2024-08-26
Payer: MEDICARE

## 2024-08-26 ENCOUNTER — APPOINTMENT (OUTPATIENT)
Dept: CARDIOLOGY | Facility: HOSPITAL | Age: 74
End: 2024-08-26
Payer: MEDICARE

## 2024-08-26 LAB
ALBUMIN SERPL BCP-MCNC: 3 G/DL (ref 3.4–5)
ALBUMIN SERPL BCP-MCNC: 3.2 G/DL (ref 3.4–5)
ALP SERPL-CCNC: 107 U/L (ref 33–136)
ALP SERPL-CCNC: 94 U/L (ref 33–136)
ALT SERPL W P-5'-P-CCNC: 6 U/L (ref 7–45)
ALT SERPL W P-5'-P-CCNC: 7 U/L (ref 7–45)
ANION GAP SERPL CALC-SCNC: 14 MMOL/L (ref 10–20)
ANION GAP SERPL CALC-SCNC: 14 MMOL/L (ref 10–20)
AORTIC VALVE PEAK VELOCITY: 1.24 M/S
AST SERPL W P-5'-P-CCNC: 14 U/L (ref 9–39)
AST SERPL W P-5'-P-CCNC: 17 U/L (ref 9–39)
ATRIAL RATE: 86 BPM
AV PEAK GRADIENT: 6.2 MMHG
AVA (PEAK VEL): 1.57 CM2
BILIRUB DIRECT SERPL-MCNC: 0.1 MG/DL (ref 0–0.3)
BILIRUB SERPL-MCNC: 0.5 MG/DL (ref 0–1.2)
BILIRUB SERPL-MCNC: 0.5 MG/DL (ref 0–1.2)
BUN SERPL-MCNC: 13 MG/DL (ref 6–23)
BUN SERPL-MCNC: 13 MG/DL (ref 6–23)
CALCIUM SERPL-MCNC: 8.5 MG/DL (ref 8.6–10.6)
CALCIUM SERPL-MCNC: 8.6 MG/DL (ref 8.6–10.6)
CHLORIDE SERPL-SCNC: 97 MMOL/L (ref 98–107)
CHLORIDE SERPL-SCNC: 98 MMOL/L (ref 98–107)
CO2 SERPL-SCNC: 28 MMOL/L (ref 21–32)
CO2 SERPL-SCNC: 29 MMOL/L (ref 21–32)
CREAT SERPL-MCNC: 1.18 MG/DL (ref 0.5–1.05)
CREAT SERPL-MCNC: 1.25 MG/DL (ref 0.5–1.05)
EGFRCR SERPLBLD CKD-EPI 2021: 46 ML/MIN/1.73M*2
EGFRCR SERPLBLD CKD-EPI 2021: 49 ML/MIN/1.73M*2
EJECTION FRACTION APICAL 4 CHAMBER: 47.3
EJECTION FRACTION: 58 %
ERYTHROCYTE [DISTWIDTH] IN BLOOD BY AUTOMATED COUNT: 17.7 % (ref 11.5–14.5)
ERYTHROCYTE [DISTWIDTH] IN BLOOD BY AUTOMATED COUNT: 17.9 % (ref 11.5–14.5)
GLUCOSE SERPL-MCNC: 71 MG/DL (ref 74–99)
GLUCOSE SERPL-MCNC: 83 MG/DL (ref 74–99)
HCT VFR BLD AUTO: 27 % (ref 36–46)
HCT VFR BLD AUTO: 28.8 % (ref 36–46)
HGB BLD-MCNC: 9 G/DL (ref 12–16)
HGB BLD-MCNC: 9.1 G/DL (ref 12–16)
LEFT ATRIUM VOLUME AREA LENGTH INDEX BSA: 43.2 ML/M2
LEFT VENTRICLE INTERNAL DIMENSION DIASTOLE: 4.2 CM (ref 3.5–6)
LEFT VENTRICULAR OUTFLOW TRACT DIAMETER: 1.8 CM
MAGNESIUM SERPL-MCNC: 1.93 MG/DL (ref 1.6–2.4)
MAGNESIUM SERPL-MCNC: 2.01 MG/DL (ref 1.6–2.4)
MCH RBC QN AUTO: 30.7 PG (ref 26–34)
MCH RBC QN AUTO: 30.7 PG (ref 26–34)
MCHC RBC AUTO-ENTMCNC: 31.6 G/DL (ref 32–36)
MCHC RBC AUTO-ENTMCNC: 33.3 G/DL (ref 32–36)
MCV RBC AUTO: 92 FL (ref 80–100)
MCV RBC AUTO: 97 FL (ref 80–100)
MITRAL VALVE E/A RATIO: 0.96
NRBC BLD-RTO: 0 /100 WBCS (ref 0–0)
NRBC BLD-RTO: 0 /100 WBCS (ref 0–0)
P AXIS: 51 DEGREES
P OFFSET: 187 MS
P ONSET: 135 MS
PHOSPHATE SERPL-MCNC: 3.8 MG/DL (ref 2.5–4.9)
PLATELET # BLD AUTO: 263 X10*3/UL (ref 150–450)
PLATELET # BLD AUTO: 290 X10*3/UL (ref 150–450)
POTASSIUM SERPL-SCNC: 3.3 MMOL/L (ref 3.5–5.3)
POTASSIUM SERPL-SCNC: 3.4 MMOL/L (ref 3.5–5.3)
PR INTERVAL: 164 MS
PROT SERPL-MCNC: 5.2 G/DL (ref 6.4–8.2)
PROT SERPL-MCNC: 5.8 G/DL (ref 6.4–8.2)
Q ONSET: 217 MS
QRS COUNT: 14 BEATS
QRS DURATION: 80 MS
QT INTERVAL: 328 MS
QTC CALCULATION(BAZETT): 392 MS
QTC FREDERICIA: 370 MS
R AXIS: -8 DEGREES
RBC # BLD AUTO: 2.93 X10*6/UL (ref 4–5.2)
RBC # BLD AUTO: 2.96 X10*6/UL (ref 4–5.2)
RIGHT VENTRICLE FREE WALL PEAK S': 11.7 CM/S
RIGHT VENTRICLE PEAK SYSTOLIC PRESSURE: 24.5 MMHG
SODIUM SERPL-SCNC: 137 MMOL/L (ref 136–145)
SODIUM SERPL-SCNC: 137 MMOL/L (ref 136–145)
T AXIS: 199 DEGREES
T OFFSET: 381 MS
TRICUSPID ANNULAR PLANE SYSTOLIC EXCURSION: 2.6 CM
UFH PPP CHRO-ACNC: 0.4 IU/ML
VENTRICULAR RATE: 86 BPM
WBC # BLD AUTO: 6.6 X10*3/UL (ref 4.4–11.3)
WBC # BLD AUTO: 7.1 X10*3/UL (ref 4.4–11.3)

## 2024-08-26 PROCEDURE — 99222 1ST HOSP IP/OBS MODERATE 55: CPT | Performed by: INTERNAL MEDICINE

## 2024-08-26 PROCEDURE — 82247 BILIRUBIN TOTAL: CPT

## 2024-08-26 PROCEDURE — 74018 RADEX ABDOMEN 1 VIEW: CPT

## 2024-08-26 PROCEDURE — 85520 HEPARIN ASSAY: CPT | Performed by: EMERGENCY MEDICINE

## 2024-08-26 PROCEDURE — 83735 ASSAY OF MAGNESIUM: CPT

## 2024-08-26 PROCEDURE — 84100 ASSAY OF PHOSPHORUS: CPT

## 2024-08-26 PROCEDURE — 1200000003 HC ONCOLOGY  ROOM WITH TELEMETRY DAILY

## 2024-08-26 PROCEDURE — 2550000001 HC RX 255 CONTRASTS: Performed by: NURSE PRACTITIONER

## 2024-08-26 PROCEDURE — 2500000004 HC RX 250 GENERAL PHARMACY W/ HCPCS (ALT 636 FOR OP/ED)

## 2024-08-26 PROCEDURE — 2500000004 HC RX 250 GENERAL PHARMACY W/ HCPCS (ALT 636 FOR OP/ED): Performed by: STUDENT IN AN ORGANIZED HEALTH CARE EDUCATION/TRAINING PROGRAM

## 2024-08-26 PROCEDURE — 93971 EXTREMITY STUDY: CPT | Performed by: RADIOLOGY

## 2024-08-26 PROCEDURE — 36415 COLL VENOUS BLD VENIPUNCTURE: CPT | Performed by: EMERGENCY MEDICINE

## 2024-08-26 PROCEDURE — A9698 NON-RAD CONTRAST MATERIALNOC: HCPCS | Performed by: NURSE PRACTITIONER

## 2024-08-26 PROCEDURE — 85027 COMPLETE CBC AUTOMATED: CPT

## 2024-08-26 PROCEDURE — 74018 RADEX ABDOMEN 1 VIEW: CPT | Performed by: RADIOLOGY

## 2024-08-26 PROCEDURE — 36415 COLL VENOUS BLD VENIPUNCTURE: CPT

## 2024-08-26 PROCEDURE — 82310 ASSAY OF CALCIUM: CPT

## 2024-08-26 PROCEDURE — 93306 TTE W/DOPPLER COMPLETE: CPT | Performed by: INTERNAL MEDICINE

## 2024-08-26 PROCEDURE — 2500000004 HC RX 250 GENERAL PHARMACY W/ HCPCS (ALT 636 FOR OP/ED): Performed by: NURSE PRACTITIONER

## 2024-08-26 PROCEDURE — 93971 EXTREMITY STUDY: CPT

## 2024-08-26 PROCEDURE — 84075 ASSAY ALKALINE PHOSPHATASE: CPT

## 2024-08-26 PROCEDURE — 93306 TTE W/DOPPLER COMPLETE: CPT

## 2024-08-26 RX ORDER — ENOXAPARIN SODIUM 100 MG/ML
40 INJECTION SUBCUTANEOUS DAILY
Qty: 30 EACH | Refills: 5 | Status: SHIPPED | OUTPATIENT
Start: 2024-08-26 | End: 2024-08-27 | Stop reason: HOSPADM

## 2024-08-26 RX ORDER — METOCLOPRAMIDE HYDROCHLORIDE 5 MG/ML
5 INJECTION INTRAMUSCULAR; INTRAVENOUS EVERY 6 HOURS SCHEDULED
Status: DISCONTINUED | OUTPATIENT
Start: 2024-08-26 | End: 2024-08-28

## 2024-08-26 RX ORDER — POTASSIUM CHLORIDE 14.9 MG/ML
20 INJECTION INTRAVENOUS
Status: COMPLETED | OUTPATIENT
Start: 2024-08-26 | End: 2024-08-26

## 2024-08-26 RX ORDER — ACETAMINOPHEN 10 MG/ML
1000 INJECTION, SOLUTION INTRAVENOUS 2 TIMES DAILY PRN
Status: DISCONTINUED | OUTPATIENT
Start: 2024-08-26 | End: 2024-08-28

## 2024-08-26 ASSESSMENT — COGNITIVE AND FUNCTIONAL STATUS - GENERAL
STANDING UP FROM CHAIR USING ARMS: A LITTLE
MOVING TO AND FROM BED TO CHAIR: A LITTLE
DAILY ACTIVITIY SCORE: 19
WALKING IN HOSPITAL ROOM: A LITTLE
MOVING FROM LYING ON BACK TO SITTING ON SIDE OF FLAT BED WITH BEDRAILS: A LITTLE
CLIMB 3 TO 5 STEPS WITH RAILING: A LITTLE
TOILETING: A LITTLE
DRESSING REGULAR UPPER BODY CLOTHING: A LITTLE
PERSONAL GROOMING: A LITTLE
TURNING FROM BACK TO SIDE WHILE IN FLAT BAD: A LITTLE
HELP NEEDED FOR BATHING: A LITTLE
MOBILITY SCORE: 18
DRESSING REGULAR LOWER BODY CLOTHING: A LITTLE

## 2024-08-26 ASSESSMENT — PAIN SCALES - GENERAL
PAINLEVEL_OUTOF10: 6
PAINLEVEL_OUTOF10: 4

## 2024-08-26 ASSESSMENT — PAIN - FUNCTIONAL ASSESSMENT
PAIN_FUNCTIONAL_ASSESSMENT: 0-10
PAIN_FUNCTIONAL_ASSESSMENT: 0-10

## 2024-08-26 NOTE — PROGRESS NOTES
[facility]  Progress Note      Patient: Terra Alexis  Age/Sex: 74 y.o., female  MRN: [id number]  Date of surgery: 8/15/24  Admit Date: 8/25/2024   Code Status: Full Code  Length of Stay: 1      Interval History/Overnight Events:   NAEON  Afebrile, VSS, on NC   Denies any fevers, chills, nausea or vomiting  Ostomy with stool and gas  On hep gtt   Currently NPO with NGT       Objective  08/24 1900 - 08/26 0659  In: -   Out: 1800       Medications:  Current Facility-Administered Medications   Medication Dose Route Frequency Provider Last Rate Last Admin    acetaminophen (Ofirmev) injection 1,000 mg  1,000 mg intravenous BID PRN Rene Wright MD        heparin 25,000 Units in dextrose 5% 250 mL (100 Units/mL) infusion (premix)  0-4,500 Units/hr intravenous Continuous Merissa High MD 7 mL/hr at 08/25/24 2322 700 Units/hr at 08/25/24 2322    heparin bolus from bag 2,000-4,000 Units  2,000-4,000 Units intravenous q4h PRN Merissa High MD        lactated Ringer's bolus 500 mL  500 mL intravenous Once ISSAC Rincon 250 mL/hr at 08/26/24 0820 500 mL at 08/26/24 0820    lactated Ringer's infusion  75 mL/hr intravenous Continuous Merissa High MD 75 mL/hr at 08/25/24 2321 75 mL/hr at 08/25/24 2321    levothyroxine (Synthroid, Levoxyl) tablet 25 mcg  25 mcg oral Daily Merissa High MD        metoclopramide (Reglan) injection 5 mg  5 mg intravenous q6h PRN Merissa High MD   5 mg at 08/25/24 0412    ondansetron (Zofran) injection 4 mg  4 mg intravenous q6h PRN Rene Wright MD        pantoprazole (ProtoNix) injection 40 mg  40 mg intravenous Daily Rene Wright MD   40 mg at 08/25/24 1312    perflutren lipid microspheres (Definity) injection 0.5-10 mL of dilution  0.5-10 mL of dilution intravenous Once in imaging BASIL Rincon-CNP        perflutren protein A microsphere (Optison) injection 0.5 mL  0.5 mL intravenous Once in imaging ISSAC Rincon        potassium chloride 20 mEq  in sterile water for injection 100 mL  20 mEq intravenous q2h ISSAC Rincon 50 mL/hr at 08/26/24 0815 20 mEq at 08/26/24 0815    sulfur hexafluoride microsphr (Lumason) injection 24.28 mg  2 mL intravenous Once in imaging ISSAC Rincon           Vitals:    08/1950 08/25/24 2316 08/26/24 0128 08/26/24 0611   BP: 110/66 110/65 110/68 119/58   BP Location:   Left arm Left arm   Patient Position:   Lying Lying   Pulse: 79 66 63 58   Resp: 20 16 15 15   Temp: 36.5 °C (97.7 °F) 36.1 °C (97 °F) 36 °C (96.8 °F) 35.8 °C (96.4 °F)   TempSrc: Temporal Temporal Temporal Temporal   SpO2: 94% 97% 97% 98%   Weight:       Height:                    9.1     7.1>-----<290              28.8   137  97  13                  ----------------<83     3.3  29  1.25          Ca 8.5 Phos 3.8 Mg 1.93       ALT 7 AST 14 AlkPhos 94 tBili 0.5            Physical Exam                                                                                                                         Gen -  No acute distress     Neuro - Alert, oriented, conversant     CV -  Regular rate     Pulm - Symmetric chest rise, non-labored breathing on NC      Abd - Soft, midly-distended, mild tenderness left of incision. Midline incision c/d/I with staples. Ileostomy stoma pink and with stool and gas in bag.       Ext - Warm, well perfused     Skin - without jaunice       Psych - appropriate tone, affect      - No CVA or suprapubic tenderness       Imaging  ECG 12 lead    Result Date: 8/26/2024  Normal sinus rhythm Inferior infarct , age undetermined Anterolateral infarct , age undetermined Abnormal ECG When compared with ECG of 01-MAR-2024 10:23, Anterior infarct is now Present Anterolateral infarct is now Present Inferior infarct is now Present Nonspecific T wave abnormality now evident in Anterolateral leads QT has shortened    XR abdomen 1 view    Result Date: 8/25/2024  Interpreted By:  Nathan Denson, STUDY: XR ABDOMEN 1 VIEW    INDICATION: Signs/Symptoms:NG tube placement.   COMPARISON: CT performed August 24   ACCESSION NUMBER(S): [id number]   ORDERING CLINICIAN: JASMYN VALADEZ   FINDINGS: Nasogastric tube over the gastric fundus.   Band of airspace disease left lung similar to prior CT.       Nasogastric tube over the gastric fundus.   Signed by: Nathan Denson 8/25/2024 9:03 AM Dictation workstation:   [id number]    Lower extremity venous duplex left    Result Date: 8/24/2024  Interpreted By:  Tank Leyva, STUDY: [facility] US LOWER EXTREMITY VENOUS DUPLEX LEFT;  8/24/2024 5:37 pm   INDICATION: Signs/Symptoms:LLE swelling s/p surgery.   COMPARISON: None.   ACCESSION NUMBER(S): [id number]   ORDERING CLINICIAN: ALISON DAS   TECHNIQUE: Grayscale, color and spectral Doppler sonographic images of the left lower extremity deep venous system. The right common femoral vein was imaged for comparison.   FINDINGS: THIGH VEINS: There is normal compressibility of the left common femoral vein, saphenous-femoral junction, femoral vein and popliteal vein. There is normal spontaneous and phasic variation by spectral doppler.   CALF VEINS: The posterior tibial and peroneal veins demonstrate normal color flow and compressibility.   CONTRALATERAL COMPARISON: The right common femoral vein is patent       No evidence of acute DVT in the left lower extremity.     MACRO: None.   Signed by: Tank Leyva 8/24/2024 5:59 PM Dictation workstation:   [id number]    CT angio chest for pulmonary embolism    Result Date: 8/24/2024  Interpreted By:  Parveen High, STUDY: CT ANGIO CHEST FOR PULMONARY EMBOLISM;  8/24/2024 5:01 pm   INDICATION: Signs/Symptoms:recent surgery, chest tightness, hypoxic.   COMPARISON: 07/01/2024   ACCESSION NUMBER(S): [id number]   ORDERING CLINICIAN: ALISON DAS   TECHNIQUE: Helical data acquisition of the chest was obtained with  75 mL Omnipaque 350. Images were reformatted in axial, coronal, and sagittal planes.MIP  reformatted images were also generated.   FINDINGS: LUNGS and AIRWAYS: Small layering pleural effusions. Bands of bibasilar atelectasis have increased.   Central airways are patent. No bronchiectasis.   MEDIASTINUM and MEI, LOWER NECK AND AXILLA: The visualized thyroid gland is grossly unremarkable.   No thoracic lymphadenopathy by CT criteria.   Esophagus is not dilated.   HEART and VESSELS: Severe cardiomegaly. No right heart strain.   New small pericardial effusion.   No large central saddle pulmonary embolus. There are lobar and segmental emboli involving the right middle lobe, lingula, and left lower lobe. There are also additional segmental emboli involving the right upper and right lower lobes.   Mild coronary atherosclerosis.   Thoracic aorta and great vessels are atherosclerotic but otherwise patent without aneurysm. Great vessels are tortuous but otherwise patent.   UPPER ABDOMEN: Separate report.   CHEST WALL and OSSEOUS STRUCTURES: No suspicious osseous lesions. Multilevel degenerative changes of the thoracic spine.         1.  Positive study for acute pulmonary embolism. Multiple emboli involving lobar and segmental branches of both lungs. 2. Cardiomegaly with small pericardial effusion. No right heart strain otherwise. 3. Small layering pleural effusions. Increased bibasilar atelectasis or infiltrates. Superimposed infarct not excluded..     Signed by: Parveen High 8/24/2024 5:41 PM Dictation workstation:   [id number]    CT abdomen pelvis w IV contrast    Result Date: 8/24/2024  Interpreted By:  Parveen High, STUDY: CT ABDOMEN PELVIS W IV CONTRAST;  8/24/2024 5:01 pm   INDICATION: Signs/Symptoms:recent left nephrectomy, bowel resection with worsening pain.   COMPARISON: 07/01/2024   ACCESSION NUMBER(S): [id number]   ORDERING CLINICIAN: ALISON ADS   TECHNIQUE: CT of the abdomen and pelvis was performed.  Contiguous axial images were obtained at 3 mm slice thickness through the abdomen and  pelvis. Coronal and sagittal reconstructions at 3 mm slice thickness were performed. 75 mL Omnipaque 350 administered intravenously without immediate complication.   FINDINGS: LOWER CHEST: Small pleural effusions with bibasilar atelectasis or infiltrates.   ABDOMEN:   LIVER: A few low-attenuation lesions are stable.   BILE DUCTS: Not dilated.   GALLBLADDER: Mildly contracted.   PANCREAS: Unremarkable   SPLEEN: Unremarkable   ADRENAL GLANDS: Unremarkable   KIDNEYS AND URETERS: Unremarkable right kidney without hydronephrosis. Left nephrectomy changes.   PELVIS:   BLADDER: Partially distended. Small amount of internal air likely from previous instrumentation.   REPRODUCTIVE ORGANS: Uterus is absent.   BOWEL: Distal colonic anastomosis. Diverting loop ileostomy. Distended stomach and proximal small bowel with gradual tapering distally and no discrete transition point. Proximal small bowel is dilated to 3.7 cm. The colon is decompressed. Unremarkable appendix.    Unremarkable mesentery.     VESSELS: Aortoiliac system is patent without aneurysm.  Major visceral branches are patent.Mild atherosclerosis. IVC and major branches are grossly patent. Major portal venous branches are patent.   PERITONEUM AND RETROPERITONEUM: No free air. Scattered small volume ascites. There is peritoneal hyperenhancement in the pelvis. No abdominal or pelvic lymphadenopathy.   BONE AND ABDOMINAL WALL: Anasarca. Scattered subcutaneous emphysema. Degenerative changes of the spine.       1.  Interval left nephro ureterectomy, partial colonic resection, and ileostomy. 2. Increased distention of the stomach and proximal small bowel with gradual tapering distally and no discrete transition point to indicate obstruction. This could reflect ileus. 3. Small volume ascites without definite fluid collection. Peritoneal hyperenhancement in the pelvis raises the question of peritonitis. 4. Anasarca and postoperative extensive subcutaneous emphysema.    "MACRO: None.   Signed by: Parveen High 8/24/2024 5:40 PM Dictation workstation:   [id number]      Assessment & Plan  Terra Alexis is a 74 y.o. 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 Her recovery was overall uneventful and she was discharged on 8/21. She presented to the [facility] ED on 8/24 with 1d of abdominal pain, watery ileostomy output, and \"heartburn.\" Workup revealed hypoxia (improved on 2LNC) and CT showing bilateral lobar and segmental PEs as well as concern for ileus. She was transferred to [facility] for further management.     Her workup is most significant for evidence of bilateral PEs on CT with new mild oxygen requirement of 2LNC as well as symptoms and imaging consistent with ileus.      We again discussed the utility of NGT placement (patient declined at OSH). She remains adamant that she does not want an NGT and that she only wants medication to help her nausea.     Her CT also commented on possible peritonitis but her abdominal exam is reassuring with only appropriate tenderness over the recent surgical site. Will continue to monitor for signs/symptoms but clinical concern is overall low at this time.    PLAN:  -Management of ileus/NGT per surgical oncology   -cont treatment of PE's with heparin gtt  - urology available for any further questions  - will cont to follow    Assessment and plan discussed with chief resident Dr. Roy and attending Dr. Cody Menchaca MD, [facility]   [facility] [place]  Adult Urology Pager: [phone]  Pediatric Urology Pager: [phone]      "

## 2024-08-26 NOTE — PROGRESS NOTES
Surgical Oncology Progress Note      08/26/24    Summary:  Terra Alexis is a 74F hypothyroidism, cervical cancer s/p TAHBSO, and urothelial cancer s/p chemotherapy, left partial nephrectomy, total ureterectomy and partial sigmoid colectomy w/ DLI on 8/15 admitted for PE and ileus.    Edited by: Concetta Carter MD at 8/26/2024 0819  Subjective    Subjective:  Pt reporting mild bloating. NGT with thin, light green output. Stoma with large volume dark thin output.    Review of Systems:    A 12-point review of systems was performed, and was negative except as above.  Review of Systems     Objective    Objective:  Vital signs:   Temp:  [35.8 °C (96.4 °F)-36.5 °C (97.7 °F)] 35.8 °C (96.4 °F)  Heart Rate:  [58-90] 58  Resp:  [15-20] 15  BP: ()/(58-69) 119/58    Physical Exam:  Physical Exam  Constitutional:       Appearance: Normal appearance.   Pulmonary:      Effort: Pulmonary effort is normal.   Abdominal:      General: Abdomen is flat.      Palpations: There is no mass.      Tenderness: There is no abdominal tenderness.      Comments: Soft, midline wound clean without purulence, well approximated with staples.   Skin:     General: Skin is warm and dry.   Neurological:      Mental Status: She is alert and oriented to person, place, and time.   Psychiatric:         Mood and Affect: Mood normal.         I/O last 2 completed shifts:  In: - (0 mL/kg)   Out: 1800 (31 mL/kg) [Emesis/NG output:1050; Stool:750]  Weight: 58.1 kg      Labs Past 18 Hours:  Recent Results (from the past 18 hour(s))   Heparin Assay, UFH    Collection Time: 08/26/24  1:39 AM   Result Value Ref Range    Heparin Unfractionated 0.4 See Comment Below for Therapeutic Ranges IU/mL   Phosphorus    Collection Time: 08/26/24  1:39 AM   Result Value Ref Range    Phosphorus 3.8 2.5 - 4.9 mg/dL   CBC    Collection Time: 08/26/24  1:41 AM   Result Value Ref Range    WBC 6.6 4.4 - 11.3 x10*3/uL    nRBC 0.0 0.0 - 0.0 /100 WBCs    RBC 2.93  (L) 4.00 - 5.20 x10*6/uL    Hemoglobin 9.0 (L) 12.0 - 16.0 g/dL    Hematocrit 27.0 (L) 36.0 - 46.0 %    MCV 92 80 - 100 fL    MCH 30.7 26.0 - 34.0 pg    MCHC 33.3 32.0 - 36.0 g/dL    RDW 17.7 (H) 11.5 - 14.5 %    Platelets 263 150 - 450 x10*3/uL   Hepatic Function Panel    Collection Time: 08/26/24  1:41 AM   Result Value Ref Range    Albumin 3.0 (L) 3.4 - 5.0 g/dL    Bilirubin, Total 0.5 0.0 - 1.2 mg/dL    Bilirubin, Direct 0.1 0.0 - 0.3 mg/dL    Alkaline Phosphatase 94 33 - 136 U/L    ALT 7 7 - 45 U/L    AST 14 9 - 39 U/L    Total Protein 5.2 (L) 6.4 - 8.2 g/dL   Magnesium    Collection Time: 08/26/24  1:41 AM   Result Value Ref Range    Magnesium 1.93 1.60 - 2.40 mg/dL   Basic Metabolic Panel    Collection Time: 08/26/24  1:41 AM   Result Value Ref Range    Glucose 83 74 - 99 mg/dL    Sodium 137 136 - 145 mmol/L    Potassium 3.3 (L) 3.5 - 5.3 mmol/L    Chloride 97 (L) 98 - 107 mmol/L    Bicarbonate 29 21 - 32 mmol/L    Anion Gap 14 10 - 20 mmol/L    Urea Nitrogen 13 6 - 23 mg/dL    Creatinine 1.25 (H) 0.50 - 1.05 mg/dL    eGFR 46 (L) >60 mL/min/1.73m*2    Calcium 8.5 (L) 8.6 - 10.6 mg/dL   CBC    Collection Time: 08/26/24  7:16 AM   Result Value Ref Range    WBC 7.1 4.4 - 11.3 x10*3/uL    nRBC 0.0 0.0 - 0.0 /100 WBCs    RBC 2.96 (L) 4.00 - 5.20 x10*6/uL    Hemoglobin 9.1 (L) 12.0 - 16.0 g/dL    Hematocrit 28.8 (L) 36.0 - 46.0 %    MCV 97 80 - 100 fL    MCH 30.7 26.0 - 34.0 pg    MCHC 31.6 (L) 32.0 - 36.0 g/dL    RDW 17.9 (H) 11.5 - 14.5 %    Platelets 290 150 - 450 x10*3/uL   Comprehensive metabolic panel    Collection Time: 08/26/24  7:16 AM   Result Value Ref Range    Glucose 71 (L) 74 - 99 mg/dL    Sodium 137 136 - 145 mmol/L    Potassium 3.4 (L) 3.5 - 5.3 mmol/L    Chloride 98 98 - 107 mmol/L    Bicarbonate 28 21 - 32 mmol/L    Anion Gap 14 10 - 20 mmol/L    Urea Nitrogen 13 6 - 23 mg/dL    Creatinine 1.18 (H) 0.50 - 1.05 mg/dL    eGFR 49 (L) >60 mL/min/1.73m*2    Calcium 8.6 8.6 - 10.6 mg/dL    Albumin 3.2  (L) 3.4 - 5.0 g/dL    Alkaline Phosphatase 107 33 - 136 U/L    Total Protein 5.8 (L) 6.4 - 8.2 g/dL    AST 17 9 - 39 U/L    Bilirubin, Total 0.5 0.0 - 1.2 mg/dL    ALT 6 (L) 7 - 45 U/L   Magnesium    Collection Time: 08/26/24  7:16 AM   Result Value Ref Range    Magnesium 2.01 1.60 - 2.40 mg/dL   Transthoracic Echo (TTE) Complete    Collection Time: 08/26/24 11:32 AM   Result Value Ref Range    BSA 1.55 m2      Meds:    Current Facility-Administered Medications:     acetaminophen (Ofirmev) injection 1,000 mg, 1,000 mg, intravenous, BID PRN, Rene Wright MD    diatrizoate yoly-diatrizoat sod (Gastrografin 37% organic bound iodine) solution 60 mL, 60 mL, oral, Once, Latesha Clemente, APRN-CNP    heparin 25,000 Units in dextrose 5% 250 mL (100 Units/mL) infusion (premix), 0-4,500 Units/hr, intravenous, Continuous, Merissa High MD, Last Rate: 7 mL/hr at 08/25/24 2322, 700 Units/hr at 08/25/24 2322    heparin bolus from bag 2,000-4,000 Units, 2,000-4,000 Units, intravenous, q4h PRN, Merissa High MD    lactated Ringer's infusion, 75 mL/hr, intravenous, Continuous, Merissa High MD, Last Rate: 75 mL/hr at 08/25/24 2321, 75 mL/hr at 08/25/24 2321    levothyroxine (Synthroid, Levoxyl) tablet 25 mcg, 25 mcg, oral, Daily, Merissa High MD    [DISCONTINUED] metoclopramide (Reglan) tablet 5 mg, 5 mg, oral, q6h PRN **OR** metoclopramide (Reglan) injection 5 mg, 5 mg, intravenous, q6h PRN, Merissa High MD, 5 mg at 08/25/24 0412    ondansetron (Zofran) injection 4 mg, 4 mg, intravenous, q6h PRN, Rene Wright MD    pantoprazole (ProtoNix) injection 40 mg, 40 mg, intravenous, Daily, Rene Wright MD, 40 mg at 08/25/24 1312    perflutren lipid microspheres (Definity) injection 0.5-10 mL of dilution, 0.5-10 mL of dilution, intravenous, Once in imaging, [facility]    perflutren protein A microsphere (Optison) injection 0.5 mL, 0.5 mL, intravenous, Once in imaging, [facility]    potassium chloride  20 mEq in sterile water for injection 100 mL, 20 mEq, intravenous, q2h, [facility]    sulfur hexafluoride microsphr (Lumason) injection 24.28 mg, 2 mL, intravenous, Once in imaging, [facility]     Imaging:  XR abdomen 1 view    Result Date: 8/26/2024  Interpreted By:  Brigida Leigh, STUDY: XR ABDOMEN 1 VIEW;  8/26/2024 9:11 am   INDICATION: Signs/Symptoms:ileus/gastro challenge.   COMPARISON: 08/25/2024   ACCESSION NUMBER(S): [id number]   ORDERING CLINICIAN: JASMYN XIAO   FINDINGS: Nonobstructive bowel gas pattern. Limited evaluation of pneumoperitoneum on supine imaging, however no gross evidence of free air is noted.   NG tube with the tip in the projection of the gastric fundus.   Contrast projected within the bladder.   Multiple staples and clips mid abdomen to pelvis.       1.  NG tube with the tip in the projection of the gastric fundus.   MACRO: None   Signed by: Brigida Leigh 8/26/2024 9:30 AM Dictation workstation:   [id number]    ECG 12 lead    Result Date: 8/26/2024  Normal sinus rhythm Inferior infarct , age undetermined Anterolateral infarct , age undetermined Abnormal ECG When compared with ECG of 01-MAR-2024 10:23, Anterior infarct is now Present Anterolateral infarct is now Present Inferior infarct is now Present Nonspecific T wave abnormality now evident in Anterolateral leads QT has shortened    XR abdomen 1 view    Result Date: 8/25/2024  Interpreted By:  Nathan Denson, STUDY: XR ABDOMEN 1 VIEW   INDICATION: Signs/Symptoms:NG tube placement.   COMPARISON: CT performed August 24   ACCESSION NUMBER(S): [id number]   ORDERING CLINICIAN: JASMYN VALADEZ   FINDINGS: Nasogastric tube over the gastric fundus.   Band of airspace disease left lung similar to prior CT.       Nasogastric tube over the gastric fundus.   Signed by: Nathan Denson 8/25/2024 9:03 AM Dictation workstation:   [id number]    Lower extremity venous duplex left    Result Date:  8/24/2024  Interpreted By:  Tank Leyva, STUDY: VASC US LOWER EXTREMITY VENOUS DUPLEX LEFT;  8/24/2024 5:37 pm   INDICATION: Signs/Symptoms:LLE swelling s/p surgery.   COMPARISON: None.   ACCESSION NUMBER(S): [id number]   ORDERING CLINICIAN: ALISON DAS   TECHNIQUE: Grayscale, color and spectral Doppler sonographic images of the left lower extremity deep venous system. The right common femoral vein was imaged for comparison.   FINDINGS: THIGH VEINS: There is normal compressibility of the left common femoral vein, saphenous-femoral junction, femoral vein and popliteal vein. There is normal spontaneous and phasic variation by spectral doppler.   CALF VEINS: The posterior tibial and peroneal veins demonstrate normal color flow and compressibility.   CONTRALATERAL COMPARISON: The right common femoral vein is patent       No evidence of acute DVT in the left lower extremity.     MACRO: None.   Signed by: Tank Leyva 8/24/2024 5:59 PM Dictation workstation:   [id number]    CT angio chest for pulmonary embolism    Result Date: 8/24/2024  Interpreted By:  Parveen High, STUDY: CT ANGIO CHEST FOR PULMONARY EMBOLISM;  8/24/2024 5:01 pm   INDICATION: Signs/Symptoms:recent surgery, chest tightness, hypoxic.   COMPARISON: 07/01/2024   ACCESSION NUMBER(S): [id number]   ORDERING CLINICIAN: ALISON DAS   TECHNIQUE: Helical data acquisition of the chest was obtained with  75 mL Omnipaque 350. Images were reformatted in axial, coronal, and sagittal planes.MIP reformatted images were also generated.   FINDINGS: LUNGS and AIRWAYS: Small layering pleural effusions. Bands of bibasilar atelectasis have increased.   Central airways are patent. No bronchiectasis.   MEDIASTINUM and MEI, LOWER NECK AND AXILLA: The visualized thyroid gland is grossly unremarkable.   No thoracic lymphadenopathy by CT criteria.   Esophagus is not dilated.   HEART and VESSELS: Severe cardiomegaly. No right heart strain.   New small  pericardial effusion.   No large central saddle pulmonary embolus. There are lobar and segmental emboli involving the right middle lobe, lingula, and left lower lobe. There are also additional segmental emboli involving the right upper and right lower lobes.   Mild coronary atherosclerosis.   Thoracic aorta and great vessels are atherosclerotic but otherwise patent without aneurysm. Great vessels are tortuous but otherwise patent.   UPPER ABDOMEN: Separate report.   CHEST WALL and OSSEOUS STRUCTURES: No suspicious osseous lesions. Multilevel degenerative changes of the thoracic spine.         1.  Positive study for acute pulmonary embolism. Multiple emboli involving lobar and segmental branches of both lungs. 2. Cardiomegaly with small pericardial effusion. No right heart strain otherwise. 3. Small layering pleural effusions. Increased bibasilar atelectasis or infiltrates. Superimposed infarct not excluded..     Signed by: Parveen High 8/24/2024 5:41 PM Dictation workstation:   [id number]    CT abdomen pelvis w IV contrast    Result Date: 8/24/2024  Interpreted By:  Parveen High, STUDY: CT ABDOMEN PELVIS W IV CONTRAST;  8/24/2024 5:01 pm   INDICATION: Signs/Symptoms:recent left nephrectomy, bowel resection with worsening pain.   COMPARISON: 07/01/2024   ACCESSION NUMBER(S): [id number]   ORDERING CLINICIAN: ALISON DAS   TECHNIQUE: CT of the abdomen and pelvis was performed.  Contiguous axial images were obtained at 3 mm slice thickness through the abdomen and pelvis. Coronal and sagittal reconstructions at 3 mm slice thickness were performed. 75 mL Omnipaque 350 administered intravenously without immediate complication.   FINDINGS: LOWER CHEST: Small pleural effusions with bibasilar atelectasis or infiltrates.   ABDOMEN:   LIVER: A few low-attenuation lesions are stable.   BILE DUCTS: Not dilated.   GALLBLADDER: Mildly contracted.   PANCREAS: Unremarkable   SPLEEN: Unremarkable   ADRENAL GLANDS: Unremarkable    KIDNEYS AND URETERS: Unremarkable right kidney without hydronephrosis. Left nephrectomy changes.   PELVIS:   BLADDER: Partially distended. Small amount of internal air likely from previous instrumentation.   REPRODUCTIVE ORGANS: Uterus is absent.   BOWEL: Distal colonic anastomosis. Diverting loop ileostomy. Distended stomach and proximal small bowel with gradual tapering distally and no discrete transition point. Proximal small bowel is dilated to 3.7 cm. The colon is decompressed. Unremarkable appendix.    Unremarkable mesentery.     VESSELS: Aortoiliac system is patent without aneurysm.  Major visceral branches are patent.Mild atherosclerosis. IVC and major branches are grossly patent. Major portal venous branches are patent.   PERITONEUM AND RETROPERITONEUM: No free air. Scattered small volume ascites. There is peritoneal hyperenhancement in the pelvis. No abdominal or pelvic lymphadenopathy.   BONE AND ABDOMINAL WALL: Anasarca. Scattered subcutaneous emphysema. Degenerative changes of the spine.       1.  Interval left nephro ureterectomy, partial colonic resection, and ileostomy. 2. Increased distention of the stomach and proximal small bowel with gradual tapering distally and no discrete transition point to indicate obstruction. This could reflect ileus. 3. Small volume ascites without definite fluid collection. Peritoneal hyperenhancement in the pelvis raises the question of peritonitis. 4. Anasarca and postoperative extensive subcutaneous emphysema.   MACRO: None.   Signed by: Parveen High 8/24/2024 5:40 PM Dictation workstation:   [id number]         Medications reviewed.  Vital signs reviewed.  Labs reviewed.         Assessment/Plan    Assessment and Plan:  Terra Alexis is a 74F hypothyroidism, cervical cancer s/p TAHBSO, and urothelial cancer s/p chemotherapy, left partial nephrectomy, total ureterectomy and partial sigmoid colectomy w/ DLI on 8/15 admitted for PE and ileus.    As pt is having high  output from NG and stoma, will try gastrografin challenge to evaluate for possible obstruction. In setting of PE, want to evaluate for R heart strain before further resuscitating. Additionally, will further assess for LE VTE w RLE duplex.      Plan Today:     Neuro:   -tylenol PRN    CV:   -TTE  -RLE duplex  -appreciate vascular recs for DOAC on DC:  1-continue with heparin gtt. for the coming 24 to 48 hours aiming at heparin therapeutic assay of 0.3-0.7 Continue to monitor CBC specially hemoglobin and hematocrit in addition to platelets  2-patient can be switched to Lovenox weight-based dose either 1 mg/kg subcu twice daily or 1.5 mg/kg once daily ideally to be treated for 6 months at least if this is not suitable to the patient or cannot be done due to insurance restrictions then at least treat with Lovenox for 1 month then patient can be switched to a DOAC which can be Eliquis after loading dose for 1 week then 5 mg p.o. twice daily (patient has suitable BMI and a GFR ranging between 49 and 46 on this hospital stay)    Resp:   -encourage OOB/IS    GI:  -gastrograffin challenge  -appreciate wound/ostomy recs  -zofran, reglan prn for nausea  -PPI  -NPO, NGT to LIWS    :   -LR 75ml/hr  -urology following, appreciate recs  -completed x1 fluconazole 200mg for UTI  -replete lytes as needed    Heme:   -hep gtt for PE    ID:  -no suspicion for infection at this point    Endo:  -levothyroxine    MSK:   -no needs     PPX: SCDs    Hospital Day: 2     Dispo: Continue current level of care     Patient's exam, labs, and findings discussed with Chief Resident Dr. JAVED Hutchinson, PGY-5 and Dr. Sebastian, who agrees with the plan as described above.      Concetta Carter MD PGY-1  Surgical Oncology [phone]

## 2024-08-26 NOTE — PROGRESS NOTES
Pharmacy Medication History Review    Terra Alexis is a 74 y.o. female admitted for Urothelial cancer (Multi). Pharmacy reviewed the patient's [id number]-[id number] medications and allergies for accuracy.    Medications ADDED:  None  Medications CHANGED:  None  Medications REMOVED:   None    The list below reflects the updated PTA list.   Prior to Admission Medications   Prescriptions Last Dose Informant   acetaminophen (Tylenol) 325 mg tablet Past Week Self   Sig: Take 2 tablets (650 mg) by mouth every 6 hours if needed for mild pain (1 - 3) for up to 5 days.   levothyroxine (Synthroid, Levoxyl) 25 mcg tablet 8/25/2024 Self   Sig: Take 1 tablet (25 mcg) by mouth early in the morning.. Take on an empty stomach at the same time each day, either 30 to 60 minutes prior to breakfast   oxyCODONE (Roxicodone) 5 mg immediate release tablet     Sig: Take 1 tablet (5 mg) by mouth every 6 hours if needed for severe pain (7 - 10) for up to 3 days.   potassium chloride CR 10 mEq ER tablet 8/21/2024 Self   Sig: Take 1 tablet (10 mEq) by mouth once daily.   vitamins A,C,E-zinc-copper (PreserVision AREDS) 4,296 mcg-226 mg-90 mg capsule Past Week Self   Sig: Take 1 capsule by mouth 2 times a day.      Facility-Administered Medications: None      The list below reflects the updated allergy list. Please review each documented allergy for additional clarification and justification.  Allergies  Reviewed by Chepe Johnson on 8/26/2024        Severity Reactions Comments    Codeine High Hallucinations, GI Upset, Nausea/vomiting     Percocet [oxycodone-acetaminophen] Medium Dizziness, Nausea/vomiting           Patient declines M2B at discharge.     Sources:   Last fill through pharmacy  Patient reported    Additional Comments:  Patient is a very good historian; able to recall all meds including names, doses, and frequencies  States her provider discontinued potassium last Wed.  Patient seems compliant based on history and interview    CHEPE  "BOOM MEAD  PGY-1 Pharmacy Resident  08/26/24     Secure Chat preferred   If no response call [phone] or Vocera \"Med Rec\"    "

## 2024-08-26 NOTE — CONSULTS
"  Wound Care Consult     Visit Date: 8/26/2024      Patient Name: Terra Alexis         MRN: [id number]           YOB: 1950     Reason for Consult: Ostomy care            Pertinent Labs:   Albumin   Date Value Ref Range Status   08/26/2024 3.2 (L) 3.4 - 5.0 g/dL Final     ALBUMIN (MG/L) IN URINE   Date Value Ref Range Status   03/08/2023 9.4 Not Established mg/L Final       Wound Assessment:  Wound 03/22/24 Incision Pelvis Left;Anterior (Active)       Wound 08/15/24 Incision Flank Left (Active)       Wound Abdomen Medial (Active)       Wound Team Summary Assessment:   Ostomy type: loop ileostomy     size: 1 1/4\" oval       color: red      protruding: budded  Functioning: watery brown liquid   Mucocutaneous junction: intact  Peristomal skin: intact  Pouching: one piece pouch with barrier ring   Ostomy Education: Patient knowledgeable in ostomy care. Requiring assistance d/t NG tube   Plan: assess stoma/pouching      Wound Team Plan: Ostomy team will follow      Michelle WONG   8/26/2024  6:11 PM        "

## 2024-08-26 NOTE — CONSULTS
Consults  History Of Present Illness:    Terra Alexis is a 74 y.o. female presenting with past medical history of hypothyroidism, cervical cancer s/p TAHBSO, and urothelial cancer s/p chemotherapy, and left partial nephrectomy, total ureterectomy (Dr. Ross, Urology) and partial sigmoid colectomy w/ DLI (Dr. Harper, Surgical Oncology) on 8/15, who presents as a transfer for [facility] ED with 1 day of abdominal pain, nausea, and emesis. At OSH, patient was noted to have hypoxia on exam and a CTA chest was completed, which demonstrated an acute PE (provoked PE) involving the lobar and segmental branches of bilateral lungs. She was started on a heparin gtt at the OSH. CT abd/pel demonstrated gastric and small bowel distention, concerning for an ileus. Surgical oncology was consulted for evaluation of ileus in setting of recent sigmoid resection w/ DLI.      Patient started having lower abdominal pain, nausea, and multiple episodes of emesis. She had been tolerating PO intake and had gas and stool in her ileostomy bag. She stated that she had a full breakfast this morning and felt fine until the afternoon.    Last Recorded Vitals:  Vitals:    08/1950 08/25/24 2316 08/26/24 0128 08/26/24 0611   BP: 110/66 110/65 110/68 119/58   BP Location:   Left arm Left arm   Patient Position:   Lying Lying   Pulse: 79 66 63 58   Resp: 20 16 15 15   Temp: 36.5 °C (97.7 °F) 36.1 °C (97 °F) 36 °C (96.8 °F) 35.8 °C (96.4 °F)   TempSrc: Temporal Temporal Temporal Temporal   SpO2: 94% 97% 97% 98%   Weight:       Height:           Last Labs:  CBC - 8/26/2024:  7:16 AM  7.1 9.1 290    28.8      CMP - 8/26/2024:  7:16 AM  8.6 5.8 17 --- 0.5   3.8 3.2 6 107      PTT - 8/24/2024:  4:44 PM  1.0   11.3 30     Troponin I, High Sensitivity   Date/Time Value Ref Range Status   08/24/2024 04:44 PM 5 0 - 13 ng/L Final     BNP   Date/Time Value Ref Range Status   08/24/2024 04:44 PM 15 0 - 99 pg/mL Final     Hemoglobin A1C   Date/Time  "Value Ref Range Status   04/26/2021 07:15 AM 5.5 4.3 - 5.6 % Final     Comment:     American Diabetes Association guidelines indicate that patients with HgbA1c in   the range 5.7-6.4% are at increased risk for development of diabetes, and   intervention by lifestyle modification may be beneficial. HgbA1c greater or   equal to 6.5% is considered diagnostic of diabetes.     VLDL   Date/Time Value Ref Range Status   01/09/2023 09:59 AM 32 0 - 40 mg/dL Final      Last I/O:  I/O last 3 completed shifts:  In: - (0 mL/kg)   Out: 1800 (31 mL/kg) [Emesis/NG output:1050; Stool:750]  Weight: 58.1 kg     Past Cardiology Tests (Last 3 Years):  EKG:  ECG 12 lead 08/24/2024 (Preliminary)      ECG 12 Lead 03/01/2024    Echo:  No results found for this or any previous visit from the past 1095 days.    Ejection Fractions:  No results found for: \"EF\"  Cath:  No results found for this or any previous visit from the past 1095 days.    Stress Test:  No results found for this or any previous visit from the past 1095 days.    Cardiac Imaging:  No results found for this or any previous visit from the past 1095 days.      Past Medical History:  She has a past medical history of Hypertension, Malignant tumor of sigmoid colon (Multi) (08/21/2024), Other specified disorders of kidney and ureter (03/04/2022), and Personal history of malignant neoplasm of cervix uteri (11/21/2022).    Past Surgical History:  She has a past surgical history that includes Other surgical history (10/07/2021) and Nephrectomy (Left, 08/15/2024).      Social History:  She reports that she has never smoked. She has never used smokeless tobacco. She reports that she does not drink alcohol and does not use drugs.    Family History:  Family History   Problem Relation Name Age of Onset    Macular degeneration Mother      Glaucoma Other grandparent     Macular degeneration Other grandparent         Allergies:  Codeine and Percocet [oxycodone-acetaminophen]    Inpatient " Medications:  Scheduled medications   Medication Dose Route Frequency    diatrizoate yoly-diatrizoat sod  90 mL oral Once    levothyroxine  25 mcg oral Daily    pantoprazole  40 mg intravenous Daily    perflutren lipid microspheres  0.5-10 mL of dilution intravenous Once in imaging    perflutren protein A microsphere  0.5 mL intravenous Once in imaging    potassium chloride  20 mEq intravenous q2h    sulfur hexafluoride microsphr  2 mL intravenous Once in imaging     PRN medications   Medication    acetaminophen    heparin    metoclopramide    ondansetron     Continuous Medications   Medication Dose Last Rate    heparin  0-4,500 Units/hr 700 Units/hr (08/25/24 2322)    lactated Ringer's  75 mL/hr 75 mL/hr (08/25/24 2321)     Outpatient Medications:  Current Outpatient Medications   Medication Instructions    acetaminophen (TYLENOL) 650 mg, oral, Every 6 hours PRN    levothyroxine (SYNTHROID, LEVOXYL) 25 mcg, oral, Daily, Take on an empty stomach at the same time each day, either 30 to 60 minutes prior to breakfast    potassium chloride CR 10 mEq ER tablet 10 mEq, oral, Daily    vitamins A,C,E-zinc-copper (PreserVision AREDS) 4,296 mcg-226 mg-90 mg capsule 1 capsule, oral, 2 times daily       Physical Exam:  General: ill appearing, no acute distress  HEENT: atraumatic, normocephalic  CV: regular rate and rhythm   Pulm: non-labored breathing on NC  GI: abdomen soft, moderately distended, mildly tender to palpation near incision areas, no signs of peritonitis. Laparotomy and port site incisions closed with staples, all c/d/I. Ileostomy stoma pink and viable with watery output, gas present in stoma appliance.   : deferred  Skin: warm, dry  Extremities: GONZALEZ  Psych: appropriate mood and affect     Assessment/Plan   74 year-old female with a past medical history of hypothyroidism, cervical cancer s/p TAHBSO, and urothelial cancer s/p chemotherapy, and left partial nephrectomy, total ureterectomy (Dr. Ross, Urology)  and partial sigmoid colectomy w/ DLI (Dr. Harper, Surgical Oncology) on 8/15, who presents as a transfer for [facility] ED with 1 day of abdominal pain, nausea, and emesis. At OSH, patient was noted to have hypoxia on exam and a CTA chest was completed, which demonstrated an acute PE involving the lobar and segmental branches of bilateral lungs.     Recommendations  1-continue with heparin gtt. for the coming 24 to 48 hours aiming at heparin therapeutic assay of 0.3-0.7  Continue to monitor CBC specially hemoglobin and hematocrit in addition to platelets  2-patient can be switched to Lovenox weight-based dose either 1 mg/kg subcu twice daily or 1.5 mg/kg once daily ideally to be treated for 6 months at least if this is not suitable to the patient or cannot be done due to insurance restrictions then at least treat with Lovenox for 1 month then patient can be switched to a DOAC which can be Eliquis after loading dose for 1 week then 5 mg p.o. twice daily (patient has suitable BMI and a GFR ranging between 49 and 46 on this hospital stay)Estimated Creatinine Clearance: 31.3 mL/min (A) (by C-G formula based on SCr of 1.18 mg/dL (H)).  3-patient can follow-up with vascular medicine clinic upon discharge    Peripheral IV 08/24/24 20 G Right;Anterior Forearm (Active)   Site Assessment Clean;Dry;Intact 08/25/24 2100   Dressing Status Dry;Clean 08/25/24 2100   Number of days: 2       NG/OG/Feeding Tube NG - [place] sump 16 Fr Right nostril (Active)   Intake (mL) 100 mL 08/26/24 1017   Intake - Flush (mL) 120 mL 08/26/24 1017   Output (mL) 50 mL 08/26/24 1017   Number of days: 1       Code Status:  Full Code    I spent 60 minutes in the professional and overall care of this patient.        Zachariah Monte MD

## 2024-08-26 NOTE — HOSPITAL COURSE
74 year-old female with a past medical history of hypothyroidism, cervical cancer s/p TAHBSO, and urothelial cancer s/p chemotherapy, and left partial nephrectomy, total ureterectomy (Dr. Ross, Urology) and partial sigmoid colectomy w/ DLI (Dr. Harper, Surgical Oncology) on 8/15, who presents as a transfer for [facility] ED with 1 day of abdominal pain, nausea, and emesis. At OSH, patient was noted to have hypoxia on exam and a CTA chest was completed, which demonstrated an acute PE involving the lobar and segmental branches of bilateral lungs. She was started on a heparin gtt at the OSH. CT abd/pel demonstrated gastric and small bowel distention, concerning for an ileus. Surgical oncology was consulted for evaluation of ileus in setting of recent sigmoid resection w/ DLI.  TTE with no evidence of R heart strain. RLE duplex showing ##. GGC showing ##

## 2024-08-27 ENCOUNTER — HOME CARE VISIT (OUTPATIENT)
Dept: HOME HEALTH SERVICES | Facility: HOME HEALTH | Age: 74
End: 2024-08-27
Payer: MEDICARE

## 2024-08-27 LAB
ALBUMIN SERPL BCP-MCNC: 2.8 G/DL (ref 3.4–5)
ALP SERPL-CCNC: 102 U/L (ref 33–136)
ALT SERPL W P-5'-P-CCNC: 4 U/L (ref 7–45)
ANION GAP SERPL CALC-SCNC: 18 MMOL/L (ref 10–20)
AST SERPL W P-5'-P-CCNC: 16 U/L (ref 9–39)
BILIRUB SERPL-MCNC: 0.5 MG/DL (ref 0–1.2)
BUN SERPL-MCNC: 12 MG/DL (ref 6–23)
CALCIUM SERPL-MCNC: 8.2 MG/DL (ref 8.6–10.6)
CHLORIDE SERPL-SCNC: 100 MMOL/L (ref 98–107)
CO2 SERPL-SCNC: 20 MMOL/L (ref 21–32)
CREAT SERPL-MCNC: 0.95 MG/DL (ref 0.5–1.05)
EGFRCR SERPLBLD CKD-EPI 2021: 63 ML/MIN/1.73M*2
ERYTHROCYTE [DISTWIDTH] IN BLOOD BY AUTOMATED COUNT: 17.4 % (ref 11.5–14.5)
GLUCOSE BLD MANUAL STRIP-MCNC: 107 MG/DL (ref 74–99)
GLUCOSE BLD MANUAL STRIP-MCNC: 114 MG/DL (ref 74–99)
GLUCOSE BLD MANUAL STRIP-MCNC: 52 MG/DL (ref 74–99)
GLUCOSE BLD MANUAL STRIP-MCNC: 57 MG/DL (ref 74–99)
GLUCOSE BLD MANUAL STRIP-MCNC: 88 MG/DL (ref 74–99)
GLUCOSE SERPL-MCNC: 54 MG/DL (ref 74–99)
HCT VFR BLD AUTO: 37 % (ref 36–46)
HGB BLD-MCNC: 11.5 G/DL (ref 12–16)
MAGNESIUM SERPL-MCNC: 1.98 MG/DL (ref 1.6–2.4)
MCH RBC QN AUTO: 31.1 PG (ref 26–34)
MCHC RBC AUTO-ENTMCNC: 31.1 G/DL (ref 32–36)
MCV RBC AUTO: 100 FL (ref 80–100)
NRBC BLD-RTO: 0 /100 WBCS (ref 0–0)
PLATELET # BLD AUTO: 317 X10*3/UL (ref 150–450)
POTASSIUM SERPL-SCNC: 3.8 MMOL/L (ref 3.5–5.3)
PROT SERPL-MCNC: 5.8 G/DL (ref 6.4–8.2)
RBC # BLD AUTO: 3.7 X10*6/UL (ref 4–5.2)
SODIUM SERPL-SCNC: 134 MMOL/L (ref 136–145)
UFH PPP CHRO-ACNC: 0.7 IU/ML
WBC # BLD AUTO: 11.2 X10*3/UL (ref 4.4–11.3)

## 2024-08-27 PROCEDURE — 85027 COMPLETE CBC AUTOMATED: CPT

## 2024-08-27 PROCEDURE — 36415 COLL VENOUS BLD VENIPUNCTURE: CPT

## 2024-08-27 PROCEDURE — 80053 COMPREHEN METABOLIC PANEL: CPT

## 2024-08-27 PROCEDURE — 99232 SBSQ HOSP IP/OBS MODERATE 35: CPT | Performed by: NURSE PRACTITIONER

## 2024-08-27 PROCEDURE — 2500000001 HC RX 250 WO HCPCS SELF ADMINISTERED DRUGS (ALT 637 FOR MEDICARE OP)

## 2024-08-27 PROCEDURE — 82947 ASSAY GLUCOSE BLOOD QUANT: CPT

## 2024-08-27 PROCEDURE — 1200000003 HC ONCOLOGY  ROOM WITH TELEMETRY DAILY

## 2024-08-27 PROCEDURE — 2500000005 HC RX 250 GENERAL PHARMACY W/O HCPCS

## 2024-08-27 PROCEDURE — 85520 HEPARIN ASSAY: CPT

## 2024-08-27 PROCEDURE — 2500000004 HC RX 250 GENERAL PHARMACY W/ HCPCS (ALT 636 FOR OP/ED)

## 2024-08-27 PROCEDURE — 2500000004 HC RX 250 GENERAL PHARMACY W/ HCPCS (ALT 636 FOR OP/ED): Performed by: STUDENT IN AN ORGANIZED HEALTH CARE EDUCATION/TRAINING PROGRAM

## 2024-08-27 PROCEDURE — 2500000004 HC RX 250 GENERAL PHARMACY W/ HCPCS (ALT 636 FOR OP/ED): Performed by: NURSE PRACTITIONER

## 2024-08-27 PROCEDURE — 83735 ASSAY OF MAGNESIUM: CPT

## 2024-08-27 RX ORDER — ENOXAPARIN SODIUM 100 MG/ML
60 INJECTION SUBCUTANEOUS
Status: DISCONTINUED | OUTPATIENT
Start: 2024-08-27 | End: 2024-08-30 | Stop reason: HOSPADM

## 2024-08-27 RX ORDER — POTASSIUM CHLORIDE 14.9 MG/ML
20 INJECTION INTRAVENOUS ONCE
Status: COMPLETED | OUTPATIENT
Start: 2024-08-27 | End: 2024-08-27

## 2024-08-27 RX ORDER — DEXTROSE 50 % IN WATER (D50W) INTRAVENOUS SYRINGE
12.5
Status: DISCONTINUED | OUTPATIENT
Start: 2024-08-27 | End: 2024-08-30 | Stop reason: HOSPADM

## 2024-08-27 RX ORDER — ENOXAPARIN SODIUM 100 MG/ML
60 INJECTION SUBCUTANEOUS
Status: DISCONTINUED | OUTPATIENT
Start: 2024-08-27 | End: 2024-08-27

## 2024-08-27 RX ORDER — ENOXAPARIN SODIUM 100 MG/ML
60 INJECTION SUBCUTANEOUS 2 TIMES DAILY
Qty: 60 EACH | Refills: 5 | Status: SHIPPED | OUTPATIENT
Start: 2024-08-27

## 2024-08-27 RX ORDER — DEXTROSE 50 % IN WATER (D50W) INTRAVENOUS SYRINGE
25
Status: DISCONTINUED | OUTPATIENT
Start: 2024-08-27 | End: 2024-08-30 | Stop reason: HOSPADM

## 2024-08-27 RX ORDER — ACETAMINOPHEN 325 MG/1
650 TABLET ORAL EVERY 6 HOURS PRN
Qty: 20 TABLET | Refills: 0 | Status: SHIPPED | OUTPATIENT
Start: 2024-08-27

## 2024-08-27 ASSESSMENT — PAIN SCALES - GENERAL
PAINLEVEL_OUTOF10: 2
PAINLEVEL_OUTOF10: 0 - NO PAIN

## 2024-08-27 ASSESSMENT — PAIN - FUNCTIONAL ASSESSMENT: PAIN_FUNCTIONAL_ASSESSMENT: 0-10

## 2024-08-27 NOTE — CONSULTS
"Nutrition Initial Assessment:   Nutrition Assessment    Reason for Assessment: Admission nursing screening    Patient is a 74 y.o. 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 Recently s/p OR (8/15) for open left nephrectomy, total ureterectomy, segmental sigmoid resection, & diverting ileostomy.    Now presents to OSH for x1 day N/V and abdominal pain. CT A/P suggestive of ileus. NGT placed in ED but removed (8/26). Pt started on a CLD.     Gastrografin challenge showing no advancement of contrast from stomach, likely d/t low motility. Reglan started.     Vascular medicine following for anticoagulation recs given PE seen on imaging.     Nutrition History:  Energy Intake: Poor < 50 % (x3 days)  Food and Nutrient History: Pt sitting up in chair w/  at bedside during visit. Known to RDN from recent admission. Prior to OR on (8/15) pt reports she had been tolerating her usual x2 meals/day with PO intake at baseline once off chemo. Reports that at time of discharge on (8/21) she was tolerating a PO diet & generally doing pretty well. Notes inability to tolerate PO intake x4 days now. Had some clear liquids around 1pm today & so far has tolerated without issue. RDN discussed plan to start Ensure Clear but to consume only as pt feels able. Denies further concerns at this time.  Vitamin/Herbal Supplement Use: Preservision & MVI  Food Allergies/Intolerances:  None  GI Symptoms: Nausea, Vomiting, and Abdominal pain  Oral Problems: None     Anthropometrics:  Height: 149.9 cm (4' 11\")   Weight: 58.1 kg (128 lb)   BMI (Calculated): 25.84  IBW/kg (Dietitian Calculated): 45.5 kg  Percent of IBW: 128 %     Weight History:   Wt Readings from Last 30 Encounters:   08/25/24 58.1 kg (128 lb) --> Admit wt   07/29/24 60.8 kg (134 lb)   06/20/24 59.1 kg (130 lb 4.7 oz)   05/30/24 63.5 kg (139 lb 15.9 oz)   02/28/24 69.9 kg (154 lb)   07/24/23 67.1 kg (148 lb)     Weight Change %:  Weight History / " % Weight Change: 4% x 1 month, 8.5% x 3 months, 17% x 6 months, & 13% x 1 year  Significant Weight Loss: Yes  Interpretation of Weight Loss: >7.5% in 3 months    Nutrition Focused Physical Exam Findings:  Subcutaneous Fat Loss:   Orbital Fat Pads: Well nourished (slightly bulging fat pads)  Buccal Fat Pads: Well nourished (full, rounded cheeks)  Triceps: Well nourished (ample fat tissue)  Muscle Wasting:  Temporalis: Well nourished (well-defined muscle)  Pectoralis (Clavicular Region): Well nourished (clavicle not visible)  Deltoid/Trapezius: Mild-Moderate (slight protrusion of acromion process)  Interosseous: Mild-Moderate (slightly depressed area between thumb and forefinger)  Edema:  Edema: none  Physical Findings:  Hair:  (Defer - Wearing hair wrap)  Eyes: Negative  Mouth: Negative  Nails: Negative  Skin: Negative    Nutrition Significant Labs:  BMP Trend:   Results from last 7 days   Lab Units 08/27/24  0756 08/26/24  0716 08/26/24  0141 08/24/24  1644   GLUCOSE mg/dL 54* 71* 83 120*   CALCIUM mg/dL 8.2* 8.6 8.5* 10.1   SODIUM mmol/L 134* 137 137 133*   POTASSIUM mmol/L 3.8 3.4* 3.3* 4.0   CO2 mmol/L 20* 28 29 29   CHLORIDE mmol/L 100 98 97* 95*   BUN mg/dL 12 13 13 10   CREATININE mg/dL 0.95 1.18* 1.25* 1.17*      Nutrition Specific Medications:  Scheduled medications  levothyroxine, 25 mcg, oral, Daily  metoclopramide, 5 mg, intravenous, q6h AILEEN  pantoprazole, 40 mg, intravenous, Daily    Continuous medications  heparin, 0-4,500 Units/hr, Last Rate: 700 Units/hr (08/27/24 0934)  lactated Ringer's, 50 mL/hr, Last Rate: 50 mL/hr (08/27/24 0902)    I/O:   Last BM Date:  (ostomy); Stool Appearance: Loose, Watery (08/25/24 0825)    Dietary Orders (From admission, onward)       Start     Ordered    08/27/24 0717  Adult diet Clear Liquid  Diet effective now        Question:  Diet type  Answer:  Clear Liquid    08/27/24 4506             Estimated Needs:   Total Energy Estimated Needs (kCal):  (7193-0491)  Method for  Estimating Needs: 30-35 kcals/kg x IBW  Total Protein Estimated Needs (g):  (55-65)  Method for Estimating Needs: 1.2-1.4 g/kg x IBW  Total Fluid Estimated Needs (mL):  (3553-5363)  Method for Estimating Needs: 1mL/kcal or per team        Nutrition Diagnosis   Malnutrition Diagnosis  Patient has Malnutrition Diagnosis: Yes  Diagnosis Status: New  Malnutrition Diagnosis: Moderate malnutrition related to chronic disease or condition  As Evidenced by: significant weight loss (8.5% x 3 months) (17% x 6 months) with mild muscle wasting/fat losses      Nutrition Interventions/Recommendations         Nutrition Prescription:  Continue clear liquid diet & advance as tolerated  Will provide Ensure Clear for added nutrition     Continue addition of pro-motility agent (i.e. Reglan)            Nutrition Interventions:   Interventions: Medical food supplement  Medical Food Supplement: Commercial beverage  Goal: Ensure Clear w/ meals    Nutrition Monitoring and Evaluation   Food/Nutrient Related History Monitoring  Monitoring and Evaluation Plan: Energy intake  Energy Intake: Estimated energy intake  Criteria: Meet >50% EENs    Time Spent (min): 60 minutes

## 2024-08-27 NOTE — PROGRESS NOTES
Surgical Oncology Progress Note      08/27/24    Summary:  Terra Alexis is a 74F hypothyroidism, cervical cancer s/p TAHBSO, and urothelial cancer s/p chemotherapy, left partial nephrectomy, total ureterectomy and partial sigmoid colectomy w/ DLI on 8/15 admitted for PE and ileus.    Subjective    Subjective:  NAEO. Pt endorsing minimal pain this AM. On NC but states she does not use home O2.    Review of Systems:    A 12-point review of systems was performed, and was negative except as above.  Review of Systems     Objective    Objective:  Vital signs:   Temp:  [35.8 °C (96.4 °F)-36.3 °C (97.3 °F)] 36.1 °C (97 °F)  Heart Rate:  [54-68] 54  Resp:  [16] 16  BP: (108-151)/(55-66) 108/61    Physical Exam:  Physical Exam  Constitutional:       Appearance: Normal appearance.   Pulmonary:      Effort: Pulmonary effort is normal.   Abdominal:      General: Abdomen is flat.      Palpations: There is no mass.      Tenderness: There is no abdominal tenderness.      Comments: Soft, midline wound clean without purulence, well approximated with staples. Ostomy with dark green output.   Skin:     General: Skin is warm and dry.   Neurological:      Mental Status: She is alert and oriented to person, place, and time.   Psychiatric:         Mood and Affect: Mood normal.         I/O last 2 completed shifts:  In: 1748.7 (30.1 mL/kg) [I.V.:648.7 (11.2 mL/kg); NG/GT:400; IV Piggyback:700]  Out: 2700 (46.5 mL/kg) [Urine:700 (0.5 mL/kg/hr); Emesis/NG output:50; Stool:1950]  Weight: 58.1 kg      Labs Past 18 Hours:  Recent Results (from the past 18 hour(s))   Heparin Assay, UFH    Collection Time: 08/27/24  7:53 AM   Result Value Ref Range    Heparin Unfractionated 0.7 See Comment Below for Therapeutic Ranges IU/mL      Meds:    Current Facility-Administered Medications:     acetaminophen (Ofirmev) injection 1,000 mg, 1,000 mg, intravenous, BID PRN, Concetta Carter MD, Stopped at 08/26/24 2124    heparin 25,000 Units in  dextrose 5% 250 mL (100 Units/mL) infusion (premix), 0-4,500 Units/hr, intravenous, Continuous, Merissa High MD, Last Rate: 7 mL/hr at 08/25/24 2322, 700 Units/hr at 08/25/24 2322    heparin bolus from bag 2,000-4,000 Units, 2,000-4,000 Units, intravenous, q4h PRN, Merissa High MD    lactated Ringer's infusion, 75 mL/hr, intravenous, Continuous, Merissa High MD, Last Rate: 75 mL/hr at 08/26/24 1803, 75 mL/hr at 08/26/24 1803    levothyroxine (Synthroid, Levoxyl) tablet 25 mcg, 25 mcg, oral, Daily, Merissa High MD, 25 mcg at 08/27/24 0511    metoclopramide (Reglan) injection 5 mg, 5 mg, intravenous, q6h AILEEN, Taye Hutchinson MD, 5 mg at 08/27/24 0511    ondansetron (Zofran) injection 4 mg, 4 mg, intravenous, q6h PRN, Rene Wright MD    pantoprazole (ProtoNix) injection 40 mg, 40 mg, intravenous, Daily, Rene Wright MD, 40 mg at 08/26/24 1204    perflutren lipid microspheres (Definity) injection 0.5-10 mL of dilution, 0.5-10 mL of dilution, intravenous, Once in imaging, ISSAC Rincon    perflutren protein A microsphere (Optison) injection 0.5 mL, 0.5 mL, intravenous, Once in imaging, ISSAC Rincon    sulfur hexafluoride microsphr (Lumason) injection 24.28 mg, 2 mL, intravenous, Once in imaging, ISSAC Rincon     Imaging:  Lower extremity venous duplex right    Result Date: 8/27/2024  Interpreted By:  Norm Grover  and Cammy Joel STUDY: [facility] US LOWER EXTREMITY VENOUS DUPLEX RIGHT;  8/26/2024 4:54 pm   INDICATION: Signs/Symptoms:RLE edema.   COMPARISON: None.   ACCESSION NUMBER(S): [id number]   ORDERING CLINICIAN: JASMYN XIAO   TECHNIQUE: Vascular ultrasound of the  right lower extremity was performed. Real time compression views as well as Gray scale, color Doppler and spectral Doppler waveform analysis was performed.   This examination was interpreted at [facility].   FINDINGS: Evaluation of the  visualized portions of the  right common femoral vein, proximal, mid, and distal femoral vein, and popliteal vein were performed.  Evaluation of the visualized portions of the  posterior tibial and peroneal veins were also performed.  In addition, evaluation of the contralateral common femoral vein was performed.   Limitations:  None   There is noncompressibility of one of the right peroneal veins with loss of Doppler flow signal and echogenic occlusive internal thrombus.   The remaining evaluated veins demonstrate normal compressibility. There is intact venous flow demonstrating normal respiratory variability and normal augmentation of flow with calf compression.   Respiratory variation and augmentation to calf pressure was noted.       1. Occlusive thrombus of one of the right peroneal veins. 2. The remaining evaluated veins of the right lower extremity demonstrate normal compressibility without evidence of venous thrombosis.   I personally reviewed the image(s)/study and resident interpretation as stated by Dr. Marycruz Chawla MD. I agree with the findings as stated. This study was interpreted at [facility], [place], [place].   MACRO: Critical Finding:  See findings. Notification was initiated on 8/27/2024 at 12:34 am by  Norm Bain.  (**-YCF-**) Instructions:  Clinical consult.   Signed by: Norm Bain 8/27/2024 12:34 AM Dictation workstation:   [id number]    XR abdomen 1 view    Result Date: 8/26/2024  Interpreted By:  Brigida Leigh, STUDY: XR ABDOMEN 1 VIEW;  8/26/2024 3:16 pm   INDICATION: Signs/Symptoms:gastrografin challenge.   COMPARISON: 08/26/2024, 1:25 p.m.   ACCESSION NUMBER(S): [id number]   ORDERING CLINICIAN: JASMYN XIAO   FINDINGS: Nonobstructive bowel gas pattern. Limited evaluation of pneumoperitoneum on supine imaging, however no gross evidence of free air is noted.   Contrast within the gastric fundus, introduced via  the NG tube.     Osseous structures demonstrate no acute bony changes.       1.  Contrast within the gastric fundus. 2. NG tube tip in the projection of the stomach.   MACRO: None   Signed by: Brigida Leigh 8/26/2024 3:19 PM Dictation workstation:   [id number]    XR abdomen 1 view    Result Date: 8/26/2024  Interpreted By:  Brigida Leigh, STUDY: XR ABDOMEN 1 VIEW;  8/26/2024 1:25 pm   INDICATION: Signs/Symptoms:gastrografin challenge.   COMPARISON: 08/26/2024, 9:01 a.m.   ACCESSION NUMBER(S): [id number]   ORDERING CLINICIAN: JASMYN XIAO   FINDINGS: There is contrast within the gastric fundus, introduced via the enteric tube. Limited evaluation of pneumoperitoneum on supine imaging, however no gross evidence of free air is noted.   Visualized lungs are clear.   Minimal contrast identified within centralized small bowel loops. Contrast is present within the rectosigmoid region.         1.  Contrast opacifies the gastric fundus, which has been introduced via of the enteric tube. 2. Minimal contrast within central small bowel loops in the rectosigmoid region.   MACRO: None   Signed by: Brigida Leigh 8/26/2024 2:53 PM Dictation workstation:   [id number]    Transthoracic Echo (TTE) Complete    Result Date: 8/26/2024   [facility], [address] [place] [address]                Tel [phone] and Fax [phone] TRANSTHORACIC ECHOCARDIOGRAM REPORT  Patient Name:      HARRIS Sandoval Physician:    [id number] Sha Woods MD Study Date:        8/26/2024            Ordering Provider:    [id number] JASMYN XIAO MRN/PID:           [id number]             Fellow: Accession#:        [id number]         Nurse: Date of Birth/Age: 1950 / 74 years Sonographer:          Kaley RASMUSSEN Gender:            F                    Additional Staff: Height:             149.00 cm            Admit Date: Weight:            58.06 kg             Admission Status:     Inpatient - STAT BSA / BMI:         1.52 m2 / 26.15      Encounter#:           [id number]                    kg/m2 Blood Pressure:    110/65 mmHg          Department Location:  [facility] Non                                                               Invasive Study Type:    TRANSTHORACIC ECHO (TTE) COMPLETE Diagnosis/ICD: Other pulmonary embolism without acute cor pulmonale-I26.99 Indication:    Pulm Embolism, sigmoid tumor of colon CPT Code:      Echo Complete w Full Doppler-63342 Patient History: Pertinent History: HTN and PE. Malignant tumor of sigmoid colon. Study Detail: The following Echo studies were performed: 2D, M-Mode, Doppler and               color flow.  PHYSICIAN INTERPRETATION: Left Ventricle: The left ventricular systolic function is normal, with a visually estimated ejection fraction of 55-60%. There are no regional left ventricular wall motion abnormalities. The left ventricular cavity size is normal. There is no evidence of left ventricular hypertrophy. Spectral Doppler shows an impaired relaxation pattern of left ventricular diastolic filling. Left Atrium: The left atrium is moderately dilated. Right Ventricle: The right ventricle is normal in size. There is normal right ventricular global systolic function. Right Atrium: The right atrium is normal in size. Aortic Valve: The aortic valve is trileaflet. There is trace aortic valve regurgitation. The peak instantaneous gradient of the aortic valve is 6.2 mmHg. Mitral Valve: The mitral valve is normal in structure. There is trace mitral valve regurgitation. Tricuspid Valve: The tricuspid valve is structurally normal. There is trace tricuspid regurgitation. Pulmonic Valve: The pulmonic valve is structurally normal. There is physiologic pulmonic valve regurgitation. Pericardium: There is a trivial pericardial effusion. Aorta: The aortic root is  normal. The Ao Sinus is 3.40 cm. The Asc Ao is 3.40 cm. Pulmonary Artery: The tricuspid regurgitant velocity is 2.32 m/s, and with an estimated right atrial pressure of 3 mmHg, the estimated pulmonary artery pressure is normal with the RVSP at 24.5 mmHg. Systemic Veins: The inferior vena cava appears to be of normal size. There is IVC inspiratory collapse greater than 50%. In comparison to the previous echocardiogram(s): There are no prior studies on this patient for comparison purposes.  CONCLUSIONS:  1. The left ventricular systolic function is normal, with a visually estimated ejection fraction of 55-60%.  2. Spectral Doppler shows an impaired relaxation pattern of left ventricular diastolic filling.  3. There is no evidence of left ventricular hypertrophy.  4. There is normal right ventricular global systolic function.  5. The left atrium is moderately dilated. QUANTITATIVE DATA SUMMARY: 2D MEASUREMENTS:                         Normal Ranges: Ao Root d:     3.40 cm  (2.0-3.7cm) LAs:           3.60 cm  (2.7-4.0cm) IVSd:          1.10 cm  (0.6-1.1cm) LVPWd:         0.90 cm  (0.6-1.1cm) LVIDd:         4.20 cm  (3.9-5.9cm) LVIDs:         2.90 cm LV Mass Index: 90 g/m2 LVEDV Index:   53 ml/m2 LV % FS        31.0 % LA VOLUME:                               Normal Ranges: LA Vol A4C:        65.1 ml    (22+/-6mL/m2) LA Vol A2C:        63.7 ml LA Vol BP:         65.6 ml LA Vol Index A4C:  42.9ml/m2 LA Vol Index A2C:  41.9 ml/m2 LA Vol Index BP:   43.2 ml/m2 LA Area A4C:       20.9 cm2 LA Area A2C:       20.3 cm2 LA Major Axis A4C: 5.7 cm LA Major Axis A2C: 5.5 cm LA Volume Index:   43.2 ml/m2 RA VOLUME BY A/L METHOD:                               Normal Ranges: RA Vol A4C:        29.5 ml    (8.3-19.5ml) RA Vol Index A4C:  19.4 ml/m2 RA Area A4C:       12.9 cm2 RA Major Axis A4C: 4.8 cm AORTA MEASUREMENTS:                      Normal Ranges: Ao Sinus, d: 3.40 cm (2.1-3.5cm) Asc Ao, d:   3.40 cm (2.1-3.4cm) LV SYSTOLIC  FUNCTION BY 2D PLANIMETRY (MOD):                      Normal Ranges: EF-A4C View:    47 % (>=55%) EF-A2C View:    62 % EF-Biplane:     55 % EF-Visual:      58 % LV EF Reported: 58 % LV DIASTOLIC FUNCTION:                               Normal Ranges: MV Peak E:        0.75 m/s    (0.7-1.2 m/s) MV Peak A:        0.78 m/s    (0.42-0.7 m/s) E/A Ratio:        0.96        (1.0-2.2) MV e'             0.075 m/s   (>8.0) MV lateral e'     0.09 m/s MV medial e'      0.06 m/s MV A Dur:         165.00 msec E/e' Ratio:       10.05       (<8.0) PulmV Sys Jeferson:    45.00 cm/s PulmV Saravia Jeferson:   29.10 cm/s PulmV S/D Jeferson:    1.50 PulmV A Revs Jeferson: 27.00 cm/s PulmV A Revs Dur: 106.00 msec MITRAL VALVE:                 Normal Ranges: MV DT: 210 msec (150-240msec) AORTIC VALVE:                         Normal Ranges: AoV Vmax:      1.24 m/s (<=1.7m/s) AoV Peak P.2 mmHg (<20mmHg) LVOT Max Jeferson:  0.76 m/s (<=1.1m/s) LVOT VTI:      18.90 cm LVOT Diameter: 1.80 cm  (1.8-2.4cm) AoV Area,Vmax: 1.57 cm2 (2.5-4.5cm2)  RIGHT VENTRICLE: RV Basal 3.30 cm RV Mid   2.10 cm RV Major 6.3 cm TAPSE:   26.1 mm RV s'    0.12 m/s TRICUSPID VALVE/RVSP:                             Normal Ranges: Peak TR Velocity: 2.32 m/s Est. RA Pressure: 3 mmHg RV Syst Pressure: 24.5 mmHg (< 30mmHg) IVC Diam:         1.50 cm PULMONIC VALVE:                         Normal Ranges: PV Accel Time: 120 msec (>120ms) PV Max Jeferson:    0.7 m/s  (0.6-0.9m/s) PV Max P.0 mmHg Pulmonary Veins: PulmV A Revs Dur: 106.00 msec PulmV A Revs Jeferson: 27.00 cm/s PulmV Saravia Jeferson:   29.10 cm/s PulmV S/D Jeferson:    1.50 PulmV Sys Jeferson:    45.00 cm/s  [id number] Sha Woods MD Electronically signed on 2024 at 11:56:09 AM  ** Final **     XR abdomen 1 view    Result Date: 2024  Interpreted By:  Brigida Leigh, STUDY: XR ABDOMEN 1 VIEW;  2024 9:11 am   INDICATION: Signs/Symptoms:ileus/gastro challenge.   COMPARISON: 2024   ACCESSION NUMBER(S): [id number]   ORDERING CLINICIAN:  JASMYN XIAO   FINDINGS: Nonobstructive bowel gas pattern. Limited evaluation of pneumoperitoneum on supine imaging, however no gross evidence of free air is noted.   NG tube with the tip in the projection of the gastric fundus.   Contrast projected within the bladder.   Multiple staples and clips mid abdomen to pelvis.       1.  NG tube with the tip in the projection of the gastric fundus.   MACRO: None   Signed by: Brigida Leigh 8/26/2024 9:30 AM Dictation workstation:   [id number]         Medications reviewed.  Vital signs reviewed.  Labs reviewed.         Assessment/Plan    Assessment and Plan:  Terra Alexis is a 74F hypothyroidism, cervical cancer s/p TAHBSO, and urothelial cancer s/p chemotherapy, left partial nephrectomy, total ureterectomy and partial sigmoid colectomy w/ DLI on 8/15 admitted for PE and ileus.    -Gastrograffin challenge showing no advancement of contrast from stomach, likely d/t low motility. TTE yesterday showing no R heart strain w normal EF. RLE duplex showing occlusion of R peroneal vein.      Plan Today:     Neuro:   -tylenol PRN    CV:   -appreciate vascular recs for DOAC on DC:  1-continue with heparin gtt. for the coming 24 to 48 hours aiming at heparin therapeutic assay of 0.3-0.7 Continue to monitor CBC specially hemoglobin and hematocrit in addition to platelets  2-patient can be switched to Lovenox weight-based dose either 1 mg/kg subcu twice daily or 1.5 mg/kg once daily ideally to be treated for 6 months at least if this is not suitable to the patient or cannot be done due to insurance restrictions then at least treat with Lovenox for 1 month then patient can be switched to a DOAC which can be Eliquis after loading dose for 1 week then 5 mg p.o. twice daily (patient has suitable BMI and a GFR ranging between 49 and 46 on this hospital stay)    Resp:   -encourage OOB/IS    GI:  -CLD  -appreciate wound/ostomy recs  -reglan 5 q6h for gastric motility  -zofran, prn for  nausea  -PPI      :   -LR 50ml/hr  -urology following, appreciate recs  -completed x1 fluconazole 200mg for UTI  -replete lytes as needed    Heme:   -continue heparin gtt until tomorrow, then start lovenox 8/28    ID:  -no suspicion for infection at this point    Endo:  -levothyroxine    MSK:   -no needs     PPX: SCDs    Hospital Day: 3     Dispo: Continue current level of care     Patient's exam, labs, and findings discussed with Chief Resident Dr. JAVED Hutchinson, PGY-5 and Dr. Sebastian, who agrees with the plan as described above.      Concetta Carter MD PGY-1  Surgical Oncology [id number]

## 2024-08-27 NOTE — PROGRESS NOTES
"Terra Alexis is a 74 y.o. female on day 2 of admission.   Medical history significant for cervical cancer s/p JEMMA/BSO and hypothyroidism as well as recent diagnosis of left ureteral cancer now s/p partial sigmoid colectomy, loop ileostomy, left pelvic dissection, resection of left ureteral tumor and pelvic mass 8/15/24 with Adonis Denton, BALJIT and Cody.  Presents as transfer from [facility] where patient was evaluated for abdominal pain, nausea and vomiting. CT ca/p showed gastric and small bowel distention that was concerning for ileus.  Findings of hypoxia prompted CTA chest that showed an acute PE involving the lobar and segmental branches of bilateral branches, without evidence of right heart strain.  Vascular Medicine Service consulted for anticoagulation recommendations.  Patient continues with Heparin infusion.  Underwent US of RLE that showed occlusive thrombus of the right peroneal vein.  Both PE and RLE DVT are considered provoked due to recent surgery for left ureteral cancer.     Subjective   Patient denies CP, SOB or bleeding.     Objective   Continues with Heparin infusion with therapeutic assay.     Physical Exam  Sitting up  in chair in NAD.  Respirations full and easy with breath sounds CTA all anterior lung fields; on RA with SpO2 91-65%  Normal heart sounds with regular rate/rhythm; telemetry monitor shows NSR; vital signs are stable  Abdominal incisions intact with staples and open to air without drainage; Ileostomy functional with passage of thin, green colored output; ileostomy pouch is intact without evidence of skin irritation  Extremities are warm to touch with palpable bilateral radial and DP pulses; no edema noted of BLE; PIV  Patient is awake and alert, participates in conversation; pleasant demeanor    Last Recorded Vitals  Blood pressure 117/60, pulse 62, temperature 36 °C (96.8 °F), resp. rate 16, height 1.499 m (4' 11\"), weight 58.1 kg (128 lb), SpO2 93%.  Intake/Output " last 3 Shifts:  I/O last 3 completed shifts:  In: 1748.7 (30.1 mL/kg) [I.V.:648.7 (11.2 mL/kg); NG/GT:400; IV Piggyback:700]  Out: 3150 (54.3 mL/kg) [Urine:700 (0.3 mL/kg/hr); Emesis/NG output:200; Stool:2250]  Weight: 58.1 kg     Relevant Results  Scheduled medications  levothyroxine, 25 mcg, oral, Daily  metoclopramide, 5 mg, intravenous, q6h AILEEN  pantoprazole, 40 mg, intravenous, Daily  perflutren lipid microspheres, 0.5-10 mL of dilution, intravenous, Once in imaging  perflutren protein A microsphere, 0.5 mL, intravenous, Once in imaging  sulfur hexafluoride microsphr, 2 mL, intravenous, Once in imaging      Continuous medications  heparin, 0-4,500 Units/hr, Last Rate: 700 Units/hr (08/27/24 0934)  lactated Ringer's, 50 mL/hr, Last Rate: 50 mL/hr (08/27/24 0902)      Results from last 7 days   Lab Units 08/27/24  1148 08/26/24  0716 08/26/24  0141   WBC AUTO x10*3/uL 11.2 7.1 6.6   HEMOGLOBIN g/dL 11.5* 9.1* 9.0*   HEMATOCRIT % 37.0 28.8* 27.0*   PLATELETS AUTO x10*3/uL 317 290 263       Results from last 7 days   Lab Units 08/27/24  0756 08/26/24  0716 08/26/24  0141   SODIUM mmol/L 134* 137 137   POTASSIUM mmol/L 3.8 3.4* 3.3*   CHLORIDE mmol/L 100 98 97*   CO2 mmol/L 20* 28 29   BUN mg/dL 12 13 13   CREATININE mg/dL 0.95 1.18* 1.25*   GLUCOSE mg/dL 54* 71* 83   CALCIUM mg/dL 8.2* 8.6 8.5*   Estimated Creatinine Clearance: 38.9 mL/min (by C-G formula based on SCr of 0.95 mg/dL).      Results from last 7 days   Lab Units 08/27/24  0753 08/26/24  0139 08/25/24  1210   ANTI XA UNFRACTIONATED IU/mL 0.7 0.4 0.5      VASC US LOWER EXTREMITY VENOUS DUPLEX RIGHT; 8/26/2024 4:54 pm   INDICATION:  Signs/Symptoms: RLE edema.  IMPRESSION:  1. Occlusive thrombus of one of the right peroneal veins.  2. The remaining evaluated veins of the right lower extremity demonstrate normal compressibility without evidence of venous thrombosis.      CT ANGIO CHEST FOR PULMONARY EMBOLISM; 8/24/2024 5:01 pm   INDICATION:  Signs/Symptoms:  recent surgery, chest tightness, hypoxic.  IMPRESSION:  1.  Positive study for acute pulmonary embolism. Multiple emboli involving lobar and segmental branches of both lungs.  2. Cardiomegaly with small pericardial effusion. No right heart strain otherwise.  3. Small layering pleural effusions. Increased bibasilar atelectasis or infiltrates. Superimposed infarct not excluded..               Assessment/Plan   Assessment & Plan  Urothelial cancer (Multi)    74 year old female with medical history as noted above.    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  Presents as transfer from [facility] where patient was evaluated for abdominal pain, nausea and vomiting. CT ca/p showed gastric and small bowel distention that was concerning for ileus.  Findings of hypoxia prompted CTA chest that showed an acute PE involving the lobar and segmental branches of bilateral branches, without evidence of right heart strain.  Vascular Medicine Service consulted for anticoagulation recommendations.  Patient continues with Heparin infusion.  Underwent US of RLE that showed occlusive thrombus of the right peroneal vein.  Both PE and RLE DVT are considered provoked due to recent surgery for left ureteral cancer.   Discussion with patient: would recommend use of weight-based Lovenox 60mg q12 hours, to continue until she is seen in the outpatient clinic with Dr. Sheyla Kasper from the Vascular Medicine Service.  At that time Dr. Kasper can determine whether to continue with Lovenox or transition to DOAC therapy. Anticipate a minimum of 6 months of anticoagulation therapy for this provoked PE and DVT.  Patient given Lovenox Med Alert band.      Recommendations:  ~Continue Heparin infusion for now; follow nomogram to achieve therapeutic assay  0.3-0.7  ~Monitor for bleeding  ~START Lovenox 60 mg at 8pm this evening, and STOP Heparin infusion 1 hour after initial dose of Lovenox.  ~Continue Lovenox 60mg q12 hour dosing with administration time 8a/8p to promote patient compliance.  ~Please have bedside RN instruct patient and a family member how to administer Lovenox injections.   ~I have requested outpatient follow up appointment with Dr. Sheyla Kasper from the Vascular Medicine Service.    Plan of care discussed with Dr. Monte  Plan of care discussed in person with Dr. Concetta Carter from the Surgical Oncology Team    BASIL Grigsby-CNP  Vascular Medicine Service   Pager [phone]

## 2024-08-28 LAB
ALBUMIN SERPL BCP-MCNC: 3.3 G/DL (ref 3.4–5)
ANION GAP SERPL CALC-SCNC: 11 MMOL/L (ref 10–20)
BUN SERPL-MCNC: 8 MG/DL (ref 6–23)
CALCIUM SERPL-MCNC: 8.5 MG/DL (ref 8.6–10.6)
CHLORIDE SERPL-SCNC: 97 MMOL/L (ref 98–107)
CO2 SERPL-SCNC: 28 MMOL/L (ref 21–32)
CREAT SERPL-MCNC: 1.04 MG/DL (ref 0.5–1.05)
EGFRCR SERPLBLD CKD-EPI 2021: 57 ML/MIN/1.73M*2
ERYTHROCYTE [DISTWIDTH] IN BLOOD BY AUTOMATED COUNT: 17.1 % (ref 11.5–14.5)
GLUCOSE BLD MANUAL STRIP-MCNC: 96 MG/DL (ref 74–99)
GLUCOSE SERPL-MCNC: 88 MG/DL (ref 74–99)
HCT VFR BLD AUTO: 30.9 % (ref 36–46)
HGB BLD-MCNC: 9.9 G/DL (ref 12–16)
MAGNESIUM SERPL-MCNC: 1.76 MG/DL (ref 1.6–2.4)
MCH RBC QN AUTO: 30.3 PG (ref 26–34)
MCHC RBC AUTO-ENTMCNC: 32 G/DL (ref 32–36)
MCV RBC AUTO: 95 FL (ref 80–100)
NRBC BLD-RTO: 0 /100 WBCS (ref 0–0)
PHOSPHATE SERPL-MCNC: 1.6 MG/DL (ref 2.5–4.9)
PLATELET # BLD AUTO: 287 X10*3/UL (ref 150–450)
POTASSIUM SERPL-SCNC: 3.1 MMOL/L (ref 3.5–5.3)
RBC # BLD AUTO: 3.27 X10*6/UL (ref 4–5.2)
SODIUM SERPL-SCNC: 133 MMOL/L (ref 136–145)
WBC # BLD AUTO: 8.7 X10*3/UL (ref 4.4–11.3)

## 2024-08-28 PROCEDURE — 82947 ASSAY GLUCOSE BLOOD QUANT: CPT

## 2024-08-28 PROCEDURE — 99232 SBSQ HOSP IP/OBS MODERATE 35: CPT | Performed by: NURSE PRACTITIONER

## 2024-08-28 PROCEDURE — 99232 SBSQ HOSP IP/OBS MODERATE 35: CPT | Performed by: SURGERY

## 2024-08-28 PROCEDURE — 1200000003 HC ONCOLOGY  ROOM WITH TELEMETRY DAILY

## 2024-08-28 PROCEDURE — 2500000001 HC RX 250 WO HCPCS SELF ADMINISTERED DRUGS (ALT 637 FOR MEDICARE OP)

## 2024-08-28 PROCEDURE — 2500000004 HC RX 250 GENERAL PHARMACY W/ HCPCS (ALT 636 FOR OP/ED): Performed by: STUDENT IN AN ORGANIZED HEALTH CARE EDUCATION/TRAINING PROGRAM

## 2024-08-28 PROCEDURE — 85027 COMPLETE CBC AUTOMATED: CPT

## 2024-08-28 PROCEDURE — 83735 ASSAY OF MAGNESIUM: CPT

## 2024-08-28 PROCEDURE — 2500000001 HC RX 250 WO HCPCS SELF ADMINISTERED DRUGS (ALT 637 FOR MEDICARE OP): Performed by: NURSE PRACTITIONER

## 2024-08-28 PROCEDURE — 2500000004 HC RX 250 GENERAL PHARMACY W/ HCPCS (ALT 636 FOR OP/ED)

## 2024-08-28 PROCEDURE — 36415 COLL VENOUS BLD VENIPUNCTURE: CPT

## 2024-08-28 PROCEDURE — 2500000004 HC RX 250 GENERAL PHARMACY W/ HCPCS (ALT 636 FOR OP/ED): Performed by: NURSE PRACTITIONER

## 2024-08-28 PROCEDURE — 80069 RENAL FUNCTION PANEL: CPT

## 2024-08-28 RX ORDER — METOCLOPRAMIDE 10 MG/1
5 TABLET ORAL
Status: DISCONTINUED | OUTPATIENT
Start: 2024-08-28 | End: 2024-08-30 | Stop reason: HOSPADM

## 2024-08-28 RX ORDER — PANTOPRAZOLE SODIUM 40 MG/1
40 TABLET, DELAYED RELEASE ORAL
Qty: 30 TABLET | Refills: 11 | Status: SHIPPED | OUTPATIENT
Start: 2024-08-28 | End: 2024-09-27

## 2024-08-28 RX ORDER — TRAMADOL HYDROCHLORIDE 50 MG/1
50 TABLET ORAL EVERY 6 HOURS PRN
Status: DISCONTINUED | OUTPATIENT
Start: 2024-08-28 | End: 2024-08-30 | Stop reason: HOSPADM

## 2024-08-28 RX ORDER — POTASSIUM CHLORIDE 14.9 MG/ML
20 INJECTION INTRAVENOUS
Status: DISCONTINUED | OUTPATIENT
Start: 2024-08-28 | End: 2024-08-28

## 2024-08-28 RX ORDER — TRAMADOL HYDROCHLORIDE 50 MG/1
50 TABLET ORAL EVERY 6 HOURS PRN
Qty: 28 TABLET | Refills: 0 | Status: SHIPPED | OUTPATIENT
Start: 2024-08-28 | End: 2024-09-04

## 2024-08-28 RX ORDER — POTASSIUM CHLORIDE 14.9 MG/ML
20 INJECTION INTRAVENOUS
Status: COMPLETED | OUTPATIENT
Start: 2024-08-28 | End: 2024-08-28

## 2024-08-28 RX ORDER — ACETAMINOPHEN 325 MG/1
650 TABLET ORAL EVERY 4 HOURS PRN
Status: DISCONTINUED | OUTPATIENT
Start: 2024-08-28 | End: 2024-08-30 | Stop reason: HOSPADM

## 2024-08-28 RX ORDER — POTASSIUM CHLORIDE 14.9 MG/ML
20 INJECTION INTRAVENOUS
Status: DISPENSED | OUTPATIENT
Start: 2024-08-28 | End: 2024-08-28

## 2024-08-28 RX ORDER — METOCLOPRAMIDE 5 MG/1
5 TABLET ORAL
Qty: 56 TABLET | Refills: 0 | Status: SHIPPED | OUTPATIENT
Start: 2024-08-28 | End: 2024-09-11

## 2024-08-28 RX ORDER — PANTOPRAZOLE SODIUM 40 MG/1
40 TABLET, DELAYED RELEASE ORAL
Status: DISCONTINUED | OUTPATIENT
Start: 2024-08-28 | End: 2024-08-30 | Stop reason: HOSPADM

## 2024-08-28 RX ORDER — MAGNESIUM SULFATE HEPTAHYDRATE 40 MG/ML
2 INJECTION, SOLUTION INTRAVENOUS ONCE
Status: COMPLETED | OUTPATIENT
Start: 2024-08-28 | End: 2024-08-28

## 2024-08-28 ASSESSMENT — PAIN SCALES - GENERAL
PAINLEVEL_OUTOF10: 0 - NO PAIN
PAINLEVEL_OUTOF10: 7

## 2024-08-28 ASSESSMENT — PAIN DESCRIPTION - LOCATION: LOCATION: ABDOMEN

## 2024-08-28 ASSESSMENT — PAIN - FUNCTIONAL ASSESSMENT: PAIN_FUNCTIONAL_ASSESSMENT: 0-10

## 2024-08-28 NOTE — CONSULTS
Wound/Ostomy Care Progress Note     Visit Date: 8/28/2024      Patient Name: Terra Alexis         MRN: [id number]           YOB: 1950     Reason for Visit: leaking ileostomy, s/p loop ileostomy creation on 8/15    Assessment/Intervention: Pt seen sitting in chair with  at bedside. 1-piece flat appliance placed by nursing earlier today leaking. Stool is brown and very watery. Loop stoma measures 38mm wide and appears budded. However, ABD with soft adipose which may be the reason for leaking. Decision made to trial convexity. Skin cleaned, prepped and stoma repouched using 57mm 2-piece soft convex; timed and dated. Utility of convexity d/w pt and her spouse. Extra appliance set left at bedside.      Wound/Ostomy Team Plan: Will continue to follow closely. Will check appliance tomorrow.      Amy Mcnair RN, CWON  8/28/2024  4:38 PM

## 2024-08-28 NOTE — PROGRESS NOTES
"Terra Alexis is a 74 y.o. female on day 3 of admission.   Medical history significant for cervical cancer s/p JEMMA/BSO and hypothyroidism as well as recent diagnosis of left ureteral cancer now s/p partial sigmoid colectomy, loop ileostomy, left pelvic dissection, resection of left ureteral tumor and pelvic mass 8/15/24 with Adonis Denton, BALJIT and Cody.  Presents as transfer from [facility] where patient was evaluated for abdominal pain, nausea and vomiting. CT ca/p showed gastric and small bowel distention that was concerning for ileus.  Findings of hypoxia prompted CTA chest that showed an acute PE involving the lobar and segmental branches of bilateral branches, without evidence of right heart strain.  Vascular Medicine Service consulted for anticoagulation recommendations.  Patient continues with Heparin infusion.  Underwent US of RLE that showed occlusive thrombus of the right peroneal vein.  Both PE and RLE DVT are considered provoked due to recent surgery for left ureteral cancer.        Subjective   Patient denies CP, SOB or bleeding     Objective   Transitioned to Lovenox 60mg q12 hours for anticoagulation therapy.     Physical Exam  Sitting up  in chair in NAD.  Respirations full and easy with breath sounds CTA all anterior lung fields; on RA   Normal heart sounds with regular rate/rhythm; vital signs are stable  Abdominal incisions intact with staples and open to air without drainage; Ileostomy functional with passage of thin, tan colored output; ileostomy pouch is intact without evidence of skin irritation  Extremities are warm to touch with palpable bilateral radial and DP pulses; no edema noted of BLE; PIV  Patient is awake and alert, participates in conversation; pleasant demeanor     Last Recorded Vitals  Blood pressure 131/81, pulse 67, temperature 35.8 °C (96.4 °F), temperature source Temporal, resp. rate 16, height 1.499 m (4' 11\"), weight 58.1 kg (128 lb), SpO2 95%.  Intake/Output " last 3 Shifts:  I/O last 3 completed shifts:  In: 1107.2 (19.1 mL/kg) [I.V.:1007.2 (17.3 mL/kg); IV Piggyback:100]  Out: 3175 (54.7 mL/kg) [Urine:750 (0.4 mL/kg/hr); Stool:2425]  Weight: 58.1 kg     Relevant Results  Scheduled medications  enoxaparin, 60 mg, subcutaneous, 2 times per day  levothyroxine, 25 mcg, oral, Daily  magnesium sulfate, 2 g, intravenous, Once  metoclopramide, 5 mg, oral, Before meals & nightly  pantoprazole, 40 mg, oral, Daily before breakfast  potassium chloride, 20 mEq, intravenous, q2h  potassium chloride, 20 mEq, intravenous, q2h      Results from last 7 days   Lab Units 08/28/24  0713 08/27/24  1148 08/26/24  0716   WBC AUTO x10*3/uL 8.7 11.2 7.1   HEMOGLOBIN g/dL 9.9* 11.5* 9.1*   HEMATOCRIT % 30.9* 37.0 28.8*   PLATELETS AUTO x10*3/uL 287 317 290       Results from last 7 days   Lab Units 08/28/24  0713 08/27/24  0756 08/26/24  0716   SODIUM mmol/L 133* 134* 137   POTASSIUM mmol/L 3.1* 3.8 3.4*   CHLORIDE mmol/L 97* 100 98   CO2 mmol/L 28 20* 28   BUN mg/dL 8 12 13   CREATININE mg/dL 1.04 0.95 1.18*   GLUCOSE mg/dL 88 54* 71*   CALCIUM mg/dL 8.5* 8.2* 8.6   Estimated Creatinine Clearance: 35.5 mL/min (by C-G formula based on SCr of 1.04 mg/dL).        Malnutrition Diagnosis Status: New  Malnutrition Diagnosis: Moderate malnutrition related to chronic disease or condition  As Evidenced by: significant weight loss (8.5% x 3 months) (17% x 6 months) with mild muscle wasting/fat losses  I agree with the dietitian's malnutrition diagnosis.      Assessment/Plan   Assessment & Plan  Urothelial cancer (Multi)    74 year old female with medical history as noted above.    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  Presents as transfer from [facility] where patient was evaluated for abdominal pain, nausea and  vomiting. CT ca/p showed gastric and small bowel distention that was concerning for ileus.  Findings of hypoxia prompted CTA chest that showed an acute PE involving the lobar and segmental branches of bilateral branches, without evidence of right heart strain.  Vascular Medicine Service consulted for anticoagulation recommendations.  Patient continues with Heparin infusion.  Underwent US of RLE that showed occlusive thrombus of the right peroneal vein.  Both PE and RLE DVT are considered provoked due to recent surgery for left ureteral cancer.   Review of labs today shows decreased hemoglobin from 11.5 grams to 9.9 grams, stable platelets 287K and stable serum creatinine 1.04 with creatinine clearance of 35 ml/minute.   Discussion with patient: continue Lovenox 60mg q12 hours, to continue until she is seen in the outpatient clinic with Dr. Sheyla Kasper from the Vascular Medicine Service.  At that time Dr. Kasper can determine whether to continue with Lovenox or transition to DOAC therapy. Anticipate a minimum of 6 months of anticoagulation therapy for this provoked PE and DVT.  Patient wearing Lovenox Med Alert band.       Recommendations:  ~Continue Lovenox 60mg q12 hours; Lovenox to continue until patient is seen in outpatient clinic with Dr. Kasper   ~Monitor for bleeding  ~Monitor hemoglobin and creatinine; creatinine clearance needs to be >30 ml/minute to continue with Lovenox.   ~Please have bedside RN instruct patient and a family member how to administer Lovenox injections.   ~Outpatient follow up appointment with Dr. Sheyla Kasper from the Vascular Medicine Service has been scheduled for 10/16/24 at 11:30 am at the [facility]..     Vascular Medicine Service will sign off; contact us for any questions.     Plan of care discussed with Dr. Monte  Plan of care discussed in person with Ms. Latesha DAVENPORT from the Surgical Oncology Team     [facility]  Vascular Medicine  Service   Pager [phone]          Hazel Calderon, APRN-CNP

## 2024-08-28 NOTE — PROGRESS NOTES
Surgical Oncology Progress Note      08/28/24    Summary:  Terra Alexis is a 74F hypothyroidism, cervical cancer s/p TAHBSO, and urothelial cancer s/p chemotherapy, left partial nephrectomy, total ureterectomy and partial sigmoid colectomy w/ DLI on 8/15 admitted for PE and ileus.    Subjective    Subjective:  No acute events overnight. Patient tolerated CLD without any issues. Ostomy with about 1500 of liquid output. Given additional 500cc bolus this AM.      Objective    Objective:  Vital signs:   Temp:  [35.8 °C (96.4 °F)-36.6 °C (97.9 °F)] 35.8 °C (96.4 °F)  Heart Rate:  [] 67  Resp:  [16-18] 16  BP: (116-135)/(72-81) 131/81    Physical Exam:  Physical Exam  Constitutional:       Appearance: Normal appearance.   Pulmonary:      Effort: Pulmonary effort is normal.   Abdominal:      General: Abdomen is flat.      Palpations: There is no mass.      Tenderness: There is no abdominal tenderness.      Comments: Soft, midline wound clean without purulence, well approximated with staples. Ostomy with dark green output.   Skin:     General: Skin is warm and dry.   Neurological:      Mental Status: She is alert and oriented to person, place, and time.   Psychiatric:         Mood and Affect: Mood normal.       I/O last 2 completed shifts:  In: 1107.2 (19.1 mL/kg) [I.V.:1007.2 (17.3 mL/kg); IV Piggyback:100]  Out: 1925 (33.2 mL/kg) [Urine:400 (0.3 mL/kg/hr); Stool:1525]  Weight: 58.1 kg      Labs Past 18 Hours:  Recent Results (from the past 18 hour(s))   POCT GLUCOSE    Collection Time: 08/27/24  6:58 PM   Result Value Ref Range    POCT Glucose 88 74 - 99 mg/dL   CBC    Collection Time: 08/28/24  7:13 AM   Result Value Ref Range    WBC 8.7 4.4 - 11.3 x10*3/uL    nRBC 0.0 0.0 - 0.0 /100 WBCs    RBC 3.27 (L) 4.00 - 5.20 x10*6/uL    Hemoglobin 9.9 (L) 12.0 - 16.0 g/dL    Hematocrit 30.9 (L) 36.0 - 46.0 %    MCV 95 80 - 100 fL    MCH 30.3 26.0 - 34.0 pg    MCHC 32.0 32.0 - 36.0 g/dL    RDW 17.1 (H) 11.5 - 14.5 %     Platelets 287 150 - 450 x10*3/uL   Renal Function Panel    Collection Time: 08/28/24  7:13 AM   Result Value Ref Range    Glucose 88 74 - 99 mg/dL    Sodium 133 (L) 136 - 145 mmol/L    Potassium 3.1 (L) 3.5 - 5.3 mmol/L    Chloride 97 (L) 98 - 107 mmol/L    Bicarbonate 28 21 - 32 mmol/L    Anion Gap 11 10 - 20 mmol/L    Urea Nitrogen 8 6 - 23 mg/dL    Creatinine 1.04 0.50 - 1.05 mg/dL    eGFR 57 (L) >60 mL/min/1.73m*2    Calcium 8.5 (L) 8.6 - 10.6 mg/dL    Phosphorus 1.6 (L) 2.5 - 4.9 mg/dL    Albumin 3.3 (L) 3.4 - 5.0 g/dL   Magnesium    Collection Time: 08/28/24  7:13 AM   Result Value Ref Range    Magnesium 1.76 1.60 - 2.40 mg/dL      Meds:    Current Facility-Administered Medications:     acetaminophen (Tylenol) tablet 650 mg, 650 mg, oral, q4h PRN, Taye Hutchinson MD, 650 mg at 08/28/24 0806    dextrose 50 % injection 12.5 g, 12.5 g, intravenous, q15 min PRN, Concetta Carter MD, 12.5 g at 08/27/24 1023    dextrose 50 % injection 25 g, 25 g, intravenous, q15 min PRN, Concetta Carter MD    enoxaparin (Lovenox) syringe 60 mg, 60 mg, subcutaneous, 2 times per day, Taye Hutchinson MD, 60 mg at 08/28/24 0800    glucagon (Glucagen) injection 1 mg, 1 mg, intramuscular, q15 min PRN, Concetta Carter MD    levothyroxine (Synthroid, Levoxyl) tablet 25 mcg, 25 mcg, oral, Daily, Merissa High MD, 25 mcg at 08/28/24 0540    magnesium sulfate 2 g in sterile water for injection 50 mL, 2 g, intravenous, Once, Latesha Clemente, APRN-CNP, Last Rate: 25 mL/hr at 08/28/24 1114, 2 g at 08/28/24 1114    metoclopramide (Reglan) tablet 5 mg, 5 mg, oral, Before meals & nightly, ISSAC Rincon, 5 mg at 08/28/24 1114    ondansetron (Zofran) injection 4 mg, 4 mg, intravenous, q6h PRN, Rene Wright MD    pantoprazole (ProtoNix) EC tablet 40 mg, 40 mg, oral, Daily before breakfast, ISSAC Rincon, 40 mg at 08/28/24 0800    potassium chloride 20 mEq in sterile water for  injection 100 mL, 20 mEq, intravenous, q2h, ISSAC Rincon, Last Rate: 50 mL/hr at 08/28/24 1114, 20 mEq at 08/28/24 1114    potassium chloride 20 mEq in sterile water for injection 100 mL, 20 mEq, intravenous, q2h, Jasmyn Clemente APRN-CNP    traMADol (Ultram) tablet 50 mg, 50 mg, oral, q6h PRN, BASIL Rincon-CNP     Imaging:  XR abdomen 1 view    Result Date: 8/27/2024  Interpreted By:  Brigida Leigh, STUDY: XR ABDOMEN 1 VIEW;  8/26/2024 5:38 pm   INDICATION: Signs/Symptoms:gastrografin challenge.     COMPARISON: 08/26/2024, 3:11 p.m.   ACCESSION NUMBER(S): [id number]   ORDERING CLINICIAN: JASMYN CLEMENTE   FINDINGS: Nonobstructive bowel gas pattern. Limited evaluation of pneumoperitoneum on supine imaging, however no gross evidence of free air is noted.   Contrast has been introduced via an NG tube with the opacification of the gastric fundus. Faint contrast enhancement within the region of the descending colon.   Large oval density projected over the right mid abdomen and pelvis question possible external artifact.       1.  Contrast within the region of the gastric fundus. Similar in appearance when compared with the prior exam.   MACRO: None   Signed by: Brigida Leigh 8/27/2024 2:31 PM Dictation workstation:   [id number]    Lower extremity venous duplex right    Result Date: 8/27/2024  Interpreted By:  Norm Grover and [place] Lesley STUDY: [facility] US LOWER EXTREMITY VENOUS DUPLEX RIGHT;  8/26/2024 4:54 pm   INDICATION: Signs/Symptoms:RLE edema.   COMPARISON: None.   ACCESSION NUMBER(S): [id number]   ORDERING CLINICIAN: JASMYN CLEMENTE   TECHNIQUE: Vascular ultrasound of the  right lower extremity was performed. Real time compression views as well as Gray scale, color Doppler and spectral Doppler waveform analysis was performed.   This examination was interpreted at [facility].   FINDINGS: Evaluation of the visualized  portions of the  right common femoral vein, proximal, mid, and distal femoral vein, and popliteal vein were performed.  Evaluation of the visualized portions of the  posterior tibial and peroneal veins were also performed.  In addition, evaluation of the contralateral common femoral vein was performed.   Limitations:  None   There is noncompressibility of one of the right peroneal veins with loss of Doppler flow signal and echogenic occlusive internal thrombus.   The remaining evaluated veins demonstrate normal compressibility. There is intact venous flow demonstrating normal respiratory variability and normal augmentation of flow with calf compression.   Respiratory variation and augmentation to calf pressure was noted.       1. Occlusive thrombus of one of the right peroneal veins. 2. The remaining evaluated veins of the right lower extremity demonstrate normal compressibility without evidence of venous thrombosis.   I personally reviewed the image(s)/study and resident interpretation as stated by Dr. Marycruz Chawla MD. I agree with the findings as stated. This study was interpreted at [facility], [place], [place].   MACRO: Critical Finding:  See findings. Notification was initiated on 8/27/2024 at 12:34 am by  Norm Bain.  (**-YCF-**) Instructions:  Clinical consult.   Signed by: Norm Bain 8/27/2024 12:34 AM Dictation workstation:   [id number]    XR abdomen 1 view    Result Date: 8/26/2024  Interpreted By:  Brigida Leigh, STUDY: XR ABDOMEN 1 VIEW;  8/26/2024 3:16 pm   INDICATION: Signs/Symptoms:gastrografin challenge.   COMPARISON: 08/26/2024, 1:25 p.m.   ACCESSION NUMBER(S): [id number]   ORDERING CLINICIAN: JASMYN XIAO   FINDINGS: Nonobstructive bowel gas pattern. Limited evaluation of pneumoperitoneum on supine imaging, however no gross evidence of free air is noted.   Contrast within the gastric fundus, introduced via the NG tube.      Osseous structures demonstrate no acute bony changes.       1.  Contrast within the gastric fundus. 2. NG tube tip in the projection of the stomach.   MACRO: None   Signed by: Brigida Leigh 8/26/2024 3:19 PM Dictation workstation:   [id number]    XR abdomen 1 view    Result Date: 8/26/2024  Interpreted By:  Brigida Leigh, STUDY: XR ABDOMEN 1 VIEW;  8/26/2024 1:25 pm   INDICATION: Signs/Symptoms:gastrografin challenge.   COMPARISON: 08/26/2024, 9:01 a.m.   ACCESSION NUMBER(S): [id number]   ORDERING CLINICIAN: JASMYN XIAO   FINDINGS: There is contrast within the gastric fundus, introduced via the enteric tube. Limited evaluation of pneumoperitoneum on supine imaging, however no gross evidence of free air is noted.   Visualized lungs are clear.   Minimal contrast identified within centralized small bowel loops. Contrast is present within the rectosigmoid region.         1.  Contrast opacifies the gastric fundus, which has been introduced via of the enteric tube. 2. Minimal contrast within central small bowel loops in the rectosigmoid region.   MACRO: None   Signed by: Brigida Leigh 8/26/2024 2:53 PM Dictation workstation:   [id number]         Medications reviewed.  Vital signs reviewed.  Labs reviewed.         Assessment/Plan    Assessment and Plan:  Terra Alexis is a 74F hypothyroidism, cervical cancer s/p TAHBSO, and urothelial cancer s/p chemotherapy, left partial nephrectomy, total ureterectomy and partial sigmoid colectomy w/ DLI on 8/15 admitted for PE and ileus.    -Gastrograffin challenge showing no advancement of contrast from stomach, likely d/t low motility. TTE yesterday showing no R heart strain w normal EF. RLE duplex showing occlusion of R peroneal vein.      Plan Today:     Neuro:   -tylenol PRN    CV:   -appreciate vascular recs   - patient transitioned to weight based lovenox 60mg q12 last night. Will continue on this regimen until seen in outpatient clinic with vascular medicine  service. At that time decision will be made to continue with lovenox or transition to DOAC therapy.     Resp:   -encourage OOB/IS    GI:  -advance to regular diet   -appreciate wound/ostomy recs  -PO reglan 5 q6h for gastric motility  -zofran, prn for nausea  -PPI  - continue to monitor ostomy output     :   -urology following, appreciate recs  -completed x1 fluconazole 200mg for UTI  -replete lytes as needed  - strict I/o's     Heme:   - continue with lovenox 60 BID     ID:  -no indications    Endo:  -continue home levothyroxine levothyroxine    MSK:   -no needs     PPX: SCDs    Hospital Day: 4     Dispo:  Possibly discharge tomorrow pending tolerating regular diet with controlled ostomy output.     D/w attending surgeon Dr. Denton.     Libby Mario MD PGY-2  Surgical Oncology [id number]

## 2024-08-29 LAB
ALBUMIN SERPL BCP-MCNC: 3.4 G/DL (ref 3.4–5)
ANION GAP SERPL CALC-SCNC: 12 MMOL/L (ref 10–20)
BUN SERPL-MCNC: 8 MG/DL (ref 6–23)
CALCIUM SERPL-MCNC: 8.7 MG/DL (ref 8.6–10.6)
CHLORIDE SERPL-SCNC: 100 MMOL/L (ref 98–107)
CO2 SERPL-SCNC: 23 MMOL/L (ref 21–32)
CREAT SERPL-MCNC: 1.01 MG/DL (ref 0.5–1.05)
EGFRCR SERPLBLD CKD-EPI 2021: 59 ML/MIN/1.73M*2
ERYTHROCYTE [DISTWIDTH] IN BLOOD BY AUTOMATED COUNT: 17.1 % (ref 11.5–14.5)
GLUCOSE SERPL-MCNC: 78 MG/DL (ref 74–99)
HCT VFR BLD AUTO: 33.8 % (ref 36–46)
HGB BLD-MCNC: 10.6 G/DL (ref 12–16)
MAGNESIUM SERPL-MCNC: 2.03 MG/DL (ref 1.6–2.4)
MCH RBC QN AUTO: 30.6 PG (ref 26–34)
MCHC RBC AUTO-ENTMCNC: 31.4 G/DL (ref 32–36)
MCV RBC AUTO: 98 FL (ref 80–100)
NRBC BLD-RTO: 0 /100 WBCS (ref 0–0)
PHOSPHATE SERPL-MCNC: 1.8 MG/DL (ref 2.5–4.9)
PLATELET # BLD AUTO: 336 X10*3/UL (ref 150–450)
POTASSIUM SERPL-SCNC: 3.4 MMOL/L (ref 3.5–5.3)
RBC # BLD AUTO: 3.46 X10*6/UL (ref 4–5.2)
SODIUM SERPL-SCNC: 132 MMOL/L (ref 136–145)
WBC # BLD AUTO: 8.9 X10*3/UL (ref 4.4–11.3)

## 2024-08-29 PROCEDURE — 80069 RENAL FUNCTION PANEL: CPT | Performed by: STUDENT IN AN ORGANIZED HEALTH CARE EDUCATION/TRAINING PROGRAM

## 2024-08-29 PROCEDURE — 85027 COMPLETE CBC AUTOMATED: CPT | Performed by: STUDENT IN AN ORGANIZED HEALTH CARE EDUCATION/TRAINING PROGRAM

## 2024-08-29 PROCEDURE — 83735 ASSAY OF MAGNESIUM: CPT | Performed by: STUDENT IN AN ORGANIZED HEALTH CARE EDUCATION/TRAINING PROGRAM

## 2024-08-29 PROCEDURE — 2500000001 HC RX 250 WO HCPCS SELF ADMINISTERED DRUGS (ALT 637 FOR MEDICARE OP)

## 2024-08-29 PROCEDURE — 2500000001 HC RX 250 WO HCPCS SELF ADMINISTERED DRUGS (ALT 637 FOR MEDICARE OP): Performed by: NURSE PRACTITIONER

## 2024-08-29 PROCEDURE — 2500000004 HC RX 250 GENERAL PHARMACY W/ HCPCS (ALT 636 FOR OP/ED): Performed by: STUDENT IN AN ORGANIZED HEALTH CARE EDUCATION/TRAINING PROGRAM

## 2024-08-29 PROCEDURE — 2500000001 HC RX 250 WO HCPCS SELF ADMINISTERED DRUGS (ALT 637 FOR MEDICARE OP): Performed by: STUDENT IN AN ORGANIZED HEALTH CARE EDUCATION/TRAINING PROGRAM

## 2024-08-29 PROCEDURE — 2500000004 HC RX 250 GENERAL PHARMACY W/ HCPCS (ALT 636 FOR OP/ED): Performed by: NURSE PRACTITIONER

## 2024-08-29 PROCEDURE — 1170000001 HC PRIVATE ONCOLOGY ROOM DAILY

## 2024-08-29 PROCEDURE — 36415 COLL VENOUS BLD VENIPUNCTURE: CPT | Performed by: STUDENT IN AN ORGANIZED HEALTH CARE EDUCATION/TRAINING PROGRAM

## 2024-08-29 PROCEDURE — 99232 SBSQ HOSP IP/OBS MODERATE 35: CPT | Performed by: SURGERY

## 2024-08-29 RX ORDER — MAGNESIUM SULFATE HEPTAHYDRATE 40 MG/ML
2 INJECTION, SOLUTION INTRAVENOUS ONCE
Status: DISCONTINUED | OUTPATIENT
Start: 2024-08-29 | End: 2024-08-29

## 2024-08-29 RX ORDER — POTASSIUM CHLORIDE 14.9 MG/ML
20 INJECTION INTRAVENOUS
Status: DISCONTINUED | OUTPATIENT
Start: 2024-08-29 | End: 2024-08-29

## 2024-08-29 RX ORDER — POTASSIUM CHLORIDE 20 MEQ/1
20 TABLET, EXTENDED RELEASE ORAL ONCE
Status: DISCONTINUED | OUTPATIENT
Start: 2024-08-29 | End: 2024-08-29

## 2024-08-29 RX ORDER — LOPERAMIDE HYDROCHLORIDE 2 MG/1
2 CAPSULE ORAL 4 TIMES DAILY PRN
Status: DISCONTINUED | OUTPATIENT
Start: 2024-08-29 | End: 2024-08-29

## 2024-08-29 RX ORDER — LOPERAMIDE HYDROCHLORIDE 2 MG/1
2 CAPSULE ORAL
Status: DISCONTINUED | OUTPATIENT
Start: 2024-08-29 | End: 2024-08-30 | Stop reason: HOSPADM

## 2024-08-29 ASSESSMENT — PAIN SCALES - GENERAL
PAINLEVEL_OUTOF10: 0 - NO PAIN
PAINLEVEL_OUTOF10: 0 - NO PAIN

## 2024-08-29 NOTE — PROGRESS NOTES
Surgical Oncology Progress Note      08/29/24    Summary:  Terra Alexis is a 74F hypothyroidism, cervical cancer s/p TAHBSO, and urothelial cancer s/p chemotherapy, left partial nephrectomy, total ureterectomy and partial sigmoid colectomy w/ DLI on 8/15 admitted for PE and ileus.    Subjective    Subjective:  Patient endorsed leaking around her ostomy site. Patient with 1800cc of liquid ostomy output, up from 1500 yesterday. Administered additional 500cc bolus this morning. Initiated loperamide 2mg qid. Tolerating lovenox shots appropriately without any issues.      Objective    Objective:  Vital signs:   Temp:  [36.2 °C (97.2 °F)-36.7 °C (98.1 °F)] 36.7 °C (98.1 °F)  Heart Rate:  [70-77] 71  Resp:  [16] 16  BP: (121-144)/(70-74) 144/73    Physical Exam:  Physical Exam  Constitutional:       Appearance: Normal appearance.   Pulmonary:      Effort: Pulmonary effort is normal.   Abdominal:      General: Abdomen is flat.      Palpations: There is no mass.      Tenderness: There is no abdominal tenderness.      Comments: Soft, midline wound clean without purulence, well approximated with staples. Ostomy with dark green output.   Skin:     General: Skin is warm and dry.   Neurological:      Mental Status: She is alert and oriented to person, place, and time.   Psychiatric:         Mood and Affect: Mood normal.       I/O last 2 completed shifts:  In: 930 (16 mL/kg) [P.O.:480; I.V.:50 (0.9 mL/kg); IV Piggyback:400]  Out: 2500 (43.1 mL/kg) [Urine:700 (0.5 mL/kg/hr); Stool:1800]  Weight: 58.1 kg      Labs Past 18 Hours:  Recent Results (from the past 18 hour(s))   POCT GLUCOSE    Collection Time: 08/28/24  5:35 PM   Result Value Ref Range    POCT Glucose 96 74 - 99 mg/dL      Meds:    Current Facility-Administered Medications:     acetaminophen (Tylenol) tablet 650 mg, 650 mg, oral, q4h PRN, Taye Hutchinson MD, 650 mg at 08/28/24 2217    dextrose 50 % injection 12.5 g, 12.5 g, intravenous, q15 min PRN, Concetta  MD Emma, 12.5 g at 08/27/24 1023    dextrose 50 % injection 25 g, 25 g, intravenous, q15 min PRN, Concetta Carter MD    enoxaparin (Lovenox) syringe 60 mg, 60 mg, subcutaneous, 2 times per day, Taye Hutchinson MD, 60 mg at 08/28/24 2001    glucagon (Glucagen) injection 1 mg, 1 mg, intramuscular, q15 min PRN, Concetta Carter MD    lactated Ringer's bolus 500 mL, 500 mL, intravenous, Once, ISSAC Rincon, Last Rate: 250 mL/hr at 08/29/24 0714, 500 mL at 08/29/24 0714    levothyroxine (Synthroid, Levoxyl) tablet 25 mcg, 25 mcg, oral, Daily, Merissa High MD, 25 mcg at 08/29/24 0714    loperamide (Imodium) capsule 2 mg, 2 mg, oral, Before meals & nightly, Taye Hutchinson MD, 2 mg at 08/29/24 0714    metoclopramide (Reglan) tablet 5 mg, 5 mg, oral, Before meals & nightly, ISSAC Rincon, 5 mg at 08/29/24 0714    ondansetron (Zofran) injection 4 mg, 4 mg, intravenous, q6h PRN, Rene Wright MD    pantoprazole (ProtoNix) EC tablet 40 mg, 40 mg, oral, Daily before breakfast, ISSAC Rincon, 40 mg at 08/29/24 0714    traMADol (Ultram) tablet 50 mg, 50 mg, oral, q6h PRN, ISSAC Rincon     Imaging:  No results found.        Medications reviewed.  Vital signs reviewed.  Labs reviewed.         Assessment/Plan    Assessment and Plan:  Terra Alexis is a 74F hypothyroidism, cervical cancer s/p TAHBSO, and urothelial cancer s/p chemotherapy, left partial nephrectomy, total ureterectomy and partial sigmoid colectomy w/ DLI on 8/15 admitted for PE and ileus.    -Gastrograffin challenge showing no advancement of contrast from stomach, likely d/t low motility. TTE yesterday showing no R heart strain w normal EF. RLE duplex showing occlusion of R peroneal vein.    Will initiate patient on loperamide and continue to monitor ostomy output as it was 1800 over 24 hours.     Plan Today:     Neuro:   -tylenol PRN    CV:   -appreciate vascular recs   -  patient transitioned to weight based lovenox 60mg q12 last night. Will continue on this regimen until seen in outpatient clinic with vascular medicine service. At that time decision will be made to continue with lovenox or transition to DOAC therapy.     Resp:   -encourage OOB/IS    GI:  -continue regular diet  -loperamide 2 qid   -appreciate wound/ostomy recs  -PO reglan 5 q6h for gastric motility  -zofran, prn for nausea  -PPI  - continue to monitor ostomy output     :   -urology following, appreciate recs  -completed x1 fluconazole 200mg for UTI  -replete lytes as needed  - strict I/o's     Heme:   - continue with lovenox 60 BID     ID:  -no indications    Endo:  -continue home levothyroxine levothyroxine    MSK:   -no needs     PPX: SCDs    Hospital Day: 5     Dispo:  Possibly discharge tomorrow pending tolerating regular diet, and ostomy more optimally controlled with loperamide.     D/w attending surgeon Dr. Denton.     Libby Mario MD PGY-2  Surgical Oncology [id number]

## 2024-08-29 NOTE — PROGRESS NOTES
[facility]  Progress Note      Patient: Terra Alexis  Age/Sex: 74 y.o., female  MRN: [id number]  Date of surgery: 8/15/24  Admit Date: 8/25/2024   Code Status: Full Code  Length of Stay: 4      Interval History/Overnight Events:   NAEON  Afebrile, VSS, on RA   Denies any fevers, chills, nausea or vomiting  Ostomy with stool and gas  Tolerating regular diet      Objective  08/27 1900 - 08/29 0659  In: 1196.7 [P.O.:480; I.V.:316.7]  Out: 3150 [Urine:700]      Medications:  Current Facility-Administered Medications   Medication Dose Route Frequency Provider Last Rate Last Admin    acetaminophen (Tylenol) tablet 650 mg  650 mg oral q4h PRN Taye Hutchinson MD   650 mg at 08/28/24 2217    dextrose 50 % injection 12.5 g  12.5 g intravenous q15 min PRN Concetta Carter MD   12.5 g at 08/27/24 1023    dextrose 50 % injection 25 g  25 g intravenous q15 min PRN Concetta Carter MD        enoxaparin (Lovenox) syringe 60 mg  60 mg subcutaneous 2 times per day Taye Hutchinson MD   60 mg at 08/29/24 0850    glucagon (Glucagen) injection 1 mg  1 mg intramuscular q15 min PRN Concetta Carter MD        levothyroxine (Synthroid, Levoxyl) tablet 25 mcg  25 mcg oral Daily Merissa High MD   25 mcg at 08/29/24 0714    loperamide (Imodium) capsule 2 mg  2 mg oral Before meals & nightly Taye Hutchinson MD   2 mg at 08/29/24 1140    metoclopramide (Reglan) tablet 5 mg  5 mg oral Before meals & nightly ISSAC Rincon   5 mg at 08/29/24 1140    ondansetron (Zofran) injection 4 mg  4 mg intravenous q6h PRN Rene Wright MD        pantoprazole (ProtoNix) EC tablet 40 mg  40 mg oral Daily before breakfast ISSAC [place]con   40 mg at 08/29/24 0714    traMADol (Ultram) tablet 50 mg  50 mg oral q6h PRN Latesha Clemente, APRN-CNP           Vitals:    08/28/24 1739 08/28/24 2225 08/29/24 0300 08/29/24 0931   BP: 142/74 121/73 144/73 122/76   BP Location:  Left arm Left arm Left arm  "  Patient Position:  Lying Lying Lying   Pulse: 77 72 71 73   Resp: 16 16 16 16   Temp: 36.5 °C (97.7 °F) 36.3 °C (97.3 °F) 36.7 °C (98.1 °F) 37 °C (98.6 °F)   TempSrc: Temporal Temporal Temporal Temporal   SpO2: 98% 97% 96% 96%   Weight:       Height:                    9.9     8.7>-----<287              30.9   133  97  8                  ----------------<88     3.1  28  1.04          Ca 8.5 Phos 1.6 Mg 1.76       ALT 4 AST 16 AlkPhos 102 tBili 0.5            Physical Exam                                                                                                                         Gen -  No acute distress, sitting in chair     Neuro - Alert, oriented, conversant, pleasant     CV -  Regular rate     Pulm - Symmetric chest rise, non-labored breathing on RA     Abd - Soft, midly-distended, appropriately tender near incision. Midline incision c/d/I well approximated with staples. Ileostomy stoma pink and with dark green stool and gas in bag.       Ext - Warm, well perfused     Skin - without jaundice       Psych - appropriate tone, affect      - No CVA or suprapubic tenderness         Assessment & Plan  Terra Alexis is a 74 y.o. 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 Her recovery was overall uneventful and she was discharged on 8/21. She presented to the [facility] ED on 8/24 with 1d of abdominal pain, watery ileostomy output, and \"heartburn.\" Workup revealed hypoxia (improved on 2LNC) and CT showing bilateral lobar and segmental PEs as well as concern for ileus. She was transferred to [facility] for further management.     Her workup is most significant for evidence of bilateral PEs on CT with new " mild oxygen requirement of 2LNC as well as symptoms and imaging consistent with ileus. NGT was placed, then removed the following day. Diet advanced and now tolerating regular diet. She was transitioned to weight based lovenox, and will continue regimen until seen in outpatient clinic with vascular medicine.  Patient was weaned off oxygen.       PLAN:  - cont treatment of PE's per vascular recs  - Patient has post-op appt set up for staple removal with Urology on 9/4/24   - Remainder of care per primary team  - Urology will continue to follow    Assessment and plan discussed with chief resident Dr. Roy and attending Dr. Ross     --------------------------------------------  Veronica Andre PA-C  Urology  Consult Uro: [id number]  After 5pm and Weekends: [id number]

## 2024-08-30 VITALS
DIASTOLIC BLOOD PRESSURE: 52 MMHG | RESPIRATION RATE: 16 BRPM | BODY MASS INDEX: 25.8 KG/M2 | TEMPERATURE: 97.9 F | HEIGHT: 59 IN | SYSTOLIC BLOOD PRESSURE: 123 MMHG | WEIGHT: 128 LBS | OXYGEN SATURATION: 96 % | HEART RATE: 88 BPM

## 2024-08-30 PROCEDURE — 2500000004 HC RX 250 GENERAL PHARMACY W/ HCPCS (ALT 636 FOR OP/ED): Performed by: STUDENT IN AN ORGANIZED HEALTH CARE EDUCATION/TRAINING PROGRAM

## 2024-08-30 PROCEDURE — 2500000001 HC RX 250 WO HCPCS SELF ADMINISTERED DRUGS (ALT 637 FOR MEDICARE OP)

## 2024-08-30 PROCEDURE — 2500000001 HC RX 250 WO HCPCS SELF ADMINISTERED DRUGS (ALT 637 FOR MEDICARE OP): Performed by: STUDENT IN AN ORGANIZED HEALTH CARE EDUCATION/TRAINING PROGRAM

## 2024-08-30 PROCEDURE — 99238 HOSP IP/OBS DSCHRG MGMT 30/<: CPT | Performed by: NURSE PRACTITIONER

## 2024-08-30 PROCEDURE — 2500000001 HC RX 250 WO HCPCS SELF ADMINISTERED DRUGS (ALT 637 FOR MEDICARE OP): Performed by: NURSE PRACTITIONER

## 2024-08-30 RX ORDER — LOPERAMIDE HYDROCHLORIDE 2 MG/1
2 CAPSULE ORAL
COMMUNITY
Start: 2024-08-30

## 2024-08-30 ASSESSMENT — PAIN SCALES - GENERAL
PAINLEVEL_OUTOF10: 0 - NO PAIN
PAINLEVEL_OUTOF10: 0 - NO PAIN

## 2024-08-30 NOTE — DISCHARGE SUMMARY
"Discharge Diagnosis  Urothelial cancer (Multi)    Hospital Course  74 year-old female with a past medical history of hypothyroidism, cervical cancer s/p TAHBSO, and urothelial cancer s/p chemotherapy, and left partial nephrectomy, total ureterectomy (Dr. Ross, Urology) and partial sigmoid colectomy w/ DLI (Dr. Harper, Surgical Oncology) on 8/15, who presents as a transfer for [facility] ED with 1 day of abdominal pain, nausea, and emesis. At OSH, patient was noted to have hypoxia on exam and a CTA chest was completed, which demonstrated an acute PE involving the lobar and segmental branches of bilateral lungs. She was started on a heparin gtt at the OSH. CT abd/pel demonstrated gastric and small bowel distention, concerning for an ileus. Surgical oncology was consulted for evaluation of ileus in setting of recent sigmoid resection w/ DLI.  TTE with no evidence of R heart strain. RLE duplex with occlusive thrombus of right peroneal veins.  Vascular medicine consulted and transitioned to therapeutic Lovenox.  Started on Reglan for ileus with return of bowel function.  Antidiarrheals titrated to keep ostomy output <1L.  DC home 8/30 with resumption of home care.    Pertinent Physical Exam At Time of Discharge  Neurological: Awake, alert, conversive  Respiratory/Thorax: even, unlabored  Genitourinary: voiding  Gastrointestinal: soft, NT, ND.  Incision well approximated with staples; mild erythema along staple line.  Ileostomy with liquid stool; stoma beefy red; pouch intact.   Skin: warm, dry  Musculoskeletal: GONZALEZ  Eyes: non-icteric  Extremities: RLE edema  Psychological: appropriate mood/affect     /84 (BP Location: Left arm, Patient Position: Lying)   Pulse 83   Temp 36.5 °C (97.7 °F) (Temporal)   Resp 16   Ht 1.499 m (4' 11\")   Wt 58.1 kg (128 lb)   SpO2 99%   BMI 25.85 kg/m²      Home Medications     Medication List      START taking these medications     enoxaparin 60 mg/0.6 mL syringe; Commonly " known as: Lovenox; Inject 0.6   mL (60 mg) under the skin 2 times a day.   loperamide 2 mg capsule; Commonly known as: Imodium; Take 1 capsule (2   mg) by mouth 4 times a day before meals.   metoclopramide 5 mg tablet; Commonly known as: Reglan; Take 1 tablet (5   mg) by mouth 4 times a day before meals for 14 days.   pantoprazole 40 mg EC tablet; Commonly known as: ProtoNix; Take 1 tablet   (40 mg) by mouth once daily in the morning. Take before meals. Do not   crush, chew, or split.   traMADol 50 mg tablet; Commonly known as: Ultram; Take 1 tablet (50 mg)   by mouth every 6 hours if needed for severe pain (7 - 10) for up to 7   days.     CONTINUE taking these medications     acetaminophen 325 mg tablet; Commonly known as: Tylenol; Take 2 tablets   (650 mg) by mouth every 6 hours if needed for mild pain (1 - 3).   levothyroxine 25 mcg tablet; Commonly known as: Synthroid, Levoxyl; Take   1 tablet (25 mcg) by mouth early in the morning.. Take on an empty stomach   at the same time each day, either 30 to 60 minutes prior to breakfast   potassium chloride CR 10 mEq ER tablet; Commonly known as: Klor-Con;   Take 1 tablet (10 mEq) by mouth once daily.   PreserVision AREDS 4,296 mcg-226 mg-90 mg capsule; Generic drug:   vitamins A,C,E-zinc-copper     STOP taking these medications     oxyCODONE 5 mg immediate release tablet; Commonly known as: Roxicodone       Outpatient Follow-Up  Future Appointments   Date Time Provider Department Center   9/4/2024 11:20 AM Teddy Ross MD [id number] [facility]   9/6/2024  1:30 PM Bernard Denton MD MPH [id number]   9/9/2024  2:40 PM Elgin Boone MD [id number] [facility]   10/16/2024 11:30 AM Sheyla Kasper MD, MS [id number] [facility]       Latesha Clemente, APRN-CNP

## 2024-08-30 NOTE — NURSING NOTE
Discharge note    Patient discharged home today. RN removed both PIVs intact. Patient provided with discharge instructions and phone numbers to call for questions and concerns. Patient sent home with catina to drive her home, all personal belongings returned/sent with patient. Patient provided with output logs for ostomy.

## 2024-08-30 NOTE — CARE PLAN
Problem: Pain  Goal: Takes deep breaths with improved pain control throughout the shift  Outcome: Progressing  Goal: Turns in bed with improved pain control throughout the shift  Outcome: Progressing  Goal: Walks with improved pain control throughout the shift  Outcome: Progressing  Goal: Performs ADL's with improved pain control throughout shift  Outcome: Progressing  Goal: Participates in PT with improved pain control throughout the shift  Outcome: Progressing  Goal: Free from opioid side effects throughout the shift  Outcome: Progressing  Goal: Free from acute confusion related to pain meds throughout the shift  Outcome: Progressing   The patient's goals for the shift include      The clinical goals for the shift include ostomy and pain management        
  Problem: Pain  Goal: Takes deep breaths with improved pain control throughout the shift  Outcome: Progressing  Goal: Walks with improved pain control throughout the shift  Outcome: Progressing  Goal: Free from opioid side effects throughout the shift  Outcome: Progressing  Goal: Free from acute confusion related to pain meds throughout the shift  Outcome: Progressing   The patient's goals for the shift include      The clinical goals for the shift include safety maintained and rest      
The patient's goals for the shift include      The clinical goals for the shift include lovenox education and adequate intake      Problem: Pain  Goal: Takes deep breaths with improved pain control throughout the shift  Outcome: Progressing  Goal: Turns in bed with improved pain control throughout the shift  Outcome: Progressing  Goal: Walks with improved pain control throughout the shift  Outcome: Progressing  Goal: Performs ADL's with improved pain control throughout shift  Outcome: Progressing  Goal: Participates in PT with improved pain control throughout the shift  Outcome: Progressing  Goal: Free from opioid side effects throughout the shift  Outcome: Progressing  Goal: Free from acute confusion related to pain meds throughout the shift  Outcome: Progressing       
The patient's goals for the shift include      The clinical goals for the shift include patient will get home safe      Problem: Pain  Goal: Takes deep breaths with improved pain control throughout the shift  Outcome: Progressing  Goal: Turns in bed with improved pain control throughout the shift  Outcome: Progressing  Goal: Walks with improved pain control throughout the shift  Outcome: Progressing  Goal: Performs ADL's with improved pain control throughout shift  Outcome: Progressing  Goal: Participates in PT with improved pain control throughout the shift  Outcome: Progressing  Goal: Free from opioid side effects throughout the shift  Outcome: Progressing  Goal: Free from acute confusion related to pain meds throughout the shift  Outcome: Progressing       
The patient's goals for the shift include      The clinical goals for the shift include patient will learn to manage ostomy      Problem: Pain  Goal: Takes deep breaths with improved pain control throughout the shift  Outcome: Progressing  Goal: Turns in bed with improved pain control throughout the shift  Outcome: Progressing  Goal: Walks with improved pain control throughout the shift  Outcome: Progressing  Goal: Performs ADL's with improved pain control throughout shift  Outcome: Progressing  Goal: Participates in PT with improved pain control throughout the shift  Outcome: Progressing  Goal: Free from opioid side effects throughout the shift  Outcome: Progressing  Goal: Free from acute confusion related to pain meds throughout the shift  Outcome: Progressing     
The patient's goals for the shift include      The clinical goals for the shift include patient will remain HDS with VS WNL throughout shift.    Patient was safe and injury free throughout shift. Patient out of bed this shift.       Problem: Pain  Goal: Takes deep breaths with improved pain control throughout the shift  Outcome: Progressing  Goal: Turns in bed with improved pain control throughout the shift  Outcome: Progressing  Goal: Walks with improved pain control throughout the shift  Outcome: Progressing  Goal: Performs ADL's with improved pain control throughout shift  Outcome: Progressing  Goal: Participates in PT with improved pain control throughout the shift  Outcome: Progressing  Goal: Free from opioid side effects throughout the shift  Outcome: Progressing  Goal: Free from acute confusion related to pain meds throughout the shift  Outcome: Progressing       
The patient's goals for the shift include  remaining HDS. Patient remained safe and VSS.     
yes

## 2024-09-01 ENCOUNTER — CLINICAL SUPPORT (OUTPATIENT)
Dept: EMERGENCY MEDICINE | Facility: HOSPITAL | Age: 74
End: 2024-09-01
Payer: MEDICARE

## 2024-09-01 ENCOUNTER — HOSPITAL ENCOUNTER (EMERGENCY)
Facility: HOSPITAL | Age: 74
Discharge: HOME | End: 2024-09-02
Attending: STUDENT IN AN ORGANIZED HEALTH CARE EDUCATION/TRAINING PROGRAM
Payer: MEDICARE

## 2024-09-01 DIAGNOSIS — K65.1 INTRA-ABDOMINAL ABSCESS (MULTI): Primary | ICD-10-CM

## 2024-09-01 LAB
ALBUMIN SERPL BCP-MCNC: 3.9 G/DL (ref 3.4–5)
ALP SERPL-CCNC: 197 U/L (ref 33–136)
ALT SERPL W P-5'-P-CCNC: 18 U/L (ref 7–45)
ANION GAP SERPL CALC-SCNC: 20 MMOL/L (ref 10–20)
AST SERPL W P-5'-P-CCNC: 35 U/L (ref 9–39)
ATRIAL RATE: 106 BPM
BILIRUB SERPL-MCNC: 0.4 MG/DL (ref 0–1.2)
BUN SERPL-MCNC: 14 MG/DL (ref 6–23)
CALCIUM SERPL-MCNC: 8.9 MG/DL (ref 8.6–10.6)
CHLORIDE SERPL-SCNC: 101 MMOL/L (ref 98–107)
CO2 SERPL-SCNC: 15 MMOL/L (ref 21–32)
CREAT SERPL-MCNC: 1.22 MG/DL (ref 0.5–1.05)
EGFRCR SERPLBLD CKD-EPI 2021: 47 ML/MIN/1.73M*2
GLUCOSE SERPL-MCNC: 165 MG/DL (ref 74–99)
LACTATE SERPL-SCNC: 2.6 MMOL/L (ref 0.4–2)
LIPASE SERPL-CCNC: 42 U/L (ref 9–82)
P AXIS: 22 DEGREES
P OFFSET: 184 MS
P ONSET: 134 MS
POTASSIUM SERPL-SCNC: 4.7 MMOL/L (ref 3.5–5.3)
PR INTERVAL: 162 MS
PROT SERPL-MCNC: 6.8 G/DL (ref 6.4–8.2)
Q ONSET: 215 MS
QRS COUNT: 18 BEATS
QRS DURATION: 80 MS
QT INTERVAL: 394 MS
QTC CALCULATION(BAZETT): 523 MS
QTC FREDERICIA: 476 MS
R AXIS: 9 DEGREES
SODIUM SERPL-SCNC: 131 MMOL/L (ref 136–145)
T AXIS: 27 DEGREES
T OFFSET: 412 MS
VENTRICULAR RATE: 106 BPM

## 2024-09-01 PROCEDURE — 99285 EMERGENCY DEPT VISIT HI MDM: CPT | Mod: 25

## 2024-09-01 PROCEDURE — 80053 COMPREHEN METABOLIC PANEL: CPT

## 2024-09-01 PROCEDURE — 2500000004 HC RX 250 GENERAL PHARMACY W/ HCPCS (ALT 636 FOR OP/ED)

## 2024-09-01 PROCEDURE — 36415 COLL VENOUS BLD VENIPUNCTURE: CPT

## 2024-09-01 PROCEDURE — 96361 HYDRATE IV INFUSION ADD-ON: CPT

## 2024-09-01 PROCEDURE — 2500000004 HC RX 250 GENERAL PHARMACY W/ HCPCS (ALT 636 FOR OP/ED): Performed by: STUDENT IN AN ORGANIZED HEALTH CARE EDUCATION/TRAINING PROGRAM

## 2024-09-01 PROCEDURE — 93005 ELECTROCARDIOGRAM TRACING: CPT

## 2024-09-01 PROCEDURE — 96375 TX/PRO/DX INJ NEW DRUG ADDON: CPT

## 2024-09-01 PROCEDURE — 83605 ASSAY OF LACTIC ACID: CPT

## 2024-09-01 PROCEDURE — 83690 ASSAY OF LIPASE: CPT

## 2024-09-01 RX ORDER — FENTANYL CITRATE 50 UG/ML
25 INJECTION, SOLUTION INTRAMUSCULAR; INTRAVENOUS ONCE
Status: COMPLETED | OUTPATIENT
Start: 2024-09-01 | End: 2024-09-01

## 2024-09-01 RX ORDER — ONDANSETRON HYDROCHLORIDE 2 MG/ML
4 INJECTION, SOLUTION INTRAVENOUS ONCE
Status: COMPLETED | OUTPATIENT
Start: 2024-09-01 | End: 2024-09-01

## 2024-09-01 ASSESSMENT — PAIN DESCRIPTION - LOCATION: LOCATION: ABDOMEN

## 2024-09-01 ASSESSMENT — PAIN - FUNCTIONAL ASSESSMENT: PAIN_FUNCTIONAL_ASSESSMENT: 0-10

## 2024-09-01 ASSESSMENT — PAIN SCALES - GENERAL: PAINLEVEL_OUTOF10: 10 - WORST POSSIBLE PAIN

## 2024-09-01 ASSESSMENT — PAIN DESCRIPTION - DESCRIPTORS: DESCRIPTORS: SHARP;CRAMPING

## 2024-09-01 ASSESSMENT — PAIN DESCRIPTION - FREQUENCY: FREQUENCY: INTERMITTENT

## 2024-09-02 ENCOUNTER — APPOINTMENT (OUTPATIENT)
Dept: RADIOLOGY | Facility: HOSPITAL | Age: 74
End: 2024-09-02
Payer: MEDICARE

## 2024-09-02 VITALS
HEIGHT: 59 IN | TEMPERATURE: 97 F | HEART RATE: 66 BPM | BODY MASS INDEX: 25.8 KG/M2 | SYSTOLIC BLOOD PRESSURE: 113 MMHG | WEIGHT: 128 LBS | OXYGEN SATURATION: 95 % | RESPIRATION RATE: 16 BRPM | DIASTOLIC BLOOD PRESSURE: 63 MMHG

## 2024-09-02 LAB
BASOPHILS # BLD AUTO: 0.02 X10*3/UL (ref 0–0.1)
BASOPHILS NFR BLD AUTO: 0.2 %
EOSINOPHIL # BLD AUTO: 0.27 X10*3/UL (ref 0–0.4)
EOSINOPHIL NFR BLD AUTO: 2.8 %
ERYTHROCYTE [DISTWIDTH] IN BLOOD BY AUTOMATED COUNT: 16.8 % (ref 11.5–14.5)
HCT VFR BLD AUTO: 28.4 % (ref 36–46)
HGB BLD-MCNC: 9.5 G/DL (ref 12–16)
IMM GRANULOCYTES # BLD AUTO: 0.1 X10*3/UL (ref 0–0.5)
IMM GRANULOCYTES NFR BLD AUTO: 1 % (ref 0–0.9)
LACTATE SERPL-SCNC: 2 MMOL/L (ref 0.4–2)
LYMPHOCYTES # BLD AUTO: 0.91 X10*3/UL (ref 0.8–3)
LYMPHOCYTES NFR BLD AUTO: 9.3 %
MCH RBC QN AUTO: 30.5 PG (ref 26–34)
MCHC RBC AUTO-ENTMCNC: 33.5 G/DL (ref 32–36)
MCV RBC AUTO: 91 FL (ref 80–100)
MONOCYTES # BLD AUTO: 0.49 X10*3/UL (ref 0.05–0.8)
MONOCYTES NFR BLD AUTO: 5 %
NEUTROPHILS # BLD AUTO: 8.01 X10*3/UL (ref 1.6–5.5)
NEUTROPHILS NFR BLD AUTO: 81.7 %
NRBC BLD-RTO: 0 /100 WBCS (ref 0–0)
PLATELET # BLD AUTO: 304 X10*3/UL (ref 150–450)
RBC # BLD AUTO: 3.11 X10*6/UL (ref 4–5.2)
WBC # BLD AUTO: 9.8 X10*3/UL (ref 4.4–11.3)

## 2024-09-02 PROCEDURE — 74177 CT ABD & PELVIS W/CONTRAST: CPT

## 2024-09-02 PROCEDURE — 2500000004 HC RX 250 GENERAL PHARMACY W/ HCPCS (ALT 636 FOR OP/ED)

## 2024-09-02 PROCEDURE — 36415 COLL VENOUS BLD VENIPUNCTURE: CPT

## 2024-09-02 PROCEDURE — 2550000001 HC RX 255 CONTRASTS

## 2024-09-02 PROCEDURE — A9698 NON-RAD CONTRAST MATERIALNOC: HCPCS

## 2024-09-02 PROCEDURE — 83605 ASSAY OF LACTIC ACID: CPT

## 2024-09-02 PROCEDURE — 2550000001 HC RX 255 CONTRASTS: Performed by: EMERGENCY MEDICINE

## 2024-09-02 PROCEDURE — 85025 COMPLETE CBC W/AUTO DIFF WBC: CPT

## 2024-09-02 PROCEDURE — 74177 CT ABD & PELVIS W/CONTRAST: CPT | Mod: FOREIGN READ | Performed by: RADIOLOGY

## 2024-09-02 PROCEDURE — 96366 THER/PROPH/DIAG IV INF ADDON: CPT

## 2024-09-02 PROCEDURE — 96365 THER/PROPH/DIAG IV INF INIT: CPT

## 2024-09-02 RX ORDER — METRONIDAZOLE 500 MG/1
500 TABLET ORAL 3 TIMES DAILY
Qty: 42 TABLET | Refills: 0 | Status: SHIPPED | OUTPATIENT
Start: 2024-09-02 | End: 2024-09-16

## 2024-09-02 RX ORDER — ONDANSETRON HYDROCHLORIDE 2 MG/ML
4 INJECTION, SOLUTION INTRAVENOUS ONCE
Status: DISCONTINUED | OUTPATIENT
Start: 2024-09-02 | End: 2024-09-02

## 2024-09-02 RX ORDER — CIPROFLOXACIN 250 MG/1
500 TABLET, FILM COATED ORAL EVERY 12 HOURS
Qty: 56 TABLET | Refills: 0 | Status: SHIPPED | OUTPATIENT
Start: 2024-09-02 | End: 2024-09-16

## 2024-09-02 ASSESSMENT — LIFESTYLE VARIABLES
HAVE PEOPLE ANNOYED YOU BY CRITICIZING YOUR DRINKING: NO
EVER FELT BAD OR GUILTY ABOUT YOUR DRINKING: NO
HAVE YOU EVER FELT YOU SHOULD CUT DOWN ON YOUR DRINKING: NO
TOTAL SCORE: 0
EVER HAD A DRINK FIRST THING IN THE MORNING TO STEADY YOUR NERVES TO GET RID OF A HANGOVER: NO

## 2024-09-02 ASSESSMENT — PAIN SCALES - GENERAL: PAINLEVEL_OUTOF10: 0 - NO PAIN

## 2024-09-02 NOTE — CONSULTS
Urology [place]  Consult Note    Terra Alexis  [id number]  1950 (74 y.o.)  Reason for Consult:  Abdominal pain and nausea/vomiting    HPI: Terra Alexis is a 74 year-old female w/ Hx cervical cancer s/p chemoradiation+TAHBSO 1999, hypothyroidism, and left pelvic sidewall mass involving the sigmoid colon, iliac vessels, and ureter s/p left robotic nephroureterectomy, sigmoid colectomy, diverting loop ileostomy, and soft tissue mass resection on 8/15 w/ Drs Cody, Bertin, and Alec. She was recently admitted  from 8/25-3/30 for ileus and PE. Since discharge on 8/30, she had been recovering well and tolerating diet until yesterday when she presented to ED with abdominal pain and 1x episode of emesis.     ROS:  Pertinent positives/negatives per HPI, 10 point comprehensive review of systems reviewed and otherwise negative.    Allergies   Allergen Reactions    Codeine Hallucinations, GI Upset and Nausea/vomiting    Percocet [Oxycodone-Acetaminophen] Dizziness and Nausea/vomiting       Past Medical History:   Diagnosis Date    Hypertension     Malignant tumor of sigmoid colon (Multi) 08/21/2024    Other specified disorders of kidney and ureter 03/04/2022    Ureteral mass    Personal history of malignant neoplasm of cervix uteri 11/21/2022    History of malignant neoplasm of cervix     Past Surgical History:   Procedure Laterality Date    NEPHRECTOMY Left 08/15/2024    ex-lap, left ureteral tumor resection, left nephrectomy, excision of pelvic mass, partial sigmoidectomy with loop ileostomy    OTHER SURGICAL HISTORY  10/07/2021    Hysterectomy     Social History     Tobacco Use    Smoking status: Never    Smokeless tobacco: Never   Vaping Use    Vaping status: Never Used   Substance Use Topics    Alcohol use: Never    Drug use: Never      Family History   Problem Relation Name Age of Onset    Macular degeneration Mother      Glaucoma Other grandparent     Macular degeneration Other grandparent      Current  Facility-Administered Medications   Medication Dose Route Frequency Provider Last Rate Last Admin    barium sulfate (Readi-Cat 2) 2 % (w/v) suspension 900 mL  900 mL oral Once in imaging Dre Khan MD         Current Outpatient Medications   Medication Sig Dispense Refill    acetaminophen (Tylenol) 325 mg tablet Take 2 tablets (650 mg) by mouth every 6 hours if needed for mild pain (1 - 3). 20 tablet 0    enoxaparin (Lovenox) 60 mg/0.6 mL syringe Inject 0.6 mL (60 mg) under the skin 2 times a day. 60 each 5    levothyroxine (Synthroid, Levoxyl) 25 mcg tablet Take 1 tablet (25 mcg) by mouth early in the morning.. Take on an empty stomach at the same time each day, either 30 to 60 minutes prior to breakfast 30 tablet 2    loperamide (Imodium) 2 mg capsule Take 1 capsule (2 mg) by mouth 4 times a day before meals.      metoclopramide (Reglan) 5 mg tablet Take 1 tablet (5 mg) by mouth 4 times a day before meals for 14 days. 56 tablet 0    pantoprazole (ProtoNix) 40 mg EC tablet Take 1 tablet (40 mg) by mouth once daily in the morning. Take before meals. Do not crush, chew, or split. 30 tablet 11    potassium chloride CR 10 mEq ER tablet Take 1 tablet (10 mEq) by mouth once daily. 30 tablet 11    traMADol (Ultram) 50 mg tablet Take 1 tablet (50 mg) by mouth every 6 hours if needed for severe pain (7 - 10) for up to 7 days. 28 tablet 0    vitamins A,C,E-zinc-copper (PreserVision AREDS) 4,296 mcg-226 mg-90 mg capsule Take 1 capsule by mouth 2 times a day.         OBJECTIVE:  Labs & Test Results  No intake/output data recorded.    Data Review:  Lab Results   Component Value Date    WBC 9.8 09/02/2024    HGB 9.5 (L) 09/02/2024    HCT 28.4 (L) 09/02/2024    MCV 91 09/02/2024     09/02/2024      Lab Results   Component Value Date    GLUCOSE 165 (H) 09/01/2024    CALCIUM 8.9 09/01/2024     (L) 09/01/2024    K 4.7 09/01/2024    CO2 15 (L) 09/01/2024     09/01/2024    BUN 14 09/01/2024    CREATININE  1.22 (H) 09/01/2024        Physical Exam  General: resting comfortably in bed  Neuro: alert and oriented, no obvious focal deficit   Head/Neck: normocephalic, atraumatic  ENT: moist mucous membranes  CV: regular rate and rhythm  Pulm: nonlabored breathing on room air, no conversational dyspnea  Abd: soft, nondistended, nontender, colostomy with large amount of dark brown output and gas, midline incision with staples  MSK: GONZALEZ, no gross deformities  Psych: mood and behavior normal    Imaging  None new    Assessment & Recommendations  Terra Alexis is a 74 year old female 3 weeks s/p s/p left robotic nephroureterectomy, sigmoid colectomy, diverting loop ileostomy, and soft tissue mass resection. She is coming in with sharp abdominal pain and 1x episode of emesis. She has been voiding well at home. CT shows small pelvic fluid collection. In the ED she is afebrile, no leukocytosis, and VSS. Surgical oncology saw her this morning and does not recommend drainage at this time.     - Okay for discharge from urology standpoint  - Appreciate surg onc recs: 14 days of cipro and metronidazole, continue imodium  - Will follow up with Dr. Ross on 9/4    Discussed with attending Dr. Cody Heck MD   Urology [place]  Adult Urology Pager: [phone]

## 2024-09-02 NOTE — CONSULTS
Reason For Consult  Abdominal pain    History Of Present Illness  Terra Alexis is a 74 y.o. female presenting with acute onset abdominal pain, and 1 episode of emesis.  Patient was recently mid to the hospital after diagnosis of PE and ileus.  She continues on therapeutic Lovenox at home.  Over last week or so she has been doing exceptionally well at home, tolerating regular food, and close monitoring her ostomy output.  She has been taking Imodium to help control high output.  Yesterday afternoon/evening she began having acute onset severe abdominal pain.  This pain progressed throughout the course the day and into the evening.  She then had 1 episode of emesis.  She presented to the ED for evaluation.  After starting antibiotics in the emergency department, patient feels significantly better.  No other specific complaints.     Patient is approximately 3 weeks out from an open left ureteral tumor resection and excision of pelvic mass as well as a partial sigmoidectomy with primary anastomosis and diverting loop ileostomy.     Past Medical History  She has a past medical history of Hypertension, Malignant tumor of sigmoid colon (Multi) (08/21/2024), Other specified disorders of kidney and ureter (03/04/2022), and Personal history of malignant neoplasm of cervix uteri (11/21/2022).    Surgical History  She has a past surgical history that includes Other surgical history (10/07/2021) and Nephrectomy (Left, 08/15/2024).     Social History  She reports that she has never smoked. She has never used smokeless tobacco. She reports that she does not drink alcohol and does not use drugs.    Family History  Family History   Problem Relation Name Age of Onset    Macular degeneration Mother      Glaucoma Other grandparent     Macular degeneration Other grandparent         Allergies  Codeine and Percocet [oxycodone-acetaminophen]    Review of Systems  As per HPI, otherwise negative     Physical  "Exam  NAD  Nontachycardic  Nonlabored respirations on room air  Abdomen soft, nontender, nondistended, right lower quadrant diverting loop ileostomy pink and well-perfused with semisolid liquid output, midline incision as well as several laparoscopic sites clean dry and intact with staples, no signs of infection       Last Recorded Vitals  Blood pressure 128/65, pulse 70, temperature 36.1 °C (97 °F), temperature source Temporal, resp. rate 16, height 1.499 m (4' 11\"), weight 58.1 kg (128 lb), SpO2 96%.    Relevant Results  Results for orders placed or performed during the hospital encounter of 09/01/24 (from the past 24 hour(s))   ECG 12 lead   Result Value Ref Range    Ventricular Rate 106 BPM    Atrial Rate 106 BPM    OK Interval 162 ms    QRS Duration 80 ms    QT Interval 394 ms    QTC Calculation(Bazett) 523 ms    P Axis 22 degrees    R Axis 9 degrees    T Axis 27 degrees    QRS Count 18 beats    Q Onset 215 ms    P Onset 134 ms    P Offset 184 ms    T Offset 412 ms    QTC Fredericia 476 ms   Comprehensive metabolic panel   Result Value Ref Range    Glucose 165 (H) 74 - 99 mg/dL    Sodium 131 (L) 136 - 145 mmol/L    Potassium 4.7 3.5 - 5.3 mmol/L    Chloride 101 98 - 107 mmol/L    Bicarbonate 15 (L) 21 - 32 mmol/L    Anion Gap 20 10 - 20 mmol/L    Urea Nitrogen 14 6 - 23 mg/dL    Creatinine 1.22 (H) 0.50 - 1.05 mg/dL    eGFR 47 (L) >60 mL/min/1.73m*2    Calcium 8.9 8.6 - 10.6 mg/dL    Albumin 3.9 3.4 - 5.0 g/dL    Alkaline Phosphatase 197 (H) 33 - 136 U/L    Total Protein 6.8 6.4 - 8.2 g/dL    AST 35 9 - 39 U/L    Bilirubin, Total 0.4 0.0 - 1.2 mg/dL    ALT 18 7 - 45 U/L   Lipase   Result Value Ref Range    Lipase 42 9 - 82 U/L   Lactate   Result Value Ref Range    Lactate 2.6 (H) 0.4 - 2.0 mmol/L   Lactate   Result Value Ref Range    Lactate 2.0 0.4 - 2.0 mmol/L   CBC and Auto Differential   Result Value Ref Range    WBC 9.8 4.4 - 11.3 x10*3/uL    nRBC 0.0 0.0 - 0.0 /100 WBCs    RBC 3.11 (L) 4.00 - 5.20 " x10*6/uL    Hemoglobin 9.5 (L) 12.0 - 16.0 g/dL    Hematocrit 28.4 (L) 36.0 - 46.0 %    MCV 91 80 - 100 fL    MCH 30.5 26.0 - 34.0 pg    MCHC 33.5 32.0 - 36.0 g/dL    RDW 16.8 (H) 11.5 - 14.5 %    Platelets 304 150 - 450 x10*3/uL    Neutrophils % 81.7 40.0 - 80.0 %    Immature Granulocytes %, Automated 1.0 (H) 0.0 - 0.9 %    Lymphocytes % 9.3 13.0 - 44.0 %    Monocytes % 5.0 2.0 - 10.0 %    Eosinophils % 2.8 0.0 - 6.0 %    Basophils % 0.2 0.0 - 2.0 %    Neutrophils Absolute 8.01 (H) 1.60 - 5.50 x10*3/uL    Immature Granulocytes Absolute, Automated 0.10 0.00 - 0.50 x10*3/uL    Lymphocytes Absolute 0.91 0.80 - 3.00 x10*3/uL    Monocytes Absolute 0.49 0.05 - 0.80 x10*3/uL    Eosinophils Absolute 0.27 0.00 - 0.40 x10*3/uL    Basophils Absolute 0.02 0.00 - 0.10 x10*3/uL       CT a/p:  IMPRESSION:  There is a 7.0 x 5.4 x 3.2 cm irregular, roughly ovoid fluid  collection in the pelvis.  This is inseparable from the posterior  aspect of the urinary bladder extending superiorly and to the left.   There is an enhancing rind.  The finding is suspicious for abscess.   The internal attenuation suggests both fluid and debris.  There is no  internal air bubble.  Clinical correlation is recommended as regards  the need for aspiration/drainage.  Small to moderate quantity of free fluid in the abdomen and pelvis  status post left nephrectomy.  Dilated small bowel on the prior CT has improved.  Wall thickening and  relative hyperemia of bowel loops suggests enteritis.  Dependent atelectasis at both lung bases.         Assessment/Plan     Patient is approximately 3 weeks out from a left partial nephrectomy, ureterectomy, sigmoid colectomy with primary anastomosis and diverting loop ileostomy here with abdominal pain.  CT scan notable for small pelvic fluid lection near colorectal anastomotic staple line.  Patient is hemodynamically normal, not septic, with normal white count.  Fluid collection was also noted on CT scan from most recent  readmission.  As this patient is diverted with a loop ileostomy, we will hold off on any drainage procedures at this time.    -Okay for discharge from surgical oncology standpoint, but would appreciate urologic evaluation as well  -If no indication from admission from urology standpoint, would recommend 14-day course of ciprofloxacin and metronidazole  -Continue Imodium for high ostomy output as needed  -Maintain scheduled follow-up appointments    Seen at bedside with Dr. Ekaterina BURT e  Surg Onc  [id number]

## 2024-09-02 NOTE — PROGRESS NOTES
I received Terra Alexis in signout from outgoing provider.  Please see the previous note for all HPI, PE and MDM up to the time of signout at 0700.    In brief Terra Alexis is an 74 y.o. female presenting for abdominal pain in the context of complicated surgical history with recent intra-abdominal procedure.  Chief Complaint   Patient presents with    Abdominal Pain   .  At the time of signout we were awaiting: Results of the CT abdomen pelvis    I examined the patient at time of assumption of care:  Awake alert and oriented, no acute distress resting comfortably on the stretcher, no respiratory distress speaking in full sentences, 2+ radial pulses bilaterally without evidence of malperfusion or delayed cap refill, abdomen is soft nontender nondistended        During my care CT abdomen pelvis resulted showing rim-enhancing fluid collection within the pelvis.  Patient was started on IV Zosyn, surgical oncology was paged, after evaluation by surgical oncology and urology they recommended the patient go home with 2 weeks of oral Cipro and Flagyl.  The patient is okay with this plan.  She has no hemodynamic instability here.  She is very motivated to go home.  She was given good return precautions of fevers, chills, intolerance of p.o. intake or increased abdominal pain.  She was instructed to take Cipro and Flagyl for full 2-week course regardless of resolution of symptoms.  She was instructed to follow-up with primary care physician and outpatient specialist as needed.      Patient seen and discussed with attending of record.    Nirmal Frankel MD

## 2024-09-02 NOTE — DISCHARGE INSTRUCTIONS
You are seen today in the emergency department due to concerns about abdominal pain, you do have an intra-abdominal abscess, you were evaluated by urology and surgical oncology.  They recommended that you take the antibiotics that we have prescribed to you for 14 days even if you have resolution of your symptoms.  If you have new or worsening pain, fevers or chills or intolerant of oral intake please return to the emergency department immediately.  Otherwise please follow-up with your outpatient providers as usual.  We have also given you the urology contact number to set up follow-up appoint with them if you do not already have 1.

## 2024-09-02 NOTE — ED PROVIDER NOTES
History of Present Illness     History provided by: Patient  Limitations to History: None  External Records Reviewed with Brief Summary: None    HPI:  Terra Alexis is a 74 y.o. female with PMHx inclusive of hypothyroidism, cervical cancer s/p TAHBSO, urothelial cancer s/p chemotherapy, partial sigmoid colectomy and left partial nephrectomy. She presents to the ED for 10/10 sharp, cramping, intermittent abdominal pain that began yesterday morning (has ileostomy). She also began vomiting ~2 hours prior after being nauseous for most of the day. Patient denies chest pain, fever/chills, dyspnea,  changes, GI changes.  Physical Exam   Triage vitals:  T 36.3 °C (97.3 °F)  HR (!) 106  /63  RR 18  O2 97 % None (Room air)    VITALS: Reviewed.  WEIGHT/BMI reviewed.  GEN: well-developed, NAD.  PSYCH: Good Judgment. AOx3. Normal memory, mood, and affect.  HEENT  -Head: NC/AT;  -Eyes: PERRL, EOMI. No discharge or redness;  NECK: Supple, with no masses.  CV: RRR, no m/r/g.  LUNGS: CTAB, no w/r/c.  ABD: Soft, ND, ileostomy pouch on right, diffusely tender to palpation, NBS, no masses or organomegaly.  : N/A  SKIN: Warm, well perfused. No skin rashes or abnormal lesions.  MSK: No deformities or signs of scoliosis. Normal gait.    Medical Decision Making & ED Course   Medical Decision Making:  Terra Alexis is a 74 y.o. female with PMHx inclusive of hypothyroidism, cervical cancer s/p TAHBSO, urothelial cancer s/p chemotherapy, partial sigmoid colectomy and left partial nephrectomy. She presents to the ED for 10/10 sharp, cramping, intermittent abdominal pain that began yesterday morning (has ileostomy). Vomiting, decreased ileostomy output today, denies denies chest pain, fever/chills, dyspnea,  changes, GI changes.    Differential includes anastamotic leak and obstruction. Lipase and lactate unremarkable, CBC shows elevated alk phos as 197, creatinine at 1.22, and sodium decreased at 131. Will get CT abd/pelv  w/ oral contrast.  ----      Differential diagnoses considered include but are not limited to: anastamotic leak vs obstruction     Social Determinants of Health which Significantly Impact Care: None identified     EKG Independent Interpretation: EKG interpreted by myself. Please see ED Course for full interpretation.    Independent Result Review and Interpretation: Relevant laboratory and radiographic results were reviewed and independently interpreted by myself.  As necessary, they are commented on in the ED Course.    Chronic conditions affecting the patient's care: As documented above in MDM    The patient was discussed with the following consultants/services: None    Care Considerations: As documented above in MDM    ED Course:     Disposition   TBD    Procedures   Procedures        Yomi Watkins MD  Emergency Medicine       Yomi Watkins MD  Resident  09/02/24 0227       Yomi Watkins MD  Resident  09/02/24 0241       Brannon Quiles MD  09/03/24 0018

## 2024-09-03 ENCOUNTER — HOME CARE VISIT (OUTPATIENT)
Dept: HOME HEALTH SERVICES | Facility: HOME HEALTH | Age: 74
End: 2024-09-03
Payer: MEDICARE

## 2024-09-03 VITALS
HEART RATE: 74 BPM | OXYGEN SATURATION: 99 % | BODY MASS INDEX: 25 KG/M2 | HEIGHT: 60 IN | DIASTOLIC BLOOD PRESSURE: 58 MMHG | TEMPERATURE: 98.1 F | SYSTOLIC BLOOD PRESSURE: 120 MMHG | RESPIRATION RATE: 18 BRPM

## 2024-09-03 PROCEDURE — G0299 HHS/HOSPICE OF RN EA 15 MIN: HCPCS | Mod: HHH

## 2024-09-03 ASSESSMENT — ENCOUNTER SYMPTOMS
PAIN: 1
MUSCLE WEAKNESS: 1
PAIN LOCATION - PAIN SEVERITY: 5/10
LOWEST PAIN SEVERITY IN PAST 24 HOURS: 5/10
FATIGUES EASILY: 1
APPETITE LEVEL: GOOD
HIGHEST PAIN SEVERITY IN PAST 24 HOURS: 9/10
PAIN SEVERITY GOAL: 2/10
CHANGE IN APPETITE: UNCHANGED
SUBJECTIVE PAIN PROGRESSION: WAXING AND WANING
PERSON REPORTING PAIN: PATIENT
DYSPNEA ON EXERTION: 1

## 2024-09-03 ASSESSMENT — ACTIVITIES OF DAILY LIVING (ADL)
AMBULATION ASSISTANCE: STAND BY ASSIST
AMBULATION ASSISTANCE: 1
OASIS_M1830: 03
ENTERING_EXITING_HOME: NEEDS ASSISTANCE

## 2024-09-04 ENCOUNTER — APPOINTMENT (OUTPATIENT)
Dept: UROLOGY | Facility: CLINIC | Age: 74
End: 2024-09-04
Payer: MEDICARE

## 2024-09-04 DIAGNOSIS — C66.2 CANCER OF LEFT URETER (MULTI): Primary | ICD-10-CM

## 2024-09-04 PROCEDURE — 99024 POSTOP FOLLOW-UP VISIT: CPT | Performed by: STUDENT IN AN ORGANIZED HEALTH CARE EDUCATION/TRAINING PROGRAM

## 2024-09-04 PROCEDURE — 1111F DSCHRG MED/CURRENT MED MERGE: CPT | Performed by: STUDENT IN AN ORGANIZED HEALTH CARE EDUCATION/TRAINING PROGRAM

## 2024-09-04 PROCEDURE — 1157F ADVNC CARE PLAN IN RCRD: CPT | Performed by: STUDENT IN AN ORGANIZED HEALTH CARE EDUCATION/TRAINING PROGRAM

## 2024-09-04 NOTE — PROGRESS NOTES
Subjective   Patient ID: Terra Alexis is a 74 y.o. female.    HPI  73 y/o M s/p nephroureterectomy, robot assisted, on 8/15/2024.    She was seen 8/24/24 in the ED for abdominal pain, patient was noted to have hypoxia on exam and a CTA chest was completed, which demonstrated an acute PE involving the lobar and segmental branches of bilateral lungs. She was started on a heparin gtt. Was discharged on Lovenox.     Was again seen on 9/1/24 for emesis and abdominal pain. Now resolved.     Hx cervical cancer s/p TAHBSO in 1999, treated also with chemo and radiation.      Review of Systems    Objective   Physical Exam  Staples removed. Well healing incisions.     Assessment/Plan   73 y/o M s/p nephroureterectomy, robot assisted, on 8/15/2024.    She was seen 8/24/24 in the ED for abdominal pain, patient was noted to have hypoxia on exam and a CTA chest was completed, which demonstrated an acute PE involving the lobar and segmental branches of bilateral lungs. She was started on a heparin gtt. Was discharged on Lovenox.     Was again seen on 9/1/24 for emesis and abdominal pain. Now resolved.     Hx cervical cancer s/p TAHBSO in 1999, treated also with chemo and radiation.      She will be meeting with Dr. Boone in medical oncology to decide on further steps in her treatment.     Plan:  FU Dr. Boone as scheduled.   FUV 2-3 months.     Diagnoses and all orders for this visit:  Cancer of left ureter (Multi)    Scribe Attestation  By signing my name below, IHarriett Scribe attest that this documentation has been prepared under the direction and in the presence of Teddy Ross MD.

## 2024-09-05 ENCOUNTER — HOME CARE VISIT (OUTPATIENT)
Dept: HOME HEALTH SERVICES | Facility: HOME HEALTH | Age: 74
End: 2024-09-05
Payer: MEDICARE

## 2024-09-05 VITALS
HEART RATE: 84 BPM | DIASTOLIC BLOOD PRESSURE: 70 MMHG | RESPIRATION RATE: 16 BRPM | TEMPERATURE: 97.7 F | SYSTOLIC BLOOD PRESSURE: 120 MMHG

## 2024-09-05 PROCEDURE — G0299 HHS/HOSPICE OF RN EA 15 MIN: HCPCS | Mod: HHH

## 2024-09-05 SDOH — ECONOMIC STABILITY: GENERAL

## 2024-09-05 ASSESSMENT — ACTIVITIES OF DAILY LIVING (ADL): MONEY MANAGEMENT (EXPENSES/BILLS): INDEPENDENT

## 2024-09-05 ASSESSMENT — ENCOUNTER SYMPTOMS
PERSON REPORTING PAIN: PATIENT
PAIN LOCATION: ABDOMEN
PAIN: 1
SKIN LESIONS: 1
LOWEST PAIN SEVERITY IN PAST 24 HOURS: 1/10
HIGHEST PAIN SEVERITY IN PAST 24 HOURS: 3/10
PAIN SEVERITY GOAL: 0/10
APPETITE LEVEL: FAIR

## 2024-09-06 ENCOUNTER — OFFICE VISIT (OUTPATIENT)
Dept: SURGICAL ONCOLOGY | Facility: HOSPITAL | Age: 74
End: 2024-09-06
Payer: MEDICARE

## 2024-09-06 VITALS
DIASTOLIC BLOOD PRESSURE: 75 MMHG | OXYGEN SATURATION: 100 % | HEART RATE: 75 BPM | SYSTOLIC BLOOD PRESSURE: 130 MMHG | TEMPERATURE: 97.7 F | WEIGHT: 131.39 LBS | RESPIRATION RATE: 20 BRPM | BODY MASS INDEX: 25.66 KG/M2

## 2024-09-06 DIAGNOSIS — Z93.2 ILEOSTOMY PRESENT (MULTI): Primary | ICD-10-CM

## 2024-09-06 DIAGNOSIS — C68.9 UROTHELIAL CANCER (MULTI): ICD-10-CM

## 2024-09-06 PROCEDURE — 3075F SYST BP GE 130 - 139MM HG: CPT | Performed by: SURGERY

## 2024-09-06 PROCEDURE — 1159F MED LIST DOCD IN RCRD: CPT | Performed by: SURGERY

## 2024-09-06 PROCEDURE — 3078F DIAST BP <80 MM HG: CPT | Performed by: SURGERY

## 2024-09-06 PROCEDURE — 1125F AMNT PAIN NOTED PAIN PRSNT: CPT | Performed by: SURGERY

## 2024-09-06 PROCEDURE — 1111F DSCHRG MED/CURRENT MED MERGE: CPT | Performed by: SURGERY

## 2024-09-06 PROCEDURE — 99211 OFF/OP EST MAY X REQ PHY/QHP: CPT | Performed by: SURGERY

## 2024-09-06 PROCEDURE — 1157F ADVNC CARE PLAN IN RCRD: CPT | Performed by: SURGERY

## 2024-09-06 PROCEDURE — 1036F TOBACCO NON-USER: CPT | Performed by: SURGERY

## 2024-09-06 ASSESSMENT — PAIN SCALES - GENERAL: PAINLEVEL: 5

## 2024-09-06 NOTE — HOME HEALTH
WOC  home nursing visit   identfied by name and bd   stoma type: ileostomy   size:1 1/4  location: rt sided   protrustion: slight and os at 6pm   mucocutaneous junction: intact  mucosal condition and color: red and moist   Peristomal Skin: intact   Peristomal contour: divits at 3 and 9   character of output: pasty     pouching system used : 1piece coloplast 14807   accessories used:  8815 , 7760 3342   left 5 48530  left 5 8805   left 1 4237      ordered supplies.via [employer].

## 2024-09-06 NOTE — HOME HEALTH
"arely samples requested  99920 •  New Image™ Soft Convex CeraPlus™ Skin Barrier - Tape    Flange Size: 2-1/4\"  (57 mm)    remove item  34595 •  New Image™ Two-Piece Drainable Ostomy Pouch – Lock 'n Roll™ Microseal Closure, Filter    Flange Size: 2-1/4\"    remove item  7300 •  Adapt™ Ostomy Belt    remove item  8664 •  Premier™ One-Piece Drainable Ostomy Pouch – Soft Convex Flextend™ Barrier, Viewing Option, Lock 'n Roll™ Microseal Closure, Tape, Filter    remove item  8805 •  CeraRing™ Barrier Rings    remove item  7760 •  Adapt™ Universal Remover Wipes    remove item  7917 •  Adapt™ Skin Protective Wipes    remove item  7906 •  Adapt™ Stoma Powder    convatec samples requested  [id number]  [id number]        coloplast samples requested  SenSura [place] Convex Deep 1-piece open  SenSura® Rehan Convex Deep 1-piece drainable pouch  All-in-one convex ileostomy pouch that fits securely to uneven skin areas, deep-seated areas and stomas that need help to protrude.  38826 - [facility]:  Maxi (11in / 655mL) - Stoma size: 15-33mm  5/8 - 1 5/16in - with filter, Transparent      Change variant    Brava Belt for SenSura [place]  Brava® Belt for SenSura Rehan  Discreet and comfortable belt solution for SenSura Colebrook.  4237 - [facility]:  - SenSura® [place] Belt - 40in      Change variant    Brava Adhesive Remover Wipe  Brava® Adhesive Remover Wipes  Sting-free and easy removal of your appliance  [id number] - [facility]: ; Brava Adhesive remover wipe 30/bx      Change variant    Brava Skin Barrier Wipe  Brava® Skin Barrier Wipes  Sting-free skin protection from output and adhesive.  033912 - [facility]:  - Skin barrier wipe 30/bx      Change variant    Brava Mouldable Ring  Brava® Moldable Ring  Creates a durable seal between the stoma and ostomy barrier to reduce leakage.  867166 - [facility]:  - Moldable ring - 4.2mm      Change variant    Brava Powder  Brava® Powder  Keeps the skin dry and protects from skin irritation.  95722 - [facility]:  - Powder " - 25g  1oz      Change variant    Brava Elastic Tape C-shape  Brava® Elastic Barrier Strips – Curved  Prevents the edges of the barrier from rolling and lifting, securing the position of the barrier  743955 [facility]:  - Elastic barrier strip      Change variant    Brava Lubricating Deodorant  Brava® Lubricating Deodorant  Lubricating Deodorant neutralizes odor and lubricates at the same time  90707 - [facility]:  - Deodorant - 240mL  8oz *Info Only- we will send sample sachets 12113

## 2024-09-06 NOTE — CASE COMMUNICATION
Saw patient for you .   left some supplies for her. ordered some supplies.   will send some samplesfor her to try.     I can return once samples arrive.     Shailesh templeton

## 2024-09-06 NOTE — HOME HEALTH
"supplies ordered via [employer].     Item CodeDescriptionQuantityItem Status Warehouse Code Denys Tracking  [id number] PC CTF STD WEAR DEEP CVX 5-8-1 5-16 SENSURA MIO1 Box- - - -  [id number] SPONGE NON-STERILE 8 PLY 4\" X 4\" DUCARE WOVEN1 Pack- - - -  [id number] REMOVER [id number] Each- - - -  [id number] POUCH LUBRICATING ADAPT 8 OZ1 Each- - - -  [id number] RING STANDARD 4.5 MM, 2\" [id number] Each- - - -  [id number] PC DRAINABLE-CONVEX CERA PLUS W FILTER, CTF UP TO 1.5\"2 Box- - - -  [id number] WIPE NO STING SAFE N [id number] Each"

## 2024-09-06 NOTE — PROGRESS NOTES
Postoperative Visit    Referring Provider:  Teddy Ross MD  No Assigned PCP Generic Provider, MD    Chief Complaint:  Chief Complaint   Patient presents with    Follow-up       History of Present Illness:  This is a 74 y.o. female who presents with a history of cervical cancer s/p JEMMA/BSO 1999 subsequently treated with chemotherapy and radiation. Underwent a nephroureterectomy, robot assisted, on 8/15/2024. I was called to evaluate the mesenteric margin of the sigmoid which was involved with tumor. Decision was made for sigmoid resection and reanastamosis with diverting loop ileostomy.     Readmitted 8/24/24 for DGE possible SBO, and was noted to have an acute PE involving the lobar and segmental branches of bilateral lungs. Discharged on Therapeutic Lovenox.      Presented to the ED again 9/1/24 for emesis and abdominal pain and was started on antibiotics for possible pelvic abscess. This appears similar to the postoperative fluid in the pelvis that was seen at her last admission. Nonetheless, she feels better and has had no further episodes.     Planned for follow up with Vascular Medicine in October to discuss ongoing anticoagulation    Past Medical History:  Past Medical History:  No date: Hypertension  08/21/2024: Malignant tumor of sigmoid colon (Multi)  03/04/2022: Other specified disorders of kidney and ureter      Comment:  Ureteral mass  11/21/2022: Personal history of malignant neoplasm of cervix uteri      Comment:  History of malignant neoplasm of cervix     Past Surgical History:  Past Surgical History:  08/15/2024: NEPHRECTOMY; Left      Comment:  ex-lap, left ureteral tumor resection, left nephrectomy,               excision of pelvic mass, partial sigmoidectomy with loop                ileostomy  10/07/2021: OTHER SURGICAL HISTORY      Comment:  Hysterectomy     Medications:  Current Outpatient Medications   Medication Instructions    acetaminophen (TYLENOL) 650 mg, oral, Every 6 hours PRN     ciprofloxacin (CIPRO) 500 mg, oral, Every 12 hours    enoxaparin (LOVENOX) 60 mg, subcutaneous, 2 times daily    levothyroxine (SYNTHROID, LEVOXYL) 25 mcg, oral, Daily, Take on an empty stomach at the same time each day, either 30 to 60 minutes prior to breakfast    loperamide (IMODIUM) 2 mg, oral, 4 times daily before meals and nightly    metoclopramide (REGLAN) 5 mg, oral, 4 times daily before meals and nightly    metroNIDAZOLE (FLAGYL) 500 mg, oral, 3 times daily    pantoprazole (PROTONIX) 40 mg, oral, Daily before breakfast, Do not crush, chew, or split.    potassium chloride CR 10 mEq ER tablet 10 mEq, oral, Daily    vitamins A,C,E-zinc-copper (PreserVision AREDS) 4,296 mcg-226 mg-90 mg capsule 1 capsule, oral, 2 times daily        Allergies:  Allergies   Allergen Reactions    Codeine Hallucinations, GI Upset and Nausea/vomiting    Percocet [Oxycodone-Acetaminophen] Dizziness and Nausea/vomiting        Family History:  family history includes Glaucoma in an other family member; Macular degeneration in her mother and another family member.     Social History:   reports that she has never smoked. She has never used smokeless tobacco. She reports that she does not drink alcohol and does not use drugs.     Review of Systems:  A complete 12 point review of systems was performed and is negative except as noted in the history of present illness.    Vital Signs:  Vitals:    09/06/24 1329   BP: 130/75   Pulse: 75   Resp: 20   Temp: 36.5 °C (97.7 °F)   SpO2: 100%        Physical Exam:  GEN: No acute distress  HEENT: Moist mucus membranes, normocephalic  CARDS: RRR  PULM: No respiratory distress  GI: Soft, non-distended, Incision well healed. No fluid collections. Ileostomy pink, moist, perfused  SKIN: No rashes or lesions  NEURO: No gross sensorimotor deficits  EXT: No arm or leg swelling    Laboratory Values:  Lab Results   Component Value Date    WBC 9.8 09/02/2024    HGB 9.5 (L) 09/02/2024    HCT 28.4 (L) 09/02/2024  "   MCV 91 09/02/2024     09/02/2024        Chemistry    Lab Results   Component Value Date/Time     (L) 09/01/2024 2223    K 4.7 09/01/2024 2223     09/01/2024 2223    CO2 15 (L) 09/01/2024 2223    BUN 14 09/01/2024 2223    CREATININE 1.22 (H) 09/01/2024 2223    Lab Results   Component Value Date/Time    CALCIUM 8.9 09/01/2024 2223    ALKPHOS 197 (H) 09/01/2024 2223    AST 35 09/01/2024 2223    ALT 18 09/01/2024 2223    BILITOT 0.4 09/01/2024 2223           No results found for: \"PR1\"      Imaging:  I have personally reviewed the images and the radiologist's report.  No images are attached to the encounter or orders placed in the encounter.     Assessment:  This is a 74 y.o. female who presents with a history of cervical cancer and a recent resection of a pelvic mass requiring segmental sigmoid colectomy and diverting loop ileostomy. Readmissions for abdominal pain, and vomiting.   Recent antibiotics for possible pelvic abscess. Improved and eating well.     Plan:  -- Continue PO intake and bowel regimen with Imodium titration to achieve 400-1000ml per day from the ostomy  -- If additional episodes of pain or vomiting occur, I asked that she call me  -- Follow up with Vascular Medicine in October. If it would be possible to stop the anticoagulation for a period of time, I will consider reversal of her ileostomy. She will need a contrast enema evaluation prior.   -- Colonoscopy will be needed in the future, likely after her ileostomy is reversed.   -- The patient asked very appropriate questions that were answered to the best of my ability with the current information at hand. He knows to call with any questions or concerns that arise    Bernard Denton MD, MPH  "

## 2024-09-09 ENCOUNTER — OFFICE VISIT (OUTPATIENT)
Dept: HEMATOLOGY/ONCOLOGY | Facility: CLINIC | Age: 74
End: 2024-09-09
Payer: MEDICARE

## 2024-09-09 VITALS
DIASTOLIC BLOOD PRESSURE: 72 MMHG | TEMPERATURE: 97.5 F | OXYGEN SATURATION: 100 % | HEART RATE: 79 BPM | BODY MASS INDEX: 26.01 KG/M2 | SYSTOLIC BLOOD PRESSURE: 117 MMHG | WEIGHT: 133.2 LBS | RESPIRATION RATE: 18 BRPM

## 2024-09-09 DIAGNOSIS — C68.9 UROTHELIAL CARCINOMA (MULTI): Primary | ICD-10-CM

## 2024-09-09 PROCEDURE — 1036F TOBACCO NON-USER: CPT | Performed by: STUDENT IN AN ORGANIZED HEALTH CARE EDUCATION/TRAINING PROGRAM

## 2024-09-09 PROCEDURE — 88342 IMHCHEM/IMCYTCHM 1ST ANTB: CPT | Performed by: PATHOLOGY

## 2024-09-09 PROCEDURE — 1125F AMNT PAIN NOTED PAIN PRSNT: CPT | Performed by: STUDENT IN AN ORGANIZED HEALTH CARE EDUCATION/TRAINING PROGRAM

## 2024-09-09 PROCEDURE — 88341 IMHCHEM/IMCYTCHM EA ADD ANTB: CPT | Performed by: PATHOLOGY

## 2024-09-09 PROCEDURE — 3078F DIAST BP <80 MM HG: CPT | Performed by: STUDENT IN AN ORGANIZED HEALTH CARE EDUCATION/TRAINING PROGRAM

## 2024-09-09 PROCEDURE — 3074F SYST BP LT 130 MM HG: CPT | Performed by: STUDENT IN AN ORGANIZED HEALTH CARE EDUCATION/TRAINING PROGRAM

## 2024-09-09 PROCEDURE — 1159F MED LIST DOCD IN RCRD: CPT | Performed by: STUDENT IN AN ORGANIZED HEALTH CARE EDUCATION/TRAINING PROGRAM

## 2024-09-09 PROCEDURE — 99214 OFFICE O/P EST MOD 30 MIN: CPT | Performed by: STUDENT IN AN ORGANIZED HEALTH CARE EDUCATION/TRAINING PROGRAM

## 2024-09-09 PROCEDURE — 99214 OFFICE O/P EST MOD 30 MIN: CPT | Mod: 25 | Performed by: STUDENT IN AN ORGANIZED HEALTH CARE EDUCATION/TRAINING PROGRAM

## 2024-09-09 PROCEDURE — 1160F RVW MEDS BY RX/DR IN RCRD: CPT | Performed by: STUDENT IN AN ORGANIZED HEALTH CARE EDUCATION/TRAINING PROGRAM

## 2024-09-09 PROCEDURE — 1111F DSCHRG MED/CURRENT MED MERGE: CPT | Performed by: STUDENT IN AN ORGANIZED HEALTH CARE EDUCATION/TRAINING PROGRAM

## 2024-09-09 PROCEDURE — 1157F ADVNC CARE PLAN IN RCRD: CPT | Performed by: STUDENT IN AN ORGANIZED HEALTH CARE EDUCATION/TRAINING PROGRAM

## 2024-09-09 PROCEDURE — 88341 IMHCHEM/IMCYTCHM EA ADD ANTB: CPT | Mod: TC,SUR | Performed by: STUDENT IN AN ORGANIZED HEALTH CARE EDUCATION/TRAINING PROGRAM

## 2024-09-09 RX ORDER — ALBUTEROL SULFATE 0.83 MG/ML
3 SOLUTION RESPIRATORY (INHALATION) AS NEEDED
Status: CANCELLED | OUTPATIENT
Start: 2024-10-07

## 2024-09-09 RX ORDER — EPINEPHRINE 0.3 MG/.3ML
0.3 INJECTION SUBCUTANEOUS EVERY 5 MIN PRN
OUTPATIENT
Start: 2024-10-03

## 2024-09-09 RX ORDER — DIPHENHYDRAMINE HYDROCHLORIDE 50 MG/ML
50 INJECTION INTRAMUSCULAR; INTRAVENOUS AS NEEDED
OUTPATIENT
Start: 2024-10-03

## 2024-09-09 RX ORDER — DIPHENHYDRAMINE HYDROCHLORIDE 50 MG/ML
50 INJECTION INTRAMUSCULAR; INTRAVENOUS AS NEEDED
Status: CANCELLED | OUTPATIENT
Start: 2024-10-07

## 2024-09-09 RX ORDER — EPINEPHRINE 0.3 MG/.3ML
0.3 INJECTION SUBCUTANEOUS EVERY 5 MIN PRN
Status: CANCELLED | OUTPATIENT
Start: 2024-10-07

## 2024-09-09 RX ORDER — FAMOTIDINE 10 MG/ML
20 INJECTION INTRAVENOUS ONCE AS NEEDED
OUTPATIENT
Start: 2024-10-03

## 2024-09-09 RX ORDER — PROCHLORPERAZINE MALEATE 10 MG
10 TABLET ORAL EVERY 6 HOURS PRN
OUTPATIENT
Start: 2024-10-03

## 2024-09-09 RX ORDER — PROCHLORPERAZINE EDISYLATE 5 MG/ML
10 INJECTION INTRAMUSCULAR; INTRAVENOUS EVERY 6 HOURS PRN
OUTPATIENT
Start: 2024-10-03

## 2024-09-09 RX ORDER — ALBUTEROL SULFATE 0.83 MG/ML
3 SOLUTION RESPIRATORY (INHALATION) AS NEEDED
OUTPATIENT
Start: 2024-10-03

## 2024-09-09 RX ORDER — FAMOTIDINE 10 MG/ML
20 INJECTION INTRAVENOUS ONCE AS NEEDED
Status: CANCELLED | OUTPATIENT
Start: 2024-10-07

## 2024-09-09 ASSESSMENT — ENCOUNTER SYMPTOMS
SLEEP DISTURBANCE: 1
LIGHT-HEADEDNESS: 0
DYSURIA: 1
ARTHRALGIAS: 1
DIARRHEA: 1
EYE PROBLEMS: 1
CHILLS: 0
VOMITING: 1
DIFFICULTY URINATING: 0
NECK PAIN: 0
DIZZINESS: 0
FATIGUE: 1
WOUND: 0
COUGH: 0
BLOOD IN STOOL: 0
MYALGIAS: 0
NAUSEA: 0
NUMBNESS: 1
UNEXPECTED WEIGHT CHANGE: 1
FEVER: 0
HEADACHES: 0
BACK PAIN: 1
ABDOMINAL PAIN: 1
HEMATOLOGIC/LYMPHATIC NEGATIVE: 1
SHORTNESS OF BREATH: 0
ENDOCRINE NEGATIVE: 1
CONSTIPATION: 1
LEG SWELLING: 0
HEMATURIA: 0
APPETITE CHANGE: 1
FLANK PAIN: 1

## 2024-09-09 ASSESSMENT — PAIN SCALES - GENERAL: PAINLEVEL: 4

## 2024-09-09 NOTE — PROGRESS NOTES
Patient ID: Terra Alexis is a 74 y.o. female.  Diagnosis:  irresectable UC   MedOnc: Dr. Boone   Urologist: Dr. Ross  Gyn: Dr. Nathan Noyola - [facility]     Patient Care Team:  No Assigned Pcp Generic Provider, MD as PCP - General (General Practice)  Elgin Boone MD as Consulting Physician (Hematology and Oncology)    ONCOLOGIC HISTORY  Patient is referred by Dr. Ross for recently diagnosed invasive carcinoma, may represent urothelial origin vs h/o cervical cancer. Patient presented to the ER in February 2024 with c/o flank pain imaging revealed soft tissue mass encasing the distal segment of the left ureter. Ureteral biopsy performed in IR on 03/22/2024 revealed the above diagnosis.      1998 Cervical cancer, tx Chemoradiation, with weekly cisplatin, and radiation completed on 1/4/99.   2/1999 Total abdominal hysterectomy and bilateral salpingo-oophorectomy, with no residual carcinoma found.   1/23/08 Category 1 mammogram  11/25/08 LGSIL ANTHONY 1 consistent with HPV effect  1/13/09 MID VAGINAL APEX, BIOPSY: MILD VAGINAL INTRAEPITHELIAL NEOPLASIA.  03/2024 - pelvic irresectable distal urothelial mass, bx proven UC  4/11/24 - Cycle 1 EV + Pembro  5/1/24 - Cycle 2 EV + Pembro  5/14/24 - sick visit call  5/23/24 - Scans show PA. C3 EV (reduced to 1.125 mg/kg)+pembro   6/13/24 - C4 EV (1.125 mg/kg) + Pembro  7/5/24 - C5 EV pembro   8/15/24 - nephroureterectomy + sigmoid colon resection  8/24/24 - PE on ACO    Other Contributing History  Cervical cancer - s/p chemoradiation and surgery    Review of Systems   Constitutional:  Positive for appetite change, fatigue and unexpected weight change. Negative for chills and fever.   HENT:  Negative.     Eyes:  Positive for eye problems.   Respiratory:  Negative for cough and shortness of breath.    Cardiovascular:  Negative for chest pain and leg swelling.   Gastrointestinal:  Positive for abdominal pain, constipation, diarrhea and vomiting. Negative for blood in stool and  nausea.        Reflux, food aversion, bloating   Endocrine: Negative.    Genitourinary:  Positive for dysuria and pelvic pain. Negative for difficulty urinating, hematuria, vaginal bleeding and vaginal discharge.         Related to stent, pelvic cramping   Musculoskeletal:  Positive for arthralgias, back pain and flank pain. Negative for myalgias and neck pain.   Skin:  Negative for itching, rash and wound.        Soreness on arms and thighs   Neurological:  Positive for numbness. Negative for dizziness, headaches and light-headedness.   Hematological: Negative.    Psychiatric/Behavioral:  Positive for sleep disturbance.      Objective    There were no vitals taken for this visit.  BSA: There is no height or weight on file to calculate BSA.    Wt Readings from Last 5 Encounters:   09/06/24 59.6 kg (131 lb 6.3 oz)   09/01/24 58.1 kg (128 lb)   08/25/24 58.1 kg (128 lb)   08/24/24 59 kg (130 lb)   08/23/24 58.5 kg (129 lb)     Performance Status:  ECOG Score: 0- Fully active, able to carry on all pre-disease performance w/o restriction.  Karnofsky Score: 90 - Able to carry on normal activity; minor signs or symptoms of disease     Physical Exam  Physical Exam  Constitutional:       General: She is not in acute distress.     Appearance: Normal appearance. She is not toxic-appearing.   HENT:      Head: Normocephalic and atraumatic.      Mouth/Throat:      Mouth: Mucous membranes are moist.      Pharynx: Oropharynx is clear.   Eyes:      Pupils: Pupils are equal, round, and reactive to light.   Pulmonary:      Effort: Pulmonary effort is normal.   Musculoskeletal:         General: Normal range of motion.      Cervical back: Normal range of motion.      Right lower leg: No edema.      Left lower leg: No edema.   Skin:     General: Skin is warm and dry.      Findings: No rash.   Neurological:      General: No focal deficit present.      Mental Status: She is alert and oriented to person, place, and time.      Motor: No  weakness.   Psychiatric:         Mood and Affect: Mood normal.         Behavior: Behavior normal.         Thought Content: Thought content normal.         Judgment: Judgment normal.   Allergies  Allergies   Allergen Reactions    Codeine Hallucinations, GI Upset and Nausea/vomiting    Percocet [Oxycodone-Acetaminophen] Dizziness and Nausea/vomiting      Medications  Current Outpatient Medications   Medication Instructions    acetaminophen (TYLENOL) 650 mg, oral, Every 6 hours PRN    ciprofloxacin (CIPRO) 500 mg, oral, Every 12 hours    enoxaparin (LOVENOX) 60 mg, subcutaneous, 2 times daily    levothyroxine (SYNTHROID, LEVOXYL) 25 mcg, oral, Daily, Take on an empty stomach at the same time each day, either 30 to 60 minutes prior to breakfast    loperamide (IMODIUM) 2 mg, oral, 4 times daily before meals and nightly    metoclopramide (REGLAN) 5 mg, oral, 4 times daily before meals and nightly    metroNIDAZOLE (FLAGYL) 500 mg, oral, 3 times daily    pantoprazole (PROTONIX) 40 mg, oral, Daily before breakfast, Do not crush, chew, or split.    potassium chloride CR 10 mEq ER tablet 10 mEq, oral, Daily    vitamins A,C,E-zinc-copper (PreserVision AREDS) 4,296 mcg-226 mg-90 mg capsule 1 capsule, oral, 2 times daily        Diagnostic Results   Recent Labs  No results found for this or any previous visit (from the past 96 hour(s)).    Genetics   Signatera 4/11/24 0.45     Pathology  Surgical Path 3/22/24   FINAL DIAGNOSIS   A. Soft tissue, mass, biopsy:    -- Involved by invasive carcinoma. See note.     Note: Patient's imaging finding of a soft tissue mass encasing the distal ureter and remote history of cervical cancer (per clinical notes) is noted. Histological sections show cores of fibroconnective tissue involved by infiltrative nests of high grade carcinoma cells, which are immunohistochemically positive for pancytokeratin AE1/AE3, CAM5.2, GATA3 and p63 and are negative for PAX8 and ER. The morphological and  immunohistochemical findings are not entirely specific. Given the clinical context of a distal ureteral mass, this might represent invasive carcinoma of urothelial origin. Clinical correlation is recommended.     Recent Imaging   MRI Pelvis -   1. A 2.9 x 2.5 x 3.6 cm diffusion restricting enhancing mass encasing  the left distal ureteral stent anterior to the left sacral ala,  compatible with known urothelial neoplasm, with similar measurements  on prior CT 07/01/2024 but has decreased in size when compared to  prior MRI from 03/22/2024. The mass completely encases the left  internal iliac artery and vein which are likely occluded.  Left  common iliac vein and left external iliac vein abuts the mass but  remain patent.  There is at least partial encasement of the left S1  and S2 nerve roots. The mass abuts the left sacrum without definite  evidence of osseous extension/destruction.  2. No evidence of lymphadenopathy or other metastatic disease in the  pelvis.    Assessment/Plan   Terra Alexis is a 74 y.o.  female with hx cervical cancer s/p chemoRT 1998, now found with pelvic irresectable distal urothelial mass, bx proven UC. Now post EV/pembro with tumor shrinkage (around 30%). Underwent nephro-U + colon resection (at least T4) and path pending. Recent admission with infection/abd abscess. Will complete 2 weeks more of AB. Will likely switch to oral anti factor X. Will complete staging with CT chest. Will resume pembro after repeating labs.   I saw and evaluated the patient. I personally obtained the key and critical portions of the history and physical exam or was physically present for key and critical portions performed by the resident/fellow. I reviewed the resident/fellow's documentation and discussed the patient with the resident/fellow. I agree with the resident/fellow's medical decision making as documented in the note.   Elgin Boone MD MSc FACP  [facility] Chair in Cancer  Research   in Medicine [employer]  Director Clinical  Medical Oncology Research Program   [facility] / [facility]  Cell [phone]  Office [phone]

## 2024-09-10 ENCOUNTER — TELEPHONE (OUTPATIENT)
Dept: HEMATOLOGY/ONCOLOGY | Facility: HOSPITAL | Age: 74
End: 2024-09-10
Payer: MEDICARE

## 2024-09-10 DIAGNOSIS — C68.9 UROTHELIAL CANCER (MULTI): Primary | ICD-10-CM

## 2024-09-10 NOTE — TELEPHONE ENCOUNTER
Received call back from patient.    Reviewed medication lists and indications for use.    Patient is appreciative of call.    No further follow-up needed.    Willi Zuleta, PharmD, CSP  Clinical Pharm Specialist -  Oncology  [facility]  Phone #: [phone]  Fax #: [phone]  Email: [email or web address]

## 2024-09-10 NOTE — TELEPHONE ENCOUNTER
Informed by treatment team that patient had questions related to their medications.    Left message on line to follow-up.    This [facility] is available for any questions or concerns.    Willi Zultea, PharmD, CSP  Clinical Pharm Specialist -  Oncology  [facility]  Phone #: [phone]  Fax #: [phone]  Email: [email or web address]

## 2024-09-11 ENCOUNTER — HOME CARE VISIT (OUTPATIENT)
Dept: HOME HEALTH SERVICES | Facility: HOME HEALTH | Age: 74
End: 2024-09-11
Payer: MEDICARE

## 2024-09-11 VITALS
DIASTOLIC BLOOD PRESSURE: 60 MMHG | TEMPERATURE: 97.3 F | OXYGEN SATURATION: 97 % | HEART RATE: 83 BPM | SYSTOLIC BLOOD PRESSURE: 112 MMHG | RESPIRATION RATE: 12 BRPM

## 2024-09-11 PROCEDURE — G0299 HHS/HOSPICE OF RN EA 15 MIN: HCPCS | Mod: HHH

## 2024-09-11 SDOH — ECONOMIC STABILITY: FOOD INSECURITY: MEALS PER DAY: 3

## 2024-09-11 ASSESSMENT — ENCOUNTER SYMPTOMS
LAST BOWEL MOVEMENT: 67094
STOOL FREQUENCY: DAILY
APPETITE LEVEL: GOOD
HIGHEST PAIN SEVERITY IN PAST 24 HOURS: 5/10
PAIN LOCATION - PAIN QUALITY: ACHE
PERSON REPORTING PAIN: PATIENT
PAIN: 1
PAIN LOCATION - RELIEVING FACTORS: PAIN MED AND REST
PAIN LOCATION: ABDOMEN
PAIN LOCATION - PAIN DURATION: DAILY
LOWEST PAIN SEVERITY IN PAST 24 HOURS: 3/10
PAIN LOCATION - PAIN SEVERITY: 5/10
PAIN LOCATION - EXACERBATING FACTORS: COLON SURGERY
PAIN SEVERITY GOAL: 2/10
CHANGE IN APPETITE: VARYING
PAIN LOCATION - PAIN FREQUENCY: INTERMITTENT
SUBJECTIVE PAIN PROGRESSION: WAXING AND WANING
FATIGUE: 1

## 2024-09-11 ASSESSMENT — PAIN SCALES - PAIN ASSESSMENT IN ADVANCED DEMENTIA (PAINAD)
CONSOLABILITY: 0 - NO NEED TO CONSOLE.
CONSOLABILITY: 0
FACIALEXPRESSION: 0 - SMILING OR INEXPRESSIVE.
BREATHING: 0
FACIALEXPRESSION: 0
BODYLANGUAGE: 0 - RELAXED.
NEGVOCALIZATION: 0 - NONE.
TOTALSCORE: 0
NEGVOCALIZATION: 0
BODYLANGUAGE: 0

## 2024-09-11 ASSESSMENT — ACTIVITIES OF DAILY LIVING (ADL)
AMBULATION ASSISTANCE: ONE PERSON
CURRENT_FUNCTION: STAND BY ASSIST
AMBULATION ASSISTANCE: STAND BY ASSIST
CURRENT_FUNCTION: ONE PERSON

## 2024-09-13 ENCOUNTER — LAB (OUTPATIENT)
Dept: LAB | Facility: LAB | Age: 74
End: 2024-09-13
Payer: MEDICARE

## 2024-09-13 ENCOUNTER — HOME CARE VISIT (OUTPATIENT)
Dept: HOME HEALTH SERVICES | Facility: HOME HEALTH | Age: 74
End: 2024-09-13
Payer: MEDICARE

## 2024-09-13 DIAGNOSIS — Z93.2 ILEOSTOMY PRESENT (MULTI): ICD-10-CM

## 2024-09-13 DIAGNOSIS — R63.8 DEHYDRATION SYMPTOMS: ICD-10-CM

## 2024-09-13 LAB
ALBUMIN SERPL BCP-MCNC: 3.6 G/DL (ref 3.4–5)
ALP SERPL-CCNC: 77 U/L (ref 33–136)
ALT SERPL W P-5'-P-CCNC: 7 U/L (ref 7–45)
ANION GAP SERPL CALC-SCNC: 14 MMOL/L (ref 10–20)
AST SERPL W P-5'-P-CCNC: 15 U/L (ref 9–39)
BILIRUB SERPL-MCNC: 0.4 MG/DL (ref 0–1.2)
BUN SERPL-MCNC: 10 MG/DL (ref 6–23)
CALCIUM SERPL-MCNC: 9.3 MG/DL (ref 8.6–10.3)
CHLORIDE SERPL-SCNC: 103 MMOL/L (ref 98–107)
CO2 SERPL-SCNC: 21 MMOL/L (ref 21–32)
CREAT SERPL-MCNC: 1.18 MG/DL (ref 0.5–1.05)
EGFRCR SERPLBLD CKD-EPI 2021: 49 ML/MIN/1.73M*2
ERYTHROCYTE [DISTWIDTH] IN BLOOD BY AUTOMATED COUNT: 18.4 % (ref 11.5–14.5)
GLUCOSE SERPL-MCNC: 63 MG/DL (ref 74–99)
HCT VFR BLD AUTO: 30.6 % (ref 36–46)
HGB BLD-MCNC: 9.7 G/DL (ref 12–16)
LAB AP ASR DISCLAIMER: NORMAL
LAB AP BLOCK FOR ADDITIONAL STUDIES: NORMAL
LABORATORY COMMENT REPORT: NORMAL
Lab: NORMAL
MCH RBC QN AUTO: 30 PG (ref 26–34)
MCHC RBC AUTO-ENTMCNC: 31.7 G/DL (ref 32–36)
MCV RBC AUTO: 95 FL (ref 80–100)
NRBC BLD-RTO: 0 /100 WBCS (ref 0–0)
PATH REPORT.ADDENDUM SPEC: NORMAL
PATH REPORT.COMMENTS IMP SPEC: NORMAL
PATH REPORT.FINAL DX SPEC: NORMAL
PATH REPORT.GROSS SPEC: NORMAL
PATH REPORT.RELEVANT HX SPEC: NORMAL
PATH REPORT.TOTAL CANCER: NORMAL
PATHOLOGY SYNOPTIC REPORT: NORMAL
PLATELET # BLD AUTO: 452 X10*3/UL (ref 150–450)
POTASSIUM SERPL-SCNC: 3.7 MMOL/L (ref 3.5–5.3)
PROT SERPL-MCNC: 6.8 G/DL (ref 6.4–8.2)
RBC # BLD AUTO: 3.23 X10*6/UL (ref 4–5.2)
SODIUM SERPL-SCNC: 134 MMOL/L (ref 136–145)
WBC # BLD AUTO: 10.1 X10*3/UL (ref 4.4–11.3)

## 2024-09-13 PROCEDURE — 36415 COLL VENOUS BLD VENIPUNCTURE: CPT

## 2024-09-13 PROCEDURE — 88365 INSITU HYBRIDIZATION (FISH): CPT | Mod: TC,SUR | Performed by: STUDENT IN AN ORGANIZED HEALTH CARE EDUCATION/TRAINING PROGRAM

## 2024-09-13 PROCEDURE — 85027 COMPLETE CBC AUTOMATED: CPT

## 2024-09-13 PROCEDURE — 80053 COMPREHEN METABOLIC PANEL: CPT

## 2024-09-19 ENCOUNTER — HOME CARE VISIT (OUTPATIENT)
Dept: HOME HEALTH SERVICES | Facility: HOME HEALTH | Age: 74
End: 2024-09-19
Payer: MEDICARE

## 2024-09-20 ENCOUNTER — LAB (OUTPATIENT)
Dept: LAB | Facility: LAB | Age: 74
End: 2024-09-20
Payer: MEDICARE

## 2024-09-20 DIAGNOSIS — C68.9 UROTHELIAL CARCINOMA (MULTI): ICD-10-CM

## 2024-09-20 LAB
ALBUMIN SERPL BCP-MCNC: 4.1 G/DL (ref 3.4–5)
ALP SERPL-CCNC: 86 U/L (ref 33–136)
ALT SERPL W P-5'-P-CCNC: 13 U/L (ref 7–45)
ANION GAP SERPL CALC-SCNC: 14 MMOL/L (ref 10–20)
AST SERPL W P-5'-P-CCNC: 24 U/L (ref 9–39)
BASOPHILS # BLD AUTO: 0.09 X10*3/UL (ref 0–0.1)
BASOPHILS NFR BLD AUTO: 1.3 %
BILIRUB SERPL-MCNC: 0.3 MG/DL (ref 0–1.2)
BUN SERPL-MCNC: 15 MG/DL (ref 6–23)
CALCIUM SERPL-MCNC: 9.3 MG/DL (ref 8.6–10.3)
CHLORIDE SERPL-SCNC: 102 MMOL/L (ref 98–107)
CO2 SERPL-SCNC: 22 MMOL/L (ref 21–32)
CREAT SERPL-MCNC: 1.2 MG/DL (ref 0.5–1.05)
EGFRCR SERPLBLD CKD-EPI 2021: 48 ML/MIN/1.73M*2
EOSINOPHIL # BLD AUTO: 0.23 X10*3/UL (ref 0–0.4)
EOSINOPHIL NFR BLD AUTO: 3.3 %
ERYTHROCYTE [DISTWIDTH] IN BLOOD BY AUTOMATED COUNT: 17.8 % (ref 11.5–14.5)
GLUCOSE SERPL-MCNC: 80 MG/DL (ref 74–99)
HCT VFR BLD AUTO: 32.1 % (ref 36–46)
HGB BLD-MCNC: 10.1 G/DL (ref 12–16)
IMM GRANULOCYTES # BLD AUTO: 0.07 X10*3/UL (ref 0–0.5)
IMM GRANULOCYTES NFR BLD AUTO: 1 % (ref 0–0.9)
LYMPHOCYTES # BLD AUTO: 1.83 X10*3/UL (ref 0.8–3)
LYMPHOCYTES NFR BLD AUTO: 26 %
MCH RBC QN AUTO: 31.2 PG (ref 26–34)
MCHC RBC AUTO-ENTMCNC: 31.5 G/DL (ref 32–36)
MCV RBC AUTO: 99 FL (ref 80–100)
MONOCYTES # BLD AUTO: 0.44 X10*3/UL (ref 0.05–0.8)
MONOCYTES NFR BLD AUTO: 6.3 %
NEUTROPHILS # BLD AUTO: 4.37 X10*3/UL (ref 1.6–5.5)
NEUTROPHILS NFR BLD AUTO: 62.1 %
NRBC BLD-RTO: 0 /100 WBCS (ref 0–0)
PLATELET # BLD AUTO: 405 X10*3/UL (ref 150–450)
POTASSIUM SERPL-SCNC: 4.1 MMOL/L (ref 3.5–5.3)
PROT SERPL-MCNC: 7.2 G/DL (ref 6.4–8.2)
RBC # BLD AUTO: 3.24 X10*6/UL (ref 4–5.2)
SODIUM SERPL-SCNC: 134 MMOL/L (ref 136–145)
WBC # BLD AUTO: 7 X10*3/UL (ref 4.4–11.3)

## 2024-09-20 PROCEDURE — 36415 COLL VENOUS BLD VENIPUNCTURE: CPT

## 2024-09-20 PROCEDURE — 80053 COMPREHEN METABOLIC PANEL: CPT

## 2024-09-20 PROCEDURE — 85025 COMPLETE CBC W/AUTO DIFF WBC: CPT

## 2024-09-23 ENCOUNTER — TELEPHONE (OUTPATIENT)
Dept: ADMISSION | Facility: HOSPITAL | Age: 74
End: 2024-09-23
Payer: MEDICARE

## 2024-09-23 ENCOUNTER — HOSPITAL ENCOUNTER (OUTPATIENT)
Dept: RADIOLOGY | Facility: HOSPITAL | Age: 74
Discharge: HOME | End: 2024-09-23
Payer: MEDICARE

## 2024-09-23 DIAGNOSIS — C68.9 UROTHELIAL CARCINOMA (MULTI): ICD-10-CM

## 2024-09-23 PROCEDURE — 2550000001 HC RX 255 CONTRASTS: Performed by: STUDENT IN AN ORGANIZED HEALTH CARE EDUCATION/TRAINING PROGRAM

## 2024-09-23 PROCEDURE — 71260 CT THORAX DX C+: CPT

## 2024-09-23 PROCEDURE — 71260 CT THORAX DX C+: CPT | Performed by: RADIOLOGY

## 2024-09-23 NOTE — TELEPHONE ENCOUNTER
Per Dr. Boone, Dr. Sheyla Kasper is to manage patient's anticoagulant therapy. Her appointment with her isn't until 10/16/24, so informed in vm to call providers office for instruction.

## 2024-09-23 NOTE — TELEPHONE ENCOUNTER
Patient called and states she currently takes Lovenox injections BID.  She has questions switching from injection to pill form.  States this was previously discussed at last FUV.  She only has 7 days left of Lovenox injections BID.      Next FUV 10/3/24.

## 2024-09-25 ENCOUNTER — HOME CARE VISIT (OUTPATIENT)
Dept: HOME HEALTH SERVICES | Facility: HOME HEALTH | Age: 74
End: 2024-09-25
Payer: MEDICARE

## 2024-09-25 DIAGNOSIS — C68.9 UROTHELIAL CANCER (MULTI): Primary | ICD-10-CM

## 2024-09-25 ASSESSMENT — ACTIVITIES OF DAILY LIVING (ADL)
OASIS_M1830: 00
HOME_HEALTH_OASIS: 00

## 2024-10-02 ENCOUNTER — LAB (OUTPATIENT)
Dept: LAB | Facility: LAB | Age: 74
End: 2024-10-02
Payer: MEDICARE

## 2024-10-02 ENCOUNTER — DOCUMENTATION (OUTPATIENT)
Dept: HEMATOLOGY/ONCOLOGY | Facility: CLINIC | Age: 74
End: 2024-10-02
Payer: MEDICARE

## 2024-10-02 DIAGNOSIS — C68.9 UROTHELIAL CANCER (MULTI): ICD-10-CM

## 2024-10-02 LAB
ALBUMIN SERPL BCP-MCNC: 4.1 G/DL (ref 3.4–5)
ALP SERPL-CCNC: 114 U/L (ref 33–136)
ALT SERPL W P-5'-P-CCNC: 12 U/L (ref 7–45)
ANION GAP SERPL CALC-SCNC: 17 MMOL/L (ref 10–20)
AST SERPL W P-5'-P-CCNC: 18 U/L (ref 9–39)
BASOPHILS # BLD AUTO: 0.06 X10*3/UL (ref 0–0.1)
BASOPHILS NFR BLD AUTO: 0.9 %
BILIRUB SERPL-MCNC: 0.4 MG/DL (ref 0–1.2)
BUN SERPL-MCNC: 15 MG/DL (ref 6–23)
CALCIUM SERPL-MCNC: 9.6 MG/DL (ref 8.6–10.3)
CHLORIDE SERPL-SCNC: 103 MMOL/L (ref 98–107)
CO2 SERPL-SCNC: 21 MMOL/L (ref 21–32)
CREAT SERPL-MCNC: 1.16 MG/DL (ref 0.5–1.05)
EGFRCR SERPLBLD CKD-EPI 2021: 50 ML/MIN/1.73M*2
EOSINOPHIL # BLD AUTO: 0.23 X10*3/UL (ref 0–0.4)
EOSINOPHIL NFR BLD AUTO: 3.4 %
ERYTHROCYTE [DISTWIDTH] IN BLOOD BY AUTOMATED COUNT: 16.4 % (ref 11.5–14.5)
GLUCOSE SERPL-MCNC: 87 MG/DL (ref 74–99)
HCT VFR BLD AUTO: 34 % (ref 36–46)
HGB BLD-MCNC: 10.5 G/DL (ref 12–16)
IMM GRANULOCYTES # BLD AUTO: 0.1 X10*3/UL (ref 0–0.5)
IMM GRANULOCYTES NFR BLD AUTO: 1.5 % (ref 0–0.9)
LYMPHOCYTES # BLD AUTO: 1.41 X10*3/UL (ref 0.8–3)
LYMPHOCYTES NFR BLD AUTO: 20.8 %
MCH RBC QN AUTO: 30.9 PG (ref 26–34)
MCHC RBC AUTO-ENTMCNC: 30.9 G/DL (ref 32–36)
MCV RBC AUTO: 100 FL (ref 80–100)
MONOCYTES # BLD AUTO: 0.45 X10*3/UL (ref 0.05–0.8)
MONOCYTES NFR BLD AUTO: 6.6 %
NEUTROPHILS # BLD AUTO: 4.53 X10*3/UL (ref 1.6–5.5)
NEUTROPHILS NFR BLD AUTO: 66.8 %
NRBC BLD-RTO: 0 /100 WBCS (ref 0–0)
PLATELET # BLD AUTO: 315 X10*3/UL (ref 150–450)
POTASSIUM SERPL-SCNC: 4 MMOL/L (ref 3.5–5.3)
PROT SERPL-MCNC: 7.5 G/DL (ref 6.4–8.2)
RBC # BLD AUTO: 3.4 X10*6/UL (ref 4–5.2)
SODIUM SERPL-SCNC: 137 MMOL/L (ref 136–145)
WBC # BLD AUTO: 6.8 X10*3/UL (ref 4.4–11.3)

## 2024-10-02 PROCEDURE — 80053 COMPREHEN METABOLIC PANEL: CPT

## 2024-10-02 PROCEDURE — 85025 COMPLETE CBC W/AUTO DIFF WBC: CPT

## 2024-10-02 PROCEDURE — 36415 COLL VENOUS BLD VENIPUNCTURE: CPT

## 2024-10-03 ENCOUNTER — OFFICE VISIT (OUTPATIENT)
Dept: HEMATOLOGY/ONCOLOGY | Facility: CLINIC | Age: 74
End: 2024-10-03
Payer: MEDICARE

## 2024-10-03 ENCOUNTER — INFUSION (OUTPATIENT)
Dept: HEMATOLOGY/ONCOLOGY | Facility: CLINIC | Age: 74
End: 2024-10-03
Payer: MEDICARE

## 2024-10-03 VITALS
DIASTOLIC BLOOD PRESSURE: 76 MMHG | RESPIRATION RATE: 16 BRPM | TEMPERATURE: 98.2 F | BODY MASS INDEX: 23.94 KG/M2 | HEART RATE: 75 BPM | SYSTOLIC BLOOD PRESSURE: 137 MMHG | WEIGHT: 122.6 LBS | OXYGEN SATURATION: 99 %

## 2024-10-03 VITALS
WEIGHT: 122.3 LBS | HEART RATE: 70 BPM | OXYGEN SATURATION: 97 % | SYSTOLIC BLOOD PRESSURE: 132 MMHG | BODY MASS INDEX: 23.89 KG/M2 | RESPIRATION RATE: 18 BRPM | TEMPERATURE: 97.3 F | DIASTOLIC BLOOD PRESSURE: 75 MMHG

## 2024-10-03 DIAGNOSIS — C68.9 UROTHELIAL CARCINOMA (MULTI): ICD-10-CM

## 2024-10-03 PROCEDURE — 1160F RVW MEDS BY RX/DR IN RCRD: CPT | Performed by: STUDENT IN AN ORGANIZED HEALTH CARE EDUCATION/TRAINING PROGRAM

## 2024-10-03 PROCEDURE — 96413 CHEMO IV INFUSION 1 HR: CPT

## 2024-10-03 PROCEDURE — 99215 OFFICE O/P EST HI 40 MIN: CPT | Performed by: STUDENT IN AN ORGANIZED HEALTH CARE EDUCATION/TRAINING PROGRAM

## 2024-10-03 PROCEDURE — 1159F MED LIST DOCD IN RCRD: CPT | Performed by: STUDENT IN AN ORGANIZED HEALTH CARE EDUCATION/TRAINING PROGRAM

## 2024-10-03 PROCEDURE — 3078F DIAST BP <80 MM HG: CPT | Performed by: STUDENT IN AN ORGANIZED HEALTH CARE EDUCATION/TRAINING PROGRAM

## 2024-10-03 PROCEDURE — 2500000004 HC RX 250 GENERAL PHARMACY W/ HCPCS (ALT 636 FOR OP/ED): Mod: JZ,JG | Performed by: STUDENT IN AN ORGANIZED HEALTH CARE EDUCATION/TRAINING PROGRAM

## 2024-10-03 PROCEDURE — 99215 OFFICE O/P EST HI 40 MIN: CPT | Mod: 25 | Performed by: STUDENT IN AN ORGANIZED HEALTH CARE EDUCATION/TRAINING PROGRAM

## 2024-10-03 PROCEDURE — 3075F SYST BP GE 130 - 139MM HG: CPT | Performed by: STUDENT IN AN ORGANIZED HEALTH CARE EDUCATION/TRAINING PROGRAM

## 2024-10-03 PROCEDURE — 1157F ADVNC CARE PLAN IN RCRD: CPT | Performed by: STUDENT IN AN ORGANIZED HEALTH CARE EDUCATION/TRAINING PROGRAM

## 2024-10-03 PROCEDURE — 1126F AMNT PAIN NOTED NONE PRSNT: CPT | Performed by: STUDENT IN AN ORGANIZED HEALTH CARE EDUCATION/TRAINING PROGRAM

## 2024-10-03 RX ORDER — PROCHLORPERAZINE MALEATE 10 MG
10 TABLET ORAL EVERY 6 HOURS PRN
Status: DISCONTINUED | OUTPATIENT
Start: 2024-10-03 | End: 2024-10-03 | Stop reason: HOSPADM

## 2024-10-03 RX ORDER — EPINEPHRINE 0.3 MG/.3ML
0.3 INJECTION SUBCUTANEOUS EVERY 5 MIN PRN
Status: DISCONTINUED | OUTPATIENT
Start: 2024-10-03 | End: 2024-10-03 | Stop reason: HOSPADM

## 2024-10-03 RX ORDER — HEPARIN SODIUM,PORCINE/PF 10 UNIT/ML
50 SYRINGE (ML) INTRAVENOUS AS NEEDED
OUTPATIENT
Start: 2024-10-03

## 2024-10-03 RX ORDER — HEPARIN 100 UNIT/ML
500 SYRINGE INTRAVENOUS AS NEEDED
OUTPATIENT
Start: 2024-10-03

## 2024-10-03 RX ORDER — FAMOTIDINE 10 MG/ML
20 INJECTION INTRAVENOUS ONCE AS NEEDED
Status: DISCONTINUED | OUTPATIENT
Start: 2024-10-03 | End: 2024-10-03 | Stop reason: HOSPADM

## 2024-10-03 RX ORDER — ALBUTEROL SULFATE 0.83 MG/ML
3 SOLUTION RESPIRATORY (INHALATION) AS NEEDED
Status: DISCONTINUED | OUTPATIENT
Start: 2024-10-03 | End: 2024-10-03 | Stop reason: HOSPADM

## 2024-10-03 RX ORDER — PROCHLORPERAZINE EDISYLATE 5 MG/ML
10 INJECTION INTRAMUSCULAR; INTRAVENOUS EVERY 6 HOURS PRN
Status: DISCONTINUED | OUTPATIENT
Start: 2024-10-03 | End: 2024-10-03 | Stop reason: HOSPADM

## 2024-10-03 RX ORDER — DIPHENHYDRAMINE HYDROCHLORIDE 50 MG/ML
50 INJECTION INTRAMUSCULAR; INTRAVENOUS AS NEEDED
Status: DISCONTINUED | OUTPATIENT
Start: 2024-10-03 | End: 2024-10-03 | Stop reason: HOSPADM

## 2024-10-03 ASSESSMENT — ENCOUNTER SYMPTOMS
BACK PAIN: 1
HEADACHES: 0
UNEXPECTED WEIGHT CHANGE: 1
CONSTIPATION: 1
LEG SWELLING: 0
COUGH: 0
OCCASIONAL FEELINGS OF UNSTEADINESS: 0
NECK PAIN: 0
HEMATOLOGIC/LYMPHATIC NEGATIVE: 1
CHILLS: 0
DEPRESSION: 0
FEVER: 0
ENDOCRINE NEGATIVE: 1
DIARRHEA: 1
SLEEP DISTURBANCE: 1
LOSS OF SENSATION IN FEET: 0
DIZZINESS: 0
NUMBNESS: 1
EYE PROBLEMS: 1
VOMITING: 1
BLOOD IN STOOL: 0
WOUND: 0
HEMATURIA: 0
ABDOMINAL PAIN: 1
DYSURIA: 1
ARTHRALGIAS: 1
FLANK PAIN: 1
NAUSEA: 0
FATIGUE: 1
SHORTNESS OF BREATH: 0
APPETITE CHANGE: 1
MYALGIAS: 0
DIFFICULTY URINATING: 0
LIGHT-HEADEDNESS: 0

## 2024-10-03 ASSESSMENT — PAIN SCALES - GENERAL
PAINLEVEL: 0-NO PAIN
PAINLEVEL: 4

## 2024-10-03 NOTE — PROGRESS NOTES
Patient ID: Terra Alexis is a 74 y.o. female.  Diagnosis:  T4 UC   MedOnc: Dr. Boone   Urologist: Dr. Ross  Gyn: Dr. Nathan Noyola - [facility]     Patient Care Team:  No Assigned Pcp Generic Provider, MD as PCP - General (General Practice)  Elgin Boone MD as Consulting Physician (Hematology and Oncology)    ONCOLOGIC HISTORY  Patient is referred by Dr. Ross for recently diagnosed invasive carcinoma, may represent urothelial origin vs h/o cervical cancer. Patient presented to the ER in February 2024 with c/o flank pain imaging revealed soft tissue mass encasing the distal segment of the left ureter. Ureteral biopsy performed in IR on 03/22/2024 revealed the above diagnosis.      1998 Cervical cancer, tx Chemoradiation, with weekly cisplatin, and radiation completed on 1/4/99.   2/1999 Total abdominal hysterectomy and bilateral salpingo-oophorectomy, with no residual carcinoma found.   1/23/08 Category 1 mammogram  11/25/08 LGSIL ANTHONY 1 consistent with HPV effect  1/13/09 MID VAGINAL APEX, BIOPSY: MILD VAGINAL INTRAEPITHELIAL NEOPLASIA.  03/2024 - pelvic irresectable distal urothelial mass, bx proven UC  4/11/24 - Cycle 1 EV + Pembro  5/1/24 - Cycle 2 EV + Pembro  5/14/24 - sick visit call  5/23/24 - Scans show [place]. C3 EV (reduced to 1.125 mg/kg)+pembro   6/13/24 - C4 EV (1.125 mg/kg) + Pembro  7/5/24 - C5 EV pembro   8/15/24 - nephroureterectomy + sigmoid colon resection  8/24/24 - PE on ACO  10/3/24 - pembro C6    Other Contributing History  Cervical cancer - s/p chemoradiation and surgery    Review of Systems   Constitutional:  Positive for appetite change, fatigue and unexpected weight change. Negative for chills and fever.   HENT:  Negative.     Eyes:  Positive for eye problems.   Respiratory:  Negative for cough and shortness of breath.    Cardiovascular:  Negative for chest pain and leg swelling.   Gastrointestinal:  Positive for abdominal pain, constipation, diarrhea and vomiting. Negative for blood in  stool and nausea.        Reflux, food aversion, bloating   Endocrine: Negative.    Genitourinary:  Positive for dysuria and pelvic pain. Negative for difficulty urinating, hematuria, vaginal bleeding and vaginal discharge.         Related to stent, pelvic cramping   Musculoskeletal:  Positive for arthralgias, back pain and flank pain. Negative for myalgias and neck pain.   Skin:  Negative for itching, rash and wound.        Soreness on arms and thighs   Neurological:  Positive for numbness. Negative for dizziness, headaches and light-headedness.   Hematological: Negative.    Psychiatric/Behavioral:  Positive for sleep disturbance.      Objective    /76   Pulse 75   Temp 36.8 °C (98.2 °F) (Core)   Resp 16   Wt 55.6 kg (122 lb 9.6 oz)   SpO2 99%   BMI 23.94 kg/m²   BSA: 1.53 meters squared    Wt Readings from Last 5 Encounters:   10/03/24 55.6 kg (122 lb 9.6 oz)   09/09/24 60.4 kg (133 lb 3.2 oz)   09/06/24 59.6 kg (131 lb 6.3 oz)   09/01/24 58.1 kg (128 lb)   08/25/24 58.1 kg (128 lb)     Performance Status:  ECOG Score: 0- Fully active, able to carry on all pre-disease performance w/o restriction.  Karnofsky Score: 90 - Able to carry on normal activity; minor signs or symptoms of disease     Physical Exam  Physical Exam  Constitutional:       General: She is not in acute distress.     Appearance: Normal appearance. She is not toxic-appearing.   HENT:      Head: Normocephalic and atraumatic.      Mouth/Throat:      Mouth: Mucous membranes are moist.      Pharynx: Oropharynx is clear.   Eyes:      Pupils: Pupils are equal, round, and reactive to light.   Pulmonary:      Effort: Pulmonary effort is normal.   Musculoskeletal:         General: Normal range of motion.      Cervical back: Normal range of motion.      Right lower leg: No edema.      Left lower leg: No edema.   Skin:     General: Skin is warm and dry.      Findings: No rash.   Neurological:      General: No focal deficit present.      Mental  Status: She is alert and oriented to person, place, and time.      Motor: No weakness.   Psychiatric:         Mood and Affect: Mood normal.         Behavior: Behavior normal.         Thought Content: Thought content normal.         Judgment: Judgment normal.   Allergies  Allergies   Allergen Reactions    Codeine Hallucinations, GI Upset and Nausea/vomiting    Percocet [Oxycodone-Acetaminophen] Dizziness and Nausea/vomiting      Medications  Current Outpatient Medications   Medication Instructions    acetaminophen (TYLENOL) 650 mg, oral, Every 6 hours PRN    enoxaparin (LOVENOX) 60 mg, subcutaneous, 2 times daily    levothyroxine (SYNTHROID, LEVOXYL) 25 mcg, oral, Daily, Take on an empty stomach at the same time each day, either 30 to 60 minutes prior to breakfast    loperamide (IMODIUM) 2 mg, oral, 4 times daily before meals and nightly    metoclopramide (REGLAN) 5 mg, oral, 4 times daily before meals and nightly    pantoprazole (PROTONIX) 40 mg, oral, Daily before breakfast, Do not crush, chew, or split.    vitamins A,C,E-zinc-copper (PreserVision AREDS) 4,296 mcg-226 mg-90 mg capsule 1 capsule, oral, 2 times daily        Diagnostic Results   Recent Labs  Results for orders placed or performed in visit on 10/02/24 (from the past 96 hour(s))   CBC and Auto Differential   Result Value Ref Range    WBC 6.8 4.4 - 11.3 x10*3/uL    nRBC 0.0 0.0 - 0.0 /100 WBCs    RBC 3.40 (L) 4.00 - 5.20 x10*6/uL    Hemoglobin 10.5 (L) 12.0 - 16.0 g/dL    Hematocrit 34.0 (L) 36.0 - 46.0 %     80 - 100 fL    MCH 30.9 26.0 - 34.0 pg    MCHC 30.9 (L) 32.0 - 36.0 g/dL    RDW 16.4 (H) 11.5 - 14.5 %    Platelets 315 150 - 450 x10*3/uL    Neutrophils % 66.8 40.0 - 80.0 %    Immature Granulocytes %, Automated 1.5 (H) 0.0 - 0.9 %    Lymphocytes % 20.8 13.0 - 44.0 %    Monocytes % 6.6 2.0 - 10.0 %    Eosinophils % 3.4 0.0 - 6.0 %    Basophils % 0.9 0.0 - 2.0 %    Neutrophils Absolute 4.53 1.60 - 5.50 x10*3/uL    Immature Granulocytes  Absolute, Automated 0.10 0.00 - 0.50 x10*3/uL    Lymphocytes Absolute 1.41 0.80 - 3.00 x10*3/uL    Monocytes Absolute 0.45 0.05 - 0.80 x10*3/uL    Eosinophils Absolute 0.23 0.00 - 0.40 x10*3/uL    Basophils Absolute 0.06 0.00 - 0.10 x10*3/uL   Comprehensive Metabolic Panel   Result Value Ref Range    Glucose 87 74 - 99 mg/dL    Sodium 137 136 - 145 mmol/L    Potassium 4.0 3.5 - 5.3 mmol/L    Chloride 103 98 - 107 mmol/L    Bicarbonate 21 21 - 32 mmol/L    Anion Gap 17 10 - 20 mmol/L    Urea Nitrogen 15 6 - 23 mg/dL    Creatinine 1.16 (H) 0.50 - 1.05 mg/dL    eGFR 50 (L) >60 mL/min/1.73m*2    Calcium 9.6 8.6 - 10.3 mg/dL    Albumin 4.1 3.4 - 5.0 g/dL    Alkaline Phosphatase 114 33 - 136 U/L    Total Protein 7.5 6.4 - 8.2 g/dL    AST 18 9 - 39 U/L    Bilirubin, Total 0.4 0.0 - 1.2 mg/dL    ALT 12 7 - 45 U/L       Genetics   Signatera 4/11/24 0.45     Pathology  Surgical Path 3/22/24   FINAL DIAGNOSIS   A. Soft tissue, mass, biopsy:    -- Involved by invasive carcinoma. See note.     Note: Patient's imaging finding of a soft tissue mass encasing the distal ureter and remote history of cervical cancer (per clinical notes) is noted. Histological sections show cores of fibroconnective tissue involved by infiltrative nests of high grade carcinoma cells, which are immunohistochemically positive for pancytokeratin AE1/AE3, CAM5.2, GATA3 and p63 and are negative for PAX8 and ER. The morphological and immunohistochemical findings are not entirely specific. Given the clinical context of a distal ureteral mass, this might represent invasive carcinoma of urothelial origin. Clinical correlation is recommended.     Recent Imaging   MRI Pelvis -   1. A 2.9 x 2.5 x 3.6 cm diffusion restricting enhancing mass encasing  the left distal ureteral stent anterior to the left sacral ala,  compatible with known urothelial neoplasm, with similar measurements  on prior CT 07/01/2024 but has decreased in size when compared to  prior MRI from  "03/22/2024. The mass completely encases the left  internal iliac artery and vein which are likely occluded.  Left  common iliac vein and left external iliac vein abuts the mass but  remain patent.  There is at least partial encasement of the left S1  and S2 nerve roots. The mass abuts the left sacrum without definite  evidence of osseous extension/destruction.  2. No evidence of lymphadenopathy or other metastatic disease in the  pelvis.      8/15/24 Colon resection + nephro-U  FINAL DIAGNOSIS   A. Soft tissue, left periureteral, biopsy:  -- Invasive poorly differentiated carcinoma with squamous differentiation     B. Colon, sigmoid, segmental resection:  -- Poorly differentiated carcinoma with squamous differentiation involving mesentery and bowel wall  -- Margins of resection are negative for carcinoma  -- Hyperplastic polyp  -- Two lymph nodes, negative for carcinoma (0/2)     C. Ureter, left, segmental resection:  -- Invasive carcinoma with extensive squamous differentiation (see comment)  -- Invasive carcinoma is present at the margin of resection  -- See cancer summary report     D. Kidney and proximal ureter, left, nephroureterectomy:  -- Atrophic renal parenchyma with marked chronic inflammation, interstitial fibrosis with tubular atrophy, glomerulosclerosis and severe arterio- and arteriolosclerosis       Assessment/Plan   Terra Alexis is a 74 y.o.  female with hx cervical cancer s/p chemoRT 1998, now found with pelvic irresectable distal urothelial mass, bx proven UC. Now post EV/pembro with tumor shrinkage (around 30%). Underwent nephro-U + colon resection with residual tumor in path - pT4 with therapy changes. KALEY now. Will resume pembrolizumab to complete 1 year \"as adjuvant\" tx. Also reaching out to [facility] to switch to oral anti factor X.   I saw and evaluated the patient. I personally obtained the key and critical portions of the history and physical exam or was physically present " for key and critical portions performed by the resident/fellow. I reviewed the resident/fellow's documentation and discussed the patient with the resident/fellow. I agree with the resident/fellow's medical decision making as documented in the note.   Elgin Boone MD MSc FACP  [facility] Chair in Cancer Research   in Medicine [employer]  Director Clinical  Medical Oncology Research Program   [facility] / [facility] [phone]  Office [phone]

## 2024-10-16 ENCOUNTER — OFFICE VISIT (OUTPATIENT)
Dept: CARDIOLOGY | Facility: HOSPITAL | Age: 74
End: 2024-10-16
Payer: MEDICARE

## 2024-10-16 VITALS
HEART RATE: 84 BPM | BODY MASS INDEX: 24.03 KG/M2 | OXYGEN SATURATION: 99 % | WEIGHT: 123.02 LBS | SYSTOLIC BLOOD PRESSURE: 126 MMHG | DIASTOLIC BLOOD PRESSURE: 82 MMHG

## 2024-10-16 DIAGNOSIS — I82.461 ACUTE DEEP VEIN THROMBOSIS (DVT) OF CALF MUSCLE VEIN OF RIGHT LOWER EXTREMITY (MULTI): ICD-10-CM

## 2024-10-16 DIAGNOSIS — I26.99 OTHER ACUTE PULMONARY EMBOLISM WITHOUT ACUTE COR PULMONALE: Primary | ICD-10-CM

## 2024-10-16 PROCEDURE — 1159F MED LIST DOCD IN RCRD: CPT | Performed by: INTERNAL MEDICINE

## 2024-10-16 PROCEDURE — 3074F SYST BP LT 130 MM HG: CPT | Performed by: INTERNAL MEDICINE

## 2024-10-16 PROCEDURE — 3079F DIAST BP 80-89 MM HG: CPT | Performed by: INTERNAL MEDICINE

## 2024-10-16 PROCEDURE — 1157F ADVNC CARE PLAN IN RCRD: CPT | Performed by: INTERNAL MEDICINE

## 2024-10-16 PROCEDURE — 1036F TOBACCO NON-USER: CPT | Performed by: INTERNAL MEDICINE

## 2024-10-16 PROCEDURE — 99214 OFFICE O/P EST MOD 30 MIN: CPT | Performed by: INTERNAL MEDICINE

## 2024-10-16 PROCEDURE — 1160F RVW MEDS BY RX/DR IN RCRD: CPT | Performed by: INTERNAL MEDICINE

## 2024-10-16 NOTE — PATIENT INSTRUCTIONS
When you are done with your Lovenox shots, you can start the Eliquis 5 mg twice a day. We will continue this up until the time of your surgery; we will stop it 48 hours pre-op. Dr. Denton's team will determine the exact timing of resuming after surgery.    We will plan to continue the blood thinner through February. I would like you to get a repeat ultrasound of your right leg here at [facility], followed by an office visit with me immediately afterwards.    Should you have questions, please do not hesitate to call my office at [phone], or you can reach me on [employer] if you have signed up for it. If you have urgent concerns, call the office, do not use [employer].

## 2024-10-16 NOTE — PROGRESS NOTES
Hospital follow-up      History of Present Illness:  Terra Alexis is a/an 74 y.o. 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 She was admitted in August with abdominal pain, nausea, and vomiting and was found incidentally to have a lobar and segmental PE without right heart strain as well as right peroneal vein thrombus.    She was started on anticoagulation with heparin and then transitioned to enoxaparin 1 mg/kg every 12 hours. She saw Dr. Boone (oncology) earlier this month and he said he was going to reach out to vascular medicine to see about transition to DOAC. (He may have talked to one of my partners; I did not see the patient in the hospital.)    She has no prior personal or family history of VTE.    She has noticed a small amount of rectal bleeding.    She would really like to stop the Lovenox as the injections are painful, and it is quite expensive.       Past Medical History:   Diagnosis Date    Hypertension     Malignant tumor of sigmoid colon (Multi) 08/21/2024    Other specified disorders of kidney and ureter 03/04/2022    Ureteral mass    Personal history of malignant neoplasm of cervix uteri 11/21/2022    History of malignant neoplasm of cervix     Past Surgical History:   Procedure Laterality Date    NEPHRECTOMY Left 08/15/2024    ex-lap, left ureteral tumor resection, left nephrectomy, excision of pelvic mass, partial sigmoidectomy with loop ileostomy    OTHER SURGICAL HISTORY  10/07/2021    Hysterectomy     Social History     Tobacco Use    Smoking status: Never    Smokeless tobacco: Never   Vaping Use    Vaping status: Never Used   Substance Use Topics    Alcohol use: Never    Drug use: Never     Family History   Problem Relation Name Age of Onset    Macular degeneration Mother      Glaucoma Other grandparent     Macular degeneration Other grandparent      Current  Outpatient Medications   Medication Sig Dispense Refill    acetaminophen (Tylenol) 325 mg tablet Take 2 tablets (650 mg) by mouth every 6 hours if needed for mild pain (1 - 3). 20 tablet 0    enoxaparin (Lovenox) 60 mg/0.6 mL syringe Inject 0.6 mL (60 mg) under the skin 2 times a day. 60 each 5    levothyroxine (Synthroid, Levoxyl) 25 mcg tablet Take 1 tablet (25 mcg) by mouth early in the morning.. Take on an empty stomach at the same time each day, either 30 to 60 minutes prior to breakfast 30 tablet 2    loperamide (Imodium) 2 mg capsule Take 1 capsule (2 mg) by mouth 4 times a day before meals.      metoclopramide (Reglan) 5 mg tablet Take 1 tablet (5 mg) by mouth 4 times a day before meals for 14 days. 56 tablet 0    pantoprazole (ProtoNix) 40 mg EC tablet Take 1 tablet (40 mg) by mouth once daily in the morning. Take before meals. Do not crush, chew, or split. 30 tablet 11    vitamins A,C,E-zinc-copper (PreserVision AREDS) 4,296 mcg-226 mg-90 mg capsule Take 1 capsule by mouth 2 times a day.       No current facility-administered medications for this visit.       Physical Examination:  Blood pressure 126/82, pulse 84, weight 55.8 kg (123 lb 0.3 oz), SpO2 99%.  No distress  No JVD or carotid bruits  Lungs clear bilaterally  Heart regular and without murmurs  Abdomen soft and non-tender, ostomy  No leg swelling  Pulses intact    Pertinent Labs:  Component      Latest Ref [facility] 10/2/2024   WBC      4.4 - 11.3 x10*3/uL 6.8    nRBC      0.0 - 0.0 /100 WBCs 0.0    RBC      4.00 - 5.20 x10*6/uL 3.40 (L)    HEMOGLOBIN      12.0 - 16.0 g/dL 10.5 (L)    HEMATOCRIT      36.0 - 46.0 % 34.0 (L)    MCV      80 - 100 fL 100    MCH      26.0 - 34.0 pg 30.9    MCHC      32.0 - 36.0 g/dL 30.9 (L)    RED CELL DISTRIBUTION WIDTH      11.5 - 14.5 % 16.4 (H)    Platelets      150 - 450 x10*3/uL 315    Neutrophils %      40.0 - 80.0 % 66.8    Immature Granulocytes %, Automated      0.0 - 0.9 % 1.5 (H)    Lymphocytes %      13.0 - 44.0  % 20.8    Monocytes %      2.0 - 10.0 % 6.6    Eosinophils %      0.0 - 6.0 % 3.4    Basophils %      0.0 - 2.0 % 0.9    Neutrophils Absolute      1.60 - 5.50 x10*3/uL 4.53    Immature Granulocytes Absolute, Automated      0.00 - 0.50 x10*3/uL 0.10    Lymphocytes Absolute      0.80 - 3.00 x10*3/uL 1.41    Monocytes Absolute      0.05 - 0.80 x10*3/uL 0.45    Eosinophils Absolute      0.00 - 0.40 x10*3/uL 0.23    Basophils Absolute      0.00 - 0.10 x10*3/uL 0.06    GLUCOSE      74 - 99 mg/dL 87    SODIUM      136 - 145 mmol/L 137    POTASSIUM      3.5 - 5.3 mmol/L 4.0    CHLORIDE      98 - 107 mmol/L 103    Bicarbonate      21 - 32 mmol/L 21    Anion Gap      10 - 20 mmol/L 17    Blood Urea Nitrogen      6 - 23 mg/dL 15    Creatinine      0.50 - 1.05 mg/dL 1.16 (H)    EGFR      >60 mL/min/1.73m*2 50 (L)    Calcium      8.6 - 10.3 mg/dL 9.6    Albumin      3.4 - 5.0 g/dL 4.1    Alkaline Phosphatase      33 - 136 U/L 114    Total Protein      6.4 - 8.2 g/dL 7.5    AST      9 - 39 U/L 18    Bilirubin Total      0.0 - 1.2 mg/dL 0.4    ALT      7 - 45 U/L 12       Legend:  (L) Low  (H) High    Pertinent Imaging:  CTA chest 8/24/2024  IMPRESSION:  1.  Positive study for acute pulmonary embolism. Multiple emboli  involving lobar and segmental branches of both lungs.  2. Cardiomegaly with small pericardial effusion. No right heart  strain otherwise.  3. Small layering pleural effusions. Increased bibasilar atelectasis  or infiltrates. Superimposed infarct not excluded..      Diagnoses and all orders for this visit:  Other acute pulmonary embolism without acute cor pulmonale (Primary)  -     apixaban (Eliquis) 5 mg tablet; Take 1 tablet (5 mg) by mouth 2 times a day.  Acute deep vein thrombosis (DVT) of calf muscle vein of right lower extremity (Multi)  -     apixaban (Eliquis) 5 mg tablet; Take 1 tablet (5 mg) by mouth 2 times a day.  -     Vascular US Lower Extremity Venous Duplex Right; Future      Follow up in February  with repeat venous duplex.    Sheyla Kasper MD, MS

## 2024-10-17 ENCOUNTER — TELEPHONE (OUTPATIENT)
Dept: ADMISSION | Facility: HOSPITAL | Age: 74
End: 2024-10-17
Payer: COMMERCIAL

## 2024-10-17 NOTE — TELEPHONE ENCOUNTER
Pt has noticed small amt blood in toilet and on tissue when urinating x 2 weeks.   Slight intermittent pain, burning with urination x 1 week.   Denies fever, frequency, urgency.     Lump on back of rt thigh - vascular MD yesterday advised it is a possible fatty deposit. Not painful, approx size of quarter, soft to touch.     Pt does not have PCP and has no plan to see one.   Last FUV 10/3 with next FUV 10/28

## 2024-10-18 ENCOUNTER — OFFICE VISIT (OUTPATIENT)
Dept: SURGICAL ONCOLOGY | Facility: HOSPITAL | Age: 74
End: 2024-10-18
Payer: MEDICARE

## 2024-10-18 VITALS
WEIGHT: 123.68 LBS | BODY MASS INDEX: 24.15 KG/M2 | RESPIRATION RATE: 16 BRPM | HEART RATE: 80 BPM | OXYGEN SATURATION: 100 % | TEMPERATURE: 97.3 F | DIASTOLIC BLOOD PRESSURE: 69 MMHG | SYSTOLIC BLOOD PRESSURE: 121 MMHG

## 2024-10-18 DIAGNOSIS — Z93.2 ILEOSTOMY PRESENT (MULTI): Primary | ICD-10-CM

## 2024-10-18 DIAGNOSIS — R30.0 DYSURIA: ICD-10-CM

## 2024-10-18 PROCEDURE — 99214 OFFICE O/P EST MOD 30 MIN: CPT | Performed by: SURGERY

## 2024-10-18 ASSESSMENT — PAIN SCALES - GENERAL: PAINLEVEL_OUTOF10: 6

## 2024-10-18 NOTE — PROGRESS NOTES
Preoperative visit for ileostomy reversal    Referring Provider:  Teddy Ross MD  No Assigned PCP Generic Provider, MD    Chief Complaint:  Ileostomy      History of Present Illness:  This is a 74 y.o. female who presents with a history of cervical cancer s/p JEMMA/BSO 1999 subsequently treated with chemotherapy and radiation. Underwent a nephroureterectomy, robot assisted, on 8/15/2024. I was called to evaluate the mesenteric margin of the sigmoid which was involved with tumor. Decision was made for sigmoid resection and reanastamosis with diverting loop ileostomy.     Readmitted 8/24/24 for DGE possible SBO, and was noted to have an acute PE involving the lobar and segmental branches of bilateral lungs. Discharged on Therapeutic Lovenox.      Presented to the ED again 9/1/24 for emesis and abdominal pain and was started on antibiotics for possible pelvic abscess. This appears similar to the postoperative fluid in the pelvis that was seen at her last admission. Nonetheless, she feels better and has had no further episodes.     Saw vascular Medicine recently and was cleared for perioperative cessation of Eliquis.  Continues on immunotherapy with Dr. Boone.    10/18/2024-overall the patient is doing well.  She says her output from her ileostomy changes with her food intake and has been controlled with tinkering of Imodium.  She denies any pain or concerns otherwise.  She does have some perianal hemorrhoids that do not bleed or cause pain but are noticeable.  She also reports a small amount of blood in her urine and a history of urinary tract infections with this symptom.    Review of Systems:  A complete 12 point review of systems was performed and is negative except as noted in the history of present illness.    Vital Signs:  Vitals:    10/18/24 1250   BP: 121/69   Pulse: 80   Resp: 16   Temp: 36.3 °C (97.3 °F)   SpO2: 100%        Physical Exam:  GEN: No acute distress  HEENT: Moist mucus membranes,  "normocephalic  CARDS: RRR  PULM: No respiratory distress  GI: Soft, non-distended, Incision well healed. No fluid collections. Ileostomy pink, moist, perfused  SKIN: No rashes or lesions  NEURO: No gross sensorimotor deficits  EXT: No arm or leg swelling    Laboratory Values:  Lab Results   Component Value Date    WBC 6.8 10/02/2024    HGB 10.5 (L) 10/02/2024    HCT 34.0 (L) 10/02/2024     10/02/2024     10/02/2024        Chemistry    Lab Results   Component Value Date/Time     10/02/2024 1031    K 4.0 10/02/2024 1031     10/02/2024 1031    CO2 21 10/02/2024 1031    BUN 15 10/02/2024 1031    CREATININE 1.16 (H) 10/02/2024 1031    Lab Results   Component Value Date/Time    CALCIUM 9.6 10/02/2024 1031    ALKPHOS 114 10/02/2024 1031    AST 18 10/02/2024 1031    ALT 12 10/02/2024 1031    BILITOT 0.4 10/02/2024 1031           No results found for: \"PR1\"        Assessment:  This is a 74 y.o. female who presents with a history of cervical cancer and a recent resection of a pelvic mass requiring segmental sigmoid colectomy and diverting loop ileostomy.     Vascular Medicine cleared for perioperative cessation of Eliquis.  Continues on immunotherapy with Dr. Serna.    Plan:  -- UA to rule out a urinary tract infection  --Plan for surgery 11/13/2024-ileostomy reversal  -- Plan to hold Eliquis 2 days prior to surgery  -- Obtain a Gastrografin enema study prior to ileostomy reversal  -- The patient asked very appropriate questions that were answered to the best of my ability with the current information at hand. He knows to call with any questions or concerns that arise    Bernard Denton MD, MPH  "

## 2024-10-21 ENCOUNTER — LAB (OUTPATIENT)
Dept: LAB | Facility: LAB | Age: 74
End: 2024-10-21
Payer: MEDICARE

## 2024-10-21 ENCOUNTER — APPOINTMENT (OUTPATIENT)
Dept: HEMATOLOGY/ONCOLOGY | Facility: CLINIC | Age: 74
End: 2024-10-21
Payer: MEDICARE

## 2024-10-21 DIAGNOSIS — R30.0 DYSURIA: ICD-10-CM

## 2024-10-21 LAB
APPEARANCE UR: CLEAR
BILIRUB UR STRIP.AUTO-MCNC: NEGATIVE MG/DL
COLOR UR: YELLOW
GLUCOSE UR STRIP.AUTO-MCNC: NORMAL MG/DL
KETONES UR STRIP.AUTO-MCNC: NEGATIVE MG/DL
LEUKOCYTE ESTERASE UR QL STRIP.AUTO: NEGATIVE
MUCOUS THREADS #/AREA URNS AUTO: ABNORMAL /LPF
NITRITE UR QL STRIP.AUTO: NEGATIVE
PH UR STRIP.AUTO: 6.5 [PH]
PROT UR STRIP.AUTO-MCNC: ABNORMAL MG/DL
RBC # UR STRIP.AUTO: ABNORMAL /UL
RBC #/AREA URNS AUTO: >20 /HPF
SP GR UR STRIP.AUTO: 1.03
UROBILINOGEN UR STRIP.AUTO-MCNC: NORMAL MG/DL
WBC #/AREA URNS AUTO: ABNORMAL /HPF

## 2024-10-21 PROCEDURE — 81001 URINALYSIS AUTO W/SCOPE: CPT

## 2024-10-22 LAB — HOLD SPECIMEN: NORMAL

## 2024-10-24 ENCOUNTER — NURSE TRIAGE (OUTPATIENT)
Dept: ADMISSION | Facility: HOSPITAL | Age: 74
End: 2024-10-24
Payer: MEDICARE

## 2024-10-24 NOTE — TELEPHONE ENCOUNTER
Spoke with patient and discussed urine results not indicating urinary infection. Patient aware to complete her lab work tomorrow & will discuss concerns further with Dr. Boone at Monday's follow up appointment.

## 2024-10-24 NOTE — TELEPHONE ENCOUNTER
Pt called to follow up on urine testing from 10/21.    She continues to have hematuria noting blood when wiping after urination and occasional drops of blood in the toilet after voiding.  She has been afebrile.  Aside from blood, urine is otherwise light yellow with no foul smell.  She denies urgency, frequency or pain with urination.  She has occasional lower abdominal discomfort that feel like menstrual cramps.  Her baseline low back pain has been worse over the past two days, particularly in the morning upon waking up.   She also has an occasional aching pain as if she has to have a BM noting Dr. Denton is aware and she will be having ostomy reversal soon.      Additional Information   Have you had a catheterization, urinary tract infaction (UTI) or sexually transmitted disease (STD)?     Previous h/o UTI   Commented on: How much and what have you been drinking in the last 24 hours?     Drinks multiple bottles of water per day   Commented on: What color is your pee? Is this different than normal? Do you see any blood or discharge in it?     Blood with wiping, drops in toilet   Commented on: Any belly pain? If yes, on a scale of 0-10 (0 being no pain and 10 being the worst possible) how would you rate your pain?     intermittent   Commented on: Any lower back pain? If yes, on a scale of 0-10 (0 being no pain and 10 being the worst possible) how would you rate your pain?     Has low back pain at baseline due to arthritis, worse over the past few days   Commented on: How long have your problems been going on?     Started around 10/11    Protocols used: Difficulty or Pain with Urination

## 2024-10-25 ENCOUNTER — LAB (OUTPATIENT)
Dept: LAB | Facility: LAB | Age: 74
End: 2024-10-25
Payer: MEDICARE

## 2024-10-25 DIAGNOSIS — E03.9 HYPOTHYROIDISM, UNSPECIFIED TYPE: ICD-10-CM

## 2024-10-25 DIAGNOSIS — C68.9 UROTHELIAL CARCINOMA (MULTI): ICD-10-CM

## 2024-10-25 LAB
ALBUMIN SERPL BCP-MCNC: 3.7 G/DL (ref 3.4–5)
ALP SERPL-CCNC: 140 U/L (ref 33–136)
ALT SERPL W P-5'-P-CCNC: 17 U/L (ref 7–45)
ANION GAP SERPL CALC-SCNC: 13 MMOL/L (ref 10–20)
AST SERPL W P-5'-P-CCNC: 18 U/L (ref 9–39)
BASOPHILS # BLD AUTO: 0.03 X10*3/UL (ref 0–0.1)
BASOPHILS NFR BLD AUTO: 0.5 %
BILIRUB SERPL-MCNC: 0.3 MG/DL (ref 0–1.2)
BUN SERPL-MCNC: 13 MG/DL (ref 6–23)
CALCIUM SERPL-MCNC: 8.7 MG/DL (ref 8.6–10.3)
CHLORIDE SERPL-SCNC: 103 MMOL/L (ref 98–107)
CO2 SERPL-SCNC: 25 MMOL/L (ref 21–32)
CREAT SERPL-MCNC: 1.08 MG/DL (ref 0.5–1.05)
EGFRCR SERPLBLD CKD-EPI 2021: 54 ML/MIN/1.73M*2
EOSINOPHIL # BLD AUTO: 0.22 X10*3/UL (ref 0–0.4)
EOSINOPHIL NFR BLD AUTO: 3.3 %
ERYTHROCYTE [DISTWIDTH] IN BLOOD BY AUTOMATED COUNT: 15.4 % (ref 11.5–14.5)
GLUCOSE SERPL-MCNC: 64 MG/DL (ref 74–99)
HCT VFR BLD AUTO: 33.5 % (ref 36–46)
HGB BLD-MCNC: 10.2 G/DL (ref 12–16)
IMM GRANULOCYTES # BLD AUTO: 0.11 X10*3/UL (ref 0–0.5)
IMM GRANULOCYTES NFR BLD AUTO: 1.7 % (ref 0–0.9)
LYMPHOCYTES # BLD AUTO: 1.35 X10*3/UL (ref 0.8–3)
LYMPHOCYTES NFR BLD AUTO: 20.3 %
MCH RBC QN AUTO: 30.3 PG (ref 26–34)
MCHC RBC AUTO-ENTMCNC: 30.4 G/DL (ref 32–36)
MCV RBC AUTO: 99 FL (ref 80–100)
MONOCYTES # BLD AUTO: 0.46 X10*3/UL (ref 0.05–0.8)
MONOCYTES NFR BLD AUTO: 6.9 %
NEUTROPHILS # BLD AUTO: 4.49 X10*3/UL (ref 1.6–5.5)
NEUTROPHILS NFR BLD AUTO: 67.3 %
NRBC BLD-RTO: 0 /100 WBCS (ref 0–0)
PLATELET # BLD AUTO: 282 X10*3/UL (ref 150–450)
POTASSIUM SERPL-SCNC: 3.5 MMOL/L (ref 3.5–5.3)
PROT SERPL-MCNC: 6.6 G/DL (ref 6.4–8.2)
RBC # BLD AUTO: 3.37 X10*6/UL (ref 4–5.2)
SODIUM SERPL-SCNC: 137 MMOL/L (ref 136–145)
TSH SERPL-ACNC: 101 MIU/L (ref 0.44–3.98)
WBC # BLD AUTO: 6.7 X10*3/UL (ref 4.4–11.3)

## 2024-10-25 PROCEDURE — 36415 COLL VENOUS BLD VENIPUNCTURE: CPT

## 2024-10-25 PROCEDURE — 80053 COMPREHEN METABOLIC PANEL: CPT

## 2024-10-25 PROCEDURE — 84439 ASSAY OF FREE THYROXINE: CPT

## 2024-10-25 PROCEDURE — 84443 ASSAY THYROID STIM HORMONE: CPT

## 2024-10-25 PROCEDURE — 85025 COMPLETE CBC W/AUTO DIFF WBC: CPT

## 2024-10-26 LAB — T4 FREE SERPL-MCNC: 0.41 NG/DL (ref 0.61–1.12)

## 2024-10-28 ENCOUNTER — OFFICE VISIT (OUTPATIENT)
Dept: HEMATOLOGY/ONCOLOGY | Facility: CLINIC | Age: 74
End: 2024-10-28
Payer: MEDICARE

## 2024-10-28 ENCOUNTER — INFUSION (OUTPATIENT)
Dept: HEMATOLOGY/ONCOLOGY | Facility: CLINIC | Age: 74
End: 2024-10-28
Payer: MEDICARE

## 2024-10-28 VITALS
HEART RATE: 74 BPM | SYSTOLIC BLOOD PRESSURE: 128 MMHG | TEMPERATURE: 98.2 F | BODY MASS INDEX: 24.49 KG/M2 | WEIGHT: 125.4 LBS | OXYGEN SATURATION: 100 % | RESPIRATION RATE: 18 BRPM | DIASTOLIC BLOOD PRESSURE: 78 MMHG

## 2024-10-28 VITALS
BODY MASS INDEX: 24.41 KG/M2 | RESPIRATION RATE: 18 BRPM | SYSTOLIC BLOOD PRESSURE: 132 MMHG | OXYGEN SATURATION: 100 % | HEART RATE: 79 BPM | TEMPERATURE: 96.3 F | WEIGHT: 125 LBS | DIASTOLIC BLOOD PRESSURE: 76 MMHG

## 2024-10-28 DIAGNOSIS — C68.9 UROTHELIAL CARCINOMA (MULTI): ICD-10-CM

## 2024-10-28 DIAGNOSIS — R21 RASH: Primary | ICD-10-CM

## 2024-10-28 DIAGNOSIS — E03.9 HYPOTHYROIDISM, UNSPECIFIED TYPE: ICD-10-CM

## 2024-10-28 DIAGNOSIS — R21 RASH: ICD-10-CM

## 2024-10-28 PROCEDURE — 1125F AMNT PAIN NOTED PAIN PRSNT: CPT | Performed by: STUDENT IN AN ORGANIZED HEALTH CARE EDUCATION/TRAINING PROGRAM

## 2024-10-28 PROCEDURE — 1157F ADVNC CARE PLAN IN RCRD: CPT | Performed by: STUDENT IN AN ORGANIZED HEALTH CARE EDUCATION/TRAINING PROGRAM

## 2024-10-28 PROCEDURE — 1159F MED LIST DOCD IN RCRD: CPT | Performed by: STUDENT IN AN ORGANIZED HEALTH CARE EDUCATION/TRAINING PROGRAM

## 2024-10-28 PROCEDURE — 3078F DIAST BP <80 MM HG: CPT | Performed by: STUDENT IN AN ORGANIZED HEALTH CARE EDUCATION/TRAINING PROGRAM

## 2024-10-28 PROCEDURE — 2500000004 HC RX 250 GENERAL PHARMACY W/ HCPCS (ALT 636 FOR OP/ED): Mod: JZ,JG | Performed by: STUDENT IN AN ORGANIZED HEALTH CARE EDUCATION/TRAINING PROGRAM

## 2024-10-28 PROCEDURE — 99214 OFFICE O/P EST MOD 30 MIN: CPT | Mod: 25 | Performed by: STUDENT IN AN ORGANIZED HEALTH CARE EDUCATION/TRAINING PROGRAM

## 2024-10-28 PROCEDURE — 99214 OFFICE O/P EST MOD 30 MIN: CPT | Performed by: STUDENT IN AN ORGANIZED HEALTH CARE EDUCATION/TRAINING PROGRAM

## 2024-10-28 PROCEDURE — 3074F SYST BP LT 130 MM HG: CPT | Performed by: STUDENT IN AN ORGANIZED HEALTH CARE EDUCATION/TRAINING PROGRAM

## 2024-10-28 PROCEDURE — 96413 CHEMO IV INFUSION 1 HR: CPT

## 2024-10-28 PROCEDURE — 1160F RVW MEDS BY RX/DR IN RCRD: CPT | Performed by: STUDENT IN AN ORGANIZED HEALTH CARE EDUCATION/TRAINING PROGRAM

## 2024-10-28 RX ORDER — PROCHLORPERAZINE MALEATE 10 MG
10 TABLET ORAL EVERY 6 HOURS PRN
Status: DISCONTINUED | OUTPATIENT
Start: 2024-10-28 | End: 2024-10-28 | Stop reason: HOSPADM

## 2024-10-28 RX ORDER — LEVOTHYROXINE SODIUM 25 UG/1
100 TABLET ORAL DAILY
Qty: 30 TABLET | Refills: 2 | Status: SHIPPED | OUTPATIENT
Start: 2024-10-28 | End: 2024-10-29 | Stop reason: SDUPTHER

## 2024-10-28 RX ORDER — FAMOTIDINE 10 MG/ML
20 INJECTION INTRAVENOUS ONCE AS NEEDED
Status: CANCELLED | OUTPATIENT
Start: 2024-10-28

## 2024-10-28 RX ORDER — HYDROXYZINE HYDROCHLORIDE 10 MG/1
25 TABLET, FILM COATED ORAL EVERY 4 HOURS PRN
Qty: 30 TABLET | Refills: 0 | Status: SHIPPED | OUTPATIENT
Start: 2024-10-28 | End: 2024-11-07

## 2024-10-28 RX ORDER — FAMOTIDINE 10 MG/ML
20 INJECTION INTRAVENOUS ONCE AS NEEDED
OUTPATIENT
Start: 2024-11-18

## 2024-10-28 RX ORDER — ALBUTEROL SULFATE 0.83 MG/ML
3 SOLUTION RESPIRATORY (INHALATION) AS NEEDED
OUTPATIENT
Start: 2024-11-18

## 2024-10-28 RX ORDER — FAMOTIDINE 10 MG/ML
20 INJECTION INTRAVENOUS ONCE AS NEEDED
Status: DISCONTINUED | OUTPATIENT
Start: 2024-10-28 | End: 2024-10-28 | Stop reason: HOSPADM

## 2024-10-28 RX ORDER — PROCHLORPERAZINE EDISYLATE 5 MG/ML
10 INJECTION INTRAMUSCULAR; INTRAVENOUS EVERY 6 HOURS PRN
Status: CANCELLED | OUTPATIENT
Start: 2024-10-28

## 2024-10-28 RX ORDER — ALBUTEROL SULFATE 0.83 MG/ML
3 SOLUTION RESPIRATORY (INHALATION) AS NEEDED
Status: CANCELLED | OUTPATIENT
Start: 2024-10-28

## 2024-10-28 RX ORDER — EPINEPHRINE 0.3 MG/.3ML
0.3 INJECTION SUBCUTANEOUS EVERY 5 MIN PRN
OUTPATIENT
Start: 2024-11-18

## 2024-10-28 RX ORDER — PROCHLORPERAZINE MALEATE 10 MG
10 TABLET ORAL EVERY 6 HOURS PRN
OUTPATIENT
Start: 2024-11-18

## 2024-10-28 RX ORDER — PROCHLORPERAZINE EDISYLATE 5 MG/ML
10 INJECTION INTRAMUSCULAR; INTRAVENOUS EVERY 6 HOURS PRN
OUTPATIENT
Start: 2024-11-18

## 2024-10-28 RX ORDER — PROCHLORPERAZINE MALEATE 10 MG
10 TABLET ORAL EVERY 6 HOURS PRN
Status: CANCELLED | OUTPATIENT
Start: 2024-10-28

## 2024-10-28 RX ORDER — DIPHENHYDRAMINE HYDROCHLORIDE 50 MG/ML
50 INJECTION INTRAMUSCULAR; INTRAVENOUS AS NEEDED
Status: DISCONTINUED | OUTPATIENT
Start: 2024-10-28 | End: 2024-10-28 | Stop reason: HOSPADM

## 2024-10-28 RX ORDER — EPINEPHRINE 0.3 MG/.3ML
0.3 INJECTION SUBCUTANEOUS EVERY 5 MIN PRN
Status: DISCONTINUED | OUTPATIENT
Start: 2024-10-28 | End: 2024-10-28 | Stop reason: HOSPADM

## 2024-10-28 RX ORDER — ALBUTEROL SULFATE 0.83 MG/ML
3 SOLUTION RESPIRATORY (INHALATION) AS NEEDED
Status: DISCONTINUED | OUTPATIENT
Start: 2024-10-28 | End: 2024-10-28 | Stop reason: HOSPADM

## 2024-10-28 RX ORDER — TRAMADOL HYDROCHLORIDE 50 MG/1
50 TABLET ORAL EVERY 6 HOURS PRN
COMMUNITY

## 2024-10-28 RX ORDER — DIPHENHYDRAMINE HYDROCHLORIDE 50 MG/ML
50 INJECTION INTRAMUSCULAR; INTRAVENOUS AS NEEDED
Status: CANCELLED | OUTPATIENT
Start: 2024-10-28

## 2024-10-28 RX ORDER — EPINEPHRINE 0.3 MG/.3ML
0.3 INJECTION SUBCUTANEOUS EVERY 5 MIN PRN
Status: CANCELLED | OUTPATIENT
Start: 2024-10-28

## 2024-10-28 RX ORDER — PROCHLORPERAZINE EDISYLATE 5 MG/ML
10 INJECTION INTRAMUSCULAR; INTRAVENOUS EVERY 6 HOURS PRN
Status: DISCONTINUED | OUTPATIENT
Start: 2024-10-28 | End: 2024-10-28 | Stop reason: HOSPADM

## 2024-10-28 RX ORDER — DIPHENHYDRAMINE HYDROCHLORIDE 50 MG/ML
50 INJECTION INTRAMUSCULAR; INTRAVENOUS AS NEEDED
OUTPATIENT
Start: 2024-11-18

## 2024-10-28 ASSESSMENT — ENCOUNTER SYMPTOMS
CHILLS: 0
ARTHRALGIAS: 1
MYALGIAS: 0
ABDOMINAL PAIN: 1
UNEXPECTED WEIGHT CHANGE: 1
NAUSEA: 0
HEMATOLOGIC/LYMPHATIC NEGATIVE: 1
ENDOCRINE NEGATIVE: 1
EYE PROBLEMS: 1
APPETITE CHANGE: 1
BACK PAIN: 1
FLANK PAIN: 1
SLEEP DISTURBANCE: 1
HEADACHES: 0
HEMATURIA: 0
BLOOD IN STOOL: 0
NUMBNESS: 1
DIZZINESS: 0
VOMITING: 1
WOUND: 0
COUGH: 0
DIFFICULTY URINATING: 0
DYSURIA: 1
DIARRHEA: 1
NECK PAIN: 0
FEVER: 0
LEG SWELLING: 0
CONSTIPATION: 1
LIGHT-HEADEDNESS: 0
SHORTNESS OF BREATH: 0
FATIGUE: 1

## 2024-10-28 ASSESSMENT — PAIN SCALES - GENERAL
PAINLEVEL_OUTOF10: 8
PAINLEVEL_OUTOF10: 8

## 2024-10-29 DIAGNOSIS — E03.9 HYPOTHYROIDISM, UNSPECIFIED TYPE: ICD-10-CM

## 2024-10-29 RX ORDER — LEVOTHYROXINE SODIUM 25 UG/1
100 TABLET ORAL DAILY
Qty: 120 TABLET | Refills: 2 | Status: SHIPPED | OUTPATIENT
Start: 2024-10-29

## 2024-11-01 ENCOUNTER — PREP FOR PROCEDURE (OUTPATIENT)
Dept: SURGICAL ONCOLOGY | Facility: HOSPITAL | Age: 74
End: 2024-11-01
Payer: MEDICARE

## 2024-11-01 DIAGNOSIS — Z93.2 ILEOSTOMY IN PLACE (MULTI): Primary | ICD-10-CM

## 2024-11-01 RX ORDER — HEPARIN SODIUM 5000 [USP'U]/ML
5000 INJECTION, SOLUTION INTRAVENOUS; SUBCUTANEOUS ONCE
OUTPATIENT
Start: 2024-11-01 | End: 2024-11-01

## 2024-11-01 RX ORDER — METRONIDAZOLE 500 MG/100ML
500 INJECTION, SOLUTION INTRAVENOUS ONCE
OUTPATIENT
Start: 2024-11-01 | End: 2024-11-01

## 2024-11-01 RX ORDER — CEFAZOLIN SODIUM 2 G/50ML
2 SOLUTION INTRAVENOUS ONCE
OUTPATIENT
Start: 2024-11-01 | End: 2024-11-01

## 2024-11-04 ENCOUNTER — HOSPITAL ENCOUNTER (OUTPATIENT)
Dept: RADIOLOGY | Facility: HOSPITAL | Age: 74
Discharge: HOME | End: 2024-11-04
Payer: MEDICARE

## 2024-11-04 ENCOUNTER — APPOINTMENT (OUTPATIENT)
Dept: RADIOLOGY | Facility: HOSPITAL | Age: 74
End: 2024-11-04
Payer: MEDICARE

## 2024-11-04 DIAGNOSIS — Z93.2 ILEOSTOMY PRESENT (MULTI): ICD-10-CM

## 2024-11-04 PROCEDURE — 74270 X-RAY XM COLON 1CNTRST STD: CPT | Performed by: STUDENT IN AN ORGANIZED HEALTH CARE EDUCATION/TRAINING PROGRAM

## 2024-11-04 PROCEDURE — 2550000001 HC RX 255 CONTRASTS: Performed by: SURGERY

## 2024-11-04 PROCEDURE — 74270 X-RAY XM COLON 1CNTRST STD: CPT

## 2024-11-04 RX ORDER — DIATRIZOATE MEGLUMINE AND DIATRIZOATE SODIUM 660; 100 MG/ML; MG/ML
240 SOLUTION ORAL; RECTAL ONCE
Status: COMPLETED | OUTPATIENT
Start: 2024-11-04 | End: 2024-11-04

## 2024-11-07 LAB
COMMENTS - MP RESULT TYPE: NORMAL
SCAN RESULT: NORMAL

## 2024-11-12 ENCOUNTER — TELEPHONE (OUTPATIENT)
Dept: SURGICAL ONCOLOGY | Facility: HOSPITAL | Age: 74
End: 2024-11-12
Payer: MEDICARE

## 2024-11-12 ENCOUNTER — ANESTHESIA EVENT (OUTPATIENT)
Dept: OPERATING ROOM | Facility: HOSPITAL | Age: 74
End: 2024-11-12
Payer: MEDICARE

## 2024-11-12 NOTE — TELEPHONE ENCOUNTER
Called patient to give details for surgery tomorrow. Left message asking for call back to discuss details on her listed cell phone. No answer on home phone as well, no message left.

## 2024-11-12 NOTE — PROGRESS NOTES
Pharmacy Medication History Review    Terra Alexis is a 74 y.o. female who is planned to be admitted for Ileostomy in place (Multi). Pharmacy called the patient prior to their scheduled procedure and reviewed the patient's [id number] medications for accuracy.    Medications ADDED:  none  Medications CHANGED:  none  Medications REMOVED:   Reglan 5mg  Pantoprazole 40mg    Please review updated prior to admission medication list and comments regarding how patient may be taking medications differently by going to Admission tab --> Admission Orders --> Admit Orders / Review prior to admission medications.     Preferred pharmacy, last doses of medications, and allergies to be confirmed with patient by nursing the day of procedure.     Sources used to complete the med history include spoke with patient, medication dispense report, oarrs, care everywhere    Below are additional concerns with the patient's PTA list.  Patient states they are no longer taking protonix or reglan. Both last filled 08/28/24    Noemí Caraballo    [facility]  Please reach out via Secure Chat for questions

## 2024-11-13 ENCOUNTER — HOSPITAL ENCOUNTER (INPATIENT)
Facility: HOSPITAL | Age: 74
DRG: 330 | End: 2024-11-13
Attending: SURGERY | Admitting: SURGERY
Payer: MEDICARE

## 2024-11-13 ENCOUNTER — ANESTHESIA (OUTPATIENT)
Dept: OPERATING ROOM | Facility: HOSPITAL | Age: 74
End: 2024-11-13
Payer: MEDICARE

## 2024-11-13 DIAGNOSIS — R11.0 NAUSEA: ICD-10-CM

## 2024-11-13 DIAGNOSIS — Z93.2 ILEOSTOMY IN PLACE (MULTI): Primary | ICD-10-CM

## 2024-11-13 PROBLEM — N20.0 RENAL CALCULI: Status: ACTIVE | Noted: 2024-11-13

## 2024-11-13 PROBLEM — G89.29 CHRONIC LOW BACK PAIN: Status: ACTIVE | Noted: 2023-10-22

## 2024-11-13 PROBLEM — D64.9 ANEMIA: Status: ACTIVE | Noted: 2024-11-13

## 2024-11-13 PROBLEM — I73.9 PERIPHERAL VASCULAR DISEASE (CMS-HCC): Status: ACTIVE | Noted: 2024-11-13

## 2024-11-13 PROBLEM — G56.00 CARPAL TUNNEL SYNDROME: Status: ACTIVE | Noted: 2024-11-13

## 2024-11-13 PROBLEM — Z92.89 HISTORY OF BLOOD TRANSFUSION: Status: ACTIVE | Noted: 2024-11-13

## 2024-11-13 PROBLEM — N18.31 CHRONIC RENAL IMPAIRMENT, STAGE 3A (MULTI): Status: ACTIVE | Noted: 2024-11-13

## 2024-11-13 PROBLEM — Z90.710 HISTORY OF HYSTERECTOMY: Status: ACTIVE | Noted: 2024-11-13

## 2024-11-13 PROBLEM — N18.2 CHRONIC RENAL IMPAIRMENT, STAGE 2 (MILD): Status: ACTIVE | Noted: 2024-11-13

## 2024-11-13 PROBLEM — E78.5 HYPERLIPIDEMIA: Status: ACTIVE | Noted: 2024-11-13

## 2024-11-13 PROBLEM — H54.7 VISION LOSS: Status: ACTIVE | Noted: 2024-11-13

## 2024-11-13 PROBLEM — C64.2 MALIGNANT NEOPLASM OF LEFT KIDNEY (MULTI): Status: ACTIVE | Noted: 2024-11-13

## 2024-11-13 PROBLEM — R21 RASH: Status: ACTIVE | Noted: 2024-11-13

## 2024-11-13 PROBLEM — H26.9 CATARACT PRESENT: Status: ACTIVE | Noted: 2024-11-13

## 2024-11-13 LAB
ABO GROUP (TYPE) IN BLOOD: NORMAL
ANTIBODY SCREEN: NORMAL
RH FACTOR (ANTIGEN D): NORMAL

## 2024-11-13 PROCEDURE — 0DNW0ZZ RELEASE PERITONEUM, OPEN APPROACH: ICD-10-PCS | Performed by: SURGERY

## 2024-11-13 PROCEDURE — 2500000004 HC RX 250 GENERAL PHARMACY W/ HCPCS (ALT 636 FOR OP/ED): Performed by: SURGERY

## 2024-11-13 PROCEDURE — 0DBB0ZZ EXCISION OF ILEUM, OPEN APPROACH: ICD-10-PCS | Performed by: SURGERY

## 2024-11-13 PROCEDURE — 36415 COLL VENOUS BLD VENIPUNCTURE: CPT | Performed by: SURGERY

## 2024-11-13 PROCEDURE — 7100000024 HC EXTENDED STAY RECOVERY PER MINUTE- PACU: Performed by: SURGERY

## 2024-11-13 PROCEDURE — 7100000002 HC RECOVERY ROOM TIME - EACH INCREMENTAL 1 MINUTE: Performed by: SURGERY

## 2024-11-13 PROCEDURE — 3600000008 HC OR TIME - EACH INCREMENTAL 1 MINUTE - PROCEDURE LEVEL THREE: Performed by: SURGERY

## 2024-11-13 PROCEDURE — 2500000005 HC RX 250 GENERAL PHARMACY W/O HCPCS: Performed by: ANESTHESIOLOGY

## 2024-11-13 PROCEDURE — 99100 ANES PT EXTEME AGE<1 YR&>70: CPT | Performed by: ANESTHESIOLOGY

## 2024-11-13 PROCEDURE — 88304 TISSUE EXAM BY PATHOLOGIST: CPT | Performed by: PATHOLOGY

## 2024-11-13 PROCEDURE — 2500000004 HC RX 250 GENERAL PHARMACY W/ HCPCS (ALT 636 FOR OP/ED)

## 2024-11-13 PROCEDURE — 7100000001 HC RECOVERY ROOM TIME - INITIAL BASE CHARGE: Performed by: SURGERY

## 2024-11-13 PROCEDURE — 86900 BLOOD TYPING SEROLOGIC ABO: CPT | Performed by: SURGERY

## 2024-11-13 PROCEDURE — 88304 TISSUE EXAM BY PATHOLOGIST: CPT | Mod: TC,SUR | Performed by: SURGERY

## 2024-11-13 PROCEDURE — 7100000011 HC EXTENDED STAY RECOVERY HOURLY - NURSING UNIT

## 2024-11-13 PROCEDURE — 3600000003 HC OR TIME - INITIAL BASE CHARGE - PROCEDURE LEVEL THREE: Performed by: SURGERY

## 2024-11-13 PROCEDURE — 3700000001 HC GENERAL ANESTHESIA TIME - INITIAL BASE CHARGE: Performed by: SURGERY

## 2024-11-13 PROCEDURE — 3700000002 HC GENERAL ANESTHESIA TIME - EACH INCREMENTAL 1 MINUTE: Performed by: SURGERY

## 2024-11-13 PROCEDURE — 2500000004 HC RX 250 GENERAL PHARMACY W/ HCPCS (ALT 636 FOR OP/ED): Performed by: ANESTHESIOLOGY

## 2024-11-13 PROCEDURE — 2720000007 HC OR 272 NO HCPCS: Performed by: SURGERY

## 2024-11-13 PROCEDURE — 2500000005 HC RX 250 GENERAL PHARMACY W/O HCPCS: Performed by: SURGERY

## 2024-11-13 PROCEDURE — 44620 REPAIR BOWEL OPENING: CPT | Performed by: SURGERY

## 2024-11-13 PROCEDURE — 1170000001 HC PRIVATE ONCOLOGY ROOM DAILY

## 2024-11-13 PROCEDURE — A44620 PR CLOSE ENTEROSTOMY: Performed by: ANESTHESIOLOGY

## 2024-11-13 RX ORDER — DROPERIDOL 2.5 MG/ML
0.62 INJECTION, SOLUTION INTRAMUSCULAR; INTRAVENOUS ONCE AS NEEDED
Status: DISCONTINUED | OUTPATIENT
Start: 2024-11-13 | End: 2024-11-13 | Stop reason: HOSPADM

## 2024-11-13 RX ORDER — HEPARIN SODIUM 5000 [USP'U]/ML
5000 INJECTION, SOLUTION INTRAVENOUS; SUBCUTANEOUS ONCE
Status: COMPLETED | OUTPATIENT
Start: 2024-11-13 | End: 2024-11-13

## 2024-11-13 RX ORDER — ACETAMINOPHEN 10 MG/ML
1000 INJECTION, SOLUTION INTRAVENOUS EVERY 6 HOURS SCHEDULED
Status: DISCONTINUED | OUTPATIENT
Start: 2024-11-13 | End: 2024-11-14

## 2024-11-13 RX ORDER — HYDROMORPHONE HYDROCHLORIDE 0.2 MG/ML
0.2 INJECTION INTRAMUSCULAR; INTRAVENOUS; SUBCUTANEOUS EVERY 5 MIN PRN
Status: DISCONTINUED | OUTPATIENT
Start: 2024-11-13 | End: 2024-11-13 | Stop reason: HOSPADM

## 2024-11-13 RX ORDER — METOCLOPRAMIDE HYDROCHLORIDE 5 MG/ML
10 INJECTION INTRAMUSCULAR; INTRAVENOUS ONCE AS NEEDED
Status: COMPLETED | OUTPATIENT
Start: 2024-11-13 | End: 2024-11-13

## 2024-11-13 RX ORDER — HYDRALAZINE HYDROCHLORIDE 20 MG/ML
5 INJECTION INTRAMUSCULAR; INTRAVENOUS EVERY 10 MIN PRN
Status: DISCONTINUED | OUTPATIENT
Start: 2024-11-13 | End: 2024-11-13 | Stop reason: HOSPADM

## 2024-11-13 RX ORDER — SODIUM CHLORIDE, SODIUM LACTATE, POTASSIUM CHLORIDE, CALCIUM CHLORIDE 600; 310; 30; 20 MG/100ML; MG/100ML; MG/100ML; MG/100ML
50 INJECTION, SOLUTION INTRAVENOUS CONTINUOUS
Status: DISCONTINUED | OUTPATIENT
Start: 2024-11-13 | End: 2024-11-14

## 2024-11-13 RX ORDER — CEFAZOLIN 1 G/1
INJECTION, POWDER, FOR SOLUTION INTRAVENOUS AS NEEDED
Status: DISCONTINUED | OUTPATIENT
Start: 2024-11-13 | End: 2024-11-13

## 2024-11-13 RX ORDER — ONDANSETRON HYDROCHLORIDE 2 MG/ML
8 INJECTION, SOLUTION INTRAVENOUS EVERY 6 HOURS PRN
Status: DISCONTINUED | OUTPATIENT
Start: 2024-11-13 | End: 2024-11-14

## 2024-11-13 RX ORDER — LIDOCAINE HCL/PF 100 MG/5ML
SYRINGE (ML) INTRAVENOUS AS NEEDED
Status: DISCONTINUED | OUTPATIENT
Start: 2024-11-13 | End: 2024-11-13

## 2024-11-13 RX ORDER — SODIUM CHLORIDE 0.9 G/100ML
IRRIGANT IRRIGATION AS NEEDED
Status: DISCONTINUED | OUTPATIENT
Start: 2024-11-13 | End: 2024-11-13 | Stop reason: HOSPADM

## 2024-11-13 RX ORDER — HYDROMORPHONE HCL/0.9% NACL/PF 15 MG/30ML
PATIENT CONTROLLED ANALGESIA SYRINGE INTRAVENOUS CONTINUOUS
Status: DISCONTINUED | OUTPATIENT
Start: 2024-11-13 | End: 2024-11-14

## 2024-11-13 RX ORDER — METHADONE IN SOD CHLOR,ISO-OSM 10 MG/ML
10 SYRINGE (ML) INTRAVENOUS ONCE
Status: COMPLETED | OUTPATIENT
Start: 2024-11-13 | End: 2024-11-13

## 2024-11-13 RX ORDER — NALOXONE HYDROCHLORIDE 0.4 MG/ML
0.2 INJECTION, SOLUTION INTRAMUSCULAR; INTRAVENOUS; SUBCUTANEOUS AS NEEDED
Status: DISCONTINUED | OUTPATIENT
Start: 2024-11-13 | End: 2024-11-21

## 2024-11-13 RX ORDER — PHENYLEPHRINE HYDROCHLORIDE 10 MG/ML
INJECTION INTRAVENOUS AS NEEDED
Status: DISCONTINUED | OUTPATIENT
Start: 2024-11-13 | End: 2024-11-13

## 2024-11-13 RX ORDER — FENTANYL CITRATE 50 UG/ML
INJECTION, SOLUTION INTRAMUSCULAR; INTRAVENOUS AS NEEDED
Status: DISCONTINUED | OUTPATIENT
Start: 2024-11-13 | End: 2024-11-13

## 2024-11-13 RX ORDER — ALBUTEROL SULFATE 0.83 MG/ML
2.5 SOLUTION RESPIRATORY (INHALATION) ONCE AS NEEDED
Status: DISCONTINUED | OUTPATIENT
Start: 2024-11-13 | End: 2024-11-13 | Stop reason: HOSPADM

## 2024-11-13 RX ORDER — LABETALOL HYDROCHLORIDE 5 MG/ML
5 INJECTION, SOLUTION INTRAVENOUS EVERY 10 MIN PRN
Status: DISCONTINUED | OUTPATIENT
Start: 2024-11-13 | End: 2024-11-13 | Stop reason: HOSPADM

## 2024-11-13 RX ORDER — CEFAZOLIN SODIUM 2 G/100ML
2 INJECTION, SOLUTION INTRAVENOUS ONCE
Status: DISCONTINUED | OUTPATIENT
Start: 2024-11-13 | End: 2024-11-13 | Stop reason: HOSPADM

## 2024-11-13 RX ORDER — METHOCARBAMOL 100 MG/ML
1000 INJECTION, SOLUTION INTRAMUSCULAR; INTRAVENOUS EVERY 8 HOURS
Status: DISCONTINUED | OUTPATIENT
Start: 2024-11-13 | End: 2024-11-14

## 2024-11-13 RX ORDER — SODIUM CHLORIDE, SODIUM LACTATE, POTASSIUM CHLORIDE, CALCIUM CHLORIDE 600; 310; 30; 20 MG/100ML; MG/100ML; MG/100ML; MG/100ML
INJECTION, SOLUTION INTRAVENOUS CONTINUOUS PRN
Status: DISCONTINUED | OUTPATIENT
Start: 2024-11-13 | End: 2024-11-13

## 2024-11-13 RX ORDER — HYDROCODONE BITARTRATE AND ACETAMINOPHEN 5; 325 MG/1; MG/1
1 TABLET ORAL EVERY 4 HOURS PRN
Status: DISCONTINUED | OUTPATIENT
Start: 2024-11-13 | End: 2024-11-13 | Stop reason: HOSPADM

## 2024-11-13 RX ORDER — ENOXAPARIN SODIUM 100 MG/ML
40 INJECTION SUBCUTANEOUS EVERY 24 HOURS
Status: DISCONTINUED | OUTPATIENT
Start: 2024-11-14 | End: 2024-11-14

## 2024-11-13 RX ORDER — METHOCARBAMOL 100 MG/ML
1000 INJECTION, SOLUTION INTRAMUSCULAR; INTRAVENOUS EVERY 8 HOURS
Status: DISCONTINUED | OUTPATIENT
Start: 2024-11-13 | End: 2024-11-13

## 2024-11-13 RX ORDER — HYDROMORPHONE HYDROCHLORIDE 1 MG/ML
INJECTION, SOLUTION INTRAMUSCULAR; INTRAVENOUS; SUBCUTANEOUS AS NEEDED
Status: DISCONTINUED | OUTPATIENT
Start: 2024-11-13 | End: 2024-11-13

## 2024-11-13 RX ORDER — ACETAMINOPHEN 325 MG/1
650 TABLET ORAL EVERY 4 HOURS PRN
Status: DISCONTINUED | OUTPATIENT
Start: 2024-11-13 | End: 2024-11-13 | Stop reason: SDUPTHER

## 2024-11-13 RX ORDER — PROPOFOL 10 MG/ML
INJECTION, EMULSION INTRAVENOUS AS NEEDED
Status: DISCONTINUED | OUTPATIENT
Start: 2024-11-13 | End: 2024-11-13

## 2024-11-13 RX ORDER — METRONIDAZOLE 500 MG/100ML
500 INJECTION, SOLUTION INTRAVENOUS ONCE
Status: COMPLETED | OUTPATIENT
Start: 2024-11-13 | End: 2024-11-13

## 2024-11-13 RX ORDER — ONDANSETRON HYDROCHLORIDE 2 MG/ML
INJECTION, SOLUTION INTRAVENOUS AS NEEDED
Status: DISCONTINUED | OUTPATIENT
Start: 2024-11-13 | End: 2024-11-13

## 2024-11-13 RX ORDER — ROCURONIUM BROMIDE 10 MG/ML
INJECTION, SOLUTION INTRAVENOUS AS NEEDED
Status: DISCONTINUED | OUTPATIENT
Start: 2024-11-13 | End: 2024-11-13

## 2024-11-13 RX ORDER — LIDOCAINE HYDROCHLORIDE 10 MG/ML
0.1 INJECTION, SOLUTION INFILTRATION; PERINEURAL ONCE
Status: DISCONTINUED | OUTPATIENT
Start: 2024-11-13 | End: 2024-11-13 | Stop reason: HOSPADM

## 2024-11-13 RX ORDER — FENTANYL CITRATE 50 UG/ML
12.5 INJECTION, SOLUTION INTRAMUSCULAR; INTRAVENOUS EVERY 5 MIN PRN
Status: DISCONTINUED | OUTPATIENT
Start: 2024-11-13 | End: 2024-11-13 | Stop reason: HOSPADM

## 2024-11-13 SDOH — HEALTH STABILITY: MENTAL HEALTH: CURRENT SMOKER: 0

## 2024-11-13 ASSESSMENT — PAIN - FUNCTIONAL ASSESSMENT
PAIN_FUNCTIONAL_ASSESSMENT: 0-10
PAIN_FUNCTIONAL_ASSESSMENT: 0-10
PAIN_FUNCTIONAL_ASSESSMENT: UNABLE TO SELF-REPORT
PAIN_FUNCTIONAL_ASSESSMENT: 0-10
PAIN_FUNCTIONAL_ASSESSMENT: 0-10
PAIN_FUNCTIONAL_ASSESSMENT: UNABLE TO SELF-REPORT
PAIN_FUNCTIONAL_ASSESSMENT: 0-10
PAIN_FUNCTIONAL_ASSESSMENT: UNABLE TO SELF-REPORT
PAIN_FUNCTIONAL_ASSESSMENT: UNABLE TO SELF-REPORT
PAIN_FUNCTIONAL_ASSESSMENT: 0-10
PAIN_FUNCTIONAL_ASSESSMENT: UNABLE TO SELF-REPORT

## 2024-11-13 ASSESSMENT — PAIN SCALES - GENERAL
PAINLEVEL_OUTOF10: 4
PAINLEVEL_OUTOF10: 3
PAINLEVEL_OUTOF10: 4
PAINLEVEL_OUTOF10: 8
PAINLEVEL_OUTOF10: 4
PAINLEVEL_OUTOF10: 8
PAINLEVEL_OUTOF10: 4
PAINLEVEL_OUTOF10: 8
PAINLEVEL_OUTOF10: 8
PAINLEVEL_OUTOF10: 4
PAINLEVEL_OUTOF10: 3
PAINLEVEL_OUTOF10: 8
PAINLEVEL_OUTOF10: 3
PAINLEVEL_OUTOF10: 4

## 2024-11-13 ASSESSMENT — PAIN DESCRIPTION - LOCATION: LOCATION: ABDOMEN

## 2024-11-13 NOTE — BRIEF OP NOTE
Date: 2024  OR Location: [facility] OR    Name: Terra Alexis, : 1950, Age: 74 y.o., MRN: [id number], Sex: female    Diagnosis  Pre-op Diagnosis      * Ileostomy in place (Multi) [Z93.2] Post-op Diagnosis     * Ileostomy in place (Multi) [Z93.2]     Procedures  Closure Ileostomy  83686 - [place] CLOSURE ENTEROSTOMY LG/SMALL INTESTINE      Surgeons      * Bernard Denton - Primary    Resident/Fellow/Other Assistant:  Frandy Jeronimo - Assisting    Staff:   Circulator: Maranda  Scrub Person: Carlos  Scrub Person: Mcihele  Relief Circulator: Jennifer  Relief Scrub: Ryan  Relief Circulator: Anirudh    Anesthesia Staff: Anesthesiologist: Patrice Zee MD  Anesthesia Resident: Kimberly Lemus MD; Demar Fatima MD    Procedure Summary  Anesthesia: General  ASA: III  Estimated Blood Loss: 10mL  Intra-op Medications:   Administrations occurring from 1100 to 1325 on 24:   Medication Name Total Dose   sodium chloride 0.9 % irrigation solution 1,000 mL   metroNIDAZOLE (Flagyl) 500 mg in sodium chloride (iso)  mL 500 mg   ceFAZolin (Ancef) 1 g 2 g   dexAMETHasone (Decadron) injection 4 mg/mL 4 mg   fentaNYL (Sublimaze) injection 50 mcg/mL 100 mcg   HYDROmorphone (Dilaudid) injection 1 mg/mL 0.6 mg   lactated Ringer's infusion Cannot be calculated   lidocaine (cardiac) injection 2% prefilled syringe 100 mg   phenylephrine (Harrison-Synephrine) injection 440 mcg   propofol (Diprivan) injection 10 mg/mL 200 mg   rocuronium (ZeMuron) 50 mg/5 mL injection 70 mg              Anesthesia Record               Intraprocedure I/O Totals          Intake    lactated Ringer's 700.00 mL    Total Intake 700 mL       Output    Est. Blood Loss 10 mL    Total Output 10 mL       Net    Net Volume 690 mL          Specimen:   ID Type Source Tests Collected by Time   1 : Ileostomy Tissue COLOSTOMY (STOMA) SURGICAL PATHOLOGY EXAM Bernard Denton MD MPH 2024 1338            Findings: Ileostomy with dense adhesions to  fascia and surrounding tissue. Stapled side-to-side anastomosis performed.    Complications:  None; patient tolerated the procedure well.     Disposition: PACU - hemodynamically stable.  Condition: stable  Specimens Collected:   ID Type Source Tests Collected by Time   1 : Ileostomy Tissue COLOSTOMY (STOMA) SURGICAL PATHOLOGY EXAM Bernard Denton MD MPH 11/13/2024 1142     Attending Attestation:     Bernard Denton  Phone Number: [phone]

## 2024-11-13 NOTE — INTERVAL H&P NOTE
H&P reviewed. The patient was examined and there are no changes to the H&P.    Contrast enema without evidence for Stricture or leak

## 2024-11-13 NOTE — ANESTHESIA PREPROCEDURE EVALUATION
Patient: Terra Alexis    Procedure Information       Date/Time: 11/13/24 1100    Procedure: Closure Ileostomy (Right)    Location: [facility] OR 22 / [facility] OR    Surgeons: Bernard Denton MD MPH            Relevant Problems   Anesthesia (within normal limits)      Cardiac   (+) Essential hypertension   (+) Hyperlipidemia   (+) Hypertension   (+) Peripheral vascular disease (CMS-HCC) (Recent cancer involving Left iliac arteries, had aorto-iliac bypass performed 8/2024)      Pulmonary   (+) Acute pulmonary embolism without acute cor pulmonale (Multi) (Bilateral segmental Pe's with DVT after sigmoid colon resection (8/2024) -- no right heart failure noted)      Neuro  Hx Benign Paroxysmal Positional Vertigo   (+) Cubital tunnel syndrome   (+) Depression with anxiety      GI   (+) Malignant tumor of sigmoid colon (Multi) (Hx prior uterine CA in 1998, then development of Left urothelial CA of L ureter, involving L kidney/ L iliac arteries/ Sigmoid colon, had L nephroureterectomy/ aorto-iliac bypass/ partial sigmoid colon resection with ileostomy)      /Renal  Hx gross hematuria, cancer spreading to Left ureter, s/p cystoureterectomy;  Hx hypokalemia   (+) Acute UTI   (+) Chronic renal impairment, stage 3a (Multi)   (+) Hydronephrosis   (+) Malignant neoplasm of left kidney (Multi) (Hx prior uterine CA in 1998, then development of Left urothelial CA of L ureter, involving L kidney/ L iliac arteries/ Sigmoid colon, had L nephroureterectomy/ aorto-iliac bypass/ partial sigmoid colon resection with ileostomy)   (+) Renal calculi (Hx small Left calculi -- improved with L ureteral stent placement)      Liver   (+) Malignant tumor of sigmoid colon (Multi) (Hx prior uterine CA in 1998, then development of Left urothelial CA of L ureter, involving L kidney/ L iliac arteries/ Sigmoid colon, had L nephroureterectomy/ aorto-iliac bypass/ partial sigmoid colon resection with ileostomy)      Endocrine (within  normal limits)      Hematology   (+) Acute deep vein thrombosis (DVT) of calf muscle vein of right lower extremity (Multi)   (+) Anemia (Chronic mild anemia (Hgb ~10))   (+) History of blood transfusion (Hx anemia with chemo/XRT for cervical CA 1998, had PRBC then)      Musculoskeletal   (+) Chronic low back pain   (+) Primary localized osteoarthritis of right knee (Hx R hip pain, sacral pain)      HEENT   (+) Cataract present (Bilateral)   (+) Vision loss (Hx acquired involutional ptosis of B eyelids, s/p B eyelid surgery)      ID   (+) Acute UTI   (+) URI (upper respiratory infection)      Skin   (+) Rash (Hx sebaceous adenoma of face)      GYN   (+) History of hysterectomy (In 1999)   (+) Malignant neoplasm of cervix uteri (Hx in 2007, s/p chemo/XRT/hysterectomy)       Study Type: TRANSTHORACIC ECHO (TTE) COMPLETE   Study Date: 8/26/2024   1. The left ventricular systolic function is normal, with a visually estimated ejection fraction of 55-60%.   2. Spectral Doppler shows an impaired relaxation pattern of left ventricular diastolic filling.   3. There is no evidence of left ventricular hypertrophy.   4. There is normal right ventricular global systolic function.  The estimated pulmonary artery pressure is normal with the RVSP at 24.5 mmHg.    5. The left atrium is moderately dilated.      Clinical information reviewed:     Meds               NPO Detail:  No data recorded     Physical Exam    Airway  Mallampati: III  TM distance: >3 FB  Neck ROM: full     Cardiovascular - normal exam  Rhythm: regular  Rate: normal     Dental   (+) implants     Pulmonary - normal exam  Breath sounds clear to auscultation     Abdominal - normal exam  Abdomen: soft  Bowel sounds: normal     Other findings: Pt with many implants throughout mouth, all dentition solid/none loose per patient          Anesthesia Plan    History of general anesthesia?: yes  History of complications of general anesthesia?: no    ASA 3     general   (Plan  GETA/PIVx2)  The patient is not a current smoker.  Patient was previously instructed to abstain from smoking on day of procedure.  Patient did not smoke on day of procedure.  Education provided regarding risk of obstructive sleep apnea.  intravenous induction   Postoperative administration of opioids is intended.  Trial extubation is planned.  Anesthetic plan and risks discussed with patient.  Use of blood products discussed with patient who consented to blood products.    Plan discussed with CAA and attending.

## 2024-11-13 NOTE — OP NOTE
Closure Ileostomy (R) Operative Note     Date: 2024  OR Location: [facility] OR    Name: Terra Alexis, : 1950, Age: 74 y.o., MRN: [id number], Sex: female    Diagnosis  Pre-op Diagnosis      * Ileostomy in place (Multi) [Z93.2] Post-op Diagnosis     * Ileostomy in place (Multi) [Z93.2]     Procedures  Closure Ileostomy  40940 - MS CLOSURE ENTEROSTOMY LG/SMALL INTESTINE      Surgeons      * Bernard Denton - Primary    Resident/Fellow/Other Assistant:  Surgeons and Role:  * No surgeons found with a matching role *    Staff:   Circulator: Maranda  Scrub Person: Carlos  Scrub Person: Michele  Relief Circulator: Jennifer Culver Scrub: Ryan Culver Circulator: Anirudh    Anesthesia Staff: Anesthesiologist: Patrice Zee MD  Anesthesia Resident: Kimberly Lemus MD; Demar Fatima MD    Procedure Summary  Anesthesia: General  ASA: III  Estimated Blood Loss: 10mL  Intra-op Medications:   Administrations occurring from 1100 to 1325 on 24:   Medication Name Total Dose   sodium chloride 0.9 % irrigation solution 1,000 mL   metroNIDAZOLE (Flagyl) 500 mg in sodium chloride (iso)  mL 500 mg   ceFAZolin (Ancef) 1 g 2 g   dexAMETHasone (Decadron) injection 4 mg/mL 4 mg   fentaNYL (Sublimaze) injection 50 mcg/mL 100 mcg   HYDROmorphone (Dilaudid) injection 1 mg/mL 0.6 mg   lactated Ringer's infusion Cannot be calculated   lidocaine (cardiac) injection 2% prefilled syringe 100 mg   phenylephrine (Harrison-Synephrine) injection 440 mcg   propofol (Diprivan) injection 10 mg/mL 200 mg   rocuronium (ZeMuron) 50 mg/5 mL injection 70 mg              Anesthesia Record               Intraprocedure I/O Totals          Intake    lactated Ringer's 700.00 mL    Total Intake 700 mL       Output    Est. Blood Loss 10 mL    Total Output 10 mL       Net    Net Volume 690 mL          Specimen:   ID Type Source Tests Collected by Time   1 : Ileostomy Tissue COLOSTOMY (STOMA) SURGICAL PATHOLOGY EXAM Bernard Denton MD MPH  11/13/2024 7867        Findings: Dense adhesions of the stoma to the fascia as well as intra-abdominal adhesions.  Extensive lysis of adhesions required for mobilization.    Indications: Terra Alexis is an 74 y.o. female who is having surgery for Ileostomy in place (Multi) [Z93.2]. She presents with a history of cervical cancer and a recent resection of a pelvic mass requiring segmental sigmoid colectomy and diverting loop ileostomy.  Contrast enema showed no stricturing or leakage from the colorectal anastomosis.  The patient has had DVTs and PEs in the interval from her prior surgery and has been on Eliquis.  She was cleared to stop the Eliquis for this procedure.    The patient was seen in the preoperative area. The risks, benefits, complications, treatment options, non-operative alternatives, expected recovery and outcomes were discussed with the patient. The possibilities of reaction to medication, pulmonary aspiration, injury to surrounding structures, bleeding, recurrent infection, the need for additional procedures, failure to diagnose a condition, and creating a complication requiring transfusion or operation were discussed with the patient. The patient concurred with the proposed plan, giving informed consent.  The site of surgery was properly noted/marked if necessary per policy. The patient has been actively warmed in preoperative area. Preoperative antibiotics have been ordered and given within 1 hours of incision. Venous thrombosis prophylaxis have been ordered including bilateral sequential compression devices and chemical prophylaxis    Procedure Details: The patient was brought to the OR and placed on the OR table in supine position. General anesthesia was induced and an LMA was placed without difficulty. The patient was positioned supine and the surgical sites were prepped and draped in sterile fashion.    An incision was made around the skin at the mucocutaneous junction taking a small rim of  skin.  Dissection carried through the dermis into the subcutaneous tissues.  The dissection then identified the bowel wall of the a ferret and E ferret limbs of the loop ileostomy.  This dissection carried through the subcutaneous tissues.  There was notable amounts of adhesions to the mesentery of the small bowel and dissection was performed carefully so as not to injure the bowel.  This required additional time especially at the level of the fascia where dense adhesions were formed.  These adhesions were taken down very carefully and 1 enterotomy at the E ferret loop was identified during this dissection.  The intra-abdominal adhesions once we were able to free up the stoma from the fascia were also notable.  Additional time was taken to mobilize and lyse these adhesions sharply or with cautery.  This was performed to be able to allow easy movement of the small intestine out through the incision.  Once the small intestine was mobile, additional interloop adhesions were lysed and the stoma was transected using a blue load LANA stapler.  This then allowed for ligation of the mesentery using 2-0 silk sutures.  The prior ileostomy was then sent for pathology review.  The limbs of the small intestine were then evaluated and a stapled end-to-end functional side-to-side anastomosis was performed ensuring apposition of the limbs and the antimesenteric fashion.  A stay stitch was placed at the crotch of the interloop staple fire which was performed with a blue load stapler.  There was no bleeding at the staple line.  A TA stapler was then used for the common channel.  This was then oversewn with a 3-0 silk suture.  The mesentery was then used to fold over the common channel staple line and was sutured into place in a Lembert type fashion.  The size of the common channel was adequate and the bowel was then placed back into the abdomen.  The fascia was then closed transversely using #1 nonlooped PDS suture in figure-of-eight  fashion.  Then the stoma site was irrigated copiously with saline and closed with a 3-0 Monocryl pursestring stitch.  A Betadine soaked Telfa wick was placed within an island dressing over top.    Complications:  None; patient tolerated the procedure well.    Disposition: PACU - hemodynamically stable.  Condition: stable         Attending Attestation: I was present for the entire procedure.    Bernard Denton  Phone Number: [phone]

## 2024-11-13 NOTE — ANESTHESIA PROCEDURE NOTES
Peripheral IV  Date/Time: 11/13/2024 11:20 AM  Inserted by: Patrice Zee MD    Placement  Needle size: 18 G  Laterality: left  Location: hand  Local anesthetic: none  Site prep: chlorhexidine  Technique: anatomical landmarks  Attempts: 1

## 2024-11-13 NOTE — ANESTHESIA PROCEDURE NOTES
Airway  Date/Time: 11/13/2024 11:24 AM  Urgency: elective    Airway not difficult    Staffing  Performed: resident   Authorized by: Patrice Zee MD    Performed by: Demar Fatima MD  Patient location during procedure: OR    Indications and Patient Condition  Indications for airway management: anesthesia and airway protection  Spontaneous Ventilation: absent  Sedation level: deep  Preoxygenated: yes  Patient position: sniffing  MILS maintained throughout  Mask difficulty assessment: 1 - vent by mask  Planned trial extubation    Final Airway Details  Final airway type: endotracheal airway      Successful airway: ETT  Cuffed: yes   Successful intubation technique: direct laryngoscopy  Facilitating devices/methods: intubating stylet  Endotracheal tube insertion site: oral  Blade: Ivis  Blade size: #3  ETT size (mm): 7.0  Cormack-Lehane Classification: grade I - full view of glottis  Placement verified by: chest auscultation and capnometry   Inital cuff pressure (cm H2O): 5  Measured from: lips  ETT to lips (cm): 22  Number of attempts at approach: 1  Ventilation between attempts: none  Number of other approaches attempted: 0

## 2024-11-13 NOTE — ANESTHESIA POSTPROCEDURE EVALUATION
Patient: Terra Alexis    Procedure Summary       Date: 11/13/24 Room / Location: [facility] OR 22 / [facility] [place]    Anesthesia Start: 1111 Anesthesia Stop: 1438    Procedure: Closure Ileostomy (Right) Diagnosis:       Ileostomy in place (Multi)      (Ileostomy in place (Multi) [Z93.2])    Surgeons: Bernard Denton MD MPH Responsible Provider: Patrice Zee MD    Anesthesia Type: general ASA Status: 3            Anesthesia Type: general    Vitals Value Taken Time   /54 11/13/24 1433   Temp 36 11/13/24 1438   Pulse 61 11/13/24 1436   Resp 14 11/13/24 1436   SpO2 100 % 11/13/24 1436   Vitals shown include unfiled device data.    Anesthesia Post Evaluation    Patient location during evaluation: PACU  Patient participation: complete - patient participated  Level of consciousness: awake  Pain management: adequate  Airway patency: patent  Cardiovascular status: acceptable  Respiratory status: acceptable  Hydration status: acceptable  Postoperative Nausea and Vomiting: none        No notable events documented.

## 2024-11-14 LAB
ALBUMIN SERPL BCP-MCNC: 3.7 G/DL (ref 3.4–5)
ANION GAP SERPL CALC-SCNC: 15 MMOL/L (ref 10–20)
BASOPHILS # BLD AUTO: 0.04 X10*3/UL (ref 0–0.1)
BASOPHILS NFR BLD AUTO: 0.4 %
BUN SERPL-MCNC: 17 MG/DL (ref 6–23)
CALCIUM SERPL-MCNC: 9.2 MG/DL (ref 8.6–10.6)
CHLORIDE SERPL-SCNC: 107 MMOL/L (ref 98–107)
CO2 SERPL-SCNC: 18 MMOL/L (ref 21–32)
CREAT SERPL-MCNC: 1.06 MG/DL (ref 0.5–1.05)
EGFRCR SERPLBLD CKD-EPI 2021: 56 ML/MIN/1.73M*2
EOSINOPHIL # BLD AUTO: 0.01 X10*3/UL (ref 0–0.4)
EOSINOPHIL NFR BLD AUTO: 0.1 %
ERYTHROCYTE [DISTWIDTH] IN BLOOD BY AUTOMATED COUNT: 15.4 % (ref 11.5–14.5)
GLUCOSE SERPL-MCNC: 86 MG/DL (ref 74–99)
HCT VFR BLD AUTO: 30.1 % (ref 36–46)
HGB BLD-MCNC: 8.8 G/DL (ref 12–16)
IMM GRANULOCYTES # BLD AUTO: 0.09 X10*3/UL (ref 0–0.5)
IMM GRANULOCYTES NFR BLD AUTO: 0.9 % (ref 0–0.9)
LYMPHOCYTES # BLD AUTO: 1.24 X10*3/UL (ref 0.8–3)
LYMPHOCYTES NFR BLD AUTO: 12.1 %
MAGNESIUM SERPL-MCNC: 1.93 MG/DL (ref 1.6–2.4)
MCH RBC QN AUTO: 30.2 PG (ref 26–34)
MCHC RBC AUTO-ENTMCNC: 29.2 G/DL (ref 32–36)
MCV RBC AUTO: 103 FL (ref 80–100)
MONOCYTES # BLD AUTO: 0.6 X10*3/UL (ref 0.05–0.8)
MONOCYTES NFR BLD AUTO: 5.9 %
NEUTROPHILS # BLD AUTO: 8.24 X10*3/UL (ref 1.6–5.5)
NEUTROPHILS NFR BLD AUTO: 80.6 %
NRBC BLD-RTO: 0 /100 WBCS (ref 0–0)
PHOSPHATE SERPL-MCNC: 4.2 MG/DL (ref 2.5–4.9)
PLATELET # BLD AUTO: 257 X10*3/UL (ref 150–450)
POTASSIUM SERPL-SCNC: 4.1 MMOL/L (ref 3.5–5.3)
RBC # BLD AUTO: 2.91 X10*6/UL (ref 4–5.2)
SODIUM SERPL-SCNC: 136 MMOL/L (ref 136–145)
WBC # BLD AUTO: 10.2 X10*3/UL (ref 4.4–11.3)

## 2024-11-14 PROCEDURE — 2500000001 HC RX 250 WO HCPCS SELF ADMINISTERED DRUGS (ALT 637 FOR MEDICARE OP)

## 2024-11-14 PROCEDURE — 2500000004 HC RX 250 GENERAL PHARMACY W/ HCPCS (ALT 636 FOR OP/ED)

## 2024-11-14 PROCEDURE — 36415 COLL VENOUS BLD VENIPUNCTURE: CPT

## 2024-11-14 PROCEDURE — 85025 COMPLETE CBC W/AUTO DIFF WBC: CPT

## 2024-11-14 PROCEDURE — 80069 RENAL FUNCTION PANEL: CPT

## 2024-11-14 PROCEDURE — 83735 ASSAY OF MAGNESIUM: CPT

## 2024-11-14 PROCEDURE — 1170000001 HC PRIVATE ONCOLOGY ROOM DAILY

## 2024-11-14 PROCEDURE — 97162 PT EVAL MOD COMPLEX 30 MIN: CPT | Mod: GP

## 2024-11-14 PROCEDURE — 96372 THER/PROPH/DIAG INJ SC/IM: CPT

## 2024-11-14 RX ORDER — ONDANSETRON HYDROCHLORIDE 2 MG/ML
4 INJECTION, SOLUTION INTRAVENOUS EVERY 8 HOURS PRN
Status: DISCONTINUED | OUTPATIENT
Start: 2024-11-14 | End: 2024-11-22 | Stop reason: HOSPADM

## 2024-11-14 RX ORDER — ACETAMINOPHEN 325 MG/1
650 TABLET ORAL EVERY 6 HOURS
Status: DISCONTINUED | OUTPATIENT
Start: 2024-11-14 | End: 2024-11-18

## 2024-11-14 RX ORDER — CYCLOBENZAPRINE HCL 10 MG
5 TABLET ORAL 3 TIMES DAILY PRN
Status: DISCONTINUED | OUTPATIENT
Start: 2024-11-14 | End: 2024-11-22 | Stop reason: HOSPADM

## 2024-11-14 RX ORDER — HEPARIN SODIUM 5000 [USP'U]/ML
5000 INJECTION, SOLUTION INTRAVENOUS; SUBCUTANEOUS 3 TIMES DAILY
Status: DISCONTINUED | OUTPATIENT
Start: 2024-11-14 | End: 2024-11-19

## 2024-11-14 RX ORDER — HYDROMORPHONE HYDROCHLORIDE 1 MG/ML
0.2 INJECTION, SOLUTION INTRAMUSCULAR; INTRAVENOUS; SUBCUTANEOUS EVERY 4 HOURS PRN
Status: DISCONTINUED | OUTPATIENT
Start: 2024-11-14 | End: 2024-11-18

## 2024-11-14 RX ORDER — TRAMADOL HYDROCHLORIDE 50 MG/1
50 TABLET ORAL EVERY 4 HOURS PRN
Status: DISCONTINUED | OUTPATIENT
Start: 2024-11-14 | End: 2024-11-18

## 2024-11-14 RX ORDER — ENOXAPARIN SODIUM 100 MG/ML
40 INJECTION SUBCUTANEOUS ONCE
Status: COMPLETED | OUTPATIENT
Start: 2024-11-14 | End: 2024-11-14

## 2024-11-14 RX ORDER — ONDANSETRON 4 MG/1
4 TABLET, ORALLY DISINTEGRATING ORAL EVERY 8 HOURS PRN
Status: DISCONTINUED | OUTPATIENT
Start: 2024-11-14 | End: 2024-11-18

## 2024-11-14 RX ORDER — PANTOPRAZOLE SODIUM 40 MG/1
40 TABLET, DELAYED RELEASE ORAL
Status: DISCONTINUED | OUTPATIENT
Start: 2024-11-15 | End: 2024-11-18

## 2024-11-14 RX ORDER — CALCIUM CARBONATE 200(500)MG
500 TABLET,CHEWABLE ORAL ONCE
Status: COMPLETED | OUTPATIENT
Start: 2024-11-14 | End: 2024-11-14

## 2024-11-14 RX ORDER — KETOROLAC TROMETHAMINE 15 MG/ML
15 INJECTION, SOLUTION INTRAMUSCULAR; INTRAVENOUS EVERY 6 HOURS
Status: DISPENSED | OUTPATIENT
Start: 2024-11-14 | End: 2024-11-16

## 2024-11-14 RX ORDER — OXYCODONE HYDROCHLORIDE 5 MG/1
5 TABLET ORAL EVERY 4 HOURS PRN
Status: DISCONTINUED | OUTPATIENT
Start: 2024-11-14 | End: 2024-11-18

## 2024-11-14 ASSESSMENT — COGNITIVE AND FUNCTIONAL STATUS - GENERAL
TURNING FROM BACK TO SIDE WHILE IN FLAT BAD: A LITTLE
TURNING FROM BACK TO SIDE WHILE IN FLAT BAD: A LITTLE
DRESSING REGULAR UPPER BODY CLOTHING: A LITTLE
MOBILITY SCORE: 22
MOVING TO AND FROM BED TO CHAIR: A LITTLE
DAILY ACTIVITIY SCORE: 21
MOBILITY SCORE: 18
CLIMB 3 TO 5 STEPS WITH RAILING: A LOT
WALKING IN HOSPITAL ROOM: A LITTLE
TOILETING: A LITTLE
DRESSING REGULAR LOWER BODY CLOTHING: A LITTLE
STANDING UP FROM CHAIR USING ARMS: A LITTLE
WALKING IN HOSPITAL ROOM: A LITTLE
HELP NEEDED FOR BATHING: A LITTLE
PERSONAL GROOMING: A LITTLE
STANDING UP FROM CHAIR USING ARMS: A LITTLE
MOVING FROM LYING ON BACK TO SITTING ON SIDE OF FLAT BED WITH BEDRAILS: A LITTLE
CLIMB 3 TO 5 STEPS WITH RAILING: A LITTLE
CLIMB 3 TO 5 STEPS WITH RAILING: A LITTLE
MOVING TO AND FROM BED TO CHAIR: A LITTLE
HELP NEEDED FOR BATHING: A LITTLE
MOBILITY SCORE: 17
DAILY ACTIVITIY SCORE: 19
TURNING FROM BACK TO SIDE WHILE IN FLAT BAD: A LITTLE
DRESSING REGULAR UPPER BODY CLOTHING: A LITTLE
DRESSING REGULAR LOWER BODY CLOTHING: A LITTLE
MOVING FROM LYING ON BACK TO SITTING ON SIDE OF FLAT BED WITH BEDRAILS: A LITTLE

## 2024-11-14 ASSESSMENT — PAIN DESCRIPTION - ORIENTATION: ORIENTATION: RIGHT

## 2024-11-14 ASSESSMENT — PAIN SCALES - GENERAL
PAINLEVEL_OUTOF10: 5 - MODERATE PAIN
PAINLEVEL_OUTOF10: 5 - MODERATE PAIN
PAINLEVEL_OUTOF10: 6
PAINLEVEL_OUTOF10: 6
PAINLEVEL_OUTOF10: 4
PAINLEVEL_OUTOF10: 5 - MODERATE PAIN

## 2024-11-14 ASSESSMENT — PAIN SCALES - PAIN ASSESSMENT IN ADVANCED DEMENTIA (PAINAD)
TOTALSCORE: 0
FACIALEXPRESSION: SMILING OR INEXPRESSIVE
BODYLANGUAGE: RELAXED
CONSOLABILITY: NO NEED TO CONSOLE
BREATHING: NORMAL

## 2024-11-14 ASSESSMENT — PAIN - FUNCTIONAL ASSESSMENT
PAIN_FUNCTIONAL_ASSESSMENT: 0-10

## 2024-11-14 ASSESSMENT — PAIN SCALES - WONG BAKER: WONGBAKER_NUMERICALRESPONSE: HURTS LITTLE BIT

## 2024-11-14 ASSESSMENT — PAIN DESCRIPTION - LOCATION: LOCATION: ABDOMEN

## 2024-11-14 ASSESSMENT — ACTIVITIES OF DAILY LIVING (ADL): ADL_ASSISTANCE: INDEPENDENT

## 2024-11-14 NOTE — PROGRESS NOTES
11/14/24 1200   Discharge Planning   Living Arrangements Spouse/significant other   Support Systems Spouse/significant other   Assistance Needed none   Type of Residence Private residence   Do you have animals or pets at home? No   Home or Post Acute Services None   Expected Discharge Disposition Home   Does the patient need discharge transport arranged? No     Terra Alexis is a 74 y.o. female with history of cervical cancer s/p JEMMA/BSO 1999, subsequent chemotherapy and radiation who recently underwent a nephroureterectomy, robot-assisted 8/2024 with involvement of sigmoid colon, s/p resection with DLI, who is now s/p ileostomy reversal with Dr. Denton on 11/13. ADOD 11/5. PT recommends HC, patient declined stating she does not need therapy and has assistance from her fierasmo at home.     Met with patient and Gregoria joseph, at bedside. Demographics and contacts verified. Patient lives at home with her fiance. She has a walker and cane at home. No home O2. Not currently active with home care. Discussed PT recommendation for in home therapy. Patient declines stating she does not need therapy. Medical team aware. Patients jake will transport her home at time of discharge. Insurance: Medicare. PCP: none. Pharmacy: [facility]. TCC/ to remain available during hospital stay for discharge planning/needs. Latesha Syed RN TCC

## 2024-11-14 NOTE — PROGRESS NOTES
Physical Therapy    Physical Therapy Evaluation    Patient Name: Terra Alexis  MRN: [id number]  Department: [facility]  Room: 60/6023-A  Today's Date: 11/14/2024   Time Calculation  Start Time: 1102  Stop Time: 1115  Time Calculation (min): 13 min    Assessment/Plan   PT Assessment  PT Assessment Results: Decreased endurance, Decreased mobility, Impaired balance  Rehab Prognosis: Good  Evaluation/Treatment Tolerance: Patient tolerated treatment well, Treatment limited secondary to agitation  Medical Staff Made Aware: Yes  Strengths: Ability to acquire knowledge  End of Session Communication: Bedside nurse  Assessment Comment: 74 y.o. female s/p ileostomy reversal. Pt independent at baseline with ADLs with occassional use of cane assistance when out in the community but no AD when ambulating around home. Lives with fiance who is available for assist. D/c rec low.  End of Session Patient Position: Bed, 3 rail up, Alarm off, not on at start of session  IP OR SWING BED PT PLAN  Inpatient or Swing Bed: Inpatient  PT Plan  Treatment/Interventions: Bed mobility, Transfer training, Gait training, Stair training, Therapeutic exercise, Therapeutic activity, Balance training  PT Plan: Ongoing PT  PT Frequency: 3 times per week  PT Discharge Recommendations: Low intensity level of continued care  PT Recommended Transfer Status: Stand by assist  PT - OK to Discharge: Yes (Pt eval completed; d/c rec assessed)    Subjective   General Visit Information:  General  Reason for Referral: s/p ileostomy reversal with Dr. Denton on 11/13  Past Medical History Relevant to Rehab: history of cervical cancer s/p JEMMA/BSO 1999, subsequent chemotherapy and radiation who recently underwent a nephroureterectomy, robot-assisted 8/2024 with involvement of sigmoid colon, s/p resection with DLI  Prior to Session Communication: Bedside nurse  Patient Position Received: Bed, 3 rail up, Alarm off, not on at start of session  General Comment: RN cleared  pt for therapy; pt reported receiving muscle relaxer before therapy arrived and being up to the bathroom to wash up this morning.  Home Living:  Home Living  Type of Home: House  Lives With: Significant other (Fiance)  Home Adaptive Equipment: Walker rolling or standard, Cane  Home Layout: Two level, 1/2 bath on main level, Bed/bath upstairs, Stairs to alternate level with rails  Alternate Level Stairs-Number of Steps: 12  Home Access: Stairs to enter without rails  Entrance Stairs-Number of Steps: 3  Bathroom Shower/Tub: Tub/shower unit  Bathroom Equipment: Grab bars in shower, Shower chair without back  Prior Level of Function:  Prior Function Per Pt/Caregiver Report  Level of [place]: Independent with ADLs and functional transfers  Receives Help From:  (Fiance and friend if needed)  ADL Assistance: Independent  Homemaking Assistance: Independent  Ambulatory Assistance:  (Reported intermittently using cane when going out of the house for things like grocery shopping)  Vocational: Retired  Prior Function Comments: +drives on occassion  Precautions:  Precautions  Medical Precautions: Fall precautions  Post-Surgical Precautions: Abdominal surgery precautions     Vital Signs (Past 2hrs)        Date/Time Vitals Session Patient Position Pulse Resp SpO2 BP MAP (mmHg)    11/14/24 1102 --  Sitting  --  --  90 %  --  --     11/14/24 1158 --  --  70  18  94 %  100/61  74                         Objective   Pain:  Pain Assessment  Pain Assessment: 0-10  0-10 (Numeric) Pain Score: 5 - Moderate pain  Pain Type: Surgical pain  Pain Location: Abdomen  Pain Interventions:  (Movement to EOB; PCA available)  Cognition:  Cognition  Overall Cognitive Status: Within Functional Limits  Orientation Level: Oriented X4    General Assessments:  Activity Tolerance  Endurance: Tolerates 10 - 20 min exercise with multiple rests  Early Mobility/Exercise Safety Screen: Proceed with mobilization - No exclusion criteria met  Activity  Tolerance Comments: Pt able to tolerate activity well limited due to symptoms from medication    Sensation  Sensation Comment: 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    Static Sitting Balance  Static Sitting-Balance Support: Feet supported  Static Sitting-Level of Assistance:  (SBA)  Static Sitting-Comment/Number of Minutes: 5 min    Static Standing Balance  Static Standing-Balance Support: No upper extremity supported  Static Standing-Level of Assistance:  (SBA)  Static Standing-Comment/Number of Minutes: 2 min  Functional Assessments:  Bed Mobility  Bed Mobility: Yes  Bed Mobility 1  Bed Mobility 1: Supine to sitting, Sitting to supine  Level of Assistance 1:  (SBA)  Bed Mobility Comments 1: Cue for hand placement; reminder for log roll transition    Transfers  Transfer: Yes  Transfer 1  Transfer From 1: Sit to, Stand to  Transfer to 1: Stand, Sit  Technique 1: Sit to stand, Stand to sit  Transfer Level of Assistance 1:  (SBA)  Trials/Comments 1: STS from EOB x1    Ambulation/Gait Training  Ambulation/Gait Training Performed: Yes  Ambulation/Gait Training 1  Surface 1: Level tile  Device 1: No device  Assistance 1:  (SBA)  Comments/Distance (ft) 1: 2 Lateral steps to left toward HOB  Extremity/Trunk Assessments:  RUE   RUE : Within Functional Limits  LUE   LUE: Within Functional Limits  RLE   RLE : Within Functional Limits  LLE   LLE : Within Functional Limits  Outcome Measures:  [facility] Basic Mobility  Turning from your back to your side while in a flat bed without using bedrails: A little  Moving from lying on your back to sitting on the side of a flat bed without using bedrails: A little  Moving to and from bed to chair (including a wheelchair): A little  Standing up from a chair using your arms (e.g. wheelchair or bedside chair): A little  To walk in hospital room: A little  Climbing 3-5 steps with railing: A lot  Basic Mobility - Total Score: 17    Encounter Problems        Encounter Problems (Active)       Mobility       LTG - Patient will navigate 12 steps with LRAD independently in order to navigate in home        Start:  11/14/24    Expected End:  11/28/24            STG - Patient will ambulate >150' with LRAD independently        Start:  11/14/24    Expected End:  11/28/24               PT Transfers       STG - Patient will perform bed mobility independently maintaining precautions        Start:  11/14/24    Expected End:  11/28/24            STG - Patient will transfer sit to and from stand independently with LRAD        Start:  11/14/24    Expected End:  11/28/24                   Education Documentation  Precautions, taught by RAMBO Becker at 11/14/2024 11:54 AM.  Learner: Patient  Readiness: Acceptance  Method: Explanation  Response: Verbalizes Understanding  Comment: Edu on roll of PT, log roll sequencing, abd precautions    Body Mechanics, taught by RAMBO Becker at 11/14/2024 11:54 AM.  Learner: Patient  Readiness: Acceptance  Method: Explanation  Response: Verbalizes Understanding  Comment: Edu on roll of PT, log roll sequencing, abd precautions    Mobility Training, taught by RAMBO Becker at 11/14/2024 11:54 AM.  Learner: Patient  Readiness: Acceptance  Method: Explanation  Response: Verbalizes Understanding  Comment: Edu on roll of PT, log roll sequencing, abd precautions    Education Comments  No comments found.

## 2024-11-14 NOTE — CARE PLAN
Problem: Pain - Adult  Goal: Verbalizes/displays adequate comfort level or baseline comfort level  Outcome: Progressing     Problem: Safety - Adult  Goal: Free from fall injury  Outcome: Progressing     Problem: Discharge Planning  Goal: Discharge to home or other facility with appropriate resources  Outcome: Progressing     Problem: Chronic Conditions and Co-morbidities  Goal: Patient's chronic conditions and co-morbidity symptoms are monitored and maintained or improved  Outcome: Progressing   The patient's goals for the shift include      The clinical goals for the shift include patient's pain will be <5/10 throughout shift.

## 2024-11-14 NOTE — SIGNIFICANT EVENT
Postoperative Eval    S: s/p ileostomy closure  Pt doing well. Denies n/v/f/c. Pain well controlled    O:  Vitals: all vitals wnl  Abd exam: wound with bandage with minimal shadowing. Pt nondistended, appropriately tender.    A/P:    Pt doing well postop, pain well controlled, making adequate urine. No changes to paln. Cnt PCA, cnt fluids. Adult diet Clear Liquid      -Dontrell Portillo md/korin pgy1

## 2024-11-14 NOTE — PROGRESS NOTES
Surgical Oncology Progress Note      11/14/24    Summary:  Terra Alexis is a 74 y.o. female with history of cervical cancer s/p JEMMA/BSO 1999, subsequent chemotherapy and radiation who recently underwent a nephroureterectomy, robot-assisted 8/2024 with involvement of sigmoid colon, s/p resection with DLI, who is now s/p ileostomy reversal with Dr. Denton on 11/13.    Subjective    Subjective:  No acute events overnight. Patient seen and evaluated this AM during team rounds. Pain much better controlled since PACU. Patient denies nausea or vomiting. No other concerns at this time. No flatus or BM yet.    Review of Systems:    A 12-point review of systems was performed, and was negative except as above.       Objective    Objective:  Vital signs:   Temp:  [35.7 °C (96.3 °F)-36 °C (96.8 °F)] 35.8 °C (96.4 °F)  Heart Rate:  [61-81] 71  Resp:  [10-20] 16  BP: ()/(46-70) 93/54    Physical Exam:  GEN: No acute distress. Alert, awake and conversive.  HEENT: Sclera anicteric. Moist mucous membranes.  RESP: Breathing non-labored, equal chest rise. On RA.  CV: Regular rate, normotensive  GI: Abdomen soft, nondistended, appropriately tender for postoperative course. Former ileostomy blowhole site covered, strikethrough of betadine.  : Voiding spontaneously.  MSK: No gross deformities. Moves all extremities spontaneously.  NEURO: Alert and oriented x3. No focal deficits.  PSYCH: Appropriate mood and affect.  SKIN:  Blowhole covered in island dressing.    I/O last 2 completed shifts:  In: 3044.5 (53.8 mL/kg) [P.O.:240; I.V.:2704.5 (47.8 mL/kg); IV Piggyback:100]  Out: 110 (1.9 mL/kg) [Urine:100 (0.1 mL/kg/hr); Blood:10]  Weight: 56.6 kg      Labs Past 18 Hours:  Recent Results (from the past 18 hours)   CBC and Auto Differential    Collection Time: 11/14/24  6:03 AM   Result Value Ref Range    WBC 10.2 4.4 - 11.3 x10*3/uL    nRBC 0.0 0.0 - 0.0 /100 WBCs    RBC 2.91 (L) 4.00 - 5.20 x10*6/uL    Hemoglobin 8.8 (L)  12.0 - 16.0 g/dL    Hematocrit 30.1 (L) 36.0 - 46.0 %     (H) 80 - 100 fL    MCH 30.2 26.0 - 34.0 pg    MCHC 29.2 (L) 32.0 - 36.0 g/dL    RDW 15.4 (H) 11.5 - 14.5 %    Platelets 257 150 - 450 x10*3/uL    Neutrophils % 80.6 40.0 - 80.0 %    Immature Granulocytes %, Automated 0.9 0.0 - 0.9 %    Lymphocytes % 12.1 13.0 - 44.0 %    Monocytes % 5.9 2.0 - 10.0 %    Eosinophils % 0.1 0.0 - 6.0 %    Basophils % 0.4 0.0 - 2.0 %    Neutrophils Absolute 8.24 (H) 1.60 - 5.50 x10*3/uL    Immature Granulocytes Absolute, Automated 0.09 0.00 - 0.50 x10*3/uL    Lymphocytes Absolute 1.24 0.80 - 3.00 x10*3/uL    Monocytes Absolute 0.60 0.05 - 0.80 x10*3/uL    Eosinophils Absolute 0.01 0.00 - 0.40 x10*3/uL    Basophils Absolute 0.04 0.00 - 0.10 x10*3/uL   Renal Function Panel    Collection Time: 11/14/24  6:03 AM   Result Value Ref Range    Glucose 86 74 - 99 mg/dL    Sodium 136 136 - 145 mmol/L    Potassium 4.1 3.5 - 5.3 mmol/L    Chloride 107 98 - 107 mmol/L    Bicarbonate 18 (L) 21 - 32 mmol/L    Anion Gap 15 10 - 20 mmol/L    Urea Nitrogen 17 6 - 23 mg/dL    Creatinine 1.06 (H) 0.50 - 1.05 mg/dL    eGFR 56 (L) >60 mL/min/1.73m*2    Calcium 9.2 8.6 - 10.6 mg/dL    Phosphorus 4.2 2.5 - 4.9 mg/dL    Albumin 3.7 3.4 - 5.0 g/dL   Magnesium    Collection Time: 11/14/24  6:03 AM   Result Value Ref Range    Magnesium 1.93 1.60 - 2.40 mg/dL      Meds:    Current Facility-Administered Medications:     acetaminophen (Ofirmev) injection 1,000 mg, 1,000 mg, intravenous, q6h AILEEN, Frandy Jeronimo MD, Stopped at 11/14/24 0359    enoxaparin (Lovenox) syringe 40 mg, 40 mg, subcutaneous, q24h, Frandy Jeronimo MD    hydromorphone PCA 0.5 mg/mL in NS opioid naive over 70 or at risk, , intravenous, Continuous, Frandy Jeronimo MD, New Syringe/Cartridge at 11/13/24 1509    methocarbamol (Robaxin) injection 1,000 mg, 1,000 mg, intravenous, q8h, Frandy Jeronimo MD, 1,000 mg at 11/14/24 0149    naloxone (Narcan) injection 0.2  mg, 0.2 mg, intravenous, PRN, Frandy Jeronimo MD    ondansetron ODT (Zofran-ODT) disintegrating tablet 4 mg, 4 mg, oral, q8h PRN **OR** ondansetron (Zofran) injection 4 mg, 4 mg, intravenous, q8h PRN, Frandy Jeronimo MD     Imaging:  No results found.    No pertinent imaging to review.    Medications reviewed.  Vital signs reviewed.  Labs reviewed.         Assessment/Plan    Assessment and Plan:  Terra Alexis is a 74 y.o. female with history of cervical cancer s/p JEMMA/BSO 1999, subsequent chemotherapy and radiation who recently underwent a nephroureterectomy, robot-assisted 8/2024 with involvement of sigmoid colon, s/p resection with DLI, who is now s/p ileostomy reversal with Dr. Denton on 11/13. Patient is in stable condition, appropriate for postoperative course.     Plan Today:   - Pain control with ofirmev, dilaudid PCA, robaxin. Transition to PO in the afternoon.  - IS, OOB  - No cardiac concerns at this time  - Soft diet, protonix. Dressings down POD2. Zofran PRN.  - Voiding spontaneously, HLIV.  - Home levothyroxine  - Lovenox prophylaxis. Holding home eliquis.    Hospital Day: 2     Dispo: Continue current level of care     Patient's exam, labs, and findings discussed with Dr. Denton, who agrees with the plan as described above.      Frandy Jeronimo MD  PGY-2 General Surgery  Surgical Oncology [id number]

## 2024-11-15 LAB
ALBUMIN SERPL BCP-MCNC: 3.3 G/DL (ref 3.4–5)
ANION GAP SERPL CALC-SCNC: 16 MMOL/L (ref 10–20)
BUN SERPL-MCNC: 24 MG/DL (ref 6–23)
CALCIUM SERPL-MCNC: 8.7 MG/DL (ref 8.6–10.6)
CHLORIDE SERPL-SCNC: 104 MMOL/L (ref 98–107)
CO2 SERPL-SCNC: 18 MMOL/L (ref 21–32)
CREAT SERPL-MCNC: 1.2 MG/DL (ref 0.5–1.05)
EGFRCR SERPLBLD CKD-EPI 2021: 48 ML/MIN/1.73M*2
ERYTHROCYTE [DISTWIDTH] IN BLOOD BY AUTOMATED COUNT: 15.2 % (ref 11.5–14.5)
GLUCOSE SERPL-MCNC: 114 MG/DL (ref 74–99)
HCT VFR BLD AUTO: 30.3 % (ref 36–46)
HGB BLD-MCNC: 9.5 G/DL (ref 12–16)
MAGNESIUM SERPL-MCNC: 1.73 MG/DL (ref 1.6–2.4)
MCH RBC QN AUTO: 30.3 PG (ref 26–34)
MCHC RBC AUTO-ENTMCNC: 31.4 G/DL (ref 32–36)
MCV RBC AUTO: 97 FL (ref 80–100)
NRBC BLD-RTO: 0 /100 WBCS (ref 0–0)
PHOSPHATE SERPL-MCNC: 2.2 MG/DL (ref 2.5–4.9)
PLATELET # BLD AUTO: 294 X10*3/UL (ref 150–450)
POTASSIUM SERPL-SCNC: 3.5 MMOL/L (ref 3.5–5.3)
RBC # BLD AUTO: 3.14 X10*6/UL (ref 4–5.2)
SODIUM SERPL-SCNC: 134 MMOL/L (ref 136–145)
WBC # BLD AUTO: 10.1 X10*3/UL (ref 4.4–11.3)

## 2024-11-15 PROCEDURE — 2500000004 HC RX 250 GENERAL PHARMACY W/ HCPCS (ALT 636 FOR OP/ED)

## 2024-11-15 PROCEDURE — 36415 COLL VENOUS BLD VENIPUNCTURE: CPT

## 2024-11-15 PROCEDURE — 2500000001 HC RX 250 WO HCPCS SELF ADMINISTERED DRUGS (ALT 637 FOR MEDICARE OP)

## 2024-11-15 PROCEDURE — 83735 ASSAY OF MAGNESIUM: CPT

## 2024-11-15 PROCEDURE — 99024 POSTOP FOLLOW-UP VISIT: CPT | Performed by: SURGERY

## 2024-11-15 PROCEDURE — 85027 COMPLETE CBC AUTOMATED: CPT

## 2024-11-15 PROCEDURE — 1170000001 HC PRIVATE ONCOLOGY ROOM DAILY

## 2024-11-15 PROCEDURE — 80069 RENAL FUNCTION PANEL: CPT

## 2024-11-15 RX ORDER — CALCIUM CARBONATE 200(500)MG
1000 TABLET,CHEWABLE ORAL ONCE
Status: COMPLETED | OUTPATIENT
Start: 2024-11-15 | End: 2024-11-15

## 2024-11-15 RX ORDER — SODIUM CHLORIDE, SODIUM LACTATE, POTASSIUM CHLORIDE, CALCIUM CHLORIDE 600; 310; 30; 20 MG/100ML; MG/100ML; MG/100ML; MG/100ML
50 INJECTION, SOLUTION INTRAVENOUS CONTINUOUS
Status: DISCONTINUED | OUTPATIENT
Start: 2024-11-15 | End: 2024-11-16

## 2024-11-15 SDOH — SOCIAL STABILITY: SOCIAL INSECURITY: ARE THERE ANY APPARENT SIGNS OF INJURIES/BEHAVIORS THAT COULD BE RELATED TO ABUSE/NEGLECT?: NO

## 2024-11-15 SDOH — SOCIAL STABILITY: SOCIAL INSECURITY: HAVE YOU HAD THOUGHTS OF HARMING ANYONE ELSE?: NO

## 2024-11-15 SDOH — SOCIAL STABILITY: SOCIAL INSECURITY: WITHIN THE LAST YEAR, HAVE YOU BEEN HUMILIATED OR EMOTIONALLY ABUSED IN OTHER WAYS BY YOUR PARTNER OR EX-PARTNER?: NO

## 2024-11-15 SDOH — SOCIAL STABILITY: SOCIAL INSECURITY: HAS ANYONE EVER THREATENED TO HURT YOUR FAMILY OR YOUR PETS?: NO

## 2024-11-15 SDOH — ECONOMIC STABILITY: FOOD INSECURITY: WITHIN THE PAST 12 MONTHS, YOU WORRIED THAT YOUR FOOD WOULD RUN OUT BEFORE YOU GOT THE MONEY TO BUY MORE.: NEVER TRUE

## 2024-11-15 SDOH — ECONOMIC STABILITY: INCOME INSECURITY: IN THE PAST 12 MONTHS HAS THE ELECTRIC, GAS, OIL, OR WATER COMPANY THREATENED TO SHUT OFF SERVICES IN YOUR HOME?: NO

## 2024-11-15 SDOH — SOCIAL STABILITY: SOCIAL INSECURITY
WITHIN THE LAST YEAR, HAVE YOU BEEN RAPED OR FORCED TO HAVE ANY KIND OF SEXUAL ACTIVITY BY YOUR PARTNER OR EX-PARTNER?: NO

## 2024-11-15 SDOH — SOCIAL STABILITY: SOCIAL INSECURITY: DOES ANYONE TRY TO KEEP YOU FROM HAVING/CONTACTING OTHER FRIENDS OR DOING THINGS OUTSIDE YOUR HOME?: NO

## 2024-11-15 SDOH — SOCIAL STABILITY: SOCIAL INSECURITY: ARE YOU OR HAVE YOU BEEN THREATENED OR ABUSED PHYSICALLY, EMOTIONALLY, OR SEXUALLY BY ANYONE?: NO

## 2024-11-15 SDOH — ECONOMIC STABILITY: HOUSING INSECURITY: IN THE LAST 12 MONTHS, WAS THERE A TIME WHEN YOU WERE NOT ABLE TO PAY THE MORTGAGE OR RENT ON TIME?: NO

## 2024-11-15 SDOH — SOCIAL STABILITY: SOCIAL INSECURITY: HAVE YOU HAD ANY THOUGHTS OF HARMING ANYONE ELSE?: NO

## 2024-11-15 SDOH — ECONOMIC STABILITY: HOUSING INSECURITY: AT ANY TIME IN THE PAST 12 MONTHS, WERE YOU HOMELESS OR LIVING IN A SHELTER (INCLUDING NOW)?: NO

## 2024-11-15 SDOH — SOCIAL STABILITY: SOCIAL INSECURITY: WITHIN THE LAST YEAR, HAVE YOU BEEN AFRAID OF YOUR PARTNER OR EX-PARTNER?: NO

## 2024-11-15 SDOH — SOCIAL STABILITY: SOCIAL INSECURITY: DO YOU FEEL UNSAFE GOING BACK TO THE PLACE WHERE YOU ARE LIVING?: NO

## 2024-11-15 SDOH — ECONOMIC STABILITY: FOOD INSECURITY: WITHIN THE PAST 12 MONTHS, THE FOOD YOU BOUGHT JUST DIDN'T LAST AND YOU DIDN'T HAVE MONEY TO GET MORE.: NEVER TRUE

## 2024-11-15 SDOH — SOCIAL STABILITY: SOCIAL INSECURITY: WERE YOU ABLE TO COMPLETE ALL THE BEHAVIORAL HEALTH SCREENINGS?: YES

## 2024-11-15 SDOH — SOCIAL STABILITY: SOCIAL INSECURITY: ABUSE: ADULT

## 2024-11-15 SDOH — SOCIAL STABILITY: SOCIAL INSECURITY
WITHIN THE LAST YEAR, HAVE YOU BEEN KICKED, HIT, SLAPPED, OR OTHERWISE PHYSICALLY HURT BY YOUR PARTNER OR EX-PARTNER?: NO

## 2024-11-15 SDOH — SOCIAL STABILITY: SOCIAL INSECURITY: DO YOU FEEL ANYONE HAS EXPLOITED OR TAKEN ADVANTAGE OF YOU FINANCIALLY OR OF YOUR PERSONAL PROPERTY?: NO

## 2024-11-15 SDOH — ECONOMIC STABILITY: HOUSING INSECURITY: IN THE PAST 12 MONTHS, HOW MANY TIMES HAVE YOU MOVED WHERE YOU WERE LIVING?: 1

## 2024-11-15 SDOH — ECONOMIC STABILITY: TRANSPORTATION INSECURITY: IN THE PAST 12 MONTHS, HAS LACK OF TRANSPORTATION KEPT YOU FROM MEDICAL APPOINTMENTS OR FROM GETTING MEDICATIONS?: NO

## 2024-11-15 SDOH — ECONOMIC STABILITY: FOOD INSECURITY: HOW HARD IS IT FOR YOU TO PAY FOR THE VERY BASICS LIKE FOOD, HOUSING, MEDICAL CARE, AND HEATING?: NOT HARD AT ALL

## 2024-11-15 ASSESSMENT — COGNITIVE AND FUNCTIONAL STATUS - GENERAL
DRESSING REGULAR UPPER BODY CLOTHING: A LITTLE
STANDING UP FROM CHAIR USING ARMS: A LITTLE
TURNING FROM BACK TO SIDE WHILE IN FLAT BAD: A LITTLE
CLIMB 3 TO 5 STEPS WITH RAILING: A LITTLE
DRESSING REGULAR LOWER BODY CLOTHING: A LITTLE
MOVING TO AND FROM BED TO CHAIR: A LITTLE
DAILY ACTIVITIY SCORE: 18
PERSONAL GROOMING: A LITTLE
TURNING FROM BACK TO SIDE WHILE IN FLAT BAD: A LITTLE
TOILETING: A LITTLE
MOBILITY SCORE: 19
DAILY ACTIVITIY SCORE: 19
DRESSING REGULAR LOWER BODY CLOTHING: A LITTLE
EATING MEALS: A LITTLE
MOBILITY SCORE: 22
HELP NEEDED FOR BATHING: A LITTLE
PERSONAL GROOMING: A LITTLE
TOILETING: A LITTLE
PATIENT BASELINE BEDBOUND: NO
DRESSING REGULAR UPPER BODY CLOTHING: A LITTLE
HELP NEEDED FOR BATHING: A LITTLE
CLIMB 3 TO 5 STEPS WITH RAILING: A LITTLE
WALKING IN HOSPITAL ROOM: A LITTLE

## 2024-11-15 ASSESSMENT — PAIN SCALES - GENERAL
PAINLEVEL_OUTOF10: 7
PAINLEVEL_OUTOF10: 7
PAINLEVEL_OUTOF10: 6
PAINLEVEL_OUTOF10: 0 - NO PAIN
PAINLEVEL_OUTOF10: 6
PAINLEVEL_OUTOF10: 2

## 2024-11-15 ASSESSMENT — ACTIVITIES OF DAILY LIVING (ADL)
JUDGMENT_ADEQUATE_SAFELY_COMPLETE_DAILY_ACTIVITIES: YES
WALKS IN HOME: INDEPENDENT
TOILETING: INDEPENDENT
HEARING - LEFT EAR: FUNCTIONAL
BATHING: INDEPENDENT
FEEDING YOURSELF: INDEPENDENT
HEARING - RIGHT EAR: FUNCTIONAL
ASSISTIVE_DEVICE: WALKER
GROOMING: INDEPENDENT
DRESSING YOURSELF: INDEPENDENT
LACK_OF_TRANSPORTATION: NO
PATIENT'S MEMORY ADEQUATE TO SAFELY COMPLETE DAILY ACTIVITIES?: YES
ADEQUATE_TO_COMPLETE_ADL: YES
LACK_OF_TRANSPORTATION: NO

## 2024-11-15 ASSESSMENT — LIFESTYLE VARIABLES
SKIP TO QUESTIONS 9-10: 1
HOW OFTEN DO YOU HAVE 6 OR MORE DRINKS ON ONE OCCASION: NEVER
HOW OFTEN DO YOU HAVE A DRINK CONTAINING ALCOHOL: MONTHLY OR LESS
HOW MANY STANDARD DRINKS CONTAINING ALCOHOL DO YOU HAVE ON A TYPICAL DAY: 1 OR 2
AUDIT-C TOTAL SCORE: 1
AUDIT-C TOTAL SCORE: 1
SUBSTANCE_ABUSE_PAST_12_MONTHS: NO
PRESCIPTION_ABUSE_PAST_12_MONTHS: NO

## 2024-11-15 ASSESSMENT — COLUMBIA-SUICIDE SEVERITY RATING SCALE - C-SSRS
2. HAVE YOU ACTUALLY HAD ANY THOUGHTS OF KILLING YOURSELF?: NO
6. HAVE YOU EVER DONE ANYTHING, STARTED TO DO ANYTHING, OR PREPARED TO DO ANYTHING TO END YOUR LIFE?: NO
1. IN THE PAST MONTH, HAVE YOU WISHED YOU WERE DEAD OR WISHED YOU COULD GO TO SLEEP AND NOT WAKE UP?: NO

## 2024-11-15 ASSESSMENT — PATIENT HEALTH QUESTIONNAIRE - PHQ9
SUM OF ALL RESPONSES TO PHQ9 QUESTIONS 1 & 2: 0
1. LITTLE INTEREST OR PLEASURE IN DOING THINGS: NOT AT ALL
2. FEELING DOWN, DEPRESSED OR HOPELESS: NOT AT ALL

## 2024-11-15 ASSESSMENT — PAIN - FUNCTIONAL ASSESSMENT
PAIN_FUNCTIONAL_ASSESSMENT: 0-10

## 2024-11-15 ASSESSMENT — PAIN DESCRIPTION - LOCATION
LOCATION: ABDOMEN

## 2024-11-15 NOTE — CARE PLAN
The patient's goals for the shift include      The clinical goals for the shift include pt will remain HDS and free from injury      Problem: Pain - Adult  Goal: Verbalizes/displays adequate comfort level or baseline comfort level  Outcome: Progressing     Problem: Safety - Adult  Goal: Free from fall injury  Outcome: Progressing     Problem: Discharge Planning  Goal: Discharge to home or other facility with appropriate resources  Outcome: Progressing     Problem: Chronic Conditions and Co-morbidities  Goal: Patient's chronic conditions and co-morbidity symptoms are monitored and maintained or improved  Outcome: Progressing

## 2024-11-15 NOTE — PROGRESS NOTES
Surgical Oncology Progress Note      11/15/24    Summary:  Terra Alexis is a 74 y.o. female with history of cervical cancer s/p JEMMA/BSO 1999, subsequent chemotherapy and radiation who recently underwent a nephroureterectomy, robot-assisted 8/2024 with involvement of sigmoid colon, s/p resection with DLI, who is now s/p ileostomy reversal with Dr. Denton on 11/13.    Subjective    Subjective:  Overnight, patient endorsed some heartburn and belching, cumulating in an episode of emesis this AM. Patient denies any worsening of abdominal pain, however endorses bloating and nausea. Does not feel as though she can eat at this time. Denies flatus or BM.    Review of Systems:    A 12-point review of systems was performed, and was negative except as above.       Objective    Objective:  Vital signs:   Temp:  [35.7 °C (96.3 °F)-36.7 °C (98.1 °F)] 36.7 °C (98.1 °F)  Heart Rate:  [70-92] 90  Resp:  [17-18] 17  BP: (100-132)/(61-80) 102/61    Physical Exam:  GEN: No acute distress. Alert, awake and conversive.  HEENT: Sclera anicteric. Moist mucous membranes.  RESP: Breathing non-labored, equal chest rise. On RA.  CV: Regular rate, normotensive  GI: Abdomen soft, mildly distended, appropriately tender for postoperative course. Former ileostomy blowhole site with healthy base of granulation tissue.  : Voiding spontaneously.  MSK: No gross deformities. Moves all extremities spontaneously.  NEURO: Alert and oriented x3. No focal deficits.  PSYCH: Appropriate mood and affect.  SKIN: Blowhole at former ileostomy site.    I/O last 2 completed shifts:  In: 300 (5.3 mL/kg) [P.O.:300]  Out: 400 (7.1 mL/kg) [Urine:400 (0.3 mL/kg/hr)]  Weight: 56.6 kg      Labs Past 18 Hours:  Recent Results (from the past 18 hours)   CBC    Collection Time: 11/15/24  6:17 AM   Result Value Ref Range    WBC 10.1 4.4 - 11.3 x10*3/uL    nRBC 0.0 0.0 - 0.0 /100 WBCs    RBC 3.14 (L) 4.00 - 5.20 x10*6/uL    Hemoglobin 9.5 (L) 12.0 - 16.0 g/dL     Hematocrit 30.3 (L) 36.0 - 46.0 %    MCV 97 80 - 100 fL    MCH 30.3 26.0 - 34.0 pg    MCHC 31.4 (L) 32.0 - 36.0 g/dL    RDW 15.2 (H) 11.5 - 14.5 %    Platelets 294 150 - 450 x10*3/uL      Meds:    Current Facility-Administered Medications:     acetaminophen (Tylenol) tablet 650 mg, 650 mg, oral, q6h, 650 mg at 11/15/24 0335 **AND** oxyCODONE (Roxicodone) immediate release tablet 5 mg, 5 mg, oral, q4h PRN **AND** traMADol (Ultram) tablet 50 mg, 50 mg, oral, q4h PRN **AND** HYDROmorphone (Dilaudid) injection 0.2 mg, 0.2 mg, intravenous, q4h PRN, Frandy Jeronimo MD    calcium carbonate (Tums) chewable tablet 1,000 mg, 1,000 mg, oral, Once, Frandy Jeronimo MD    cyclobenzaprine (Flexeril) tablet 5 mg, 5 mg, oral, TID PRN, Frandy Jeronimo MD    heparin (porcine) injection 5,000 Units, 5,000 Units, subcutaneous, TID, Frandy Jeronimo MD, 5,000 Units at 11/14/24 2114    ketorolac (Toradol) injection 15 mg, 15 mg, intravenous, q6h, Frandy Jeronimo MD, 15 mg at 11/14/24 2149    lactated Ringer's infusion, 50 mL/hr, intravenous, Continuous, Frandy Jeronimo MD    naloxone (Narcan) injection 0.2 mg, 0.2 mg, intravenous, PRN, Frandy Jeronimo MD    ondansetron ODT (Zofran-ODT) disintegrating tablet 4 mg, 4 mg, oral, q8h PRN **OR** ondansetron (Zofran) injection 4 mg, 4 mg, intravenous, q8h PRN, Frandy Jeronimo MD, 4 mg at 11/15/24 0557    pantoprazole (ProtoNix) EC tablet 40 mg, 40 mg, oral, Daily before breakfast, Frandy Jeronimo MD     Imaging:  No results found.    No pertinent imaging to review.    Medications reviewed.  Vital signs reviewed.  Labs reviewed.         Assessment/Plan    Assessment and Plan:  Terra Alexis is a 74 y.o. female with history of cervical cancer s/p JEMMA/BSO 1999, subsequent chemotherapy and radiation who recently underwent a nephroureterectomy, robot-assisted 8/2024 with involvement of sigmoid colon, s/p resection with DLI, who is now s/p  ileostomy reversal with Dr. Denton on 11/13. Patient is in stable condition, appropriate for postoperative course. Concern for development of ileus.    Plan Today:   - Pain control with PO tylenol/oxy/tramadol/flexeril  - IS, OOB  - No cardiac concerns at this time  - Soft diet, protonix. Dressings down. Zofran PRN. Encouraged patient to self-regulate d/t symptoms of ileus.  - Voiding spontaneously. Started LR @ 50 given poor PO intake.  - Home levothyroxine  - SQH prophylaxis. Holding home eliquis.    Hospital Day: 3     Dispo: Continue current level of care     Patient's exam, labs, and findings discussed with Dr. Denton, who agrees with the plan as described above.      Frandy Jeronimo MD  PGY-2 General Surgery  Surgical Oncology [id number]

## 2024-11-16 ENCOUNTER — APPOINTMENT (OUTPATIENT)
Dept: RADIOLOGY | Facility: HOSPITAL | Age: 74
DRG: 330 | End: 2024-11-16
Payer: MEDICARE

## 2024-11-16 LAB
ALBUMIN SERPL BCP-MCNC: 2.9 G/DL (ref 3.4–5)
ANION GAP SERPL CALC-SCNC: 12 MMOL/L (ref 10–20)
BUN SERPL-MCNC: 22 MG/DL (ref 6–23)
CALCIUM SERPL-MCNC: 9 MG/DL (ref 8.6–10.6)
CHLORIDE SERPL-SCNC: 104 MMOL/L (ref 98–107)
CO2 SERPL-SCNC: 22 MMOL/L (ref 21–32)
CREAT SERPL-MCNC: 1.06 MG/DL (ref 0.5–1.05)
EGFRCR SERPLBLD CKD-EPI 2021: 56 ML/MIN/1.73M*2
ERYTHROCYTE [DISTWIDTH] IN BLOOD BY AUTOMATED COUNT: 15.1 % (ref 11.5–14.5)
GLUCOSE SERPL-MCNC: 109 MG/DL (ref 74–99)
HCT VFR BLD AUTO: 28 % (ref 36–46)
HGB BLD-MCNC: 9 G/DL (ref 12–16)
MAGNESIUM SERPL-MCNC: 1.61 MG/DL (ref 1.6–2.4)
MCH RBC QN AUTO: 30.2 PG (ref 26–34)
MCHC RBC AUTO-ENTMCNC: 32.1 G/DL (ref 32–36)
MCV RBC AUTO: 94 FL (ref 80–100)
NRBC BLD-RTO: 0 /100 WBCS (ref 0–0)
PHOSPHATE SERPL-MCNC: 2.2 MG/DL (ref 2.5–4.9)
PLATELET # BLD AUTO: 317 X10*3/UL (ref 150–450)
POTASSIUM SERPL-SCNC: 3.8 MMOL/L (ref 3.5–5.3)
RBC # BLD AUTO: 2.98 X10*6/UL (ref 4–5.2)
SODIUM SERPL-SCNC: 134 MMOL/L (ref 136–145)
WBC # BLD AUTO: 5.2 X10*3/UL (ref 4.4–11.3)

## 2024-11-16 PROCEDURE — 85027 COMPLETE CBC AUTOMATED: CPT

## 2024-11-16 PROCEDURE — 80069 RENAL FUNCTION PANEL: CPT

## 2024-11-16 PROCEDURE — 2500000004 HC RX 250 GENERAL PHARMACY W/ HCPCS (ALT 636 FOR OP/ED)

## 2024-11-16 PROCEDURE — 71045 X-RAY EXAM CHEST 1 VIEW: CPT

## 2024-11-16 PROCEDURE — 71045 X-RAY EXAM CHEST 1 VIEW: CPT | Performed by: RADIOLOGY

## 2024-11-16 PROCEDURE — 1170000001 HC PRIVATE ONCOLOGY ROOM DAILY

## 2024-11-16 PROCEDURE — 36415 COLL VENOUS BLD VENIPUNCTURE: CPT

## 2024-11-16 PROCEDURE — 74018 RADEX ABDOMEN 1 VIEW: CPT

## 2024-11-16 PROCEDURE — 2500000001 HC RX 250 WO HCPCS SELF ADMINISTERED DRUGS (ALT 637 FOR MEDICARE OP)

## 2024-11-16 PROCEDURE — 83735 ASSAY OF MAGNESIUM: CPT

## 2024-11-16 PROCEDURE — 74018 RADEX ABDOMEN 1 VIEW: CPT | Performed by: RADIOLOGY

## 2024-11-16 RX ORDER — POTASSIUM CHLORIDE 14.9 MG/ML
20 INJECTION INTRAVENOUS
Status: COMPLETED | OUTPATIENT
Start: 2024-11-16 | End: 2024-11-16

## 2024-11-16 RX ORDER — MAGNESIUM SULFATE HEPTAHYDRATE 40 MG/ML
4 INJECTION, SOLUTION INTRAVENOUS ONCE
Status: COMPLETED | OUTPATIENT
Start: 2024-11-16 | End: 2024-11-16

## 2024-11-16 RX ORDER — CALCIUM CARBONATE 200(500)MG
500 TABLET,CHEWABLE ORAL ONCE
Status: DISCONTINUED | OUTPATIENT
Start: 2024-11-16 | End: 2024-11-22 | Stop reason: HOSPADM

## 2024-11-16 RX ORDER — SODIUM CHLORIDE, SODIUM LACTATE, POTASSIUM CHLORIDE, CALCIUM CHLORIDE 600; 310; 30; 20 MG/100ML; MG/100ML; MG/100ML; MG/100ML
75 INJECTION, SOLUTION INTRAVENOUS CONTINUOUS
Status: ACTIVE | OUTPATIENT
Start: 2024-11-16 | End: 2024-11-17

## 2024-11-16 ASSESSMENT — PAIN - FUNCTIONAL ASSESSMENT
PAIN_FUNCTIONAL_ASSESSMENT: 0-10

## 2024-11-16 ASSESSMENT — COGNITIVE AND FUNCTIONAL STATUS - GENERAL
STANDING UP FROM CHAIR USING ARMS: A LITTLE
DRESSING REGULAR UPPER BODY CLOTHING: A LITTLE
TOILETING: A LITTLE
MOVING TO AND FROM BED TO CHAIR: A LITTLE
CLIMB 3 TO 5 STEPS WITH RAILING: A LITTLE
STANDING UP FROM CHAIR USING ARMS: A LITTLE
HELP NEEDED FOR BATHING: A LITTLE
TURNING FROM BACK TO SIDE WHILE IN FLAT BAD: A LITTLE
DRESSING REGULAR LOWER BODY CLOTHING: A LITTLE
DAILY ACTIVITIY SCORE: 21
DAILY ACTIVITIY SCORE: 19
HELP NEEDED FOR BATHING: A LITTLE
DRESSING REGULAR UPPER BODY CLOTHING: A LITTLE
MOBILITY SCORE: 19
PERSONAL GROOMING: A LITTLE
DRESSING REGULAR LOWER BODY CLOTHING: A LITTLE
MOVING TO AND FROM BED TO CHAIR: A LITTLE
WALKING IN HOSPITAL ROOM: A LITTLE
TURNING FROM BACK TO SIDE WHILE IN FLAT BAD: A LITTLE
MOBILITY SCORE: 19
WALKING IN HOSPITAL ROOM: A LITTLE
CLIMB 3 TO 5 STEPS WITH RAILING: A LITTLE

## 2024-11-16 ASSESSMENT — PAIN SCALES - GENERAL
PAINLEVEL_OUTOF10: 0 - NO PAIN
PAINLEVEL_OUTOF10: 0 - NO PAIN
PAINLEVEL_OUTOF10: 6
PAINLEVEL_OUTOF10: 2
PAINLEVEL_OUTOF10: 6
PAINLEVEL_OUTOF10: 0 - NO PAIN
PAINLEVEL_OUTOF10: 10 - WORST POSSIBLE PAIN

## 2024-11-16 ASSESSMENT — PAIN DESCRIPTION - DESCRIPTORS: DESCRIPTORS: BURNING;ACHING

## 2024-11-16 NOTE — CARE PLAN
The patient's goals for the shift include      The clinical goals for the shift include patient's pain jurgen be <5/10 throughout shift.      Problem: Pain - Adult  Goal: Verbalizes/displays adequate comfort level or baseline comfort level  Outcome: Progressing     Problem: Safety - Adult  Goal: Free from fall injury  Outcome: Progressing     Problem: Discharge Planning  Goal: Discharge to home or other facility with appropriate resources  Outcome: Progressing     Problem: Chronic Conditions and Co-morbidities  Goal: Patient's chronic conditions and co-morbidity symptoms are monitored and maintained or improved  Outcome: Progressing

## 2024-11-16 NOTE — PROGRESS NOTES
Surgical Oncology Progress Note      11/16/24    Summary:  Terra Alexis is a 74 y.o. female with history of cervical cancer s/p JEMMA/BSO 1999, subsequent chemotherapy and radiation who recently underwent a nephroureterectomy, robot-assisted 8/2024 with involvement of sigmoid colon, s/p resection with DLI, who is now s/p ileostomy reversal with Dr. Denton on 11/13.   Subjective    Subjective:  Overnight, patient with multiple episodes of nausea, vomiting.  NGT placed with return of 400 Mg output. Patient seen and evaluated this AM during team rounds.  Patient patient with continued nausea, mild abdominal distention.         Objective    Objective:  Vital signs:   Temp:  [36.1 °C (97 °F)-36.6 °C (97.9 °F)] 36.4 °C (97.5 °F)  Heart Rate:  [] 88  Resp:  [18] 18  BP: (104-129)/(65-86) 104/65    Physical Exam:  GEN: No acute distress. Alert, awake and conversive.  HEENT: Sclera anicteric. Moist mucous membranes.  RESP: Breathing non-labored, equal chest rise. On RA.  CV: Regular rate, normotensive  GI: Abdomen soft, mildly distended, nontender.   : Voiding spontaneously.  MSK: No gross deformities. Moves all extremities spontaneously.  NEURO: Alert and oriented x3. No focal deficits.  PSYCH: Appropriate mood and affect.  SKIN: No rashes or lesions.    I/O last 2 completed shifts:  In: 1291 (22.8 mL/kg) [P.O.:200; I.V.:1081 (19.1 mL/kg); NG/GT:10]  Out: 1475 (26.1 mL/kg) [Urine:575 (0.4 mL/kg/hr); Emesis/NG output:900]  Weight: 56.6 kg      Labs Past 18 Hours:  Recent Results (from the past 18 hours)   Magnesium    Collection Time: 11/16/24  5:50 AM   Result Value Ref Range    Magnesium 1.61 1.60 - 2.40 mg/dL   Renal Function Panel    Collection Time: 11/16/24  5:50 AM   Result Value Ref Range    Glucose 109 (H) 74 - 99 mg/dL    Sodium 134 (L) 136 - 145 mmol/L    Potassium 3.8 3.5 - 5.3 mmol/L    Chloride 104 98 - 107 mmol/L    Bicarbonate 22 21 - 32 mmol/L    Anion Gap 12 10 - 20 mmol/L    Urea Nitrogen  22 6 - 23 mg/dL    Creatinine 1.06 (H) 0.50 - 1.05 mg/dL    eGFR 56 (L) >60 mL/min/1.73m*2    Calcium 9.0 8.6 - 10.6 mg/dL    Phosphorus 2.2 (L) 2.5 - 4.9 mg/dL    Albumin 2.9 (L) 3.4 - 5.0 g/dL   CBC    Collection Time: 11/16/24  5:50 AM   Result Value Ref Range    WBC 5.2 4.4 - 11.3 x10*3/uL    nRBC 0.0 0.0 - 0.0 /100 WBCs    RBC 2.98 (L) 4.00 - 5.20 x10*6/uL    Hemoglobin 9.0 (L) 12.0 - 16.0 g/dL    Hematocrit 28.0 (L) 36.0 - 46.0 %    MCV 94 80 - 100 fL    MCH 30.2 26.0 - 34.0 pg    MCHC 32.1 32.0 - 36.0 g/dL    RDW 15.1 (H) 11.5 - 14.5 %    Platelets 317 150 - 450 x10*3/uL      Meds:    Current Facility-Administered Medications:     acetaminophen (Tylenol) tablet 650 mg, 650 mg, oral, q6h, 650 mg at 11/15/24 2044 **AND** oxyCODONE (Roxicodone) immediate release tablet 5 mg, 5 mg, oral, q4h PRN, 5 mg at 11/15/24 1325 **AND** traMADol (Ultram) tablet 50 mg, 50 mg, oral, q4h PRN, 50 mg at 11/16/24 0105 **AND** HYDROmorphone (Dilaudid) injection 0.2 mg, 0.2 mg, intravenous, q4h PRN, Frandy Jeronimo MD    calcium carbonate (Tums) chewable tablet 500 mg, 500 mg, oral, Once, Dontrell Portillo MD    cyclobenzaprine (Flexeril) tablet 5 mg, 5 mg, oral, TID PRN, Frandy Jeronimo MD    heparin (porcine) injection 5,000 Units, 5,000 Units, subcutaneous, TID, Frandy Jeronimo MD, 5,000 Units at 11/16/24 0922    ketorolac (Toradol) injection 15 mg, 15 mg, intravenous, q6h, Frandy Jeronimo MD, 15 mg at 11/16/24 0922    lactated Ringer's infusion, 75 mL/hr, intravenous, Continuous, Dontrell Portillo MD, Last Rate: 75 mL/hr at 11/16/24 0923, 75 mL/hr at 11/16/24 0923    naloxone (Narcan) injection 0.2 mg, 0.2 mg, intravenous, PRN, Frandy Jeronimo MD    ondansetron ODT (Zofran-ODT) disintegrating tablet 4 mg, 4 mg, oral, q8h PRN **OR** ondansetron (Zofran) injection 4 mg, 4 mg, intravenous, q8h PRN, Frandy Jeronimo MD, 4 mg at 11/16/24 0025    pantoprazole (ProtoNix) EC tablet 40 mg, 40 mg, oral, Daily  before breakfast, Frandy Jeronimo MD, 40 mg at 11/15/24 0829     Imaging:  XR abdomen 1 view    Result Date: 11/16/2024  Interpreted By:  Raul Dong  and Cammy Joel STUDY: XR ABDOMEN 1 VIEW;  11/16/2024 8:07 am   INDICATION: Signs/Symptoms:ngt.   COMPARISON: 08/26/2024 abdominal radiograph and fluoroscopic images dated 11/04/2024   ACCESSION NUMBER(S): [id number]   ORDERING CLINICIAN: DEVON TAYLOR   FINDINGS: AP supine radiographs of the abdomen were provided.   Enteric tube in place with tip projecting over the gastric fundus.   Mild nonspecific gaseous distention of the loops of bowel within the visualized upper abdomen. Several surgical clips overlying the lower abdomen.   Small volume of contrast retained within the transverse colon and splenic flexure.   Limited evaluation of pneumoperitoneum on supine imaging, however no gross evidence of free air is noted.   Mild left basilar atelectasis or trace pleural effusion. Faint focal pulmonary opacity in the retrocardiac region. The visualized cardiomediastinal silhouette is normal in appearance.   No evidence of acute osseous abnormality.       1. Enteric tube tip projects over the gastric fundus. 2. overall nonobstructive bowel-gas pattern with a small volume of retained colonic contrast. 3. Blunting of the left costophrenic angle with faint focal pulmonary opacity in the retrocardiac region, which likely relates to atelectasis however infectious or inflammatory consolidation is not entirely excluded.   I personally reviewed the image(s)/study and resident interpretation as stated by Dr. Marycruz Chawla MD. I agree with the findings as stated. This study was interpreted at [facility], [place], [place].   MACRO: None   Signed by: Raul Yoon 11/16/2024 10:12 AM Dictation workstation:   [id number]    XR chest 1 view    Result Date: 11/16/2024  Interpreted By:  Raul Dong,  and  Cong Palmer STUDY: XR CHEST 1 VIEW;  11/16/2024 2:09 am   INDICATION: Signs/Symptoms:ngt placement.     COMPARISON: CT abdomen pelvis 09/02/2024   ACCESSION NUMBER(S): [id number]   ORDERING CLINICIAN: DEVON TAYLOR   FINDINGS: Enteric tube seen coursing below the level diaphragm with tip overlying the expected location of the stomach. Surgical clips overlie the lower abdomen.   Nonobstructive bowel gas pattern. Contrast opacifies multiple loops of small bowel and colon. There is free air in the abdomen more prominent in the right upper quadrant of the abdomen.   Visualized lungs are clear.   Osseous structures demonstrate no acute bony changes.       1.  Enteric tube with tip overlying the expected location of the stomach. 2. Nonobstructive bowel gas pattern. 3. Pneumoperitoneum, likely postsurgical in nature. Correlate with surgical history and concern for acute pathology in the abdomen.   I personally reviewed the images/study and I agree with Estela Gar DO's (radiology resident) findings as stated. This study was interpreted at [place], [place].   MACRO: None   Signed by: Raul Yoon 11/16/2024 10:12 AM Dictation workstation:   [id number]     No pertinent imaging to review.    Medications reviewed.  Vital signs reviewed.  Labs reviewed.         Assessment/Plan    Assessment and Plan:  Terra Alexis is a 74 y.o. female with history of cervical cancer s/p JEMMA/BSO 1999, subsequent chemotherapy and radiation who recently underwent a nephroureterectomy, robot-assisted 8/2024 with involvement of sigmoid colon, s/p resection with DLI, who is now s/p ileostomy reversal with Dr. Taylor on 11/13.     Overnight, patient with several episodes of emesis.  Patient made n.p.o., NGT placed with 400 Mg output.  Patient to remain n.p.o. with NGT in place today, KUB ordered.  Likely continuation of ileus, will continue to monitor with serial abdominal  exams today.    Plan Today:   - Pain control with PO tylenol/oxy/tramadol/flexeril  - IS, OOB  - No cardiac concerns at this time  -NPO, NGT, serial abdominal exams.  Continue protonix. Dressings down. Zofran PRN.   - Voiding spontaneously. Started LR @ 75 given poor PO intake.  - Home levothyroxine  - SQH prophylaxis. Holding home eliquis.      Patient's exam, labs, and findings discussed with Dr. Baca, who agrees with the plan as described above.      Lalito Keller MD  PGY-1 General Surgery  Surgical Oncology [id number]

## 2024-11-17 ENCOUNTER — APPOINTMENT (OUTPATIENT)
Dept: RADIOLOGY | Facility: HOSPITAL | Age: 74
DRG: 330 | End: 2024-11-17
Payer: MEDICARE

## 2024-11-17 LAB
ALBUMIN SERPL BCP-MCNC: 2.8 G/DL (ref 3.4–5)
ANION GAP SERPL CALC-SCNC: 13 MMOL/L (ref 10–20)
BUN SERPL-MCNC: 20 MG/DL (ref 6–23)
CALCIUM SERPL-MCNC: 8.4 MG/DL (ref 8.6–10.6)
CHLORIDE SERPL-SCNC: 104 MMOL/L (ref 98–107)
CO2 SERPL-SCNC: 22 MMOL/L (ref 21–32)
CREAT SERPL-MCNC: 0.94 MG/DL (ref 0.5–1.05)
EGFRCR SERPLBLD CKD-EPI 2021: 64 ML/MIN/1.73M*2
ERYTHROCYTE [DISTWIDTH] IN BLOOD BY AUTOMATED COUNT: 15.3 % (ref 11.5–14.5)
GLUCOSE BLD MANUAL STRIP-MCNC: 73 MG/DL (ref 74–99)
GLUCOSE SERPL-MCNC: 81 MG/DL (ref 74–99)
HCT VFR BLD AUTO: 23.9 % (ref 36–46)
HGB BLD-MCNC: 7.6 G/DL (ref 12–16)
MAGNESIUM SERPL-MCNC: 2.39 MG/DL (ref 1.6–2.4)
MCH RBC QN AUTO: 30.2 PG (ref 26–34)
MCHC RBC AUTO-ENTMCNC: 31.8 G/DL (ref 32–36)
MCV RBC AUTO: 95 FL (ref 80–100)
NRBC BLD-RTO: 0 /100 WBCS (ref 0–0)
PHOSPHATE SERPL-MCNC: 2 MG/DL (ref 2.5–4.9)
PLATELET # BLD AUTO: 280 X10*3/UL (ref 150–450)
POTASSIUM SERPL-SCNC: 3.9 MMOL/L (ref 3.5–5.3)
RBC # BLD AUTO: 2.52 X10*6/UL (ref 4–5.2)
SODIUM SERPL-SCNC: 135 MMOL/L (ref 136–145)
WBC # BLD AUTO: 3 X10*3/UL (ref 4.4–11.3)

## 2024-11-17 PROCEDURE — 82947 ASSAY GLUCOSE BLOOD QUANT: CPT

## 2024-11-17 PROCEDURE — 2500000001 HC RX 250 WO HCPCS SELF ADMINISTERED DRUGS (ALT 637 FOR MEDICARE OP)

## 2024-11-17 PROCEDURE — 74177 CT ABD & PELVIS W/CONTRAST: CPT | Performed by: RADIOLOGY

## 2024-11-17 PROCEDURE — 2550000001 HC RX 255 CONTRASTS: Performed by: SURGERY

## 2024-11-17 PROCEDURE — 80069 RENAL FUNCTION PANEL: CPT | Performed by: STUDENT IN AN ORGANIZED HEALTH CARE EDUCATION/TRAINING PROGRAM

## 2024-11-17 PROCEDURE — 74177 CT ABD & PELVIS W/CONTRAST: CPT

## 2024-11-17 PROCEDURE — 2500000004 HC RX 250 GENERAL PHARMACY W/ HCPCS (ALT 636 FOR OP/ED)

## 2024-11-17 PROCEDURE — 36415 COLL VENOUS BLD VENIPUNCTURE: CPT | Performed by: STUDENT IN AN ORGANIZED HEALTH CARE EDUCATION/TRAINING PROGRAM

## 2024-11-17 PROCEDURE — 85027 COMPLETE CBC AUTOMATED: CPT | Performed by: STUDENT IN AN ORGANIZED HEALTH CARE EDUCATION/TRAINING PROGRAM

## 2024-11-17 PROCEDURE — 83735 ASSAY OF MAGNESIUM: CPT | Performed by: STUDENT IN AN ORGANIZED HEALTH CARE EDUCATION/TRAINING PROGRAM

## 2024-11-17 PROCEDURE — 1170000001 HC PRIVATE ONCOLOGY ROOM DAILY

## 2024-11-17 RX ORDER — SODIUM CHLORIDE, SODIUM LACTATE, POTASSIUM CHLORIDE, CALCIUM CHLORIDE 600; 310; 30; 20 MG/100ML; MG/100ML; MG/100ML; MG/100ML
75 INJECTION, SOLUTION INTRAVENOUS CONTINUOUS
Status: DISCONTINUED | OUTPATIENT
Start: 2024-11-17 | End: 2024-11-18

## 2024-11-17 ASSESSMENT — COGNITIVE AND FUNCTIONAL STATUS - GENERAL
TURNING FROM BACK TO SIDE WHILE IN FLAT BAD: A LITTLE
TURNING FROM BACK TO SIDE WHILE IN FLAT BAD: A LITTLE
MOVING TO AND FROM BED TO CHAIR: A LITTLE
WALKING IN HOSPITAL ROOM: A LITTLE
DRESSING REGULAR UPPER BODY CLOTHING: A LITTLE
DRESSING REGULAR LOWER BODY CLOTHING: A LITTLE
HELP NEEDED FOR BATHING: A LITTLE
CLIMB 3 TO 5 STEPS WITH RAILING: A LITTLE
WALKING IN HOSPITAL ROOM: A LITTLE
DRESSING REGULAR LOWER BODY CLOTHING: A LITTLE
DRESSING REGULAR UPPER BODY CLOTHING: A LITTLE
STANDING UP FROM CHAIR USING ARMS: A LITTLE
CLIMB 3 TO 5 STEPS WITH RAILING: A LITTLE
MOBILITY SCORE: 18
MOVING TO AND FROM BED TO CHAIR: A LITTLE
TOILETING: A LITTLE
DAILY ACTIVITIY SCORE: 20
MOVING FROM LYING ON BACK TO SITTING ON SIDE OF FLAT BED WITH BEDRAILS: A LITTLE
MOBILITY SCORE: 18
STANDING UP FROM CHAIR USING ARMS: A LITTLE
DAILY ACTIVITIY SCORE: 20
HELP NEEDED FOR BATHING: A LITTLE
TOILETING: A LITTLE
MOVING FROM LYING ON BACK TO SITTING ON SIDE OF FLAT BED WITH BEDRAILS: A LITTLE

## 2024-11-17 ASSESSMENT — PAIN SCALES - GENERAL
PAINLEVEL_OUTOF10: 9
PAINLEVEL_OUTOF10: 8
PAINLEVEL_OUTOF10: 6
PAINLEVEL_OUTOF10: 6
PAINLEVEL_OUTOF10: 5 - MODERATE PAIN
PAINLEVEL_OUTOF10: 9
PAINLEVEL_OUTOF10: 9

## 2024-11-17 ASSESSMENT — PAIN DESCRIPTION - LOCATION
LOCATION: ABDOMEN
LOCATION: ABDOMEN

## 2024-11-17 ASSESSMENT — PAIN - FUNCTIONAL ASSESSMENT
PAIN_FUNCTIONAL_ASSESSMENT: 0-10

## 2024-11-17 NOTE — PROGRESS NOTES
Surgical Oncology Progress Note      11/17/24    Summary:  Terra Alexis is a 74 y.o. female with history of cervical cancer s/p JEMMA/BSO 1999, subsequent chemotherapy and radiation who recently underwent a nephroureterectomy, robot-assisted 8/2024 with involvement of sigmoid colon, s/p resection with DLI, who is now s/p ileostomy reversal with Dr. Denton on 11/13.   Subjective    Subjective:  No acute events overnight. Patient seen and evaluated this AM during team rounds.  Patient alert, conversational, however looks ill.  Patient with NGT in place with thick bilious drainage.         Objective    Objective:  Vital signs:   Temp:  [36 °C (96.8 °F)-36.3 °C (97.3 °F)] 36.3 °C (97.3 °F)  Heart Rate:  [70-85] 75  Resp:  [18] 18  BP: (103-127)/(61-67) 113/61    Physical Exam:  GEN: No acute distress. Alert, awake and conversive.  HEENT: Sclera anicteric. Moist mucous membranes.  RESP: Breathing non-labored, equal chest rise. On RA.  CV: Regular rate, normotensive  GI: Abdomen soft, mildly distended, nontender.   : Voiding spontaneously.  MSK: No gross deformities. Moves all extremities spontaneously.  NEURO: Alert and oriented x3. No focal deficits.  PSYCH: Appropriate mood and affect.  SKIN: No rashes or lesions.    I/O last 2 completed shifts:  In: 2113.8 (37.3 mL/kg) [I.V.:2013.8 (35.6 mL/kg); IV Piggyback:100]  Out: 1025 (18.1 mL/kg) [Urine:500 (0.4 mL/kg/hr); Emesis/NG output:525]  Weight: 56.6 kg      Labs Past 18 Hours:  Recent Results (from the past 18 hours)   CBC    Collection Time: 11/17/24  5:57 AM   Result Value Ref Range    WBC 3.0 (L) 4.4 - 11.3 x10*3/uL    nRBC 0.0 0.0 - 0.0 /100 WBCs    RBC 2.52 (L) 4.00 - 5.20 x10*6/uL    Hemoglobin 7.6 (L) 12.0 - 16.0 g/dL    Hematocrit 23.9 (L) 36.0 - 46.0 %    MCV 95 80 - 100 fL    MCH 30.2 26.0 - 34.0 pg    MCHC 31.8 (L) 32.0 - 36.0 g/dL    RDW 15.3 (H) 11.5 - 14.5 %    Platelets 280 150 - 450 x10*3/uL   Renal Function Panel    Collection Time: 11/17/24   5:57 AM   Result Value Ref Range    Glucose 81 74 - 99 mg/dL    Sodium 135 (L) 136 - 145 mmol/L    Potassium 3.9 3.5 - 5.3 mmol/L    Chloride 104 98 - 107 mmol/L    Bicarbonate 22 21 - 32 mmol/L    Anion Gap 13 10 - 20 mmol/L    Urea Nitrogen 20 6 - 23 mg/dL    Creatinine 0.94 0.50 - 1.05 mg/dL    eGFR 64 >60 mL/min/1.73m*2    Calcium 8.4 (L) 8.6 - 10.6 mg/dL    Phosphorus 2.0 (L) 2.5 - 4.9 mg/dL    Albumin 2.8 (L) 3.4 - 5.0 g/dL   Magnesium    Collection Time: 11/17/24  5:57 AM   Result Value Ref Range    Magnesium 2.39 1.60 - 2.40 mg/dL      Meds:    Current Facility-Administered Medications:     acetaminophen (Tylenol) tablet 650 mg, 650 mg, oral, q6h, 650 mg at 11/17/24 0803 **AND** oxyCODONE (Roxicodone) immediate release tablet 5 mg, 5 mg, oral, q4h PRN, 5 mg at 11/17/24 0803 **AND** traMADol (Ultram) tablet 50 mg, 50 mg, oral, q4h PRN, 50 mg at 11/17/24 0009 **AND** HYDROmorphone (Dilaudid) injection 0.2 mg, 0.2 mg, intravenous, q4h PRN, Frandy Jeronimo MD    calcium carbonate (Tums) chewable tablet 500 mg, 500 mg, oral, Once, Dontrell Portillo MD    cyclobenzaprine (Flexeril) tablet 5 mg, 5 mg, oral, TID PRN, Frandy Jeronimo MD    heparin (porcine) injection 5,000 Units, 5,000 Units, subcutaneous, TID, Frandy Jeronimo MD, 5,000 Units at 11/17/24 0804    iohexol (OMNIPaque) 12 mg iodine/mL oral contrast 500 mL, 500 mL, oral, Once in imaging, Lalito Keller MD    naloxone (Narcan) injection 0.2 mg, 0.2 mg, intravenous, PRN, Frandy Jeronimo MD    ondansetron ODT (Zofran-ODT) disintegrating tablet 4 mg, 4 mg, oral, q8h PRN **OR** ondansetron (Zofran) injection 4 mg, 4 mg, intravenous, q8h PRN, Frandy Jeronimo MD, 4 mg at 11/16/24 0025    pantoprazole (ProtoNix) EC tablet 40 mg, 40 mg, oral, Daily before breakfast, Frandy Jeronimo MD, 40 mg at 11/17/24 0803     Imaging:  XR abdomen 1 view    Result Date: 11/16/2024  Interpreted By:  Raul Dong,  aman Chawla  Marycruz STUDY: XR ABDOMEN 1 VIEW;  11/16/2024 8:07 am   INDICATION: Signs/Symptoms:ngt.   COMPARISON: 08/26/2024 abdominal radiograph and fluoroscopic images dated 11/04/2024   ACCESSION NUMBER(S): [id number]   ORDERING CLINICIAN: DEVON TAYLOR   FINDINGS: AP supine radiographs of the abdomen were provided.   Enteric tube in place with tip projecting over the gastric fundus.   Mild nonspecific gaseous distention of the loops of bowel within the visualized upper abdomen. Several surgical clips overlying the lower abdomen.   Small volume of contrast retained within the transverse colon and splenic flexure.   Limited evaluation of pneumoperitoneum on supine imaging, however no gross evidence of free air is noted.   Mild left basilar atelectasis or trace pleural effusion. Faint focal pulmonary opacity in the retrocardiac region. The visualized cardiomediastinal silhouette is normal in appearance.   No evidence of acute osseous abnormality.       1. Enteric tube tip projects over the gastric fundus. 2. overall nonobstructive bowel-gas pattern with a small volume of retained colonic contrast. 3. Blunting of the left costophrenic angle with faint focal pulmonary opacity in the retrocardiac region, which likely relates to atelectasis however infectious or inflammatory consolidation is not entirely excluded.   I personally reviewed the image(s)/study and resident interpretation as stated by Dr. Marycruz Chawla MD. I agree with the findings as stated. This study was interpreted at [facility], [place], [place].   MACRO: None   Signed by: Raul Yoon 11/16/2024 10:12 AM Dictation workstation:   [id number]    XR chest 1 view    Result Date: 11/16/2024  Interpreted By:  Raul Dong and Stephens Katherine STUDY: XR CHEST 1 VIEW;  11/16/2024 2:09 am   INDICATION: Signs/Symptoms:ngt placement.     COMPARISON: CT abdomen pelvis 09/02/2024   ACCESSION NUMBER(S): [id number]    ORDERING CLINICIAN: DEVON TAYLOR   FINDINGS: Enteric tube seen coursing below the level diaphragm with tip overlying the expected location of the stomach. Surgical clips overlie the lower abdomen.   Nonobstructive bowel gas pattern. Contrast opacifies multiple loops of small bowel and colon. There is free air in the abdomen more prominent in the right upper quadrant of the abdomen.   Visualized lungs are clear.   Osseous structures demonstrate no acute bony changes.       1.  Enteric tube with tip overlying the expected location of the stomach. 2. Nonobstructive bowel gas pattern. 3. Pneumoperitoneum, likely postsurgical in nature. Correlate with surgical history and concern for acute pathology in the abdomen.   I personally reviewed the images/study and I agree with Estela Gar DO's (radiology resident) findings as stated. This study was interpreted at [place], [place].   MACRO: None   Signed by: Raul Yoon 11/16/2024 10:12 AM Dictation workstation:   [id number]     I have reviewed the imaging above as it pertains to the patient's surgical concerns and agree with the radiologist's interpretation.    Medications reviewed.  Vital signs reviewed.  Labs reviewed.         Assessment/Plan    Assessment and Plan:  Terra Alexis is a 74 y.o. female with history of cervical cancer s/p JEMMA/BSO 1999, subsequent chemotherapy and radiation who recently underwent a nephroureterectomy, robot-assisted 8/2024 with involvement of sigmoid colon, s/p resection with DLI, who is now s/p ileostomy reversal with Dr. Taylor on 11/13.     Patient remains with NGT in place draining continuous thick bilious output.  Clinical picture most consistent with ileus, however patient ill-appearing on exam today.  Will obtain CTAP with IV and oral contrast to evaluate anastomotic site.  Plan Today:   - Will obtain CTAP with p.o. and IV contrast to evaluate anastomotic site.  -  Pain control with PO tylenol/oxy/tramadol/flexeril  - IS, OOB  - No cardiac concerns at this time  - NPO, NGT. Continue protonix.  Zofran PRN.   - Voiding spontaneously.  Continue LR 75 mL/h.  - Home levothyroxine  - SQH prophylaxis. Holding home eliquis.      Patient's exam, labs, and findings discussed with Dr. Baca, who agrees with the plan as described above.      Lalito Keller MD  PGY-1 General Surgery  Surgical Oncology [id number]

## 2024-11-17 NOTE — CARE PLAN
The patient's goals for the shift include      The clinical goals for the shift include pt will have decrease pain and nausea      Problem: Pain - Adult  Goal: Verbalizes/displays adequate comfort level or baseline comfort level  Outcome: Progressing     Problem: Safety - Adult  Goal: Free from fall injury  Outcome: Progressing     Problem: Discharge Planning  Goal: Discharge to home or other facility with appropriate resources  Outcome: Progressing     Problem: Chronic Conditions and Co-morbidities  Goal: Patient's chronic conditions and co-morbidity symptoms are monitored and maintained or improved  Outcome: Progressing

## 2024-11-18 ENCOUNTER — APPOINTMENT (OUTPATIENT)
Dept: RADIOLOGY | Facility: HOSPITAL | Age: 74
DRG: 330 | End: 2024-11-18
Payer: MEDICARE

## 2024-11-18 ENCOUNTER — APPOINTMENT (OUTPATIENT)
Dept: UROLOGY | Facility: CLINIC | Age: 74
End: 2024-11-18
Payer: MEDICARE

## 2024-11-18 ENCOUNTER — TELEPHONE (OUTPATIENT)
Dept: ADMISSION | Facility: HOSPITAL | Age: 74
End: 2024-11-18

## 2024-11-18 ENCOUNTER — APPOINTMENT (OUTPATIENT)
Dept: HEMATOLOGY/ONCOLOGY | Facility: CLINIC | Age: 74
End: 2024-11-18
Payer: MEDICARE

## 2024-11-18 VITALS
WEIGHT: 124.78 LBS | HEIGHT: 60 IN | OXYGEN SATURATION: 92 % | TEMPERATURE: 97.7 F | HEART RATE: 75 BPM | BODY MASS INDEX: 24.5 KG/M2 | SYSTOLIC BLOOD PRESSURE: 125 MMHG | DIASTOLIC BLOOD PRESSURE: 58 MMHG | RESPIRATION RATE: 18 BRPM

## 2024-11-18 LAB
ALBUMIN SERPL BCP-MCNC: 2.7 G/DL (ref 3.4–5)
ANION GAP SERPL CALC-SCNC: 15 MMOL/L (ref 10–20)
APTT PPP: 28 SECONDS (ref 27–38)
BUN SERPL-MCNC: 18 MG/DL (ref 6–23)
CALCIUM SERPL-MCNC: 8.1 MG/DL (ref 8.6–10.6)
CHLORIDE SERPL-SCNC: 103 MMOL/L (ref 98–107)
CO2 SERPL-SCNC: 21 MMOL/L (ref 21–32)
CREAT SERPL-MCNC: 1.01 MG/DL (ref 0.5–1.05)
EGFRCR SERPLBLD CKD-EPI 2021: 59 ML/MIN/1.73M*2
ERYTHROCYTE [DISTWIDTH] IN BLOOD BY AUTOMATED COUNT: 14.9 % (ref 11.5–14.5)
GLUCOSE SERPL-MCNC: 63 MG/DL (ref 74–99)
HCT VFR BLD AUTO: 22.2 % (ref 36–46)
HGB BLD-MCNC: 7.3 G/DL (ref 12–16)
MAGNESIUM SERPL-MCNC: 2 MG/DL (ref 1.6–2.4)
MCH RBC QN AUTO: 30.2 PG (ref 26–34)
MCHC RBC AUTO-ENTMCNC: 32.9 G/DL (ref 32–36)
MCV RBC AUTO: 92 FL (ref 80–100)
NRBC BLD-RTO: 0 /100 WBCS (ref 0–0)
PHOSPHATE SERPL-MCNC: 2.4 MG/DL (ref 2.5–4.9)
PLATELET # BLD AUTO: 247 X10*3/UL (ref 150–450)
POTASSIUM SERPL-SCNC: 3.8 MMOL/L (ref 3.5–5.3)
RBC # BLD AUTO: 2.42 X10*6/UL (ref 4–5.2)
SODIUM SERPL-SCNC: 135 MMOL/L (ref 136–145)
UFH PPP CHRO-ACNC: 0.5 IU/ML
UFH PPP CHRO-ACNC: 0.6 IU/ML
WBC # BLD AUTO: 4 X10*3/UL (ref 4.4–11.3)

## 2024-11-18 PROCEDURE — 85730 THROMBOPLASTIN TIME PARTIAL: CPT | Performed by: STUDENT IN AN ORGANIZED HEALTH CARE EDUCATION/TRAINING PROGRAM

## 2024-11-18 PROCEDURE — 36415 COLL VENOUS BLD VENIPUNCTURE: CPT | Performed by: STUDENT IN AN ORGANIZED HEALTH CARE EDUCATION/TRAINING PROGRAM

## 2024-11-18 PROCEDURE — 74018 RADEX ABDOMEN 1 VIEW: CPT

## 2024-11-18 PROCEDURE — 2500000004 HC RX 250 GENERAL PHARMACY W/ HCPCS (ALT 636 FOR OP/ED): Performed by: NURSE PRACTITIONER

## 2024-11-18 PROCEDURE — 74018 RADEX ABDOMEN 1 VIEW: CPT | Performed by: RADIOLOGY

## 2024-11-18 PROCEDURE — 2500000004 HC RX 250 GENERAL PHARMACY W/ HCPCS (ALT 636 FOR OP/ED): Performed by: STUDENT IN AN ORGANIZED HEALTH CARE EDUCATION/TRAINING PROGRAM

## 2024-11-18 PROCEDURE — 2500000001 HC RX 250 WO HCPCS SELF ADMINISTERED DRUGS (ALT 637 FOR MEDICARE OP)

## 2024-11-18 PROCEDURE — 80069 RENAL FUNCTION PANEL: CPT

## 2024-11-18 PROCEDURE — 83735 ASSAY OF MAGNESIUM: CPT | Performed by: STUDENT IN AN ORGANIZED HEALTH CARE EDUCATION/TRAINING PROGRAM

## 2024-11-18 PROCEDURE — 85027 COMPLETE CBC AUTOMATED: CPT

## 2024-11-18 PROCEDURE — 1170000001 HC PRIVATE ONCOLOGY ROOM DAILY

## 2024-11-18 PROCEDURE — 85520 HEPARIN ASSAY: CPT | Performed by: STUDENT IN AN ORGANIZED HEALTH CARE EDUCATION/TRAINING PROGRAM

## 2024-11-18 PROCEDURE — 36415 COLL VENOUS BLD VENIPUNCTURE: CPT

## 2024-11-18 PROCEDURE — 2500000004 HC RX 250 GENERAL PHARMACY W/ HCPCS (ALT 636 FOR OP/ED)

## 2024-11-18 PROCEDURE — 99024 POSTOP FOLLOW-UP VISIT: CPT | Performed by: SURGERY

## 2024-11-18 RX ORDER — PANTOPRAZOLE SODIUM 40 MG/10ML
40 INJECTION, POWDER, LYOPHILIZED, FOR SOLUTION INTRAVENOUS DAILY
Status: DISCONTINUED | OUTPATIENT
Start: 2024-11-18 | End: 2024-11-21

## 2024-11-18 RX ORDER — HYDROMORPHONE HYDROCHLORIDE 1 MG/ML
0.2 INJECTION, SOLUTION INTRAMUSCULAR; INTRAVENOUS; SUBCUTANEOUS EVERY 4 HOURS PRN
Status: DISCONTINUED | OUTPATIENT
Start: 2024-11-18 | End: 2024-11-20

## 2024-11-18 RX ORDER — SODIUM CHLORIDE, SODIUM LACTATE, POTASSIUM CHLORIDE, CALCIUM CHLORIDE 600; 310; 30; 20 MG/100ML; MG/100ML; MG/100ML; MG/100ML
75 INJECTION, SOLUTION INTRAVENOUS CONTINUOUS
Status: DISCONTINUED | OUTPATIENT
Start: 2024-11-18 | End: 2024-11-19

## 2024-11-18 RX ORDER — ACETAMINOPHEN 10 MG/ML
1000 INJECTION, SOLUTION INTRAVENOUS EVERY 8 HOURS
Status: DISCONTINUED | OUTPATIENT
Start: 2024-11-18 | End: 2024-11-20

## 2024-11-18 RX ORDER — HYDROMORPHONE HYDROCHLORIDE 1 MG/ML
0.4 INJECTION, SOLUTION INTRAMUSCULAR; INTRAVENOUS; SUBCUTANEOUS EVERY 4 HOURS PRN
Status: DISCONTINUED | OUTPATIENT
Start: 2024-11-18 | End: 2024-11-20

## 2024-11-18 RX ORDER — HEPARIN SODIUM 10000 [USP'U]/100ML
0-4000 INJECTION, SOLUTION INTRAVENOUS CONTINUOUS
Status: DISCONTINUED | OUTPATIENT
Start: 2024-11-18 | End: 2024-11-21

## 2024-11-18 RX ORDER — POTASSIUM CHLORIDE 14.9 MG/ML
20 INJECTION INTRAVENOUS
Status: DISCONTINUED | OUTPATIENT
Start: 2024-11-18 | End: 2024-11-18

## 2024-11-18 RX ORDER — SODIUM CHLORIDE, SODIUM LACTATE, POTASSIUM CHLORIDE, CALCIUM CHLORIDE 600; 310; 30; 20 MG/100ML; MG/100ML; MG/100ML; MG/100ML
75 INJECTION, SOLUTION INTRAVENOUS CONTINUOUS
Status: DISCONTINUED | OUTPATIENT
Start: 2024-11-18 | End: 2024-11-18

## 2024-11-18 ASSESSMENT — COGNITIVE AND FUNCTIONAL STATUS - GENERAL
CLIMB 3 TO 5 STEPS WITH RAILING: A LITTLE
STANDING UP FROM CHAIR USING ARMS: A LITTLE
MOVING TO AND FROM BED TO CHAIR: A LITTLE
WALKING IN HOSPITAL ROOM: A LITTLE
TURNING FROM BACK TO SIDE WHILE IN FLAT BAD: A LITTLE
MOVING TO AND FROM BED TO CHAIR: A LITTLE
DAILY ACTIVITIY SCORE: 20
DRESSING REGULAR LOWER BODY CLOTHING: A LITTLE
HELP NEEDED FOR BATHING: A LITTLE
WALKING IN HOSPITAL ROOM: A LITTLE
TOILETING: A LITTLE
DRESSING REGULAR LOWER BODY CLOTHING: A LITTLE
TURNING FROM BACK TO SIDE WHILE IN FLAT BAD: A LITTLE
STANDING UP FROM CHAIR USING ARMS: A LITTLE
MOBILITY SCORE: 18
MOVING FROM LYING ON BACK TO SITTING ON SIDE OF FLAT BED WITH BEDRAILS: A LITTLE
CLIMB 3 TO 5 STEPS WITH RAILING: A LITTLE
DRESSING REGULAR UPPER BODY CLOTHING: A LITTLE
MOVING FROM LYING ON BACK TO SITTING ON SIDE OF FLAT BED WITH BEDRAILS: A LITTLE
MOBILITY SCORE: 18
HELP NEEDED FOR BATHING: A LITTLE
DAILY ACTIVITIY SCORE: 20
DRESSING REGULAR UPPER BODY CLOTHING: A LITTLE
TOILETING: A LITTLE

## 2024-11-18 ASSESSMENT — PAIN SCALES - GENERAL
PAINLEVEL_OUTOF10: 7
PAINLEVEL_OUTOF10: 8
PAINLEVEL_OUTOF10: 3
PAINLEVEL_OUTOF10: 4
PAINLEVEL_OUTOF10: 2
PAINLEVEL_OUTOF10: 6
PAINLEVEL_OUTOF10: 7

## 2024-11-18 ASSESSMENT — PAIN - FUNCTIONAL ASSESSMENT
PAIN_FUNCTIONAL_ASSESSMENT: UNABLE TO SELF-REPORT
PAIN_FUNCTIONAL_ASSESSMENT: 0-10

## 2024-11-18 ASSESSMENT — PAIN DESCRIPTION - LOCATION: LOCATION: ABDOMEN

## 2024-11-18 NOTE — PROGRESS NOTES
Surgical Oncology Progress Note      11/18/24    Summary:  Terra Alexis is a 74 y.o. female with history of cervical cancer s/p JEMMA/BSO 1999, subsequent chemotherapy and radiation who recently underwent a nephroureterectomy, robot-assisted 8/2024 with involvement of sigmoid colon, s/p resection with DLI, who is now s/p ileostomy reversal with Dr. Denton on 11/13.   Subjective    Subjective:  No issues overnight. Patient with 925ml of NGT output. Passing flatus.      Objective    Objective:  Vital signs:   Temp:  [36.5 °C (97.7 °F)-36.6 °C (97.9 °F)] 36.6 °C (97.9 °F)  Heart Rate:  [74-81] 74  Resp:  [17-18] 17  BP: (109-125)/(58-69) 120/60    Physical Exam:  GEN: No acute distress. Alert, awake and conversive.  HEENT: Sclera anicteric. Moist mucous membranes.  RESP: Breathing non-labored, equal chest rise. On RA.  CV: Regular rate.  GI: Abdomen soft, mildly distended, appropriately tender.  MSK: No gross deformities. Moves all extremities spontaneously.  NEURO: Alert and oriented x3. No focal deficits.  PSYCH: Appropriate mood and affect.    I/O last 2 completed shifts:  In: 2421 (42.8 mL/kg) [P.O.:240; I.V.:1581 (27.9 mL/kg); NG/GT:500; IV Piggyback:100]  Out: 1475 (26.1 mL/kg) [Urine:300 (0.2 mL/kg/hr); Emesis/NG output:1175]  Weight: 56.6 kg      Labs Past 18 Hours:  Recent Results (from the past 18 hours)   POCT GLUCOSE    Collection Time: 11/17/24  7:13 PM   Result Value Ref Range    POCT Glucose 73 (L) 74 - 99 mg/dL   CBC    Collection Time: 11/18/24  5:55 AM   Result Value Ref Range    WBC 4.0 (L) 4.4 - 11.3 x10*3/uL    nRBC 0.0 0.0 - 0.0 /100 WBCs    RBC 2.42 (L) 4.00 - 5.20 x10*6/uL    Hemoglobin 7.3 (L) 12.0 - 16.0 g/dL    Hematocrit 22.2 (L) 36.0 - 46.0 %    MCV 92 80 - 100 fL    MCH 30.2 26.0 - 34.0 pg    MCHC 32.9 32.0 - 36.0 g/dL    RDW 14.9 (H) 11.5 - 14.5 %    Platelets 247 150 - 450 x10*3/uL      Meds:    Current Facility-Administered Medications:     acetaminophen (Tylenol) tablet 650  mg, 650 mg, oral, q6h, 650 mg at 11/18/24 0304 **AND** oxyCODONE (Roxicodone) immediate release tablet 5 mg, 5 mg, oral, q4h PRN, 5 mg at 11/18/24 0304 **AND** traMADol (Ultram) tablet 50 mg, 50 mg, oral, q4h PRN, 50 mg at 11/17/24 0009 **AND** HYDROmorphone (Dilaudid) injection 0.2 mg, 0.2 mg, intravenous, q4h PRN, Frandy Jeronimo MD    calcium carbonate (Tums) chewable tablet 500 mg, 500 mg, oral, Once, Dontrell Portillo MD    cyclobenzaprine (Flexeril) tablet 5 mg, 5 mg, oral, TID PRN, Frandy Jeronimo MD    heparin (porcine) injection 5,000 Units, 5,000 Units, subcutaneous, TID, Frandy Jeronimo MD, 5,000 Units at 11/17/24 2055    heparin 25,000 Units in dextrose 5% 250 mL (100 Units/mL) infusion (premix), 0-4,000 Units/hr, intravenous, Continuous, Luis Alcala MD    heparin bolus from bag 3,396 Units, 60 Units/kg, intravenous, Once, Luis Alcala MD    iohexol (OMNIPaque) 12 mg iodine/mL oral contrast 500 mL, 500 mL, oral, Once in imaging, Lalito Keller MD    lactated Ringer's infusion, 75 mL/hr, intravenous, Continuous, Lalito Keller MD, Last Rate: 75 mL/hr at 11/17/24 1606, 75 mL/hr at 11/17/24 1606    lactated Ringer's infusion, 75 mL/hr, intravenous, Continuous, Luis Alcala MD    naloxone (Narcan) injection 0.2 mg, 0.2 mg, intravenous, PRN, Frandy Jeronimo MD    ondansetron ODT (Zofran-ODT) disintegrating tablet 4 mg, 4 mg, oral, q8h PRN **OR** ondansetron (Zofran) injection 4 mg, 4 mg, intravenous, q8h PRN, Frandy Jeronimo MD, 4 mg at 11/16/24 0025    pantoprazole (ProtoNix) EC tablet 40 mg, 40 mg, oral, Daily before breakfast, Frandy Jeronimo MD, 40 mg at 11/17/24 0803    piperacillin-tazobactam (Zosyn) 3.375 g in dextrose (iso) IV 50 mL, 3.375 g, intravenous, q6h, Lalito Keller MD, Stopped at 11/18/24 0338     Imaging:  CT abdomen pelvis w IV and oral contrast    Result Date: 11/17/2024  Interpreted By:  Moris Watson,  and Cammy Joel  STUDY: CT ABDOMEN PELVIS W IV AND ORAL CONTRAST;  11/17/2024 2:54 pm   INDICATION: Signs/Symptoms:Tachycardia, nausea vomiting in setting of new ileocolonic anastomosis. Would like PO contrast to be at anastomosis for eval..   COMPARISON: CT abdomen pelvis 09/02/2024   ACCESSION NUMBER(S): [id number]   ORDERING CLINICIAN: DEVON TAYLOR   TECHNIQUE: Contiguous axial images of the abdomen and pelvis were obtained after the intravenous administration of 90 mL Omnipaque 350 contrast. Coronal and sagittal reformatted images were reconstructed from the axial data.   FINDINGS: LOWER CHEST: Small bilateral pleural effusions with overlying parenchymal consolidation. No focal airspace opacity or pneumothorax. The heart is nonenlarged with trace pericardial fluid. No significant abnormality of the chest wall.   LIVER: Few simple fluid and fat attenuating lesions throughout the liver are unchanged from the prior exam.   BILE DUCTS: No significant intrahepatic or extrahepatic dilatation.   GALLBLADDER: Small volume of pericholecystic fluid without significant gallbladder wall thickening or radiopaque cholelithiasis, similar in appearance when compared with the prior exam.   PANCREAS: No significant abnormality.   SPLEEN: No significant abnormality.   ADRENALS: No significant abnormality.   KIDNEYS, URETERS, BLADDER: Status post left nephrectomy. The right kidney is unremarkable. The urinary bladder is normal in appearance.   REPRODUCTIVE ORGANS: No significant abnormality.   GI: There is hyperdense enteric contrast material throughout the stomach, jejunum, and proximal ileum and throughout the colon to the level of the upper rectum, however is not visualized within the mid to distal or terminal ileum and does not reach the ileocolic anastomosis. Enteric tube in place terminating in the gastric body. The contrast in the colon and rectum is likely retained contrast from prior enema study.   There is a mild fluid-filled  distention of the small bowel to the level of the anastomosis with distal collapse of the terminal ileum as seen on series 201 images 83 and 96. There is no discrete fluid collection or external contrast around the anastomosis, however there is a punctate focus of gas localized near the suture line (series 201, image 82).   There is moderate wall thickening of the distal ileum just proximal to the anastomosis (series 201, image 78). Otherwise no significant inflammatory wall thickening of the small or large bowel.   Status post partial sigmoid resection with an anastomosis in the deep pelvis. Adjacent to the sigmoid anastomosis, there is a gas and likely stool containing collection measuring 4.8 x 1.0 cm which appears distinct from the rectosigmoid junction and apparently arises from the adjacent anastomosis, best visualized on series 201 images 112 and 111. This may represent a contained leak or blind-ending portion of a end-to-side anastomosis.   There is an additional loculated thick-walled collection in the presacral region measuring 4.0 x 5.2 cm, which was previously visualized but appears slightly smaller on the current exam, and may also arise from the adjacent rectosigmoid anastomosis (series 201, image 111).   There is no discrete contrast extravasation from the small bowel.   VESSELS: No significant abnormality. The portal veins and IVC are patent.   PERITONEUM/RETROPERITONEUM: Moderate volume of simple fluid ascites throughout the abdomen and pelvis. Small volume of pneumoperitoneum primarily overlying the anterior hepatic border. Collections as described above in GI section.   LYMPH NODES: No enlarged lymph nodes.   ABDOMINAL WALL: Mild diffuse body wall edema. Status post ileostomy reversal with stranding along the previous ostomy tract and a small volume of subcutaneous gas. Small skin surface defect with surrounding skin thickening of the right anterior abdominal wall. Linear ventral midline surgical  incision with subjacent granulation tissue and stranding. No subcutaneous collection is visualized.   OSSEOUS STRUCTURES: No acute osseous abnormality.       1. Status post ileostomy reversal and ileoileal anastomosis. Contrast is visualized throughout the proximal small bowel, however does not traverse the ileo ileal anastomosis. There is mild thickening of the distal ileum, just proximally to the anastomosis with a punctate focus of adjacent localized gas which may be postsurgical, however can not entirely exclude small leak. Distal to the anastomosis, there is collapse of the ileal loops described in detail above. 2. Contrast present throughout the colon which can be related to prior contrast study. There is a gas and stool containing collection anterior to the rectosigmoid anastomotic site with clear communication with the adjacent anastomosis. This may represent a blind-ending portion of end to side colorectal anastomosis or can represent a contained leak from perforation. Correlate with surgical details. 3. An additional loculated thick-walled collection in the presacral region is slightly smaller when compared with the prior exam, and may also communicate with and/or arise from the rectosigmoid anastomosis. 4. There is no discrete enteric contrast extravasation. 5. Moderate volume simple fluid ascites and small volume pneumoperitoneum over the anterior hepatic and omental borders, likely postsurgical. 6. Small bilateral pleural effusions with overlying pulmonary consolidation, likely atelectatic however superimposed infection is not excluded. 7. Mild body wall edema and postsurgical changes of the ventral midline and right anterior abdomen.   I personally reviewed the image(s)/study and resident interpretation as stated by Dr. Marycruz Chawla MD. I agree with the findings as stated. This study was interpreted at [facility], [place], [place].   MACRO: None   Signed by: Moris  Fito Cedenoleta 11/17/2024 6:32 PM Dictation workstation:   [id number]    XR abdomen 1 view    Result Date: 11/16/2024  Interpreted By:  Raul Dong  and Cammy Joel STUDY: XR ABDOMEN 1 VIEW;  11/16/2024 8:07 am   INDICATION: Signs/Symptoms:ngt.   COMPARISON: 08/26/2024 abdominal radiograph and fluoroscopic images dated 11/04/2024   ACCESSION NUMBER(S): [id number]   ORDERING CLINICIAN: DEVON TAYLOR   FINDINGS: AP supine radiographs of the abdomen were provided.   Enteric tube in place with tip projecting over the gastric fundus.   Mild nonspecific gaseous distention of the loops of bowel within the visualized upper abdomen. Several surgical clips overlying the lower abdomen.   Small volume of contrast retained within the transverse colon and splenic flexure.   Limited evaluation of pneumoperitoneum on supine imaging, however no gross evidence of free air is noted.   Mild left basilar atelectasis or trace pleural effusion. Faint focal pulmonary opacity in the retrocardiac region. The visualized cardiomediastinal silhouette is normal in appearance.   No evidence of acute osseous abnormality.       1. Enteric tube tip projects over the gastric fundus. 2. overall nonobstructive bowel-gas pattern with a small volume of retained colonic contrast. 3. Blunting of the left costophrenic angle with faint focal pulmonary opacity in the retrocardiac region, which likely relates to atelectasis however infectious or inflammatory consolidation is not entirely excluded.   I personally reviewed the image(s)/study and resident interpretation as stated by Dr. Marycruz Chawla MD. I agree with the findings as stated. This study was interpreted at [facility], [place], [place].   MACRO: None   Signed by: Raul Yoon 11/16/2024 10:12 AM Dictation workstation:   [id number]     No pertinent imaging to review.    Medications reviewed.  Vital signs reviewed.  Labs reviewed.          Assessment/Plan    Assessment and Plan:  Terra Alexis is a 74 y.o. female with history of cervical cancer s/p JEMMA/BSO 1999, subsequent chemotherapy and radiation who recently underwent a nephroureterectomy, robot-assisted 8/2024 with involvement of sigmoid colon, s/p resection with DLI, who is now s/p ileostomy reversal with Dr. Denton on 11/13.     Plan Today:   - Pain control with PO tylenol/oxy/tramadol/flexeril  - IS, OOB  - Continue Zosyn as empiric treatment for possible abscess at prior colonic anastomosis.  - NPO, NGT, serial abdominal exams.    - Continue protonix. Dressings down. Zofran PRN.   - Continue LR @ 75.  - Home levothyroxine.  - Start heparin gtt (holding home eliquis).      Patient's exam, labs, and findings discussed with Dr. Denton, who agrees with the plan as described above.      Luis Alcala MD  General Surgery  Surgical Oncology [phone]

## 2024-11-18 NOTE — TELEPHONE ENCOUNTER
Terra called and said she has been admitted since last week at [facility] so she can't make today's appointment with Dr. Boone at 9:10 or her Infusion appt. At [facility] today. She also can't make her urology appt. With Dr. Teddy Ross. I will add urology on this message. Messaged team and secured chat Dr Boone.

## 2024-11-18 NOTE — PROGRESS NOTES
Physical Therapy                 Therapy Communication Note    Patient Name: Terra Alexis  MRN: [id number]  Department: [facility]  Room: 6023/6023-A  Today's Date: 11/18/2024     Discipline: Physical Therapy    Missed Visit Reason: Missed Visit Reason: Patient refused    Missed Time: Attempt    Comment: PT treatment attempted pt reported ambulation with mobility aid prior and is tired. Will re-attempt at later date.

## 2024-11-18 NOTE — CARE PLAN
The patient's goals for the shift include      The clinical goals for the shift include PAIN CONTROL      Problem: Pain - Adult  Goal: Verbalizes/displays adequate comfort level or baseline comfort level  Outcome: Progressing

## 2024-11-18 NOTE — SIGNIFICANT EVENT
Patient seen at bedside. Abdomen soft, nontender, nondistended with no rebound tenderness or guarding. NGT in place draining gastric contents.

## 2024-11-19 LAB
ABO GROUP (TYPE) IN BLOOD: NORMAL
ANION GAP SERPL CALC-SCNC: 18 MMOL/L (ref 10–20)
ANTIBODY SCREEN: NORMAL
BUN SERPL-MCNC: 15 MG/DL (ref 6–23)
CALCIUM SERPL-MCNC: 8.4 MG/DL (ref 8.6–10.6)
CHLORIDE SERPL-SCNC: 101 MMOL/L (ref 98–107)
CO2 SERPL-SCNC: 21 MMOL/L (ref 21–32)
CREAT SERPL-MCNC: 1.1 MG/DL (ref 0.5–1.05)
EGFRCR SERPLBLD CKD-EPI 2021: 53 ML/MIN/1.73M*2
ERYTHROCYTE [DISTWIDTH] IN BLOOD BY AUTOMATED COUNT: 15 % (ref 11.5–14.5)
GLUCOSE BLD MANUAL STRIP-MCNC: 65 MG/DL (ref 74–99)
GLUCOSE SERPL-MCNC: 55 MG/DL (ref 74–99)
HCT VFR BLD AUTO: 25.7 % (ref 36–46)
HGB BLD-MCNC: 8 G/DL (ref 12–16)
MAGNESIUM SERPL-MCNC: 1.98 MG/DL (ref 1.6–2.4)
MCH RBC QN AUTO: 30.4 PG (ref 26–34)
MCHC RBC AUTO-ENTMCNC: 31.1 G/DL (ref 32–36)
MCV RBC AUTO: 98 FL (ref 80–100)
NRBC BLD-RTO: 0 /100 WBCS (ref 0–0)
PLATELET # BLD AUTO: 301 X10*3/UL (ref 150–450)
POTASSIUM SERPL-SCNC: 3.7 MMOL/L (ref 3.5–5.3)
RBC # BLD AUTO: 2.63 X10*6/UL (ref 4–5.2)
RH FACTOR (ANTIGEN D): NORMAL
SODIUM SERPL-SCNC: 136 MMOL/L (ref 136–145)
UFH PPP CHRO-ACNC: 0.6 IU/ML
WBC # BLD AUTO: 5.2 X10*3/UL (ref 4.4–11.3)

## 2024-11-19 PROCEDURE — 36415 COLL VENOUS BLD VENIPUNCTURE: CPT | Performed by: STUDENT IN AN ORGANIZED HEALTH CARE EDUCATION/TRAINING PROGRAM

## 2024-11-19 PROCEDURE — 2500000004 HC RX 250 GENERAL PHARMACY W/ HCPCS (ALT 636 FOR OP/ED)

## 2024-11-19 PROCEDURE — 2500000004 HC RX 250 GENERAL PHARMACY W/ HCPCS (ALT 636 FOR OP/ED): Performed by: STUDENT IN AN ORGANIZED HEALTH CARE EDUCATION/TRAINING PROGRAM

## 2024-11-19 PROCEDURE — 86901 BLOOD TYPING SEROLOGIC RH(D): CPT | Performed by: NURSE PRACTITIONER

## 2024-11-19 PROCEDURE — 83735 ASSAY OF MAGNESIUM: CPT | Performed by: NURSE PRACTITIONER

## 2024-11-19 PROCEDURE — 97530 THERAPEUTIC ACTIVITIES: CPT | Mod: GP

## 2024-11-19 PROCEDURE — 80048 BASIC METABOLIC PNL TOTAL CA: CPT | Performed by: NURSE PRACTITIONER

## 2024-11-19 PROCEDURE — 36415 COLL VENOUS BLD VENIPUNCTURE: CPT | Performed by: NURSE PRACTITIONER

## 2024-11-19 PROCEDURE — 85027 COMPLETE CBC AUTOMATED: CPT | Performed by: NURSE PRACTITIONER

## 2024-11-19 PROCEDURE — 82947 ASSAY GLUCOSE BLOOD QUANT: CPT

## 2024-11-19 PROCEDURE — 85520 HEPARIN ASSAY: CPT | Performed by: STUDENT IN AN ORGANIZED HEALTH CARE EDUCATION/TRAINING PROGRAM

## 2024-11-19 PROCEDURE — 2500000004 HC RX 250 GENERAL PHARMACY W/ HCPCS (ALT 636 FOR OP/ED): Performed by: NURSE PRACTITIONER

## 2024-11-19 PROCEDURE — 1170000001 HC PRIVATE ONCOLOGY ROOM DAILY

## 2024-11-19 RX ORDER — POTASSIUM CHLORIDE 14.9 MG/ML
20 INJECTION INTRAVENOUS ONCE
Status: DISCONTINUED | OUTPATIENT
Start: 2024-11-19 | End: 2024-11-20

## 2024-11-19 RX ORDER — DEXTROSE 50 % IN WATER (D50W) INTRAVENOUS SYRINGE
12.5
Status: DISCONTINUED | OUTPATIENT
Start: 2024-11-19 | End: 2024-11-22 | Stop reason: HOSPADM

## 2024-11-19 RX ORDER — DEXTROSE 50 % IN WATER (D50W) INTRAVENOUS SYRINGE
25
Status: DISCONTINUED | OUTPATIENT
Start: 2024-11-19 | End: 2024-11-22 | Stop reason: HOSPADM

## 2024-11-19 RX ORDER — DEXTROSE, SODIUM CHLORIDE, SODIUM LACTATE, POTASSIUM CHLORIDE, AND CALCIUM CHLORIDE 5; .6; .31; .03; .02 G/100ML; G/100ML; G/100ML; G/100ML; G/100ML
100 INJECTION, SOLUTION INTRAVENOUS CONTINUOUS
Status: DISCONTINUED | OUTPATIENT
Start: 2024-11-19 | End: 2024-11-20

## 2024-11-19 ASSESSMENT — COGNITIVE AND FUNCTIONAL STATUS - GENERAL
MOVING FROM LYING ON BACK TO SITTING ON SIDE OF FLAT BED WITH BEDRAILS: A LITTLE
MOVING FROM LYING ON BACK TO SITTING ON SIDE OF FLAT BED WITH BEDRAILS: A LITTLE
MOVING TO AND FROM BED TO CHAIR: A LITTLE
TURNING FROM BACK TO SIDE WHILE IN FLAT BAD: A LITTLE
STANDING UP FROM CHAIR USING ARMS: A LITTLE
TURNING FROM BACK TO SIDE WHILE IN FLAT BAD: A LITTLE
DAILY ACTIVITIY SCORE: 19
TOILETING: A LITTLE
MOBILITY SCORE: 18
MOVING TO AND FROM BED TO CHAIR: A LITTLE
PERSONAL GROOMING: A LITTLE
MOBILITY SCORE: 23
HELP NEEDED FOR BATHING: A LITTLE
DAILY ACTIVITIY SCORE: 21
WALKING IN HOSPITAL ROOM: A LITTLE
STANDING UP FROM CHAIR USING ARMS: A LITTLE
MOBILITY SCORE: 17
DRESSING REGULAR LOWER BODY CLOTHING: A LITTLE
CLIMB 3 TO 5 STEPS WITH RAILING: A LITTLE
DRESSING REGULAR UPPER BODY CLOTHING: A LITTLE
DRESSING REGULAR LOWER BODY CLOTHING: A LITTLE
CLIMB 3 TO 5 STEPS WITH RAILING: A LOT
DRESSING REGULAR UPPER BODY CLOTHING: A LITTLE
WALKING IN HOSPITAL ROOM: A LITTLE
CLIMB 3 TO 5 STEPS WITH RAILING: A LITTLE
HELP NEEDED FOR BATHING: A LITTLE

## 2024-11-19 ASSESSMENT — PAIN - FUNCTIONAL ASSESSMENT
PAIN_FUNCTIONAL_ASSESSMENT: 0-10

## 2024-11-19 ASSESSMENT — PAIN SCALES - GENERAL
PAINLEVEL_OUTOF10: 0 - NO PAIN
PAINLEVEL_OUTOF10: 0 - NO PAIN
PAINLEVEL_OUTOF10: 8
PAINLEVEL_OUTOF10: 6
PAINLEVEL_OUTOF10: 5 - MODERATE PAIN

## 2024-11-19 ASSESSMENT — PAIN DESCRIPTION - LOCATION: LOCATION: ABDOMEN

## 2024-11-19 NOTE — PROGRESS NOTES
Surgical Oncology Progress Note      11/19/24    Summary:  Terra Alexis is a 74 y.o. female with history of cervical cancer s/p JEMMA/BSO 1999, subsequent chemotherapy and radiation who recently underwent a nephroureterectomy, robot-assisted 8/2024 with involvement of sigmoid colon, s/p resection with DLI, who is now s/p ileostomy reversal with Dr. Denton on 11/13.     Subjective    Subjective:  No issues overnight. Pt with 590 NGT output. Complains of throat pain and ear pressure dt NGT. Having bilateral lower quadrant abdominal pain improved from yesterday.     Objective    Objective:  Vital signs:   Temp:  [36 °C (96.8 °F)-36.7 °C (98.1 °F)] 36.2 °C (97.2 °F)  Heart Rate:  [61-80] 61  Resp:  [15-18] 18  BP: (112-134)/(57-75) 134/60    Physical Exam:  GEN: No acute distress. Alert, awake and conversive.  HEENT: Sclera anicteric. Moist mucous membranes. NGT with gastric output.   RESP: Breathing non-labored, equal chest rise. On RA.  CV: Regular rate.  GI: Abdomen soft, mildly distended, appropriately tender, more so in bilateral lower quadrants. Former ostomy site clean, healing well without drainage.   MSK: No gross deformities. Moves all extremities spontaneously.  NEURO: Alert and oriented x3. No focal deficits.  PSYCH: Appropriate mood and affect.    I/O last 2 completed shifts:  In: 1458.8 (25.8 mL/kg) [P.O.:150; I.V.:1108.8 (19.6 mL/kg); IV Piggyback:200]  Out: 1220 (21.6 mL/kg) [Urine:550 (0.4 mL/kg/hr); Emesis/NG output:670]  Weight: 56.6 kg      Labs Past 18 Hours:  Recent Results (from the past 18 hours)   Heparin Assay, [facility]    Collection Time: 11/18/24  5:26 PM   Result Value Ref Range    Heparin Unfractionated 0.6 See Comment Below for Therapeutic Ranges IU/mL   Heparin Assay, [facility]    Collection Time: 11/18/24 10:53 PM   Result Value Ref Range    Heparin Unfractionated 0.5 See Comment Below for Therapeutic Ranges IU/mL      Meds:    Current Facility-Administered Medications:     acetaminophen  (Ofirmev) injection 1,000 mg, 1,000 mg, intravenous, q8h, Luis Alcala MD, 1,000 mg at 11/18/24 2156    benzocaine (Hurricaine) 20 % mouth spray 1 spray, 1 spray, Mouth/Throat, 4x daily PRN, ISSAC Rincon    benzocaine-menthol (Cepastat Sore Throat) lozenge 1 lozenge, 1 lozenge, Mouth/Throat, q2h PRN, ISSAC Rincon    calcium carbonate (Tums) chewable tablet 500 mg, 500 mg, oral, Once, Dontrell Portillo MD    [Held by provider] cyclobenzaprine (Flexeril) tablet 5 mg, 5 mg, oral, TID PRN, Frandy Jeronimo MD    heparin (porcine) injection 5,000 Units, 5,000 Units, subcutaneous, TID, Frandy Jeronimo MD, 5,000 Units at 11/18/24 2140    heparin 25,000 Units in dextrose 5% 250 mL (100 Units/mL) infusion (premix), 0-4,000 Units/hr, intravenous, Continuous, Luis Alcala MD, Last Rate: 7 mL/hr at 11/19/24 0103, 700 Units/hr at 11/19/24 0103    HYDROmorphone (Dilaudid) injection 0.2 mg, 0.2 mg, intravenous, q4h PRN, ISSAC Rincon, 0.2 mg at 11/19/24 0430    HYDROmorphone (Dilaudid) injection 0.4 mg, 0.4 mg, intravenous, q4h PRN, ISSAC Rincon, 0.4 mg at 11/18/24 2246    iohexol (OMNIPaque) 12 mg iodine/mL oral contrast 500 mL, 500 mL, oral, Once in imaging, Lalito Keller MD    lactated Ringer's infusion, 75 mL/hr, intravenous, Continuous, ISSAC iRncon, Last Rate: 75 mL/hr at 11/18/24 2140, 75 mL/hr at 11/18/24 2140    naloxone (Narcan) injection 0.2 mg, 0.2 mg, intravenous, PRN, Frandy Jeronimo MD    [DISCONTINUED] ondansetron ODT (Zofran-ODT) disintegrating tablet 4 mg, 4 mg, oral, q8h PRN **OR** ondansetron (Zofran) injection 4 mg, 4 mg, intravenous, q8h PRN, Frandy Jeronimo MD, 4 mg at 11/16/24 0025    pantoprazole (ProtoNix) injection 40 mg, 40 mg, intravenous, Daily, Latesha Clemente, APRN-CNP, 40 mg at 11/18/24 0919    piperacillin-tazobactam (Zosyn) 3.375 g in dextrose (iso) IV 50 mL, 3.375 g, intravenous, q6h,  Lalito Keller MD, Stopped at 11/19/24 0349     Imaging:  XR abdomen 1 view    Result Date: 11/18/2024  Interpreted By:  Raul Dong, STUDY: XR ABDOMEN 1 VIEW; 11/18/2024 7:52 am   INDICATION: Signs/Symptoms:r/o pneumoperitoneum, eval contrast passage.   COMPARISON: Radiograph dated 11/16/2024   ACCESSION NUMBER(S): [id number]   ORDERING CLINICIAN: DEVON TAYLOR   FINDINGS: Enteric tube is in place with the tip projecting over the gastric fundus. Slightly prominent loops of bowel with positive contrast in the ascending and transverse colon. Overall bowel gas pattern is nonobstructive. Lucency in the left upper quadrant of the abdomen..   Faint airspace opacity in lung bases.   Osseous structures demonstrate no acute bony changes.       1.  Lucency in the left upper quadrant of the abdomen which might be gastric bubble, however cannot exclude pneumoperitoneum. Recommend correlation with concern for acute intra-abdominal pathology. If clinically indicated, consider correlation with CT of the abdomen. 2. Few slightly prominent loops of bowel measuring up to 2.8 cm with overall nonobstructive bowel gas pattern.   Signed by: Raul Yoon 11/18/2024 12:21 PM Dictation workstation:   [id number]    CT abdomen pelvis w IV and oral contrast    Result Date: 11/17/2024  Interpreted By:  Moris Watson,  and Cammy Joel STUDY: CT ABDOMEN PELVIS W IV AND ORAL CONTRAST;  11/17/2024 2:54 pm   INDICATION: Signs/Symptoms:Tachycardia, nausea vomiting in setting of new ileocolonic anastomosis. Would like PO contrast to be at anastomosis for eval..   COMPARISON: CT abdomen pelvis 09/02/2024   ACCESSION NUMBER(S): [id number]   ORDERING CLINICIAN: DEVON TAYLOR   TECHNIQUE: Contiguous axial images of the abdomen and pelvis were obtained after the intravenous administration of 90 mL Omnipaque 350 contrast. Coronal and sagittal reformatted images were reconstructed from the axial data.   FINDINGS:  LOWER CHEST: Small bilateral pleural effusions with overlying parenchymal consolidation. No focal airspace opacity or pneumothorax. The heart is nonenlarged with trace pericardial fluid. No significant abnormality of the chest wall.   LIVER: Few simple fluid and fat attenuating lesions throughout the liver are unchanged from the prior exam.   BILE DUCTS: No significant intrahepatic or extrahepatic dilatation.   GALLBLADDER: Small volume of pericholecystic fluid without significant gallbladder wall thickening or radiopaque cholelithiasis, similar in appearance when compared with the prior exam.   PANCREAS: No significant abnormality.   SPLEEN: No significant abnormality.   ADRENALS: No significant abnormality.   KIDNEYS, URETERS, BLADDER: Status post left nephrectomy. The right kidney is unremarkable. The urinary bladder is normal in appearance.   REPRODUCTIVE ORGANS: No significant abnormality.   GI: There is hyperdense enteric contrast material throughout the stomach, jejunum, and proximal ileum and throughout the colon to the level of the upper rectum, however is not visualized within the mid to distal or terminal ileum and does not reach the ileocolic anastomosis. Enteric tube in place terminating in the gastric body. The contrast in the colon and rectum is likely retained contrast from prior enema study.   There is a mild fluid-filled distention of the small bowel to the level of the anastomosis with distal collapse of the terminal ileum as seen on series 201 images 83 and 96. There is no discrete fluid collection or external contrast around the anastomosis, however there is a punctate focus of gas localized near the suture line (series 201, image 82).   There is moderate wall thickening of the distal ileum just proximal to the anastomosis (series 201, image 78). Otherwise no significant inflammatory wall thickening of the small or large bowel.   Status post partial sigmoid resection with an anastomosis in the  deep pelvis. Adjacent to the sigmoid anastomosis, there is a gas and likely stool containing collection measuring 4.8 x 1.0 cm which appears distinct from the rectosigmoid junction and apparently arises from the adjacent anastomosis, best visualized on series 201 images 112 and 111. This may represent a contained leak or blind-ending portion of a end-to-side anastomosis.   There is an additional loculated thick-walled collection in the presacral region measuring 4.0 x 5.2 cm, which was previously visualized but appears slightly smaller on the current exam, and may also arise from the adjacent rectosigmoid anastomosis (series 201, image 111).   There is no discrete contrast extravasation from the small bowel.   VESSELS: No significant abnormality. The portal veins and IVC are patent.   PERITONEUM/RETROPERITONEUM: Moderate volume of simple fluid ascites throughout the abdomen and pelvis. Small volume of pneumoperitoneum primarily overlying the anterior hepatic border. Collections as described above in GI section.   LYMPH NODES: No enlarged lymph nodes.   ABDOMINAL WALL: Mild diffuse body wall edema. Status post ileostomy reversal with stranding along the previous ostomy tract and a small volume of subcutaneous gas. Small skin surface defect with surrounding skin thickening of the right anterior abdominal wall. Linear ventral midline surgical incision with subjacent granulation tissue and stranding. No subcutaneous collection is visualized.   OSSEOUS STRUCTURES: No acute osseous abnormality.       1. Status post ileostomy reversal and ileoileal anastomosis. Contrast is visualized throughout the proximal small bowel, however does not traverse the ileo ileal anastomosis. There is mild thickening of the distal ileum, just proximally to the anastomosis with a punctate focus of adjacent localized gas which may be postsurgical, however can not entirely exclude small leak. Distal to the anastomosis, there is collapse of the  ileal loops described in detail above. 2. Contrast present throughout the colon which can be related to prior contrast study. There is a gas and stool containing collection anterior to the rectosigmoid anastomotic site with clear communication with the adjacent anastomosis. This may represent a blind-ending portion of end to side colorectal anastomosis or can represent a contained leak from perforation. Correlate with surgical details. 3. An additional loculated thick-walled collection in the presacral region is slightly smaller when compared with the prior exam, and may also communicate with and/or arise from the rectosigmoid anastomosis. 4. There is no discrete enteric contrast extravasation. 5. Moderate volume simple fluid ascites and small volume pneumoperitoneum over the anterior hepatic and omental borders, likely postsurgical. 6. Small bilateral pleural effusions with overlying pulmonary consolidation, likely atelectatic however superimposed infection is not excluded. 7. Mild body wall edema and postsurgical changes of the ventral midline and right anterior abdomen.   I personally reviewed the image(s)/study and resident interpretation as stated by Dr. Marycruz Chawla MD. I agree with the findings as stated. This study was interpreted at [facility], [place], [place].   MACRO: None   Signed by: Moris Watson 11/17/2024 6:32 PM Dictation workstation:   [id number]     Medications reviewed.  Vital signs reviewed.  Labs reviewed.         Assessment/Plan    Assessment and Plan:  Terra Alexis is a 74 y.o. female with history of cervical cancer s/p JEMMA/BSO 1999, subsequent chemotherapy and radiation who recently underwent a nephroureterectomy, robot-assisted 8/2024 with involvement of sigmoid colon, s/p resection with DLI, who is now s/p ileostomy reversal with Dr. Denton on 11/13.     Plan Today:   - Pain control with scheduled IV tylenol and PRN IV dilaudid  - OOB  as much as possible. Had discussion about IS use  - Continue Zosyn as empiric treatment for possible abscess at prior colonic anastomosis.  - NPO, serial abdominal exams.  - NGT to gravity 4 hours, reassess   - Continue IV protonix. Zofran PRN  - Continue LR @ 75.  - Home levothyroxine.  - Continue heparin gtt    Patient's exam, labs, and findings discussed with Dr. Denton, who agrees with the plan as described above.      [facility]  Surgical Oncology [id number]      Patients NGT with 590ml (which included 250ml of ice chips) in last 24 hours, NGT output thin gastric appearing. Abdominal exam benign - TTP around prior ostomy site. LLQ TTP yesterday is improved today. Patient continues to pass flatus. Says she feels like she has to have a bowel movement. We performed a gravity trial with NGT - patient with increased flatus during this time, abdominal exam stayed benign, and patient without nausea/emesis. <50cc output after placing to suction after 4 hours. NGT removed. Will continue patient on ice chips overnight, plan to advance diet 11/20.     Luis Alcala MD  General Surgery Resident  Surgical Oncology

## 2024-11-19 NOTE — HOSPITAL COURSE
Terra Alexis is a 74y F with recent hx sigmoid resection with DLI 8/2024 admitted after ileostomy closure on 11/13/24. She tolerated the procedure well. She had NGT placed intraoperatively which initially drained thick, bilious output. Postoperative course significant for ileus for which she underwent bowel rest until return of bowel function, and NGT was removed at that time on 11/19/2024. She was treated empirically for possible abscess at prior colonic anastomosis with IV antibiotics.     Pain was controlled initially with IV pain medication and was then transitioned to PO 11/20. Diet was advanced as tolerated.     At the time of discharge, patient was ambulating, voiding and stooling appropriately, and tolerating a diet. Pain was medically optimized and the maximum benefit of hospitalization had been achieved. She was discharged home with instructions and will follow up with surgical oncology on 12/3/2024.

## 2024-11-19 NOTE — PROGRESS NOTES
Physical Therapy    Physical Therapy Treatment    Patient Name: Terra Alexis  MRN: [id number]  Department: [facility]  Room: [facility]-  Today's Date: 11/19/2024  Time Calculation  Start Time: 1240  Stop Time: 1256  Time Calculation (min): 16 min         Assessment/Plan   PT Assessment  Medical Staff Made Aware: Yes  End of Session Communication: Bedside nurse  Assessment Comment: Pt tolerated treatment well with increased tolerance to ambualtion w CGA. Pt was experiencng abdominal pain but exhibited good movement quality. Limited due to symptoms of fatigue.  End of Session Patient Position: Up in chair, Alarm off, not on at start of session     PT Plan  Treatment/Interventions: Bed mobility, Transfer training, Gait training, Stair training, Therapeutic exercise, Therapeutic activity, Balance training  PT Plan: Ongoing PT  PT Frequency: 3 times per week  PT Discharge Recommendations: Low intensity level of continued care  PT Recommended Transfer Status: Stand by assist  PT - OK to Discharge: Yes (Pt eval completed; d/c rec assessed)      General Visit Information:   PT  Visit  PT Received On: 11/19/24  Response to Previous Treatment: Patient with no complaints from previous session.  General  Missed Visit: Yes  Missed Visit Reason: Patient refused  Family/Caregiver Present: Yes (Fiance)  Prior to Session Communication: Bedside nurse  Patient Position Received: Up in chair, Alarm off, not on at start of session  Preferred Learning Style: verbal  General Comment: RN cleared pt for therapy; pt reported being in some pain but receptive to attempt therapy session.    Subjective   Precautions:  Precautions  Medical Precautions: Fall precautions  Post-Surgical Precautions: Abdominal surgery precautions    Vital Signs (Past 2hrs)        Date/Time Vitals Session Patient Position Pulse Resp SpO2 BP MAP (mmHg)    11/19/24 1240 --  Sitting  71  --  --  127/64  --                   Vital Signs Comment: Standing /72; Seated  after ambulation /62     Objective   Pain:  Pain Assessment  Pain Assessment: 0-10  0-10 (Numeric) Pain Score: 8  Pain Interventions: Ambulation/increased activity  Cognition:  Cognition  Overall Cognitive Status: Within Functional Limits  Orientation Level: Oriented X4  Coordination:     Postural Control:  Static Sitting Balance  Static Sitting-Balance Support: Feet supported, Bilateral upper extremity supported  Static Sitting-Level of Assistance:  (SBA)  Static Sitting-Comment/Number of Minutes: 5 min  Static Standing Balance  Static Standing-Balance Support: Bilateral upper extremity supported  Static Standing-Level of Assistance:  (SBA)  Static Standing-Comment/Number of Minutes: 5 min  Extremity/Trunk Assessments:  RUE   RUE : Within Functional Limits  LUE   LUE: Within Functional Limits  RLE   RLE : Within Functional Limits  LLE   LLE : Within Functional Limits  Activity Tolerance:  Activity Tolerance  Endurance: Tolerates 10 - 20 min exercise with multiple rests  Treatments:       Therapeutic Activity  Therapeutic Activity Performed: Yes  Therapeutic Activity 1: Pt able to complete transfers and increase ambulation training due to increased tolerance; pt utilized CGA. Edu on importance of OOB activity and sequencing for transfers. Pt monitored during ambulation for any symptoms of fatigue or dizziness.    Ambulation/Gait Training  Ambulation/Gait Training Performed: Yes  Ambulation/Gait Training 1  Surface 1: Level tile  Device 1: Rolling walker  Assistance 1: Contact guard  Quality of Gait 1: Decreased step length  Comments/Distance (ft) 1: 100ft x2; edu on importance of OOB activity. Monitored for symptoms of fatigue and dizziness.  Transfers  Transfer: Yes  Transfer 1  Transfer From 1: Sit to, Stand to  Transfer to 1: Stand, Sit  Technique 1: Sit to stand, Stand to sit  Transfer Device 1: Walker  Transfer Level of Assistance 1: Contact guard  Trials/Comments 1: Pt stood from chair to assess BP  before ambualtion and sat to chair following ambulation; cues for sequencing and hand placement.    Outcome Measures:  [facility] Basic Mobility  Turning from your back to your side while in a flat bed without using bedrails: A little  Moving from lying on your back to sitting on the side of a flat bed without using bedrails: A little  Moving to and from bed to chair (including a wheelchair): A little  Standing up from a chair using your arms (e.g. wheelchair or bedside chair): A little  To walk in hospital room: A little  Climbing 3-5 steps with railing: A lot  Basic Mobility - Total Score: 17    Education Documentation  Precautions, taught by RAMBO Becker at 11/19/2024  1:27 PM.  Learner: Patient  Readiness: Acceptance  Method: Explanation  Response: Verbalizes Understanding  Comment: Edu on importance of OOB activity and RW sequencing    Body Mechanics, taught by RAMBO Becker at 11/19/2024  1:27 PM.  Learner: Patient  Readiness: Acceptance  Method: Explanation  Response: Verbalizes Understanding  Comment: Edu on importance of OOB activity and RW sequencing    Mobility Training, taught by RAMBO Becker at 11/19/2024  1:27 PM.  Learner: Patient  Readiness: Acceptance  Method: Explanation  Response: Verbalizes Understanding  Comment: Edu on importance of OOB activity and RW sequencing    Education Comments  No comments found.        OP EDUCATION:       Encounter Problems       Encounter Problems (Active)       Mobility       LTG - Patient will navigate 12 steps with LRAD independently in order to navigate in home  (Progressing)       Start:  11/14/24    Expected End:  11/28/24            STG - Patient will ambulate >150' with LRAD independently  (Progressing)       Start:  11/14/24    Expected End:  11/28/24               PT Transfers       STG - Patient will perform bed mobility independently maintaining precautions  (Progressing)       Start:  11/14/24    Expected End:  11/28/24            STG -  Patient will transfer sit to and from stand independently with LRAD  (Progressing)       Start:  11/14/24    Expected End:  11/28/24               Pain - Adult

## 2024-11-19 NOTE — CARE PLAN
The patient's goals for the shift include      The clinical goals for the shift include patient's pain will be <5/10 throughout shift.    Problem: Pain - Adult  Goal: Verbalizes/displays adequate comfort level or baseline comfort level  Outcome: Progressing     Problem: Safety - Adult  Goal: Free from fall injury  Outcome: Progressing     Problem: Discharge Planning  Goal: Discharge to home or other facility with appropriate resources  Outcome: Progressing     Problem: Chronic Conditions and Co-morbidities  Goal: Patient's chronic conditions and co-morbidity symptoms are monitored and maintained or improved  Outcome: Progressing

## 2024-11-20 LAB
ANION GAP SERPL CALC-SCNC: 12 MMOL/L (ref 10–20)
BUN SERPL-MCNC: 13 MG/DL (ref 6–23)
CALCIUM SERPL-MCNC: 8.2 MG/DL (ref 8.6–10.6)
CHLORIDE SERPL-SCNC: 102 MMOL/L (ref 98–107)
CO2 SERPL-SCNC: 23 MMOL/L (ref 21–32)
CREAT SERPL-MCNC: 0.96 MG/DL (ref 0.5–1.05)
EGFRCR SERPLBLD CKD-EPI 2021: 63 ML/MIN/1.73M*2
ERYTHROCYTE [DISTWIDTH] IN BLOOD BY AUTOMATED COUNT: 14.3 % (ref 11.5–14.5)
GLUCOSE SERPL-MCNC: 103 MG/DL (ref 74–99)
HCT VFR BLD AUTO: 23.3 % (ref 36–46)
HGB BLD-MCNC: 8.1 G/DL (ref 12–16)
MAGNESIUM SERPL-MCNC: 1.72 MG/DL (ref 1.6–2.4)
MCH RBC QN AUTO: 30.7 PG (ref 26–34)
MCHC RBC AUTO-ENTMCNC: 34.8 G/DL (ref 32–36)
MCV RBC AUTO: 88 FL (ref 80–100)
NRBC BLD-RTO: 0 /100 WBCS (ref 0–0)
PLATELET # BLD AUTO: 259 X10*3/UL (ref 150–450)
POTASSIUM SERPL-SCNC: 3.2 MMOL/L (ref 3.5–5.3)
RBC # BLD AUTO: 2.64 X10*6/UL (ref 4–5.2)
SODIUM SERPL-SCNC: 134 MMOL/L (ref 136–145)
UFH PPP CHRO-ACNC: 0.3 IU/ML
WBC # BLD AUTO: 6.8 X10*3/UL (ref 4.4–11.3)

## 2024-11-20 PROCEDURE — 83735 ASSAY OF MAGNESIUM: CPT | Performed by: NURSE PRACTITIONER

## 2024-11-20 PROCEDURE — 1170000001 HC PRIVATE ONCOLOGY ROOM DAILY

## 2024-11-20 PROCEDURE — 85520 HEPARIN ASSAY: CPT | Performed by: STUDENT IN AN ORGANIZED HEALTH CARE EDUCATION/TRAINING PROGRAM

## 2024-11-20 PROCEDURE — 2500000004 HC RX 250 GENERAL PHARMACY W/ HCPCS (ALT 636 FOR OP/ED): Mod: JZ | Performed by: STUDENT IN AN ORGANIZED HEALTH CARE EDUCATION/TRAINING PROGRAM

## 2024-11-20 PROCEDURE — 2500000002 HC RX 250 W HCPCS SELF ADMINISTERED DRUGS (ALT 637 FOR MEDICARE OP, ALT 636 FOR OP/ED): Performed by: NURSE PRACTITIONER

## 2024-11-20 PROCEDURE — 99024 POSTOP FOLLOW-UP VISIT: CPT | Performed by: SURGERY

## 2024-11-20 PROCEDURE — 2500000004 HC RX 250 GENERAL PHARMACY W/ HCPCS (ALT 636 FOR OP/ED): Performed by: NURSE PRACTITIONER

## 2024-11-20 PROCEDURE — 36415 COLL VENOUS BLD VENIPUNCTURE: CPT | Performed by: STUDENT IN AN ORGANIZED HEALTH CARE EDUCATION/TRAINING PROGRAM

## 2024-11-20 PROCEDURE — 2500000001 HC RX 250 WO HCPCS SELF ADMINISTERED DRUGS (ALT 637 FOR MEDICARE OP)

## 2024-11-20 PROCEDURE — 85027 COMPLETE CBC AUTOMATED: CPT | Performed by: NURSE PRACTITIONER

## 2024-11-20 PROCEDURE — 2500000004 HC RX 250 GENERAL PHARMACY W/ HCPCS (ALT 636 FOR OP/ED)

## 2024-11-20 PROCEDURE — 80048 BASIC METABOLIC PNL TOTAL CA: CPT | Performed by: NURSE PRACTITIONER

## 2024-11-20 RX ORDER — POTASSIUM CHLORIDE 14.9 MG/ML
20 INJECTION INTRAVENOUS
Status: DISCONTINUED | OUTPATIENT
Start: 2024-11-20 | End: 2024-11-20

## 2024-11-20 RX ORDER — DEXTROSE, SODIUM CHLORIDE, SODIUM LACTATE, POTASSIUM CHLORIDE, AND CALCIUM CHLORIDE 5; .6; .31; .03; .02 G/100ML; G/100ML; G/100ML; G/100ML; G/100ML
40 INJECTION, SOLUTION INTRAVENOUS CONTINUOUS
Status: DISCONTINUED | OUTPATIENT
Start: 2024-11-20 | End: 2024-11-21

## 2024-11-20 RX ORDER — MAGNESIUM SULFATE HEPTAHYDRATE 40 MG/ML
2 INJECTION, SOLUTION INTRAVENOUS ONCE
Status: COMPLETED | OUTPATIENT
Start: 2024-11-20 | End: 2024-11-20

## 2024-11-20 RX ORDER — TRAMADOL HYDROCHLORIDE 50 MG/1
50 TABLET ORAL EVERY 4 HOURS PRN
Status: DISCONTINUED | OUTPATIENT
Start: 2024-11-20 | End: 2024-11-22 | Stop reason: HOSPADM

## 2024-11-20 RX ORDER — ACETAMINOPHEN 325 MG/1
650 TABLET ORAL EVERY 6 HOURS
Status: DISCONTINUED | OUTPATIENT
Start: 2024-11-20 | End: 2024-11-22 | Stop reason: HOSPADM

## 2024-11-20 RX ORDER — HYDROMORPHONE HYDROCHLORIDE 1 MG/ML
0.2 INJECTION, SOLUTION INTRAMUSCULAR; INTRAVENOUS; SUBCUTANEOUS EVERY 4 HOURS PRN
Status: DISCONTINUED | OUTPATIENT
Start: 2024-11-20 | End: 2024-11-22 | Stop reason: HOSPADM

## 2024-11-20 RX ORDER — OXYCODONE HYDROCHLORIDE 5 MG/1
5 TABLET ORAL EVERY 4 HOURS PRN
Status: DISCONTINUED | OUTPATIENT
Start: 2024-11-20 | End: 2024-11-22 | Stop reason: HOSPADM

## 2024-11-20 RX ORDER — POTASSIUM CHLORIDE 20 MEQ/1
40 TABLET, EXTENDED RELEASE ORAL 2 TIMES DAILY
Status: COMPLETED | OUTPATIENT
Start: 2024-11-20 | End: 2024-11-20

## 2024-11-20 ASSESSMENT — COGNITIVE AND FUNCTIONAL STATUS - GENERAL
DRESSING REGULAR LOWER BODY CLOTHING: A LITTLE
MOBILITY SCORE: 23
TURNING FROM BACK TO SIDE WHILE IN FLAT BAD: A LITTLE
DAILY ACTIVITIY SCORE: 20
MOVING FROM LYING ON BACK TO SITTING ON SIDE OF FLAT BED WITH BEDRAILS: A LITTLE
WALKING IN HOSPITAL ROOM: A LITTLE
CLIMB 3 TO 5 STEPS WITH RAILING: A LITTLE
DRESSING REGULAR UPPER BODY CLOTHING: A LITTLE
CLIMB 3 TO 5 STEPS WITH RAILING: A LITTLE
TOILETING: A LITTLE
MOBILITY SCORE: 18
DAILY ACTIVITIY SCORE: 18
HELP NEEDED FOR BATHING: A LITTLE
DAILY ACTIVITIY SCORE: 24
STANDING UP FROM CHAIR USING ARMS: A LITTLE
PERSONAL GROOMING: A LITTLE
CLIMB 3 TO 5 STEPS WITH RAILING: A LITTLE
MOVING TO AND FROM BED TO CHAIR: A LITTLE
DRESSING REGULAR LOWER BODY CLOTHING: A LITTLE
TOILETING: A LITTLE
DRESSING REGULAR UPPER BODY CLOTHING: A LITTLE
HELP NEEDED FOR BATHING: A LITTLE
MOBILITY SCORE: 23
EATING MEALS: A LITTLE

## 2024-11-20 ASSESSMENT — PAIN SCALES - GENERAL
PAINLEVEL_OUTOF10: 6
PAINLEVEL_OUTOF10: 0 - NO PAIN
PAINLEVEL_OUTOF10: 4
PAINLEVEL_OUTOF10: 0 - NO PAIN

## 2024-11-20 ASSESSMENT — PAIN - FUNCTIONAL ASSESSMENT
PAIN_FUNCTIONAL_ASSESSMENT: 0-10
PAIN_FUNCTIONAL_ASSESSMENT: 0-10

## 2024-11-20 NOTE — PROGRESS NOTES
Surgical Oncology Progress Note      11/20/24    Summary:  Terra Alexis is a 74 y.o. female with history of cervical cancer s/p JEMMA/BSO 1999, subsequent chemotherapy and radiation who recently underwent a nephroureterectomy, robot-assisted 8/2024 with involvement of sigmoid colon, s/p resection with DLI, who is now s/p ileostomy reversal with Dr. Denton on 11/13.     Subjective    Subjective:  No acute events overnight. Patient states she had 4-5 loose bowel movements overnight with some bloating prior to her first bowel movement that has since resolved. Had some ice chips yesterday, states she is now hungry. Pain drastically improved. No belching or hiccups.     Objective    Objective:  Vital signs:   Temp:  [36 °C (96.8 °F)-36.6 °C (97.8 °F)] 36.6 °C (97.8 °F)  Heart Rate:  [58-71] 62  Resp:  [17-18] 18  BP: (126-164)/(62-81) 158/70    Physical Exam:  GEN: No acute distress. Alert, awake and conversive.  HEENT: Sclera anicteric. Moist mucous membranes.  RESP: Breathing non-labored, equal chest rise. On RA.  CV: Regular rate.  GI: Abdomen soft, mildly distended, appropriately tender. Former ostomy site clean, healing well without drainage.   MSK: No gross deformities. Moves all extremities spontaneously.  NEURO: Alert and oriented x3. No focal deficits.  PSYCH: Appropriate mood and affect.    I/O last 2 completed shifts:  In: 2178.7 (38.5 mL/kg) [I.V.:1928.7 (34.1 mL/kg); IV Piggyback:250]  Out: 1951 (34.5 mL/kg) [Urine:1750 (1.3 mL/kg/hr); Emesis/NG output:200; Stool:1]  Weight: 56.6 kg      Labs Past 18 Hours:  Recent Results (from the past 18 hours)   CBC    Collection Time: 11/20/24  5:43 AM   Result Value Ref Range    WBC 6.8 4.4 - 11.3 x10*3/uL    nRBC 0.0 0.0 - 0.0 /100 WBCs    RBC 2.64 (L) 4.00 - 5.20 x10*6/uL    Hemoglobin 8.1 (L) 12.0 - 16.0 g/dL    Hematocrit 23.3 (L) 36.0 - 46.0 %    MCV 88 80 - 100 fL    MCH 30.7 26.0 - 34.0 pg    MCHC 34.8 32.0 - 36.0 g/dL    RDW 14.3 11.5 - 14.5 %     Platelets 259 150 - 450 x10*3/uL   Magnesium    Collection Time: 11/20/24  5:43 AM   Result Value Ref Range    Magnesium 1.72 1.60 - 2.40 mg/dL   Basic Metabolic Panel    Collection Time: 11/20/24  5:43 AM   Result Value Ref Range    Glucose 103 (H) 74 - 99 mg/dL    Sodium 134 (L) 136 - 145 mmol/L    Potassium 3.2 (L) 3.5 - 5.3 mmol/L    Chloride 102 98 - 107 mmol/L    Bicarbonate 23 21 - 32 mmol/L    Anion Gap 12 10 - 20 mmol/L    Urea Nitrogen 13 6 - 23 mg/dL    Creatinine 0.96 0.50 - 1.05 mg/dL    eGFR 63 >60 mL/min/1.73m*2    Calcium 8.2 (L) 8.6 - 10.6 mg/dL   Heparin Assay, UFH    Collection Time: 11/20/24  5:43 AM   Result Value Ref Range    Heparin Unfractionated 0.3 See Comment Below for Therapeutic Ranges IU/mL      Meds:    Current Facility-Administered Medications:     acetaminophen (Ofirmev) injection 1,000 mg, 1,000 mg, intravenous, q8h, Luis Alcala MD, 1,000 mg at 11/20/24 0852    benzocaine-menthol (Cepastat Sore Throat) lozenge 1 lozenge, 1 lozenge, Mouth/Throat, q2h PRN, ISSAC Rincon    calcium carbonate (Tums) chewable tablet 500 mg, 500 mg, oral, Once, Dontrell Portillo MD    [Held by provider] cyclobenzaprine (Flexeril) tablet 5 mg, 5 mg, oral, TID PRN, Frandy Jeronimo MD    dextrose 5 % and lactated Ringer's infusion, 100 mL/hr, intravenous, Continuous, ISSAC Rincon, Last Rate: 100 mL/hr at 11/20/24 0933, 100 mL/hr at 11/20/24 0933    dextrose 50 % injection 12.5 g, 12.5 g, intravenous, q15 min PRN, Luis Alcala MD    dextrose 50 % injection 25 g, 25 g, intravenous, q15 min PRN, Luis Alcala MD    glucagon (Glucagen) injection 1 mg, 1 mg, intramuscular, q15 min PRN, Luis Alcala MD    heparin 25,000 Units in dextrose 5% 250 mL (100 Units/mL) infusion (premix), 0-4,000 Units/hr, intravenous, Continuous, Luis Alcala MD, Last Rate: 7 mL/hr at 11/19/24 1311, 700 Units/hr at 11/19/24 1311    HYDROmorphone (Dilaudid) injection 0.2 mg, 0.2 mg,  intravenous, q4h PRN, ISSAC Rincon, 0.2 mg at 11/19/24 0430    HYDROmorphone (Dilaudid) injection 0.4 mg, 0.4 mg, intravenous, q4h PRN, ISSAC Rincon, 0.4 mg at 11/18/24 2246    magnesium sulfate 2 g in sterile water for injection 50 mL, 2 g, intravenous, Once, ISSAC Rincon, Last Rate: 25 mL/hr at 11/20/24 0853, 2 g at 11/20/24 0853    naloxone (Narcan) injection 0.2 mg, 0.2 mg, intravenous, PRN, Frandy Jeronimo MD    [DISCONTINUED] ondansetron ODT (Zofran-ODT) disintegrating tablet 4 mg, 4 mg, oral, q8h PRN **OR** ondansetron (Zofran) injection 4 mg, 4 mg, intravenous, q8h PRN, Frandy Jeronimo MD, 4 mg at 11/16/24 0025    pantoprazole (ProtoNix) injection 40 mg, 40 mg, intravenous, Daily, ISSAC Rincon, 40 mg at 11/20/24 0906    phenoL (Chloraseptic) 1.4 % mouth/throat spray 1 spray, 1 spray, Mouth/Throat, q2h PRN, ISSAC Rincon    piperacillin-tazobactam (Zosyn) 3.375 g in dextrose (iso) IV 50 mL, 3.375 g, intravenous, q6h, Lalito Keller MD, Stopped at 11/20/24 0928    potassium chloride CR (Klor-Con M20) ER tablet 40 mEq, 40 mEq, oral, BID, ISSAC Rincon, 40 mEq at 11/20/24 0933     Imaging:  No results found.    Medications reviewed.  Vital signs reviewed.  Labs reviewed.         Assessment/Plan    Assessment and Plan:  Terra Alexis is a 74 y.o. female with history of cervical cancer s/p JEMMA/BSO 1999, subsequent chemotherapy and radiation who recently underwent a nephroureterectomy, robot-assisted 8/2024 with involvement of sigmoid colon, s/p resection with DLI, who is now s/p ileostomy reversal with Dr. Denton on 11/13. Postop course prolonged d/t ileus that appears to have since resolved.    Plan Today:   - Pain control with PO tylenol, PRN oxy/tramadol.  - OOB as much as possible. IS.  - Continue Zosyn as empiric treatment for possible abscess at prior colonic anastomosis.  - Full liquid diet.  -  Continue IV protonix. Zofran PRN  - Continue LR @ 40.  - Home levothyroxine.  - Continue heparin gtt    Patient's exam, labs, and findings discussed with Dr. Denton, who agrees with the plan as described above.      Frandy Jeronimo MD  PGY-2 General Surgery  Surgical Oncology [id number]

## 2024-11-20 NOTE — PROGRESS NOTES
Physical Therapy                 Therapy Communication Note    Patient Name: Terra Alexis  MRN: [id number]  Department: [facility]  Room: 6023/6023-A  Today's Date: 11/20/2024     Discipline: Physical Therapy    Missed Visit Reason: Missed Visit Reason: Other (Comment)    Missed Time: Attempt    Comment:  PT treatment attempted; pt resting in bed, fiance politely declines due to pt has been up very frequent BMs. Will re-attempt at later date.

## 2024-11-21 PROBLEM — Z93.2 ILEOSTOMY IN PLACE (MULTI): Status: RESOLVED | Noted: 2024-11-01 | Resolved: 2024-11-21

## 2024-11-21 PROBLEM — Z92.89 HISTORY OF BLOOD TRANSFUSION: Status: RESOLVED | Noted: 2024-11-13 | Resolved: 2024-11-21

## 2024-11-21 LAB
ANION GAP SERPL CALC-SCNC: 13 MMOL/L (ref 10–20)
BUN SERPL-MCNC: 11 MG/DL (ref 6–23)
CALCIUM SERPL-MCNC: 7.9 MG/DL (ref 8.6–10.6)
CHLORIDE SERPL-SCNC: 103 MMOL/L (ref 98–107)
CO2 SERPL-SCNC: 22 MMOL/L (ref 21–32)
CREAT SERPL-MCNC: 0.95 MG/DL (ref 0.5–1.05)
EGFRCR SERPLBLD CKD-EPI 2021: 63 ML/MIN/1.73M*2
ERYTHROCYTE [DISTWIDTH] IN BLOOD BY AUTOMATED COUNT: 15 % (ref 11.5–14.5)
GLUCOSE SERPL-MCNC: 94 MG/DL (ref 74–99)
HCT VFR BLD AUTO: 26.8 % (ref 36–46)
HGB BLD-MCNC: 8.5 G/DL (ref 12–16)
MAGNESIUM SERPL-MCNC: 1.89 MG/DL (ref 1.6–2.4)
MCH RBC QN AUTO: 30.7 PG (ref 26–34)
MCHC RBC AUTO-ENTMCNC: 31.7 G/DL (ref 32–36)
MCV RBC AUTO: 97 FL (ref 80–100)
NRBC BLD-RTO: 0 /100 WBCS (ref 0–0)
PLATELET # BLD AUTO: 283 X10*3/UL (ref 150–450)
POTASSIUM SERPL-SCNC: 3.7 MMOL/L (ref 3.5–5.3)
RBC # BLD AUTO: 2.77 X10*6/UL (ref 4–5.2)
SODIUM SERPL-SCNC: 134 MMOL/L (ref 136–145)
UFH PPP CHRO-ACNC: 0.3 IU/ML
WBC # BLD AUTO: 7.7 X10*3/UL (ref 4.4–11.3)

## 2024-11-21 PROCEDURE — 2500000004 HC RX 250 GENERAL PHARMACY W/ HCPCS (ALT 636 FOR OP/ED)

## 2024-11-21 PROCEDURE — 2500000001 HC RX 250 WO HCPCS SELF ADMINISTERED DRUGS (ALT 637 FOR MEDICARE OP)

## 2024-11-21 PROCEDURE — 85520 HEPARIN ASSAY: CPT | Performed by: STUDENT IN AN ORGANIZED HEALTH CARE EDUCATION/TRAINING PROGRAM

## 2024-11-21 PROCEDURE — 83735 ASSAY OF MAGNESIUM: CPT | Performed by: NURSE PRACTITIONER

## 2024-11-21 PROCEDURE — 2500000002 HC RX 250 W HCPCS SELF ADMINISTERED DRUGS (ALT 637 FOR MEDICARE OP, ALT 636 FOR OP/ED): Performed by: NURSE PRACTITIONER

## 2024-11-21 PROCEDURE — 2500000004 HC RX 250 GENERAL PHARMACY W/ HCPCS (ALT 636 FOR OP/ED): Performed by: NURSE PRACTITIONER

## 2024-11-21 PROCEDURE — 2500000001 HC RX 250 WO HCPCS SELF ADMINISTERED DRUGS (ALT 637 FOR MEDICARE OP): Performed by: NURSE PRACTITIONER

## 2024-11-21 PROCEDURE — 97530 THERAPEUTIC ACTIVITIES: CPT | Mod: GP,CQ

## 2024-11-21 PROCEDURE — 36415 COLL VENOUS BLD VENIPUNCTURE: CPT | Performed by: STUDENT IN AN ORGANIZED HEALTH CARE EDUCATION/TRAINING PROGRAM

## 2024-11-21 PROCEDURE — 2500000002 HC RX 250 W HCPCS SELF ADMINISTERED DRUGS (ALT 637 FOR MEDICARE OP, ALT 636 FOR OP/ED)

## 2024-11-21 PROCEDURE — 1170000001 HC PRIVATE ONCOLOGY ROOM DAILY

## 2024-11-21 PROCEDURE — 2500000004 HC RX 250 GENERAL PHARMACY W/ HCPCS (ALT 636 FOR OP/ED): Performed by: STUDENT IN AN ORGANIZED HEALTH CARE EDUCATION/TRAINING PROGRAM

## 2024-11-21 PROCEDURE — 85027 COMPLETE CBC AUTOMATED: CPT | Performed by: NURSE PRACTITIONER

## 2024-11-21 PROCEDURE — 80048 BASIC METABOLIC PNL TOTAL CA: CPT | Performed by: NURSE PRACTITIONER

## 2024-11-21 PROCEDURE — 99024 POSTOP FOLLOW-UP VISIT: CPT | Performed by: SURGERY

## 2024-11-21 RX ORDER — LEVOTHYROXINE SODIUM 100 UG/1
100 TABLET ORAL DAILY
Status: DISCONTINUED | OUTPATIENT
Start: 2024-11-21 | End: 2024-11-22 | Stop reason: HOSPADM

## 2024-11-21 RX ORDER — POTASSIUM CHLORIDE 20 MEQ/1
20 TABLET, EXTENDED RELEASE ORAL ONCE
Status: COMPLETED | OUTPATIENT
Start: 2024-11-21 | End: 2024-11-21

## 2024-11-21 RX ORDER — ACETAMINOPHEN 325 MG/1
650 TABLET ORAL EVERY 6 HOURS
COMMUNITY
Start: 2024-11-21

## 2024-11-21 RX ORDER — TRAMADOL HYDROCHLORIDE 50 MG/1
50 TABLET ORAL EVERY 6 HOURS PRN
Qty: 28 TABLET | Refills: 0 | Status: SHIPPED | OUTPATIENT
Start: 2024-11-21 | End: 2024-11-28

## 2024-11-21 RX ORDER — LANOLIN ALCOHOL/MO/W.PET/CERES
400 CREAM (GRAM) TOPICAL ONCE
Status: COMPLETED | OUTPATIENT
Start: 2024-11-21 | End: 2024-11-21

## 2024-11-21 RX ORDER — PANTOPRAZOLE SODIUM 40 MG/1
40 TABLET, DELAYED RELEASE ORAL
Status: DISCONTINUED | OUTPATIENT
Start: 2024-11-21 | End: 2024-11-22 | Stop reason: HOSPADM

## 2024-11-21 RX ORDER — AMOXICILLIN AND CLAVULANATE POTASSIUM 875; 125 MG/1; MG/1
1 TABLET, FILM COATED ORAL EVERY 12 HOURS SCHEDULED
Status: DISCONTINUED | OUTPATIENT
Start: 2024-11-21 | End: 2024-11-22 | Stop reason: HOSPADM

## 2024-11-21 ASSESSMENT — COGNITIVE AND FUNCTIONAL STATUS - GENERAL
CLIMB 3 TO 5 STEPS WITH RAILING: A LOT
MOVING FROM LYING ON BACK TO SITTING ON SIDE OF FLAT BED WITH BEDRAILS: A LITTLE
MOBILITY SCORE: 17
WALKING IN HOSPITAL ROOM: A LITTLE
MOVING TO AND FROM BED TO CHAIR: A LITTLE
STANDING UP FROM CHAIR USING ARMS: A LITTLE
TURNING FROM BACK TO SIDE WHILE IN FLAT BAD: A LITTLE

## 2024-11-21 ASSESSMENT — PAIN SCALES - GENERAL
PAINLEVEL_OUTOF10: 6
PAINLEVEL_OUTOF10: 0 - NO PAIN
PAINLEVEL_OUTOF10: 0 - NO PAIN
PAINLEVEL_OUTOF10: 3

## 2024-11-21 ASSESSMENT — PAIN - FUNCTIONAL ASSESSMENT
PAIN_FUNCTIONAL_ASSESSMENT: UNABLE TO SELF-REPORT
PAIN_FUNCTIONAL_ASSESSMENT: 0-10
PAIN_FUNCTIONAL_ASSESSMENT: 0-10

## 2024-11-21 ASSESSMENT — PAIN DESCRIPTION - LOCATION: LOCATION: ABDOMEN

## 2024-11-21 NOTE — PROGRESS NOTES
Surgical Oncology Progress Note      11/21/24    Summary:  Terra Alexis is a 74 y.o. female with history of cervical cancer s/p JEMMA/BSO 1999, subsequent chemotherapy and radiation who recently underwent a nephroureterectomy, robot-assisted 8/2024 with involvement of sigmoid colon, s/p resection with DLI, who is now s/p ileostomy reversal with Dr. Denton on 11/13.     Subjective    Subjective:  NAEON. Patient still complaining of loose BM, stating frequency makes it difficult to sleep through the night. Tolerating liquids and soft diet. Denies nausea/vomiting. Ambulating frequently. States she is feeling much better overall.        Objective    Objective:  Vital signs:   Temp:  [36.2 °C (97.2 °F)-37.1 °C (98.8 °F)] 36.7 °C (98.1 °F)  Heart Rate:  [62-83] 83  Resp:  [16-18] 16  BP: (114-158)/(70-84) 135/76    Physical Exam:  GEN: No acute distress. Alert, awake and conversive.  HEENT: Sclera anicteric. Moist mucous membranes.  RESP: Breathing non-labored, equal chest rise. On RA.  CV: Regular rate.  GI: Abdomen soft, mildly distended, minimally tender. Former ostomy site clean, healing well without drainage. Nontender to palpation.  MSK: No gross deformities. Moves all extremities spontaneously.  NEURO: Alert and oriented x3. No focal deficits.  PSYCH: Appropriate mood and affect.    I/O last 2 completed shifts:  In: 1919 (33.9 mL/kg) [P.O.:860; I.V.:909 (16.1 mL/kg); IV Piggyback:150]  Out: 904 (16 mL/kg) [Urine:900 (0.7 mL/kg/hr); Stool:4]  Weight: 56.6 kg      Labs Past 18 Hours:  No results found for this or any previous visit (from the past 18 hours).     Meds:    Current Facility-Administered Medications:     acetaminophen (Tylenol) tablet 650 mg, 650 mg, oral, q6h, 650 mg at 11/21/24 0331 **AND** oxyCODONE (Roxicodone) immediate release tablet 5 mg, 5 mg, oral, q4h PRN **AND** traMADol (Ultram) tablet 50 mg, 50 mg, oral, q4h PRN **AND** HYDROmorphone (Dilaudid) injection 0.2 mg, 0.2 mg, intravenous,  q4h PRN, Frandy Jeronimo MD    benzocaine-menthol (Cepastat Sore Throat) lozenge 1 lozenge, 1 lozenge, Mouth/Throat, q2h PRN, ISSAC Rincon    calcium carbonate (Tums) chewable tablet 500 mg, 500 mg, oral, Once, Dontrell Portillo MD    [Held by provider] cyclobenzaprine (Flexeril) tablet 5 mg, 5 mg, oral, TID PRN, Frandy Jeronimo MD    dextrose 5 % and lactated Ringer's infusion, 40 mL/hr, intravenous, Continuous, Frandy Jeronimo MD, Last Rate: 40 mL/hr at 11/20/24 1736, 40 mL/hr at 11/20/24 1736    dextrose 50 % injection 12.5 g, 12.5 g, intravenous, q15 min PRN, Luis Alcala MD    dextrose 50 % injection 25 g, 25 g, intravenous, q15 min PRN, Luis Alcala MD    glucagon (Glucagen) injection 1 mg, 1 mg, intramuscular, q15 min PRN, Luis Alcala MD    heparin 25,000 Units in dextrose 5% 250 mL (100 Units/mL) infusion (premix), 0-4,000 Units/hr, intravenous, Continuous, Luis Alcala MD, Last Rate: 7 mL/hr at 11/21/24 0053, 700 Units/hr at 11/21/24 0053    naloxone (Narcan) injection 0.2 mg, 0.2 mg, intravenous, PRN, Frandy Jeronimo MD    [DISCONTINUED] ondansetron ODT (Zofran-ODT) disintegrating tablet 4 mg, 4 mg, oral, q8h PRN **OR** ondansetron (Zofran) injection 4 mg, 4 mg, intravenous, q8h PRN, Frandy Jeronimo MD, 4 mg at 11/16/24 0025    pantoprazole (ProtoNix) injection 40 mg, 40 mg, intravenous, Daily, ISSAC Rincon, 40 mg at 11/20/24 0906    phenoL (Chloraseptic) 1.4 % mouth/throat spray 1 spray, 1 spray, Mouth/Throat, q2h PRN, Latesha Clemente, APRN-CNP    piperacillin-tazobactam (Zosyn) 3.375 g in dextrose (iso) IV 50 mL, 3.375 g, intravenous, q6h, Lalito Keller MD, Stopped at 11/21/24 0413     Imaging:  No results found.    Medications reviewed.  Vital signs reviewed.  Labs reviewed.         Assessment/Plan    Assessment and Plan:  Terra Alexis is a 74 y.o. female with history of cervical cancer s/p JEMMA/BSO 1999, subsequent  chemotherapy and radiation who recently underwent a nephroureterectomy, robot-assisted 8/2024 with involvement of sigmoid colon, s/p resection with DLI, who is now s/p ileostomy reversal with Dr. Denton on 11/13. Postop course prolonged d/t ileus that appears to have since resolved.    Plan Today:   - Pain control with PO tylenol, PRN oxy/tramadol.  - Continue ambulation  - Augmentin for remainder of abx course  - Continue easy to chew diet  - High protein Ensure with meals  - Continue IV protonix. Zofran PRN  - Discontinue mIVF dt improved PO intake  - Home levothyroxine.  - Start home eliquis  - Possible dc tomorrow    Patient's exam, labs, and findings discussed with Dr. Denton, who agrees with the plan as described above.      DENISE HALL, S IV  Surgical Oncology [id number]    I have seen and discussed the patient's interval history, physical and labs with the medical student, and evaluated the patient with the remainder of the surgical team and agree with the findings and plan as documented above.    Doing well today. Continued BM, slowing down. Still soft but less than before. Abdominal exam stable. VSS, otherwise appropriate.    Plan:  - Regular diet, high protein ensure  - Transitioned to augmentin from zosyn, will need 7 days total  - HLIV  - Transition heparin gtt to eliquis  - Anticipate home tomorrow    Frandy Jeronimo MD  PGY-2 General Surgery  Surgical Oncology [id number]

## 2024-11-21 NOTE — PROGRESS NOTES
"Physical Therapy    Physical Therapy Treatment    Patient Name: Terra Alexis  MRN: [id number]  Department: [facility]  Room: [facility]6023-  Today's Date: 11/21/2024  Time Calculation  Start Time: 1632  Stop Time: 1642  Time Calculation (min): 10 min         Assessment/Plan         PT Plan  Treatment/Interventions: Bed mobility, Transfer training, Gait training, Stair training, Therapeutic exercise, Therapeutic activity, Balance training  PT Plan: Ongoing PT  PT Frequency: 3 times per week  PT Discharge Recommendations: Low intensity level of continued care  PT Recommended Transfer Status: Stand by assist  PT - OK to Discharge: Yes (Pt eval completed; d/c rec assessed)      General Visit Information:   PT  Visit  PT Received On: 11/21/24  General  Prior to Session Communication: Bedside nurse  Patient Position Received: Up in chair, Alarm off, not on at start of session  General Comment: pt in chair upon arrival. pt reports she has been ambulating in room. pt not wearing hospital socks and reports she has not been. PTA provided education on importance of wearing proper footwear to prevent fall. PTA provided education on safe mobility techniques. pt initally reports not following spinal precautions however after education and demonstrations reports these are the techniques that she uses to complete bed mobility and transfers. pt declining mobility post education secondary to \" being in the bathroom all day\"    Subjective   Precautions:  Precautions  Medical Precautions: Fall precautions  Post-Surgical Precautions: Abdominal surgery precautions    Vital Signs (Past 2hrs)        Date/Time Vitals Session Patient Position Pulse Resp SpO2 BP MAP (mmHg)    11/21/24 1546 --  --  74  18  98 %  134/81  --                         Objective   Pain:  Pain Assessment  0-10 (Numeric) Pain Score: 0 - No pain    Treatments:  Therapeutic Activity  Therapeutic Activity 1: education on safe mobility techniques and abdominal precautions. pt " initally resistive to education however verbalized understanding    Outcome Measures:  [facility] Basic Mobility  Turning from your back to your side while in a flat bed without using bedrails: A little  Moving from lying on your back to sitting on the side of a flat bed without using bedrails: A little  Moving to and from bed to chair (including a wheelchair): A little  Standing up from a chair using your arms (e.g. wheelchair or bedside chair): A little  To walk in hospital room: A little  Climbing 3-5 steps with railing: A lot  Basic Mobility - Total Score: 17    Education Documentation  No documentation found.  Education Comments  No comments found.        OP EDUCATION:       Encounter Problems       Encounter Problems (Active)       Mobility       LTG - Patient will navigate 12 steps with LRAD independently in order to navigate in home  (Progressing)       Start:  11/14/24    Expected End:  11/28/24            STG - Patient will ambulate >150' with LRAD independently  (Progressing)       Start:  11/14/24    Expected End:  11/28/24               PT Transfers       STG - Patient will perform bed mobility independently maintaining precautions  (Progressing)       Start:  11/14/24    Expected End:  11/28/24            STG - Patient will transfer sit to and from stand independently with LRAD  (Progressing)       Start:  11/14/24    Expected End:  11/28/24               Pain - Adult

## 2024-11-22 ENCOUNTER — DOCUMENTATION (OUTPATIENT)
Dept: HEMATOLOGY/ONCOLOGY | Facility: HOSPITAL | Age: 74
End: 2024-11-22
Payer: MEDICARE

## 2024-11-22 VITALS
HEIGHT: 60 IN | TEMPERATURE: 96.8 F | SYSTOLIC BLOOD PRESSURE: 146 MMHG | HEART RATE: 68 BPM | RESPIRATION RATE: 18 BRPM | BODY MASS INDEX: 24.5 KG/M2 | DIASTOLIC BLOOD PRESSURE: 79 MMHG | OXYGEN SATURATION: 93 % | WEIGHT: 124.78 LBS

## 2024-11-22 PROCEDURE — 2500000001 HC RX 250 WO HCPCS SELF ADMINISTERED DRUGS (ALT 637 FOR MEDICARE OP)

## 2024-11-22 PROCEDURE — 99024 POSTOP FOLLOW-UP VISIT: CPT | Performed by: NURSE PRACTITIONER

## 2024-11-22 PROCEDURE — 2500000002 HC RX 250 W HCPCS SELF ADMINISTERED DRUGS (ALT 637 FOR MEDICARE OP, ALT 636 FOR OP/ED)

## 2024-11-22 PROCEDURE — 2500000001 HC RX 250 WO HCPCS SELF ADMINISTERED DRUGS (ALT 637 FOR MEDICARE OP): Performed by: NURSE PRACTITIONER

## 2024-11-22 PROCEDURE — 2500000004 HC RX 250 GENERAL PHARMACY W/ HCPCS (ALT 636 FOR OP/ED)

## 2024-11-22 RX ORDER — AMOXICILLIN AND CLAVULANATE POTASSIUM 875; 125 MG/1; MG/1
1 TABLET, FILM COATED ORAL EVERY 12 HOURS SCHEDULED
Qty: 4 TABLET | Refills: 0 | Status: SHIPPED | OUTPATIENT
Start: 2024-11-22

## 2024-11-22 RX ORDER — ONDANSETRON 4 MG/1
4 TABLET, FILM COATED ORAL EVERY 8 HOURS PRN
Qty: 20 TABLET | Refills: 0 | Status: SHIPPED | OUTPATIENT
Start: 2024-11-22

## 2024-11-22 ASSESSMENT — PAIN SCALES - GENERAL
PAINLEVEL_OUTOF10: 10 - WORST POSSIBLE PAIN
PAINLEVEL_OUTOF10: 7
PAINLEVEL_OUTOF10: 6
PAINLEVEL_OUTOF10: 6
PAINLEVEL_OUTOF10: 2

## 2024-11-22 ASSESSMENT — COGNITIVE AND FUNCTIONAL STATUS - GENERAL
DAILY ACTIVITIY SCORE: 18
TOILETING: A LITTLE
HELP NEEDED FOR BATHING: A LITTLE
WALKING IN HOSPITAL ROOM: A LITTLE
MOVING FROM LYING ON BACK TO SITTING ON SIDE OF FLAT BED WITH BEDRAILS: A LITTLE
DRESSING REGULAR UPPER BODY CLOTHING: A LITTLE
EATING MEALS: A LITTLE
STANDING UP FROM CHAIR USING ARMS: A LITTLE
MOBILITY SCORE: 18
PERSONAL GROOMING: A LITTLE
TURNING FROM BACK TO SIDE WHILE IN FLAT BAD: A LITTLE
DRESSING REGULAR LOWER BODY CLOTHING: A LITTLE
MOVING TO AND FROM BED TO CHAIR: A LITTLE
CLIMB 3 TO 5 STEPS WITH RAILING: A LITTLE

## 2024-11-22 ASSESSMENT — PAIN SCALES - WONG BAKER
WONGBAKER_NUMERICALRESPONSE: HURTS LITTLE BIT
WONGBAKER_NUMERICALRESPONSE: HURTS LITTLE BIT

## 2024-11-22 ASSESSMENT — PAIN DESCRIPTION - LOCATION
LOCATION: ABDOMEN
LOCATION: ABDOMEN

## 2024-11-22 ASSESSMENT — PAIN - FUNCTIONAL ASSESSMENT
PAIN_FUNCTIONAL_ASSESSMENT: 0-10
PAIN_FUNCTIONAL_ASSESSMENT: 0-10

## 2024-11-22 NOTE — DISCHARGE SUMMARY
Discharge Diagnosis  Ileostomy in place (Multi)    Test Results Pending At Discharge  Pending Labs       Order Current Status    Surgical Pathology Exam In process            Hospital Course  Terra Alexis is a 74y F with recent hx sigmoid resection with DLI 8/2024 admitted after ileostomy closure on 11/13/24. She tolerated the procedure well. She had NGT placed intraoperatively which initially drained thick, bilious output. Postoperative course significant for ileus for which she underwent bowel rest until return of bowel function, and NGT was removed at that time on 11/19/2024. She was treated empirically for possible abscess at prior colonic anastomosis with IV antibiotics.     Pain was controlled initially with IV pain medication and was then transitioned to PO 11/20. Diet was advanced as tolerated.     At the time of discharge, patient was ambulating, voiding and stooling appropriately, and tolerating a diet. Pain was medically optimized and the maximum benefit of hospitalization had been achieved. She was discharged home with instructions and will follow up with surgical oncology on 12/3/2024.     Pertinent Physical Exam At Time of Discharge  Neurological: Awake, alert, conversive  Respiratory/Thorax: even, unlabored  Genitourinary: voiding  Gastrointestinal: soft, NT, ND.  Ostomy site with dressing in place.  Skin: warm, dry  Musculoskeletal: GONZALEZ  Eyes: non-icteric  Extremities: no edema   Psychological: appropriate mood/affect     /79   Pulse 68   Temp 36 °C (96.8 °F)   Resp 18   Ht 1.524 m (5')   Wt 56.6 kg (124 lb 12.5 oz)   SpO2 93%   BMI 24.37 kg/m²      Home Medications     Medication List      START taking these medications     amoxicillin-pot clavulanate 875-125 mg tablet; Commonly known as:   Augmentin; Take 1 tablet by mouth every 12 hours.   ondansetron 4 mg tablet; Commonly known as: Zofran; Take 1 tablet (4 mg)   by mouth every 8 hours if needed for nausea or vomiting.   traMADol 50  mg tablet; Commonly known as: Ultram; Take 1 tablet (50 mg)   by mouth every 6 hours if needed for severe pain (7 - 10) for up to 7   days.     CHANGE how you take these medications     * acetaminophen 325 mg tablet; Commonly known as: Tylenol; Take 2   tablets (650 mg) by mouth every 6 hours if needed for mild pain (1 - 3).;   What changed: Another medication with the same name was added. Make sure   you understand how and when to take each.   * acetaminophen 325 mg tablet; Commonly known as: Tylenol; Take 2   tablets (650 mg) by mouth every 6 hours.; What changed: You were already   taking a medication with the same name, and this prescription was added.   Make sure you understand how and when to take each.  * This list has 2 medication(s) that are the same as other medications   prescribed for you. Read the directions carefully, and ask your doctor or   other care provider to review them with you.     CONTINUE taking these medications     apixaban 5 mg tablet; Commonly known as: Eliquis; Take 1 tablet (5 mg)   by mouth 2 times a day.   hydrOXYzine HCL 10 mg tablet; Commonly known as: Atarax; Take 2.5   tablets (25 mg) by mouth every 4 hours if needed for itching for up to 10   days.   levothyroxine 25 mcg tablet; Commonly known as: Synthroid, Levoxyl; Take   4 tablets (100 mcg) by mouth early in the morning.. Take on an empty   stomach at the same time each day, either 30 to 60 minutes prior to   breakfast     STOP taking these medications     loperamide 2 mg capsule; Commonly known as: Imodium   metoclopramide 5 mg tablet; Commonly known as: Reglan   pantoprazole 40 mg EC tablet; Commonly known as: ProtoNix   PreserVision AREDS 4,296 mcg-226 mg-90 mg capsule; Generic drug:   vitamins A,C,E-zinc-copper       Outpatient Follow-Up  Future Appointments   Date Time Provider Department Center   12/3/2024  1:30 PM Bernard Denton MD MPH [id number] [facility]   2/26/2025  8:00 AM [facility] VASC 1 [facility]   2/26/2025   9:00 AM Sheyla Kasper MD, MS [id number] [facility]       Latesha Clemente, APRN-CNP

## 2024-11-22 NOTE — PROGRESS NOTES
Notified by Dr. Denton that Terra is going home today after ileostomy reversal and will need to reschedule her appointment with Dr. Boone that was missed this week. Message sent to scheduling team.

## 2024-11-26 ENCOUNTER — APPOINTMENT (OUTPATIENT)
Dept: SURGICAL ONCOLOGY | Facility: CLINIC | Age: 74
End: 2024-11-26
Payer: MEDICARE

## 2024-11-26 LAB
LABORATORY COMMENT REPORT: NORMAL
PATH REPORT.FINAL DX SPEC: NORMAL
PATH REPORT.GROSS SPEC: NORMAL
PATH REPORT.RELEVANT HX SPEC: NORMAL
PATH REPORT.TOTAL CANCER: NORMAL

## 2024-12-03 ENCOUNTER — APPOINTMENT (OUTPATIENT)
Dept: SURGICAL ONCOLOGY | Facility: CLINIC | Age: 74
End: 2024-12-03
Payer: MEDICARE

## 2024-12-10 ENCOUNTER — OFFICE VISIT (OUTPATIENT)
Dept: SURGICAL ONCOLOGY | Facility: CLINIC | Age: 74
End: 2024-12-10
Payer: MEDICARE

## 2024-12-10 VITALS
DIASTOLIC BLOOD PRESSURE: 80 MMHG | SYSTOLIC BLOOD PRESSURE: 133 MMHG | BODY MASS INDEX: 23.72 KG/M2 | WEIGHT: 121.47 LBS | RESPIRATION RATE: 18 BRPM | OXYGEN SATURATION: 100 % | TEMPERATURE: 98.2 F | HEART RATE: 84 BPM

## 2024-12-10 DIAGNOSIS — Z98.890 H/O ILEOSTOMY: Primary | ICD-10-CM

## 2024-12-10 DIAGNOSIS — G89.18 POST-OP PAIN: ICD-10-CM

## 2024-12-10 DIAGNOSIS — M54.30 SCIATIC LEG PAIN: ICD-10-CM

## 2024-12-10 PROCEDURE — 1125F AMNT PAIN NOTED PAIN PRSNT: CPT | Performed by: SURGERY

## 2024-12-10 PROCEDURE — 3075F SYST BP GE 130 - 139MM HG: CPT | Performed by: SURGERY

## 2024-12-10 PROCEDURE — 1111F DSCHRG MED/CURRENT MED MERGE: CPT | Performed by: SURGERY

## 2024-12-10 PROCEDURE — 1157F ADVNC CARE PLAN IN RCRD: CPT | Performed by: SURGERY

## 2024-12-10 PROCEDURE — 99211 OFF/OP EST MAY X REQ PHY/QHP: CPT | Performed by: SURGERY

## 2024-12-10 PROCEDURE — 3079F DIAST BP 80-89 MM HG: CPT | Performed by: SURGERY

## 2024-12-10 RX ORDER — OXYCODONE AND ACETAMINOPHEN 5; 325 MG/1; MG/1
1 TABLET ORAL EVERY 6 HOURS PRN
Qty: 5 TABLET | Refills: 0 | Status: SHIPPED | OUTPATIENT
Start: 2024-12-10 | End: 2024-12-17

## 2024-12-10 ASSESSMENT — PAIN SCALES - GENERAL: PAINLEVEL_OUTOF10: 6

## 2024-12-10 NOTE — PROGRESS NOTES
Postoperative visit for ileostomy reversal    Referring Provider:  Teddy Ross MD  No Assigned PCP Generic Provider, MD    Chief Complaint:  History of ileostomy      History of Present Illness:  This is a 74 y.o. female who presents with a history of cervical cancer s/p JEMMA/BSO 1999 subsequently treated with chemotherapy and radiation. Underwent a nephroureterectomy, robot assisted, on 8/15/2024. I was called to evaluate the mesenteric margin of the sigmoid which was involved with tumor. Decision was made for sigmoid resection and reanastamosis with diverting loop ileostomy.     Readmitted 8/24/24 for DGE possible SBO, and was noted to have an acute PE involving the lobar and segmental branches of bilateral lungs. Discharged on Therapeutic Lovenox.      Presented to the ED again 9/1/24 for emesis and abdominal pain and was started on antibiotics for possible pelvic abscess. This appears similar to the postoperative fluid in the pelvis that was seen at her last admission. Nonetheless, she feels better and has had no further episodes.     Saw vascular Medicine recently and was cleared for perioperative cessation of Eliquis.  Continues on immunotherapy with Dr. Boone.    Surgery 11/13/2024-ileostomy takedown    12/10/2024-postop visit.  The patient reports that she has had issues with discomfort and hemorrhoids.  She had called last week regarding diarrhea however her bigger complaint is a feeling of firm stools for which she is bearing down to have bowel movements.  She started reusing MiraLAX yesterday.  She states that her hemorrhoids do itch and that sitz bath's that were started yesterday have helped.  Otherwise she has left back and gluteal pain that radiates down the left thigh.  This has been a chronic issue of back pain however she has never had radiculopathy associated with it.  Otherwise she has been eating well and having bowel movements daily.    Review of Systems:  A complete 12 point review of  "systems was performed and is negative except as noted in the history of present illness.    Vital Signs:  Vitals:    12/10/24 0815   BP: 133/80   Pulse: 84   Resp: 18   Temp: 36.8 °C (98.2 °F)   SpO2: 100%        Physical Exam:  GEN: No acute distress  HEENT: Moist mucus membranes, normocephalic  CARDS: RRR  PULM: No respiratory distress  GI: Soft, non-distended, Incision well healed.  Ileostomy site is closed.  ANAL: External skin tags consistent with hemorrhoids.  None that were tense or inflamed.  Nontender to palpation.  Follicular nodularity in the same region.  No evidence of bleeding or discharge.  SKIN: No rashes or lesions  NEURO: No gross sensorimotor deficits.  Unable to elicit radicular pain with palpation of the lower back or gluteal regions  EXT: No arm or leg swelling    Laboratory Values:  Lab Results   Component Value Date    WBC 7.7 11/21/2024    HGB 8.5 (L) 11/21/2024    HCT 26.8 (L) 11/21/2024    MCV 97 11/21/2024     11/21/2024        Chemistry    Lab Results   Component Value Date/Time     (L) 11/21/2024 0642    K 3.7 11/21/2024 0642     11/21/2024 0642    CO2 22 11/21/2024 0642    BUN 11 11/21/2024 0642    CREATININE 0.95 11/21/2024 0642    Lab Results   Component Value Date/Time    CALCIUM 7.9 (L) 11/21/2024 0642    ALKPHOS 140 (H) 10/25/2024 1340    AST 18 10/25/2024 1340    ALT 17 10/25/2024 1340    BILITOT 0.3 10/25/2024 1340           No results found for: \"PR1\"        Assessment:  This is a 74 y.o. female who presents with a history of cervical cancer and a recent resection of a pelvic mass requiring segmental sigmoid colectomy and diverting loop ileostomy.     Vascular Medicine cleared for perioperative cessation of Eliquis.  Continues on immunotherapy with Dr. Boone.    Surgery 11/13/2024-ileostomy takedown.    Plan:  -- Recovering adequately.  The patient was instructed to take MiraLAX daily especially as she continues to require narcotic pain medicine for some of " her discomfort.    -- Sitz bath's daily.    -- she is being referred to neurosurgery for evaluation of her lower back pain with radiating discomfort consistent with sciatica.  -- Follow-up with medical oncology for cancer surveillance and treatment  -- Follow-up with vascular medicine for DVT management  -- The patient asked very appropriate questions that were answered to the best of my ability with the current information at hand. He knows to call with any questions or concerns that arise    Bernard Denton MD, MPH

## 2024-12-12 NOTE — PROGRESS NOTES
Subjective   Patient ID: Terra Alexis is a 74 y.o. female.      HPI  74 y.o. female presents s/p nephroureterectomy, robot assisted, on 8/15/2024. She has a history of cervical cancer and is status post TAHBSO in 1999, treated with chemo and radiation also.     She was admitted for a ileostomy closure on 11/13/2024. She was discharged on 11/22/2024.    She is having difficulty sleeping related to pain. She is starting to have regular bowel movements after having diarrhea then constipation. She states she may have a sciatic nerve problem that radiates down her leg.     Dr. Boone will be giving her immunotherapy infusions. She has had one immunotherapy injection. She receives 1 every 3 months.     CT Abdomen Pelvis completed on 11/17/2024:  IMPRESSION:  1. Status post ileostomy reversal and ileoileal anastomosis. Contrast  is visualized throughout the proximal small bowel, however does not  traverse the ileo ileal anastomosis. There is mild thickening of the  distal ileum, just proximally to the anastomosis with a punctate  focus of adjacent localized gas which may be postsurgical, however  can not entirely exclude small leak. Distal to the anastomosis, there  is collapse of the ileal loops described in detail above.  2. Contrast present throughout the colon which can be related to  prior contrast study. There is a gas and stool containing collection  anterior to the rectosigmoid anastomotic site with clear  communication with the adjacent anastomosis. This may represent a  blind-ending portion of end to side colorectal anastomosis or can  represent a contained leak from perforation. Correlate with surgical  details.  3. An additional loculated thick-walled collection in the presacral  region is slightly smaller when compared with the prior exam, and may  also communicate with and/or arise from the rectosigmoid anastomosis.  4. There is no discrete enteric contrast extravasation.  5. Moderate volume simple fluid  ascites and small volume  pneumoperitoneum over the anterior hepatic and omental borders,  likely postsurgical.  6. Small bilateral pleural effusions with overlying pulmonary  consolidation, likely atelectatic however superimposed infection is  not excluded.  7. Mild body wall edema and postsurgical changes of the ventral  midline and right anterior abdomen.      Surgical Pathology Exam collected on 08/15/2024:  FINAL DIAGNOSIS   A. Soft tissue, left periureteral, biopsy:  -- Invasive poorly differentiated carcinoma with squamous differentiation     B. Colon, sigmoid, segmental resection:  -- Poorly differentiated carcinoma with squamous differentiation involving mesentery and bowel wall  -- Margins of resection are negative for carcinoma  -- Hyperplastic polyp  -- Two lymph nodes, negative for carcinoma (0/2)     C. Ureter, left, segmental resection:  -- Invasive carcinoma with extensive squamous differentiation (see comment)  -- Invasive carcinoma is present at the margin of resection  -- See cancer summary report     D. Kidney and proximal ureter, left, nephroureterectomy:  -- Atrophic renal parenchyma with marked chronic inflammation, interstitial fibrosis with tubular atrophy, glomerulosclerosis and severe arterio- and arteriolosclerosis       Review of Systems  A complete review of systems was performed. All systems are noted to be negative unless indicated in the history of present illness, impression, active problem list, or past histories.     Objective   Physical Exam    Assessment/Plan   Diagnoses and all orders for this visit:  Cancer of left ureter (Multi)  -     MR pelvis w and wo IV contrast; Future      74 y.o. female presents s/p nephroureterectomy, robot assisted, on 8/15/2024. She has a history of cervical cancer and [facility] with [facility] and [facility] also.  She was admitted for a ileostomy closure on 11/13/2024. She was discharged on 11/22/2024.    I personally  reviewed the surgical pathology collected on 08/15/2024. In addition I reviewed the CT abdomen and pelvis that was preformed on 11/17/2024. I have advised the patient that there is an area that appears to be a mass that is fluid filled. Another area is suspicious for a mass, this is reduced compared to a recent image. I have sent a message to Dr. Boone in regards to the images.     I have advised the patient that the fact that the cancer is not seen in any other area of her body is encouraging. This could be related to the treatment she received.    The sciatic nerve was not seen during the surgery. A neurosurgeon accompanied us during surgery, to ensure no impingement occurred.       Plan:  MRI Pelvis with and without contrast  Continue immunotherapy treatments with Dr. Boone   FU 3 months      Scribe Attestation   By signing my name below, I, Ninoska Licona, Scribe attestation that this documentation has been prepared under the direction and in the presence of Teddy Ross MD.

## 2024-12-13 ENCOUNTER — APPOINTMENT (OUTPATIENT)
Dept: UROLOGY | Facility: CLINIC | Age: 74
End: 2024-12-13
Payer: MEDICARE

## 2024-12-13 ENCOUNTER — LAB (OUTPATIENT)
Dept: LAB | Facility: LAB | Age: 74
End: 2024-12-13
Payer: MEDICARE

## 2024-12-13 DIAGNOSIS — C68.9 UROTHELIAL CARCINOMA (MULTI): ICD-10-CM

## 2024-12-13 DIAGNOSIS — C66.2 CANCER OF LEFT URETER (MULTI): ICD-10-CM

## 2024-12-13 LAB
ALBUMIN SERPL BCP-MCNC: 3.6 G/DL (ref 3.4–5)
ALP SERPL-CCNC: 167 U/L (ref 33–136)
ALT SERPL W P-5'-P-CCNC: 6 U/L (ref 7–45)
ANION GAP SERPL CALC-SCNC: 16 MMOL/L (ref 10–20)
AST SERPL W P-5'-P-CCNC: 10 U/L (ref 9–39)
BASOPHILS # BLD AUTO: 0.07 X10*3/UL (ref 0–0.1)
BASOPHILS NFR BLD AUTO: 0.8 %
BILIRUB SERPL-MCNC: 0.4 MG/DL (ref 0–1.2)
BUN SERPL-MCNC: 14 MG/DL (ref 6–23)
CALCIUM SERPL-MCNC: 9.4 MG/DL (ref 8.6–10.3)
CHLORIDE SERPL-SCNC: 102 MMOL/L (ref 98–107)
CO2 SERPL-SCNC: 23 MMOL/L (ref 21–32)
CREAT SERPL-MCNC: 0.95 MG/DL (ref 0.5–1.05)
EGFRCR SERPLBLD CKD-EPI 2021: 63 ML/MIN/1.73M*2
EOSINOPHIL # BLD AUTO: 0.22 X10*3/UL (ref 0–0.4)
EOSINOPHIL NFR BLD AUTO: 2.4 %
ERYTHROCYTE [DISTWIDTH] IN BLOOD BY AUTOMATED COUNT: 16.6 % (ref 11.5–14.5)
GLUCOSE SERPL-MCNC: 78 MG/DL (ref 74–99)
HCT VFR BLD AUTO: 29.4 % (ref 36–46)
HGB BLD-MCNC: 8.9 G/DL (ref 12–16)
IMM GRANULOCYTES # BLD AUTO: 0.1 X10*3/UL (ref 0–0.5)
IMM GRANULOCYTES NFR BLD AUTO: 1.1 % (ref 0–0.9)
LYMPHOCYTES # BLD AUTO: 1.45 X10*3/UL (ref 0.8–3)
LYMPHOCYTES NFR BLD AUTO: 15.6 %
MCH RBC QN AUTO: 29.8 PG (ref 26–34)
MCHC RBC AUTO-ENTMCNC: 30.3 G/DL (ref 32–36)
MCV RBC AUTO: 98 FL (ref 80–100)
MONOCYTES # BLD AUTO: 0.77 X10*3/UL (ref 0.05–0.8)
MONOCYTES NFR BLD AUTO: 8.3 %
NEUTROPHILS # BLD AUTO: 6.66 X10*3/UL (ref 1.6–5.5)
NEUTROPHILS NFR BLD AUTO: 71.8 %
NRBC BLD-RTO: 0 /100 WBCS (ref 0–0)
PLATELET # BLD AUTO: 474 X10*3/UL (ref 150–450)
POTASSIUM SERPL-SCNC: 4.6 MMOL/L (ref 3.5–5.3)
PROT SERPL-MCNC: 6.6 G/DL (ref 6.4–8.2)
RBC # BLD AUTO: 2.99 X10*6/UL (ref 4–5.2)
SODIUM SERPL-SCNC: 136 MMOL/L (ref 136–145)
WBC # BLD AUTO: 9.3 X10*3/UL (ref 4.4–11.3)

## 2024-12-13 PROCEDURE — 1111F DSCHRG MED/CURRENT MED MERGE: CPT | Performed by: STUDENT IN AN ORGANIZED HEALTH CARE EDUCATION/TRAINING PROGRAM

## 2024-12-13 PROCEDURE — G2211 COMPLEX E/M VISIT ADD ON: HCPCS | Performed by: STUDENT IN AN ORGANIZED HEALTH CARE EDUCATION/TRAINING PROGRAM

## 2024-12-13 PROCEDURE — 36415 COLL VENOUS BLD VENIPUNCTURE: CPT

## 2024-12-13 PROCEDURE — 1157F ADVNC CARE PLAN IN RCRD: CPT | Performed by: STUDENT IN AN ORGANIZED HEALTH CARE EDUCATION/TRAINING PROGRAM

## 2024-12-13 PROCEDURE — 99214 OFFICE O/P EST MOD 30 MIN: CPT | Performed by: STUDENT IN AN ORGANIZED HEALTH CARE EDUCATION/TRAINING PROGRAM

## 2024-12-13 PROCEDURE — 85025 COMPLETE CBC W/AUTO DIFF WBC: CPT

## 2024-12-13 PROCEDURE — 80053 COMPREHEN METABOLIC PANEL: CPT

## 2024-12-16 ENCOUNTER — INFUSION (OUTPATIENT)
Dept: HEMATOLOGY/ONCOLOGY | Facility: CLINIC | Age: 74
End: 2024-12-16
Payer: MEDICARE

## 2024-12-16 ENCOUNTER — OFFICE VISIT (OUTPATIENT)
Dept: HEMATOLOGY/ONCOLOGY | Facility: CLINIC | Age: 74
End: 2024-12-16
Payer: MEDICARE

## 2024-12-16 VITALS
SYSTOLIC BLOOD PRESSURE: 128 MMHG | OXYGEN SATURATION: 98 % | RESPIRATION RATE: 18 BRPM | WEIGHT: 116 LBS | DIASTOLIC BLOOD PRESSURE: 77 MMHG | TEMPERATURE: 99 F | HEART RATE: 92 BPM | BODY MASS INDEX: 22.65 KG/M2

## 2024-12-16 DIAGNOSIS — C64.2 MALIGNANT NEOPLASM OF LEFT KIDNEY (MULTI): Primary | ICD-10-CM

## 2024-12-16 DIAGNOSIS — C68.9 UROTHELIAL CARCINOMA (MULTI): ICD-10-CM

## 2024-12-16 DIAGNOSIS — C68.9 UROTHELIAL CANCER (MULTI): ICD-10-CM

## 2024-12-16 DIAGNOSIS — C68.9 UROTHELIAL CARCINOMA (MULTI): Primary | ICD-10-CM

## 2024-12-16 PROCEDURE — 1160F RVW MEDS BY RX/DR IN RCRD: CPT | Performed by: STUDENT IN AN ORGANIZED HEALTH CARE EDUCATION/TRAINING PROGRAM

## 2024-12-16 PROCEDURE — 1157F ADVNC CARE PLAN IN RCRD: CPT | Performed by: STUDENT IN AN ORGANIZED HEALTH CARE EDUCATION/TRAINING PROGRAM

## 2024-12-16 PROCEDURE — 1111F DSCHRG MED/CURRENT MED MERGE: CPT | Performed by: STUDENT IN AN ORGANIZED HEALTH CARE EDUCATION/TRAINING PROGRAM

## 2024-12-16 PROCEDURE — 2500000004 HC RX 250 GENERAL PHARMACY W/ HCPCS (ALT 636 FOR OP/ED): Performed by: STUDENT IN AN ORGANIZED HEALTH CARE EDUCATION/TRAINING PROGRAM

## 2024-12-16 PROCEDURE — 3078F DIAST BP <80 MM HG: CPT | Performed by: STUDENT IN AN ORGANIZED HEALTH CARE EDUCATION/TRAINING PROGRAM

## 2024-12-16 PROCEDURE — 1125F AMNT PAIN NOTED PAIN PRSNT: CPT | Performed by: STUDENT IN AN ORGANIZED HEALTH CARE EDUCATION/TRAINING PROGRAM

## 2024-12-16 PROCEDURE — 96413 CHEMO IV INFUSION 1 HR: CPT

## 2024-12-16 PROCEDURE — 99214 OFFICE O/P EST MOD 30 MIN: CPT | Performed by: STUDENT IN AN ORGANIZED HEALTH CARE EDUCATION/TRAINING PROGRAM

## 2024-12-16 PROCEDURE — 3074F SYST BP LT 130 MM HG: CPT | Performed by: STUDENT IN AN ORGANIZED HEALTH CARE EDUCATION/TRAINING PROGRAM

## 2024-12-16 PROCEDURE — 1159F MED LIST DOCD IN RCRD: CPT | Performed by: STUDENT IN AN ORGANIZED HEALTH CARE EDUCATION/TRAINING PROGRAM

## 2024-12-16 RX ORDER — HEPARIN 100 UNIT/ML
500 SYRINGE INTRAVENOUS AS NEEDED
OUTPATIENT
Start: 2024-12-16

## 2024-12-16 RX ORDER — ALBUTEROL SULFATE 0.83 MG/ML
3 SOLUTION RESPIRATORY (INHALATION) AS NEEDED
OUTPATIENT
Start: 2025-01-07

## 2024-12-16 RX ORDER — ALBUTEROL SULFATE 0.83 MG/ML
3 SOLUTION RESPIRATORY (INHALATION) AS NEEDED
OUTPATIENT
Start: 2024-12-17

## 2024-12-16 RX ORDER — PROCHLORPERAZINE EDISYLATE 5 MG/ML
10 INJECTION INTRAMUSCULAR; INTRAVENOUS EVERY 6 HOURS PRN
Status: DISCONTINUED | OUTPATIENT
Start: 2024-12-16 | End: 2024-12-16 | Stop reason: HOSPADM

## 2024-12-16 RX ORDER — PROCHLORPERAZINE EDISYLATE 5 MG/ML
10 INJECTION INTRAMUSCULAR; INTRAVENOUS EVERY 6 HOURS PRN
OUTPATIENT
Start: 2024-12-17

## 2024-12-16 RX ORDER — DIPHENHYDRAMINE HYDROCHLORIDE 50 MG/ML
50 INJECTION INTRAMUSCULAR; INTRAVENOUS AS NEEDED
OUTPATIENT
Start: 2025-01-07

## 2024-12-16 RX ORDER — FAMOTIDINE 10 MG/ML
20 INJECTION INTRAVENOUS ONCE AS NEEDED
OUTPATIENT
Start: 2024-12-17

## 2024-12-16 RX ORDER — DIPHENHYDRAMINE HYDROCHLORIDE 50 MG/ML
50 INJECTION INTRAMUSCULAR; INTRAVENOUS AS NEEDED
OUTPATIENT
Start: 2024-12-17

## 2024-12-16 RX ORDER — EPINEPHRINE 0.3 MG/.3ML
0.3 INJECTION SUBCUTANEOUS EVERY 5 MIN PRN
OUTPATIENT
Start: 2024-12-17

## 2024-12-16 RX ORDER — DIPHENHYDRAMINE HYDROCHLORIDE 50 MG/ML
50 INJECTION INTRAMUSCULAR; INTRAVENOUS AS NEEDED
Status: DISCONTINUED | OUTPATIENT
Start: 2024-12-16 | End: 2024-12-16 | Stop reason: HOSPADM

## 2024-12-16 RX ORDER — PROCHLORPERAZINE MALEATE 10 MG
10 TABLET ORAL EVERY 6 HOURS PRN
Status: DISCONTINUED | OUTPATIENT
Start: 2024-12-16 | End: 2024-12-16 | Stop reason: HOSPADM

## 2024-12-16 RX ORDER — HEPARIN 100 UNIT/ML
500 SYRINGE INTRAVENOUS AS NEEDED
Status: DISCONTINUED | OUTPATIENT
Start: 2024-12-16 | End: 2024-12-16 | Stop reason: HOSPADM

## 2024-12-16 RX ORDER — ALBUTEROL SULFATE 0.83 MG/ML
3 SOLUTION RESPIRATORY (INHALATION) AS NEEDED
Status: DISCONTINUED | OUTPATIENT
Start: 2024-12-16 | End: 2024-12-16 | Stop reason: HOSPADM

## 2024-12-16 RX ORDER — FAMOTIDINE 10 MG/ML
20 INJECTION INTRAVENOUS ONCE AS NEEDED
OUTPATIENT
Start: 2025-01-07

## 2024-12-16 RX ORDER — EPINEPHRINE 0.3 MG/.3ML
0.3 INJECTION SUBCUTANEOUS EVERY 5 MIN PRN
OUTPATIENT
Start: 2025-01-07

## 2024-12-16 RX ORDER — HEPARIN SODIUM,PORCINE/PF 10 UNIT/ML
50 SYRINGE (ML) INTRAVENOUS AS NEEDED
OUTPATIENT
Start: 2024-12-16

## 2024-12-16 RX ORDER — PROCHLORPERAZINE EDISYLATE 5 MG/ML
10 INJECTION INTRAMUSCULAR; INTRAVENOUS EVERY 6 HOURS PRN
OUTPATIENT
Start: 2025-01-07

## 2024-12-16 RX ORDER — HEPARIN SODIUM,PORCINE/PF 10 UNIT/ML
50 SYRINGE (ML) INTRAVENOUS AS NEEDED
Status: DISCONTINUED | OUTPATIENT
Start: 2024-12-16 | End: 2024-12-16 | Stop reason: HOSPADM

## 2024-12-16 RX ORDER — EPINEPHRINE 0.3 MG/.3ML
0.3 INJECTION SUBCUTANEOUS EVERY 5 MIN PRN
Status: DISCONTINUED | OUTPATIENT
Start: 2024-12-16 | End: 2024-12-16 | Stop reason: HOSPADM

## 2024-12-16 RX ORDER — PROCHLORPERAZINE MALEATE 10 MG
10 TABLET ORAL EVERY 6 HOURS PRN
OUTPATIENT
Start: 2025-01-07

## 2024-12-16 RX ORDER — PROCHLORPERAZINE MALEATE 10 MG
10 TABLET ORAL EVERY 6 HOURS PRN
OUTPATIENT
Start: 2024-12-17

## 2024-12-16 RX ORDER — FAMOTIDINE 10 MG/ML
20 INJECTION INTRAVENOUS ONCE AS NEEDED
Status: DISCONTINUED | OUTPATIENT
Start: 2024-12-16 | End: 2024-12-16 | Stop reason: HOSPADM

## 2024-12-16 ASSESSMENT — ENCOUNTER SYMPTOMS
DIARRHEA: 1
DYSURIA: 1
CHILLS: 0
UNEXPECTED WEIGHT CHANGE: 1
CONSTIPATION: 1
HEMATURIA: 0
LIGHT-HEADEDNESS: 0
VOMITING: 1
ABDOMINAL PAIN: 1
BLOOD IN STOOL: 0
FLANK PAIN: 1
ENDOCRINE NEGATIVE: 1
SHORTNESS OF BREATH: 0
WOUND: 0
BACK PAIN: 1
ARTHRALGIAS: 1
FATIGUE: 1
SLEEP DISTURBANCE: 1
DIFFICULTY URINATING: 0
HEMATOLOGIC/LYMPHATIC NEGATIVE: 1
NECK PAIN: 0
NAUSEA: 0
DIZZINESS: 0
APPETITE CHANGE: 1
MYALGIAS: 0
COUGH: 0
FEVER: 0
EYE PROBLEMS: 1
HEADACHES: 0
LEG SWELLING: 0
NUMBNESS: 1

## 2024-12-16 ASSESSMENT — PAIN SCALES - GENERAL: PAINLEVEL_OUTOF10: 8

## 2024-12-16 NOTE — PROGRESS NOTES
Patient ID: Terra Alexis is a 74 y.o. female.  Diagnosis:  T4 UC   MedOnc: Dr. Boone   Urologist: Dr. Ross  Gyn: Dr. Nathan Noyola - [facility]     Patient Care Team:  No Assigned Pcp Generic Provider, MD as PCP - General (General Practice)  Elgin Boone MD as Consulting Physician (Hematology and Oncology)  Sheyla Kasper MD, MS as Consulting Physician (Vascular Medicine)    ONCOLOGIC HISTORY  Patient is referred by Dr. Ross for recently diagnosed invasive carcinoma, may represent urothelial origin vs h/o cervical cancer. Patient presented to the ER in February 2024 with c/o flank pain imaging revealed soft tissue mass encasing the distal segment of the left ureter. Ureteral biopsy performed in IR on 03/22/2024 revealed the above diagnosis.      1998 Cervical cancer, tx Chemoradiation, with weekly cisplatin, and radiation completed on 1/4/99.   2/1999 Total abdominal hysterectomy and bilateral salpingo-oophorectomy, with no residual carcinoma found.   1/23/08 Category 1 mammogram  11/25/08 LGSIL ANTHONY 1 consistent with HPV effect  1/13/09 MID VAGINAL APEX, BIOPSY: MILD VAGINAL INTRAEPITHELIAL NEOPLASIA.  03/2024 - pelvic irresectable distal urothelial mass, bx proven UC  4/11/24 - Cycle 1 EV + Pembro  5/1/24 - Cycle 2 EV + Pembro  5/14/24 - sick visit call  5/23/24 - Scans show [facility]. C3 EV (reduced to 1.125 mg/kg)+pembro   6/13/24 - C4 EV (1.125 mg/kg) + Pembro  7/5/24 - C5 EV pembro   8/15/24 - nephroureterectomy + sigmoid colon resection  8/24/24 - PE on ACO  10/3/24 - pembro C6  10/28/24 - pembro C7  11/2224 - ileostomy closure  12/16/24 - pembro C8    Other Contributing History  Cervical cancer - s/p chemoradiation and surgery    Review of Systems   Constitutional:  Positive for appetite change, fatigue and unexpected weight change. Negative for chills and fever.   HENT:  Negative.     Eyes:  Positive for eye problems.   Respiratory:  Negative for cough and shortness of breath.    Cardiovascular:  Negative  for chest pain and leg swelling.   Gastrointestinal:  Positive for abdominal pain, constipation, diarrhea and vomiting. Negative for blood in stool and nausea.        Reflux, food aversion, bloating   Endocrine: Negative.    Genitourinary:  Positive for dysuria and pelvic pain. Negative for difficulty urinating, hematuria, vaginal bleeding and vaginal discharge.         Related to stent, pelvic cramping   Musculoskeletal:  Positive for arthralgias, back pain and flank pain. Negative for myalgias and neck pain.   Skin:  Negative for itching, rash and wound.        Soreness on arms and thighs   Neurological:  Positive for numbness. Negative for dizziness, headaches and light-headedness.   Hematological: Negative.    Psychiatric/Behavioral:  Positive for sleep disturbance.      Objective    /77   Pulse 92   Temp 37.2 °C (99 °F)   Resp 18   Wt 52.6 kg (116 lb)   SpO2 98%   BMI 22.65 kg/m²   BSA: 1.49 meters squared    Wt Readings from Last 5 Encounters:   12/16/24 52.6 kg (116 lb)   12/10/24 55.1 kg (121 lb 7.6 oz)   11/13/24 56.6 kg (124 lb 12.5 oz)   10/28/24 56.7 kg (125 lb)   10/28/24 56.9 kg (125 lb 6.4 oz)     Performance Status:  ECOG Score: 0- Fully active, able to carry on all pre-disease performance w/o restriction.  Karnofsky Score: 90 - Able to carry on normal activity; minor signs or symptoms of disease     Physical Exam  Physical Exam  Constitutional:       General: She is not in acute distress.     Appearance: Normal appearance. She is not toxic-appearing.   HENT:      Head: Normocephalic and atraumatic.      Mouth/Throat:      Mouth: Mucous membranes are moist.      Pharynx: Oropharynx is clear.   Eyes:      Pupils: Pupils are equal, round, and reactive to light.   Pulmonary:      Effort: Pulmonary effort is normal.   Musculoskeletal:         General: Normal range of motion.      Cervical back: Normal range of motion.      Right lower leg: No edema.      Left lower leg: No edema.   Skin:      General: Skin is warm and dry.      Findings: No rash.   Neurological:      General: No focal deficit present.      Mental Status: She is alert and oriented to person, place, and time.      Motor: No weakness.   Psychiatric:         Mood and Affect: Mood normal.         Behavior: Behavior normal.         Thought Content: Thought content normal.         Judgment: Judgment normal.     Allergies  Allergies   Allergen Reactions    Codeine Hallucinations, GI Upset and Nausea/vomiting    Percocet [Oxycodone-Acetaminophen] Dizziness and Nausea/vomiting      Medications  Current Outpatient Medications   Medication Instructions    acetaminophen (TYLENOL) 650 mg, oral, Every 6 hours PRN    acetaminophen (TYLENOL) 650 mg, oral, Every 6 hours    amoxicillin-pot clavulanate (Augmentin) 875-125 mg tablet 1 tablet, oral, Every 12 hours scheduled    apixaban (ELIQUIS) 5 mg, oral, 2 times daily    hydrOXYzine HCL (ATARAX) 25 mg, oral, Every 4 hours PRN    levothyroxine (SYNTHROID, LEVOXYL) 100 mcg, oral, Daily, Take on an empty stomach at the same time each day, either 30 to 60 minutes prior to breakfast    ondansetron (ZOFRAN) 4 mg, oral, Every 8 hours PRN    oxyCODONE-acetaminophen (Percocet) 5-325 mg tablet 1 tablet, oral, Every 6 hours PRN        Diagnostic Results   Recent Labs  Results for orders placed or performed in visit on 12/13/24 (from the past 96 hours)   CBC and Auto Differential   Result Value Ref Range    WBC 9.3 4.4 - 11.3 x10*3/uL    nRBC 0.0 0.0 - 0.0 /100 WBCs    RBC 2.99 (L) 4.00 - 5.20 x10*6/uL    Hemoglobin 8.9 (L) 12.0 - 16.0 g/dL    Hematocrit 29.4 (L) 36.0 - 46.0 %    MCV 98 80 - 100 fL    MCH 29.8 26.0 - 34.0 pg    MCHC 30.3 (L) 32.0 - 36.0 g/dL    RDW 16.6 (H) 11.5 - 14.5 %    Platelets 474 (H) 150 - 450 x10*3/uL    Neutrophils % 71.8 40.0 - 80.0 %    Immature Granulocytes %, Automated 1.1 (H) 0.0 - 0.9 %    Lymphocytes % 15.6 13.0 - 44.0 %    Monocytes % 8.3 2.0 - 10.0 %    Eosinophils % 2.4 0.0 - 6.0 %     Basophils % 0.8 0.0 - 2.0 %    Neutrophils Absolute 6.66 (H) 1.60 - 5.50 x10*3/uL    Immature Granulocytes Absolute, Automated 0.10 0.00 - 0.50 x10*3/uL    Lymphocytes Absolute 1.45 0.80 - 3.00 x10*3/uL    Monocytes Absolute 0.77 0.05 - 0.80 x10*3/uL    Eosinophils Absolute 0.22 0.00 - 0.40 x10*3/uL    Basophils Absolute 0.07 0.00 - 0.10 x10*3/uL   Comprehensive metabolic panel   Result Value Ref Range    Glucose 78 74 - 99 mg/dL    Sodium 136 136 - 145 mmol/L    Potassium 4.6 3.5 - 5.3 mmol/L    Chloride 102 98 - 107 mmol/L    Bicarbonate 23 21 - 32 mmol/L    Anion Gap 16 10 - 20 mmol/L    Urea Nitrogen 14 6 - 23 mg/dL    Creatinine 0.95 0.50 - 1.05 mg/dL    eGFR 63 >60 mL/min/1.73m*2    Calcium 9.4 8.6 - 10.3 mg/dL    Albumin 3.6 3.4 - 5.0 g/dL    Alkaline Phosphatase 167 (H) 33 - 136 U/L    Total Protein 6.6 6.4 - 8.2 g/dL    AST 10 9 - 39 U/L    Bilirubin, Total 0.4 0.0 - 1.2 mg/dL    ALT 6 (L) 7 - 45 U/L       Genetics   Signatera 4/11/24 0.45     Pathology  Surgical Path 3/22/24   FINAL DIAGNOSIS   A. Soft tissue, mass, biopsy:    -- Involved by invasive carcinoma. See note.     Note: Patient's imaging finding of a soft tissue mass encasing the distal ureter and remote history of cervical cancer (per clinical notes) is noted. Histological sections show cores of fibroconnective tissue involved by infiltrative nests of high grade carcinoma cells, which are immunohistochemically positive for pancytokeratin AE1/AE3, CAM5.2, GATA3 and p63 and are negative for PAX8 and ER. The morphological and immunohistochemical findings are not entirely specific. Given the clinical context of a distal ureteral mass, this might represent invasive carcinoma of urothelial origin. Clinical correlation is recommended.     Recent Imaging   MRI Pelvis -   1. A 2.9 x 2.5 x 3.6 cm diffusion restricting enhancing mass encasing  the left distal ureteral stent anterior to the left sacral ala,  compatible with known urothelial neoplasm,  "with similar measurements  on prior CT 07/01/2024 but has decreased in size when compared to  prior MRI from 03/22/2024. The mass completely encases the left  internal iliac artery and vein which are likely occluded.  Left  common iliac vein and left external iliac vein abuts the mass but  remain patent.  There is at least partial encasement of the left S1  and S2 nerve roots. The mass abuts the left sacrum without definite  evidence of osseous extension/destruction.  2. No evidence of lymphadenopathy or other metastatic disease in the  pelvis.      8/15/24 Colon resection + nephro-U  FINAL DIAGNOSIS   A. Soft tissue, left periureteral, biopsy:  -- Invasive poorly differentiated carcinoma with squamous differentiation     B. Colon, sigmoid, segmental resection:  -- Poorly differentiated carcinoma with squamous differentiation involving mesentery and bowel wall  -- Margins of resection are negative for carcinoma  -- Hyperplastic polyp  -- Two lymph nodes, negative for carcinoma (0/2)     C. Ureter, left, segmental resection:  -- Invasive carcinoma with extensive squamous differentiation (see comment)  -- Invasive carcinoma is present at the margin of resection  -- See cancer summary report     D. Kidney and proximal ureter, left, nephroureterectomy:  -- Atrophic renal parenchyma with marked chronic inflammation, interstitial fibrosis with tubular atrophy, glomerulosclerosis and severe arterio- and arteriolosclerosis     Assessment/Plan   Terra Alexis is a 74 y.o.  female with hx cervical cancer s/p chemoRT 1998, now found with pelvic irresectable distal urothelial mass, bx proven UC. Now post EV/pembro with tumor shrinkage (around 30%). Underwent nephro-U + colon resection with residual tumor in path - pT4 with therapy changes. KALEY now. Recent  ileostomy closure. On \"adjuvant\" pembro. Will get signatera. Will increase synthroid. On eliquis now. Will continue with ctDNA  I saw and evaluated the patient. I " personally obtained the key and critical portions of the history and physical exam or was physically present for key and critical portions performed by the resident/fellow. I reviewed the resident/fellow's documentation and discussed the patient with the resident/fellow. I agree with the resident/fellow's medical decision making as documented in the note.   Elgin Boone MD MSc FACP  [facility] Chair in Cancer Research   in Medicine [employer]  Director Clinical  Medical Oncology Research Program   [facility] [phone]  Office [phone]

## 2024-12-17 ENCOUNTER — TELEPHONE (OUTPATIENT)
Dept: HEMATOLOGY/ONCOLOGY | Facility: HOSPITAL | Age: 74
End: 2024-12-17
Payer: MEDICARE

## 2024-12-17 NOTE — TELEPHONE ENCOUNTER
"Terra states that she is having a dental implant at the end of the month and her dentist wants to put her on two medications. She just wants to make sure that there are no interactions with anything she is currently taking. The medications are as follows:    Amoxicillin  A steroid \"roll back\"    Message sent to team.  "

## 2024-12-17 NOTE — TELEPHONE ENCOUNTER
Per Cinthya Barnard CNP:    Checked with pharmacist, ok to receive both amoxicillin and steroid dose pack. Just no prolonged steroids.       Attempted to call pt back. Detailed message left on identified line with call back number should she have any additional questions or concerns.

## 2024-12-30 ENCOUNTER — TELEPHONE (OUTPATIENT)
Dept: SURGICAL ONCOLOGY | Facility: HOSPITAL | Age: 74
End: 2024-12-30
Payer: MEDICARE

## 2024-12-30 DIAGNOSIS — M54.30 SCIATIC LEG PAIN: ICD-10-CM

## 2024-12-30 RX ORDER — OXYCODONE AND ACETAMINOPHEN 5; 325 MG/1; MG/1
1 TABLET ORAL EVERY 6 HOURS PRN
Qty: 10 TABLET | Refills: 0 | Status: SHIPPED | OUTPATIENT
Start: 2024-12-30 | End: 2025-01-06

## 2024-12-31 ENCOUNTER — HOSPITAL ENCOUNTER (OUTPATIENT)
Dept: RADIOLOGY | Facility: HOSPITAL | Age: 74
Discharge: HOME | End: 2024-12-31
Payer: MEDICARE

## 2024-12-31 DIAGNOSIS — C66.2 CANCER OF LEFT URETER (MULTI): ICD-10-CM

## 2024-12-31 LAB
COMMENTS - MP RESULT TYPE: NORMAL
SCAN RESULT: NORMAL

## 2024-12-31 PROCEDURE — A9575 INJ GADOTERATE MEGLUMI 0.1ML: HCPCS | Performed by: STUDENT IN AN ORGANIZED HEALTH CARE EDUCATION/TRAINING PROGRAM

## 2024-12-31 PROCEDURE — 72197 MRI PELVIS W/O & W/DYE: CPT

## 2024-12-31 PROCEDURE — 2550000001 HC RX 255 CONTRASTS: Performed by: STUDENT IN AN ORGANIZED HEALTH CARE EDUCATION/TRAINING PROGRAM

## 2024-12-31 RX ORDER — GADOTERATE MEGLUMINE 376.9 MG/ML
10 INJECTION INTRAVENOUS
Status: COMPLETED | OUTPATIENT
Start: 2024-12-31 | End: 2024-12-31

## 2024-12-31 RX ADMIN — GADOTERATE MEGLUMINE 10 ML: 376.9 INJECTION INTRAVENOUS at 19:48

## 2025-01-02 ENCOUNTER — DOCUMENTATION (OUTPATIENT)
Dept: HEMATOLOGY/ONCOLOGY | Facility: CLINIC | Age: 75
End: 2025-01-02
Payer: MEDICARE

## 2025-01-02 NOTE — PROGRESS NOTES
Left voice mail message for patient instructing them to complete lab work at any  lab before their upcoming Cinthya Barnard appointment. Instructed to call [phone] if they have any further questions.

## 2025-01-03 ENCOUNTER — LAB (OUTPATIENT)
Dept: LAB | Facility: LAB | Age: 75
End: 2025-01-03
Payer: MEDICARE

## 2025-01-03 DIAGNOSIS — C68.9 UROTHELIAL CARCINOMA (MULTI): ICD-10-CM

## 2025-01-03 LAB
ALBUMIN SERPL BCP-MCNC: 3.4 G/DL (ref 3.4–5)
ALP SERPL-CCNC: 160 U/L (ref 33–136)
ALT SERPL W P-5'-P-CCNC: 6 U/L (ref 7–45)
ANION GAP SERPL CALC-SCNC: 14 MMOL/L (ref 10–20)
AST SERPL W P-5'-P-CCNC: 9 U/L (ref 9–39)
BASOPHILS # BLD AUTO: 0.04 X10*3/UL (ref 0–0.1)
BASOPHILS NFR BLD AUTO: 0.4 %
BILIRUB SERPL-MCNC: 0.3 MG/DL (ref 0–1.2)
BUN SERPL-MCNC: 16 MG/DL (ref 6–23)
CALCIUM SERPL-MCNC: 8.9 MG/DL (ref 8.6–10.3)
CHLORIDE SERPL-SCNC: 101 MMOL/L (ref 98–107)
CO2 SERPL-SCNC: 24 MMOL/L (ref 21–32)
CREAT SERPL-MCNC: 0.91 MG/DL (ref 0.5–1.05)
EGFRCR SERPLBLD CKD-EPI 2021: 67 ML/MIN/1.73M*2
EOSINOPHIL # BLD AUTO: 0.21 X10*3/UL (ref 0–0.4)
EOSINOPHIL NFR BLD AUTO: 2 %
ERYTHROCYTE [DISTWIDTH] IN BLOOD BY AUTOMATED COUNT: 16.7 % (ref 11.5–14.5)
GLUCOSE SERPL-MCNC: 82 MG/DL (ref 74–99)
HCT VFR BLD AUTO: 26.6 % (ref 36–46)
HGB BLD-MCNC: 8.2 G/DL (ref 12–16)
IMM GRANULOCYTES # BLD AUTO: 0.11 X10*3/UL (ref 0–0.5)
IMM GRANULOCYTES NFR BLD AUTO: 1.1 % (ref 0–0.9)
LYMPHOCYTES # BLD AUTO: 1.4 X10*3/UL (ref 0.8–3)
LYMPHOCYTES NFR BLD AUTO: 13.5 %
MCH RBC QN AUTO: 29.3 PG (ref 26–34)
MCHC RBC AUTO-ENTMCNC: 30.8 G/DL (ref 32–36)
MCV RBC AUTO: 95 FL (ref 80–100)
MONOCYTES # BLD AUTO: 1 X10*3/UL (ref 0.05–0.8)
MONOCYTES NFR BLD AUTO: 9.6 %
NEUTROPHILS # BLD AUTO: 7.64 X10*3/UL (ref 1.6–5.5)
NEUTROPHILS NFR BLD AUTO: 73.4 %
NRBC BLD-RTO: 0 /100 WBCS (ref 0–0)
PLATELET # BLD AUTO: 421 X10*3/UL (ref 150–450)
POTASSIUM SERPL-SCNC: 4.3 MMOL/L (ref 3.5–5.3)
PROT SERPL-MCNC: 6.3 G/DL (ref 6.4–8.2)
RBC # BLD AUTO: 2.8 X10*6/UL (ref 4–5.2)
SODIUM SERPL-SCNC: 135 MMOL/L (ref 136–145)
T4 FREE SERPL-MCNC: 1.33 NG/DL (ref 0.61–1.12)
TSH SERPL-ACNC: 4.16 MIU/L (ref 0.44–3.98)
WBC # BLD AUTO: 10.4 X10*3/UL (ref 4.4–11.3)

## 2025-01-03 PROCEDURE — 84439 ASSAY OF FREE THYROXINE: CPT

## 2025-01-03 PROCEDURE — 85025 COMPLETE CBC W/AUTO DIFF WBC: CPT

## 2025-01-03 PROCEDURE — 80053 COMPREHEN METABOLIC PANEL: CPT

## 2025-01-03 PROCEDURE — 84443 ASSAY THYROID STIM HORMONE: CPT

## 2025-01-06 ENCOUNTER — INFUSION (OUTPATIENT)
Dept: HEMATOLOGY/ONCOLOGY | Facility: CLINIC | Age: 75
End: 2025-01-06
Payer: MEDICARE

## 2025-01-06 ENCOUNTER — OFFICE VISIT (OUTPATIENT)
Dept: HEMATOLOGY/ONCOLOGY | Facility: CLINIC | Age: 75
End: 2025-01-06
Payer: MEDICARE

## 2025-01-06 ENCOUNTER — APPOINTMENT (OUTPATIENT)
Dept: HEMATOLOGY/ONCOLOGY | Facility: CLINIC | Age: 75
End: 2025-01-06
Payer: MEDICARE

## 2025-01-06 VITALS
DIASTOLIC BLOOD PRESSURE: 67 MMHG | HEART RATE: 85 BPM | SYSTOLIC BLOOD PRESSURE: 130 MMHG | RESPIRATION RATE: 18 BRPM | BODY MASS INDEX: 23.63 KG/M2 | TEMPERATURE: 97.2 F | OXYGEN SATURATION: 100 % | WEIGHT: 121 LBS

## 2025-01-06 DIAGNOSIS — C68.9 UROTHELIAL CARCINOMA (MULTI): ICD-10-CM

## 2025-01-06 DIAGNOSIS — R94.6 ABNORMAL THYROID HORMONE METABOLISM: ICD-10-CM

## 2025-01-06 PROCEDURE — 1159F MED LIST DOCD IN RCRD: CPT

## 2025-01-06 PROCEDURE — 99215 OFFICE O/P EST HI 40 MIN: CPT | Mod: 25

## 2025-01-06 PROCEDURE — 1125F AMNT PAIN NOTED PAIN PRSNT: CPT

## 2025-01-06 PROCEDURE — 1036F TOBACCO NON-USER: CPT

## 2025-01-06 PROCEDURE — 99215 OFFICE O/P EST HI 40 MIN: CPT

## 2025-01-06 PROCEDURE — 3078F DIAST BP <80 MM HG: CPT

## 2025-01-06 PROCEDURE — 96413 CHEMO IV INFUSION 1 HR: CPT

## 2025-01-06 PROCEDURE — 2500000004 HC RX 250 GENERAL PHARMACY W/ HCPCS (ALT 636 FOR OP/ED): Performed by: STUDENT IN AN ORGANIZED HEALTH CARE EDUCATION/TRAINING PROGRAM

## 2025-01-06 PROCEDURE — 3075F SYST BP GE 130 - 139MM HG: CPT

## 2025-01-06 PROCEDURE — 1157F ADVNC CARE PLAN IN RCRD: CPT

## 2025-01-06 RX ORDER — DIPHENHYDRAMINE HYDROCHLORIDE 50 MG/ML
50 INJECTION INTRAMUSCULAR; INTRAVENOUS AS NEEDED
Status: DISCONTINUED | OUTPATIENT
Start: 2025-01-06 | End: 2025-01-06 | Stop reason: HOSPADM

## 2025-01-06 RX ORDER — EPINEPHRINE 0.3 MG/.3ML
0.3 INJECTION SUBCUTANEOUS EVERY 5 MIN PRN
Status: DISCONTINUED | OUTPATIENT
Start: 2025-01-06 | End: 2025-01-06 | Stop reason: HOSPADM

## 2025-01-06 RX ORDER — PROCHLORPERAZINE EDISYLATE 5 MG/ML
10 INJECTION INTRAMUSCULAR; INTRAVENOUS EVERY 6 HOURS PRN
Status: DISCONTINUED | OUTPATIENT
Start: 2025-01-06 | End: 2025-01-06 | Stop reason: HOSPADM

## 2025-01-06 RX ORDER — FAMOTIDINE 10 MG/ML
20 INJECTION INTRAVENOUS ONCE AS NEEDED
Status: DISCONTINUED | OUTPATIENT
Start: 2025-01-06 | End: 2025-01-06 | Stop reason: HOSPADM

## 2025-01-06 RX ORDER — ALBUTEROL SULFATE 0.83 MG/ML
3 SOLUTION RESPIRATORY (INHALATION) AS NEEDED
Status: DISCONTINUED | OUTPATIENT
Start: 2025-01-06 | End: 2025-01-06 | Stop reason: HOSPADM

## 2025-01-06 RX ORDER — PROCHLORPERAZINE MALEATE 10 MG
10 TABLET ORAL EVERY 6 HOURS PRN
Status: DISCONTINUED | OUTPATIENT
Start: 2025-01-06 | End: 2025-01-06 | Stop reason: HOSPADM

## 2025-01-06 RX ADMIN — SODIUM CHLORIDE 200 MG: 9 INJECTION, SOLUTION INTRAVENOUS at 15:17

## 2025-01-06 ASSESSMENT — ENCOUNTER SYMPTOMS
ARTHRALGIAS: 1
COUGH: 0
NUMBNESS: 1
ENDOCRINE NEGATIVE: 1
NECK PAIN: 0
BLOOD IN STOOL: 0
DIFFICULTY URINATING: 0
NAUSEA: 0
FATIGUE: 1
EYE PROBLEMS: 1
APPETITE CHANGE: 1
BACK PAIN: 1
FLANK PAIN: 0
HEMATURIA: 0
HEADACHES: 0
VOMITING: 0
LEG SWELLING: 0
DYSURIA: 1
WOUND: 0
CHILLS: 0
SLEEP DISTURBANCE: 1
ABDOMINAL PAIN: 1
DIZZINESS: 0
FEVER: 0
LIGHT-HEADEDNESS: 0
MYALGIAS: 0
UNEXPECTED WEIGHT CHANGE: 1
SHORTNESS OF BREATH: 0
HEMATOLOGIC/LYMPHATIC NEGATIVE: 1
DIARRHEA: 0
CONSTIPATION: 1

## 2025-01-06 ASSESSMENT — PAIN SCALES - GENERAL: PAINLEVEL_OUTOF10: 6

## 2025-01-06 NOTE — PROGRESS NOTES
qwPatient ID: Terra Alexis is a 74 y.o. female.  Diagnosis:  T4 UC   MedOnc: Dr. Boone   Urologist: Dr. Ross  Gyn: Dr. Nathan Noyola - [facility]     Patient Care Team:  No Assigned Pcp Generic Provider, MD as PCP - General (General Practice)  Elgin Boone MD as Consulting Physician (Hematology and Oncology)  Sheyla Kasper MD, MS as Consulting Physician (Vascular Medicine)    ONCOLOGIC HISTORY  Patient is referred by Dr. Ross for recently diagnosed invasive carcinoma, may represent urothelial origin vs h/o cervical cancer. Patient presented to the ER in February 2024 with c/o flank pain imaging revealed soft tissue mass encasing the distal segment of the left ureter. Ureteral biopsy performed in IR on 03/22/2024 revealed the above diagnosis.      1998 Cervical cancer, tx Chemoradiation, with weekly cisplatin, and radiation completed on 1/4/99.   2/1999 Total abdominal hysterectomy and bilateral salpingo-oophorectomy, with no residual carcinoma found.   1/23/08 Category 1 mammogram  11/25/08 LGSIL ANTHONY 1 consistent with HPV effect  1/13/09 MID VAGINAL APEX, BIOPSY: MILD VAGINAL INTRAEPITHELIAL NEOPLASIA.  03/2024 - pelvic irresectable distal urothelial mass, bx proven UC  4/11/24 - Cycle 1 EV + Pembro  5/1/24 - Cycle 2 EV + Pembro  5/14/24 - sick visit call  5/23/24 - Scans show DE. C3 EV (reduced to 1.125 mg/kg)+pembro   6/13/24 - C4 EV (1.125 mg/kg) + Pembro  7/5/24 - C5 EV pembro   8/15/24 - nephroureterectomy + sigmoid colon resection  8/24/24 - PE on ACO  10/3/24 - pembro C6  10/28/24 - pembro C7  11/22/24 - ileostomy closure  12/16/24 - pembro C8  1/6/25 - pembro C9     Other Contributing History  Cervical cancer - s/p chemoradiation and surgery    Review of Systems   Constitutional:  Positive for appetite change, fatigue and unexpected weight change. Negative for chills and fever.   HENT:  Negative.     Eyes:  Positive for eye problems.   Respiratory:  Negative for cough and shortness of breath.     Cardiovascular:  Negative for chest pain and leg swelling.   Gastrointestinal:  Positive for abdominal pain and constipation. Negative for blood in stool, diarrhea, nausea and vomiting.   Endocrine: Negative.    Genitourinary:  Positive for dysuria. Negative for difficulty urinating, hematuria, pelvic pain, vaginal bleeding and vaginal discharge.    Musculoskeletal:  Positive for arthralgias and back pain. Negative for flank pain, myalgias and neck pain.   Skin:  Negative for itching, rash and wound.   Neurological:  Positive for numbness. Negative for dizziness, headaches and light-headedness.   Hematological: Negative.    Psychiatric/Behavioral:  Positive for sleep disturbance.      Objective    There were no vitals taken for this visit.  BSA: There is no height or weight on file to calculate BSA.    Wt Readings from Last 5 Encounters:   12/16/24 52.6 kg (116 lb)   12/31/24 53.1 kg (117 lb)   12/10/24 55.1 kg (121 lb 7.6 oz)   11/13/24 56.6 kg (124 lb 12.5 oz)   10/28/24 56.7 kg (125 lb)     Performance Status:  ECOG Score: 0- Fully active, able to carry on all pre-disease performance w/o restriction.  Karnofsky Score: 90 - Able to carry on normal activity; minor signs or symptoms of disease     Physical Exam  Constitutional: Awake, NAD  ENMT: mucous membranes moist, no apparent injury, no lesions seen  Head/Neck: NCAT  Respiratory/Thorax: CTAB, symmetric   Cardiovascular: RRR, no murmur  Gastrointestinal: Soft, NT, nondistended, +BS  Extremities: No LE edema  Psychological: Appropriate mood and behavior  Skin: no rash noted       Allergies  Allergies   Allergen Reactions    Codeine Hallucinations, GI Upset and Nausea/vomiting    Percocet [Oxycodone-Acetaminophen] Dizziness and Nausea/vomiting      Medications  Current Outpatient Medications   Medication Instructions    acetaminophen (TYLENOL) 650 mg, oral, Every 6 hours PRN    acetaminophen (TYLENOL) 650 mg, oral, Every 6 hours    amoxicillin-pot clavulanate  (Augmentin) 875-125 mg tablet 1 tablet, oral, Every 12 hours scheduled    apixaban (ELIQUIS) 5 mg, oral, 2 times daily    hydrOXYzine HCL (ATARAX) 25 mg, oral, Every 4 hours PRN    levothyroxine (SYNTHROID, LEVOXYL) 100 mcg, oral, Daily, Take on an empty stomach at the same time each day, either 30 to 60 minutes prior to breakfast    ondansetron (ZOFRAN) 4 mg, oral, Every 8 hours PRN    oxyCODONE-acetaminophen (Percocet) 5-325 mg tablet 1 tablet, oral, Every 6 hours PRN        Diagnostic Results   Recent Labs  Results for orders placed or performed in visit on 01/03/25 (from the past 96 hours)   CBC and Auto Differential   Result Value Ref Range    WBC 10.4 4.4 - 11.3 x10*3/uL    nRBC 0.0 0.0 - 0.0 /100 WBCs    RBC 2.80 (L) 4.00 - 5.20 x10*6/uL    Hemoglobin 8.2 (L) 12.0 - 16.0 g/dL    Hematocrit 26.6 (L) 36.0 - 46.0 %    MCV 95 80 - 100 fL    MCH 29.3 26.0 - 34.0 pg    MCHC 30.8 (L) 32.0 - 36.0 g/dL    RDW 16.7 (H) 11.5 - 14.5 %    Platelets 421 150 - 450 x10*3/uL    Neutrophils % 73.4 40.0 - 80.0 %    Immature Granulocytes %, Automated 1.1 (H) 0.0 - 0.9 %    Lymphocytes % 13.5 13.0 - 44.0 %    Monocytes % 9.6 2.0 - 10.0 %    Eosinophils % 2.0 0.0 - 6.0 %    Basophils % 0.4 0.0 - 2.0 %    Neutrophils Absolute 7.64 (H) 1.60 - 5.50 x10*3/uL    Immature Granulocytes Absolute, Automated 0.11 0.00 - 0.50 x10*3/uL    Lymphocytes Absolute 1.40 0.80 - 3.00 x10*3/uL    Monocytes Absolute 1.00 (H) 0.05 - 0.80 x10*3/uL    Eosinophils Absolute 0.21 0.00 - 0.40 x10*3/uL    Basophils Absolute 0.04 0.00 - 0.10 x10*3/uL   Comprehensive metabolic panel   Result Value Ref Range    Glucose 82 74 - 99 mg/dL    Sodium 135 (L) 136 - 145 mmol/L    Potassium 4.3 3.5 - 5.3 mmol/L    Chloride 101 98 - 107 mmol/L    Bicarbonate 24 21 - 32 mmol/L    Anion Gap 14 10 - 20 mmol/L    Urea Nitrogen 16 6 - 23 mg/dL    Creatinine 0.91 0.50 - 1.05 mg/dL    eGFR 67 >60 mL/min/1.73m*2    Calcium 8.9 8.6 - 10.3 mg/dL    Albumin 3.4 3.4 - 5.0 g/dL     Alkaline Phosphatase 160 (H) 33 - 136 U/L    Total Protein 6.3 (L) 6.4 - 8.2 g/dL    AST 9 9 - 39 U/L    Bilirubin, Total 0.3 0.0 - 1.2 mg/dL    ALT 6 (L) 7 - 45 U/L   Tsh With Reflex To Free T4 If Abnormal   Result Value Ref Range    Thyroid Stimulating Hormone 4.16 (H) 0.44 - 3.98 mIU/L   Thyroxine, Free   Result Value Ref Range    Thyroxine, Free 1.33 (H) 0.61 - 1.12 ng/dL       Genetics   Signatera 4/11/24 0.45   Signatera 7/5/24 0.00  Signatera 10/28/24 100.33  Signatera 12/16/24 33.52      Pathology  Surgical Path 3/22/24   FINAL DIAGNOSIS   A. Soft tissue, mass, biopsy:    -- Involved by invasive carcinoma. See note.     Note: Patient's imaging finding of a soft tissue mass encasing the distal ureter and remote history of cervical cancer (per clinical notes) is noted. Histological sections show cores of fibroconnective tissue involved by infiltrative nests of high grade carcinoma cells, which are immunohistochemically positive for pancytokeratin AE1/AE3, CAM5.2, GATA3 and p63 and are negative for PAX8 and ER. The morphological and immunohistochemical findings are not entirely specific. Given the clinical context of a distal ureteral mass, this might represent invasive carcinoma of urothelial origin. Clinical correlation is recommended.     Recent Imaging   MRI Pelvis -   1. A 2.9 x 2.5 x 3.6 cm diffusion restricting enhancing mass encasing  the left distal ureteral stent anterior to the left sacral ala,  compatible with known urothelial neoplasm, with similar measurements  on prior CT 07/01/2024 but has decreased in size when compared to  prior MRI from 03/22/2024. The mass completely encases the left  internal iliac artery and vein which are likely occluded.  Left  common iliac vein and left external iliac vein abuts the mass but  remain patent.  There is at least partial encasement of the left S1  and S2 nerve roots. The mass abuts the left sacrum without definite  evidence of osseous  "extension/destruction.  2. No evidence of lymphadenopathy or other metastatic disease in the  pelvis.      8/15/24 Colon resection + nephro-U  FINAL DIAGNOSIS   A. Soft tissue, left periureteral, biopsy:  -- Invasive poorly differentiated carcinoma with squamous differentiation     B. Colon, sigmoid, segmental resection:  -- Poorly differentiated carcinoma with squamous differentiation involving mesentery and bowel wall  -- Margins of resection are negative for carcinoma  -- Hyperplastic polyp  -- Two lymph nodes, negative for carcinoma (0/2)     C. Ureter, left, segmental resection:  -- Invasive carcinoma with extensive squamous differentiation (see comment)  -- Invasive carcinoma is present at the margin of resection  -- See cancer summary report     D. Kidney and proximal ureter, left, nephroureterectomy:  -- Atrophic renal parenchyma with marked chronic inflammation, interstitial fibrosis with tubular atrophy, glomerulosclerosis and severe arterio- and arteriolosclerosis       Assessment/Plan   Terra Alexis is a 74 y.o.  female with hx cervical cancer s/p chemoRT 1998, now found with pelvic irresectable distal urothelial mass, bx proven UC. Now post EV/pembro with tumor shrinkage (around 30%). Underwent nephro-U + colon resection with residual tumor in path - pT4 with therapy changes. KALEY now. Recent  ileostomy closure. On \"adjuvant\" pembro, signatera sent 12/16/24 with decreased ctDNA. On eliquis for PE. No symptoms r/t infusion other than stable fatigue, proceed with C9 pembro today.       Patient verbalizes understanding of above plan. Time provided for patient's questions. Patient instructed to reach out for any new concerning issues.     Cinthya Barnard, MSN, APRN-CNP  Acute Care Nurse Practitioner   Genitourinary () Oncology  [facility]  Phone: [phone]     "

## 2025-01-13 ENCOUNTER — NURSE TRIAGE (OUTPATIENT)
Dept: ADMISSION | Facility: HOSPITAL | Age: 75
End: 2025-01-13
Payer: MEDICARE

## 2025-01-13 DIAGNOSIS — M54.30 SCIATIC LEG PAIN: ICD-10-CM

## 2025-01-13 RX ORDER — OXYCODONE AND ACETAMINOPHEN 5; 325 MG/1; MG/1
1 TABLET ORAL EVERY 6 HOURS PRN
Qty: 20 TABLET | Refills: 0 | Status: SHIPPED | OUTPATIENT
Start: 2025-01-13 | End: 2025-01-20

## 2025-01-13 NOTE — TELEPHONE ENCOUNTER
RN called patient, advised RX sent to pharmacy on file.  Patient appreciative of call back.  Also advised patient that referral placed for supportive oncology - transferred to scheduling line.      Advised to call back with additional questions/concerns.

## 2025-01-13 NOTE — TELEPHONE ENCOUNTER
Patient called and left voicemail stating she is having severe pain, tearful during voicemail.  Requesting to speak with someone.     RN called Terra chamberlain, she states she is having pain in left hip.  Radiating from left hip to down leg to ankle.  Started few months ago.  Pain was only occurring at night and pain is now occurring during day.   Patient states she unable to sleep at night, difficulty walking.  Patient states she saw Dr. Denton, was given pain medication and recommended spine doctor.     Dr. Denton is unable to prescribe pain medications anymore.  She will see orthopedic on 1/28/25.  Tearful during call, crying and upset that no one can help her.  Patient went to PCP and does not like her, as her recommendations are not helping, does not want to follow up with PCP.  Previously prescribed Percocet and requesting refill for this.  Currently taking Tylenol 650mg 2 tablets every 6 hrs.  Attempting heating pad.  Not sleeping at night.      No fevers, no chest pain, no SOB.     Patient requesting Percocet for pain, next FUV 1/27/25

## 2025-01-16 ENCOUNTER — TELEPHONE (OUTPATIENT)
Dept: ADMISSION | Facility: HOSPITAL | Age: 75
End: 2025-01-16
Payer: MEDICARE

## 2025-01-16 NOTE — TELEPHONE ENCOUNTER
Terra called the office for her MRI results from 12/31. States team was supposed to call her once they were in though she did have a FUV 1/6.     IMPRESSION:  1. Large 8.3 cm heterogeneous, necrotic mass in the left presacral  region is consistent with locally recurrent malignancy. The mass  compresses the left L5 nerve root and encases the left S1 nerve root,  as well as invades the severely narrows the left proximal external  iliac artery and vein, encases and occludes the left internal iliac  vessels. Suspect there is also involvement of the left vaginal cuff.  2. Trace pelvic free fluid and pelvic edema is likely reactive to the  above findings, as detailed above.  3. Mildly prominent single versus 2 adjacent low-level para-aortic  lymph node(s), which may be reactive or metastatic.

## 2025-01-17 NOTE — TELEPHONE ENCOUNTER
I called Terra to set up a FUV next Tuesday to review results with Dr. Boone.   No further questions or concerns at this time.

## 2025-01-21 ENCOUNTER — TELEMEDICINE (OUTPATIENT)
Dept: HEMATOLOGY/ONCOLOGY | Facility: HOSPITAL | Age: 75
End: 2025-01-21
Payer: MEDICARE

## 2025-01-21 DIAGNOSIS — C68.9 UROTHELIAL CARCINOMA (MULTI): Primary | ICD-10-CM

## 2025-01-21 PROCEDURE — 99214 OFFICE O/P EST MOD 30 MIN: CPT | Performed by: STUDENT IN AN ORGANIZED HEALTH CARE EDUCATION/TRAINING PROGRAM

## 2025-01-21 PROCEDURE — 1159F MED LIST DOCD IN RCRD: CPT | Performed by: STUDENT IN AN ORGANIZED HEALTH CARE EDUCATION/TRAINING PROGRAM

## 2025-01-21 PROCEDURE — 1160F RVW MEDS BY RX/DR IN RCRD: CPT | Performed by: STUDENT IN AN ORGANIZED HEALTH CARE EDUCATION/TRAINING PROGRAM

## 2025-01-21 PROCEDURE — 1157F ADVNC CARE PLAN IN RCRD: CPT | Performed by: STUDENT IN AN ORGANIZED HEALTH CARE EDUCATION/TRAINING PROGRAM

## 2025-01-21 RX ORDER — DIPHENHYDRAMINE HYDROCHLORIDE 50 MG/ML
50 INJECTION INTRAMUSCULAR; INTRAVENOUS AS NEEDED
OUTPATIENT
Start: 2025-01-27

## 2025-01-21 RX ORDER — ALBUTEROL SULFATE 0.83 MG/ML
3 SOLUTION RESPIRATORY (INHALATION) AS NEEDED
OUTPATIENT
Start: 2025-01-27

## 2025-01-21 RX ORDER — PROCHLORPERAZINE MALEATE 10 MG
10 TABLET ORAL EVERY 6 HOURS PRN
OUTPATIENT
Start: 2025-01-27

## 2025-01-21 RX ORDER — PROCHLORPERAZINE EDISYLATE 5 MG/ML
10 INJECTION INTRAMUSCULAR; INTRAVENOUS EVERY 6 HOURS PRN
OUTPATIENT
Start: 2025-01-27

## 2025-01-21 RX ORDER — EPINEPHRINE 0.3 MG/.3ML
0.3 INJECTION SUBCUTANEOUS EVERY 5 MIN PRN
OUTPATIENT
Start: 2025-01-27

## 2025-01-21 RX ORDER — FAMOTIDINE 10 MG/ML
20 INJECTION INTRAVENOUS ONCE AS NEEDED
OUTPATIENT
Start: 2025-01-27

## 2025-01-21 ASSESSMENT — ENCOUNTER SYMPTOMS
ABDOMINAL PAIN: 1
UNEXPECTED WEIGHT CHANGE: 1
LIGHT-HEADEDNESS: 0
APPETITE CHANGE: 1
CHILLS: 0
CONSTIPATION: 1
COUGH: 0
LEG SWELLING: 0
DIARRHEA: 0
DIFFICULTY URINATING: 0
FLANK PAIN: 0
BLOOD IN STOOL: 0
NUMBNESS: 1
EYE PROBLEMS: 1
WOUND: 0
VOMITING: 0
HEMATOLOGIC/LYMPHATIC NEGATIVE: 1
DIZZINESS: 0
FATIGUE: 1
HEMATURIA: 0
NECK PAIN: 0
SHORTNESS OF BREATH: 0
ARTHRALGIAS: 1
DYSURIA: 1
SLEEP DISTURBANCE: 1
HEADACHES: 0
ENDOCRINE NEGATIVE: 1
BACK PAIN: 1
MYALGIAS: 0
FEVER: 0
NAUSEA: 0

## 2025-01-21 NOTE — PROGRESS NOTES
qwPatient ID: Terra Alexis is a 74 y.o. female.  Diagnosis:  irresectable T4 UC   MedOnc: Dr. Boone   Urologist: Dr. Ross  Gyn: Dr. Nathan Noyola - [facility]     Patient Care Team:  No Assigned Pcp Generic Provider, MD as PCP - General (General Practice)  Elgin Boone MD as Consulting Physician (Hematology and Oncology)  Sheyla Kasper MD, MS as Consulting Physician (Vascular Medicine)    ONCOLOGIC HISTORY  Patient is referred by Dr. Ross for recently diagnosed invasive carcinoma, may represent urothelial origin vs h/o cervical cancer. Patient presented to the ER in February 2024 with c/o flank pain imaging revealed soft tissue mass encasing the distal segment of the left ureter. Ureteral biopsy performed in IR on 03/22/2024 revealed the above diagnosis.      1998 Cervical cancer, tx Chemoradiation, with weekly cisplatin, and radiation completed on 1/4/99.   2/1999 Total abdominal hysterectomy and bilateral salpingo-oophorectomy, with no residual carcinoma found.   1/23/08 Category 1 mammogram  11/25/08 LGSIL ANTHONY 1 consistent with HPV effect  1/13/09 MID VAGINAL APEX, BIOPSY: MILD VAGINAL INTRAEPITHELIAL NEOPLASIA.  03/2024 - pelvic irresectable distal urothelial mass, bx proven UC  4/11/24 - Cycle 1 EV + Pembro  5/1/24 - Cycle 2 EV + Pembro  5/14/24 - sick visit call  5/23/24 - Scans show AK. C3 EV (reduced to 1.125 mg/kg)+pembro   6/13/24 - C4 EV (1.125 mg/kg) + Pembro  7/5/24 - C5 EV pembro   8/15/24 - nephroureterectomy + sigmoid colon resection  8/24/24 - PE on ACO  10/3/24 - pembro C6  10/28/24 - pembro C7  11/22/24 - ileostomy closure  12/16/24 - pembro C8  1/6/25 - pembro C9   1/27/24 - tentative pembro C10    Other Contributing History  Cervical cancer - s/p chemoradiation and surgery    Review of Systems   Constitutional:  Positive for appetite change, fatigue and unexpected weight change. Negative for chills and fever.   HENT:  Negative.     Eyes:  Positive for eye problems.   Respiratory:   Negative for cough and shortness of breath.    Cardiovascular:  Negative for chest pain and leg swelling.   Gastrointestinal:  Positive for abdominal pain and constipation. Negative for blood in stool, diarrhea, nausea and vomiting.   Endocrine: Negative.    Genitourinary:  Positive for dysuria. Negative for difficulty urinating, hematuria, pelvic pain, vaginal bleeding and vaginal discharge.    Musculoskeletal:  Positive for arthralgias and back pain. Negative for flank pain, myalgias and neck pain.   Skin:  Negative for itching, rash and wound.   Neurological:  Positive for numbness. Negative for dizziness, headaches and light-headedness.   Hematological: Negative.    Psychiatric/Behavioral:  Positive for sleep disturbance.      Objective    There were no vitals taken for this visit.  BSA: There is no height or weight on file to calculate BSA.    Wt Readings from Last 5 Encounters:   01/06/25 54.9 kg (121 lb)   12/16/24 52.6 kg (116 lb)   12/31/24 53.1 kg (117 lb)   12/10/24 55.1 kg (121 lb 7.6 oz)   11/13/24 56.6 kg (124 lb 12.5 oz)     Performance Status:  ECOG Score: 0- Fully active, able to carry on all pre-disease performance w/o restriction.  Karnofsky Score: 90 - Able to carry on normal activity; minor signs or symptoms of disease     Physical Exam  Constitutional: Awake, NAD  ENMT: mucous membranes moist, no apparent injury, no lesions seen  Head/Neck: NCAT  Respiratory/Thorax: CTAB, symmetric   Cardiovascular: RRR, no murmur  Gastrointestinal: Soft, NT, nondistended, +BS  Extremities: No LE edema  Psychological: Appropriate mood and behavior  Skin: no rash noted       Allergies  Allergies   Allergen Reactions    Codeine Hallucinations, GI Upset and Nausea/vomiting    Percocet [Oxycodone-Acetaminophen] Dizziness and Nausea/vomiting      Medications  Current Outpatient Medications   Medication Instructions    acetaminophen (TYLENOL) 650 mg, oral, Every 6 hours PRN    acetaminophen (TYLENOL) 650 mg, oral,  Every 6 hours    amoxicillin-pot clavulanate (Augmentin) 875-125 mg tablet 1 tablet, oral, Every 12 hours scheduled    apixaban (ELIQUIS) 5 mg, oral, 2 times daily    hydrOXYzine HCL (ATARAX) 25 mg, oral, Every 4 hours PRN    levothyroxine (SYNTHROID, LEVOXYL) 100 mcg, oral, Daily, Take on an empty stomach at the same time each day, either 30 to 60 minutes prior to breakfast    ondansetron (ZOFRAN) 4 mg, oral, Every 8 hours PRN        Diagnostic Results   Recent Labs  No results found for this or any previous visit (from the past 96 hours).      Genetics   Signatera 4/11/24 0.45   Signatera 7/5/24 0.00  Signatera 10/28/24 100.33  Signatera 12/16/24 33.52    Pathology  Surgical Path 3/22/24   FINAL DIAGNOSIS   A. Soft tissue, mass, biopsy:    -- Involved by invasive carcinoma. See note.     Note: Patient's imaging finding of a soft tissue mass encasing the distal ureter and remote history of cervical cancer (per clinical notes) is noted. Histological sections show cores of fibroconnective tissue involved by infiltrative nests of high grade carcinoma cells, which are immunohistochemically positive for pancytokeratin AE1/AE3, CAM5.2, GATA3 and p63 and are negative for PAX8 and ER. The morphological and immunohistochemical findings are not entirely specific. Given the clinical context of a distal ureteral mass, this might represent invasive carcinoma of urothelial origin. Clinical correlation is recommended.     Recent Imaging   MRI Pelvis -   1. A 2.9 x 2.5 x 3.6 cm diffusion restricting enhancing mass encasing  the left distal ureteral stent anterior to the left sacral ala,  compatible with known urothelial neoplasm, with similar measurements  on prior CT 07/01/2024 but has decreased in size when compared to  prior MRI from 03/22/2024. The mass completely encases the left  internal iliac artery and vein which are likely occluded.  Left  common iliac vein and left external iliac vein abuts the mass but  remain patent.   There is at least partial encasement of the left S1  and S2 nerve roots. The mass abuts the left sacrum without definite  evidence of osseous extension/destruction.  2. No evidence of lymphadenopathy or other metastatic disease in the  pelvis.      8/15/24 Colon resection + nephro-U  FINAL DIAGNOSIS   A. Soft tissue, left periureteral, biopsy:  -- Invasive poorly differentiated carcinoma with squamous differentiation   B. Colon, sigmoid, segmental resection:  -- Poorly differentiated carcinoma with squamous differentiation involving mesentery and bowel wall  -- Margins of resection are negative for carcinoma  -- Hyperplastic polyp  -- Two lymph nodes, negative for carcinoma (0/2)  C. Ureter, left, segmental resection:  -- Invasive carcinoma with extensive squamous differentiation (see comment)  -- Invasive carcinoma is present at the margin of resection  -- See cancer summary report  D. Kidney and proximal ureter, left, nephroureterectomy:  -- Atrophic renal parenchyma with marked chronic inflammation, interstitial fibrosis with tubular atrophy, glomerulosclerosis and severe arterio- and arteriolosclerosis     Assessment/Plan   Terra Alexis is a 74 y.o.  female with hx cervical cancer s/p chemoRT 1998, now found with pelvic irresectable distal urothelial mass, bx proven UC. Now post EV/pembro with tumor shrinkage (around 30%). Underwent nephro-U + colon resection with residual tumor in path - pT4 with therapy changes. Unfortunately with recurrent progressive tumor, resumed pembro and signatera sent 12/16/24 with decreased ctDNA. Plan to remain on pembro and restage her with scans end of the week.   I also discussed this case with Radiation Oncology team and I think she benefits from radiation treatment to his pelvic mass --> seeing the team next week as well.     I saw and evaluated the patient. I personally obtained the key and critical portions of the history and physical exam or was physically present  for key and critical portions performed by the resident/fellow. I reviewed the resident/fellow's documentation and discussed the patient with the resident/fellow. I agree with the resident/fellow's medical decision making as documented in the note.   Elgin Boone MD MSc FACP  [facility] Chair in Cancer Research   in Medicine [facility] [email or web address]] [employer]  Director Clinical  Medical Oncology Research Program   [facility] [phone]  Office [phone]

## 2025-01-23 ENCOUNTER — DOCUMENTATION (OUTPATIENT)
Dept: HEMATOLOGY/ONCOLOGY | Facility: CLINIC | Age: 75
End: 2025-01-23
Payer: MEDICARE

## 2025-01-23 ENCOUNTER — HOSPITAL ENCOUNTER (OUTPATIENT)
Dept: RADIOLOGY | Facility: HOSPITAL | Age: 75
Discharge: HOME | End: 2025-01-23
Payer: MEDICARE

## 2025-01-23 ENCOUNTER — APPOINTMENT (OUTPATIENT)
Dept: RADIOLOGY | Facility: HOSPITAL | Age: 75
End: 2025-01-23
Payer: MEDICARE

## 2025-01-23 DIAGNOSIS — C68.9 UROTHELIAL CARCINOMA (MULTI): ICD-10-CM

## 2025-01-23 PROCEDURE — 71260 CT THORAX DX C+: CPT

## 2025-01-23 PROCEDURE — 78306 BONE IMAGING WHOLE BODY: CPT

## 2025-01-23 PROCEDURE — 71260 CT THORAX DX C+: CPT | Performed by: RADIOLOGY

## 2025-01-23 PROCEDURE — 2550000001 HC RX 255 CONTRASTS

## 2025-01-23 PROCEDURE — A9503 TC99M MEDRONATE: HCPCS

## 2025-01-23 PROCEDURE — 78306 BONE IMAGING WHOLE BODY: CPT | Performed by: RADIOLOGY

## 2025-01-23 PROCEDURE — 74177 CT ABD & PELVIS W/CONTRAST: CPT | Performed by: RADIOLOGY

## 2025-01-23 PROCEDURE — 3430000001 HC RX 343 DIAGNOSTIC RADIOPHARMACEUTICALS

## 2025-01-23 RX ADMIN — TECHNETIUM TC 99M MEDRONATE 25.9 MILLICURIE: 25 INJECTION, POWDER, FOR SOLUTION INTRAVENOUS at 09:40

## 2025-01-23 RX ADMIN — IOHEXOL 75 ML: 350 INJECTION, SOLUTION INTRAVENOUS at 10:20

## 2025-01-23 NOTE — PROGRESS NOTES
Left voice mail message for patient instructing them to complete lab work at any  lab before their upcoming Cinthya Barnard appointment. Instructed to call [phone] if they have any further questions.

## 2025-01-24 ENCOUNTER — LAB (OUTPATIENT)
Dept: LAB | Facility: LAB | Age: 75
End: 2025-01-24
Payer: MEDICARE

## 2025-01-24 ENCOUNTER — OFFICE VISIT (OUTPATIENT)
Dept: PALLIATIVE MEDICINE | Facility: CLINIC | Age: 75
End: 2025-01-24
Payer: MEDICARE

## 2025-01-24 ENCOUNTER — TELEPHONE (OUTPATIENT)
Dept: PALLIATIVE MEDICINE | Facility: CLINIC | Age: 75
End: 2025-01-24

## 2025-01-24 VITALS
BODY MASS INDEX: 22.82 KG/M2 | WEIGHT: 116.84 LBS | DIASTOLIC BLOOD PRESSURE: 73 MMHG | TEMPERATURE: 96.8 F | SYSTOLIC BLOOD PRESSURE: 150 MMHG | OXYGEN SATURATION: 100 % | HEART RATE: 94 BPM | RESPIRATION RATE: 16 BRPM

## 2025-01-24 DIAGNOSIS — T40.2X5A THERAPEUTIC OPIOID INDUCED CONSTIPATION: Primary | ICD-10-CM

## 2025-01-24 DIAGNOSIS — G89.29 OTHER CHRONIC PAIN: ICD-10-CM

## 2025-01-24 DIAGNOSIS — M79.2 NEUROPATHIC PAIN: ICD-10-CM

## 2025-01-24 DIAGNOSIS — K59.03 THERAPEUTIC OPIOID INDUCED CONSTIPATION: Primary | ICD-10-CM

## 2025-01-24 DIAGNOSIS — M54.30 SCIATIC LEG PAIN: ICD-10-CM

## 2025-01-24 DIAGNOSIS — Z51.5 PALLIATIVE CARE ENCOUNTER: Primary | ICD-10-CM

## 2025-01-24 DIAGNOSIS — G89.3 CANCER RELATED PAIN: ICD-10-CM

## 2025-01-24 DIAGNOSIS — Z71.89 GOALS OF CARE, COUNSELING/DISCUSSION: ICD-10-CM

## 2025-01-24 DIAGNOSIS — R94.6 ABNORMAL THYROID HORMONE METABOLISM: ICD-10-CM

## 2025-01-24 DIAGNOSIS — C68.9 UROTHELIAL CARCINOMA (MULTI): ICD-10-CM

## 2025-01-24 LAB
ALBUMIN SERPL BCP-MCNC: 3.5 G/DL (ref 3.4–5)
ALP SERPL-CCNC: 123 U/L (ref 33–136)
ALT SERPL W P-5'-P-CCNC: 14 U/L (ref 7–45)
ANION GAP SERPL CALC-SCNC: 13 MMOL/L (ref 10–20)
AST SERPL W P-5'-P-CCNC: 17 U/L (ref 9–39)
BASOPHILS # BLD AUTO: 0.02 X10*3/UL (ref 0–0.1)
BASOPHILS NFR BLD AUTO: 0.2 %
BILIRUB SERPL-MCNC: 0.2 MG/DL (ref 0–1.2)
BUN SERPL-MCNC: 17 MG/DL (ref 6–23)
CALCIUM SERPL-MCNC: 9.3 MG/DL (ref 8.6–10.3)
CHLORIDE SERPL-SCNC: 103 MMOL/L (ref 98–107)
CO2 SERPL-SCNC: 23 MMOL/L (ref 21–32)
CREAT SERPL-MCNC: 0.88 MG/DL (ref 0.5–1.05)
EGFRCR SERPLBLD CKD-EPI 2021: 69 ML/MIN/1.73M*2
EOSINOPHIL # BLD AUTO: 0 X10*3/UL (ref 0–0.4)
EOSINOPHIL NFR BLD AUTO: 0 %
ERYTHROCYTE [DISTWIDTH] IN BLOOD BY AUTOMATED COUNT: 17.2 % (ref 11.5–14.5)
GLUCOSE SERPL-MCNC: 89 MG/DL (ref 74–99)
HCT VFR BLD AUTO: 29.3 % (ref 36–46)
HGB BLD-MCNC: 8.9 G/DL (ref 12–16)
IMM GRANULOCYTES # BLD AUTO: 0.15 X10*3/UL (ref 0–0.5)
IMM GRANULOCYTES NFR BLD AUTO: 1.2 % (ref 0–0.9)
LYMPHOCYTES # BLD AUTO: 1.61 X10*3/UL (ref 0.8–3)
LYMPHOCYTES NFR BLD AUTO: 12.5 %
MCH RBC QN AUTO: 28.8 PG (ref 26–34)
MCHC RBC AUTO-ENTMCNC: 30.4 G/DL (ref 32–36)
MCV RBC AUTO: 95 FL (ref 80–100)
MONOCYTES # BLD AUTO: 0.7 X10*3/UL (ref 0.05–0.8)
MONOCYTES NFR BLD AUTO: 5.5 %
NEUTROPHILS # BLD AUTO: 10.36 X10*3/UL (ref 1.6–5.5)
NEUTROPHILS NFR BLD AUTO: 80.6 %
NRBC BLD-RTO: 0 /100 WBCS (ref 0–0)
PLATELET # BLD AUTO: 524 X10*3/UL (ref 150–450)
POTASSIUM SERPL-SCNC: 4.5 MMOL/L (ref 3.5–5.3)
PROT SERPL-MCNC: 6.8 G/DL (ref 6.4–8.2)
RBC # BLD AUTO: 3.09 X10*6/UL (ref 4–5.2)
SODIUM SERPL-SCNC: 134 MMOL/L (ref 136–145)
T4 FREE SERPL-MCNC: 0.92 NG/DL (ref 0.61–1.12)
TSH SERPL-ACNC: 6.79 MIU/L (ref 0.44–3.98)
WBC # BLD AUTO: 12.8 X10*3/UL (ref 4.4–11.3)

## 2025-01-24 PROCEDURE — 84443 ASSAY THYROID STIM HORMONE: CPT

## 2025-01-24 PROCEDURE — 85025 COMPLETE CBC W/AUTO DIFF WBC: CPT

## 2025-01-24 PROCEDURE — 1157F ADVNC CARE PLAN IN RCRD: CPT | Performed by: NURSE PRACTITIONER

## 2025-01-24 PROCEDURE — 3077F SYST BP >= 140 MM HG: CPT | Performed by: NURSE PRACTITIONER

## 2025-01-24 PROCEDURE — 1125F AMNT PAIN NOTED PAIN PRSNT: CPT | Performed by: NURSE PRACTITIONER

## 2025-01-24 PROCEDURE — 99214 OFFICE O/P EST MOD 30 MIN: CPT | Performed by: NURSE PRACTITIONER

## 2025-01-24 PROCEDURE — 80053 COMPREHEN METABOLIC PANEL: CPT

## 2025-01-24 PROCEDURE — 99204 OFFICE O/P NEW MOD 45 MIN: CPT | Performed by: NURSE PRACTITIONER

## 2025-01-24 PROCEDURE — 84439 ASSAY OF FREE THYROXINE: CPT

## 2025-01-24 PROCEDURE — 3078F DIAST BP <80 MM HG: CPT | Performed by: NURSE PRACTITIONER

## 2025-01-24 PROCEDURE — 1036F TOBACCO NON-USER: CPT | Performed by: NURSE PRACTITIONER

## 2025-01-24 RX ORDER — GABAPENTIN 300 MG/1
300 CAPSULE ORAL 2 TIMES DAILY
Qty: 60 CAPSULE | Refills: 0 | Status: SHIPPED | OUTPATIENT
Start: 2025-01-24 | End: 2025-02-23

## 2025-01-24 RX ORDER — DOCUSATE SODIUM 100 MG/1
100 CAPSULE, LIQUID FILLED ORAL 2 TIMES DAILY PRN
Qty: 60 CAPSULE | Refills: 3 | Status: SHIPPED | OUTPATIENT
Start: 2025-01-24

## 2025-01-24 RX ORDER — SENNOSIDES 8.6 MG/1
1-2 TABLET ORAL 2 TIMES DAILY PRN
Qty: 120 TABLET | Refills: 3 | Status: SHIPPED | OUTPATIENT
Start: 2025-01-24 | End: 2025-02-23

## 2025-01-24 RX ORDER — OXYCODONE HYDROCHLORIDE 10 MG/1
10 TABLET ORAL EVERY 4 HOURS PRN
Qty: 180 TABLET | Refills: 0 | Status: SHIPPED | OUTPATIENT
Start: 2025-01-24 | End: 2025-02-23

## 2025-01-24 ASSESSMENT — PAIN SCALES - GENERAL: PAINLEVEL_OUTOF10: 9

## 2025-01-24 NOTE — PATIENT INSTRUCTIONS
For your bowels:   --take colace 100mg 2x/day as needed for hard stools (stool softener)  --take senna 8.6mg, 1-2tabs, 1-2x/day as needed for constipation (mild/ maintenance laxative)  --you may take miralax powder, 1 cap full a day, as needed for constipation, as long as you are staying well hydrated.   --you may take bisacodyl (dulcolax) 10mg daily at bed time as needed. If no bowel movement for 2-3 days:   --take Milk of Magnesia 15mL every 4 hrs as needed until bowel movement is achieved     2. For pain:  --start gabapentin 300mg at bedtime. I have ordered this medication for twice a day dosing, but would like you to start by taking it once a day at bed. We sill check in with you next week, and give further instructions to increase it if tolerating well. This is your neuropathic, or nerve pain, agent.   --start oxycodone 10mg every 4 hrs as NEEDED for pain. This is your short acting, or immediate release, pain medication. This medication can cause constipation, so please utilize the bowel regimen above to maintain regular bowel schedule.   --today we discussed start MS Contin (morphine sulfate extended release) 15mg two times a day. This is a long acting, or extended release, pain medication. We can further discuss this medication during our phone call next week.

## 2025-01-24 NOTE — TELEPHONE ENCOUNTER
Phone call to pharmacy to see if PA needed for Oxycodone and Gabapentin. No PA needed, pharmacy will fill.

## 2025-01-24 NOTE — PROGRESS NOTES
SUPPORTIVE AND PALLIATIVE ONCOLOGY CONSULT - OUTPATIENT      SERVICE DATE: 1/24/2025    Referred by:  ***  Medical Oncologist: Elgin Boone MD   Radiation Oncologist: No care team member to display  Primary Physician: No Assigned PCP Generic Provider  None    REASON FOR CONSULT/CHIEF CONSULT COMPLAINT: { :60375}    Subjective   HISTORY OF PRESENT ILLNESS: Terra Alexis is a 74 y.o. female who presents with  ***     Pain Assessment:  Pain Score:   9  Location: Hip  Education:        Symptom Assessment:  Pain:{ :01786}  Headache: {  :67252}  Dizziness:{ :51398}  Lack of energy: { :25179}  Difficulty sleeping: { :37839}  Worrying: {  :37277}  Anxiety: { :35550}  Depression: { :19389}  Pain in mouth/swallowing: { :96511}  Dry mouth: { :68270}  Taste changes: { :54350}  Shortness of breath: { :49719}  Lack of appetite: {  :60381}   Nausea: { :37514}  Vomiting: { :59105}  Constipation: { :93122}  Diarrhea: { :03412}  Sore muscles: { :99474}  Numbness or tingling in hands/feet/other: { :52532}  Weight loss: { :63690}  Other: { :73919}      Information obtained from: { :66615}  ______________________________________________________________________     Oncology History   Urothelial carcinoma (Multi)   4/3/2024 Initial Diagnosis    Urothelial carcinoma (CMS/HCC)     4/11/2024 -  Chemotherapy    Enfortumab vedotin + Pembrolizumab, 21 Day Cycles (Pembro maintenance as of cycle 6)         Past Medical History:   Diagnosis Date   • Chronic kidney disease    • GERD (gastroesophageal reflux disease)    • Hypothyroidism    • Malignant tumor of sigmoid colon (Multi) 08/21/2024   • Nephrolithiasis    • Other specified disorders of kidney and ureter 03/04/2022    Ureteral mass   • Peripheral neuropathy    • Personal history of malignant neoplasm of cervix uteri 11/21/2022    History of malignant neoplasm of cervix   • Vision loss      Past Surgical History:   Procedure Laterality Date   • NEPHRECTOMY Left 08/15/2024    ex-lap,  left ureteral tumor resection, left nephrectomy, excision of pelvic mass, partial sigmoidectomy with loop ileostomy   • OTHER SURGICAL HISTORY  10/07/2021    Hysterectomy     Family History   Problem Relation Name Age of Onset   • Macular degeneration Mother     • Glaucoma Other grandparent    • Macular degeneration Other grandparent         SOCIAL HISTORY  {SOC HX:77740}   Social History:  reports that she has never smoked. She has never used smokeless tobacco. She reports that she does not currently use alcohol. She reports that she does not use drugs.  ***    Mormon and Importance of Mormon:  {Mormon:07655}  Role of wendi in daily life/ Role of spirituality in health care decision-making: ***  Spiritual or Roman Catholic community: ***    REVIEW OF SYSTEMS  Review of systems negative unless noted in HPI.       Objective     Palliative Performance Scale % (PPS)       Current Outpatient Medications   Medication Instructions   • acetaminophen (TYLENOL) 650 mg, oral, Every 6 hours PRN   • acetaminophen (TYLENOL) 650 mg, oral, Every 6 hours   • amoxicillin-pot clavulanate (Augmentin) 875-125 mg tablet 1 tablet, oral, Every 12 hours scheduled   • apixaban (ELIQUIS) 5 mg, oral, 2 times daily   • hydrOXYzine HCL (ATARAX) 25 mg, oral, Every 4 hours PRN   • levothyroxine (SYNTHROID, LEVOXYL) 100 mcg, oral, Daily, Take on an empty stomach at the same time each day, either 30 to 60 minutes prior to breakfast   • ondansetron (ZOFRAN) 4 mg, oral, Every 8 hours PRN       Allergies:   Allergies   Allergen Reactions   • Codeine Hallucinations, GI Upset and Nausea/vomiting   • Percocet [Oxycodone-Acetaminophen] Dizziness and Nausea/vomiting       {If you would like to pull in Lab results for the last 24 hours, type .[id number] :99}  {If you would like to pull in Imaging results, type .imgrslt :99}       PHYSICAL EXAMINATION  Vital Signs:   Vital signs reviewed  Vitals:    01/24/25 1305   BP: 150/73   Pulse: 94   Resp: 16   Temp:  36 °C (96.8 °F)   SpO2: 100%     Pain Score:   9         Physical Exam    ASSESSMENT/PLAN    Pain  Pain is: { :47711}  Type: { :88578}  Pain control: { :87269}  Home regimen: ***  Intolerances/previously tried: ***  Personalized pain goal: ***  ORT-OUD Score: Total Score:    due to { :66577}  indicating Opioid Risk Category:   of future opioid use disorder.   ***    Opioid Use  Medication Management:   - OARRS report reviewed with no aberrant behavior; consistent with  prescriptions/records and patient history  - MED ***.  Overdose Risk Score ***.   This has been discussed with patient.   - We will continue to closely monitor the patient for signs of prescription misuse including UDS, OARRS review and subjective reports at each visit.  - *** concurrent benzodiazepine use   - I am a provider who either is or has consulted and collaborated with a provider certified in Hospice and Palliative Medicine and have conducted a face-face visit and examination for this patient.  - Routine Urine Drug Screen completed *** appropriately positive for opioids and negative for illicit substances  - Controlled Substance Agreement completed ***  - Specifically discussed that controlled substance prescriptions will only be provided by our group as outlined in the completed agreement  - Prescribed naloxone ***  - Red Flags: ***     Nausea   { :26607} nausea {with or without:90880} vomiting related to { :01281} ***    Constipation   At risk for constipation related to opioids, ***,  { :90183}   Usual bowel pattern: ***   Current regimen: ***   LBM ***   ***     Altered Mood  {Acute/Chronic:22831} {anxiety/depression:37229} related to {related to:21076}   {controlled/uncontrolled:90628} with home regimen  Current regimen: ***    Sleeping Difficulty:  Impaired sleep related to ***  Current regimen:  ***  ***    Decreased appetite  Related to { :14045}  Nutrition { :40593}  Weight loss ***  Current regimen:  ***  ***    Supportive  Interventions: { :63520}    Introduction to Supportive and Palliative Oncology:  Spoke with ***   Introduced the role and philosophy of Supportive and Palliative oncology in the evaluation and management of symptoms during cancer treatment  Palliative care was introduced as a service for patients with serious illness to help with symptoms, assist with goals of care conversations, navigate complex decision making, improve quality of life for patients, and provide support both patients and families.  Patient seemed to appreciate the extra layer of support.    Medical Decision Making/Goals of Care/Advance Care Planning:  Patient's current clinical condition, including diagnosis, prognosis, and management plan, and goals of care were discussed.   Life limiting disease: { :15193}  Family: Supportive ***  Performance status: Major limitations due to { :65955}  Joys/meaning/strength: ***  Understanding of health: Demonstrates good prognostic understanding of disease process, understands plan for ***  Information:{ :28207}  Medical update:***  Prognosis:***  Goals: {Goals:50063}  Worries and fears now and future: { :61882}   Minimum acceptable outcome/QOL:  ***  Code status discussion:  ***    Advance Directives  Existence of Advance Directives:{ :10007}  Decision maker: { :68613} ***  Code Status: { :26013}    Next Follow-Up Visit:  Return to clinic in ***    Signature and billing  Thank you for allowing us to participate in the care of this patient. Recommendations will be communicated back to the consulting service by way of shared electronic medical record or face-to-face.    Medical complexity was {medical complexity:46921} level due to due to complexity of problems, extensive data review, and high risk of management/treatment.  Time was spent on the following: {time spent:69147}. Total time spent: ***      DATA   Diagnostic tests and information reviewed for today's visit:  { :77192}       Some elements copied from  *** note on ***, the elements have been updated and all reflect current decision making from today, 1/24/2025.      Plan of Care discussed with: { :71092}      SIGNATURE: Britni Frank APRN-CNP    Contact information:  Supportive and Palliative Oncology  Monday-Friday 8 AM-5 PM  Phone:  [phone], press option #5, then option #1.   Or Epic Secure Chat          time spent: 55 mins      DATA   Diagnostic tests and information reviewed for today's visit:  Most recent labs       Some elements copied from oncology note on 1/21/25, the elements have been updated and all reflect current decision making from today, 1/24/2025.      Plan of Care discussed with: Patient and Family/Significant Other: jake       SIGNATURE: Britni Frank, APRN-CNP    Contact information:  Supportive and Palliative Oncology  Monday-Friday 8 AM-5 PM  Phone:  [phone], press option #5, then option #1.   Or Epic Secure Chat

## 2025-01-27 ENCOUNTER — OFFICE VISIT (OUTPATIENT)
Dept: HEMATOLOGY/ONCOLOGY | Facility: CLINIC | Age: 75
End: 2025-01-27
Payer: MEDICARE

## 2025-01-27 ENCOUNTER — INFUSION (OUTPATIENT)
Dept: HEMATOLOGY/ONCOLOGY | Facility: CLINIC | Age: 75
End: 2025-01-27
Payer: MEDICARE

## 2025-01-27 VITALS
BODY MASS INDEX: 22.69 KG/M2 | OXYGEN SATURATION: 99 % | WEIGHT: 116.18 LBS | DIASTOLIC BLOOD PRESSURE: 72 MMHG | RESPIRATION RATE: 18 BRPM | HEART RATE: 100 BPM | SYSTOLIC BLOOD PRESSURE: 116 MMHG | TEMPERATURE: 97.9 F

## 2025-01-27 DIAGNOSIS — C68.9 UROTHELIAL CARCINOMA (MULTI): ICD-10-CM

## 2025-01-27 DIAGNOSIS — C68.9 UROTHELIAL CARCINOMA (MULTI): Primary | ICD-10-CM

## 2025-01-27 PROCEDURE — 1157F ADVNC CARE PLAN IN RCRD: CPT

## 2025-01-27 PROCEDURE — 99214 OFFICE O/P EST MOD 30 MIN: CPT | Mod: 25

## 2025-01-27 PROCEDURE — 99214 OFFICE O/P EST MOD 30 MIN: CPT

## 2025-01-27 PROCEDURE — 2500000004 HC RX 250 GENERAL PHARMACY W/ HCPCS (ALT 636 FOR OP/ED): Mod: JZ,TB | Performed by: STUDENT IN AN ORGANIZED HEALTH CARE EDUCATION/TRAINING PROGRAM

## 2025-01-27 PROCEDURE — 96413 CHEMO IV INFUSION 1 HR: CPT

## 2025-01-27 PROCEDURE — 3074F SYST BP LT 130 MM HG: CPT

## 2025-01-27 PROCEDURE — 3078F DIAST BP <80 MM HG: CPT

## 2025-01-27 PROCEDURE — 1125F AMNT PAIN NOTED PAIN PRSNT: CPT

## 2025-01-27 PROCEDURE — 1036F TOBACCO NON-USER: CPT

## 2025-01-27 PROCEDURE — 1159F MED LIST DOCD IN RCRD: CPT

## 2025-01-27 RX ORDER — ALBUTEROL SULFATE 0.83 MG/ML
3 SOLUTION RESPIRATORY (INHALATION) AS NEEDED
Status: DISCONTINUED | OUTPATIENT
Start: 2025-01-27 | End: 2025-01-27 | Stop reason: HOSPADM

## 2025-01-27 RX ORDER — HEPARIN 100 UNIT/ML
500 SYRINGE INTRAVENOUS AS NEEDED
OUTPATIENT
Start: 2025-01-27

## 2025-01-27 RX ORDER — FAMOTIDINE 10 MG/ML
20 INJECTION INTRAVENOUS ONCE AS NEEDED
Status: DISCONTINUED | OUTPATIENT
Start: 2025-01-27 | End: 2025-01-27 | Stop reason: HOSPADM

## 2025-01-27 RX ORDER — HEPARIN SODIUM,PORCINE/PF 10 UNIT/ML
50 SYRINGE (ML) INTRAVENOUS AS NEEDED
OUTPATIENT
Start: 2025-01-27

## 2025-01-27 RX ORDER — PROCHLORPERAZINE MALEATE 10 MG
10 TABLET ORAL EVERY 6 HOURS PRN
Status: DISCONTINUED | OUTPATIENT
Start: 2025-01-27 | End: 2025-01-27 | Stop reason: HOSPADM

## 2025-01-27 RX ORDER — PROCHLORPERAZINE EDISYLATE 5 MG/ML
10 INJECTION INTRAMUSCULAR; INTRAVENOUS EVERY 6 HOURS PRN
Status: DISCONTINUED | OUTPATIENT
Start: 2025-01-27 | End: 2025-01-27 | Stop reason: HOSPADM

## 2025-01-27 RX ORDER — EPINEPHRINE 0.3 MG/.3ML
0.3 INJECTION SUBCUTANEOUS EVERY 5 MIN PRN
Status: DISCONTINUED | OUTPATIENT
Start: 2025-01-27 | End: 2025-01-27 | Stop reason: HOSPADM

## 2025-01-27 RX ORDER — DIPHENHYDRAMINE HYDROCHLORIDE 50 MG/ML
50 INJECTION INTRAMUSCULAR; INTRAVENOUS AS NEEDED
Status: DISCONTINUED | OUTPATIENT
Start: 2025-01-27 | End: 2025-01-27 | Stop reason: HOSPADM

## 2025-01-27 RX ADMIN — SODIUM CHLORIDE 200 MG: 9 INJECTION, SOLUTION INTRAVENOUS at 12:55

## 2025-01-27 ASSESSMENT — ENCOUNTER SYMPTOMS
CHILLS: 0
ARTHRALGIAS: 1
NECK PAIN: 0
SHORTNESS OF BREATH: 0
MYALGIAS: 0
CONSTIPATION: 1
APPETITE CHANGE: 1
UNEXPECTED WEIGHT CHANGE: 1
FLANK PAIN: 0
NUMBNESS: 1
HEMATOLOGIC/LYMPHATIC NEGATIVE: 1
BLOOD IN STOOL: 0
BACK PAIN: 1
DIARRHEA: 0
SLEEP DISTURBANCE: 0
ABDOMINAL PAIN: 1
WOUND: 0
VOMITING: 0
FATIGUE: 1
EYE PROBLEMS: 1
HEADACHES: 0
FEVER: 0
DIFFICULTY URINATING: 0
HEMATURIA: 0
NAUSEA: 0
ENDOCRINE NEGATIVE: 1
DYSURIA: 1
COUGH: 0
LEG SWELLING: 0
LIGHT-HEADEDNESS: 0
DIZZINESS: 0

## 2025-01-27 ASSESSMENT — PAIN SCALES - GENERAL: PAINLEVEL_OUTOF10: 7

## 2025-01-27 NOTE — PROGRESS NOTES
qwPatient ID: Terra Alexis is a 74 y.o. female.  Diagnosis:  irresectable T4 UC   MedOnc: Dr. Boone   Urologist: Dr. Ross  Gyn: Dr. Nathan Noyola - [facility]     Patient Care Team:  No Assigned Pcp Generic Provider, MD as PCP - General (General Practice)  Elgin Boone MD as Consulting Physician (Hematology and Oncology)  Sheyla Kasper MD, MS as Consulting Physician (Vascular Medicine)    ONCOLOGIC HISTORY  Patient is referred by Dr. Ross for recently diagnosed invasive carcinoma, may represent urothelial origin vs h/o cervical cancer. Patient presented to the ER in February 2024 with c/o flank pain imaging revealed soft tissue mass encasing the distal segment of the left ureter. Ureteral biopsy performed in IR on 03/22/2024 revealed the above diagnosis.      1998 Cervical cancer, tx Chemoradiation, with weekly cisplatin, and radiation completed on 1/4/99.   2/1999 Total abdominal hysterectomy and bilateral salpingo-oophorectomy, with no residual carcinoma found.   1/23/08 Category 1 mammogram  11/25/08 LGSIL ANTHONY 1 consistent with HPV effect  1/13/09 MID VAGINAL APEX, BIOPSY: MILD VAGINAL INTRAEPITHELIAL NEOPLASIA.  03/2024 - pelvic irresectable distal urothelial mass, bx proven UC  4/11/24 - Cycle 1 EV + Pembro  5/1/24 - Cycle 2 EV + Pembro  5/14/24 - sick visit call  5/23/24 - Scans show [place]. C3 EV (reduced to 1.125 mg/kg)+pembro   6/13/24 - C4 EV (1.125 mg/kg) + Pembro  7/5/24 - C5 EV pembro   8/15/24 - nephroureterectomy + sigmoid colon resection  8/24/24 - PE on ACO  10/3/24 - pembro C6  10/28/24 - pembro C7  11/22/24 - ileostomy closure  12/16/24 - pembro C8  1/6/25 - pembro C9   1/27/24 - tentative pembro C10    Other Contributing History  Cervical cancer - s/p chemoradiation and surgery    Review of Systems   Constitutional:  Positive for appetite change, fatigue and unexpected weight change. Negative for chills and fever.   HENT:  Negative.     Eyes:  Positive for eye problems.   Respiratory:   Negative for cough and shortness of breath.    Cardiovascular:  Negative for chest pain and leg swelling.   Gastrointestinal:  Positive for abdominal pain and constipation. Negative for blood in stool, diarrhea, nausea and vomiting.   Endocrine: Negative.    Genitourinary:  Positive for dysuria. Negative for difficulty urinating, hematuria, pelvic pain, vaginal bleeding and vaginal discharge.    Musculoskeletal:  Positive for arthralgias and back pain. Negative for flank pain, myalgias and neck pain.   Skin:  Negative for itching, rash and wound.   Neurological:  Positive for numbness. Negative for dizziness, headaches and light-headedness.   Hematological: Negative.    Psychiatric/Behavioral:  Negative for sleep disturbance.      Objective    /72   Pulse 100   Temp 36.6 °C (97.9 °F)   Resp 18   Wt 52.7 kg (116 lb 2.9 oz)   SpO2 99%   BMI 22.69 kg/m²   BSA: 1.49 meters squared    Wt Readings from Last 5 Encounters:   01/27/25 52.7 kg (116 lb 2.9 oz)   01/24/25 53 kg (116 lb 13.5 oz)   01/06/25 54.9 kg (121 lb)   12/16/24 52.6 kg (116 lb)   12/31/24 53.1 kg (117 lb)     Performance Status:  ECOG Score: 0- Fully active, able to carry on all pre-disease performance w/o restriction.  Karnofsky Score: 90 - Able to carry on normal activity; minor signs or symptoms of disease     Physical Exam  Constitutional: Awake, NAD  ENMT: no lesions seen, noted bloody gums d/t recent dental procedure - pt states dissolving stitches came out this morning   Head/Neck: NCAT  Respiratory/Thorax: CTAB, symmetric   Cardiovascular: RRR, no murmur  Gastrointestinal: Soft, NT, nondistended, +BS  Extremities: No LE edema  Psychological: Appropriate mood and behavior  Skin: no rash noted       Allergies  Allergies   Allergen Reactions    Codeine Hallucinations, GI Upset and Nausea/vomiting      Medications  Current Outpatient Medications   Medication Instructions    acetaminophen (TYLENOL) 650 mg, oral, Every 6 hours PRN     acetaminophen (TYLENOL) 650 mg, oral, Every 6 hours    amoxicillin-pot clavulanate (Augmentin) 875-125 mg tablet 1 tablet, oral, Every 12 hours scheduled    apixaban (ELIQUIS) 5 mg, oral, 2 times daily    docusate sodium (COLACE) 100 mg, oral, 2 times daily PRN    gabapentin (NEURONTIN) 300 mg, oral, 2 times daily    hydrOXYzine HCL (ATARAX) 25 mg, oral, Every 4 hours PRN    levothyroxine (SYNTHROID, LEVOXYL) 100 mcg, oral, Daily, Take on an empty stomach at the same time each day, either 30 to 60 minutes prior to breakfast    ondansetron (ZOFRAN) 4 mg, oral, Every 8 hours PRN    oxyCODONE (ROXICODONE) 10 mg, oral, Every 4 hours PRN    sennosides (SENNA) 8.6-17.2 mg, oral, 2 times daily PRN        Diagnostic Results   Recent Labs  Results for orders placed or performed in visit on 01/24/25 (from the past 96 hours)   CBC and Auto Differential   Result Value Ref Range    WBC 12.8 (H) 4.4 - 11.3 x10*3/uL    nRBC 0.0 0.0 - 0.0 /100 WBCs    RBC 3.09 (L) 4.00 - 5.20 x10*6/uL    Hemoglobin 8.9 (L) 12.0 - 16.0 g/dL    Hematocrit 29.3 (L) 36.0 - 46.0 %    MCV 95 80 - 100 fL    MCH 28.8 26.0 - 34.0 pg    MCHC 30.4 (L) 32.0 - 36.0 g/dL    RDW 17.2 (H) 11.5 - 14.5 %    Platelets 524 (H) 150 - 450 x10*3/uL    Neutrophils % 80.6 40.0 - 80.0 %    Immature Granulocytes %, Automated 1.2 (H) 0.0 - 0.9 %    Lymphocytes % 12.5 13.0 - 44.0 %    Monocytes % 5.5 2.0 - 10.0 %    Eosinophils % 0.0 0.0 - 6.0 %    Basophils % 0.2 0.0 - 2.0 %    Neutrophils Absolute 10.36 (H) 1.60 - 5.50 x10*3/uL    Immature Granulocytes Absolute, Automated 0.15 0.00 - 0.50 x10*3/uL    Lymphocytes Absolute 1.61 0.80 - 3.00 x10*3/uL    Monocytes Absolute 0.70 0.05 - 0.80 x10*3/uL    Eosinophils Absolute 0.00 0.00 - 0.40 x10*3/uL    Basophils Absolute 0.02 0.00 - 0.10 x10*3/uL   Comprehensive Metabolic Panel   Result Value Ref Range    Glucose 89 74 - 99 mg/dL    Sodium 134 (L) 136 - 145 mmol/L    Potassium 4.5 3.5 - 5.3 mmol/L    Chloride 103 98 - 107 mmol/L     Bicarbonate 23 21 - 32 mmol/L    Anion Gap 13 10 - 20 mmol/L    Urea Nitrogen 17 6 - 23 mg/dL    Creatinine 0.88 0.50 - 1.05 mg/dL    eGFR 69 >60 mL/min/1.73m*2    Calcium 9.3 8.6 - 10.3 mg/dL    Albumin 3.5 3.4 - 5.0 g/dL    Alkaline Phosphatase 123 33 - 136 U/L    Total Protein 6.8 6.4 - 8.2 g/dL    AST 17 9 - 39 U/L    Bilirubin, Total 0.2 0.0 - 1.2 mg/dL    ALT 14 7 - 45 U/L   TSH with reflex to Free T4 if abnormal   Result Value Ref Range    Thyroid Stimulating Hormone 6.79 (H) 0.44 - 3.98 mIU/L   Thyroxine, Free   Result Value Ref Range    Thyroxine, Free 0.92 0.61 - 1.12 ng/dL         Genetics   Signatera 4/11/24 0.45   Signatera 7/5/24 0.00  Signatera 10/28/24 100.33  Signatera 12/16/24 33.52    Pathology  Surgical Path 3/22/24   FINAL DIAGNOSIS   A. Soft tissue, mass, biopsy:    -- Involved by invasive carcinoma. See note.     Note: Patient's imaging finding of a soft tissue mass encasing the distal ureter and remote history of cervical cancer (per clinical notes) is noted. Histological sections show cores of fibroconnective tissue involved by infiltrative nests of high grade carcinoma cells, which are immunohistochemically positive for pancytokeratin AE1/AE3, CAM5.2, GATA3 and p63 and are negative for PAX8 and ER. The morphological and immunohistochemical findings are not entirely specific. Given the clinical context of a distal ureteral mass, this might represent invasive carcinoma of urothelial origin. Clinical correlation is recommended.     Recent Imaging   MRI Pelvis -   1. A 2.9 x 2.5 x 3.6 cm diffusion restricting enhancing mass encasing  the left distal ureteral stent anterior to the left sacral ala,  compatible with known urothelial neoplasm, with similar measurements  on prior CT 07/01/2024 but has decreased in size when compared to  prior MRI from 03/22/2024. The mass completely encases the left  internal iliac artery and vein which are likely occluded.  Left  common iliac vein and left external  iliac vein abuts the mass but  remain patent.  There is at least partial encasement of the left S1  and S2 nerve roots. The mass abuts the left sacrum without definite  evidence of osseous extension/destruction.  2. No evidence of lymphadenopathy or other metastatic disease in the  pelvis.      8/15/24 Colon resection + nephro-U  FINAL DIAGNOSIS   A. Soft tissue, left periureteral, biopsy:  -- Invasive poorly differentiated carcinoma with squamous differentiation   B. Colon, sigmoid, segmental resection:  -- Poorly differentiated carcinoma with squamous differentiation involving mesentery and bowel wall  -- Margins of resection are negative for carcinoma  -- Hyperplastic polyp  -- Two lymph nodes, negative for carcinoma (0/2)  C. Ureter, left, segmental resection:  -- Invasive carcinoma with extensive squamous differentiation (see comment)  -- Invasive carcinoma is present at the margin of resection  -- See cancer summary report  D. Kidney and proximal ureter, left, nephroureterectomy:  -- Atrophic renal parenchyma with marked chronic inflammation, interstitial fibrosis with tubular atrophy, glomerulosclerosis and severe arterio- and arteriolosclerosis     Assessment/Plan   Terra Alexis is a 74 y.o.  female with hx cervical cancer s/p chemoRT 1998, now found with pelvic irresectable distal urothelial mass, bx proven UC. Now post EV/pembro with tumor shrinkage (around 30%). Underwent nephro-U + colon resection with residual tumor in path - pT4 with therapy changes. Unfortunately with recurrent progressive tumor, resumed pembro and signatera sent 12/16/24 with decreased ctDNA. Remains on pembro, restaging scans stable.  also discussed this case with Radiation Oncology team and I think she benefits from radiation treatment to his pelvic mass --> seeing the team this week (Friday).

## 2025-01-28 ENCOUNTER — APPOINTMENT (OUTPATIENT)
Dept: ORTHOPEDIC SURGERY | Facility: CLINIC | Age: 75
End: 2025-01-28
Payer: MEDICARE

## 2025-01-28 ENCOUNTER — HOSPITAL ENCOUNTER (OUTPATIENT)
Dept: RADIOLOGY | Facility: CLINIC | Age: 75
Discharge: HOME | End: 2025-01-28
Payer: MEDICARE

## 2025-01-28 DIAGNOSIS — M54.16 LEFT LUMBAR RADICULOPATHY: ICD-10-CM

## 2025-01-28 DIAGNOSIS — M25.552 HIP PAIN, LEFT: ICD-10-CM

## 2025-01-28 PROCEDURE — 73502 X-RAY EXAM HIP UNI 2-3 VIEWS: CPT | Mod: LEFT SIDE

## 2025-01-28 PROCEDURE — 73502 X-RAY EXAM HIP UNI 2-3 VIEWS: CPT | Mod: LT

## 2025-01-28 PROCEDURE — 1157F ADVNC CARE PLAN IN RCRD: CPT | Performed by: FAMILY MEDICINE

## 2025-01-28 PROCEDURE — 99214 OFFICE O/P EST MOD 30 MIN: CPT | Performed by: FAMILY MEDICINE

## 2025-01-28 NOTE — PROGRESS NOTES
** Please excuse any errors in grammar or translation related to this dictation. Voice recognition software was utilized to prepare this document. **    Assessment & Plan:  Clinical presentation more consistent for lumbar nerve root impingement opposed to isolated hip source of pain.  Discussed with patient how if her nerves get impinged along lumbar spine she can get referred pain throughout her leg which she endorses.  Right now she has on a regimen for palliative care to include oxycodone and gabapentin.  Unfortunately she is having unwanted side effects with constipation from this.  For this reason I am recommending she be seen by pain management for potential spinal injection.  I provided her the contact information for Dr. Louis.  If symptoms change and become more localized within her hip I am happy to see her back for this.  In the meantime she can continue her pain regimen prescribed by palliative care.  All questions answered and patient agrees this plan of care.      Chief complaint:  Left hip pain    HPI:  75 y/o patient, hx of right knee arthritis, HTN, urothelial carcinoma, presents with left hip pain. No mechanism of injury reported at onset. Symptoms have progressively worsened over the past month. Pain is most prominent at buttock and down the posterior leg.  No reported sensory changes or weakness, hip joint locking, or feeling of instability.  Symptoms are aggravated by all activity. Treatment to date has been through palliative care to include oxycodone 10 mg every 4 hours and gabapentin 3 mg nightly.  Denies saddle anesthesia.  Denies fecal or urinary incontinence.  Does endorse constipation since she started taking the oxycodone.  Denies weakness in the lower extremities.    Patient Active Problem List   Diagnosis    Acquired involutional ptosis of both eyelids    Benign lesion of eyelid    Benign paroxysmal positional vertigo    Brow ptosis, bilateral    Burning with urination    Chronic  pain of right knee    Combined forms of age-related cataract, bilateral    Cubital tunnel syndrome    Dermatochalasis of left upper eyelid    Dermatochalasis of right upper eyelid    Essential hypertension    Gross hematuria    Hematuria    Hydronephrosis    Macular RPE mottling    Malignant neoplasm of cervix uteri    Obesity, Class I, BMI 30-34.9    Sebaceous adenoma of face    Personal history of malignant neoplasm of cervix uteri    Ureteral mass    URI (upper respiratory infection)    Vaginal dysplasia    Arthritis    Asymptomatic postmenopausal state    Depression with anxiety    Hypertension    Hypokalemia    Knee pain, right    Chronic low back pain    Nonfunctioning kidney    Primary localized osteoarthritis of right knee    Right hip pain    Sacral pain    Urothelial carcinoma (Multi)    Acute UTI    Pelvic mass    Cancer of left ureter (Multi)    Malignant tumor of sigmoid colon (Multi)    Post-op pain    Urothelial cancer (Multi)    Acute pulmonary embolism without acute cor pulmonale (Multi)    Acute deep vein thrombosis (DVT) of calf muscle vein of right lower extremity (Multi)    History of hysterectomy    Hyperlipidemia    Vision loss    Cataract present    Rash    Chronic renal impairment, stage 3a (Multi)    Anemia    Malignant neoplasm of left kidney (Multi)    Peripheral vascular disease (CMS-HCC)    Renal calculi     Past Surgical History:   Procedure Laterality Date    NEPHRECTOMY Left 08/15/2024    ex-lap, left ureteral tumor resection, left nephrectomy, excision of pelvic mass, partial sigmoidectomy with loop ileostomy    OTHER SURGICAL HISTORY  10/07/2021    Hysterectomy     Current Outpatient Medications on File Prior to Visit   Medication Sig Dispense Refill    acetaminophen (Tylenol) 325 mg tablet Take 2 tablets (650 mg) by mouth every 6 hours if needed for mild pain (1 - 3). 20 tablet 0    acetaminophen (Tylenol) 325 mg tablet Take 2 tablets (650 mg) by mouth every 6 hours.       amoxicillin-pot clavulanate (Augmentin) 875-125 mg tablet Take 1 tablet by mouth every 12 hours. 4 tablet 0    apixaban (Eliquis) 5 mg tablet Take 1 tablet (5 mg) by mouth 2 times a day. 60 tablet 5    docusate sodium (Colace) 100 mg capsule Take 1 capsule (100 mg) by mouth 2 times a day as needed for constipation (for hard stools). 60 capsule 3    gabapentin (Neurontin) 300 mg capsule Take 1 capsule (300 mg) by mouth 2 times a day. 60 capsule 0    hydrOXYzine HCL (Atarax) 10 mg tablet Take 2.5 tablets (25 mg) by mouth every 4 hours if needed for itching for up to 10 days. 30 tablet 0    levothyroxine (Synthroid, Levoxyl) 25 mcg tablet Take 4 tablets (100 mcg) by mouth early in the morning.. Take on an empty stomach at the same time each day, either 30 to 60 minutes prior to breakfast 120 tablet 2    ondansetron (Zofran) 4 mg tablet Take 1 tablet (4 mg) by mouth every 8 hours if needed for nausea or vomiting. (Patient not taking: Reported on 1/27/2025) 20 tablet 0    oxyCODONE (Roxicodone) 10 mg immediate release tablet Take 1 tablet (10 mg) by mouth every 4 hours if needed for severe pain (7 - 10). 180 tablet 0    sennosides (senna) 8.6 mg tablet Take 1-2 tablets (8.6-17.2 mg) by mouth 2 times a day as needed for constipation. 120 tablet 3    [DISCONTINUED] oxyCODONE-acetaminophen (Percocet) 5-325 mg tablet Take 1 tablet by mouth every 6 hours if needed for severe pain (7 - 10) for up to 7 days. 20 tablet 0     Current Facility-Administered Medications on File Prior to Visit   Medication Dose Route Frequency Provider Last Rate Last Admin    [COMPLETED] pembrolizumab (Keytruda) 200 mg in sodium chloride 0.9% 118 mL IV  200 mg intravenous Once Elgin Boone MD   Stopped at 01/27/25 7395    [DISCONTINUED] albuterol 2.5 mg /3 mL (0.083 %) nebulizer solution 3 mL  3 mL nebulization PRN Elgin Boone MD        [DISCONTINUED] dextrose 5 % in water (D5W) bolus 500 mL  500 mL intravenous PRN Elgin Boone MD         [DISCONTINUED] diphenhydrAMINE (BENADryl) injection 50 mg  50 mg intravenous PRN Elgin Boone MD        [DISCONTINUED] EPINEPHrine (Epipen) injection syringe 0.3 mg  0.3 mg intramuscular q5 min PRN Elgin Boone MD        [DISCONTINUED] famotidine PF (Pepcid) injection 20 mg  20 mg intravenous Once PRN Elgin Boone MD        [DISCONTINUED] methylPREDNISolone sod succinate (SOLU-Medrol) 40 mg/mL injection 40 mg  40 mg intravenous PRN Elgin Boone MD        [DISCONTINUED] prochlorperazine (Compazine) injection 10 mg  10 mg intravenous q6h PRN Elgin Boone MD        [DISCONTINUED] prochlorperazine (Compazine) tablet 10 mg  10 mg oral q6h PRN Elgin Boone MD        [DISCONTINUED] sodium chloride 0.9 % bolus 500 mL  500 mL intravenous PRN Elgin Boone MD           Exam:  General Exam:  Constitutional - NAD, AAO x 3, conversing appropriately.  HEENT- Normocephalic and atraumatic. No facial deformities. Hearing grossly normal.  Lungs - Breathing non-labored with normal rate. No accessory muscle use.  CV - Extremities warm and well-perfused, brisk capillary refill present.   Neuro - CN II-XII grossly intact.    LEFT Hip Exam:  No warmth, erythema or ecchymosis overlying.  Active flexion to 90 degrees, ER 50deg, IR 30deg.  NTTP over greater trochanter, glute tendons, proximal ITB, ischial tuberosity  [5]/5 strength of hip flexion, abduction, & adduction  SILT  [ - ]Log roll pain, [ - ]FADIR, [ - ]AMRIT, [ - ]Stinchfield,    Lumbar Spine Exam:  No gross spinal deformity observed.  No midline ttp. No step-offs.  Normal flexion and extension. Normal hip flexion.   [5]/5 strength of hip flexion (L2/L3), knee extension (L4), ankle DF (L4), EHL (L5), ankle PF (S1)  SILT in all dermatomal distributions L3-S1  [ - ] Straight leg raise  [ + ] Slump test  [ - ] AMRIT pain referred to low back  [ - ] Joanne's finger test        Results:  Xrays of left hip obtained 1/20/2025 personally interpreted as mild  degenerative changes with acetabular dysplasia.    Lab Results   Component Value Date    HGBA1C 5.5 04/26/2021    CREATININE 0.88 01/24/2025    EGFR 69 01/24/2025

## 2025-01-29 NOTE — PROGRESS NOTES
Radiation Oncology Outpatient Consult    Patient Name:  Terra Alexis  MRN:  [id number]  :  1950    Referring Provider: No ref. provider found  Primary Care Provider: No Assigned PCP Generic Provider, MD  Care Team: Patient Care Team:  No Assigned Pcp Generic Provider, MD as PCP - General (General Practice)  Elgin Boone MD as Consulting Physician (Hematology and Oncology)  Sheyla Kasper MD, MS as Consulting Physician (Vascular Medicine)    Date of Service: 2025     SUBJECTIVE  History of Present Illness:  Terra Alexis is a 74 y.o. female who was referred by Dr. Boone, for a consultation to the [facility] Department of Radiation Oncology.  She is known to have a history of cervical cancer diagnosed in , and treated with concomitant chemo radiation with cisplatin followed by brachytherapy.  Radiation was completed on 1999, and this was followed by total abdominal hysterectomy and bilateral salpingo-oophorectomy completed in 1999.  In 2024 the patient developed left flank pain with hematuria and imaging revealed an enhancing mass inseparable from and potential rising from the distal left ureter measuring 3.1 x 3.5 to 4.1 cm.  The patient underwent on 3/1/2024 cystoscopy and left uteroscopy with ureteral biopsy and ureteral stent insertion with retrograde pyelogram, then CT-guided biopsy on 2024.  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  The patient then was started on Enfortumab vedotin-ejfv (EV) plus pembrolizumab (Pembro) for 5 cycles. She eventually underwent on 8/15/2024 exploration laparotomy, open left ureteral tumor resection and external pelvic mass, and partial sigmoidectomy, and was placed afterwards on maintenance Pembro.  She received a total of 10 cycles, the last of which was administered on 2024.  MRI of the pelvis performed on 2024 showed a large 8.3 cm heterogeneous,  necrotic mass in the left presacral region that was consistent with locally recurrent disease.  PET/CT performed on 1/23/2025 did not show evidence of metastatic disease. She is presenting today for consideration of radiation therapy for her locally recurrent disease.    Per chart review, the patient underwent on 2/25/2024 a CT abdomen and pelvis without IV contrast for evaluation of flank pain and hematuria.  The CT showed a soft tissue mass encasing the distal segment of the left ureter, measuring 3.7 x 3.5 cm and located approximately 7 cm from the UV junction, associated with severe hydronephrosis and proximal hydroureter.  She was eventually admitted for evaluation and underwent on 3/1/2020 for cystoscopy and left uteroscopy with ureteral biopsy and ureteral stent insertion with retrograde pyelogram.  This procedure came back as nonconclusive, so patient underwent CT-guided biopsy on 3/22/2024.  Biopsy from this procedure came back with invasive carcinoma, likely representing urothelial origin.      The patient was afterwards started on EV + pembrolizumab.  She received 5 cycles, and then underwent on 8/15/2024  exploratory laparotomy, open left radial tumor resection and extra renal pelvic mass, and partial sigmoidectomy.  Pathology from the surgical procedure came back with invasive poorly differentiated carcinoma with squamous differentiation involving the left ureteral biopsy, the colon, the sigmoid, the resected mass.  There was lymphovascular invasion, and the left ureter margin was involved by carcinoma.     Patient was afterwards placed on maintenance pembrolizumab and completed a total of 5 cycles postoperatively.  MRI of the pelvis was completed on 12/31/2024 that showed a large 8.3 cm heterogeneous, necrotic mass in the left presacral region consistent with locally recurrent malignancy.  The tumor was abutting the left L5 nerve root, and encasing the left S1 nerve root, and invading the upper rectum  distal to the rectosigmoid anastomosis.  There was also probable involvement of the left vaginal cuff.  The tumor also encased and severely narrowed the proximal left external iliac artery and vein.  CT chest abdomen pelvis was performed on 1/23/2025 and it showed no metastatic sites.  Also PET/CT performed on 1/23/2025 showed no evidence of osseous metastatic disease.    During today's interview,    Prior Radiotherapy:  No radiation treatments to show. (Treatments may have been administered in another system.)       Past Medical History:    Past Medical History:   Diagnosis Date   • Chronic kidney disease    • GERD (gastroesophageal reflux disease)    • Hypothyroidism    • Malignant tumor of sigmoid colon (Multi) 08/21/2024   • Nephrolithiasis    • Other specified disorders of kidney and ureter 03/04/2022    Ureteral mass   • Peripheral neuropathy    • Personal history of malignant neoplasm of cervix uteri 11/21/2022    History of malignant neoplasm of cervix   • Vision loss         Past Surgical History:    Past Surgical History:   Procedure Laterality Date   • NEPHRECTOMY Left 08/15/2024    ex-lap, left ureteral tumor resection, left nephrectomy, excision of pelvic mass, partial sigmoidectomy with loop ileostomy   • OTHER SURGICAL HISTORY  10/07/2021    Hysterectomy        Family History:  Cancer-related family history is not on file.    Social History:    Social History     Tobacco Use   • Smoking status: Never   • Smokeless tobacco: Never   Vaping Use   • Vaping status: Never Used   Substance Use Topics   • Alcohol use: Not Currently   • Drug use: Never       Allergies:    Allergies   Allergen Reactions   • Codeine Hallucinations, GI Upset and Nausea/vomiting        Medications:    Current Outpatient Medications:   •  acetaminophen (Tylenol) 325 mg tablet, Take 2 tablets (650 mg) by mouth every 6 hours if needed for mild pain (1 - 3)., Disp: 20 tablet, Rfl: 0  •  acetaminophen (Tylenol) 325 mg tablet, Take 2  tablets (650 mg) by mouth every 6 hours., Disp: , Rfl:   •  amoxicillin-pot clavulanate (Augmentin) 875-125 mg tablet, Take 1 tablet by mouth every 12 hours., Disp: 4 tablet, Rfl: 0  •  apixaban (Eliquis) 5 mg tablet, Take 1 tablet (5 mg) by mouth 2 times a day., Disp: 60 tablet, Rfl: 5  •  docusate sodium (Colace) 100 mg capsule, Take 1 capsule (100 mg) by mouth 2 times a day as needed for constipation (for hard stools)., Disp: 60 capsule, Rfl: 3  •  gabapentin (Neurontin) 300 mg capsule, Take 1 capsule (300 mg) by mouth 2 times a day., Disp: 60 capsule, Rfl: 0  •  hydrOXYzine HCL (Atarax) 10 mg tablet, Take 2.5 tablets (25 mg) by mouth every 4 hours if needed for itching for up to 10 days., Disp: 30 tablet, Rfl: 0  •  levothyroxine (Synthroid, Levoxyl) 25 mcg tablet, Take 4 tablets (100 mcg) by mouth early in the morning.. Take on an empty stomach at the same time each day, either 30 to 60 minutes prior to breakfast, Disp: 120 tablet, Rfl: 2  •  ondansetron (Zofran) 4 mg tablet, Take 1 tablet (4 mg) by mouth every 8 hours if needed for nausea or vomiting. (Patient not taking: Reported on 1/27/2025), Disp: 20 tablet, Rfl: 0  •  oxyCODONE (Roxicodone) 10 mg immediate release tablet, Take 1 tablet (10 mg) by mouth every 4 hours if needed for severe pain (7 - 10)., Disp: 180 tablet, Rfl: 0  •  sennosides (senna) 8.6 mg tablet, Take 1-2 tablets (8.6-17.2 mg) by mouth 2 times a day as needed for constipation., Disp: 120 tablet, Rfl: 3      Review of Systems:  Review of Systems - Oncology    Performance Status:  The Karnofsky performance scale today is {DESC; KARNOFSKY SCALE WITH ECOG EQUIVALENT:17587}.        OBJECTIVE  There were no vitals taken for this visit.   Physical Exam     Laboratory Review:  There are no laboratory contraindications to radiation therapy.    The pertinent lab results were reviewed and discussed with the patient.  Notably, ***  {LABORATORY RESULTS:41222}    Pathology Review:  The pertinent  pathology results were reviewed and discussed with the patient.  Notably,     Surgical Pathology Exam: [id number]  Order: [id number]   Collected 3/22/2024 09:06       Status: Final result       Visible to patient: No (inaccessible in [facility])    0 Result Notes      Component    FINAL DIAGNOSIS   A. Soft tissue, mass, biopsy:    -- Involved by invasive carcinoma. See note.     Note: Patient's imaging finding of a soft tissue mass encasing the distal ureter and remote history of cervical cancer (per clinical notes) is noted. Histological sections show cores of fibroconnective tissue involved by infiltrative nests of high grade carcinoma cells, which are immunohistochemically positive for pancytokeratin AE1/AE3, CAM5.2, GATA3 and p63 and are negative for PAX8 and ER. The morphological and immunohistochemical findings are not entirely specific. Given the clinical context of a distal ureteral mass, this might represent invasive carcinoma of urothelial origin. Clinical correlation is recommended.     : Dr. Sukh Case   Electronically signed by Marcelino Melendez MD on 3/29/2024 at 1402        By the signature on this report, the individual or group listed as making the Final Interpretation/Diagnosis certifies that they have reviewed this case.    Clinical History    left pelvis mass, surrounding distal ureter and iliac vessels. R/o malignancy        Surgical Pathology Exam: [id number]  Order: [id number]   Collected 8/15/2024 13:11       Status: Edited Result - FINAL       Visible to patient: No (inaccessible in [facility])       Dx: Cancer of left ureter (Multi)    0 Result Notes      Component    FINAL DIAGNOSIS   A. Soft tissue, left periureteral, biopsy:  -- Invasive poorly differentiated carcinoma with squamous differentiation     B. Colon, sigmoid, segmental resection:  -- Poorly differentiated carcinoma with squamous differentiation involving mesentery and bowel wall  -- Margins of resection are  negative for carcinoma  -- Hyperplastic polyp  -- Two lymph nodes, negative for carcinoma (0/2)     C. Ureter, left, segmental resection:  -- Invasive carcinoma with extensive squamous differentiation (see comment)  -- Invasive carcinoma is present at the margin of resection  -- See cancer summary report     D. Kidney and proximal ureter, left, nephroureterectomy:  -- Atrophic renal parenchyma with marked chronic inflammation, interstitial fibrosis with tubular atrophy, glomerulosclerosis and severe arterio- and arteriolosclerosis            Imaging:  The pertinent imaging results were reviewed and discussed with the patient.  Notably,     === 01/23/25 ===    CT CHEST ABDOMEN PELVIS W IV CONTRAST    - Impression -  Urothelial carcinoma restaging scan. When compared to the prior MRI  dated 12/31/2024, there is a stable presacral heterogeneous mass as  described above. No definite new sites of metastatic disease.  Additional stable chronic and incidental findings described above.    I personally reviewed the image(s) / study and I agree with the  findings as stated by Andres Deras MD. This study was interpreted at  [facility], [place], [place].    MACRO:  None    Signed by: Jono Luna 1/24/2025 10:07 PM  Dictation workstation:   [id number]    === 12/31/24 ===    MR PELVIS W AND WO CONTRAST    - Impression -  1. Large 8.3 cm heterogeneous, necrotic mass in the left presacral  region is consistent with locally recurrent malignancy. The mass  compresses the left L5 nerve root and encases the left S1 nerve root,  as well as invades the severely narrows the left proximal external  iliac artery and vein, encases and occludes the left internal iliac  vessels. Suspect there is also involvement of the left vaginal cuff.  2. Trace pelvic free fluid and pelvic edema is likely reactive to the  above findings, as detailed above.  3. Mildly prominent single versus 2 adjacent low-level para-aortic  lymph  node(s), which may be reactive or metastatic.      MACRO:  Critical Finding:  See findings. Notification was initiated on  1/6/2025 at 4:31 pm by Dr. Merissa Holloway.  (**-OCF-**) Instructions:    Signed by: Merissa Holloway 1/6/2025 4:31 PM  Dictation workstation:   [id number]       ASSESSMENT:   Terra Alexis is a 74 y.o. female who was referred by Dr. Boone, for a consultation to the [facility] Department of Radiation Oncology.  She is known to have a history of cervical cancer diagnosed in 1998, and treated with concomitant chemo radiation with cisplatin.  Radiation was completed on 1/4/1999, and this was followed by total abdominal hysterectomy and bilateral salpingo-oophorectomy completed in February 1999.  In February 2024 the patient developed left flank pain with hematuria and imaging revealed an enhancing mass inseparable from and potential rising from the distal left ureter measuring 3.1 x 3.5 to 4.1 cm.  The patient underwent on 3/1/2024 cystoscopy and left uteroscopy with ureteral biopsy and ureteral stent insertion with retrograde pyelogram.  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  The patient then was started on Enfortumab vedotin-ejfv (EV) plus pembrolizumab (Pembro) for 5 cycles. She eventually underwent on 8/15/2024 exploration laparotomy, open left ureteral tumor resection and external pelvic mass, and partial sigmoidectomy, and was placed afterwards on maintenance Pembro.  She received a total of 10 stomach therapy the last of which was administered on 1/27/2024.  Of the pelvis performed on 12/31/2025 showed a large 8.3 cm heterogeneous, necrotic mass in the left presacral region that was consistent with locally recurrent disease.  PET/CT performed on 1/23/2025 did not show evidence of metastatic disease. She is presenting today for consideration of radiation therapy for her locally recurrent disease.    PLAN:       [ ] Consider that  radiation therapy.  NCCN Guidelines {Actions; were/were not:78170} applicable to guide this patients treatment plan.   Tessa Mcneill MD PhD      cancer diagnosed in 1998, and treated with concomitant chemo radiation with cisplatin.  Radiation was completed on 1/4/1999, and this was followed by total abdominal hysterectomy and bilateral salpingo-oophorectomy completed in February 1999.  In February 2024 the patient developed left flank pain with hematuria and imaging revealed an enhancing mass inseparable from and potential rising from the distal left ureter measuring 3.1 x 3.5 to 4.1 cm.  The patient underwent on 3/1/2024 cystoscopy and left uteroscopy with ureteral biopsy and ureteral stent insertion with retrograde pyelogram.  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  The patient then was started on Enfortumab vedotin-ejfv (EV) plus pembrolizumab (Pembro) for 5 cycles. She eventually underwent on 8/15/2024 exploration laparotomy, open left ureteral tumor resection and external pelvic mass, and partial sigmoidectomy, and was placed afterwards on maintenance Pembro.  She received a total of 10 stomach therapy the last of which was administered on 1/27/2024.  Of the pelvis performed on 12/31/2025 showed a large 8.3 cm heterogeneous, necrotic mass in the left presacral region that was consistent with locally recurrent disease.  PET/CT performed on 1/23/2025 did not show evidence of metastatic disease. She is presenting today for consideration of radiation therapy for her locally recurrent disease.    During today's interview, we went to the patient and her fiancé over the images.  We explained the presence of the mass and the proximity of the bowels.  We also explained that the mass has probably extended up to the rectosigmoid junction and the vagina as reported in the MRI.  We also explained that the mass is encasing on the left sacral plexus is probably what is causing her left-sided sciatica.  We then went over the fact that, given the mass that is none resectable, radiation therapy can be used for local control.  We  explained that her previous radiation therapy and the current location and relation with the rectum and the vagina might need to limit the amount of dose we will be able to deliver.  Therefore, we will refer to gynecological oncology and to general surgery/gastroenterology for assessment of the invasion and risk of fistula via scope.  We will obtain her records from her previous radiation therapy course.    We then went over the logistics of radiation oncology which includes first acquisition of % treatment position.  We then went over the benefits and the side effects of radiation oncology the benefits are mainly local control, and prevention of further growth of the tumor.  As for the side effect these would include fatigue, nausea, diarrhea, risk of fistula with the rectum and the vagina, bowel perforation, radiation myelitis, dysuria, chronic kidney disease, and theoretical risk of secondary malignancy.    The patient and her significant other understood the benefits and side effects of RT as outlined above. They asked questions that revealed understanding and their questions were thoroughly answered. Patient elected to proceed with RT and signed the consent form.      PLAN:     [ ] Referral placed for gynecological oncology given the involvement of the left vaginal cuff.  [ ] Referred to general surgery and gastroenterology for possible scope given the involvement of the rectosigmoid anastomosis.  [ ] Will schedule CT SIM 1 week after the above referral, and plan for delivery of 35 Gray in 1.5 Gray twice daily fractions.  [ ] Will obtain the previous records of her radiation therapy for eventual dose composite.  [ ] Medrol Dosepak prescribed for the left-sided sciatica.      NCCN Guidelines were applicable to guide this patients treatment plan.   Tessa Mcneill MD PhD

## 2025-01-30 ENCOUNTER — TELEPHONE (OUTPATIENT)
Dept: RADIATION ONCOLOGY | Facility: HOSPITAL | Age: 75
End: 2025-01-30
Payer: MEDICARE

## 2025-01-30 NOTE — TELEPHONE ENCOUNTER
Called pt to remind of appointment on 1/31/2025 at 2:30. Pt's phone went to voicemail left number if needs to reschedule.

## 2025-01-31 ENCOUNTER — TELEPHONE (OUTPATIENT)
Dept: PALLIATIVE MEDICINE | Facility: CLINIC | Age: 75
End: 2025-01-31
Payer: MEDICARE

## 2025-01-31 ENCOUNTER — HOSPITAL ENCOUNTER (OUTPATIENT)
Dept: RADIATION ONCOLOGY | Facility: HOSPITAL | Age: 75
Setting detail: RADIATION/ONCOLOGY SERIES
Discharge: HOME | End: 2025-01-31
Payer: MEDICARE

## 2025-01-31 VITALS
DIASTOLIC BLOOD PRESSURE: 72 MMHG | TEMPERATURE: 97.3 F | SYSTOLIC BLOOD PRESSURE: 132 MMHG | HEART RATE: 93 BPM | OXYGEN SATURATION: 96 % | WEIGHT: 119.82 LBS | BODY MASS INDEX: 23.4 KG/M2 | RESPIRATION RATE: 18 BRPM

## 2025-01-31 DIAGNOSIS — C68.9 UROTHELIAL CANCER (MULTI): ICD-10-CM

## 2025-01-31 DIAGNOSIS — M53.3 SACRAL PAIN: ICD-10-CM

## 2025-01-31 DIAGNOSIS — C68.9 UROTHELIAL CARCINOMA (MULTI): ICD-10-CM

## 2025-01-31 DIAGNOSIS — Z85.9: ICD-10-CM

## 2025-01-31 DIAGNOSIS — M48.50XA: ICD-10-CM

## 2025-01-31 DIAGNOSIS — C64.2 MALIGNANT NEOPLASM OF LEFT KIDNEY (MULTI): Primary | ICD-10-CM

## 2025-01-31 DIAGNOSIS — C66.2 CANCER OF LEFT URETER (MULTI): ICD-10-CM

## 2025-01-31 DIAGNOSIS — R19.00 PELVIC MASS: ICD-10-CM

## 2025-01-31 PROCEDURE — 99215 OFFICE O/P EST HI 40 MIN: CPT | Performed by: INTERNAL MEDICINE

## 2025-01-31 PROCEDURE — G2211 COMPLEX E/M VISIT ADD ON: HCPCS | Performed by: INTERNAL MEDICINE

## 2025-01-31 PROCEDURE — 99205 OFFICE O/P NEW HI 60 MIN: CPT | Performed by: INTERNAL MEDICINE

## 2025-01-31 RX ORDER — METHYLPREDNISOLONE 4 MG/1
TABLET ORAL
Qty: 21 TABLET | Refills: 0 | Status: SHIPPED | OUTPATIENT
Start: 2025-01-31

## 2025-01-31 ASSESSMENT — ACTIVITIES OF DAILY LIVING (ADL)
HEARING - RIGHT EAR: DIFFICULTY WITH NOISE
BATHING: INDEPENDENT
HEARING - LEFT EAR: DIFFICULTY WITH NOISE
GROOMING: INDEPENDENT
DRESSING YOURSELF: INDEPENDENT
TOILETING: INDEPENDENT
JUDGMENT_ADEQUATE_SAFELY_COMPLETE_DAILY_ACTIVITIES: YES
PATIENT'S MEMORY ADEQUATE TO SAFELY COMPLETE DAILY ACTIVITIES?: YES
ADEQUATE_TO_COMPLETE_ADL: YES
WALKS IN HOME: INDEPENDENT
FEEDING YOURSELF: INDEPENDENT

## 2025-01-31 ASSESSMENT — ENCOUNTER SYMPTOMS
GASTROINTESTINAL NEGATIVE: 1
BACK PAIN: 1
EYES NEGATIVE: 1
APPETITE CHANGE: 1
FATIGUE: 1
ENDOCRINE NEGATIVE: 1
LOSS OF SENSATION IN FEET: 1
UNEXPECTED WEIGHT CHANGE: 1
HEMATOLOGIC/LYMPHATIC NEGATIVE: 1
RESPIRATORY NEGATIVE: 1
CARDIOVASCULAR NEGATIVE: 1
DEPRESSION: 0
OCCASIONAL FEELINGS OF UNSTEADINESS: 1
SLEEP DISTURBANCE: 1

## 2025-01-31 ASSESSMENT — PATIENT HEALTH QUESTIONNAIRE - PHQ9
SUM OF ALL RESPONSES TO PHQ9 QUESTIONS 1 AND 2: 0
1. LITTLE INTEREST OR PLEASURE IN DOING THINGS: NOT AT ALL
2. FEELING DOWN, DEPRESSED OR HOPELESS: NOT AT ALL

## 2025-01-31 ASSESSMENT — PAIN SCALES - GENERAL: PAINLEVEL_OUTOF10: 9

## 2025-01-31 NOTE — PROGRESS NOTES
Radiation Oncology Nursing Note    Prior Radiotherapy:  Yes, describe: [id number] at   No radiation treatments to show. (Treatments may have been administered in another system.)     Current Systemic Treatment:  Yes, describe: immunotherapyy    cycl 12     Presence of Pacemaker or ICD:  No    History of Autoimmune or Connective Tissue Disorders:  No    Pain: The patient's current pain level was assessed.  They report currently having a pain of 9 out of 10.  They feel their pain is not under control with the use of pain medications.    Review of Systems:  Review of Systems   Constitutional:  Positive for appetite change, fatigue and unexpected weight change.   HENT:   Positive for tinnitus.         Mostly left ear     Eyes: Negative.    Respiratory: Negative.     Cardiovascular: Negative.    Gastrointestinal: Negative.    Endocrine: Negative.    Genitourinary: Negative.          Pt had nephrectomy in 8/15/2024   Musculoskeletal:  Positive for back pain and gait problem.        Left flank pain     Skin: Negative.    Neurological:  Positive for gait problem.   Hematological: Negative.    Psychiatric/Behavioral:  Positive for sleep disturbance.

## 2025-01-31 NOTE — TELEPHONE ENCOUNTER
Phone call to patient to assess how tolerating Gabapentin 300mg at bedtime as plan to increase to BID dosing if tolerating well. I left this information on secure VM. I also provided our call back number and instructed patient to call back if not tolerating Gabapentin well or if further issues with pain. Recall patient started Oxycodone PRN. Britni Frank CNP discussed long acting pain medication (mser) if needed in future too but patient did not want to start at last visit.

## 2025-01-31 NOTE — TELEPHONE ENCOUNTER
Phone call to patient who was returning my call from earlier today. Patient is agreeable to increase Gabapentin 300mg from at bedtime to BID dosing. Patient reports taking Oxycodone Q4H PRN and says how she is always in pain. We discussed starting a long acting pain medication (MSER that Cherry previously spoke to patient about) but she declines. Patient says she met with RT today and they think the tumor is causing the pain and hoping with RT to decrease tumor burden and improve pain some. Patient is waiting on a call for scheduling but says this will be ASAP. Patient says they instructed her to continue taking pain medications as prescribed for the time being. Patient says that she cx her FUV with our department as is going to follow with RT but appreciates everything Britni has done to try to help her pain. I asked patient if RT had directed her to cx appointment and she said she will follow up with them. I did discuss they often may not be comfortable prescribing pain medications and the benefits of continuing to follow with our team. I updated patient that we offer phone visits (as well as in person) and if appointment burden was too much that we could try to incorporate more phone visits. Patient will call our office back after speaking to RT again. No further needs at this time. Provider updated.

## 2025-02-06 ENCOUNTER — APPOINTMENT (OUTPATIENT)
Dept: NEUROSURGERY | Facility: CLINIC | Age: 75
End: 2025-02-06
Payer: MEDICARE

## 2025-02-06 NOTE — ADDENDUM NOTE
Encounter addended by: Tessa Mcneill MD PhD on: 2/6/2025 9:29 AM   Actions taken: Visit diagnoses modified, Order list changed, Diagnosis association updated

## 2025-02-10 ENCOUNTER — APPOINTMENT (OUTPATIENT)
Dept: RADIATION ONCOLOGY | Facility: HOSPITAL | Age: 75
End: 2025-02-10
Payer: MEDICARE

## 2025-02-10 ENCOUNTER — HOSPITAL ENCOUNTER (OUTPATIENT)
Dept: RADIOLOGY | Facility: EXTERNAL LOCATION | Age: 75
Discharge: HOME | End: 2025-02-10
Payer: MEDICARE

## 2025-02-10 DIAGNOSIS — C68.9: ICD-10-CM

## 2025-02-10 RX ORDER — DIPHENHYDRAMINE HYDROCHLORIDE 50 MG/ML
50 INJECTION INTRAMUSCULAR; INTRAVENOUS AS NEEDED
OUTPATIENT
Start: 2025-02-17

## 2025-02-10 RX ORDER — EPINEPHRINE 0.3 MG/.3ML
0.3 INJECTION SUBCUTANEOUS EVERY 5 MIN PRN
OUTPATIENT
Start: 2025-02-17

## 2025-02-10 RX ORDER — ALBUTEROL SULFATE 0.83 MG/ML
3 SOLUTION RESPIRATORY (INHALATION) AS NEEDED
OUTPATIENT
Start: 2025-02-17

## 2025-02-10 RX ORDER — FAMOTIDINE 10 MG/ML
20 INJECTION INTRAVENOUS ONCE AS NEEDED
OUTPATIENT
Start: 2025-02-17

## 2025-02-10 RX ORDER — PROCHLORPERAZINE MALEATE 10 MG
10 TABLET ORAL EVERY 6 HOURS PRN
OUTPATIENT
Start: 2025-02-17

## 2025-02-10 RX ORDER — PROCHLORPERAZINE EDISYLATE 5 MG/ML
10 INJECTION INTRAMUSCULAR; INTRAVENOUS EVERY 6 HOURS PRN
OUTPATIENT
Start: 2025-02-17

## 2025-02-13 ENCOUNTER — APPOINTMENT (OUTPATIENT)
Dept: PAIN MEDICINE | Facility: CLINIC | Age: 75
End: 2025-02-13
Payer: MEDICARE

## 2025-02-13 ENCOUNTER — HOSPITAL ENCOUNTER (OUTPATIENT)
Dept: RADIOLOGY | Facility: CLINIC | Age: 75
Discharge: HOME | End: 2025-02-13
Payer: MEDICARE

## 2025-02-13 DIAGNOSIS — C68.9 UROTHELIAL CARCINOMA (MULTI): ICD-10-CM

## 2025-02-13 DIAGNOSIS — C66.2 CANCER OF LEFT URETER (MULTI): ICD-10-CM

## 2025-02-13 PROCEDURE — A9575 INJ GADOTERATE MEGLUMI 0.1ML: HCPCS | Performed by: INTERNAL MEDICINE

## 2025-02-13 PROCEDURE — 72197 MRI PELVIS W/O & W/DYE: CPT

## 2025-02-13 PROCEDURE — 2550000001 HC RX 255 CONTRASTS: Performed by: INTERNAL MEDICINE

## 2025-02-13 RX ORDER — GADOTERATE MEGLUMINE 376.9 MG/ML
11 INJECTION INTRAVENOUS
Status: COMPLETED | OUTPATIENT
Start: 2025-02-13 | End: 2025-02-13

## 2025-02-13 RX ADMIN — GADOTERATE MEGLUMINE 11 ML: 376.9 INJECTION INTRAVENOUS at 14:40

## 2025-02-14 ENCOUNTER — LAB REQUISITION (OUTPATIENT)
Dept: LAB | Facility: HOSPITAL | Age: 75
End: 2025-02-14
Payer: MEDICARE

## 2025-02-14 ENCOUNTER — LAB (OUTPATIENT)
Dept: LAB | Facility: HOSPITAL | Age: 75
End: 2025-02-14
Payer: MEDICARE

## 2025-02-14 ENCOUNTER — APPOINTMENT (OUTPATIENT)
Dept: SURGERY | Facility: CLINIC | Age: 75
End: 2025-02-14
Payer: MEDICARE

## 2025-02-14 DIAGNOSIS — C68.9 UROTHELIAL CARCINOMA (MULTI): ICD-10-CM

## 2025-02-14 DIAGNOSIS — C68.9 MALIGNANT NEOPLASM OF URINARY ORGAN, UNSPECIFIED: ICD-10-CM

## 2025-02-14 DIAGNOSIS — R94.6 ABNORMAL RESULTS OF THYROID FUNCTION STUDIES: ICD-10-CM

## 2025-02-14 LAB
ALBUMIN SERPL BCP-MCNC: 3.1 G/DL (ref 3.4–5)
ALP SERPL-CCNC: 95 U/L (ref 33–136)
ALT SERPL W P-5'-P-CCNC: 14 U/L (ref 7–45)
ANION GAP SERPL CALC-SCNC: 13 MMOL/L (ref 10–20)
AST SERPL W P-5'-P-CCNC: 9 U/L (ref 9–39)
BASOPHILS # BLD AUTO: 0.04 X10*3/UL (ref 0–0.1)
BASOPHILS NFR BLD AUTO: 0.2 %
BILIRUB SERPL-MCNC: 0.3 MG/DL (ref 0–1.2)
BUN SERPL-MCNC: 9 MG/DL (ref 6–23)
CALCIUM SERPL-MCNC: 8.8 MG/DL (ref 8.6–10.3)
CHLORIDE SERPL-SCNC: 98 MMOL/L (ref 98–107)
CO2 SERPL-SCNC: 23 MMOL/L (ref 21–32)
CREAT SERPL-MCNC: 0.97 MG/DL (ref 0.5–1.05)
EGFRCR SERPLBLD CKD-EPI 2021: 62 ML/MIN/1.73M*2
EOSINOPHIL # BLD AUTO: 0.31 X10*3/UL (ref 0–0.4)
EOSINOPHIL NFR BLD AUTO: 1.8 %
ERYTHROCYTE [DISTWIDTH] IN BLOOD BY AUTOMATED COUNT: 17.1 % (ref 11.5–14.5)
GLUCOSE SERPL-MCNC: 96 MG/DL (ref 74–99)
HCT VFR BLD AUTO: 24.6 % (ref 36–46)
HGB BLD-MCNC: 7.4 G/DL (ref 12–16)
IMM GRANULOCYTES # BLD AUTO: 0.28 X10*3/UL (ref 0–0.5)
IMM GRANULOCYTES NFR BLD AUTO: 1.6 % (ref 0–0.9)
LYMPHOCYTES # BLD AUTO: 1.29 X10*3/UL (ref 0.8–3)
LYMPHOCYTES NFR BLD AUTO: 7.5 %
MCH RBC QN AUTO: 28.1 PG (ref 26–34)
MCHC RBC AUTO-ENTMCNC: 30.1 G/DL (ref 32–36)
MCV RBC AUTO: 94 FL (ref 80–100)
MONOCYTES # BLD AUTO: 0.99 X10*3/UL (ref 0.05–0.8)
MONOCYTES NFR BLD AUTO: 5.8 %
NEUTROPHILS # BLD AUTO: 14.22 X10*3/UL (ref 1.6–5.5)
NEUTROPHILS NFR BLD AUTO: 83.1 %
NRBC BLD-RTO: 0 /100 WBCS (ref 0–0)
PLATELET # BLD AUTO: 480 X10*3/UL (ref 150–450)
POTASSIUM SERPL-SCNC: 4.2 MMOL/L (ref 3.5–5.3)
PROT SERPL-MCNC: 5.9 G/DL (ref 6.4–8.2)
RBC # BLD AUTO: 2.63 X10*6/UL (ref 4–5.2)
SODIUM SERPL-SCNC: 130 MMOL/L (ref 136–145)
T4 FREE SERPL-MCNC: 0.99 NG/DL (ref 0.61–1.12)
TSH SERPL-ACNC: 23.52 MIU/L (ref 0.44–3.98)
WBC # BLD AUTO: 17.1 X10*3/UL (ref 4.4–11.3)

## 2025-02-14 PROCEDURE — 84439 ASSAY OF FREE THYROXINE: CPT

## 2025-02-14 PROCEDURE — 80053 COMPREHEN METABOLIC PANEL: CPT

## 2025-02-14 PROCEDURE — 85025 COMPLETE CBC W/AUTO DIFF WBC: CPT

## 2025-02-14 PROCEDURE — 84443 ASSAY THYROID STIM HORMONE: CPT

## 2025-02-14 ASSESSMENT — ENCOUNTER SYMPTOMS
NECK PAIN: 0
HEMATOLOGIC/LYMPHATIC NEGATIVE: 1
BLOOD IN STOOL: 0
APPETITE CHANGE: 1
NUMBNESS: 1
MYALGIAS: 0
FLANK PAIN: 0
HEADACHES: 0
HEMATURIA: 0
ARTHRALGIAS: 1
CONSTIPATION: 1
LEG SWELLING: 0
LIGHT-HEADEDNESS: 0
CHILLS: 0
FEVER: 0
DIFFICULTY URINATING: 0
WOUND: 0
COUGH: 0
FATIGUE: 1
NAUSEA: 0
DYSURIA: 1
EYE PROBLEMS: 1
BACK PAIN: 1
DIZZINESS: 0
SLEEP DISTURBANCE: 0
ENDOCRINE NEGATIVE: 1
DIARRHEA: 0
UNEXPECTED WEIGHT CHANGE: 1
SHORTNESS OF BREATH: 0
ABDOMINAL PAIN: 1
VOMITING: 0

## 2025-02-14 NOTE — PROGRESS NOTES
qwPatient ID: Terra Alexis is a 74 y.o. female.  Diagnosis:  irresectable T4 UC   MedOnc: Dr. Boone   Urologist: Dr. Ross  Gyn: Dr. Nathan Noyola - [facility]     Patient Care Team:  No Assigned Pcp Generic Provider, MD as PCP - General (General Practice)  Elgin Boone MD as Consulting Physician (Hematology and Oncology)  Sheyla Kasper MD, MS as Consulting Physician (Vascular Medicine)    ONCOLOGIC HISTORY  Patient is referred by Dr. Ross for recently diagnosed invasive carcinoma, may represent urothelial origin vs h/o cervical cancer. Patient presented to the ER in February 2024 with c/o flank pain imaging revealed soft tissue mass encasing the distal segment of the left ureter. Ureteral biopsy performed in IR on 03/22/2024 revealed the above diagnosis.      1998 Cervical cancer, tx Chemoradiation, with weekly cisplatin, and radiation completed on 1/4/99.   2/1999 Total abdominal hysterectomy and bilateral salpingo-oophorectomy, with no residual carcinoma found.   1/23/08 Category 1 mammogram  11/25/08 LGSIL ANTHONY 1 consistent with HPV effect  1/13/09 MID VAGINAL APEX, BIOPSY: MILD VAGINAL INTRAEPITHELIAL NEOPLASIA.  03/2024 - pelvic irresectable distal urothelial mass, bx proven UC  4/11/24 - Cycle 1 EV + Pembro  5/1/24 - Cycle 2 EV + Pembro  5/14/24 - sick visit call  5/23/24 - Scans show CT. C3 EV (reduced to 1.125 mg/kg)+pembro   6/13/24 - C4 EV (1.125 mg/kg) + Pembro  7/5/24 - C5 EV pembro   8/15/24 - nephroureterectomy + sigmoid colon resection  8/24/24 - PE on ACO  10/3/24 - pembro C6  10/28/24 - pembro C7  11/22/24 - ileostomy closure  12/16/24 - pembro C8  1/6/25 - pembro C9   1/27/24 - pembro C10  2/13/25 - MR pelvis with progressing local recurrence and invasion to sacrum, rectum, and vaginal cuff, referred to GynOnc and colorectal surg for RT planning   2/17/25 - continue pembro C11     Other Contributing History  Cervical cancer - s/p chemoradiation and surgery    Review of Systems    Constitutional:  Positive for appetite change, fatigue and unexpected weight change. Negative for chills and fever.   HENT:  Negative.     Eyes:  Positive for eye problems.   Respiratory:  Negative for cough and shortness of breath.    Cardiovascular:  Negative for chest pain and leg swelling.   Gastrointestinal:  Positive for abdominal pain and constipation. Negative for blood in stool, diarrhea, nausea and vomiting.   Endocrine: Negative.    Genitourinary:  Positive for dysuria. Negative for difficulty urinating, hematuria, pelvic pain, vaginal bleeding and vaginal discharge.    Musculoskeletal:  Positive for arthralgias and back pain. Negative for flank pain, myalgias and neck pain.   Skin:  Negative for itching, rash and wound.   Neurological:  Positive for numbness. Negative for dizziness, headaches and light-headedness.   Hematological: Negative.    Psychiatric/Behavioral:  Negative for sleep disturbance.      Objective    There were no vitals taken for this visit.  BSA: There is no height or weight on file to calculate BSA.    Wt Readings from Last 5 Encounters:   02/13/25 52.6 kg (116 lb)   01/31/25 54.3 kg (119 lb 13.1 oz)   01/27/25 52.7 kg (116 lb 2.9 oz)   01/24/25 53 kg (116 lb 13.5 oz)   01/06/25 54.9 kg (121 lb)     Performance Status:  ECOG Score: 0- Fully active, able to carry on all pre-disease performance w/o restriction.  Karnofsky Score: 90 - Able to carry on normal activity; minor signs or symptoms of disease     Physical Exam  Constitutional: Awake, NAD  ENMT: no lesions seen, noted bloody gums d/t recent dental procedure - pt states dissolving stitches came out this morning   Head/Neck: NCAT  Respiratory/Thorax: CTAB, symmetric   Cardiovascular: RRR, no murmur  Gastrointestinal: Soft, NT, nondistended, +BS  Extremities: No LE edema  Psychological: Appropriate mood and behavior  Skin: no rash noted       Allergies  Allergies   Allergen Reactions   • Codeine Hallucinations, GI Upset and  Nausea/vomiting      Medications  Current Outpatient Medications   Medication Instructions   • acetaminophen (TYLENOL) 650 mg, oral, Every 6 hours PRN   • acetaminophen (TYLENOL) 650 mg, oral, Every 6 hours   • amoxicillin-pot clavulanate (Augmentin) 875-125 mg tablet 1 tablet, oral, Every 12 hours scheduled   • apixaban (ELIQUIS) 5 mg, oral, 2 times daily   • docusate sodium (COLACE) 100 mg, oral, 2 times daily PRN   • gabapentin (NEURONTIN) 300 mg, oral, 2 times daily   • hydrOXYzine HCL (ATARAX) 25 mg, oral, Every 4 hours PRN   • levothyroxine (SYNTHROID, LEVOXYL) 100 mcg, oral, Daily, Take on an empty stomach at the same time each day, either 30 to 60 minutes prior to breakfast   • methylPREDNISolone (Medrol Dospak) 4 mg tablets Follow schedule on package instructions   • ondansetron (ZOFRAN) 4 mg, oral, Every 8 hours PRN   • oxyCODONE (ROXICODONE) 10 mg, oral, Every 4 hours PRN   • sennosides (SENNA) 8.6-17.2 mg, oral, 2 times daily PRN        Diagnostic Results   Recent Labs  No results found. However, due to the size of the patient record, not all encounters were searched. Please check Results Review for a complete set of results.    Genetics   Signatera 4/11/24 0.45   Signatera 7/5/24 0.00  Signatera 10/28/24 100.33  Signatera 12/16/24 33.52    Pathology  Surgical Path 3/22/24   FINAL DIAGNOSIS   A. Soft tissue, mass, biopsy:    -- Involved by invasive carcinoma. See note.     Note: Patient's imaging finding of a soft tissue mass encasing the distal ureter and remote history of cervical cancer (per clinical notes) is noted. Histological sections show cores of fibroconnective tissue involved by infiltrative nests of high grade carcinoma cells, which are immunohistochemically positive for pancytokeratin AE1/AE3, CAM5.2, GATA3 and p63 and are negative for PAX8 and ER. The morphological and immunohistochemical findings are not entirely specific. Given the clinical context of a distal ureteral mass, this might  represent invasive carcinoma of urothelial origin. Clinical correlation is recommended.     Recent Imaging   MRI Pelvis 2/13/25 -   IMPRESSION:  1. Progression of the necrotic left presacral soft tissue mass  consistent with the known local recurrence. The mass currently  measures 9.4 x 5.6 cm in comparison to 7.8 x 4.6 cm. More invasion of  the left side of the sacrum, upper rectum and left vaginal cuff.  2. More conspicuous right lower perirectal nodule measuring 0.9 cm  which would be concerning for disease involvement.  3. Stable prominent left para-aortic lymph node measuring 0.8 cm      8/15/24 Colon resection + nephro-U  FINAL DIAGNOSIS   A. Soft tissue, left periureteral, biopsy:  -- Invasive poorly differentiated carcinoma with squamous differentiation   B. Colon, sigmoid, segmental resection:  -- Poorly differentiated carcinoma with squamous differentiation involving mesentery and bowel wall  -- Margins of resection are negative for carcinoma  -- Hyperplastic polyp  -- Two lymph nodes, negative for carcinoma (0/2)  C. Ureter, left, segmental resection:  -- Invasive carcinoma with extensive squamous differentiation (see comment)  -- Invasive carcinoma is present at the margin of resection  -- See cancer summary report  D. Kidney and proximal ureter, left, nephroureterectomy:  -- Atrophic renal parenchyma with marked chronic inflammation, interstitial fibrosis with tubular atrophy, glomerulosclerosis and severe arterio- and arteriolosclerosis     Assessment/Plan   Terra Alexis is a 74 y.o.  female with hx cervical cancer s/p chemoRT 1998, now found with pelvic irresectable distal urothelial mass, bx proven UC. Now post EV/pembro with tumor shrinkage (around 30%). Underwent nephro-U + colon resection with residual tumor in path - pT4 with therapy changes. Unfortunately with recurrent progressive tumor, resumed pembro and signatera sent 12/16/24 with decreased ctDNA.   2/13/25 MR pelvis showing  local recurrence with invasion to sacrum, rectum, and vaginal cuff; Discussed this case with Radiation Oncology team and I think she benefits from radiation treatment to his pelvic mass --> will likely need colonic diversion prior to RT (GI / colorectal surg consulted), also GynOnc for vaginal cuff involvement.   Continue C11 pembro today. Further RT plan pending these referrals.   Hgb 7.4, will set up PRBC transfusion this week at [facility].   More forgetful but thinks this is r/t not sleeping from so much back/hip pain. Started on dex 4mg daily for pain and fatigue.       Patient verbalizes understanding of above plan. Time provided for patient's questions. Patient instructed to reach out for any new concerning issues.     Cinthya Barnard, MSN, APRN-CNP  Acute Care Nurse Practitioner   Genitourinary () Oncology  [facility]  Phone: [phone]

## 2025-02-17 ENCOUNTER — OFFICE VISIT (OUTPATIENT)
Dept: HEMATOLOGY/ONCOLOGY | Facility: CLINIC | Age: 75
End: 2025-02-17
Payer: MEDICARE

## 2025-02-17 ENCOUNTER — INFUSION (OUTPATIENT)
Dept: HEMATOLOGY/ONCOLOGY | Facility: CLINIC | Age: 75
End: 2025-02-17
Payer: MEDICARE

## 2025-02-17 VITALS
DIASTOLIC BLOOD PRESSURE: 69 MMHG | SYSTOLIC BLOOD PRESSURE: 115 MMHG | TEMPERATURE: 95.9 F | HEART RATE: 98 BPM | BODY MASS INDEX: 21.31 KG/M2 | OXYGEN SATURATION: 99 % | WEIGHT: 110 LBS | RESPIRATION RATE: 18 BRPM

## 2025-02-17 DIAGNOSIS — D63.8 ANEMIA IN OTHER CHRONIC DISEASES CLASSIFIED ELSEWHERE: ICD-10-CM

## 2025-02-17 DIAGNOSIS — M79.2 NEUROPATHIC PAIN: ICD-10-CM

## 2025-02-17 DIAGNOSIS — G89.29 OTHER CHRONIC PAIN: ICD-10-CM

## 2025-02-17 DIAGNOSIS — M54.30 SCIATIC LEG PAIN: ICD-10-CM

## 2025-02-17 DIAGNOSIS — C68.9 UROTHELIAL CARCINOMA (MULTI): ICD-10-CM

## 2025-02-17 DIAGNOSIS — G89.3 NEOPLASM RELATED PAIN: Primary | ICD-10-CM

## 2025-02-17 PROCEDURE — 3074F SYST BP LT 130 MM HG: CPT

## 2025-02-17 PROCEDURE — 96365 THER/PROPH/DIAG IV INF INIT: CPT | Mod: INF

## 2025-02-17 PROCEDURE — 99214 OFFICE O/P EST MOD 30 MIN: CPT

## 2025-02-17 PROCEDURE — 3078F DIAST BP <80 MM HG: CPT

## 2025-02-17 PROCEDURE — 2500000004 HC RX 250 GENERAL PHARMACY W/ HCPCS (ALT 636 FOR OP/ED): Mod: JZ,TB | Performed by: STUDENT IN AN ORGANIZED HEALTH CARE EDUCATION/TRAINING PROGRAM

## 2025-02-17 PROCEDURE — 1157F ADVNC CARE PLAN IN RCRD: CPT

## 2025-02-17 PROCEDURE — 1036F TOBACCO NON-USER: CPT

## 2025-02-17 PROCEDURE — 1159F MED LIST DOCD IN RCRD: CPT

## 2025-02-17 PROCEDURE — 1125F AMNT PAIN NOTED PAIN PRSNT: CPT

## 2025-02-17 PROCEDURE — 96413 CHEMO IV INFUSION 1 HR: CPT

## 2025-02-17 PROCEDURE — 99214 OFFICE O/P EST MOD 30 MIN: CPT | Mod: 25

## 2025-02-17 RX ORDER — ALBUTEROL SULFATE 0.83 MG/ML
3 SOLUTION RESPIRATORY (INHALATION) AS NEEDED
Status: CANCELLED | OUTPATIENT
Start: 2025-02-19

## 2025-02-17 RX ORDER — ALBUTEROL SULFATE 0.83 MG/ML
3 SOLUTION RESPIRATORY (INHALATION) AS NEEDED
Status: DISCONTINUED | OUTPATIENT
Start: 2025-02-17 | End: 2025-02-17 | Stop reason: HOSPADM

## 2025-02-17 RX ORDER — FAMOTIDINE 10 MG/ML
20 INJECTION, SOLUTION INTRAVENOUS ONCE AS NEEDED
Status: CANCELLED | OUTPATIENT
Start: 2025-02-19

## 2025-02-17 RX ORDER — GABAPENTIN 300 MG/1
300 CAPSULE ORAL 3 TIMES DAILY
Qty: 90 CAPSULE | Refills: 0 | Status: SHIPPED | OUTPATIENT
Start: 2025-02-17 | End: 2025-03-19

## 2025-02-17 RX ORDER — DEXAMETHASONE 4 MG/1
4 TABLET ORAL
Qty: 10 TABLET | Refills: 0 | Status: SHIPPED | OUTPATIENT
Start: 2025-02-17 | End: 2025-02-27

## 2025-02-17 RX ORDER — FAMOTIDINE 10 MG/ML
20 INJECTION, SOLUTION INTRAVENOUS ONCE AS NEEDED
Status: DISCONTINUED | OUTPATIENT
Start: 2025-02-17 | End: 2025-02-17 | Stop reason: HOSPADM

## 2025-02-17 RX ORDER — DIPHENHYDRAMINE HYDROCHLORIDE 50 MG/ML
50 INJECTION INTRAMUSCULAR; INTRAVENOUS AS NEEDED
Status: CANCELLED | OUTPATIENT
Start: 2025-02-19

## 2025-02-17 RX ORDER — EPINEPHRINE 0.3 MG/.3ML
0.3 INJECTION SUBCUTANEOUS EVERY 5 MIN PRN
Status: CANCELLED | OUTPATIENT
Start: 2025-02-19

## 2025-02-17 RX ORDER — PROCHLORPERAZINE MALEATE 10 MG
10 TABLET ORAL EVERY 6 HOURS PRN
Status: DISCONTINUED | OUTPATIENT
Start: 2025-02-17 | End: 2025-02-17 | Stop reason: HOSPADM

## 2025-02-17 RX ORDER — PROCHLORPERAZINE EDISYLATE 5 MG/ML
10 INJECTION INTRAMUSCULAR; INTRAVENOUS EVERY 6 HOURS PRN
Status: DISCONTINUED | OUTPATIENT
Start: 2025-02-17 | End: 2025-02-17 | Stop reason: HOSPADM

## 2025-02-17 RX ORDER — DIPHENHYDRAMINE HYDROCHLORIDE 50 MG/ML
50 INJECTION INTRAMUSCULAR; INTRAVENOUS AS NEEDED
Status: DISCONTINUED | OUTPATIENT
Start: 2025-02-17 | End: 2025-02-17 | Stop reason: HOSPADM

## 2025-02-17 RX ORDER — EPINEPHRINE 0.3 MG/.3ML
0.3 INJECTION SUBCUTANEOUS EVERY 5 MIN PRN
Status: DISCONTINUED | OUTPATIENT
Start: 2025-02-17 | End: 2025-02-17 | Stop reason: HOSPADM

## 2025-02-17 RX ADMIN — SODIUM CHLORIDE 200 MG: 9 INJECTION, SOLUTION INTRAVENOUS at 15:52

## 2025-02-17 ASSESSMENT — PAIN SCALES - GENERAL: PAINLEVEL_OUTOF10: 10-WORST PAIN EVER

## 2025-02-18 ENCOUNTER — TELEPHONE (OUTPATIENT)
Dept: HEMATOLOGY/ONCOLOGY | Facility: CLINIC | Age: 75
End: 2025-02-18
Payer: MEDICARE

## 2025-02-18 ENCOUNTER — TELEPHONE (OUTPATIENT)
Dept: GASTROENTEROLOGY | Facility: CLINIC | Age: 75
End: 2025-02-18
Payer: MEDICARE

## 2025-02-18 ENCOUNTER — LAB (OUTPATIENT)
Dept: LAB | Facility: CLINIC | Age: 75
End: 2025-02-18
Payer: MEDICARE

## 2025-02-18 DIAGNOSIS — D63.8 ANEMIA IN OTHER CHRONIC DISEASES CLASSIFIED ELSEWHERE: ICD-10-CM

## 2025-02-18 LAB
ABO GROUP (TYPE) IN BLOOD: NORMAL
ABO GROUP (TYPE) IN BLOOD: NORMAL
ANTIBODY SCREEN: NORMAL
RH FACTOR (ANTIGEN D): NORMAL
RH FACTOR (ANTIGEN D): NORMAL

## 2025-02-18 PROCEDURE — 36415 COLL VENOUS BLD VENIPUNCTURE: CPT

## 2025-02-18 PROCEDURE — 86901 BLOOD TYPING SEROLOGIC RH(D): CPT

## 2025-02-18 PROCEDURE — 86923 COMPATIBILITY TEST ELECTRIC: CPT

## 2025-02-18 RX ORDER — FAMOTIDINE 10 MG/ML
20 INJECTION, SOLUTION INTRAVENOUS ONCE AS NEEDED
Status: CANCELLED | OUTPATIENT
Start: 2025-02-18

## 2025-02-18 RX ORDER — DIPHENHYDRAMINE HYDROCHLORIDE 50 MG/ML
50 INJECTION INTRAMUSCULAR; INTRAVENOUS AS NEEDED
Status: CANCELLED | OUTPATIENT
Start: 2025-02-18

## 2025-02-18 RX ORDER — ALBUTEROL SULFATE 0.83 MG/ML
3 SOLUTION RESPIRATORY (INHALATION) AS NEEDED
Status: CANCELLED | OUTPATIENT
Start: 2025-02-18

## 2025-02-18 RX ORDER — EPINEPHRINE 0.3 MG/.3ML
0.3 INJECTION SUBCUTANEOUS EVERY 5 MIN PRN
Status: CANCELLED | OUTPATIENT
Start: 2025-02-18

## 2025-02-18 NOTE — TELEPHONE ENCOUNTER
Attempts made to contact patient for labs for transfusion.  ANNALISA Mendes able to reach patient, states she will be in for her labs.

## 2025-02-19 ENCOUNTER — INFUSION (OUTPATIENT)
Dept: HEMATOLOGY/ONCOLOGY | Facility: CLINIC | Age: 75
End: 2025-02-19
Payer: MEDICARE

## 2025-02-19 VITALS
WEIGHT: 112.21 LBS | BODY MASS INDEX: 21.74 KG/M2 | RESPIRATION RATE: 18 BRPM | SYSTOLIC BLOOD PRESSURE: 116 MMHG | HEART RATE: 72 BPM | TEMPERATURE: 96.8 F | DIASTOLIC BLOOD PRESSURE: 64 MMHG | OXYGEN SATURATION: 99 %

## 2025-02-19 DIAGNOSIS — D63.8 ANEMIA IN OTHER CHRONIC DISEASES CLASSIFIED ELSEWHERE: ICD-10-CM

## 2025-02-19 LAB
BLOOD EXPIRATION DATE: NORMAL
DISPENSE STATUS: NORMAL
PRODUCT BLOOD TYPE: 9500
PRODUCT CODE: NORMAL
UNIT ABO: NORMAL
UNIT NUMBER: NORMAL
UNIT RH: NORMAL
UNIT VOLUME: 350
XM INTEP: NORMAL

## 2025-02-19 PROCEDURE — P9040 RBC LEUKOREDUCED IRRADIATED: HCPCS

## 2025-02-19 PROCEDURE — 36430 TRANSFUSION BLD/BLD COMPNT: CPT

## 2025-02-19 RX ORDER — ALBUTEROL SULFATE 0.83 MG/ML
3 SOLUTION RESPIRATORY (INHALATION) AS NEEDED
OUTPATIENT
Start: 2025-02-19

## 2025-02-19 RX ORDER — FAMOTIDINE 10 MG/ML
20 INJECTION, SOLUTION INTRAVENOUS ONCE AS NEEDED
OUTPATIENT
Start: 2025-02-19

## 2025-02-19 RX ORDER — EPINEPHRINE 0.3 MG/.3ML
0.3 INJECTION SUBCUTANEOUS EVERY 5 MIN PRN
OUTPATIENT
Start: 2025-02-19

## 2025-02-19 RX ORDER — DIPHENHYDRAMINE HYDROCHLORIDE 50 MG/ML
50 INJECTION INTRAMUSCULAR; INTRAVENOUS AS NEEDED
OUTPATIENT
Start: 2025-02-19

## 2025-02-19 ASSESSMENT — PAIN SCALES - GENERAL: PAINLEVEL_OUTOF10: 9

## 2025-02-21 ENCOUNTER — APPOINTMENT (OUTPATIENT)
Dept: PALLIATIVE MEDICINE | Facility: CLINIC | Age: 75
End: 2025-02-21
Payer: MEDICARE

## 2025-02-21 DIAGNOSIS — C68.9 UROTHELIAL CARCINOMA (MULTI): ICD-10-CM

## 2025-02-23 RX ORDER — SODIUM, POTASSIUM,MAG SULFATES 17.5-3.13G
SOLUTION, RECONSTITUTED, ORAL ORAL
Qty: 354 ML | Refills: 0 | Status: SHIPPED | OUTPATIENT
Start: 2025-02-23

## 2025-02-26 ENCOUNTER — HOSPITAL ENCOUNTER (OUTPATIENT)
Dept: VASCULAR MEDICINE | Facility: HOSPITAL | Age: 75
Discharge: HOME | End: 2025-02-26
Payer: MEDICARE

## 2025-02-26 ENCOUNTER — OFFICE VISIT (OUTPATIENT)
Dept: CARDIOLOGY | Facility: HOSPITAL | Age: 75
End: 2025-02-26
Payer: MEDICARE

## 2025-02-26 VITALS — HEART RATE: 81 BPM | SYSTOLIC BLOOD PRESSURE: 152 MMHG | OXYGEN SATURATION: 100 % | DIASTOLIC BLOOD PRESSURE: 80 MMHG

## 2025-02-26 DIAGNOSIS — Z86.718 PERSONAL HISTORY OF DVT (DEEP VEIN THROMBOSIS): ICD-10-CM

## 2025-02-26 DIAGNOSIS — I82.461 ACUTE DEEP VEIN THROMBOSIS (DVT) OF CALF MUSCLE VEIN OF RIGHT LOWER EXTREMITY (MULTI): ICD-10-CM

## 2025-02-26 DIAGNOSIS — I26.99 OTHER ACUTE PULMONARY EMBOLISM WITHOUT ACUTE COR PULMONALE: Primary | ICD-10-CM

## 2025-02-26 PROCEDURE — 99214 OFFICE O/P EST MOD 30 MIN: CPT | Mod: 25 | Performed by: INTERNAL MEDICINE

## 2025-02-26 PROCEDURE — 93971 EXTREMITY STUDY: CPT | Mod: MUE

## 2025-02-26 PROCEDURE — 93971 EXTREMITY STUDY: CPT | Performed by: SURGERY

## 2025-02-26 PROCEDURE — 1160F RVW MEDS BY RX/DR IN RCRD: CPT | Performed by: INTERNAL MEDICINE

## 2025-02-26 PROCEDURE — 1157F ADVNC CARE PLAN IN RCRD: CPT | Performed by: INTERNAL MEDICINE

## 2025-02-26 PROCEDURE — 1036F TOBACCO NON-USER: CPT | Performed by: INTERNAL MEDICINE

## 2025-02-26 PROCEDURE — 3077F SYST BP >= 140 MM HG: CPT | Performed by: INTERNAL MEDICINE

## 2025-02-26 PROCEDURE — 99214 OFFICE O/P EST MOD 30 MIN: CPT | Performed by: INTERNAL MEDICINE

## 2025-02-26 PROCEDURE — 93971 EXTREMITY STUDY: CPT

## 2025-02-26 PROCEDURE — 3079F DIAST BP 80-89 MM HG: CPT | Performed by: INTERNAL MEDICINE

## 2025-02-26 PROCEDURE — G2211 COMPLEX E/M VISIT ADD ON: HCPCS | Performed by: INTERNAL MEDICINE

## 2025-02-26 PROCEDURE — 1159F MED LIST DOCD IN RCRD: CPT | Performed by: INTERNAL MEDICINE

## 2025-02-26 NOTE — PROGRESS NOTES
History of Present Illness:  Terra Alexis is a/an 74 y.o. 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 She was admitted in August with abdominal pain, nausea, and vomiting and was found incidentally to have a lobar and segmental PE without right heart strain as well as right peroneal vein thrombus.     She was started on anticoagulation with heparin and then transitioned to enoxaparin 1 mg/kg every 12 hours. In October was transitioned to apixaban, which she stopped three days ago due to expense.     She has no prior personal or family history of VTE.     She is here after having completed 6 months of apixaban.    Notes left leg swelling (previous deep vein thrombosis in right calf). She has been having sciatica on the left.         Past Medical History:   Diagnosis Date    Chronic kidney disease     GERD (gastroesophageal reflux disease)     Hypothyroidism     Malignant tumor of sigmoid colon (Multi) 08/21/2024    Nephrolithiasis     Other specified disorders of kidney and ureter 03/04/2022    Ureteral mass    Peripheral neuropathy     Personal history of malignant neoplasm of cervix uteri 11/21/2022    History of malignant neoplasm of cervix    Vision loss      Past Surgical History:   Procedure Laterality Date    NEPHRECTOMY Left 08/15/2024    ex-lap, left ureteral tumor resection, left nephrectomy, excision of pelvic mass, partial sigmoidectomy with loop ileostomy    OTHER SURGICAL HISTORY  10/07/2021    Hysterectomy     Social History     Tobacco Use    Smoking status: Never    Smokeless tobacco: Never   Vaping Use    Vaping status: Never Used   Substance Use Topics    Alcohol use: Not Currently    Drug use: Never     Family History   Problem Relation Name Age of Onset    Macular degeneration Mother      Glaucoma Other grandparent     Macular degeneration Other grandparent       Current Outpatient Medications   Medication Sig Dispense Refill    acetaminophen (Tylenol) 325 mg tablet Take 2 tablets (650 mg) by mouth every 6 hours if needed for mild pain (1 - 3). 20 tablet 0    acetaminophen (Tylenol) 325 mg tablet Take 2 tablets (650 mg) by mouth every 6 hours.      dexAMETHasone (Decadron) 4 mg tablet Take 1 tablet (4 mg) by mouth once daily in the morning. Take before meals for 10 days. 10 tablet 0    gabapentin (Neurontin) 300 mg capsule Take 1 capsule (300 mg) by mouth 3 times a day. 90 capsule 0    levothyroxine (Synthroid, Levoxyl) 25 mcg tablet Take 4 tablets (100 mcg) by mouth early in the morning.. Take on an empty stomach at the same time each day, either 30 to 60 minutes prior to breakfast 120 tablet 2    ondansetron (Zofran) 4 mg tablet Take 1 tablet (4 mg) by mouth every 8 hours if needed for nausea or vomiting. 20 tablet 0    oxyCODONE (Roxicodone) 10 mg immediate release tablet Take 1 tablet (10 mg) by mouth every 4 hours if needed for severe pain (7 - 10). 180 tablet 0    sodium,potassium,mag sulfates (Suprep) 17.5-3.13-1.6 gram solution Take one bottle twice as directed by the prep instructions 354 mL 0    hydrOXYzine HCL (Atarax) 10 mg tablet Take 2.5 tablets (25 mg) by mouth every 4 hours if needed for itching for up to 10 days. 30 tablet 0     No current facility-administered medications for this visit.       Physical Examination:  Blood pressure 152/80, pulse 81, SpO2 100%.  No distress  No JVD or carotid bruits  Lungs clear bilaterally  Heart regular and without murmurs  Abdomen soft and non-tender  Left ankle swelling  Pulses intact      Pertinent Labs:    Pertinent Imaging:  CTA chest 8/24/2024  IMPRESSION:  1.  Positive study for acute pulmonary embolism. Multiple emboli  involving lobar and segmental branches of both lungs.  2. Cardiomegaly with small pericardial effusion. No right heart  strain otherwise.  3. Small layering pleural effusions. Increased bibasilar  atelectasis  or infiltrates. Superimposed infarct not excluded.    Venous duplex ultrasound 02/26/25    PRELIMINARY CONCLUSIONS:  Right Lower Venous: No evidence of acute deep vein thrombus visualized in the right lower extremity.  Left Lower Venous: The left common femoral vein demonstrates normal spontaneous and respirophasic flow.    Left Lower Venous: No evidence of acute deep vein thrombus visualized in the left lower extremity.     Additional Findings:  Left lower extremity only. Right CFV on previous same day exam.    Diagnoses and all orders for this visit:  Other acute pulmonary embolism without acute cor pulmonale (Primary)  Acute deep vein thrombosis (DVT) of calf muscle vein of right lower extremity (Multi)  She has completed initial acute phase of anticoagulation   We had a long discussion of risks and benefits about continuing anticoagulation   I would favor at least a low dose given continued active cancer  She favors stopping anticoagulation   I am somewhat reassured because her deep vein thrombosis/pulmonary embolism episode was associated with surgery, and her Khorana score is 1, but I think it remains a risk  She will need close clinical follow up    Follow up in 3 months.    Sheyla Kasper MD, MS

## 2025-02-27 NOTE — PROGRESS NOTES
"Patient ID: Terra Alexis is a 74 y.o. female.  Referring Physician: No referring provider defined for this encounter.  Primary Care Provider: No Assigned PCP Generic Provider, MD Carlos LEAL  73yo pt with a history of stage IB2 cervical cancer diagnosed in 1998 s/p chemoRT and completion hysterectomy KALEY since then, now with urothelial carcinoma with recurrence in the pelvis involving the sacrum, upper rectum and left vaginal cuff planning RT.  Per rad onc, \"we will refer to gynecological oncology and to general surgery/gastroenterology for assessment of the invasion and risk of fistula via scope.\"    Cancer history  -1998 stage IB2 cervical cancer, chemoRT with weekly cis followed by [facility] with no residual carcinoma, KALEY since then, follows with Nathan Noyola at [facility], pap negative 4/2023  - 03/2024 - pelvic irresectable distal urothelial mass, bx proven urothelial carcinoma  - 4/2024 everolimus/pembro x 5 cycles followed by nephroureterectomy and sigmoid colon resection with reanastamosis (diverting loop ileostomy s/p reversal) in 8/2024  - 2/13/25 - MR pelvis with progressing local recurrence and invasion to sacrum, rectum, and vaginal cuff  - 2/17/25 C11 single agent pembro    Imaging:    MRI 2/13/25 IMPRESSION:  1. Progression of the necrotic left presacral soft tissue mass  consistent with the known local recurrence. The mass currently  measures 9.4 x 5.6 cm in comparison to 7.8 x 4.6 cm. More invasion of  the left side of the sacrum, upper rectum and left vaginal cuff.  2. More conspicuous right lower perirectal nodule measuring 0.9 cm  which would be concerning for disease involvement.  3. Stable prominent left para-aortic lymph node measuring 0.8 cm       Past Medical History:   Diagnosis Date    Chronic kidney disease     GERD (gastroesophageal reflux disease)     Hypothyroidism     Malignant tumor of sigmoid colon (Multi) 08/21/2024    Nephrolithiasis     Other specified disorders of kidney and " ureter 03/04/2022    Ureteral mass    Peripheral neuropathy     Personal history of malignant neoplasm of cervix uteri 11/21/2022    History of malignant neoplasm of cervix    Vision loss      Past Surgical History:   Procedure Laterality Date    NEPHRECTOMY Left 08/15/2024    ex-lap, left ureteral tumor resection, left nephrectomy, excision of pelvic mass, partial sigmoidectomy with loop ileostomy    OTHER SURGICAL HISTORY  10/07/2021    Hysterectomy     Family History   Problem Relation Name Age of Onset    Macular degeneration Mother      Glaucoma Other grandparent     Macular degeneration Other grandparent      Social History     Tobacco Use    Smoking status: Never    Smokeless tobacco: Never   Vaping Use    Vaping status: Never Used   Substance Use Topics    Alcohol use: Not Currently    Drug use: Never         Objective    BSA: There is no height or weight on file to calculate BSA.  There were no vitals taken for this visit.     Physical Exam  Constitutional:       Appearance: Normal appearance. She is normal weight.   HENT:      Head: Normocephalic and atraumatic.   Cardiovascular:      Rate and Rhythm: Normal rate and regular rhythm.   Pulmonary:      Effort: Pulmonary effort is normal.   Abdominal:      General: Abdomen is flat. Bowel sounds are normal.      Palpations: Abdomen is soft.   Genitourinary:     Comments: Normal external genitalia, thin vaginal mucosa with radiation changes noted but no mucosal abnormalities or lesions.  On bimanual exam firm fixed mass is noted on the left side of the pelvis, vaginal mucosa moves freely independently of the mass.  Musculoskeletal:         General: Normal range of motion.      Cervical back: Normal range of motion.   Skin:     General: Skin is warm and dry.   Neurological:      General: No focal deficit present.      Mental Status: She is alert and oriented to person, place, and time. Mental status is at baseline.   Psychiatric:         Mood and Affect: Mood  normal.         Behavior: Behavior normal.         Thought Content: Thought content normal.         Judgment: Judgment normal.         Performance Status:  Symptomatic; fully ambulatory    Assessment/Plan   74 y.o.  pt with a history of stage IB2 cervical cancer diagnosed in 1998 s/p chemoRT and completion hysterectomy KALEY since then, now with urothelial carcinoma with recurrence in the pelvis involving the sacrum, upper rectum and left vaginal cuff planning RT.      Oncology History   Urothelial carcinoma (Multi)   4/3/2024 Initial Diagnosis    Urothelial carcinoma (CMS/HCC)     4/11/2024 -  Chemotherapy    Enfortumab vedotin + Pembrolizumab, 21 Day Cycles (Pembro maintenance as of cycle 6)       -We performed pelvic exam which revealed no vaginal mucosal involvement.  Unlikely related to prior cervical cancer.  -We dicussed the concern regarding fistula to the vagina/colon as a radiation toxicity and given the location of tumor involvement.  We discussed the possibility of bowel diversion to decrease this risk.  Based on our exam she does not have vaginal mucosal involvement with tumor however tumor is in close proximity to both bowel and vagina so fistula will be a risk.  -She will follow up with Dr. Montemayor for colonoscopy  -Will be available for management of any future issues that may arise in result of her treatment.      Treatment Plans       Name Type Plan Dates Plan Provider         Active    Enfortumab vedotin + Pembrolizumab, 21 Day Cycles (Pembro maintenance as of cycle 6) Oncology Treatment 4/4/2024 - Present MD Piyush Bhatti MD  Seen with Dr. Vicente    I saw and evaluated the patient. I personally obtained the key and critical portions of the history and physical exam or was physically present for key and critical portions performed by the resident/fellow. I reviewed the resident/fellow's documentation and discussed the patient with the resident/fellow. I agree  with the resident/fellow's medical decision making as documented in the note.    Gilma Vicente MD

## 2025-02-28 ENCOUNTER — OFFICE VISIT (OUTPATIENT)
Dept: GYNECOLOGIC ONCOLOGY | Facility: HOSPITAL | Age: 75
End: 2025-02-28
Payer: MEDICARE

## 2025-02-28 VITALS
TEMPERATURE: 97 F | WEIGHT: 113 LBS | OXYGEN SATURATION: 98 % | HEART RATE: 91 BPM | RESPIRATION RATE: 17 BRPM | BODY MASS INDEX: 21.9 KG/M2 | DIASTOLIC BLOOD PRESSURE: 69 MMHG | SYSTOLIC BLOOD PRESSURE: 105 MMHG

## 2025-02-28 DIAGNOSIS — R19.00 PELVIC MASS: ICD-10-CM

## 2025-02-28 DIAGNOSIS — C53.9 MALIGNANT NEOPLASM OF CERVIX, UNSPECIFIED SITE (MULTI): ICD-10-CM

## 2025-02-28 DIAGNOSIS — C68.9 UROTHELIAL CANCER (MULTI): Primary | ICD-10-CM

## 2025-02-28 PROCEDURE — 99215 OFFICE O/P EST HI 40 MIN: CPT | Performed by: STUDENT IN AN ORGANIZED HEALTH CARE EDUCATION/TRAINING PROGRAM

## 2025-03-03 ENCOUNTER — APPOINTMENT (OUTPATIENT)
Dept: SURGERY | Facility: CLINIC | Age: 75
End: 2025-03-03
Payer: MEDICARE

## 2025-03-05 ENCOUNTER — ANESTHESIA EVENT (OUTPATIENT)
Dept: GASTROENTEROLOGY | Facility: HOSPITAL | Age: 75
End: 2025-03-05
Payer: MEDICARE

## 2025-03-05 ENCOUNTER — ANESTHESIA (OUTPATIENT)
Dept: GASTROENTEROLOGY | Facility: HOSPITAL | Age: 75
End: 2025-03-05
Payer: MEDICARE

## 2025-03-05 ENCOUNTER — HOSPITAL ENCOUNTER (OUTPATIENT)
Dept: GASTROENTEROLOGY | Facility: HOSPITAL | Age: 75
Discharge: HOME | End: 2025-03-05
Payer: MEDICARE

## 2025-03-05 VITALS
BODY MASS INDEX: 21.74 KG/M2 | OXYGEN SATURATION: 100 % | HEART RATE: 79 BPM | SYSTOLIC BLOOD PRESSURE: 105 MMHG | RESPIRATION RATE: 16 BRPM | TEMPERATURE: 98.2 F | DIASTOLIC BLOOD PRESSURE: 59 MMHG | WEIGHT: 112.21 LBS

## 2025-03-05 DIAGNOSIS — C68.9 UROTHELIAL CANCER (MULTI): ICD-10-CM

## 2025-03-05 PROCEDURE — 7100000009 HC PHASE TWO TIME - INITIAL BASE CHARGE

## 2025-03-05 PROCEDURE — 7100000010 HC PHASE TWO TIME - EACH INCREMENTAL 1 MINUTE

## 2025-03-05 PROCEDURE — 2500000004 HC RX 250 GENERAL PHARMACY W/ HCPCS (ALT 636 FOR OP/ED)

## 2025-03-05 PROCEDURE — 45380 COLONOSCOPY AND BIOPSY: CPT | Performed by: STUDENT IN AN ORGANIZED HEALTH CARE EDUCATION/TRAINING PROGRAM

## 2025-03-05 PROCEDURE — 3700000001 HC GENERAL ANESTHESIA TIME - INITIAL BASE CHARGE

## 2025-03-05 PROCEDURE — 45331 SIGMOIDOSCOPY AND BIOPSY: CPT | Performed by: STUDENT IN AN ORGANIZED HEALTH CARE EDUCATION/TRAINING PROGRAM

## 2025-03-05 PROCEDURE — 3700000002 HC GENERAL ANESTHESIA TIME - EACH INCREMENTAL 1 MINUTE

## 2025-03-05 RX ORDER — PROPOFOL 10 MG/ML
INJECTION, EMULSION INTRAVENOUS AS NEEDED
Status: DISCONTINUED | OUTPATIENT
Start: 2025-03-05 | End: 2025-03-05

## 2025-03-05 RX ORDER — ONDANSETRON HYDROCHLORIDE 2 MG/ML
4 INJECTION, SOLUTION INTRAVENOUS ONCE AS NEEDED
Status: CANCELLED | OUTPATIENT
Start: 2025-03-05

## 2025-03-05 RX ORDER — LIDOCAINE HCL/PF 100 MG/5ML
SYRINGE (ML) INTRAVENOUS AS NEEDED
Status: DISCONTINUED | OUTPATIENT
Start: 2025-03-05 | End: 2025-03-05

## 2025-03-05 RX ADMIN — LIDOCAINE HYDROCHLORIDE 20 MG: 20 INJECTION, SOLUTION INTRAVENOUS at 11:08

## 2025-03-05 RX ADMIN — PROPOFOL 70 MCG/KG/MIN: 10 INJECTION, EMULSION INTRAVENOUS at 11:09

## 2025-03-05 RX ADMIN — PROPOFOL 40 MG: 10 INJECTION, EMULSION INTRAVENOUS at 11:08

## 2025-03-05 SDOH — HEALTH STABILITY: MENTAL HEALTH: CURRENT SMOKER: 0

## 2025-03-05 ASSESSMENT — PAIN SCALES - GENERAL
PAINLEVEL_OUTOF10: 0 - NO PAIN

## 2025-03-05 ASSESSMENT — COLUMBIA-SUICIDE SEVERITY RATING SCALE - C-SSRS
2. HAVE YOU ACTUALLY HAD ANY THOUGHTS OF KILLING YOURSELF?: NO
1. IN THE PAST MONTH, HAVE YOU WISHED YOU WERE DEAD OR WISHED YOU COULD GO TO SLEEP AND NOT WAKE UP?: NO
2. HAVE YOU ACTUALLY HAD ANY THOUGHTS OF KILLING YOURSELF?: NO
6. HAVE YOU EVER DONE ANYTHING, STARTED TO DO ANYTHING, OR PREPARED TO DO ANYTHING TO END YOUR LIFE?: NO
6. HAVE YOU EVER DONE ANYTHING, STARTED TO DO ANYTHING, OR PREPARED TO DO ANYTHING TO END YOUR LIFE?: NO
1. IN THE PAST MONTH, HAVE YOU WISHED YOU WERE DEAD OR WISHED YOU COULD GO TO SLEEP AND NOT WAKE UP?: NO

## 2025-03-05 ASSESSMENT — PAIN - FUNCTIONAL ASSESSMENT
PAIN_FUNCTIONAL_ASSESSMENT: 0-10
PAIN_FUNCTIONAL_ASSESSMENT: 0-10

## 2025-03-05 NOTE — ANESTHESIA PREPROCEDURE EVALUATION
Patient: Terra Alexis    Procedure Information       Date/Time: 03/05/25 1100    Scheduled providers: Александр Little MD; Lawrence Gaines MD    Procedure: COLONOSCOPY    Location: [facility]            Relevant Problems   Anesthesia (within normal limits)      Cardiac   (+) Essential hypertension   (+) Hyperlipidemia   (+) Hypertension   (+) Peripheral vascular disease (CMS-HCC)      Pulmonary   (+) Acute pulmonary embolism without acute cor pulmonale (Multi)      Neuro   (+) Cubital tunnel syndrome   (+) Depression with anxiety      GI   (+) Malignant tumor of sigmoid colon (Multi)      /Renal   (+) Acute UTI   (+) Chronic renal impairment, stage 3a (Multi)   (+) Hydronephrosis   (+) Malignant neoplasm of left kidney (Multi)   (+) Renal calculi      Liver   (+) Malignant tumor of sigmoid colon (Multi)      Hematology   (+) Acute deep vein thrombosis (DVT) of calf muscle vein of right lower extremity (Multi)   (+) Anemia in other chronic diseases classified elsewhere      Musculoskeletal   (+) Chronic low back pain   (+) Primary localized osteoarthritis of right knee      HEENT   (+) Cataract present   (+) Vision loss      ID   (+) Acute UTI   (+) URI (upper respiratory infection)      Skin   (+) Rash      GYN   (+) History of hysterectomy   (+) Malignant neoplasm of cervix uteri       Clinical information reviewed:   Tobacco  Allergies  Meds   Med Hx  Surg Hx   Fam Hx          NPO Detail:  No data recorded     Physical Exam    Airway  Mallampati: II  TM distance: >3 FB  Neck ROM: full     Cardiovascular - normal exam  Rhythm: regular  Rate: normal     Dental - normal exam     Pulmonary - normal exam  Breath sounds clear to auscultation     Abdominal            Anesthesia Plan    History of general anesthesia?: yes  History of complications of general anesthesia?: no    ASA 2     MAC     The patient is not a current smoker.    intravenous induction   Anesthetic plan and risks discussed with  patient.  Use of blood products discussed with patient who.    Plan discussed with CRNA.

## 2025-03-05 NOTE — DISCHARGE INSTRUCTIONS
Patient Instructions after an Endoscopy      Post-procedure recommendations:  - The patient will be observed post-procedure, until all discharge criteria are met.   - Discharge the patient to home with family member/escort.  - Resume previous diet.  - Continue present medications.  - Observe patient's clinical course following today's procedure with therapeutic intervention.   - Watch for bleeding, perforation, and infection.   - Follow-up with referring physician.   - Return to primary care physician.  - The patient has a contact number available for  emergencies.  The signs and symptoms of potential delayed complications were discussed with the patient.  Return to normal activities tomorrow.  Written discharge instructions were provided to the patient.    The anesthetics, sedatives or narcotics which were given to you today will be acting in your body for the next 24 hours, so you might feel a little sleepy or groggy.  This feeling should slowly wear off. Carefully read and follow the instructions.     You received sedation today:  - Do not drive or operate any machinery or power tools of any kind.   - No alcoholic beverages today, not even beer or wine.  - Do not make any important decisions or sign any legal documents.  - No over the counter medications that contain alcohol or that may cause drowsiness.  - Do not make any important decisions or sign any legal documents.    While it is common to experience mild to moderate abdominal distention, gas, or belching after your procedure, if any of these symptoms occur following discharge from the GI Lab or within one week of having your procedure, call the [facility] to be advised whether a visit to your nearest Urgent Care or Emergency Department is indicated.  Take this paper with you if you go:  - If you develop an allergic reaction to the medications that were given during your procedure such as difficulty breathing, rash, hives, severe nausea,  vomiting or lightheadedness.  - If you experience chest pain, shortness of breath, severe abdominal pain, fevers and chills.  - If you develop signs and symptoms of bleeding such as blood in your spit, if your stools turn black, tarry, or bloody.  - If you have not urinated within 8 hours following your procedure.  - If your IV site becomes painful, red, inflamed, or looks infected.       If you experience any problems or have any questions following discharge from the GI Lab, please call: [phone]

## 2025-03-05 NOTE — ANESTHESIA POSTPROCEDURE EVALUATION
Patient: Terra Alexis    Procedure Summary       Date: 03/05/25 Room / Location: [facility]    Anesthesia Start: 1104 Anesthesia Stop:     Procedure: COLONOSCOPY Diagnosis: Urothelial cancer (Multi)    Scheduled Providers: Александр Little MD; Lawrence Gaines MD Responsible Provider: Lawrence Gaines MD    Anesthesia Type: MAC ASA Status: 2            Anesthesia Type: MAC    Vitals Value Taken Time   BP 99/56 03/05/25 1124   Temp 36.5 03/05/25 1124   Pulse 84 03/05/25 1124   Resp 16 03/05/25 1124   SpO2 100 03/05/25 1124       Anesthesia Post Evaluation    Patient location during evaluation: PACU  Patient participation: complete - patient participated  Level of consciousness: awake and alert  Pain management: adequate  Airway patency: patent  Cardiovascular status: acceptable  Respiratory status: acceptable  Hydration status: acceptable  Postoperative Nausea and Vomiting: none        No notable events documented.

## 2025-03-05 NOTE — PERIOPERATIVE NURSING NOTE
Dr. Gaines and Dr Little at bedside.  Patient has left lower extremity swelling from foot to groin with skin discoloration, gustavo purple in color.  Per patient she had fallen 2 weeks ago but did not injure her leg.  Pedal pulse present, moderate.  Patient seen by Cardiology on February 26th and Venous duplex done. Per Dr. Little and Dr. Gaines, OK to proceed with admission for colonoscopy.

## 2025-03-05 NOTE — H&P
Procedure H&P    Patient Profile-Procedures  Name Terra Alexis  Date of Birth 1950  MRN [id number]  Address   [address] [id number] [place] [address]    Primary Phone Number [phone]  Secondary Phone Number    PCP Shannon Rodriguez    Procedure(s):  Procedures: Colonoscopy  Primary contact name and number   Extended Emergency Contact Information  Primary Emergency Contact: SHAISTA REGALADO  Address: [address] [place] [address] [facility] [place]  Home Phone: [phone]  Work Phone: [phone]  Mobile Phone: [phone]  Relation: Significant Other   needed? No  Secondary Emergency Contact: DULCE KLINE  Home Phone: [phone]  Work Phone: [phone]  Mobile Phone: [phone]  Relation: Other   needed? No    General Health  Weight   Vitals:    03/05/25 1000   Weight: 50.9 kg (112 lb 3.4 oz)     BMI Body mass index is 21.74 kg/m².    Allergies  Allergies   Allergen Reactions    Codeine Hallucinations, GI Upset and Nausea/vomiting       Past Medical History   Past Medical History:   Diagnosis Date    Chronic kidney disease     GERD (gastroesophageal reflux disease)     Hypothyroidism     Malignant tumor of sigmoid colon (Multi) 08/21/2024    Nephrolithiasis     Other specified disorders of kidney and ureter 03/04/2022    Ureteral mass    Peripheral neuropathy     Personal history of malignant neoplasm of cervix uteri 11/21/2022    History of malignant neoplasm of cervix    Vision loss        Provider assessment  Diagnosis:  evaluation of malignant invasion into colon  Medication Reviewed - yes  Prior to Admission medications    Medication Sig Start Date End Date Taking? Authorizing Provider   acetaminophen (Tylenol) 325 mg tablet Take 2 tablets (650 mg) by mouth every 6 hours if needed for mild pain (1 - 3). 8/27/24  Yes Latesha Clemente, APRN-CNP   gabapentin (Neurontin) 300 mg capsule Take 1 capsule  (300 mg) by mouth 3 times a day. 2/17/25 3/19/25 Yes ISSAC Rubio   levothyroxine (Synthroid, Levoxyl) 25 mcg tablet Take 4 tablets (100 mcg) by mouth early in the morning.. Take on an empty stomach at the same time each day, either 30 to 60 minutes prior to breakfast 10/29/24  Yes Elgin Boone MD   oxyCODONE (Roxicodone) 10 mg immediate release tablet Take 1 tablet (10 mg) by mouth every 4 hours if needed for severe pain (7 - 10). 1/24/25 3/5/25 Yes ISSAC Vivas   acetaminophen (Tylenol) 325 mg tablet Take 2 tablets (650 mg) by mouth every 6 hours. 11/21/24   ISSAC Rincon   dexAMETHasone (Decadron) 4 mg tablet Take 1 tablet (4 mg) by mouth once daily in the morning. Take before meals for 10 days.  Patient not taking: Reported on 3/4/2025 2/17/25 2/27/25  ISSAC Rubio   hydrOXYzine HCL (Atarax) 10 mg tablet Take 2.5 tablets (25 mg) by mouth every 4 hours if needed for itching for up to 10 days.  Patient not taking: Reported on 3/4/2025 10/28/24 11/13/24  Elgin Boone MD   ondansetron (Zofran) 4 mg tablet Take 1 tablet (4 mg) by mouth every 8 hours if needed for nausea or vomiting.  Patient not taking: Reported on 3/4/2025 11/22/24   ISSAC Rincon   sodium,potassium,mag sulfates (Suprep) 17.5-3.13-1.6 gram solution Take one bottle twice as directed by the prep instructions 2/23/25 3/5/25  Александр Little MD       Physical Exam  Vitals:    03/05/25 1000   Pulse: 107   Resp: 16   Temp: 36.6 °C (97.9 °F)   SpO2: 95%        General: A&Ox3, NAD.  HEENT: AT/NC.   CV: RRR. No murmur.  Resp: CTA bilaterally. No wheezing, rhonchi or rales.   GI: Soft, NT/ND. BSx4.  Extrem: No edema. Pulses intact.  Neuro: No focal deficits.   Psych: Normal mood and affect.      Procedure Plan - pre-procedural (re)assesment completed by physician:  discharge/transfer patient when discharge criteria met    Александр Little MD  3/5/2025 10:27 AM

## 2025-03-06 ENCOUNTER — TELEPHONE (OUTPATIENT)
Dept: HEMATOLOGY/ONCOLOGY | Facility: CLINIC | Age: 75
End: 2025-03-06
Payer: MEDICARE

## 2025-03-06 NOTE — TELEPHONE ENCOUNTER
Pt called after hours: chief complaint: swollen leg  I returned her call, she was frustrated that she had not heard back from the team yet. I relayed Cinthya's message to the patient however she reports being off of blood thinners since 2/24. Had US on 2/26. Swelling started a few days ago. Still endorses pain and difficulty weight bearing, Reports discoloration at the top of the leg, pink and blotchy. Denies SOB and Chest pain, I secure chatted Cinthya Barnard to see if she still planned to call the patient today however no response. I reached out to the oncall fellow to advise.

## 2025-03-06 NOTE — TELEPHONE ENCOUNTER
Pt states her left leg is swollen from foot to hip for the past few days. Painful to bear wt, unable to get shoe on. No swelling in rt leg.   Pt does have neuropathy pain in both feet.   Pt had US on left lower extremity 2/26 - no evidence of DVT.   Last FUV 2/17, next FUV and infusion 3/10

## 2025-03-06 NOTE — TELEPHONE ENCOUNTER
Spoke with the on-call fellow who recommends ER  Cinthya Barnard also aware of the ER recommendation.     I called to speak with the patient regarding the recommendation, I advised that she needs to go to an ER. She is agreeable to plan, will go to ER, calling family. Unsure what ER she will go to so unable to route my note. She was agreeable to the plan however

## 2025-03-07 ENCOUNTER — APPOINTMENT (OUTPATIENT)
Dept: CARDIOLOGY | Facility: HOSPITAL | Age: 75
DRG: 299 | End: 2025-03-07
Payer: MEDICARE

## 2025-03-07 ENCOUNTER — HOSPITAL ENCOUNTER (INPATIENT)
Facility: HOSPITAL | Age: 75
End: 2025-03-07
Attending: EMERGENCY MEDICINE | Admitting: INTERNAL MEDICINE
Payer: MEDICARE

## 2025-03-07 ENCOUNTER — APPOINTMENT (OUTPATIENT)
Dept: RADIOLOGY | Facility: HOSPITAL | Age: 75
DRG: 299 | End: 2025-03-07
Payer: MEDICARE

## 2025-03-07 DIAGNOSIS — Z93.2 ILEOSTOMY IN PLACE (MULTI): ICD-10-CM

## 2025-03-07 DIAGNOSIS — I82.423 ACUTE BILATERAL DEEP VEIN THROMBOSIS (DVT) OF ILIAC VEINS OF LOWER EXTREMITIES (MULTI): ICD-10-CM

## 2025-03-07 DIAGNOSIS — C68.9 UROTHELIAL CARCINOMA: ICD-10-CM

## 2025-03-07 DIAGNOSIS — G89.29 OTHER CHRONIC PAIN: ICD-10-CM

## 2025-03-07 DIAGNOSIS — M79.2 NEUROPATHIC PAIN: ICD-10-CM

## 2025-03-07 DIAGNOSIS — G89.3 CANCER RELATED PAIN: ICD-10-CM

## 2025-03-07 DIAGNOSIS — M54.30 SCIATIC LEG PAIN: ICD-10-CM

## 2025-03-07 DIAGNOSIS — I82.461 ACUTE DEEP VEIN THROMBOSIS (DVT) OF CALF MUSCLE VEIN OF RIGHT LOWER EXTREMITY: ICD-10-CM

## 2025-03-07 DIAGNOSIS — C68.9 UROTHELIAL CANCER (MULTI): ICD-10-CM

## 2025-03-07 DIAGNOSIS — K59.03 DRUG INDUCED CONSTIPATION: ICD-10-CM

## 2025-03-07 DIAGNOSIS — I82.492 ACUTE EMBOLISM AND THROMBOSIS OF OTHER SPECIFIED DEEP VEIN OF LEFT LOWER EXTREMITY: ICD-10-CM

## 2025-03-07 DIAGNOSIS — I82.412 ACUTE DEEP VEIN THROMBOSIS (DVT) OF FEMORAL VEIN OF LEFT LOWER EXTREMITY (MULTI): Primary | ICD-10-CM

## 2025-03-07 DIAGNOSIS — I26.99 PULMONARY EMBOLUS, RIGHT (MULTI): ICD-10-CM

## 2025-03-07 DIAGNOSIS — Z79.01 LONG TERM (CURRENT) USE OF ANTICOAGULANTS: ICD-10-CM

## 2025-03-07 DIAGNOSIS — D64.9 ANEMIA, UNSPECIFIED TYPE: ICD-10-CM

## 2025-03-07 LAB
ABO GROUP (TYPE) IN BLOOD: NORMAL
ABO GROUP (TYPE) IN BLOOD: NORMAL
ALBUMIN SERPL BCP-MCNC: 3 G/DL (ref 3.4–5)
ALP SERPL-CCNC: 101 U/L (ref 33–136)
ALT SERPL W P-5'-P-CCNC: 18 U/L (ref 7–45)
ANION GAP SERPL CALC-SCNC: 15 MMOL/L (ref 10–20)
ANTIBODY SCREEN: NORMAL
APTT PPP: 27 SECONDS (ref 26–36)
AST SERPL W P-5'-P-CCNC: 17 U/L (ref 9–39)
BASOPHILS # BLD AUTO: 0.06 X10*3/UL (ref 0–0.1)
BASOPHILS NFR BLD AUTO: 0.3 %
BILIRUB SERPL-MCNC: 0.6 MG/DL (ref 0–1.2)
BNP SERPL-MCNC: 46 PG/ML (ref 0–99)
BUN SERPL-MCNC: 25 MG/DL (ref 6–23)
CALCIUM SERPL-MCNC: 9 MG/DL (ref 8.6–10.3)
CARDIAC TROPONIN I PNL SERPL HS: 8 NG/L (ref 0–13)
CHLORIDE SERPL-SCNC: 88 MMOL/L (ref 98–107)
CO2 SERPL-SCNC: 23 MMOL/L (ref 21–32)
CREAT SERPL-MCNC: 1.26 MG/DL (ref 0.5–1.05)
EGFRCR SERPLBLD CKD-EPI 2021: 45 ML/MIN/1.73M*2
EOSINOPHIL # BLD AUTO: 0.02 X10*3/UL (ref 0–0.4)
EOSINOPHIL NFR BLD AUTO: 0.1 %
ERYTHROCYTE [DISTWIDTH] IN BLOOD BY AUTOMATED COUNT: 17.3 % (ref 11.5–14.5)
GLUCOSE SERPL-MCNC: 118 MG/DL (ref 74–99)
HCT VFR BLD AUTO: 19.9 % (ref 36–46)
HGB BLD-MCNC: 6.5 G/DL (ref 12–16)
IMM GRANULOCYTES # BLD AUTO: 0.42 X10*3/UL (ref 0–0.5)
IMM GRANULOCYTES NFR BLD AUTO: 1.8 % (ref 0–0.9)
INR PPP: 1.4 (ref 0.9–1.1)
LYMPHOCYTES # BLD AUTO: 0.86 X10*3/UL (ref 0.8–3)
LYMPHOCYTES NFR BLD AUTO: 3.6 %
MCH RBC QN AUTO: 27.9 PG (ref 26–34)
MCHC RBC AUTO-ENTMCNC: 32.7 G/DL (ref 32–36)
MCV RBC AUTO: 85 FL (ref 80–100)
MONOCYTES # BLD AUTO: 0.87 X10*3/UL (ref 0.05–0.8)
MONOCYTES NFR BLD AUTO: 3.7 %
NEUTROPHILS # BLD AUTO: 21.48 X10*3/UL (ref 1.6–5.5)
NEUTROPHILS NFR BLD AUTO: 90.5 %
NRBC BLD-RTO: 0 /100 WBCS (ref 0–0)
PLATELET # BLD AUTO: 348 X10*3/UL (ref 150–450)
POTASSIUM SERPL-SCNC: 3.2 MMOL/L (ref 3.5–5.3)
PROT SERPL-MCNC: 6.4 G/DL (ref 6.4–8.2)
PROTHROMBIN TIME: 15 SECONDS (ref 9.8–12.4)
RBC # BLD AUTO: 2.33 X10*6/UL (ref 4–5.2)
RH FACTOR (ANTIGEN D): NORMAL
RH FACTOR (ANTIGEN D): NORMAL
SODIUM SERPL-SCNC: 123 MMOL/L (ref 136–145)
UFH PPP CHRO-ACNC: 0.1 IU/ML
WBC # BLD AUTO: 23.7 X10*3/UL (ref 4.4–11.3)

## 2025-03-07 PROCEDURE — 80053 COMPREHEN METABOLIC PANEL: CPT

## 2025-03-07 PROCEDURE — 84484 ASSAY OF TROPONIN QUANT: CPT

## 2025-03-07 PROCEDURE — 83880 ASSAY OF NATRIURETIC PEPTIDE: CPT

## 2025-03-07 PROCEDURE — 2500000004 HC RX 250 GENERAL PHARMACY W/ HCPCS (ALT 636 FOR OP/ED): Performed by: INTERNAL MEDICINE

## 2025-03-07 PROCEDURE — 86923 COMPATIBILITY TEST ELECTRIC: CPT

## 2025-03-07 PROCEDURE — 85610 PROTHROMBIN TIME: CPT

## 2025-03-07 PROCEDURE — 85025 COMPLETE CBC W/AUTO DIFF WBC: CPT

## 2025-03-07 PROCEDURE — 2500000002 HC RX 250 W HCPCS SELF ADMINISTERED DRUGS (ALT 637 FOR MEDICARE OP, ALT 636 FOR OP/ED): Performed by: INTERNAL MEDICINE

## 2025-03-07 PROCEDURE — P9040 RBC LEUKOREDUCED IRRADIATED: HCPCS

## 2025-03-07 PROCEDURE — 36430 TRANSFUSION BLD/BLD COMPNT: CPT

## 2025-03-07 PROCEDURE — 93005 ELECTROCARDIOGRAM TRACING: CPT

## 2025-03-07 PROCEDURE — 36415 COLL VENOUS BLD VENIPUNCTURE: CPT

## 2025-03-07 PROCEDURE — 2060000001 HC INTERMEDIATE ICU ROOM DAILY

## 2025-03-07 PROCEDURE — 93971 EXTREMITY STUDY: CPT

## 2025-03-07 PROCEDURE — 2500000001 HC RX 250 WO HCPCS SELF ADMINISTERED DRUGS (ALT 637 FOR MEDICARE OP): Performed by: INTERNAL MEDICINE

## 2025-03-07 PROCEDURE — 99223 1ST HOSP IP/OBS HIGH 75: CPT | Performed by: INTERNAL MEDICINE

## 2025-03-07 PROCEDURE — 2500000001 HC RX 250 WO HCPCS SELF ADMINISTERED DRUGS (ALT 637 FOR MEDICARE OP)

## 2025-03-07 PROCEDURE — 85520 HEPARIN ASSAY: CPT

## 2025-03-07 PROCEDURE — 93971 EXTREMITY STUDY: CPT | Performed by: RADIOLOGY

## 2025-03-07 PROCEDURE — 86901 BLOOD TYPING SEROLOGIC RH(D): CPT

## 2025-03-07 PROCEDURE — 99285 EMERGENCY DEPT VISIT HI MDM: CPT | Mod: 25 | Performed by: EMERGENCY MEDICINE

## 2025-03-07 RX ORDER — PANTOPRAZOLE SODIUM 40 MG/1
40 TABLET, DELAYED RELEASE ORAL
Status: DISPENSED | OUTPATIENT
Start: 2025-03-08

## 2025-03-07 RX ORDER — OXYCODONE HYDROCHLORIDE 5 MG/1
10 TABLET ORAL ONCE
Status: COMPLETED | OUTPATIENT
Start: 2025-03-07 | End: 2025-03-07

## 2025-03-07 RX ORDER — GABAPENTIN 300 MG/1
300 CAPSULE ORAL 2 TIMES DAILY
Status: DISPENSED | OUTPATIENT
Start: 2025-03-07

## 2025-03-07 RX ORDER — LEVOTHYROXINE SODIUM 100 UG/1
100 TABLET ORAL DAILY
Status: DISCONTINUED | OUTPATIENT
Start: 2025-03-08 | End: 2025-03-08

## 2025-03-07 RX ORDER — SODIUM CHLORIDE AND POTASSIUM CHLORIDE 150; 900 MG/100ML; MG/100ML
70 INJECTION, SOLUTION INTRAVENOUS CONTINUOUS
Status: DISCONTINUED | OUTPATIENT
Start: 2025-03-07 | End: 2025-03-09

## 2025-03-07 RX ORDER — ONDANSETRON HYDROCHLORIDE 2 MG/ML
4 INJECTION, SOLUTION INTRAVENOUS EVERY 6 HOURS PRN
Status: ACTIVE | OUTPATIENT
Start: 2025-03-07

## 2025-03-07 RX ORDER — GABAPENTIN 100 MG/1
100 CAPSULE ORAL 2 TIMES DAILY
Status: DISCONTINUED | OUTPATIENT
Start: 2025-03-07 | End: 2025-03-07

## 2025-03-07 RX ORDER — ENOXAPARIN SODIUM 100 MG/ML
1 INJECTION SUBCUTANEOUS
Status: DISCONTINUED | OUTPATIENT
Start: 2025-03-07 | End: 2025-03-07

## 2025-03-07 RX ORDER — ACETAMINOPHEN 325 MG/1
650 TABLET ORAL EVERY 4 HOURS PRN
Status: DISCONTINUED | OUTPATIENT
Start: 2025-03-07 | End: 2025-03-08

## 2025-03-07 RX ORDER — ENOXAPARIN SODIUM 100 MG/ML
1 INJECTION SUBCUTANEOUS EVERY 12 HOURS
Status: DISCONTINUED | OUTPATIENT
Start: 2025-03-07 | End: 2025-03-07

## 2025-03-07 RX ORDER — POTASSIUM CHLORIDE 20 MEQ/1
40 TABLET, EXTENDED RELEASE ORAL ONCE
Status: COMPLETED | OUTPATIENT
Start: 2025-03-07 | End: 2025-03-07

## 2025-03-07 RX ORDER — OXYCODONE HYDROCHLORIDE 5 MG/1
10 TABLET ORAL EVERY 6 HOURS PRN
Status: DISPENSED | OUTPATIENT
Start: 2025-03-07

## 2025-03-07 RX ORDER — HEPARIN SODIUM 10000 [USP'U]/100ML
0-4500 INJECTION, SOLUTION INTRAVENOUS CONTINUOUS
Status: DISPENSED | OUTPATIENT
Start: 2025-03-07

## 2025-03-07 RX ORDER — ACETAMINOPHEN 325 MG/1
975 TABLET ORAL ONCE
Status: COMPLETED | OUTPATIENT
Start: 2025-03-07 | End: 2025-03-07

## 2025-03-07 RX ORDER — ACETAMINOPHEN 500 MG
5 TABLET ORAL NIGHTLY PRN
Status: DISCONTINUED | OUTPATIENT
Start: 2025-03-07 | End: 2025-03-08

## 2025-03-07 RX ORDER — OXYCODONE HYDROCHLORIDE 5 MG/1
10 TABLET ORAL EVERY 4 HOURS PRN
Status: DISCONTINUED | OUTPATIENT
Start: 2025-03-07 | End: 2025-03-07

## 2025-03-07 RX ORDER — MORPHINE SULFATE 2 MG/ML
2 INJECTION, SOLUTION INTRAMUSCULAR; INTRAVENOUS
Status: ACTIVE | OUTPATIENT
Start: 2025-03-07

## 2025-03-07 RX ADMIN — OXYCODONE HYDROCHLORIDE 10 MG: 5 TABLET ORAL at 17:33

## 2025-03-07 RX ADMIN — HEPARIN SODIUM 900 UNITS/HR: 10000 INJECTION, SOLUTION INTRAVENOUS at 20:20

## 2025-03-07 RX ADMIN — GABAPENTIN 300 MG: 300 CAPSULE ORAL at 20:26

## 2025-03-07 RX ADMIN — POTASSIUM CHLORIDE 40 MEQ: 1500 TABLET, EXTENDED RELEASE ORAL at 18:22

## 2025-03-07 RX ADMIN — ACETAMINOPHEN 975 MG: 325 TABLET ORAL at 17:33

## 2025-03-07 SDOH — SOCIAL STABILITY: SOCIAL INSECURITY: WITHIN THE LAST YEAR, HAVE YOU BEEN HUMILIATED OR EMOTIONALLY ABUSED IN OTHER WAYS BY YOUR PARTNER OR EX-PARTNER?: NO

## 2025-03-07 SDOH — SOCIAL STABILITY: SOCIAL INSECURITY: ARE THERE ANY APPARENT SIGNS OF INJURIES/BEHAVIORS THAT COULD BE RELATED TO ABUSE/NEGLECT?: NO

## 2025-03-07 SDOH — SOCIAL STABILITY: SOCIAL INSECURITY: HAVE YOU HAD ANY THOUGHTS OF HARMING ANYONE ELSE?: NO

## 2025-03-07 SDOH — SOCIAL STABILITY: SOCIAL INSECURITY: ARE YOU OR HAVE YOU BEEN THREATENED OR ABUSED PHYSICALLY, EMOTIONALLY, OR SEXUALLY BY ANYONE?: NO

## 2025-03-07 SDOH — ECONOMIC STABILITY: INCOME INSECURITY: IN THE PAST 12 MONTHS HAS THE ELECTRIC, GAS, OIL, OR WATER COMPANY THREATENED TO SHUT OFF SERVICES IN YOUR HOME?: NO

## 2025-03-07 SDOH — ECONOMIC STABILITY: FOOD INSECURITY: WITHIN THE PAST 12 MONTHS, THE FOOD YOU BOUGHT JUST DIDN'T LAST AND YOU DIDN'T HAVE MONEY TO GET MORE.: NEVER TRUE

## 2025-03-07 SDOH — ECONOMIC STABILITY: FOOD INSECURITY: WITHIN THE PAST 12 MONTHS, YOU WORRIED THAT YOUR FOOD WOULD RUN OUT BEFORE YOU GOT THE MONEY TO BUY MORE.: NEVER TRUE

## 2025-03-07 SDOH — SOCIAL STABILITY: SOCIAL INSECURITY: WITHIN THE LAST YEAR, HAVE YOU BEEN AFRAID OF YOUR PARTNER OR EX-PARTNER?: NO

## 2025-03-07 SDOH — SOCIAL STABILITY: SOCIAL INSECURITY: HAVE YOU HAD THOUGHTS OF HARMING ANYONE ELSE?: NO

## 2025-03-07 SDOH — SOCIAL STABILITY: SOCIAL INSECURITY: HAS ANYONE EVER THREATENED TO HURT YOUR FAMILY OR YOUR PETS?: NO

## 2025-03-07 SDOH — SOCIAL STABILITY: SOCIAL INSECURITY: DOES ANYONE TRY TO KEEP YOU FROM HAVING/CONTACTING OTHER FRIENDS OR DOING THINGS OUTSIDE YOUR HOME?: NO

## 2025-03-07 SDOH — SOCIAL STABILITY: SOCIAL INSECURITY: ABUSE: ADULT

## 2025-03-07 SDOH — SOCIAL STABILITY: SOCIAL INSECURITY: DO YOU FEEL UNSAFE GOING BACK TO THE PLACE WHERE YOU ARE LIVING?: NO

## 2025-03-07 SDOH — SOCIAL STABILITY: SOCIAL INSECURITY: WERE YOU ABLE TO COMPLETE ALL THE BEHAVIORAL HEALTH SCREENINGS?: YES

## 2025-03-07 SDOH — SOCIAL STABILITY: SOCIAL INSECURITY: DO YOU FEEL ANYONE HAS EXPLOITED OR TAKEN ADVANTAGE OF YOU FINANCIALLY OR OF YOUR PERSONAL PROPERTY?: NO

## 2025-03-07 ASSESSMENT — ACTIVITIES OF DAILY LIVING (ADL)
LACK_OF_TRANSPORTATION: NO
WALKS IN HOME: INDEPENDENT
TOILETING: INDEPENDENT
GROOMING: INDEPENDENT
HEARING - LEFT EAR: FUNCTIONAL
JUDGMENT_ADEQUATE_SAFELY_COMPLETE_DAILY_ACTIVITIES: YES
DRESSING YOURSELF: INDEPENDENT
ADEQUATE_TO_COMPLETE_ADL: YES
PATIENT'S MEMORY ADEQUATE TO SAFELY COMPLETE DAILY ACTIVITIES?: YES
HEARING - RIGHT EAR: FUNCTIONAL
BATHING: INDEPENDENT
FEEDING YOURSELF: INDEPENDENT

## 2025-03-07 ASSESSMENT — PAIN DESCRIPTION - ORIENTATION: ORIENTATION: LEFT

## 2025-03-07 ASSESSMENT — COGNITIVE AND FUNCTIONAL STATUS - GENERAL
CLIMB 3 TO 5 STEPS WITH RAILING: A LOT
DRESSING REGULAR LOWER BODY CLOTHING: A LITTLE
WALKING IN HOSPITAL ROOM: A LITTLE
MOVING TO AND FROM BED TO CHAIR: A LITTLE
PATIENT BASELINE BEDBOUND: NO
TOILETING: A LITTLE
DAILY ACTIVITIY SCORE: 21
MOBILITY SCORE: 20
HELP NEEDED FOR BATHING: A LITTLE

## 2025-03-07 ASSESSMENT — LIFESTYLE VARIABLES
SKIP TO QUESTIONS 9-10: 1
HOW OFTEN DO YOU HAVE A DRINK CONTAINING ALCOHOL: NEVER
HOW MANY STANDARD DRINKS CONTAINING ALCOHOL DO YOU HAVE ON A TYPICAL DAY: PATIENT DOES NOT DRINK
AUDIT-C TOTAL SCORE: 0
AUDIT-C TOTAL SCORE: 0
HOW OFTEN DO YOU HAVE 6 OR MORE DRINKS ON ONE OCCASION: NEVER

## 2025-03-07 ASSESSMENT — PAIN - FUNCTIONAL ASSESSMENT: PAIN_FUNCTIONAL_ASSESSMENT: 0-10

## 2025-03-07 ASSESSMENT — ENCOUNTER SYMPTOMS
GASTROINTESTINAL NEGATIVE: 1
FREQUENCY: 1
RESPIRATORY NEGATIVE: 1
EYES NEGATIVE: 1
HEMATOLOGIC/LYMPHATIC NEGATIVE: 1
MYALGIAS: 1
APPETITE CHANGE: 1
WEAKNESS: 1
ACTIVITY CHANGE: 1
SLEEP DISTURBANCE: 1
ENDOCRINE NEGATIVE: 1
FATIGUE: 1
BACK PAIN: 1

## 2025-03-07 ASSESSMENT — PAIN SCALES - GENERAL
PAINLEVEL_OUTOF10: 0 - NO PAIN
PAINLEVEL_OUTOF10: 4
PAINLEVEL_OUTOF10: 10 - WORST POSSIBLE PAIN
PAINLEVEL_OUTOF10: 9
PAINLEVEL_OUTOF10: 0 - NO PAIN

## 2025-03-07 ASSESSMENT — PAIN DESCRIPTION - LOCATION: LOCATION: LEG

## 2025-03-07 NOTE — Clinical Note
Location: left popliteal vein. Manual thrombectomy. Aspiration catheter inserted. Pass # = 2. Aspirations started.

## 2025-03-07 NOTE — ED PROVIDER NOTES
"History of Present Illness     History provided by: {History Provided By:55987::\"Patient\"}  Limitations to History: {History Limitations:09574::\"None\"}  External Records Reviewed with Brief Summary: ***    HPI:  Terra Alexis is a 74 y.o. female 74-year-old female with history of invasive urothelial carcinoma, with ureter mass, history of cervical cancer status post chemoradiation in .  She is currently on Pembro and Signatera.  Additional history of peripheral neuropathy CKD GERD hypothyroidism.  She presents for evaluation of left leg swelling.    Physical Exam   Triage vitals:  T 37.8 °C (100 °F)  HR 90  /66  RR 18  O2 100 % None (Room air)    General: Awake, alert, in no acute distress  Eyes: Gaze conjugate.  No scleral icterus or injection  HENT: Normo-cephalic, atraumatic. No stridor  CV: {HR:27335::\"Regular\"} rate, {rhythm:50009::\"regular\"} rhythm. Radial pulses 2+ bilaterally  Resp: Breathing non-labored, speaking in full sentences.  Clear to auscultation bilaterally  GI: Soft, non-distended, non-tender. No rebound or guarding.  : ***  MSK/Extremities: No gross bony deformities. Moving all extremities  Skin: Warm. Appropriate color  Neuro: Alert. Oriented. Face symmetric. Speech is fluent.  Gross strength and sensation intact in b/l UE and LEs  Psych: Appropriate mood and affect      Medical Decision Making & ED Course   Medical Decision Makin y.o. female  ***  ----  {Scoring Tools Utilized:55016}    Differential diagnoses considered include but are not limited to: ***     Social Determinants of Health which Significantly Impact Care: {Social Determinants of Health which Significantly Impact Care:09671::\"None identified\"} {The following actions were taken to address these social determinants:48494}    EKG Independent Interpretation: EKG interpreted by myself. Please see ED Course and MDM for full interpretation.    Independent Result Review and Interpretation: Results were " "independently reviewed and interpreted by myself. Please see ED course and MDM for full interpretation.    Chronic conditions affecting the patient's care: As documented in the MDM    The patient was discussed with the following consultants/services: {The patient was discussed with the following consultants/services:85163::\"None\"}    Care Considerations: As per MDM    ED Course:     Disposition   {ED Disposition:12765}    Procedures   Procedures    Patient seen and discussed with ED attending physician.    Etelvina Sabillon, DO  Emergency Medicine  " Considerations: As per [facility]    ED Course:  Diagnoses as of 03/11/25 1635   Acute deep vein thrombosis (DVT) of femoral vein of left lower extremity (Multi)   Anemia, unspecified type   Acute bilateral deep vein thrombosis (DVT) of iliac veins of lower extremities (Multi)     Disposition   Admitted to medicine    Procedures   Procedures    Patient seen and discussed with ED attending physician.    Etelvina Sabillon DO  Emergency Medicine     Etelvina Sabillon DO  Resident  03/11/25 3507

## 2025-03-07 NOTE — H&P
History Of Present Illness  Terra Alexis is a 74 y.o. female presenting with increased pain and swelling of her left leg for the last 2 days or so. For about 6 weeks, she has been having a 'sciatica' type pain, down the outside of her left leg. This has made her very inactive, she has not been moving much at all. The edema occurred over the last couple of days. She just had a normal venous doppler about 2 weeks ago, now has extensive LLE DVT. Her previous dvt was on the right side. Denies fever, chills, sob, chest discomfort . She has very poor appetite, very low energy. Has not been sleeping well due to pain. No dysuria, no blood in her stool or  urine that she is aware of. She was just taken off her eliquis a short time ago. She did have a colonoscopy 2 days ago, results of biopsies are pending. She is taking Pembro for her malignancy at this time.              Past Medical History  Hypertension  Nephrolitiasis  Cervical cancer, treated with chemo/xrt, then surgery  Invasive uroepithelial cancer, involving upper rectum, vaginal cuff and sacrum.   Hypothyroid  Hx DVT/PE  Peripheral neuropathy  CKD  GERD  Depression    Surgical History  JEMMA/BSO  Colonoscopy  Left nephrectomy  Partial sigmoid resection, loop ileostomy     Social History  She reports that she has never smoked. She has never used smokeless tobacco. She reports that she does not currently use alcohol. She reports that she does not use drugs. Has a cane and walker at home    Family History  Family History   Problem Relation Name Age of Onset    Macular degeneration Mother      Glaucoma Other grandparent     Macular degeneration Other grandparent         Allergies  Codeine    Review of Systems   Constitutional:  Positive for activity change, appetite change and fatigue.   HENT: Negative.     Eyes: Negative.    Respiratory: Negative.     Cardiovascular:  Positive for leg swelling.   Gastrointestinal: Negative.    Endocrine: Negative.    Genitourinary:   Positive for frequency.   Musculoskeletal:  Positive for back pain, gait problem and myalgias.   Skin:  Positive for pallor.   Allergic/Immunologic: Positive for immunocompromised state.   Neurological:  Positive for weakness.   Hematological: Negative.    Psychiatric/Behavioral:  Positive for sleep disturbance.         Physical Exam  Constitutional:       Comments: Alert, fatigued appearing woman   HENT:      Head: Normocephalic.      Nose: Nose normal.      Mouth/Throat:      Mouth: Mucous membranes are dry.   Eyes:      Extraocular Movements: Extraocular movements intact.      Pupils: Pupils are equal, round, and reactive to light.   Neck:      Comments: No jvd, bruits, adenopaty  Cardiovascular:      Comments: Regular, increased S2. 1-2/6 systolic murmur  Pulmonary:      Effort: Pulmonary effort is normal.      Breath sounds: Normal breath sounds.   Abdominal:      General: Bowel sounds are normal.      Palpations: Abdomen is soft.      Comments: Slightly distended across lower abdomen, but no tenderness to palpation   Musculoskeletal:      Comments: LLE is markedly edematous, 3+  Leg is cool , digits are discolored. Tender around calf area. Extends all the way to groin  Pulses are present   Skin:     General: Skin is dry.      Coloration: Skin is pale.      Findings: Bruising present.   Neurological:      Mental Status: Mental status is at baseline.      Motor: Weakness present.      Gait: Gait abnormal.   Psychiatric:         Mood and Affect: Mood normal.         Behavior: Behavior normal.       Results for orders placed or performed during the hospital encounter of 03/07/25 (from the past 24 hours)   CBC and Auto Differential   Result Value Ref Range    WBC 23.7 (H) 4.4 - 11.3 x10*3/uL    nRBC 0.0 0.0 - 0.0 /100 WBCs    RBC 2.33 (L) 4.00 - 5.20 x10*6/uL    Hemoglobin 6.5 (LL) 12.0 - 16.0 g/dL    Hematocrit 19.9 (L) 36.0 - 46.0 %    MCV 85 80 - 100 fL    MCH 27.9 26.0 - 34.0 pg    MCHC 32.7 32.0 - 36.0 g/dL     RDW 17.3 (H) 11.5 - 14.5 %    Platelets 348 150 - 450 x10*3/uL    Neutrophils % 90.5 40.0 - 80.0 %    Immature Granulocytes %, Automated 1.8 (H) 0.0 - 0.9 %    Lymphocytes % 3.6 13.0 - 44.0 %    Monocytes % 3.7 2.0 - 10.0 %    Eosinophils % 0.1 0.0 - 6.0 %    Basophils % 0.3 0.0 - 2.0 %    Neutrophils Absolute 21.48 (H) 1.60 - 5.50 x10*3/uL    Immature Granulocytes Absolute, Automated 0.42 0.00 - 0.50 x10*3/uL    Lymphocytes Absolute 0.86 0.80 - 3.00 x10*3/uL    Monocytes Absolute 0.87 (H) 0.05 - 0.80 x10*3/uL    Eosinophils Absolute 0.02 0.00 - 0.40 x10*3/uL    Basophils Absolute 0.06 0.00 - 0.10 x10*3/uL   Comprehensive metabolic panel   Result Value Ref Range    Glucose 118 (H) 74 - 99 mg/dL    Sodium 123 (L) 136 - 145 mmol/L    Potassium 3.2 (L) 3.5 - 5.3 mmol/L    Chloride 88 (L) 98 - 107 mmol/L    Bicarbonate 23 21 - 32 mmol/L    Anion Gap 15 10 - 20 mmol/L    Urea Nitrogen 25 (H) 6 - 23 mg/dL    Creatinine 1.26 (H) 0.50 - 1.05 mg/dL    eGFR 45 (L) >60 mL/min/1.73m*2    Calcium 9.0 8.6 - 10.3 mg/dL    Albumin 3.0 (L) 3.4 - 5.0 g/dL    Alkaline Phosphatase 101 33 - 136 U/L    Total Protein 6.4 6.4 - 8.2 g/dL    AST 17 9 - 39 U/L    Bilirubin, Total 0.6 0.0 - 1.2 mg/dL    ALT 18 7 - 45 U/L   B-Type Natriuretic Peptide   Result Value Ref Range    BNP 46 0 - 99 pg/mL   Troponin I, High Sensitivity   Result Value Ref Range    Troponin I, High Sensitivity 8 0 - 13 ng/L   Type And Screen   Result Value Ref Range    ABO TYPE B     Rh TYPE POS     ANTIBODY SCREEN NEG    Coagulation Screen   Result Value Ref Range    Protime 15.0 (H) 9.8 - 12.4 seconds    INR 1.4 (H) 0.9 - 1.1    aPTT 27 26 - 36 seconds   Heparin Assay   Result Value Ref Range    Heparin Unfractionated 0.1 See Comment Below for Therapeutic Ranges IU/mL   Prepare RBC: 1 Units, Irradiated, Leukocytes Reduced (CMV reduced risk)   Result Value Ref Range    PRODUCT CODE J5819X41     Unit Number [id number]     Unit ABO B     Unit RH POS     XM INTEP COMP  "    Dispense Status XM     Blood Expiration Date 3/31/2025 11:59:00 PM EDT     PRODUCT BLOOD TYPE 7300     UNIT VOLUME 350    VERIFY ABO/Rh Group Test   Result Value Ref Range    ABO TYPE B     Rh TYPE POS      *Note: Due to a large number of results and/or encounters for the requested time period, some results have not been displayed. A complete set of results can be found in Results Review.          Last Recorded Vitals  Blood pressure 106/60, pulse 81, temperature 37.8 °C (100 °F), temperature source Temporal, resp. rate 16, height 1.524 m (5'), weight 50.8 kg (112 lb), SpO2 100%.    Relevant Results    Assessment/Plan   Assessment & Plan  Acute bilateral deep vein thrombosis (DVT) of iliac veins of lower extremities (Multi)    Extensive acute DVT, LLE. I believe, that with the extensiveness of the clot burden, heparin should be used rather than lovenox, orders placed. Vascular consult as well, may benefit from thrombectomy. Pain control as able. She will require life long anticoagulation now  Fever. Possibly related to above. Will check ua, rule out uti  Uroepithelial malignancy. Path of recent biopsies pending. Has been taking Pembro, immunotherapy.   \"Sciatica\", there is mention of sacral invasion, it is possible that her back and leg pain she has been experiencing the last 6 weeks or so may be secondary to this . Consider scan if none done recently, will review chart  TERRELL. Fluids overnight. Oral supplements  Hypothyroid. Check tsh. Continue supplement  Hyponatremia, hypovolemic. Recheck after saline overnight  Hypokalemia. Supplement and recheck, with mag level in am.   Hx GERD. Start protonix  Peripheral neuropathy. Trial of neurontin while here. Adjust dose as tolerated  Will need PT/OT evals after adequately anticoagulated.   Acute on chronic anemia. She was transfused a couple weeks ago, irradiated blood. Will receive another unit here when available.        I spent 45 minutes in the professional and " overall care of this patient.      Lamar Mendoza MD

## 2025-03-07 NOTE — Clinical Note
Location: left popliteal vein. Manual thrombectomy. Aspiration catheter inserted. Pass # = 3. Aspirations started.

## 2025-03-07 NOTE — Clinical Note
Location: left popliteal vein. Manual thrombectomy. Aspiration catheter inserted. Aspirations started. Pass 6

## 2025-03-07 NOTE — PROGRESS NOTES
Pharmacy Medication History Review    Terra Alexis is a 74 y.o. female admitted for Acute bilateral deep vein thrombosis (DVT) of iliac veins of lower extremities (Multi). Pharmacy reviewed the patient's [id number] medications and allergies for accuracy.    The list below reflectives the updated PTA list. Please review each medication in order reconciliation for additional clarification and justification.  Prior to Admission Medications   Prescriptions Last Dose Informant Patient Reported? Taking?   acetaminophen (Tylenol) 325 mg tablet 3/5/2025 Self Yes Yes   Sig: Take 2 tablets (650 mg) by mouth every 6 hours if needed for mild pain (1 - 3).   acetaminophen (Tylenol) 325 mg tablet  Self Yes Yes   Sig: Take 2 tablets (650 mg) by mouth every 6 hours.   dexAMETHasone (Decadron) 4 mg tablet   No No   Sig: Take 1 tablet (4 mg) by mouth once daily in the morning. Take before meals for 10 days.   Patient not taking: Reported on 3/4/2025   gabapentin (Neurontin) 300 mg capsule 3/5/2025 Self Yes Yes   Sig: Take 1 capsule (300 mg) by mouth 3 times a day.   hydrOXYzine HCL (Atarax) 10 mg tablet   No No   Sig: Take 2.5 tablets (25 mg) by mouth every 4 hours if needed for itching for up to 10 days.   Patient not taking: Reported on 3/4/2025   levothyroxine (Synthroid, Levoxyl) 25 mcg tablet 3/5/2025 Self Yes Yes   Sig: Take 4 tablets (100 mcg) by mouth early in the morning.. Take on an empty stomach at the same time each day, either 30 to 60 minutes prior to breakfast   ondansetron (Zofran) 4 mg tablet  Self No No   Sig: Take 1 tablet (4 mg) by mouth every 8 hours if needed for nausea or vomiting.   Patient not taking: No sig reported   oxyCODONE (Roxicodone) 10 mg immediate release tablet 3/5/2025  Yes Yes   Sig: Take 1 tablet (10 mg) by mouth every 4 hours if needed for severe pain (7 - 10).      Facility-Administered Medications: None           The list below reflectives the updated allergy list. Please review  each documented allergy for additional clarification and justification.  Allergies  Reviewed by Elmo Calderon RN on 3/7/2025        Severity Reactions Comments    Codeine High Hallucinations, GI Upset, Nausea/vomiting             Below are additional concerns with the patient's PTA list.      Gela Batista

## 2025-03-07 NOTE — Clinical Note
Location: left popliteal vein. Manual thrombectomy. Aspiration catheter inserted. Pass # = 5. Aspirations started.

## 2025-03-07 NOTE — ED TRIAGE NOTES
Pt BIB POV from home w/ c/o left lower edema and pain. Pain for about a month. Marked edema to LLE, started about a week ago. Has not been able to ambulate as of late. H/o DVT and PE in Aug last year, just stopped thinner a week ago. Denies chest pain, sob.

## 2025-03-07 NOTE — Clinical Note
Location: left popliteal vein. Manual thrombectomy. Aspiration catheter inserted. Pass # = 4. Aspirations started.

## 2025-03-08 LAB
ANION GAP SERPL CALC-SCNC: 13 MMOL/L (ref 10–20)
BASOPHILS # BLD AUTO: 0.03 X10*3/UL (ref 0–0.1)
BASOPHILS NFR BLD AUTO: 0.2 %
BLOOD EXPIRATION DATE: NORMAL
BUN SERPL-MCNC: 22 MG/DL (ref 6–23)
CALCIUM SERPL-MCNC: 8.9 MG/DL (ref 8.6–10.3)
CHLORIDE SERPL-SCNC: 94 MMOL/L (ref 98–107)
CO2 SERPL-SCNC: 22 MMOL/L (ref 21–32)
CREAT SERPL-MCNC: 1.02 MG/DL (ref 0.5–1.05)
DISPENSE STATUS: NORMAL
EGFRCR SERPLBLD CKD-EPI 2021: 58 ML/MIN/1.73M*2
EOSINOPHIL # BLD AUTO: 0.09 X10*3/UL (ref 0–0.4)
EOSINOPHIL NFR BLD AUTO: 0.5 %
ERYTHROCYTE [DISTWIDTH] IN BLOOD BY AUTOMATED COUNT: 16.6 % (ref 11.5–14.5)
GLUCOSE SERPL-MCNC: 97 MG/DL (ref 74–99)
HCT VFR BLD AUTO: 22.6 % (ref 36–46)
HCT VFR BLD AUTO: 26.4 % (ref 36–46)
HGB BLD-MCNC: 7.4 G/DL (ref 12–16)
HGB BLD-MCNC: 8.3 G/DL (ref 12–16)
IMM GRANULOCYTES # BLD AUTO: 0.55 X10*3/UL (ref 0–0.5)
IMM GRANULOCYTES NFR BLD AUTO: 2.8 % (ref 0–0.9)
LYMPHOCYTES # BLD AUTO: 0.88 X10*3/UL (ref 0.8–3)
LYMPHOCYTES NFR BLD AUTO: 4.5 %
MAGNESIUM SERPL-MCNC: 1.78 MG/DL (ref 1.6–2.4)
MCH RBC QN AUTO: 27.8 PG (ref 26–34)
MCHC RBC AUTO-ENTMCNC: 32.7 G/DL (ref 32–36)
MCV RBC AUTO: 85 FL (ref 80–100)
MONOCYTES # BLD AUTO: 0.59 X10*3/UL (ref 0.05–0.8)
MONOCYTES NFR BLD AUTO: 3 %
NEUTROPHILS # BLD AUTO: 17.52 X10*3/UL (ref 1.6–5.5)
NEUTROPHILS NFR BLD AUTO: 89 %
NRBC BLD-RTO: 0 /100 WBCS (ref 0–0)
PLATELET # BLD AUTO: 324 X10*3/UL (ref 150–450)
POTASSIUM SERPL-SCNC: 3.7 MMOL/L (ref 3.5–5.3)
PRODUCT BLOOD TYPE: 7300
PRODUCT CODE: NORMAL
RBC # BLD AUTO: 2.66 X10*6/UL (ref 4–5.2)
SODIUM SERPL-SCNC: 125 MMOL/L (ref 136–145)
T4 FREE SERPL-MCNC: 0.8 NG/DL (ref 0.61–1.12)
TSH SERPL-ACNC: 17.97 MIU/L (ref 0.44–3.98)
UFH PPP CHRO-ACNC: 0.2 IU/ML
UFH PPP CHRO-ACNC: 0.2 IU/ML
UFH PPP CHRO-ACNC: 0.3 IU/ML
UFH PPP CHRO-ACNC: 0.3 IU/ML
UNIT ABO: NORMAL
UNIT NUMBER: NORMAL
UNIT RH: NORMAL
UNIT VOLUME: 350
WBC # BLD AUTO: 19.7 X10*3/UL (ref 4.4–11.3)
XM INTEP: NORMAL

## 2025-03-08 PROCEDURE — 85520 HEPARIN ASSAY: CPT | Performed by: INTERNAL MEDICINE

## 2025-03-08 PROCEDURE — 85014 HEMATOCRIT: CPT | Performed by: NURSE PRACTITIONER

## 2025-03-08 PROCEDURE — 80048 BASIC METABOLIC PNL TOTAL CA: CPT | Performed by: INTERNAL MEDICINE

## 2025-03-08 PROCEDURE — 85018 HEMOGLOBIN: CPT | Performed by: NURSE PRACTITIONER

## 2025-03-08 PROCEDURE — 85025 COMPLETE CBC W/AUTO DIFF WBC: CPT | Performed by: INTERNAL MEDICINE

## 2025-03-08 PROCEDURE — 84439 ASSAY OF FREE THYROXINE: CPT | Performed by: INTERNAL MEDICINE

## 2025-03-08 PROCEDURE — 2060000001 HC INTERMEDIATE ICU ROOM DAILY

## 2025-03-08 PROCEDURE — 2500000001 HC RX 250 WO HCPCS SELF ADMINISTERED DRUGS (ALT 637 FOR MEDICARE OP): Performed by: INTERNAL MEDICINE

## 2025-03-08 PROCEDURE — 83735 ASSAY OF MAGNESIUM: CPT | Performed by: INTERNAL MEDICINE

## 2025-03-08 PROCEDURE — 2500000004 HC RX 250 GENERAL PHARMACY W/ HCPCS (ALT 636 FOR OP/ED): Performed by: INTERNAL MEDICINE

## 2025-03-08 PROCEDURE — 84443 ASSAY THYROID STIM HORMONE: CPT | Performed by: INTERNAL MEDICINE

## 2025-03-08 PROCEDURE — 36415 COLL VENOUS BLD VENIPUNCTURE: CPT | Performed by: INTERNAL MEDICINE

## 2025-03-08 PROCEDURE — 2500000002 HC RX 250 W HCPCS SELF ADMINISTERED DRUGS (ALT 637 FOR MEDICARE OP, ALT 636 FOR OP/ED): Performed by: INTERNAL MEDICINE

## 2025-03-08 PROCEDURE — 99233 SBSQ HOSP IP/OBS HIGH 50: CPT | Performed by: INTERNAL MEDICINE

## 2025-03-08 RX ORDER — ACETAMINOPHEN 325 MG/1
975 TABLET ORAL 3 TIMES DAILY
Status: DISPENSED | OUTPATIENT
Start: 2025-03-08

## 2025-03-08 RX ORDER — ACETAMINOPHEN 500 MG
10 TABLET ORAL NIGHTLY PRN
Status: ACTIVE | OUTPATIENT
Start: 2025-03-08

## 2025-03-08 RX ORDER — LEVOTHYROXINE SODIUM 112 UG/1
112 TABLET ORAL DAILY
Status: DISPENSED | OUTPATIENT
Start: 2025-03-09

## 2025-03-08 RX ADMIN — ACETAMINOPHEN 975 MG: 325 TABLET ORAL at 15:08

## 2025-03-08 RX ADMIN — ACETAMINOPHEN 975 MG: 325 TABLET ORAL at 21:37

## 2025-03-08 RX ADMIN — POTASSIUM CHLORIDE AND SODIUM CHLORIDE 70 ML/HR: 900; 150 INJECTION, SOLUTION INTRAVENOUS at 00:40

## 2025-03-08 RX ADMIN — LEVOTHYROXINE SODIUM 100 MCG: 100 TABLET ORAL at 05:31

## 2025-03-08 RX ADMIN — POTASSIUM CHLORIDE AND SODIUM CHLORIDE 70 ML/HR: 900; 150 INJECTION, SOLUTION INTRAVENOUS at 14:36

## 2025-03-08 RX ADMIN — GABAPENTIN 300 MG: 300 CAPSULE ORAL at 21:37

## 2025-03-08 RX ADMIN — PANTOPRAZOLE SODIUM 40 MG: 40 TABLET, DELAYED RELEASE ORAL at 05:31

## 2025-03-08 RX ADMIN — GABAPENTIN 300 MG: 300 CAPSULE ORAL at 09:07

## 2025-03-08 RX ADMIN — OXYCODONE HYDROCHLORIDE 10 MG: 5 TABLET ORAL at 09:07

## 2025-03-08 RX ADMIN — HEPARIN SODIUM 1100 UNITS/HR: 10000 INJECTION, SOLUTION INTRAVENOUS at 14:36

## 2025-03-08 RX ADMIN — OXYCODONE HYDROCHLORIDE 10 MG: 5 TABLET ORAL at 15:08

## 2025-03-08 RX ADMIN — OXYCODONE HYDROCHLORIDE 10 MG: 5 TABLET ORAL at 21:38

## 2025-03-08 SDOH — ECONOMIC STABILITY: INCOME INSECURITY: IN THE PAST 12 MONTHS HAS THE ELECTRIC, GAS, OIL, OR WATER COMPANY THREATENED TO SHUT OFF SERVICES IN YOUR HOME?: NO

## 2025-03-08 SDOH — SOCIAL STABILITY: SOCIAL INSECURITY: WITHIN THE LAST YEAR, HAVE YOU BEEN HUMILIATED OR EMOTIONALLY ABUSED IN OTHER WAYS BY YOUR PARTNER OR EX-PARTNER?: NO

## 2025-03-08 SDOH — SOCIAL STABILITY: SOCIAL INSECURITY: WITHIN THE LAST YEAR, HAVE YOU BEEN AFRAID OF YOUR PARTNER OR EX-PARTNER?: NO

## 2025-03-08 ASSESSMENT — PAIN SCALES - GENERAL
PAINLEVEL_OUTOF10: 9
PAINLEVEL_OUTOF10: 9
PAINLEVEL_OUTOF10: 5 - MODERATE PAIN
PAINLEVEL_OUTOF10: 10 - WORST POSSIBLE PAIN
PAINLEVEL_OUTOF10: 5 - MODERATE PAIN

## 2025-03-08 ASSESSMENT — COGNITIVE AND FUNCTIONAL STATUS - GENERAL
DRESSING REGULAR LOWER BODY CLOTHING: A LITTLE
CLIMB 3 TO 5 STEPS WITH RAILING: A LOT
DAILY ACTIVITIY SCORE: 21
MOBILITY SCORE: 20
MOVING TO AND FROM BED TO CHAIR: A LITTLE
WALKING IN HOSPITAL ROOM: A LITTLE
HELP NEEDED FOR BATHING: A LITTLE
TOILETING: A LITTLE

## 2025-03-08 ASSESSMENT — PAIN - FUNCTIONAL ASSESSMENT
PAIN_FUNCTIONAL_ASSESSMENT: 0-10

## 2025-03-08 ASSESSMENT — PAIN DESCRIPTION - ORIENTATION
ORIENTATION: LEFT
ORIENTATION: LEFT

## 2025-03-08 ASSESSMENT — PAIN DESCRIPTION - LOCATION
LOCATION: LEG
LOCATION: LEG

## 2025-03-08 ASSESSMENT — PAIN SCALES - PAIN ASSESSMENT IN ADVANCED DEMENTIA (PAINAD)
TOTALSCORE: MEDICATION (SEE MAR)
TOTALSCORE: MEDICATION (SEE MAR)

## 2025-03-08 ASSESSMENT — ACTIVITIES OF DAILY LIVING (ADL): LACK_OF_TRANSPORTATION: NO

## 2025-03-08 NOTE — PROGRESS NOTES
Patient: Terra Alexis  Room/bed: 240/240-A  Admitted on: 3/7/2025    Age: 74 y.o.   Gender: female  Code Status:  Full Code   Admitting Dx: Acute deep vein thrombosis (DVT) of femoral vein of left lower extremity (Multi) [I82.412]  Anemia, unspecified type [D64.9]  Acute bilateral deep vein thrombosis (DVT) of iliac veins of lower extremities (Multi) [I82.423]    MRN: [id number]  PCP: No Assigned PCP Generic Provider, MD       Subjective   Sleepy.  Not eating much yet. Pain not improved at all    Objective    Physical Exam  HENT:      Head: Normocephalic.      Nose: Nose normal.      Mouth/Throat:      Mouth: Mucous membranes are moist.   Eyes:      Extraocular Movements: Extraocular movements intact.      Pupils: Pupils are equal, round, and reactive to light.   Cardiovascular:      Comments: Regular, with increased S2  Pulmonary:      Comments: Equal air exchange. Few rhonchi on left lung  Abdominal:      General: Bowel sounds are normal.   Musculoskeletal:      Comments: LLE markedly edematous. Skin discoloration, mottling to leg, ecchymoses on posterior thigh. Warm to touch. Pulses present  No edema of RLE   Skin:     General: Skin is dry.      Coloration: Skin is pale.   Neurological:      Mental Status: She is alert. Mental status is at baseline.      Motor: Weakness present.      Gait: Gait abnormal.   Psychiatric:         Mood and Affect: Mood normal.         Behavior: Behavior normal.        Temp:  [36.3 °C (97.3 °F)-37.8 °C (100 °F)] 36.9 °C (98.5 °F)  Heart Rate:  [70-95] 89  Resp:  [10-25] 18  BP: ()/(45-66) 111/56    Vitals:    03/07/25 1941   Weight: 53.2 kg (117 lb 4.6 oz)           I/Os    Intake/Output Summary (Last 24 hours) at 3/8/2025 1206  Last data filed at 3/8/2025 0900  Gross per 24 hour   Intake 880 ml   Output --   Net 880 ml       Labs:   Results from last 72 hours   Lab Units 03/08/25  0556 03/07/25  1326   SODIUM mmol/L 125* 123*   POTASSIUM mmol/L 3.7 3.2*   CHLORIDE mmol/L 94*  "88*   CO2 mmol/L 22 23   BUN mg/dL 22 25*   CREATININE mg/dL 1.02 1.26*   GLUCOSE mg/dL 97 118*   CALCIUM mg/dL 8.9 9.0   ANION GAP mmol/L 13 15   EGFR mL/min/1.73m*2 58* 45*      Results from last 72 hours   Lab Units 03/08/25  0556 03/08/25  0116 03/07/25  1326   WBC AUTO x10*3/uL 19.7*  --  23.7*   HEMOGLOBIN g/dL 7.4* 8.3* 6.5*   HEMATOCRIT % 22.6* 26.4* 19.9*   PLATELETS AUTO x10*3/uL 324  --  348   NEUTROS PCT AUTO % 89.0  --  90.5   LYMPHS PCT AUTO % 4.5  --  3.6   MONOS PCT AUTO % 3.0  --  3.7   EOS PCT AUTO % 0.5  --  0.1      Lab Results   Component Value Date    CALCIUM 8.9 03/08/2025    PHOS 2.4 (L) 11/18/2024      No results found for: \"CRP\"   [unfilled]     Micro/ID:   No results found for the last 90 days.                   No lab exists for component: \"AGALPCRNB\"   .ID  Lab Results   Component Value Date    URINECULTURE Normal genitourinary bradly 08/13/2024     Images:  Vascular US Lower Extremity Venous Duplex Left  Narrative: Interpreted By:  Wisam Casey,   STUDY:  [facility] US LOWER EXTREMITY VENOUS DUPLEX LEFT  3/7/2025 3:56 pm      INDICATION:  75 y/o   F with  Signs/Symptoms:LLE swelling, hx of DVT. LMP:  Unknown.              COMPARISON:  None      ACCESSION NUMBER(S):  [id number]      ORDERING CLINICIAN:  REBECA WYNN      TECHNIQUE:  Routine ultrasound of the  left lower extremity was performed with  duplex Doppler (color and spectral) evaluation.   Static images were  obtained for remote interpretation.      FINDINGS:  Partially occlusive thrombus in the common femoral vein. Occlusive  thrombus in the femoral, popliteal, and posterior tibial veins.  Peroneal veins not visualized.      Impression: Extensive left lower extremity deep venous thrombosis.      Wisam Casey discussed the significance and urgency of this  critical finding by epic secure chat with  REBECA WYNN on  3/7/2025 at 4:04 pm.  (**-RCF-**) Findings:  See findings.          MACRO:  None    "   Signed by: Wisam Casey 3/7/2025 4:04 PM  Dictation workstation:   [id number]       Meds    Scheduled medications  gabapentin, 300 mg, oral, BID  levothyroxine, 100 mcg, oral, Daily  pantoprazole, 40 mg, oral, Daily before breakfast      Continuous medications  heparin, 0-4,500 Units/hr, Last Rate: 1,100 Units/hr (03/08/25 0857)  potassium chloride in 0.9%NaCl, 70 mL/hr, Last Rate: 70 mL/hr (03/08/25 0040)      PRN medications  PRN medications: acetaminophen, heparin, melatonin, morphine, ondansetron, oxyCODONE     Assessment and Plan    Terra Alexis is a 74 y.o. female   Acute extensive DVT, from femoral to popliteal area. Not really improved yet. Continue heparin, has been on bedrest. Requested vascular consult, ? Thrombectomy.will need life long anticoagulation, as this is 2nd occurrence and she has malignancy  Hypothyroid. Numbers off, will increase dose of synthroid today.   Anemia. Monitor. Transfuse as needed. Has been receiving transfusions as outpt  Hx uroepithelial cancer. Most recent MRI shows a mass extending into the left side of the sacrum, also upper rectum and vaginal cuff. She has edema of the psoas and iliac muscles. She has encasement of the nerve roots, and is also encasing the iliac artery and vein. XRT oncologist did discuss this briefly with them, but no plan was made for xrt or other treatment. Added neurontin for pain control, and can titrate up as tolerated.   CKD, stage 3. Avoid nephrotoxic agents  GERD  Depression  Hyponatremia, likely hypovolemic. Check urine electrolytes. Continue fluids, oral intake not improved    Lamar Mendoza MD

## 2025-03-08 NOTE — CARE PLAN
Problem: Pain - Adult  Goal: Verbalizes/displays adequate comfort level or baseline comfort level  Outcome: Progressing     Problem: Safety - Adult  Goal: Free from fall injury  Outcome: Progressing     Problem: Discharge Planning  Goal: Discharge to home or other facility with appropriate resources  Outcome: Progressing     Problem: Chronic Conditions and Co-morbidities  Goal: Patient's chronic conditions and co-morbidity symptoms are monitored and maintained or improved  Outcome: Progressing

## 2025-03-08 NOTE — PROGRESS NOTES
03/08/25 0819   Discharge Planning   Living Arrangements Spouse/significant other  (Lives with fiance)   Support Systems Spouse/significant other   Assistance Needed Patient is A&OX3, independent in ADLs, ambulates with a cane if needed, no O2, CPAP or Bipap at baseline. No active HHC agency. Patient reports she has a  PCP out of the [place] office but can't remember her name. Patient was on Eliquis at home but was stopped at the end of February.   Type of Residence Private residence   Number of Stairs to Enter Residence 4   Number of Stairs Within Residence 26  (upstairs and basement steps)   Do you have animals or pets at home? No   Who is requesting discharge planning? Provider   Home or Post Acute Services None   Expected Discharge Disposition Home  (Home, no needs-denied need for HHC.)   Does the patient need discharge transport arranged? No   Financial Resource Strain   How hard is it for you to pay for the very basics like food, housing, medical care, and heating? Not very   Housing Stability   In the last 12 months, was there a time when you were not able to pay the mortgage or rent on time? N   In the past 12 months, how many times have you moved where you were living? 0   At any time in the past 12 months, were you homeless or living in a shelter (including now)? N   Transportation Needs   In the past 12 months, has lack of transportation kept you from medical appointments or from getting medications? no   In the past 12 months, has lack of transportation kept you from meetings, work, or from getting things needed for daily living? No   Stroke Family Assessment   Stroke Family Assessment Needed No   Intensity of Service   Intensity of Service 0-30 min

## 2025-03-09 LAB
ANION GAP SERPL CALC-SCNC: 13 MMOL/L (ref 10–20)
APPEARANCE UR: CLEAR
BACTERIA #/AREA URNS AUTO: ABNORMAL /HPF
BILIRUB UR STRIP.AUTO-MCNC: NEGATIVE MG/DL
BLOOD EXPIRATION DATE: NORMAL
BUN SERPL-MCNC: 18 MG/DL (ref 6–23)
CALCIUM SERPL-MCNC: 8.8 MG/DL (ref 8.6–10.3)
CHLORIDE SERPL-SCNC: 95 MMOL/L (ref 98–107)
CHLORIDE UR-SCNC: 21 MMOL/L
CHLORIDE/CREATININE (MMOL/G) IN URINE: 54 MMOL/G CREAT (ref 38–318)
CO2 SERPL-SCNC: 22 MMOL/L (ref 21–32)
COLOR UR: ABNORMAL
CREAT SERPL-MCNC: 0.88 MG/DL (ref 0.5–1.05)
CREAT UR-MCNC: 38.6 MG/DL (ref 20–320)
DISPENSE STATUS: NORMAL
EGFRCR SERPLBLD CKD-EPI 2021: 69 ML/MIN/1.73M*2
ERYTHROCYTE [DISTWIDTH] IN BLOOD BY AUTOMATED COUNT: 16.9 % (ref 11.5–14.5)
GLUCOSE SERPL-MCNC: 94 MG/DL (ref 74–99)
GLUCOSE UR STRIP.AUTO-MCNC: NORMAL MG/DL
HCT VFR BLD AUTO: 20.5 % (ref 36–46)
HCT VFR BLD AUTO: 27.6 % (ref 36–46)
HGB BLD-MCNC: 6.7 G/DL (ref 12–16)
HGB BLD-MCNC: 9.3 G/DL (ref 12–16)
KETONES UR STRIP.AUTO-MCNC: NEGATIVE MG/DL
LEUKOCYTE ESTERASE UR QL STRIP.AUTO: ABNORMAL
MCH RBC QN AUTO: 27.7 PG (ref 26–34)
MCHC RBC AUTO-ENTMCNC: 32.7 G/DL (ref 32–36)
MCV RBC AUTO: 85 FL (ref 80–100)
NITRITE UR QL STRIP.AUTO: NEGATIVE
NRBC BLD-RTO: 0 /100 WBCS (ref 0–0)
OSMOLALITY UR: 235 MOSM/KG (ref 200–1200)
PH UR STRIP.AUTO: 6.5 [PH]
PLATELET # BLD AUTO: 269 X10*3/UL (ref 150–450)
POTASSIUM SERPL-SCNC: 3.9 MMOL/L (ref 3.5–5.3)
POTASSIUM UR-SCNC: 23 MMOL/L
POTASSIUM/CREAT UR-RTO: 60 MMOL/G CREAT
PRODUCT BLOOD TYPE: 7300
PRODUCT CODE: NORMAL
PROT UR STRIP.AUTO-MCNC: NEGATIVE MG/DL
RBC # BLD AUTO: 2.42 X10*6/UL (ref 4–5.2)
RBC # UR STRIP.AUTO: NEGATIVE MG/DL
RBC #/AREA URNS AUTO: ABNORMAL /HPF
SODIUM SERPL-SCNC: 126 MMOL/L (ref 136–145)
SODIUM UR-SCNC: 16 MMOL/L
SODIUM/CREAT UR-RTO: 41 MMOL/G CREAT
SP GR UR STRIP.AUTO: 1.01
SQUAMOUS #/AREA URNS AUTO: ABNORMAL /HPF
UFH PPP CHRO-ACNC: 0.1 IU/ML
UFH PPP CHRO-ACNC: 0.2 IU/ML
UFH PPP CHRO-ACNC: 0.2 IU/ML
UNIT ABO: NORMAL
UNIT NUMBER: NORMAL
UNIT RH: NORMAL
UNIT VOLUME: 350
UROBILINOGEN UR STRIP.AUTO-MCNC: NORMAL MG/DL
WBC # BLD AUTO: 16.9 X10*3/UL (ref 4.4–11.3)
WBC #/AREA URNS AUTO: ABNORMAL /HPF
XM INTEP: NORMAL

## 2025-03-09 PROCEDURE — 36415 COLL VENOUS BLD VENIPUNCTURE: CPT | Performed by: INTERNAL MEDICINE

## 2025-03-09 PROCEDURE — 84300 ASSAY OF URINE SODIUM: CPT | Mod: GEALAB | Performed by: INTERNAL MEDICINE

## 2025-03-09 PROCEDURE — 36430 TRANSFUSION BLD/BLD COMPNT: CPT

## 2025-03-09 PROCEDURE — 80048 BASIC METABOLIC PNL TOTAL CA: CPT | Performed by: INTERNAL MEDICINE

## 2025-03-09 PROCEDURE — 85027 COMPLETE CBC AUTOMATED: CPT | Performed by: INTERNAL MEDICINE

## 2025-03-09 PROCEDURE — 81001 URINALYSIS AUTO W/SCOPE: CPT | Performed by: INTERNAL MEDICINE

## 2025-03-09 PROCEDURE — 2500000001 HC RX 250 WO HCPCS SELF ADMINISTERED DRUGS (ALT 637 FOR MEDICARE OP): Performed by: INTERNAL MEDICINE

## 2025-03-09 PROCEDURE — 2060000001 HC INTERMEDIATE ICU ROOM DAILY

## 2025-03-09 PROCEDURE — 2500000002 HC RX 250 W HCPCS SELF ADMINISTERED DRUGS (ALT 637 FOR MEDICARE OP, ALT 636 FOR OP/ED): Performed by: INTERNAL MEDICINE

## 2025-03-09 PROCEDURE — P9040 RBC LEUKOREDUCED IRRADIATED: HCPCS

## 2025-03-09 PROCEDURE — 83930 ASSAY OF BLOOD OSMOLALITY: CPT | Mod: GEALAB | Performed by: INTERNAL MEDICINE

## 2025-03-09 PROCEDURE — 85520 HEPARIN ASSAY: CPT | Performed by: INTERNAL MEDICINE

## 2025-03-09 PROCEDURE — 2500000004 HC RX 250 GENERAL PHARMACY W/ HCPCS (ALT 636 FOR OP/ED): Performed by: INTERNAL MEDICINE

## 2025-03-09 PROCEDURE — 83935 ASSAY OF URINE OSMOLALITY: CPT | Mod: GEALAB | Performed by: INTERNAL MEDICINE

## 2025-03-09 PROCEDURE — 82436 ASSAY OF URINE CHLORIDE: CPT | Mod: GEALAB | Performed by: INTERNAL MEDICINE

## 2025-03-09 PROCEDURE — 85014 HEMATOCRIT: CPT | Performed by: INTERNAL MEDICINE

## 2025-03-09 PROCEDURE — 99233 SBSQ HOSP IP/OBS HIGH 50: CPT | Performed by: INTERNAL MEDICINE

## 2025-03-09 RX ADMIN — OXYCODONE HYDROCHLORIDE 10 MG: 5 TABLET ORAL at 12:18

## 2025-03-09 RX ADMIN — GABAPENTIN 300 MG: 300 CAPSULE ORAL at 10:35

## 2025-03-09 RX ADMIN — ACETAMINOPHEN 975 MG: 325 TABLET ORAL at 21:07

## 2025-03-09 RX ADMIN — HEPARIN SODIUM 1300 UNITS/HR: 10000 INJECTION, SOLUTION INTRAVENOUS at 16:22

## 2025-03-09 RX ADMIN — ACETAMINOPHEN 975 MG: 325 TABLET ORAL at 17:21

## 2025-03-09 RX ADMIN — PANTOPRAZOLE SODIUM 40 MG: 40 TABLET, DELAYED RELEASE ORAL at 05:51

## 2025-03-09 RX ADMIN — OXYCODONE HYDROCHLORIDE 10 MG: 5 TABLET ORAL at 05:51

## 2025-03-09 RX ADMIN — ACETAMINOPHEN 975 MG: 325 TABLET ORAL at 10:35

## 2025-03-09 RX ADMIN — LEVOTHYROXINE SODIUM 112 MCG: 112 TABLET ORAL at 05:51

## 2025-03-09 RX ADMIN — GABAPENTIN 300 MG: 300 CAPSULE ORAL at 21:07

## 2025-03-09 RX ADMIN — POTASSIUM CHLORIDE AND SODIUM CHLORIDE 70 ML/HR: 900; 150 INJECTION, SOLUTION INTRAVENOUS at 05:54

## 2025-03-09 ASSESSMENT — COGNITIVE AND FUNCTIONAL STATUS - GENERAL
WALKING IN HOSPITAL ROOM: A LOT
DRESSING REGULAR UPPER BODY CLOTHING: A LITTLE
STANDING UP FROM CHAIR USING ARMS: A LOT
WALKING IN HOSPITAL ROOM: A LOT
TURNING FROM BACK TO SIDE WHILE IN FLAT BAD: A LITTLE
CLIMB 3 TO 5 STEPS WITH RAILING: A LOT
MOBILITY SCORE: 16
DAILY ACTIVITIY SCORE: 18
DRESSING REGULAR UPPER BODY CLOTHING: A LITTLE
TOILETING: A LOT
MOVING TO AND FROM BED TO CHAIR: A LITTLE
PERSONAL GROOMING: A LITTLE
DRESSING REGULAR LOWER BODY CLOTHING: A LITTLE
HELP NEEDED FOR BATHING: A LITTLE
MOBILITY SCORE: 16
PERSONAL GROOMING: A LOT
TURNING FROM BACK TO SIDE WHILE IN FLAT BAD: A LITTLE
HELP NEEDED FOR BATHING: A LITTLE
MOVING TO AND FROM BED TO CHAIR: A LITTLE
STANDING UP FROM CHAIR USING ARMS: A LOT
DRESSING REGULAR LOWER BODY CLOTHING: A LITTLE
DAILY ACTIVITIY SCORE: 17
TOILETING: A LOT
CLIMB 3 TO 5 STEPS WITH RAILING: A LOT

## 2025-03-09 ASSESSMENT — PAIN DESCRIPTION - ORIENTATION: ORIENTATION: LEFT

## 2025-03-09 ASSESSMENT — PAIN SCALES - GENERAL
PAINLEVEL_OUTOF10: 5 - MODERATE PAIN
PAINLEVEL_OUTOF10: 6
PAINLEVEL_OUTOF10: 7
PAINLEVEL_OUTOF10: 10 - WORST POSSIBLE PAIN

## 2025-03-09 ASSESSMENT — PAIN - FUNCTIONAL ASSESSMENT
PAIN_FUNCTIONAL_ASSESSMENT: 0-10

## 2025-03-09 ASSESSMENT — PAIN DESCRIPTION - LOCATION: LOCATION: LEG

## 2025-03-09 NOTE — CARE PLAN
The clinical goals for the shift include pt will remain stable on room air      Problem: Pain - Adult  Goal: Verbalizes/displays adequate comfort level or baseline comfort level  Outcome: Progressing     Problem: Safety - Adult  Goal: Free from fall injury  Outcome: Progressing     Problem: Fall/Injury  Goal: Not fall by end of shift  Outcome: Progressing  Goal: Be free from injury by end of the shift  Outcome: Progressing  Goal: Verbalize understanding of personal risk factors for fall in the hospital  Outcome: Progressing     CBC resulted and hgb 6.7. Kinga Doe notified. 1 unit PRBC infused. Otherwise uneventful night.

## 2025-03-09 NOTE — PROGRESS NOTES
Patient: Terra Alexis  Room/bed: 240/240-A  Admitted on: 3/7/2025    Age: 74 y.o.   Gender: female  Code Status:  Full Code   Admitting Dx: Acute deep vein thrombosis (DVT) of femoral vein of left lower extremity (Multi) [I82.412]  Anemia, unspecified type [D64.9]  Acute bilateral deep vein thrombosis (DVT) of iliac veins of lower extremities (Multi) [I82.423]    MRN: [id number]  PCP: No Assigned PCP Generic Provider, MD       Subjective   Feeling a little better, less uncomfortable. Able to get some rest    Objective    Physical Exam  HENT:      Head: Normocephalic.      Mouth/Throat:      Mouth: Mucous membranes are dry.   Eyes:      Extraocular Movements: Extraocular movements intact.      Pupils: Pupils are equal, round, and reactive to light.   Cardiovascular:      Rate and Rhythm: Normal rate and regular rhythm.   Pulmonary:      Effort: Pulmonary effort is normal.      Breath sounds: Normal breath sounds.   Abdominal:      General: Bowel sounds are normal.      Palpations: Abdomen is soft.   Musculoskeletal:      Comments: LLE edema is improved, but still obviously present. The discoloration, mottling type present on admission has resolved. No erythema. Calf is not taut.  to movement and palpation   Skin:     General: Skin is warm.      Coloration: Skin is pale.   Neurological:      Mental Status: She is alert. Mental status is at baseline.      Motor: Weakness present.      Gait: Gait abnormal.   Psychiatric:         Mood and Affect: Mood normal.         Behavior: Behavior normal.        Temp:  [36.5 °C (97.7 °F)-37 °C (98.6 °F)] 36.8 °C (98.2 °F)  Heart Rate:  [] 81  Resp:  [8-25] 17  BP: ()/(48-64) 110/58    Vitals:    03/07/25 1941   Weight: 53.2 kg (117 lb 4.6 oz)           I/Os    Intake/Output Summary (Last 24 hours) at 3/9/2025 1016  Last data filed at 3/9/2025 0855  Gross per 24 hour   Intake 3523.37 ml   Output 300 ml   Net 3223.37 ml       Labs:   Results from last 72 hours  "  Lab Units 03/09/25  0818 03/08/25  0556 03/07/25  1326   SODIUM mmol/L 126* 125* 123*   POTASSIUM mmol/L 3.9 3.7 3.2*   CHLORIDE mmol/L 95* 94* 88*   CO2 mmol/L 22 22 23   BUN mg/dL 18 22 25*   CREATININE mg/dL 0.88 1.02 1.26*   GLUCOSE mg/dL 94 97 118*   CALCIUM mg/dL 8.8 8.9 9.0   ANION GAP mmol/L 13 13 15   EGFR mL/min/1.73m*2 69 58* 45*      Results from last 72 hours   Lab Units 03/09/25  0818 03/09/25  0023 03/08/25  0556 03/08/25  0116 03/07/25  1326   WBC AUTO x10*3/uL  --  16.9* 19.7*  --  23.7*   HEMOGLOBIN g/dL 9.3* 6.7* 7.4*   < > 6.5*   HEMATOCRIT % 27.6* 20.5* 22.6*   < > 19.9*   PLATELETS AUTO x10*3/uL  --  269 324  --  348   NEUTROS PCT AUTO %  --   --  89.0  --  90.5   LYMPHS PCT AUTO %  --   --  4.5  --  3.6   MONOS PCT AUTO %  --   --  3.0  --  3.7   EOS PCT AUTO %  --   --  0.5  --  0.1    < > = values in this interval not displayed.      Lab Results   Component Value Date    CALCIUM 8.8 03/09/2025    PHOS 2.4 (L) 11/18/2024      No results found for: \"CRP\"   [unfilled]     Micro/ID:   No results found for the last 90 days.                   No lab exists for component: \"AGALPCRNB\"   .ID  Lab Results   Component Value Date    URINECULTURE Normal genitourinary bradly 08/13/2024       Images:  Vascular US Lower Extremity Venous Duplex Left  Narrative: Interpreted By:  Wisam Casey,   STUDY:  [facility] US LOWER EXTREMITY VENOUS DUPLEX LEFT  3/7/2025 3:56 pm      INDICATION:  75 y/o   F with  Signs/Symptoms:LLE swelling, hx of DVT. LMP:  Unknown.              COMPARISON:  None      ACCESSION NUMBER(S):  [id number]      ORDERING CLINICIAN:  REBECA WYNN      TECHNIQUE:  Routine ultrasound of the  left lower extremity was performed with  duplex Doppler (color and spectral) evaluation.   Static images were  obtained for remote interpretation.      FINDINGS:  Partially occlusive thrombus in the common femoral vein. Occlusive  thrombus in the femoral, popliteal, and posterior tibial " veins.  Peroneal veins not visualized.      Impression: Extensive left lower extremity deep venous thrombosis.      Wisam Casey discussed the significance and urgency of this  critical finding by epic secure chat with  REBECA WYNN on  3/7/2025 at 4:04 pm.  (**-RCF-**) Findings:  See findings.          MACRO:  None      Signed by: Wisam Casey 3/7/2025 4:04 PM  Dictation workstation:   [id number]       Meds    Scheduled medications  acetaminophen, 975 mg, oral, TID  gabapentin, 300 mg, oral, BID  levothyroxine, 112 mcg, oral, Daily  pantoprazole, 40 mg, oral, Daily before breakfast      Continuous medications  heparin, 0-4,500 Units/hr, Last Rate: 1,100 Units/hr (03/08/25 8366)  potassium chloride in 0.9%NaCl, 70 mL/hr, Last Rate: 70 mL/hr (03/09/25 0555)      PRN medications  PRN medications: heparin, melatonin, morphine, ondansetron, oxyCODONE     Assessment and Plan    Terra Alexis is a 74 y.o. female   Acute extensive DVT of LLE, developed just over a couple weeks time, after being taken off eliquis. Vascular consult is pending. Continue heparin pending vascular seeing, then expect to transition to eliquis. Will require life long anticoagulation  Hypothyroid, did increase her dose of synthroid.   Hyponatremia. Does have hypovolemic component, but suspect she may have siadh also. Check osmolalities and urine electrolytes. Will dc saline today, she is 3 liters positive now  Ambulatory dysfunction. PT/OT evals.   Anemia, recurrent, requiring transfusions. Good response. Check stool. May give dose of iv iron as well. Monitor hgb.  Progressive uroepithelial cancer. Invasion of sacral area, with encasement of nerves causing significant pain for her. Discomfort is improved with neurontin, using oxy as well as needed. I am going to ask palliative care to see, as it seems she may be getting towards end of treatment for the malignancy.   CKD, stage 3  GERD    Lamar Mendoza MD

## 2025-03-09 NOTE — CARE PLAN
Problem: Pain - Adult  Goal: Verbalizes/displays adequate comfort level or baseline comfort level  Outcome: Progressing     Problem: Safety - Adult  Goal: Free from fall injury  Outcome: Progressing     Problem: Discharge Planning  Goal: Discharge to home or other facility with appropriate resources  Outcome: Progressing     Problem: Chronic Conditions and Co-morbidities  Goal: Patient's chronic conditions and co-morbidity symptoms are monitored and maintained or improved  Outcome: Progressing     Problem: Nutrition  Goal: Nutrient intake appropriate for maintaining nutritional needs  Outcome: Progressing

## 2025-03-09 NOTE — SIGNIFICANT EVENT
Notified overnight of hemoglobin 6.7 & transfusion ordered.     Gen-awake and talking  Cardiac-No murmer & RRR  Lungs-CTA BL  Ext-No swelling  Neuro-Alert and oriented.     Anemia Requiring Transfusion-Unit of blood given. Monitor repeat hemoglobin. Consent reviewed.

## 2025-03-10 ENCOUNTER — APPOINTMENT (OUTPATIENT)
Dept: RADIOLOGY | Facility: HOSPITAL | Age: 75
DRG: 299 | End: 2025-03-10
Payer: MEDICARE

## 2025-03-10 ENCOUNTER — APPOINTMENT (OUTPATIENT)
Dept: VASCULAR MEDICINE | Facility: HOSPITAL | Age: 75
DRG: 270 | End: 2025-03-10
Payer: MEDICARE

## 2025-03-10 ENCOUNTER — APPOINTMENT (OUTPATIENT)
Dept: HEMATOLOGY/ONCOLOGY | Facility: CLINIC | Age: 75
End: 2025-03-10
Payer: MEDICARE

## 2025-03-10 VITALS
DIASTOLIC BLOOD PRESSURE: 55 MMHG | WEIGHT: 117.28 LBS | HEART RATE: 77 BPM | RESPIRATION RATE: 19 BRPM | BODY MASS INDEX: 23.03 KG/M2 | OXYGEN SATURATION: 95 % | HEIGHT: 60 IN | SYSTOLIC BLOOD PRESSURE: 103 MMHG | TEMPERATURE: 98.5 F

## 2025-03-10 PROBLEM — I82.412 ACUTE DEEP VEIN THROMBOSIS (DVT) OF FEMORAL VEIN OF LEFT LOWER EXTREMITY: Status: ACTIVE | Noted: 2025-03-07

## 2025-03-10 LAB
ALBUMIN SERPL BCP-MCNC: 2.4 G/DL (ref 3.4–5)
ALP SERPL-CCNC: 88 U/L (ref 33–136)
ALT SERPL W P-5'-P-CCNC: 13 U/L (ref 7–45)
ANION GAP SERPL CALC-SCNC: 12 MMOL/L (ref 10–20)
AST SERPL W P-5'-P-CCNC: 10 U/L (ref 9–39)
ATRIAL RATE: 74 BPM
BILIRUB SERPL-MCNC: 0.4 MG/DL (ref 0–1.2)
BUN SERPL-MCNC: 18 MG/DL (ref 6–23)
CALCIUM SERPL-MCNC: 8.4 MG/DL (ref 8.6–10.3)
CHLORIDE SERPL-SCNC: 96 MMOL/L (ref 98–107)
CO2 SERPL-SCNC: 23 MMOL/L (ref 21–32)
CREAT SERPL-MCNC: 0.9 MG/DL (ref 0.5–1.05)
EGFRCR SERPLBLD CKD-EPI 2021: 68 ML/MIN/1.73M*2
GLUCOSE SERPL-MCNC: 96 MG/DL (ref 74–99)
HCT VFR BLD AUTO: 27.6 % (ref 36–46)
HGB BLD-MCNC: 8.7 G/DL (ref 12–16)
OSMOLALITY SERPL: 272 MOSM/KG (ref 280–300)
P AXIS: 60 DEGREES
P OFFSET: 172 MS
P ONSET: 134 MS
POTASSIUM SERPL-SCNC: 4.2 MMOL/L (ref 3.5–5.3)
PR INTERVAL: 174 MS
PROT SERPL-MCNC: 5.1 G/DL (ref 6.4–8.2)
Q ONSET: 221 MS
QRS COUNT: 12 BEATS
QRS DURATION: 90 MS
QT INTERVAL: 382 MS
QTC CALCULATION(BAZETT): 424 MS
QTC FREDERICIA: 410 MS
R AXIS: -3 DEGREES
SODIUM SERPL-SCNC: 127 MMOL/L (ref 136–145)
T AXIS: 13 DEGREES
T OFFSET: 412 MS
UFH PPP CHRO-ACNC: 0.3 IU/ML
UFH PPP CHRO-ACNC: 0.3 IU/ML
UFH PPP CHRO-ACNC: >2 IU/ML
VENTRICULAR RATE: 74 BPM

## 2025-03-10 PROCEDURE — 93970 EXTREMITY STUDY: CPT | Performed by: SURGERY

## 2025-03-10 PROCEDURE — 85014 HEMATOCRIT: CPT | Performed by: INTERNAL MEDICINE

## 2025-03-10 PROCEDURE — A9540 TC99M MAA: HCPCS | Performed by: INTERNAL MEDICINE

## 2025-03-10 PROCEDURE — 94760 N-INVAS EAR/PLS OXIMETRY 1: CPT

## 2025-03-10 PROCEDURE — 85520 HEPARIN ASSAY: CPT | Performed by: PHYSICIAN ASSISTANT

## 2025-03-10 PROCEDURE — 3430000001 HC RX 343 DIAGNOSTIC RADIOPHARMACEUTICALS: Performed by: INTERNAL MEDICINE

## 2025-03-10 PROCEDURE — 99222 1ST HOSP IP/OBS MODERATE 55: CPT | Performed by: PHYSICIAN ASSISTANT

## 2025-03-10 PROCEDURE — 85520 HEPARIN ASSAY: CPT | Performed by: INTERNAL MEDICINE

## 2025-03-10 PROCEDURE — 99233 SBSQ HOSP IP/OBS HIGH 50: CPT | Performed by: INTERNAL MEDICINE

## 2025-03-10 PROCEDURE — 2060000001 HC INTERMEDIATE ICU ROOM DAILY

## 2025-03-10 PROCEDURE — 80053 COMPREHEN METABOLIC PANEL: CPT | Performed by: INTERNAL MEDICINE

## 2025-03-10 PROCEDURE — 93970 EXTREMITY STUDY: CPT

## 2025-03-10 PROCEDURE — 36415 COLL VENOUS BLD VENIPUNCTURE: CPT | Performed by: INTERNAL MEDICINE

## 2025-03-10 PROCEDURE — 2500000002 HC RX 250 W HCPCS SELF ADMINISTERED DRUGS (ALT 637 FOR MEDICARE OP, ALT 636 FOR OP/ED): Performed by: INTERNAL MEDICINE

## 2025-03-10 PROCEDURE — 2500000001 HC RX 250 WO HCPCS SELF ADMINISTERED DRUGS (ALT 637 FOR MEDICARE OP): Performed by: INTERNAL MEDICINE

## 2025-03-10 PROCEDURE — 2500000004 HC RX 250 GENERAL PHARMACY W/ HCPCS (ALT 636 FOR OP/ED): Performed by: INTERNAL MEDICINE

## 2025-03-10 PROCEDURE — 71045 X-RAY EXAM CHEST 1 VIEW: CPT

## 2025-03-10 PROCEDURE — 71045 X-RAY EXAM CHEST 1 VIEW: CPT | Performed by: STUDENT IN AN ORGANIZED HEALTH CARE EDUCATION/TRAINING PROGRAM

## 2025-03-10 PROCEDURE — 78830 RP LOCLZJ TUM SPECT W/CT 1: CPT

## 2025-03-10 PROCEDURE — 78830 RP LOCLZJ TUM SPECT W/CT 1: CPT | Performed by: STUDENT IN AN ORGANIZED HEALTH CARE EDUCATION/TRAINING PROGRAM

## 2025-03-10 PROCEDURE — 9420000001 HC RT PATIENT EDUCATION 5 MIN

## 2025-03-10 RX ORDER — HEPARIN SODIUM 10000 [USP'U]/100ML
0-4500 INJECTION, SOLUTION INTRAVENOUS CONTINUOUS
Status: DISCONTINUED | OUTPATIENT
Start: 2025-03-10 | End: 2025-03-10

## 2025-03-10 RX ORDER — OXYCODONE HCL 10 MG/1
10 TABLET, FILM COATED, EXTENDED RELEASE ORAL EVERY 12 HOURS SCHEDULED
Status: DISCONTINUED | OUTPATIENT
Start: 2025-03-10 | End: 2025-03-13

## 2025-03-10 RX ORDER — DOCUSATE SODIUM 100 MG/1
100 CAPSULE, LIQUID FILLED ORAL 2 TIMES DAILY
Status: DISCONTINUED | OUTPATIENT
Start: 2025-03-10 | End: 2025-03-12

## 2025-03-10 RX ORDER — HEPARIN SODIUM 10000 [USP'U]/100ML
0-4500 INJECTION, SOLUTION INTRAVENOUS CONTINUOUS
Status: DISCONTINUED | OUTPATIENT
Start: 2025-03-10 | End: 2025-03-12

## 2025-03-10 RX ADMIN — DOCUSATE SODIUM 100 MG: 100 CAPSULE, LIQUID FILLED ORAL at 09:59

## 2025-03-10 RX ADMIN — ACETAMINOPHEN 975 MG: 325 TABLET ORAL at 15:45

## 2025-03-10 RX ADMIN — KIT FOR THE PREPARATION OF TECHNETIUM TC 99M ALBUMIN AGGREGATED 4.2 MILLICURIE: 2.5 INJECTION, POWDER, FOR SOLUTION INTRAVENOUS at 16:05

## 2025-03-10 RX ADMIN — MORPHINE SULFATE 2 MG: 2 INJECTION, SOLUTION INTRAMUSCULAR; INTRAVENOUS at 15:46

## 2025-03-10 RX ADMIN — PANTOPRAZOLE SODIUM 40 MG: 40 TABLET, DELAYED RELEASE ORAL at 06:19

## 2025-03-10 RX ADMIN — ACETAMINOPHEN 975 MG: 325 TABLET ORAL at 20:54

## 2025-03-10 RX ADMIN — APIXABAN 10 MG: 5 TABLET, FILM COATED ORAL at 09:59

## 2025-03-10 RX ADMIN — DOCUSATE SODIUM 100 MG: 100 CAPSULE, LIQUID FILLED ORAL at 20:54

## 2025-03-10 RX ADMIN — Medication 10 MG: at 20:54

## 2025-03-10 RX ADMIN — OXYCODONE HYDROCHLORIDE 10 MG: 10 TABLET, FILM COATED, EXTENDED RELEASE ORAL at 20:54

## 2025-03-10 RX ADMIN — GABAPENTIN 300 MG: 300 CAPSULE ORAL at 20:54

## 2025-03-10 RX ADMIN — ACETAMINOPHEN 975 MG: 325 TABLET ORAL at 08:56

## 2025-03-10 RX ADMIN — OXYCODONE HYDROCHLORIDE 10 MG: 5 TABLET ORAL at 08:57

## 2025-03-10 RX ADMIN — LEVOTHYROXINE SODIUM 112 MCG: 112 TABLET ORAL at 06:19

## 2025-03-10 RX ADMIN — HEPARIN SODIUM 1400 UNITS/HR: 10000 INJECTION, SOLUTION INTRAVENOUS at 08:57

## 2025-03-10 RX ADMIN — GABAPENTIN 300 MG: 300 CAPSULE ORAL at 08:57

## 2025-03-10 RX ADMIN — OXYCODONE HYDROCHLORIDE 10 MG: 10 TABLET, FILM COATED, EXTENDED RELEASE ORAL at 11:44

## 2025-03-10 ASSESSMENT — COGNITIVE AND FUNCTIONAL STATUS - GENERAL
MOVING TO AND FROM BED TO CHAIR: A LITTLE
WALKING IN HOSPITAL ROOM: A LOT
CLIMB 3 TO 5 STEPS WITH RAILING: A LOT
HELP NEEDED FOR BATHING: A LITTLE
MOBILITY SCORE: 16
TOILETING: A LOT
DAILY ACTIVITIY SCORE: 18
STANDING UP FROM CHAIR USING ARMS: A LOT
DRESSING REGULAR LOWER BODY CLOTHING: A LITTLE
DRESSING REGULAR UPPER BODY CLOTHING: A LITTLE
TURNING FROM BACK TO SIDE WHILE IN FLAT BAD: A LITTLE
PERSONAL GROOMING: A LITTLE

## 2025-03-10 ASSESSMENT — PAIN - FUNCTIONAL ASSESSMENT
PAIN_FUNCTIONAL_ASSESSMENT: 0-10

## 2025-03-10 ASSESSMENT — PAIN SCALES - GENERAL
PAINLEVEL_OUTOF10: 7
PAINLEVEL_OUTOF10: 6
PAINLEVEL_OUTOF10: 0 - NO PAIN
PAINLEVEL_OUTOF10: 6
PAINLEVEL_OUTOF10: 0 - NO PAIN

## 2025-03-10 NOTE — PROGRESS NOTES
Patient: Terra Alexis  Room/bed: 240/240-A  Admitted on: 3/7/2025    Age: 74 y.o.   Gender: female  Code Status:  Full Code   Admitting Dx: Acute deep vein thrombosis (DVT) of femoral vein of left lower extremity (Multi) [I82.412]  Anemia, unspecified type [D64.9]  Acute bilateral deep vein thrombosis (DVT) of iliac veins of lower extremities (Multi) [I82.423]    MRN: [id number]  PCP: No Assigned PCP Generic Provider, MD       Subjective   Having some pain right now, but did ok overnight. Not much appetite.    Objective    Physical Exam  Constitutional:       Comments: Appears somewhat uncomfortable at this time   HENT:      Head: Normocephalic.      Nose: Nose normal.      Mouth/Throat:      Mouth: Mucous membranes are dry.   Eyes:      Extraocular Movements: Extraocular movements intact.      Pupils: Pupils are equal, round, and reactive to light.   Cardiovascular:      Rate and Rhythm: Normal rate and regular rhythm.   Pulmonary:      Comments: Shallow respirations. Brief crackle/rhonchi in LLL  Abdominal:      Comments: Decreased bowel sounds  Mildly distended  Tender along left side of abdomen at this time   Musculoskeletal:      Comments: LLE is still quite enlarged, but edema is significantly improved. Not taut any longer. Leg is fairly warm. Faint pulses present. Edema from foot to thigh. Very tender to even light touch, rola around calf area  No edema of RLE   Skin:     General: Skin is dry.      Coloration: Skin is pale.   Neurological:      Mental Status: She is alert. Mental status is at baseline.      Motor: Weakness present.   Psychiatric:         Mood and Affect: Mood normal.        Temp:  [36.7 °C (98 °F)-38.2 °C (100.8 °F)] 38.2 °C (100.8 °F)  Heart Rate:  [] 104  Resp:  [14-21] 21  BP: ()/(49-69) 131/69    Vitals:    03/07/25 1941   Weight: 53.2 kg (117 lb 4.6 oz)           I/Os    Intake/Output Summary (Last 24 hours) at 3/10/2025 0900  Last data filed at 3/10/2025 0145  Gross per 24  "hour   Intake 240 ml   Output 400 ml   Net -160 ml       Labs:   Results from last 72 hours   Lab Units 03/10/25  0620 03/09/25  0818 03/08/25  0556   SODIUM mmol/L 127* 126* 125*   POTASSIUM mmol/L 4.2 3.9 3.7   CHLORIDE mmol/L 96* 95* 94*   CO2 mmol/L 23 22 22   BUN mg/dL 18 18 22   CREATININE mg/dL 0.90 0.88 1.02   GLUCOSE mg/dL 96 94 97   CALCIUM mg/dL 8.4* 8.8 8.9   ANION GAP mmol/L 12 13 13   EGFR mL/min/1.73m*2 68 69 58*      Results from last 72 hours   Lab Units 03/10/25  0620 03/09/25  0818 03/09/25  0023 03/08/25  0556 03/08/25  0116 03/07/25  1326   WBC AUTO x10*3/uL  --   --  16.9* 19.7*  --  23.7*   HEMOGLOBIN g/dL 8.7* 9.3* 6.7* 7.4*   < > 6.5*   HEMATOCRIT % 27.6* 27.6* 20.5* 22.6*   < > 19.9*   PLATELETS AUTO x10*3/uL  --   --  269 324  --  348   NEUTROS PCT AUTO %  --   --   --  89.0  --  90.5   LYMPHS PCT AUTO %  --   --   --  4.5  --  3.6   MONOS PCT AUTO %  --   --   --  3.0  --  3.7   EOS PCT AUTO %  --   --   --  0.5  --  0.1    < > = values in this interval not displayed.      Lab Results   Component Value Date    CALCIUM 8.4 (L) 03/10/2025    PHOS 2.4 (L) 11/18/2024      No results found for: \"CRP\"   [unfilled]     Micro/ID:   No results found for the last 90 days.                   No lab exists for component: \"AGALPCRNB\"   .ID  Lab Results   Component Value Date    URINECULTURE Normal genitourinary bradly 08/13/2024       Images:  ECG 12 lead  Normal sinus rhythm  Normal ECG  When compared with ECG of 01-SEP-2024 20:35,  Criteria for Anterior infarct are no longer Present  Criteria for Anterolateral infarct are no longer Present  Criteria for Inferior infarct are no longer Present  ST no longer depressed in Lateral leads  Nonspecific T wave abnormality, improved in Inferior leads  T wave inversion no longer evident in Lateral leads  QT has shortened       Meds    Scheduled medications  acetaminophen, 975 mg, oral, TID  apixaban, 10 mg, oral, BID   Followed by  [START ON 3/17/2025] " apixaban, 5 mg, oral, BID  docusate sodium, 100 mg, oral, BID  gabapentin, 300 mg, oral, BID  levothyroxine, 112 mcg, oral, Daily  oxyCODONE ER, 10 mg, oral, q12h AILEEN  pantoprazole, 40 mg, oral, Daily before breakfast      Continuous medications     PRN medications  PRN medications: melatonin, morphine, ondansetron, oxyCODONE     Assessment and Plan    Terra Alexis is a 74 y.o. female , hx of metastatic uroepithelial cancer, on maintenance Pembro from February note. Admitted with extensive dvt of LLE  Acute extensive DVT of LLE. Just taken off eliquis , and developed this in just a couple of weeks. Changing from heparin to eliquis today. Edema is improving. Need to increase activity. She will require life long anticoagulation now, as this is 2nd event, and she has malignancy  Metastatic uroepithelial cancer, recurrence. It is in upper rectum, vaginal cuff and sacrum is encasing both arteries and veins on left side, likely contributing to the dvt. I have asked palliative to see  Intractable pain, left side. She was having severe pain prior to dvt, likely related to the cancer. Working on pain control, again asking palliative to see, not sure if  and patient really understand magnitude of disease, prognosis etc . She has been started on neurontin for the neuropathic component of the pain  Anemia. She has been receiving transfusions as needed. Monitor  Fever. Monitor today. Obtain cxr. May be atelectasis as she has not been moving. IS to bedside as well.   Therapy evals requested.   Hypothyroid, stable on supplement  Hyponatremia, appears to be secondary to volume depletion. Urine sodium less than 20, osmols reviewed. Improving slowly    Lamar Mendoza MD

## 2025-03-10 NOTE — CONSULTS
Patient has Malnutrition Diagnosis: Yes  Diagnosis Status: New  Malnutrition Diagnosis: Severe malnutrition related to chronic disease or condition  Related to: poor appetite  As Evidenced by: significant weight loss of 12%/6 months, 24%/1 year, loss of muscle and adipose stores, consuming less than 75% of estimated energy needs >/=1 month  Additional Assessment Information: ONS added for additional kcal and protein  Nutrition Assessment    Reason for Assessment: Admission nursing screening (MST=2, weight loss, eating poorly)    Patient is a 74 y.o. 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 Noted pt with intractable pain, even PTA, likely 2/2 cancer. Palliative care consulted.     Past Medical History:   Diagnosis Date    Chronic kidney disease     GERD (gastroesophageal reflux disease)     Hypothyroidism     Malignant tumor of sigmoid colon (Multi) 08/21/2024    Nephrolithiasis     Other specified disorders of kidney and ureter 03/04/2022    Ureteral mass    Peripheral neuropathy     Personal history of malignant neoplasm of cervix uteri 11/21/2022    History of malignant neoplasm of cervix    Vision loss       Past Surgical History:   Procedure Laterality Date    NEPHRECTOMY Left 08/15/2024    ex-lap, left ureteral tumor resection, left nephrectomy, excision of pelvic mass, partial sigmoidectomy with loop ileostomy    OTHER SURGICAL HISTORY  10/07/2021    Hysterectomy      Nutrition History:  Food and Nutrient History: Pt seen, significant other at bedside to help with providing history. Pt reports decreased appetite, ? time frame. Noted significant weight loss over past year. Pt takes Ensure at home, agrees to take while inpatient. Pt waiting for lunch to arrive. Pt ate 100% of dinner last night, did not eat breakfast or lunch per nursing  documentation.     Allergies   Allergen Reactions    Codeine Hallucinations, GI Upset and Nausea/vomiting      GI Symptoms: None     Oral Problems: None          Anthropometrics:  Height: 152.4 cm (5')   Weight: 53.2 kg (117 lb 4.6 oz)   BMI (Calculated): 22.91  IBW/kg (Dietitian Calculated): 45.5 kg  Percent of IBW: 117 %       Weight History:     Daily Weight  03/07/25 : 53.2 kg (117 lb 4.6 oz)  03/05/25 : 50.9 kg (112 lb 3.4 oz)  02/28/25 : 51.3 kg (113 lb)  02/19/25 : 50.9 kg (112 lb 3.4 oz)  02/17/25 : 49.9 kg (110 lb)  02/13/25 : 52.6 kg (116 lb)  01/31/25 : 54.3 kg (119 lb 13.1 oz)  01/27/25 : 52.7 kg (116 lb 2.9 oz)  01/24/25 : 53 kg (116 lb 13.5 oz)  01/06/25 : 54.9 kg (121 lb)    Weight         3/7/2025  1240 3/7/2025  1941          Weight: 50.8 kg (112 lb) 53.2 kg (117 lb 4.6 oz)              Weight Change %:  Weight History / % Weight Change: down 12%/6 months; 24%/1 year  Significant Weight Loss: Yes  Interpretation of Weight Loss: >10% in 6 months       Nutrition Focused Physical Exam Findings:    Subcutaneous Fat Loss  Defer Subcutaneous Fat Loss Assessment:  (visual)  Orbital Fat Pads: Mild-Moderate (slight dark circles and slight hollowing)  Buccal Fat Pads: Mild-Moderate (flat cheeks, minimal bounce)  Muscle Wasting  Defer Muscle Wasting Assessment:  (visual)  Temporalis: Mild-Moderate (slight depression)  Pectoralis (Clavicular Region): Mild-Moderate (some protrusion of clavicle)  Edema  Edema: +1 trace  Edema Location: BLE non-pitting edema  Physical Findings  Skin: Negative (skin intact)    Nutrition Significant Labs:    Results from last 7 days   Lab Units 03/10/25  0620 03/09/25  0818 03/08/25  0556   GLUCOSE mg/dL 96 94 97   SODIUM mmol/L 127* 126* 125*   POTASSIUM mmol/L 4.2 3.9 3.7   CHLORIDE mmol/L 96* 95* 94*   CO2 mmol/L 23 22 22   BUN mg/dL 18 18 22   CREATININE mg/dL 0.90 0.88 1.02   EGFR mL/min/1.73m*2 68 69 58*   CALCIUM mg/dL 8.4* 8.8 8.9   MAGNESIUM mg/dL  --   --  1.78     Lab  Results   Component Value Date    HGBA1C 5.5 04/26/2021         Lab Results   Component Value Date    ALBUMIN 2.4 (L) 03/10/2025      Nutrition Specific Medications:   Scheduled medications:  acetaminophen, 975 mg, oral, TID  apixaban, 10 mg, oral, BID   Followed by  [START ON 3/17/2025] apixaban, 5 mg, oral, BID  docusate sodium, 100 mg, oral, BID  gabapentin, 300 mg, oral, BID  levothyroxine, 112 mcg, oral, Daily  oxyCODONE ER, 10 mg, oral, q12h AILEEN  pantoprazole, 40 mg, oral, Daily before breakfast      Nursing Data Per flowsheet:   Stool Appearance: Liquid, Watery (03/10/25 1200)  Gastrointestinal  Gastrointestinal (WDL): Within Defined Limits  Abdomen Inspection: Soft, Nondistended  Abdominal Tenderness: No guarding, Nontender  Bowel Sounds: All quadrants  Bowel Sounds (All Quadrants): Active, Present, Audible  Last BM Date: 03/08/25  Bowel Incontinence: Yes  Stool Appearance: Liquid, Watery  Stool Color: Brown  Feeding assistance level: Independent    Intake/Output Summary (Last 24 hours) at 3/10/2025 1343  Last data filed at 3/10/2025 1200  Gross per 24 hour   Intake 240 ml   Output 800 ml   Net -560 ml      0-10 (Numeric) Pain Score: 6   Dietary Orders (From admission, onward)       Start     Ordered    03/10/25 1319  Oral nutritional supplements  Until discontinued        Question Answer Comment   Deliver with Breakfast vanilla   Deliver with Dinner    Select supplement: Ensure Plus High Protein        03/10/25 1319    03/07/25 2058  May Participate in Room Service  ( ROOM SERVICE MAY PARTICIPATE)  Once        Question:  .  Answer:  Yes    03/07/25 2057 03/07/25 1751  Adult diet Regular  Diet effective now        Question:  Diet type  Answer:  Regular    03/07/25 1750                     Estimated Needs:   Total Energy Estimated Needs in 24 hours (kCal):  (6426-0329)  Method for Estimating Needs: 30-35 kcal/kg  Total Protein Estimated Needs in 24 Hours (g):  (64-80)  Method for Estimating 24 Hour  Protein Needs: 1.2-1.5 g/kg     Method for Estimating 24 Hour Fluid Needs: 1 ml/kcal       Nutrition Diagnosis   Malnutrition Diagnosis  Patient has Malnutrition Diagnosis: Yes  Diagnosis Status: New  Malnutrition Diagnosis: Severe malnutrition related to chronic disease or condition  Related to: poor appetite  As Evidenced by: significant weight loss of 12%/6 months, 24%/1 year, loss of muscle and adipose stores, consuming less than 75% of estimated energy needs >/=1 month  Additional Assessment Information: ONS added for additional kcal and protein      Nutrition Interventions/Recommendations   Nutrition Prescription:  diet, fluids, ONS    Nutrition Interventions:     Interventions: Medical food supplement     Goal: Ensure Plus HP 2 x/day= 700 kcal, 40 g protein    Coordination of Care: n/a  Nutrition Education:   N/A  Recommendations:  Continue with Regular diet  If oral intakes remain sub-optimal, consider appetite stimulant   Weights: daily  RFP, Mg daily; replete lytes prn          Nutrition Monitoring and Evaluation   Food/Nutrient Related History Monitoring  Monitoring and Evaluation Plan: Intake / amount of food  Intake / Amount of food: Consumes at least 50% or more of meals/snacks/supplements    Anthropometric Measurements  Monitoring and Evaluation Plan: Body weight  Body Weight: Measured body weight  Criteria: Monitor    Biochemical Data, Medical Tests and Procedures  Monitoring and Evaluation Plan: Electrolyte/renal panel, Glucose/endocrine profile  Criteria: Monitor      Time Spent (min): 60 minutes

## 2025-03-10 NOTE — PROGRESS NOTES
03/10/25 0917   Discharge Planning   Living Arrangements Spouse/significant other  (lives with fiance)   Support Systems Spouse/significant other   Assistance Needed Patient is A&OX3, independent in ADLs, ambulates with a cane if needed, no O2, CPAP or Bipap at baseline. No active HHC agency. Patient reports she has a  PCP out of the [place] office but can't remember her name. Patient was on Eliquis at home but was stopped at the end of February.   Type of Residence Private residence   Number of Stairs to Enter Residence 4   Number of Stairs Within Residence 26  (upstairs and basement steps)   Do you have animals or pets at home? No   Who is requesting discharge planning? Provider   Home or Post Acute Services None   Expected Discharge Disposition Home  (Home, no needs-denied need for HHC)   Does the patient need discharge transport arranged? No   Stroke Family Assessment   Stroke Family Assessment Needed No   Intensity of Service   Intensity of Service 0-30 min

## 2025-03-10 NOTE — CARE PLAN
The patient's goals for the shift include      The clinical goals for the shift include Pt will remain safe throughout shift.      Problem: Pain - Adult  Goal: Verbalizes/displays adequate comfort level or baseline comfort level  Outcome: Progressing     Problem: Safety - Adult  Goal: Free from fall injury  Outcome: Progressing     Problem: Discharge Planning  Goal: Discharge to home or other facility with appropriate resources  Outcome: Progressing     Problem: Chronic Conditions and Co-morbidities  Goal: Patient's chronic conditions and co-morbidity symptoms are monitored and maintained or improved  Outcome: Progressing     Problem: Nutrition  Goal: Nutrient intake appropriate for maintaining nutritional needs  Outcome: Progressing     Problem: Fall/Injury  Goal: Not fall by end of shift  Outcome: Progressing  Goal: Be free from injury by end of the shift  Outcome: Progressing  Goal: Verbalize understanding of personal risk factors for fall in the hospital  Outcome: Progressing  Goal: Verbalize understanding of risk factor reduction measures to prevent injury from fall in the home  Outcome: Progressing  Goal: Use assistive devices by end of the shift  Outcome: Progressing  Goal: Pace activities to prevent fatigue by end of the shift  Outcome: Progressing     Problem: Skin  Goal: Decreased wound size/increased tissue granulation at next dressing change  Outcome: Progressing  Goal: Participates in plan/prevention/treatment measures  Outcome: Progressing  Goal: Prevent/manage excess moisture  Outcome: Progressing  Goal: Prevent/minimize sheer/friction injuries  Outcome: Progressing  Goal: Promote/optimize nutrition  Outcome: Progressing  Goal: Promote skin healing  Outcome: Progressing

## 2025-03-10 NOTE — CONSULTS
Inpatient consult to Vascular Surgery  Consult performed by: Cinthya Spaulding PA-C  Consult ordered by: Lamar Mendoza MD        History Of Present Illness:    Terra Alexis is a 74 y.o. female presenting with left lower extremity swelling and pain. Patient reports chronic left lower extremity pain that she reports is related to metastatic uroepithelial cancer w/ invasion into the rectum/sacrum. She has been undergoing further workup for this. She was recently seen by vascular medicine 2/26 after stopping her Eliquis on 2/23 due to cost. Her first episode of VTE (RLE DVT And PE) was noted in August of 2024 after a surgical procedure. She completed six months of therapy and elected to discontinue anticoagulation at that time. At that visit she complained of mild LLE edema and had a negative venous duplex. She reported increased swelling and worsening pain to the LLE leading to presentation where she was diagnosed with a occlusive LLE DVT and was admitted on heparin gtt. Family at bedside reports improvement in the coloration/swelling of the left leg since admission. Patient however continues to report severe pain, worse with any and all touch/movement.   Today, she reports feeling significantly fatigued, falling asleep during conversation.   No subjective SOB, chest pain or pressure.      Last Recorded Vitals:  Vitals:    03/10/25 0800 03/10/25 0831 03/10/25 1200 03/10/25 1234   BP:  131/69  116/65   BP Location:  Left arm  Right arm   Patient Position:  Lying  Lying   Pulse: 89 104 100    Resp: 21 21 23    Temp:  (!) 38.2 °C (100.8 °F)  36.8 °C (98.2 °F)   TempSrc:  Temporal  Temporal   SpO2: 99% 98% 98%    Weight:       Height:           Last Labs:  CBC - 3/10/2025:  6:20 AM  16.9 8.7 269    27.6      CMP - 3/10/2025:  6:20 AM  8.4 5.1 10 --- 0.4   2.4 2.4 13 88      PTT - 3/7/2025:  4:16 PM  1.4   15.0 27     Troponin I, High Sensitivity   Date/Time Value Ref Range Status   03/07/2025 01:26 PM 8 0 - 13 ng/L  Final   08/24/2024 04:44 PM 5 0 - 13 ng/L Final     BNP   Date/Time Value Ref Range Status   03/07/2025 01:26 PM 46 0 - 99 pg/mL Final   08/24/2024 04:44 PM 15 0 - 99 pg/mL Final     Hemoglobin A1C   Date/Time Value Ref Range Status   04/26/2021 07:15 AM 5.5 4.3 - 5.6 % Final     Comment:     American Diabetes Association guidelines indicate that patients with HgbA1c in   the range 5.7-6.4% are at increased risk for development of diabetes, and   intervention by lifestyle modification may be beneficial. HgbA1c greater or   equal to 6.5% is considered diagnostic of diabetes.     VLDL   Date/Time Value Ref Range Status   01/09/2023 09:59 AM 32 0 - 40 mg/dL Final      Last I/O:  I/O last 3 completed shifts:  In: 2108.5 (39.6 mL/kg) [P.O.:930; I.V.:850.5 (16 mL/kg); Blood:328]  Out: 700 (13.2 mL/kg) [Urine:700 (0.4 mL/kg/hr)]  Weight: 53.2 kg     Past Cardiology Tests (Last 3 Years):  EKG:  ECG 12 lead 03/07/2025 (Preliminary) NSR, 74 bpm no acute ischemic changes.       Echo:  Transthoracic Echo (TTE) Complete 08/26/2024  CONCLUSIONS:   1. The left ventricular systolic function is normal, with a visually estimated ejection fraction of 55-60%.   2. Spectral Doppler shows an impaired relaxation pattern of left ventricular diastolic filling.   3. There is no evidence of left ventricular hypertrophy.   4. There is normal right ventricular global systolic function.   5. The left atrium is moderately dilated.    Ejection Fractions:  EF   Date/Time Value Ref Range Status   08/26/2024 11:32 AM 58 %      Cath:  No results found for this or any previous visit from the past 1095 days.    Stress Test:  No results found for this or any previous visit from the past 1095 days.    Imaging:  Vascular US LE Venous Duplex (03/10/25):  **CRITICAL RESULT**  Critical Result: Left DEIV, profunda and peroneal veins acute DVT.  Notification called to Cinthya Bowen PA-C on 3/10/2025 at 10:42:47 AM. Acknowledged critical results notification  communicated via in person by Kary Guillory S.     PRELIMINARY CONCLUSIONS:  Right Lower Venous: No evidence of acute deep vein thrombus visualized in the right lower extremity.  Left Lower Venous: There is acute occlusive deep vein thrombosis visualized in the distal external iliac, proximal femoral, mid femoral, distal femoral, popliteal, posterior tibial and peroneal veins. There is acute non-occlusive deep vein thrombosis visualized in the profunda and common femoral veins. Unable to adequately visualize IVC and left common iliac vein. Technically difficult study due to edema and patient inability to position.     Imaging & Doppler Findings:     Right                 Compressible Thrombus        Flow  Distal External Iliac                       Spontaneous/Phasic  CFV                       Yes        None   Spontaneous/Phasic  PFV                       Yes        None  FV Proximal               Yes        None   Spontaneous/Phasic  FV Mid                    Yes        None  FV Distal                 Yes        None  Popliteal                 Yes        None   Spontaneous/Phasic  Peroneal                  Yes        None  PTV                       Yes        None        Left                  Compress      Thrombus          Flow  Distal External Iliac    No      Acute occlusive      None  CFV                      No    Acute non-occlusive Continuous  PFV                      No    Acute non-occlusive  FV Proximal              No      Acute occlusive      None  FV Mid                   No      Acute occlusive      None  FV Distal                No      Acute occlusive      None  Popliteal                No      Acute occlusive      None  Peroneal                 No      Acute occlusive      None  PTV                      No      Acute occlusive      None     Vascular US LE Venous Duplex (03/07/2025):  FINDINGS:  Partially occlusive thrombus in the common femoral vein. Occlusive  thrombus in the femoral, popliteal,  and posterior tibial veins.  Peroneal veins not visualized.      IMPRESSION:  Extensive left lower extremity deep venous thrombosis.    Venous Insufficiency US (02/26/2025):  CONCLUSIONS:  Right Lower Venous: No evidence of acute deep vein thrombus visualized in the right lower extremity.  Left Lower Venous: The left common femoral vein demonstrates normal spontaneous and respirophasic flow.     Comparison:  Compared with study from 8/6/2024, Compared to previous performed in radiology peroneal veins appear patent on today's exam.     Imaging & Doppler Findings:     Right                 Compressible Thrombus        Flow  Distal External Iliac                       Spontaneous/Phasic  CFV                       Yes        None   Spontaneous/Phasic  PFV                       Yes        None  FV Proximal               Yes        None   Spontaneous/Phasic  FV Mid                    Yes        None  FV Distal                 Yes        None  Popliteal                 Yes        None   Spontaneous/Phasic  Peroneal                  Yes        None  PTV                       Yes        None        Left        Flow  CFV  Spontaneous/Phasic     Past Medical History:  She has a past medical history of Chronic kidney disease, GERD (gastroesophageal reflux disease), Hypothyroidism, Malignant tumor of sigmoid colon (Multi) (08/21/2024), Nephrolithiasis, Other specified disorders of kidney and ureter (03/04/2022), Peripheral neuropathy, Personal history of malignant neoplasm of cervix uteri (11/21/2022), and Vision loss.    She has no past medical history of Hypertension.    Past Surgical History:  She has a past surgical history that includes Other surgical history (10/07/2021) and Nephrectomy (Left, 08/15/2024).      Social History:  She reports that she has never smoked. She has never used smokeless tobacco. She reports that she does not currently use alcohol. She reports that she does not use drugs.    Family History:  Family  History   Problem Relation Name Age of Onset    Macular degeneration Mother      Glaucoma Other grandparent     Macular degeneration Other grandparent         Allergies:  Codeine    Inpatient Medications:  Scheduled medications   Medication Dose Route Frequency    acetaminophen  975 mg oral TID    apixaban  10 mg oral BID    Followed by    [START ON 3/17/2025] apixaban  5 mg oral BID    docusate sodium  100 mg oral BID    gabapentin  300 mg oral BID    levothyroxine  112 mcg oral Daily    oxyCODONE ER  10 mg oral q12h AILEEN    pantoprazole  40 mg oral Daily before breakfast     PRN medications   Medication    melatonin    morphine    ondansetron    oxyCODONE     Continuous Medications   Medication Dose Last Rate     Outpatient Medications:  Current Outpatient Medications   Medication Instructions    acetaminophen (TYLENOL) 650 mg, oral, Every 6 hours PRN    acetaminophen (TYLENOL) 650 mg, oral, Every 6 hours    dexAMETHasone (DECADRON) 4 mg, oral, Daily before breakfast    gabapentin (NEURONTIN) 300 mg, oral, 3 times daily    hydrOXYzine HCL (ATARAX) 25 mg, oral, Every 4 hours PRN    levothyroxine (SYNTHROID, LEVOXYL) 100 mcg, oral, Daily, Take on an empty stomach at the same time each day, either 30 to 60 minutes prior to breakfast    ondansetron (ZOFRAN) 4 mg, oral, Every 8 hours PRN       Physical Exam:  Physical Exam  Constitutional:       Appearance: She is ill-appearing. She is not diaphoretic.      Comments: Somnolent.      HENT:      Head: Normocephalic.      Nose: Nose normal.      Mouth/Throat:      Mouth: Mucous membranes are moist.   Eyes:      Conjunctiva/sclera: Conjunctivae normal.   Cardiovascular:      Rate and Rhythm: Regular rhythm. Tachycardia present.      Pulses:           Dorsalis pedis pulses are detected w/ Doppler on the left side.        Posterior tibial pulses are detected w/ Doppler on the left side.      Heart sounds: No murmur heard.  Pulmonary:      Effort: Pulmonary effort is normal.    Abdominal:      General: Abdomen is flat.   Musculoskeletal:         General: Tenderness present.      Left lower leg: Edema present.   Skin:     General: Skin is warm.   Neurological:      Mental Status: She is disoriented.   Psychiatric:         Mood and Affect: Mood normal.            Assessment/Plan   Terra Alexis is a 73 yo female with a PMH of  uroepithelial cancer (currently on Pembro) w/ metastasis into rectum/sacrum, VTE (RLE DVT/PE 8/2024), HTN, Hypothyroidism, CKD, GERD, Depression who presented with LLE edema found to have extensive occlusive DVT.     Acute DVT of the left lower extremity (Distal ext iliac, common fem to mid/distal/prox fem, pop, peroneal and PTV; unable to visualize IVC)  Hx RLE DVT/PE (8/2024)  Active Uroepithelial cancer  w/ metastasis     Recommend continuing heparin gtt at this time.   In addition, recommend CT to r/o pulmonary embolism given tachycardia, relative hypotension, fever.   NPO @ midnight for thrombectomy.  Daily measurements of b/l LE (6cm above and below the knee).  Case discussed with Dr. Lucas.   Will follow.       Peripheral IV 03/07/25 20 G Right Antecubital (Active)   Site Assessment Clean;Dry;Intact 03/10/25 0900   Dressing Status Clean;Dry;Occlusive 03/10/25 0900   Number of days: 3       Peripheral IV 20 G Right;Anterior Forearm (Active)   Site Assessment Clean;Dry;Intact 03/10/25 0900   Dressing Status Clean;Dry;Occlusive 03/10/25 0900   Number of days: 2       Code Status:  Full Code    I spent  minutes in the professional and overall care of this patient.        Cinthya Spaulding PA-C

## 2025-03-10 NOTE — DOCUMENTATION CLARIFICATION NOTE
"    PATIENT:               HARRIS LUNDBERG  ACCT #:                  [id number]  MRN:                       [id number]  :                       1950  ADMIT DATE:       3/7/2025 12:42 PM  DISCH DATE:  RESPONDING PROVIDER #:        [id number]          PROVIDER RESPONSE TEXT:    Non-infectious SIRS without acute organ dysfunction 2/2 acute DVT    CDI QUERY TEXT:    Clarification        Instruction:    Based on your assessment of the patient and the clinical information, please provide the requested documentation by clicking on the appropriate radio button and enter any additional information if prompted.    Question: Please further clarify if there is a diagnosis related to the clinical information    When answering this query, please exercise your independent professional judgment. The fact that a question is being asked, does not imply that any particular answer is desired or expected.    The patient's clinical indicators include:  Clinical Information:  74 year old admitted with Acute DVT left femoral vein, hyponatremia, anemia.    Clinical Indicators:    --Per H&P  \"1. Extensive acute DVT, LLE....2. Fever. Possibly related to above.\"    -In ER WBC 23.7.  VS 37.8, 90, 18, 106/66, 100% RA.    -3/8 WBC 19.7.  Vitals T-36.3-37.2, P , R 8-25, BP 9//64    -3/9  WBC 16.9.  Vitals T-36.5-36.9, P-69-92, R-15-18, BP 94//58    -3/10 Vitals T- 37.6-38.2, P-, R-18-21, BP 96//69    Treatment:  Heparin drip for DVT.  NS with KCl at 70ml/hr x2 days    Risk Factors:  Acute DVT.  Metastatic urothelial cancer, currently on Pembro and Signatera.  Options provided:  -- Non-infectious SIRS without acute organ dysfunction 2/2 acute DVT  -- Other - I will add my own diagnosis  -- Refer to Clinical Documentation Reviewer    Query created by: Selina Bynum on 3/10/2025 10:50 AM      Electronically signed by:  DESTINEE DURHAM MD 3/10/2025 11:05 AM          "

## 2025-03-11 ENCOUNTER — APPOINTMENT (OUTPATIENT)
Dept: CARDIOLOGY | Facility: HOSPITAL | Age: 75
DRG: 270 | End: 2025-03-11
Payer: MEDICARE

## 2025-03-11 LAB
ANION GAP SERPL CALC-SCNC: 13 MMOL/L (ref 10–20)
AORTIC VALVE MEAN GRADIENT: 7 MMHG
AORTIC VALVE PEAK VELOCITY: 1.8 M/S
AV PEAK GRADIENT: 13 MMHG
AVA (PEAK VEL): 1.7 CM2
AVA (VTI): 1.93 CM2
BASOPHILS # BLD MANUAL: 0 X10*3/UL (ref 0–0.1)
BASOPHILS NFR BLD MANUAL: 0 %
BUN SERPL-MCNC: 20 MG/DL (ref 6–23)
BURR CELLS BLD QL SMEAR: ABNORMAL
CALCIUM SERPL-MCNC: 8.9 MG/DL (ref 8.6–10.3)
CHLORIDE SERPL-SCNC: 96 MMOL/L (ref 98–107)
CO2 SERPL-SCNC: 22 MMOL/L (ref 21–32)
CREAT SERPL-MCNC: 0.93 MG/DL (ref 0.5–1.05)
EGFRCR SERPLBLD CKD-EPI 2021: 65 ML/MIN/1.73M*2
EJECTION FRACTION APICAL 4 CHAMBER: 60.8
EJECTION FRACTION: 68 %
EOSINOPHIL # BLD MANUAL: 0.21 X10*3/UL (ref 0–0.4)
EOSINOPHIL NFR BLD MANUAL: 1 %
ERYTHROCYTE [DISTWIDTH] IN BLOOD BY AUTOMATED COUNT: 16.9 % (ref 11.5–14.5)
GLUCOSE SERPL-MCNC: 87 MG/DL (ref 74–99)
HCT VFR BLD AUTO: 27.2 % (ref 36–46)
HGB BLD-MCNC: 8.5 G/DL (ref 12–16)
HYPOCHROMIA BLD QL SMEAR: ABNORMAL
IMM GRANULOCYTES # BLD AUTO: 1.61 X10*3/UL (ref 0–0.5)
IMM GRANULOCYTES NFR BLD AUTO: 7.9 % (ref 0–0.9)
LEFT ATRIUM VOLUME AREA LENGTH INDEX BSA: 24.2 ML/M2
LEFT VENTRICLE INTERNAL DIMENSION DIASTOLE: 4 CM (ref 3.5–6)
LEFT VENTRICULAR OUTFLOW TRACT DIAMETER: 1.99 CM
LYMPHOCYTES # BLD MANUAL: 1.44 X10*3/UL (ref 0.8–3)
LYMPHOCYTES NFR BLD MANUAL: 7 %
MCH RBC QN AUTO: 28.1 PG (ref 26–34)
MCHC RBC AUTO-ENTMCNC: 31.3 G/DL (ref 32–36)
MCV RBC AUTO: 90 FL (ref 80–100)
METAMYELOCYTES # BLD MANUAL: 0.41 X10*3/UL
METAMYELOCYTES NFR BLD MANUAL: 2 %
MONOCYTES # BLD MANUAL: 0.41 X10*3/UL (ref 0.05–0.8)
MONOCYTES NFR BLD MANUAL: 2 %
MYELOCYTES # BLD MANUAL: 0.41 X10*3/UL
MYELOCYTES NFR BLD MANUAL: 2 %
NEUTROPHILS # BLD MANUAL: 17.63 X10*3/UL (ref 1.6–5.5)
NEUTS BAND # BLD MANUAL: 1.23 X10*3/UL (ref 0–0.5)
NEUTS BAND NFR BLD MANUAL: 6 %
NEUTS SEG # BLD MANUAL: 16.4 X10*3/UL (ref 1.6–5)
NEUTS SEG NFR BLD MANUAL: 80 %
NRBC BLD-RTO: 0 /100 WBCS (ref 0–0)
OVALOCYTES BLD QL SMEAR: ABNORMAL
PLATELET # BLD AUTO: 353 X10*3/UL (ref 150–450)
POTASSIUM SERPL-SCNC: 4.6 MMOL/L (ref 3.5–5.3)
RBC # BLD AUTO: 3.03 X10*6/UL (ref 4–5.2)
RBC MORPH BLD: ABNORMAL
RIGHT VENTRICLE PEAK SYSTOLIC PRESSURE: 29.8 MMHG
SODIUM SERPL-SCNC: 126 MMOL/L (ref 136–145)
TOTAL CELLS COUNTED BLD: 100
UFH PPP CHRO-ACNC: 1.3 IU/ML
UFH PPP CHRO-ACNC: 1.6 IU/ML
UFH PPP CHRO-ACNC: 1.9 IU/ML
WBC # BLD AUTO: 20.5 X10*3/UL (ref 4.4–11.3)

## 2025-03-11 PROCEDURE — 75820 VEIN X-RAY ARM/LEG: CPT | Performed by: INTERNAL MEDICINE

## 2025-03-11 PROCEDURE — 99152 MOD SED SAME PHYS/QHP 5/>YRS: CPT | Performed by: INTERNAL MEDICINE

## 2025-03-11 PROCEDURE — 37184 PRIM ART M-THRMBC 1ST VSL: CPT | Mod: LT | Performed by: INTERNAL MEDICINE

## 2025-03-11 PROCEDURE — 06CY3ZZ EXTIRPATION OF MATTER FROM LOWER VEIN, PERCUTANEOUS APPROACH: ICD-10-PCS | Performed by: INTERNAL MEDICINE

## 2025-03-11 PROCEDURE — 85027 COMPLETE CBC AUTOMATED: CPT | Performed by: INTERNAL MEDICINE

## 2025-03-11 PROCEDURE — 067D3ZZ DILATION OF LEFT COMMON ILIAC VEIN, PERCUTANEOUS APPROACH: ICD-10-PCS | Performed by: INTERNAL MEDICINE

## 2025-03-11 PROCEDURE — 99153 MOD SED SAME PHYS/QHP EA: CPT | Performed by: INTERNAL MEDICINE

## 2025-03-11 PROCEDURE — 76937 US GUIDE VASCULAR ACCESS: CPT | Performed by: INTERNAL MEDICINE

## 2025-03-11 PROCEDURE — 2500000001 HC RX 250 WO HCPCS SELF ADMINISTERED DRUGS (ALT 637 FOR MEDICARE OP): Performed by: INTERNAL MEDICINE

## 2025-03-11 PROCEDURE — C1769 GUIDE WIRE: HCPCS | Performed by: INTERNAL MEDICINE

## 2025-03-11 PROCEDURE — 06CD3ZZ EXTIRPATION OF MATTER FROM LEFT COMMON ILIAC VEIN, PERCUTANEOUS APPROACH: ICD-10-PCS | Performed by: INTERNAL MEDICINE

## 2025-03-11 PROCEDURE — 37184 PRIM ART M-THRMBC 1ST VSL: CPT | Performed by: INTERNAL MEDICINE

## 2025-03-11 PROCEDURE — 85520 HEPARIN ASSAY: CPT | Performed by: INTERNAL MEDICINE

## 2025-03-11 PROCEDURE — 37248 TRLUML BALO ANGIOP 1ST VEIN: CPT | Mod: LT | Performed by: INTERNAL MEDICINE

## 2025-03-11 PROCEDURE — C1757 CATH, THROMBECTOMY/EMBOLECT: HCPCS | Performed by: INTERNAL MEDICINE

## 2025-03-11 PROCEDURE — 85007 BL SMEAR W/DIFF WBC COUNT: CPT | Performed by: INTERNAL MEDICINE

## 2025-03-11 PROCEDURE — 80048 BASIC METABOLIC PNL TOTAL CA: CPT | Performed by: INTERNAL MEDICINE

## 2025-03-11 PROCEDURE — 2720000007 HC OR 272 NO HCPCS: Performed by: INTERNAL MEDICINE

## 2025-03-11 PROCEDURE — 93306 TTE W/DOPPLER COMPLETE: CPT | Performed by: STUDENT IN AN ORGANIZED HEALTH CARE EDUCATION/TRAINING PROGRAM

## 2025-03-11 PROCEDURE — 7100000009 HC PHASE TWO TIME - INITIAL BASE CHARGE: Performed by: INTERNAL MEDICINE

## 2025-03-11 PROCEDURE — 36415 COLL VENOUS BLD VENIPUNCTURE: CPT | Performed by: INTERNAL MEDICINE

## 2025-03-11 PROCEDURE — 2060000001 HC INTERMEDIATE ICU ROOM DAILY

## 2025-03-11 PROCEDURE — C1887 CATHETER, GUIDING: HCPCS | Performed by: INTERNAL MEDICINE

## 2025-03-11 PROCEDURE — C1725 CATH, TRANSLUMIN NON-LASER: HCPCS | Performed by: INTERNAL MEDICINE

## 2025-03-11 PROCEDURE — 37248 TRLUML BALO ANGIOP 1ST VEIN: CPT | Performed by: INTERNAL MEDICINE

## 2025-03-11 PROCEDURE — 2500000005 HC RX 250 GENERAL PHARMACY W/O HCPCS: Performed by: INTERNAL MEDICINE

## 2025-03-11 PROCEDURE — 7100000010 HC PHASE TWO TIME - EACH INCREMENTAL 1 MINUTE: Performed by: INTERNAL MEDICINE

## 2025-03-11 PROCEDURE — 99233 SBSQ HOSP IP/OBS HIGH 50: CPT | Performed by: INTERNAL MEDICINE

## 2025-03-11 PROCEDURE — 2550000001 HC RX 255 CONTRASTS: Performed by: INTERNAL MEDICINE

## 2025-03-11 PROCEDURE — 2500000002 HC RX 250 W HCPCS SELF ADMINISTERED DRUGS (ALT 637 FOR MEDICARE OP, ALT 636 FOR OP/ED): Performed by: INTERNAL MEDICINE

## 2025-03-11 PROCEDURE — 93306 TTE W/DOPPLER COMPLETE: CPT

## 2025-03-11 PROCEDURE — C1894 INTRO/SHEATH, NON-LASER: HCPCS | Performed by: INTERNAL MEDICINE

## 2025-03-11 PROCEDURE — 2500000004 HC RX 250 GENERAL PHARMACY W/ HCPCS (ALT 636 FOR OP/ED): Performed by: INTERNAL MEDICINE

## 2025-03-11 RX ORDER — MIDAZOLAM HYDROCHLORIDE 1 MG/ML
INJECTION, SOLUTION INTRAMUSCULAR; INTRAVENOUS AS NEEDED
Status: DISCONTINUED | OUTPATIENT
Start: 2025-03-11 | End: 2025-03-11 | Stop reason: HOSPADM

## 2025-03-11 RX ORDER — FENTANYL CITRATE 50 UG/ML
INJECTION, SOLUTION INTRAMUSCULAR; INTRAVENOUS AS NEEDED
Status: DISCONTINUED | OUTPATIENT
Start: 2025-03-11 | End: 2025-03-11 | Stop reason: HOSPADM

## 2025-03-11 RX ORDER — HEPARIN SODIUM 1000 [USP'U]/ML
INJECTION, SOLUTION INTRAVENOUS; SUBCUTANEOUS AS NEEDED
Status: DISCONTINUED | OUTPATIENT
Start: 2025-03-11 | End: 2025-03-11 | Stop reason: HOSPADM

## 2025-03-11 RX ORDER — SODIUM CHLORIDE 9 MG/ML
50 INJECTION, SOLUTION INTRAVENOUS CONTINUOUS
Status: ACTIVE | OUTPATIENT
Start: 2025-03-11 | End: 2025-03-12

## 2025-03-11 RX ORDER — METHOCARBAMOL 500 MG/1
500 TABLET, FILM COATED ORAL EVERY 8 HOURS SCHEDULED
Status: DISCONTINUED | OUTPATIENT
Start: 2025-03-11 | End: 2025-03-12

## 2025-03-11 RX ORDER — LIDOCAINE HYDROCHLORIDE 20 MG/ML
INJECTION, SOLUTION INFILTRATION; PERINEURAL AS NEEDED
Status: DISCONTINUED | OUTPATIENT
Start: 2025-03-11 | End: 2025-03-11 | Stop reason: HOSPADM

## 2025-03-11 RX ADMIN — LEVOTHYROXINE SODIUM 112 MCG: 112 TABLET ORAL at 05:19

## 2025-03-11 RX ADMIN — DOCUSATE SODIUM 100 MG: 100 CAPSULE, LIQUID FILLED ORAL at 09:02

## 2025-03-11 RX ADMIN — GABAPENTIN 300 MG: 300 CAPSULE ORAL at 21:37

## 2025-03-11 RX ADMIN — GABAPENTIN 300 MG: 300 CAPSULE ORAL at 09:01

## 2025-03-11 RX ADMIN — METHOCARBAMOL 500 MG: 500 TABLET ORAL at 21:37

## 2025-03-11 RX ADMIN — OXYCODONE HYDROCHLORIDE 10 MG: 10 TABLET, FILM COATED, EXTENDED RELEASE ORAL at 21:37

## 2025-03-11 RX ADMIN — ACETAMINOPHEN 975 MG: 325 TABLET ORAL at 21:37

## 2025-03-11 RX ADMIN — PANTOPRAZOLE SODIUM 40 MG: 40 TABLET, DELAYED RELEASE ORAL at 06:13

## 2025-03-11 RX ADMIN — ACETAMINOPHEN 975 MG: 325 TABLET ORAL at 09:01

## 2025-03-11 RX ADMIN — OXYCODONE HYDROCHLORIDE 10 MG: 10 TABLET, FILM COATED, EXTENDED RELEASE ORAL at 09:01

## 2025-03-11 RX ADMIN — SODIUM CHLORIDE 50 ML/HR: 9 INJECTION, SOLUTION INTRAVENOUS at 22:38

## 2025-03-11 ASSESSMENT — PAIN SCALES - GENERAL
PAINLEVEL_OUTOF10: 0 - NO PAIN
PAINLEVEL_OUTOF10: 8
PAINLEVEL_OUTOF10: 9
PAINLEVEL_OUTOF10: 0 - NO PAIN
PAINLEVEL_OUTOF10: 0 - NO PAIN

## 2025-03-11 ASSESSMENT — COGNITIVE AND FUNCTIONAL STATUS - GENERAL
CLIMB 3 TO 5 STEPS WITH RAILING: A LOT
TOILETING: A LOT
MOBILITY SCORE: 17
WALKING IN HOSPITAL ROOM: A LOT
PERSONAL GROOMING: A LITTLE
STANDING UP FROM CHAIR USING ARMS: A LOT
DRESSING REGULAR LOWER BODY CLOTHING: A LITTLE
DAILY ACTIVITIY SCORE: 18
DRESSING REGULAR UPPER BODY CLOTHING: A LITTLE
HELP NEEDED FOR BATHING: A LITTLE
MOVING TO AND FROM BED TO CHAIR: A LITTLE

## 2025-03-11 ASSESSMENT — PAIN - FUNCTIONAL ASSESSMENT: PAIN_FUNCTIONAL_ASSESSMENT: 0-10

## 2025-03-11 ASSESSMENT — PAIN DESCRIPTION - LOCATION: LOCATION: LEG

## 2025-03-11 NOTE — SIGNIFICANT EVENT
Palliative Care Social Work Note     Pt had several procedures and was off the floor most of the day.  Palliative care will follow up 3/12/25.  Team updated.

## 2025-03-11 NOTE — PROGRESS NOTES
Physical Therapy                 Therapy Communication Note    Patient Name: Terra Alexis  MRN: [id number]  Department: [facility]  Room: 240/240-A  Today's Date: 3/11/2025     Discipline: Physical Therapy          Missed Visit Reason:  Patient heparin nontherapeutic, anticipated thrombectomy today. Hold PT eval     Missed Time: Attempt

## 2025-03-11 NOTE — PROGRESS NOTES
[facility] Hospitalist Progress Note       [phone]: Please page me for patient care issues.  [phone]: Please page night hospitalist for any issues.     Terra Alexis  :  1950(74 y.o.)  MRN:  [id number]  PCP: No Assigned PCP Generic Provider, MD  LOS: 4  day(s) since admission    Assessment & Plan  Acute bilateral deep vein thrombosis (DVT) of iliac veins of lower extremities (Multi)    Acute deep vein thrombosis (DVT) of femoral vein of left lower extremity (Multi)      Assessment and Plan:     Terra Alexis is a 74 y.o. female , hx of metastatic uroepithelial cancer, on maintenance Pembro from February note. Admitted with extensive dvt of LLE    #Acute extensive DVT of LLE likely due to malignancy  -hep gtt pending thrombectomy (3/11/25) w/ plans to transition to apixaban  #Cancer related pain-on cocktail of analgesics  #Hypothyroidism  #Metastatic uroepithelial cancer, recurrence.   #Anemia of chronic disease  #Hypothyroid-on synthroid  -CS notes reviewed and recs appreciated: palliative, inerventional cards    Disposition: await test results, await consultant recommendations, and await clinical improvement    DVT Prophylaxis: full anticoagulation hep gtt    Code status: Full Code  Diet: NPO Diet Except: Other (specify); Additional Details: Clear liquids until 0500. May have clears up to 2 hours prior to procedure if exact time is known.; Effective midnight    Level of MDM:  High    discussed with nurse, discussed with TCC/SW, I personally examined the patient, and I personally reviewed chart, data, labs radiology reports    Family Communication  Number :   Name of Designated Family Representative:       Total time spent: 50 minutes, of total time providing counseling or in coordination of care. Total time on this day of visit includes record and documentation review before and after visit including documentation and time not explicitly included on EMR time stamp      Subjective:   Interval  History:  Lower Extremity Issue      The patient complains of LLE pain  The patient feels their symptoms areimproving  no events or new concerns    Scheduled Meds:acetaminophen, 975 mg, oral, TID  docusate sodium, 100 mg, oral, BID  gabapentin, 300 mg, oral, BID  levothyroxine, 112 mcg, oral, Daily  methocarbamol, 500 mg, oral, q8h AILEEN  oxyCODONE ER, 10 mg, oral, q12h AILEEN  pantoprazole, 40 mg, oral, Daily before breakfast      Continuous Infusions:heparin, 0-4,500 Units/hr, Last Rate: Stopped (03/10/25 1639)  oxygen, , Last Rate: 2 L/min (25 1533)      PRN Meds:PRN medications: fentaNYL PF, heparin, heparin, lidocaine, melatonin, midazolam, morphine, ondansetron, oxyCODONE, oxygen    Review of Systems   All other systems reviewed and are negative.    Interval Pertinent History:  Social History     Tobacco Use    Smoking status: Never    Smokeless tobacco: Never   Substance Use Topics    Alcohol use: Not Currently         Objective:   Patient Vitals for the past 24 hrs:   BP Temp Temp src Pulse Resp SpO2   25 1525 118/59 -- -- 88 16 98 %   25 1200 97/54 -- -- 90 16 95 %   25 0800 120/56 36.6 °C (97.9 °F) Temporal 99 21 97 %   25 0400 96/54 36.9 °C (98.5 °F) -- 93 18 97 %   25 0000 (!) 91/49 -- -- 81 17 100 %   03/10/25 2000 98/50 -- -- 96 19 98 %   03/10/25 1930 93/54 37.2 °C (98.9 °F) Temporal 103 16 97 %       Average, Min, and Max for last 24 hours Vitals:  TEMPERATURE:  Temp  Av.9 °C (98.4 °F)  Min: 36.6 °C (97.9 °F)  Max: 37.2 °C (98.9 °F)    RESPIRATIONS RANGE: Resp  Av.6  Min: 16  Max: 21    PULSE RANGE: Pulse  Av.9  Min: 81  Max: 103    BLOOD PRESSURE RANGE:  Systolic (24hrs), Av , Min:91 , Max:120   ; Diastolic (24hrs), Av, Min:49, Max:59      PULSE OXIMETRY RANGE: SpO2  Av.4 %  Min: 95 %  Max: 100 %  Body mass index is 22.91 kg/m².    I/O last 3 completed shifts:  In: 1536.5 (28.9 mL/kg) [P.O.:957; I.V.:579.5 (10.9 mL/kg)]  Out: 900 (16.9  mL/kg) [Urine:900 (0.5 mL/kg/hr)]  Weight: 53.2 kg   Weight change:      Physical Exam  Vitals and nursing note reviewed.   Constitutional:       Appearance: Normal appearance.   HENT:      Head: Normocephalic and atraumatic.      Right Ear: External ear normal.      Left Ear: External ear normal.      Nose: Nose normal.      Mouth/Throat:      Mouth: Mucous membranes are moist.   Eyes:      General: No scleral icterus.        Right eye: No discharge.         Left eye: No discharge.      Extraocular Movements: Extraocular movements intact.      Conjunctiva/sclera: Conjunctivae normal.      Pupils: Pupils are equal, round, and reactive to light.   Cardiovascular:      Rate and Rhythm: Normal rate and regular rhythm.   Pulmonary:      Effort: Pulmonary effort is normal.      Breath sounds: Normal breath sounds.   Abdominal:      General: Abdomen is flat. Bowel sounds are normal.      Palpations: Abdomen is soft.   Musculoskeletal:         General: Tenderness present. Normal range of motion.      Right lower leg: No edema.      Left lower leg: Edema present.   Skin:     General: Skin is warm and dry.      Capillary Refill: Capillary refill takes less than 2 seconds.   Neurological:      General: No focal deficit present.   Psychiatric:         Mood and Affect: Mood normal.         Thought Content: Thought content normal.         Judgment: Judgment normal.         Lab Results   Component Value Date     (L) 03/11/2025    K 4.6 03/11/2025    CL 96 (L) 03/11/2025    CO2 22 03/11/2025    BUN 20 03/11/2025    CREATININE 0.93 03/11/2025    GLUCOSE 87 03/11/2025    CALCIUM 8.9 03/11/2025    PROT 5.1 (L) 03/10/2025    BILITOT 0.4 03/10/2025    ALKPHOS 88 03/10/2025    AST 10 03/10/2025    ALT 13 03/10/2025       Lab Results   Component Value Date    WBC 20.5 (H) 03/11/2025    HGB 8.5 (L) 03/11/2025    HCT 27.2 (L) 03/11/2025    MCV 90 03/11/2025     03/11/2025    LYMPHOPCT 7.0 03/11/2025    RBC 3.03 (L) 03/11/2025     MCH 28.1 03/11/2025    MCHC 31.3 (L) 03/11/2025    RDW 16.9 (H) 03/11/2025       Lab Results   Component Value Date    TSH 17.97 (H) 03/08/2025     Lab Results   Component Value Date    LACTATE 2.0 09/02/2024    BNP 46 03/07/2025    INR 1.4 (H) 03/07/2025       IMAGES:  Encounter Date: 03/07/25   ECG 12 lead   Result Value    Ventricular Rate 74    Atrial Rate 74    KY Interval 174    QRS Duration 90    QT Interval 382    QTC Calculation(Bazett) 424    P Axis 60    R Axis -3    T Axis 13    QRS Count 12    Q Onset 221    P Onset 134    P Offset 172    T Offset 412    QTC Fredericia 410    Narrative    Normal sinus rhythm  Normal ECG  When compared with ECG of 01-SEP-2024 20:35,  Criteria for Anterior infarct are no longer Present  Criteria for Anterolateral infarct are no longer Present  Criteria for Inferior infarct are no longer Present  ST no longer depressed in Lateral leads  Nonspecific T wave abnormality, improved in Inferior leads  T wave inversion no longer evident in Lateral leads  QT has shortened  See ED provider note for full interpretation and clinical correlation  Confirmed by Estela Gonzales ([id number]) on 3/10/2025 6:56:54 PM     NM Lung perfusion with spect/ct    Result Date: 3/10/2025  Impression: 1. No distinct wedge-shaped segmental perfusion defect seen on SPECT/CT to suggest acute pulmonary embolism (low probability). 2. Low-dose CT demonstrates bilateral trace pleural effusions.   The interpretation above is based on modified PIOPED II and PISAPED criteria.   This study was analyzed and interpreted at [place], [place].   MACRO: None     Signed by: Ml Mendoza 3/10/2025 5:09 PM Dictation workstation:   [id number]    XR chest 1 view    Result Date: 3/10/2025  Impression: Suggestion of mild cardiomegaly. Otherwise no acute cardiopulmonary process.     MACRO: None   Signed by: Jenna Watson 3/10/2025 12:12 PM Dictation workstation:   [id number]    Transthoracic Echo  (TTE) Complete    Result Date: 3/11/2025   [facility], [address] [address]               Tel [phone] and Fax [phone] TRANSTHORACIC ECHOCARDIOGRAM REPORT  Patient Name:       HARRIS ALPHONSE Sandoval Physician:    [id number] Marcia Dawson MD Study Date:         3/11/2025           Ordering Provider:    [id number] DESTINEE DURHAM MRN/PID:            [id number]            Fellow: Accession#:         [id number]        Nurse: Date of Birth/Age:  1950 / 74      Sonographer:          Carmen kaur                                     RDCS Gender assigned at  F                   Additional Staff: Birth: Height:             152.40 cm           Admit Date:           3/7/2025 Weight:             53.07 kg            Admission Status:     Inpatient -                                                               Routine BSA / BMI:          1.49 m2 / 22.85     Encounter#:           [id number]                     kg/m2 Blood Pressure:     116/65 mmHg         Department Location:  [facility] Non                                                               Invasive Study Type:    TRANSTHORACIC ECHO (TTE) COMPLETE Diagnosis/ICD: Other pulmonary embolism without acute cor pulmonale-I26.99 Indication:    Pulmonary Embolism CPT Code:      Echo Complete w Full Doppler-87025 Patient History: Pertinent History: LE Edema. Study Detail: The following Echo studies were performed: 2D, M-Mode, Doppler and               color flow. Image quality for this study is poor.  PHYSICIAN INTERPRETATION: Left Ventricle: The left ventricular systolic function is normal, with a visually estimated ejection fraction of 65-70%. There is mild concentric left ventricular hypertrophy. The left ventricular cavity size is normal. There is mildly increased posterior left  ventricular wall thickness. Left ventricular diastolic filling cannot be determined due to E/A wave fusion. Left Atrium: The left atrial size is normal. Right Ventricle: The right ventricle is normal in size. There is normal right ventricular global systolic function. Right Atrium: The right atrial size was not well visualized. Aortic Valve: The aortic valve is probably trileaflet. The aortic valve dimensionless index is 0.62. There is no evidence of aortic valve regurgitation. The peak instantaneous gradient of the aortic valve is 13 mmHg. The mean gradient of the aortic valve is 7 mmHg. Mitral Valve: The mitral valve is normal in structure. There is trace mitral valve regurgitation. Tricuspid Valve: The tricuspid valve is structurally normal. There is trace tricuspid regurgitation. The right ventricular systolic pressure is unable to be estimated. Pulmonic Valve: The pulmonic valve is not well visualized. There is physiologic pulmonic valve regurgitation. Pericardium: Trivial pericardial effusion. Aorta: The aortic root is normal. Systemic Veins: The inferior vena cava appears normal in size, with IVC inspiratory collapse greater than 50%. In comparison to the previous echocardiogram(s): Compared with study dated 8/26/2024, no significant change.  CONCLUSIONS:  1. The left ventricular systolic function is normal, with a visually estimated ejection fraction of 65-70%.  2. There is normal right ventricular global systolic function. QUANTITATIVE DATA SUMMARY:  2D MEASUREMENTS:          Normal Ranges: IVSd:            1.26 cm  (0.6-1.1cm) LVPWd:           1.15 cm  (0.6-1.1cm) LVIDd:           4.00 cm  (3.9-5.9cm) LVIDs:           2.58 cm LV Mass Index:   112 g/m2 LVEDV Index:     29 ml/m2 LV % FS          35.5 %  LEFT ATRIUM:                  Normal Ranges: LA Vol A4C:        37.7 ml    (22+/-6mL/m2) LA Vol A2C:        31.0 ml LA Vol BP:         35.9 ml LA Vol Index A4C:  25.4 ml/m2 LA Vol Index A2C:  20.8 ml/m2 LA  Vol Index BP:   24.2 ml/m2 LA Area A4C:       14.6 cm2 LA Area A2C:       13.9 cm2 LA Major Axis A4C: 4.8 cm LA Major Axis A2C: 5.3 cm LA Volume Index:   24.3 ml/m2 LA Vol A4C:        35.0 ml LA Vol A2C:        30.4 ml LA Vol Index BSA:  22.0 ml/m2  RIGHT ATRIUM:          Normal Ranges: RA Area A4C:  15.6 cm2  M-MODE MEASUREMENTS:         Normal Ranges: Ao Root:             2.90 cm (2.0-3.7cm) LAs:                 3.85 cm (2.7-4.0cm)  LV SYSTOLIC FUNCTION:                      Normal Ranges: EF-A4C View:    61 % (>=55%) EF-A2C View:    64 % EF-Biplane:     60 % EF-Visual:      68 % LV EF Reported: 68 %  LV DIASTOLIC FUNCTION:          Normal Ranges: MV Peak E:             0.93 m/s (0.7-1.2 m/s)  MITRAL VALVE:         Normal Ranges: MV DT:        81 msec (150-240msec)  AORTIC VALVE:                      Normal Ranges: AoV Vmax:                1.80 m/s  (<=1.7m/s) AoV Peak P.9 mmHg (<20mmHg) AoV Mean P.7 mmHg  (1.7-11.5mmHg) LVOT Max Jeferson:            0.99 m/s  (<=1.1m/s) AoV VTI:                 24.82 cm  (18-25cm) LVOT VTI:                15.47 cm LVOT Diameter:           1.99 cm   (1.8-2.4cm) AoV Area, VTI:           1.93 cm2  (2.5-5.5cm2) AoV Area,Vmax:           1.70 cm2  (2.5-4.5cm2) AoV Dimensionless Index: 0.62  RIGHT VENTRICLE: RV Basal 3.10 cm RV Mid   2.20 cm RV Major 6.7 cm  TRICUSPID VALVE/RVSP:          Normal Ranges: Peak TR Velocity:     2.59 m/s RV Syst Pressure:     30 mmHg  (< 30mmHg)  PULMONIC VALVE:          Normal Ranges: PV Max Jeferson:     1.1 m/s  (0.6-0.9m/s) PV Max P.9 mmHg  [id number] Marcia Dawson MD Electronically signed on 3/11/2025 at 10:34:34 AM  ** Final **     Transthoracic Echo (TTE) Complete    Result Date: 2024   [facility], [address] [place] [address]                Tel [phone] and Fax [phone] TRANSTHORACIC ECHOCARDIOGRAM REPORT  Patient Name:      HARRIS Sandoval Physician:    [id number] Sha                                                                Chuck SPIVEY Study Date:        8/26/2024            Ordering Provider:    [id number] JASMYN LEDESMAISHMAEL MRN/PID:           [id number]             Fellow: Accession#:        [id number]         Nurse: Date of Birth/Age: 1950 / 74 years Sonographer:          Kalye RASMUSSEN Gender:            F                    Additional Staff: Height:            149.00 cm            Admit Date: Weight:            58.06 kg             Admission Status:     Inpatient - STAT BSA / BMI:         1.52 m2 / 26.15      Encounter#:           [id number]                    kg/m2 Blood Pressure:    110/65 mmHg          Department Location:  [facility] Non                                                               Invasive Study Type:    TRANSTHORACIC ECHO (TTE) COMPLETE Diagnosis/ICD: Other pulmonary embolism without acute cor pulmonale-I26.99 Indication:    Pulm Embolism, sigmoid tumor of colon CPT Code:      Echo Complete w Full Doppler-[id number] Patient History: Pertinent History: HTN and PE. Malignant tumor of sigmoid colon. Study Detail: The following Echo studies were performed: 2D, M-Mode, Doppler and               color flow.  PHYSICIAN INTERPRETATION: Left Ventricle: The left ventricular systolic function is normal, with a visually estimated ejection fraction of 55-60%. There are no regional left ventricular wall motion abnormalities. The left ventricular cavity size is normal. There is no evidence of left ventricular hypertrophy. Spectral Doppler shows an impaired relaxation pattern of left ventricular diastolic filling. Left Atrium: The left atrium is moderately dilated. Right Ventricle: The right ventricle is normal in size. There is normal right ventricular global systolic function. Right Atrium: The right atrium is normal in size. Aortic Valve: The aortic valve is trileaflet. There is trace aortic valve  regurgitation. The peak instantaneous gradient of the aortic valve is 6.2 mmHg. Mitral Valve: The mitral valve is normal in structure. There is trace mitral valve regurgitation. Tricuspid Valve: The tricuspid valve is structurally normal. There is trace tricuspid regurgitation. Pulmonic Valve: The pulmonic valve is structurally normal. There is physiologic pulmonic valve regurgitation. Pericardium: There is a trivial pericardial effusion. Aorta: The aortic root is normal. The Ao Sinus is 3.40 cm. The Asc Ao is 3.40 cm. Pulmonary Artery: The tricuspid regurgitant velocity is 2.32 m/s, and with an estimated right atrial pressure of 3 mmHg, the estimated pulmonary artery pressure is normal with the RVSP at 24.5 mmHg. Systemic Veins: The inferior vena cava appears to be of normal size. There is IVC inspiratory collapse greater than 50%. In comparison to the previous echocardiogram(s): There are no prior studies on this patient for comparison purposes.  CONCLUSIONS:  1. The left ventricular systolic function is normal, with a visually estimated ejection fraction of 55-60%.  2. Spectral Doppler shows an impaired relaxation pattern of left ventricular diastolic filling.  3. There is no evidence of left ventricular hypertrophy.  4. There is normal right ventricular global systolic function.  5. The left atrium is moderately dilated. QUANTITATIVE DATA SUMMARY: 2D MEASUREMENTS:                         Normal Ranges: Ao Root d:     3.40 cm  (2.0-3.7cm) LAs:           3.60 cm  (2.7-4.0cm) IVSd:          1.10 cm  (0.6-1.1cm) LVPWd:         0.90 cm  (0.6-1.1cm) LVIDd:         4.20 cm  (3.9-5.9cm) LVIDs:         2.90 cm LV Mass Index: 90 g/m2 LVEDV Index:   53 ml/m2 LV % FS        31.0 % LA VOLUME:                               Normal Ranges: LA Vol A4C:        65.1 ml    (22+/-6mL/m2) LA Vol A2C:        63.7 ml LA Vol BP:         65.6 ml LA Vol Index A4C:  42.9ml/m2 LA Vol Index A2C:  41.9 ml/m2 LA Vol Index BP:   43.2 ml/m2 LA  Area A4C:       20.9 cm2 LA Area A2C:       20.3 cm2 LA Major Axis A4C: 5.7 cm LA Major Axis A2C: 5.5 cm LA Volume Index:   43.2 ml/m2 RA VOLUME BY A/L METHOD:                               Normal Ranges: RA Vol A4C:        29.5 ml    (8.3-19.5ml) RA Vol Index A4C:  19.4 ml/m2 RA Area A4C:       12.9 cm2 RA Major Axis A4C: 4.8 cm AORTA MEASUREMENTS:                      Normal Ranges: Ao Sinus, d: 3.40 cm (2.1-3.5cm) Asc Ao, d:   3.40 cm (2.1-3.4cm) LV SYSTOLIC FUNCTION BY 2D PLANIMETRY (MOD):                      Normal Ranges: EF-A4C View:    47 % (>=55%) EF-A2C View:    62 % EF-Biplane:     55 % EF-Visual:      58 % LV EF Reported: 58 % LV DIASTOLIC FUNCTION:                               Normal Ranges: MV Peak E:        0.75 m/s    (0.7-1.2 m/s) MV Peak A:        0.78 m/s    (0.42-0.7 m/s) E/A Ratio:        0.96        (1.0-2.2) MV e'             0.075 m/s   (>8.0) MV lateral e'     0.09 m/s MV medial e'      0.06 m/s MV A Dur:         165.00 msec E/e' Ratio:       10.05       (<8.0) PulmV Sys Jeferson:    45.00 cm/s PulmV Saravia Jeferson:   29.10 cm/s PulmV S/D Jeferson:    1.50 PulmV A Revs Jeferson: 27.00 cm/s PulmV A Revs Dur: 106.00 msec MITRAL VALVE:                 Normal Ranges: MV DT: 210 msec (150-240msec) AORTIC VALVE:                         Normal Ranges: AoV Vmax:      1.24 m/s (<=1.7m/s) AoV Peak P.2 mmHg (<20mmHg) LVOT Max Jeferson:  0.76 m/s (<=1.1m/s) LVOT VTI:      18.90 cm LVOT Diameter: 1.80 cm  (1.8-2.4cm) AoV Area,Vmax: 1.57 cm2 (2.5-4.5cm2)  RIGHT VENTRICLE: RV Basal 3.30 cm RV Mid   2.10 cm RV Major 6.3 cm TAPSE:   26.1 mm RV s'    0.12 m/s TRICUSPID VALVE/RVSP:                             Normal Ranges: Peak TR Velocity: 2.32 m/s Est. RA Pressure: 3 mmHg RV Syst Pressure: 24.5 mmHg (< 30mmHg) IVC Diam:         1.50 cm PULMONIC VALVE:                         Normal Ranges: PV Accel Time: 120 msec (>120ms) PV Max Jeferson:    0.7 m/s  (0.6-0.9m/s) PV Max P.0 mmHg Pulmonary Veins: PulmV A Revs Dur: 106.00  msec PulmV A Revs Jeferson: 27.00 cm/s PulmV Saravia Jeferson:   29.10 cm/s PulmV S/D Jeferson:    1.50 PulmV Sys Jeferson:    45.00 cm/s  [id number] Sha Woods MD Electronically signed on 8/26/2024 at 11:56:09 AM  ** Final **    === 03/07/25 ===    XR CHEST 1 VIEW    - Impression -  Suggestion of mild cardiomegaly. Otherwise no acute cardiopulmonary  process.      MACRO:  None    Signed by: Jenna Watson 3/10/2025 12:12 PM  Dictation workstation:   [id number]  === 02/10/25 ===    RAD ONC CT SIM IMAGES ONLY  === 03/07/25 ===    XR CHEST 1 VIEW    - Impression -  Suggestion of mild cardiomegaly. Otherwise no acute cardiopulmonary  process.      MACRO:  None    Signed by: Jenna Watson 3/10/2025 12:12 PM  Dictation workstation:   [id number]  === 02/13/25 ===    MR PELVIS W AND WO CONTRAST    - Impression -  1. Progression of the necrotic left presacral soft tissue mass  consistent with the known local recurrence. The mass currently  measures 9.4 x 5.6 cm in comparison to 7.8 x 4.6 cm. More invasion of  the left side of the sacrum, upper rectum and left vaginal cuff.  2. More conspicuous right lower perirectal nodule measuring 0.9 cm  which would be concerning for disease involvement.  3. Stable prominent left para-aortic lymph node measuring 0.8 cm    MACRO:  None    Signed by: Александр Carrillo 2/14/2025 10:30 AM  Dictation workstation:   [id number]    Additional results of the last 24 hours have been reviewed.    Dictated using [employer] Version 2.4  Proof read however unrecognized voice recognition errors may have occurred     Electronically signed by Amelia Ponce DO on 03/11/25 at 4:50 PM

## 2025-03-11 NOTE — PROGRESS NOTES
Occupational Therapy                 Therapy Communication Note    Patient Name: Terra Alexis  MRN: [id number]  Department: [address]  Room: [address] [place], [place] [address]  Today's Date: 3/11/2025     Discipline: Occupational Therapy          Missed Visit Reason:  OT consult received, at 0900 confirmed with bedside nurse that pt anticipates a thrombectomy today and OT eval deferred.     Missed Time: Attempt

## 2025-03-11 NOTE — CARE PLAN
The patient's goals for the shift include      The clinical goals for the shift include Pt will have pain controlled this shift.    Over the shift, the patient did make progress toward the following goals. Had procedure to remove clots. Monitored and medicated as orders this shift.

## 2025-03-11 NOTE — CARE PLAN
The patient's goals for the shift include      The clinical goals for the shift include To remain safe throughout the shift.    Over the shift, the patient did not make progress toward the following goals. Barriers to progression include . Recommendations to address these barriers include .        Problem: Chronic Conditions and Co-morbidities  Goal: Patient's chronic conditions and co-morbidity symptoms are monitored and maintained or improved  Outcome: Progressing     Problem: Nutrition  Goal: Nutrient intake appropriate for maintaining nutritional needs  Outcome: Progressing

## 2025-03-12 LAB
ABO GROUP (TYPE) IN BLOOD: NORMAL
ALBUMIN SERPL BCP-MCNC: 2.4 G/DL (ref 3.4–5)
ANION GAP SERPL CALC-SCNC: 12 MMOL/L (ref 10–20)
ANTIBODY SCREEN: NORMAL
BASOPHILS # BLD MANUAL: 0 X10*3/UL (ref 0–0.1)
BASOPHILS NFR BLD MANUAL: 0 %
BUN SERPL-MCNC: 22 MG/DL (ref 6–23)
CALCIUM SERPL-MCNC: 8.8 MG/DL (ref 8.6–10.3)
CHLORIDE SERPL-SCNC: 97 MMOL/L (ref 98–107)
CO2 SERPL-SCNC: 23 MMOL/L (ref 21–32)
CREAT SERPL-MCNC: 1.1 MG/DL (ref 0.5–1.05)
EGFRCR SERPLBLD CKD-EPI 2021: 53 ML/MIN/1.73M*2
EOSINOPHIL # BLD MANUAL: 0 X10*3/UL (ref 0–0.4)
EOSINOPHIL NFR BLD MANUAL: 0 %
ERYTHROCYTE [DISTWIDTH] IN BLOOD BY AUTOMATED COUNT: 15.9 % (ref 11.5–14.5)
ERYTHROCYTE [DISTWIDTH] IN BLOOD BY AUTOMATED COUNT: 16.8 % (ref 11.5–14.5)
GLUCOSE SERPL-MCNC: 85 MG/DL (ref 74–99)
HCT VFR BLD AUTO: 21.8 % (ref 36–46)
HCT VFR BLD AUTO: 31.5 % (ref 36–46)
HGB BLD-MCNC: 10 G/DL (ref 12–16)
HGB BLD-MCNC: 7 G/DL (ref 12–16)
HYPOCHROMIA BLD QL SMEAR: ABNORMAL
IMM GRANULOCYTES # BLD AUTO: 1.83 X10*3/UL (ref 0–0.5)
IMM GRANULOCYTES NFR BLD AUTO: 8.9 % (ref 0–0.9)
LYMPHOCYTES # BLD MANUAL: 1.64 X10*3/UL (ref 0.8–3)
LYMPHOCYTES NFR BLD MANUAL: 8 %
MAGNESIUM SERPL-MCNC: 1.72 MG/DL (ref 1.6–2.4)
MCH RBC QN AUTO: 28 PG (ref 26–34)
MCH RBC QN AUTO: 28.2 PG (ref 26–34)
MCHC RBC AUTO-ENTMCNC: 31.7 G/DL (ref 32–36)
MCHC RBC AUTO-ENTMCNC: 32.1 G/DL (ref 32–36)
MCV RBC AUTO: 87 FL (ref 80–100)
MCV RBC AUTO: 89 FL (ref 80–100)
METAMYELOCYTES # BLD MANUAL: 0.62 X10*3/UL
METAMYELOCYTES NFR BLD MANUAL: 3 %
MONOCYTES # BLD MANUAL: 1.23 X10*3/UL (ref 0.05–0.8)
MONOCYTES NFR BLD MANUAL: 6 %
MYELOCYTES # BLD MANUAL: 0.21 X10*3/UL
MYELOCYTES NFR BLD MANUAL: 1 %
NEUTROPHILS # BLD MANUAL: 16.81 X10*3/UL (ref 1.6–5.5)
NEUTS BAND # BLD MANUAL: 2.05 X10*3/UL (ref 0–0.5)
NEUTS BAND NFR BLD MANUAL: 10 %
NEUTS SEG # BLD MANUAL: 14.76 X10*3/UL (ref 1.6–5)
NEUTS SEG NFR BLD MANUAL: 72 %
NRBC BLD-RTO: 0 /100 WBCS (ref 0–0)
NRBC BLD-RTO: 0 /100 WBCS (ref 0–0)
OVALOCYTES BLD QL SMEAR: ABNORMAL
PHOSPHATE SERPL-MCNC: 4.7 MG/DL (ref 2.5–4.9)
PLATELET # BLD AUTO: 314 X10*3/UL (ref 150–450)
PLATELET # BLD AUTO: 356 X10*3/UL (ref 150–450)
POLYCHROMASIA BLD QL SMEAR: ABNORMAL
POTASSIUM SERPL-SCNC: 4.8 MMOL/L (ref 3.5–5.3)
RBC # BLD AUTO: 2.5 X10*6/UL (ref 4–5.2)
RBC # BLD AUTO: 3.55 X10*6/UL (ref 4–5.2)
RBC MORPH BLD: ABNORMAL
RH FACTOR (ANTIGEN D): NORMAL
SODIUM SERPL-SCNC: 127 MMOL/L (ref 136–145)
TOTAL CELLS COUNTED BLD: 100
UFH PPP CHRO-ACNC: 0.5 IU/ML
WBC # BLD AUTO: 19.5 X10*3/UL (ref 4.4–11.3)
WBC # BLD AUTO: 20.5 X10*3/UL (ref 4.4–11.3)

## 2025-03-12 PROCEDURE — 2500000001 HC RX 250 WO HCPCS SELF ADMINISTERED DRUGS (ALT 637 FOR MEDICARE OP): Performed by: INTERNAL MEDICINE

## 2025-03-12 PROCEDURE — 2500000004 HC RX 250 GENERAL PHARMACY W/ HCPCS (ALT 636 FOR OP/ED): Performed by: INTERNAL MEDICINE

## 2025-03-12 PROCEDURE — 36415 COLL VENOUS BLD VENIPUNCTURE: CPT | Performed by: INTERNAL MEDICINE

## 2025-03-12 PROCEDURE — 36415 COLL VENOUS BLD VENIPUNCTURE: CPT | Performed by: PHYSICIAN ASSISTANT

## 2025-03-12 PROCEDURE — 2500000002 HC RX 250 W HCPCS SELF ADMINISTERED DRUGS (ALT 637 FOR MEDICARE OP, ALT 636 FOR OP/ED): Performed by: INTERNAL MEDICINE

## 2025-03-12 PROCEDURE — 85027 COMPLETE CBC AUTOMATED: CPT | Performed by: INTERNAL MEDICINE

## 2025-03-12 PROCEDURE — 85520 HEPARIN ASSAY: CPT | Performed by: PHYSICIAN ASSISTANT

## 2025-03-12 PROCEDURE — 97161 PT EVAL LOW COMPLEX 20 MIN: CPT | Mod: GP

## 2025-03-12 PROCEDURE — P9040 RBC LEUKOREDUCED IRRADIATED: HCPCS

## 2025-03-12 PROCEDURE — 97165 OT EVAL LOW COMPLEX 30 MIN: CPT | Mod: GO

## 2025-03-12 PROCEDURE — 36430 TRANSFUSION BLD/BLD COMPNT: CPT

## 2025-03-12 PROCEDURE — 80069 RENAL FUNCTION PANEL: CPT | Performed by: INTERNAL MEDICINE

## 2025-03-12 PROCEDURE — 85007 BL SMEAR W/DIFF WBC COUNT: CPT | Performed by: INTERNAL MEDICINE

## 2025-03-12 PROCEDURE — 99232 SBSQ HOSP IP/OBS MODERATE 35: CPT | Performed by: PHYSICIAN ASSISTANT

## 2025-03-12 PROCEDURE — 2500000001 HC RX 250 WO HCPCS SELF ADMINISTERED DRUGS (ALT 637 FOR MEDICARE OP): Performed by: PHYSICIAN ASSISTANT

## 2025-03-12 PROCEDURE — 2060000001 HC INTERMEDIATE ICU ROOM DAILY

## 2025-03-12 PROCEDURE — 97535 SELF CARE MNGMENT TRAINING: CPT | Mod: GO

## 2025-03-12 PROCEDURE — 86923 COMPATIBILITY TEST ELECTRIC: CPT

## 2025-03-12 PROCEDURE — 86850 RBC ANTIBODY SCREEN: CPT | Performed by: INTERNAL MEDICINE

## 2025-03-12 PROCEDURE — 85027 COMPLETE CBC AUTOMATED: CPT | Performed by: PHYSICIAN ASSISTANT

## 2025-03-12 PROCEDURE — 99232 SBSQ HOSP IP/OBS MODERATE 35: CPT | Performed by: INTERNAL MEDICINE

## 2025-03-12 PROCEDURE — 83735 ASSAY OF MAGNESIUM: CPT | Performed by: INTERNAL MEDICINE

## 2025-03-12 RX ORDER — DEXAMETHASONE 4 MG/1
4 TABLET ORAL DAILY
Status: DISCONTINUED | OUTPATIENT
Start: 2025-03-13 | End: 2025-03-14

## 2025-03-12 RX ORDER — METHOCARBAMOL 500 MG/1
1000 TABLET, FILM COATED ORAL EVERY 8 HOURS SCHEDULED
Status: DISCONTINUED | OUTPATIENT
Start: 2025-03-12 | End: 2025-03-15 | Stop reason: HOSPADM

## 2025-03-12 RX ORDER — NALOXONE HYDROCHLORIDE 0.4 MG/ML
0.2 INJECTION, SOLUTION INTRAMUSCULAR; INTRAVENOUS; SUBCUTANEOUS EVERY 5 MIN PRN
Status: DISCONTINUED | OUTPATIENT
Start: 2025-03-12 | End: 2025-03-15 | Stop reason: HOSPADM

## 2025-03-12 RX ORDER — GABAPENTIN 400 MG/1
400 CAPSULE ORAL 3 TIMES DAILY
Status: DISCONTINUED | OUTPATIENT
Start: 2025-03-12 | End: 2025-03-14

## 2025-03-12 RX ORDER — DEXAMETHASONE SODIUM PHOSPHATE 10 MG/ML
10 INJECTION INTRAMUSCULAR; INTRAVENOUS EVERY 8 HOURS
Status: COMPLETED | OUTPATIENT
Start: 2025-03-12 | End: 2025-03-13

## 2025-03-12 RX ORDER — AMOXICILLIN 250 MG
2 CAPSULE ORAL 2 TIMES DAILY
Status: DISCONTINUED | OUTPATIENT
Start: 2025-03-12 | End: 2025-03-15 | Stop reason: HOSPADM

## 2025-03-12 RX ORDER — OXYCODONE HYDROCHLORIDE 5 MG/1
10 TABLET ORAL EVERY 4 HOURS PRN
Status: DISCONTINUED | OUTPATIENT
Start: 2025-03-12 | End: 2025-03-15 | Stop reason: HOSPADM

## 2025-03-12 RX ORDER — OXYCODONE HYDROCHLORIDE 5 MG/1
5 TABLET ORAL EVERY 4 HOURS PRN
Status: DISCONTINUED | OUTPATIENT
Start: 2025-03-12 | End: 2025-03-15 | Stop reason: HOSPADM

## 2025-03-12 RX ORDER — POLYETHYLENE GLYCOL 3350 17 G/17G
17 POWDER, FOR SOLUTION ORAL 2 TIMES DAILY
Status: DISCONTINUED | OUTPATIENT
Start: 2025-03-12 | End: 2025-03-15 | Stop reason: HOSPADM

## 2025-03-12 RX ORDER — SODIUM CHLORIDE 9 MG/ML
100 INJECTION, SOLUTION INTRAVENOUS CONTINUOUS
Status: ACTIVE | OUTPATIENT
Start: 2025-03-12 | End: 2025-03-12

## 2025-03-12 RX ADMIN — APIXABAN 10 MG: 5 TABLET, FILM COATED ORAL at 20:25

## 2025-03-12 RX ADMIN — SODIUM CHLORIDE 100 ML/HR: 9 INJECTION, SOLUTION INTRAVENOUS at 10:44

## 2025-03-12 RX ADMIN — GABAPENTIN 300 MG: 300 CAPSULE ORAL at 08:42

## 2025-03-12 RX ADMIN — MORPHINE SULFATE 2 MG: 2 INJECTION, SOLUTION INTRAMUSCULAR; INTRAVENOUS at 11:57

## 2025-03-12 RX ADMIN — HYDROMORPHONE HYDROCHLORIDE 0.4 MG: 1 INJECTION, SOLUTION INTRAMUSCULAR; INTRAVENOUS; SUBCUTANEOUS at 22:47

## 2025-03-12 RX ADMIN — METHOCARBAMOL 500 MG: 500 TABLET ORAL at 14:27

## 2025-03-12 RX ADMIN — OXYCODONE HYDROCHLORIDE 10 MG: 10 TABLET, FILM COATED, EXTENDED RELEASE ORAL at 20:26

## 2025-03-12 RX ADMIN — METHOCARBAMOL 500 MG: 500 TABLET ORAL at 06:14

## 2025-03-12 RX ADMIN — OXYCODONE HYDROCHLORIDE 10 MG: 10 TABLET, FILM COATED, EXTENDED RELEASE ORAL at 08:42

## 2025-03-12 RX ADMIN — METHOCARBAMOL TABLETS 1000 MG: 500 TABLET, COATED ORAL at 22:50

## 2025-03-12 RX ADMIN — Medication 10 MG: at 22:41

## 2025-03-12 RX ADMIN — ACETAMINOPHEN 975 MG: 325 TABLET ORAL at 16:58

## 2025-03-12 RX ADMIN — LEVOTHYROXINE SODIUM 112 MCG: 112 TABLET ORAL at 06:14

## 2025-03-12 RX ADMIN — ACETAMINOPHEN 975 MG: 325 TABLET ORAL at 20:26

## 2025-03-12 RX ADMIN — POLYETHYLENE GLYCOL 3350 17 G: 17 POWDER, FOR SOLUTION ORAL at 20:26

## 2025-03-12 RX ADMIN — ACETAMINOPHEN 975 MG: 325 TABLET ORAL at 08:41

## 2025-03-12 RX ADMIN — DOCUSATE SODIUM 100 MG: 100 CAPSULE, LIQUID FILLED ORAL at 08:42

## 2025-03-12 RX ADMIN — DEXAMETHASONE SODIUM PHOSPHATE 10 MG: 10 INJECTION, SOLUTION INTRAMUSCULAR; INTRAVENOUS at 16:58

## 2025-03-12 RX ADMIN — SENNOSIDES AND DOCUSATE SODIUM 2 TABLET: 50; 8.6 TABLET ORAL at 20:26

## 2025-03-12 RX ADMIN — PANTOPRAZOLE SODIUM 40 MG: 40 TABLET, DELAYED RELEASE ORAL at 06:14

## 2025-03-12 RX ADMIN — APIXABAN 10 MG: 5 TABLET, FILM COATED ORAL at 11:29

## 2025-03-12 RX ADMIN — GABAPENTIN 400 MG: 400 CAPSULE ORAL at 20:26

## 2025-03-12 ASSESSMENT — COGNITIVE AND FUNCTIONAL STATUS - GENERAL
TOILETING: A LOT
CLIMB 3 TO 5 STEPS WITH RAILING: A LOT
TOILETING: A LOT
STANDING UP FROM CHAIR USING ARMS: A LOT
MOVING TO AND FROM BED TO CHAIR: A LITTLE
CLIMB 3 TO 5 STEPS WITH RAILING: A LOT
TOILETING: TOTAL
WALKING IN HOSPITAL ROOM: A LOT
DRESSING REGULAR UPPER BODY CLOTHING: A LITTLE
DAILY ACTIVITIY SCORE: 16
HELP NEEDED FOR BATHING: A LITTLE
STANDING UP FROM CHAIR USING ARMS: A LITTLE
DRESSING REGULAR LOWER BODY CLOTHING: A LITTLE
MOBILITY SCORE: 15
MOBILITY SCORE: 16
PERSONAL GROOMING: A LITTLE
MOVING TO AND FROM BED TO CHAIR: A LITTLE
PERSONAL GROOMING: A LITTLE
TURNING FROM BACK TO SIDE WHILE IN FLAT BAD: A LITTLE
WALKING IN HOSPITAL ROOM: A LOT
HELP NEEDED FOR BATHING: A LITTLE
DAILY ACTIVITIY SCORE: 18
DAILY ACTIVITIY SCORE: 16
HELP NEEDED FOR BATHING: A LOT
DRESSING REGULAR UPPER BODY CLOTHING: A LITTLE
DRESSING REGULAR LOWER BODY CLOTHING: A LOT
MOBILITY SCORE: 17
MOVING TO AND FROM BED TO CHAIR: A LOT
STANDING UP FROM CHAIR USING ARMS: A LOT
TURNING FROM BACK TO SIDE WHILE IN FLAT BAD: A LITTLE
CLIMB 3 TO 5 STEPS WITH RAILING: A LOT
DRESSING REGULAR UPPER BODY CLOTHING: A LITTLE
WALKING IN HOSPITAL ROOM: A LOT
MOVING FROM LYING ON BACK TO SITTING ON SIDE OF FLAT BED WITH BEDRAILS: A LITTLE
DRESSING REGULAR LOWER BODY CLOTHING: TOTAL

## 2025-03-12 ASSESSMENT — PAIN SCALES - GENERAL
PAINLEVEL_OUTOF10: 7
PAINLEVEL_OUTOF10: 7
PAINLEVEL_OUTOF10: 9
PAINLEVEL_OUTOF10: 10 - WORST POSSIBLE PAIN
PAINLEVEL_OUTOF10: 10 - WORST POSSIBLE PAIN
PAINLEVEL_OUTOF10: 5 - MODERATE PAIN
PAINLEVEL_OUTOF10: 5 - MODERATE PAIN

## 2025-03-12 ASSESSMENT — PAIN DESCRIPTION - LOCATION
LOCATION: LEG

## 2025-03-12 ASSESSMENT — PAIN - FUNCTIONAL ASSESSMENT
PAIN_FUNCTIONAL_ASSESSMENT: 0-10

## 2025-03-12 ASSESSMENT — ACTIVITIES OF DAILY LIVING (ADL)
BATHING_ASSISTANCE: MODERATE
ADL_ASSISTANCE: INDEPENDENT
ADL_ASSISTANCE: INDEPENDENT
HOME_MANAGEMENT_TIME_ENTRY: 10

## 2025-03-12 ASSESSMENT — PAIN DESCRIPTION - ORIENTATION
ORIENTATION: LEFT;RIGHT
ORIENTATION: LEFT

## 2025-03-12 NOTE — PROGRESS NOTES
Occupational Therapy    Evaluation    Patient Name: Terra Alexis  MRN: [id number]  Department: [facility]  Room: [address] [place], [place] [address]  Today's Date: 3/12/2025  Time Calculation  Start Time: 1451  Stop Time: 1516  Time Calculation (min): 25 min    Assessment  IP OT Assessment  OT Assessment: Pt presents with decreased ADL performance and impaired mobility compared to her baseline. Will benefit from skilled OT services to increase functional outcomes and return to PLOF  Prognosis: Good  Barriers to Discharge Home: Caregiver assistance, Physical needs  Caregiver Assistance: Caregiver assistance needed per identified barriers - however, level of patient's required assistance exceeds assistance available at home  Physical Needs: Intermittent mobility assistance needed, Intermittent ADL assistance needed  Evaluation/Treatment Tolerance: Patient tolerated treatment well  Medical Staff Made Aware: Yes  End of Session Communication: Bedside nurse  End of Session Patient Position: Bed, 3 rail up, Alarm on  Plan:  Treatment Interventions: ADL retraining, Functional transfer training, Endurance training, Patient/family training, Equipment evaluation/education, Compensatory technique education  OT Frequency: 3 times per week  OT Discharge Recommendations: Moderate intensity level of continued care  Equipment Recommended upon Discharge: Wheeled walker (dressing equipment)  OT Recommended Transfer Status: Assist of 1  OT - OK to Discharge: Yes    Subjective     General:  General  Reason for Referral: DVT of LLE with thrombectomy 3/11  Referred By: Amelia Ponce  Past Medical History Relevant to Rehab: Hypertension  Nephrolitiasis  Cervical cancer, treated with chemo/xrt, then surgery  Invasive uroepithelial cancer, involving upper rectum, vaginal cuff and sacrum.   Hypothyroid  Hx DVT/PE  Peripheral neuropathy  CKD  GERD  Depression  Family/Caregiver Present: No  Co-Treatment: PT  Co-Treatment Reason: Maximize patients functional mobility and  safety  Prior to Session Communication: Bedside nurse  Patient Position Received: Bed, 3 rail up, Alarm on  General Comment: Eager to be OOB and work with therapy  Precautions:  Medical Precautions: Fall precautions (IV, tele)  Precautions Comment: Surgical NP provided clearance for activity as tolerated and confirmed no post-op positioning/mobility restrictions     Date/Time Vitals Session Patient Position Pulse Resp SpO2 BP MAP (mmHg)    03/12/25 1451 During OT  Sitting  103  22  100 %  118/67  82      Pain:  Pain Assessment  Pain Assessment: 0-10  0-10 (Numeric) Pain Score: 10 - Worst possible pain  Pain Type: Acute pain  Pain Location: Leg  Pain Orientation: Left, Posterior  Pain Interventions: Repositioned, Ambulation/increased activity, Emotional support (RN provided medication)  Response to Interventions: No change in pain, Provider notified    Objective   Cognition:  Orientation Level: Oriented X4           Home Living:  Type of Home: House  Lives With: Significant other  Home Adaptive Equipment: Cane, Walker rolling or standard  Home Layout: Two level  Alternate Level Stairs-Rails: Left  Alternate Level Stairs-Number of Steps: 15  Home Access: Stairs to enter with rails  Entrance Stairs-Rails: None  Entrance Stairs-Number of Steps: 5   Prior Function:  Receives Help From: Family  ADL Assistance: Independent  Homemaking Assistance: Independent  Ambulatory Assistance: Independent (occ use of cane)    ADL:  Eating Assistance: Modified independent (Device)  Grooming Assistance: Modified independent (Device)  Grooming Deficit:  (seated)  Bathing Assistance: Moderate  Bathing Deficit: Perineal area, Buttocks, Right upper leg, Left upper leg, Right lower leg including foot, Left lower leg including foot  UE Dressing Assistance: Modified independent (Device)  UE Dressing Deficit:  (seated, anticipated)  LE Dressing Assistance: Total  Toileting Assistance with Device: Total  Activity Tolerance:  Endurance: Tolerates  10 - 20 min exercise with multiple rests  Bed Mobility/Transfers: Bed Mobility  Bed Mobility: Yes  Bed Mobility 1  Bed Mobility 1: Supine to sitting  Level of Assistance 1: Close supervision  Bed Mobility 2  Bed Mobility  2: Sitting to supine  Level of Assistance 2: Minimum assistance (BLE)    Transfers  Transfer: Yes  Transfer 1  Technique 1: Sit to stand, Stand to sit  Transfer Device 1: Walker  Transfer Level of Assistance 1: Contact guard  Trials/Comments 1: completed x2 for undergarment and linen change during session      Functional Mobility:  Functional Mobility  Functional Mobility Performed: Yes  Functional Mobility 1  Surface 1: Level tile  Device 1: Rolling walker  Assistance 1: Contact guard  Comments 1: x3 lateral steps  Sensation:  Light Touch:  (baseline BLE peripheral neuropathy)  Strength:  Strength Comments: B UE 4+/5  Coordination:  Movements are Fluid and Coordinated: Yes   Extremities: RUE   RUE : Within Functional Limits and LUE   LUE: Within Functional Limits    Outcome Measures: [facility] Daily Activity  Putting on and taking off regular lower body clothing: Total  Bathing (including washing, rinsing, drying): A little  Putting on and taking off regular upper body clothing: A little  Toileting, which includes using toilet, bedpan or urinal: Total  Taking care of personal grooming such as brushing teeth: None  Eating Meals: None  Daily Activity - Total Score: 16      Education Documentation  Body Mechanics, taught by Lissy Yip OT at 3/12/2025  4:11 PM.  Learner: Patient  Readiness: Acceptance  Method: Explanation, Demonstration  Response: Needs Reinforcement  Comment: during bed mobility, transfers and ADL        Goals:   Encounter Problems       Encounter Problems (Active)       OT Goals       Pt will demo bed mobility with sup  (Progressing)       Start:  03/12/25    Expected End:  03/26/25            Pt will demo functional transfers to/ from EOB, chair and commode with LRD and sup  (Progressing)       Start:  03/12/25    Expected End:  03/26/25            Pt will demo functional mobility necessary to complete ADL routine with LRD and sup (Progressing)       Start:  03/12/25    Expected End:  03/26/25            Pt will demo LB ADL routine and meaningful daily activities with Gemma using modifications as needed  (Progressing)       Start:  03/12/25    Expected End:  03/26/25            Pt will demo and/or verbalize 2-3 energy conservation techniques to incorporate into functional mobility or ADL to improve performance and increase independence during this LOS.  (Progressing)       Start:  03/12/25    Expected End:  03/26/25

## 2025-03-12 NOTE — PROGRESS NOTES
Physical Therapy                 Therapy Communication Note    Patient Name: Terra Alexis  MRN: [id number]  Department: [address]  Room: [address] [place], [place] [address]  Today's Date: 3/12/2025     Discipline: Physical Therapy          Missed Visit Reason:  Patient on strict bedrest orders until seen by Dr. Patiño     Missed Time: Attempt

## 2025-03-12 NOTE — CONSULTS
Consults    PALLIATIVE CARE SOCIAL WORK CONSULT:  Ernestina COLEMAN  CURRENT ATTENDING PROVIDER: Amelia Ponce DO     Reason for Consult    GOC    Introduction to Palliative Care  Met with pt and catina Kinney at bedside  Pt was alert and oriented and able to participate in visit.  Staff present:  Ernestina Portillo  Palliative care was introduced as a service for patients with serious illness to help with symptoms, assist with goals of care conversations, navigate complex decision making, improve quality of life for patients, and provide support to patients and families.  Support and empathy was provided throughout the encounter.    History of Present Illness  Terar Alexis is a 74 y.o. female with past medical history of cervical cancer, invasive uroepithelial cancer, h/o DVT/PE, CKD. Pt is presenting with increased pain and swelling of her L leg.     Caregiving/Caregiver Support  Does the patient require assistance in some or all components of her care, including coordination of medical care?  yes  If Yes, which person serves that role? catina    Medical History  Per EMR.    Personal History  Pt is .  Pt and Gregoria have been together two years.  Pt worked in construction management.      Oriental orthodox and Importance of Oriental orthodox:  Pt was raised Jehovah's witness, considers herself Adventist.   Role of wendi in daily life/ Role of spirituality in health care decision-making:  states she has wendi  Spiritual or Uatsdin community:  none     Depression   per H&P.  Pt feels frustrated with her complications.     Anxiety   denies     Advance Directives  Surrogate Health Care Decision Maker:  RAINER SANDHU  yes, from 12/11  Living Will  yes, from 12/11  On file.  Pt would like to update documents, will assist pt 3/13/25.    Code Status                    FULL CODE.  Reviewed risks of CPR, including trauma, death, and intubation.  Reviewed code status options.  Pt wishes to remain FULL CODE.  Pt is not sure about long term  "intubation.  Pt states \"I want to be saved\".     Serious Illness Conversation        Life Limiting Disease:  cancer  Disease Specific Information Provided:  advanced disease  What is your understanding now of where you are with your illness:  pt feels overwhelmed with many many appts and treatment options.  Pt states she was told 8/24 that she might not survive the extensive surgery she had.    What are your fears, worries, or concerns about the future:  not getting better  What are you hoping for:  recovery  How much does your family know about your priorities and wishes:  needs further discussion.    Other distressing Symptoms        Overall decline, pain.     Plan and Social Considerations  Pt plans to be dc home when medically cleared and follow up with oncology.      Plan of Care discussed with: team    Thank you for asking Palliative Care to assist with care of this patient.  We will continue to follow  Please contact us for additional questions or concerns.    JARED Choi  Palliative Care   Contact Via Epic Secure chat   "

## 2025-03-12 NOTE — PROGRESS NOTES
Occupational Therapy                 Therapy Communication Note    Patient Name: Terra Alexis  MRN: [id number]  Department: [address]  Room: [address] [place], [place] [address]  Today's Date: 3/12/2025     Discipline: Occupational Therapy          Missed Visit Reason:  1000, OT eval discussed with RN. Pt remains on strict bedrest orders until given clearance from Dr. Manzano. No eval at this time    Missed Time: Attempt

## 2025-03-12 NOTE — PROGRESS NOTES
03/12/25 1457   Discharge Planning   Living Arrangements Spouse/significant other   Support Systems Spouse/significant other   Assistance Needed Patient is A&OX3, independent in ADLs, ambulates with a cane if needed, no O2, CPAP or Bipap at baseline. No active HHC agency. Patient reports she has a  PCP out of the [place] office but can't remember her name. Patient was on Eliquis at home but was stopped at the end of February.   Type of Residence Private residence   Number of Stairs to Enter Residence 4   Number of Stairs Within Residence 20   Do you have animals or pets at home? No   Who is requesting discharge planning? Provider   Expected Discharge Disposition Home  (TBD: PT/OT pending, on room air,)   Does the patient need discharge transport arranged? No   Stroke Family Assessment   Stroke Family Assessment Needed No   Intensity of Service   Intensity of Service 0-30 min     3/12/25 @ 1535: PT recommending low. Discussed with patient who is agreeable to H[facility] but without PCP, referral made to [facility] and will order [facility] with [facility] provider to follow for HHC. Patient in need of SN to monitor thrombectomy site, medication education, and PT/OT. PCP referral order placed as well.

## 2025-03-12 NOTE — PROGRESS NOTES
Physical Therapy    Physical Therapy Evaluation    Patient Name: Terra Alexis  MRN: [id number]  Department: [address]  [facility]  Today's Date: 3/12/2025   Time Calculation  Start Time: 1450  Stop Time: 1515  Time Calculation (min): 25 min    Assessment/Plan   PT Assessment  PT Assessment Results: Decreased strength, Decreased endurance, Impaired balance, Decreased mobility  Rehab Prognosis: Excellent  Barriers to Discharge Home: No anticipated barriers  Evaluation/Treatment Tolerance: Patient limited by pain  Medical Staff Made Aware: Yes  Strengths: Ability to acquire knowledge, Insight into problems, Support of Caregivers  Barriers to Participation: Comorbidities, Housing layout  End of Session Communication: Bedside nurse  Assessment Comment: Patient limited d/t 10/10 pain, RN addressing, however overall mobilized well over short distance with 2WW. Patient lives with SO in a two story home, does need to manage stairs and typically uses a cane for mobility. Rec LOW intensity PT intervention.  End of Session Patient Position: Bed, 3 rail up, Alarm on (all needs within reach, L LE elevated on pillow)  IP OR SWING BED PT PLAN  Inpatient or Swing Bed: Inpatient  PT Plan  Treatment/Interventions: Bed mobility, Transfer training, Gait training, Stair training, Balance training, Endurance training, Therapeutic exercise  PT Plan: Ongoing PT  PT Frequency: 2 times per week  PT Discharge Recommendations: Low intensity level of continued care  Equipment Recommended upon Discharge: Wheeled walker  PT Recommended Transfer Status: Assist x1  PT - OK to Discharge: Yes (per PT POC)    Subjective   General Visit Information:  General  Reason for Referral: Impaired functional mobility; DVT with thrombectomy 3/11  Referred By: Amelia Ponce  Past Medical History Relevant to Rehab: Hypertension  Nephrolitiasis  Cervical cancer, treated with chemo/xrt, then surgery  Invasive uroepithelial cancer, involving upper rectum, vaginal  cuff and sacrum.   Hypothyroid  Hx DVT/PE  Peripheral neuropathy  CKD  GERD  Depression  Family/Caregiver Present: No  Co-Treatment: OT  Co-Treatment Reason: Maximize patients functional mobility and safety  Prior to Session Communication: Bedside nurse  Patient Position Received: Bed, 3 rail up, Alarm on  General Comment: Patient agreeable to therapy assessment  Home Living:  Home Living  Type of Home: House  Lives With: Significant other  Home Adaptive Equipment: Cane  Home Layout: Two level  Alternate Level Stairs-Rails: Left  Alternate Level Stairs-Number of Steps: 15  Home Access: Stairs to enter with rails  Entrance Stairs-Rails: None  Entrance Stairs-Number of Steps: 5  Prior Level of Function:  Prior Function Per Pt/Caregiver Report  Level of [place]: Independent with ADLs and functional transfers, Independent with homemaking with ambulation  Receives Help From: Family  ADL Assistance: Independent  Homemaking Assistance: Independent  Ambulatory Assistance: Independent (occ use of cane)  Precautions:  Precautions  Medical Precautions: Fall precautions (IV, tele)  Precautions Comment: RN cleared with surgical NP, d/c bedrest orders, cleared for mobility without restrictions             Objective   Pain:  Pain Assessment  Pain Assessment: 0-10  0-10 (Numeric) Pain Score: 10 - Worst possible pain  Pain Type: Acute pain  Pain Location: Leg  Pain Orientation: Left  Pain Interventions: Repositioned, Ambulation/increased activity (RN to provide meds)  Cognition:  Cognition  Overall Cognitive Status: Within Functional Limits  Orientation Level: Oriented X4    General Assessments:  General Observation  General Observation: L LE edema     Activity Tolerance  Endurance: Tolerates 10 - 20 min exercise with multiple rests    Sensation  Light Touch:  (Patient reports peripheral neuropathy)    Strength  Strength Comments: R LE 4+/5, L LE 3+/5    Coordination  Movements are Fluid and Coordinated: Yes    Postural  Control  Postural Control: Within Functional Limits    Static Sitting Balance  Static Sitting-Balance Support: No upper extremity supported, Feet supported  Static Sitting-Level of Assistance: Independent    Static Standing Balance  Static Standing-Balance Support: Bilateral upper extremity supported  Static Standing-Level of Assistance: Close supervision  Functional Assessments:       Bed Mobility  Bed Mobility: Yes  Bed Mobility 1  Bed Mobility 1: Supine to sitting  Level of Assistance 1: Close supervision  Bed Mobility 2  Bed Mobility  2: Sitting to supine  Level of Assistance 2: Minimum assistance (B LE)    Transfers  Transfer: Yes  Transfer 1  Transfer From 1: Sit to, Stand to  Transfer to 1: Sit, Stand  Technique 1: Sit to stand, Stand to sit  Transfer Device 1: Walker  Transfer Level of Assistance 1: Contact guard    Ambulation/Gait Training  Ambulation/Gait Training Performed: Yes  Ambulation/Gait Training 1  Surface 1: Level tile  Device 1: Rolling walker  Assistance 1: Contact guard  Quality of Gait 1: Inconsistent stride length  Comments/Distance (ft) 1: 3' (lateral stepping)    Outcome Measures:  [facility] Basic Mobility  Turning from your back to your side while in a flat bed without using bedrails: A little  Moving from lying on your back to sitting on the side of a flat bed without using bedrails: A little  Moving to and from bed to chair (including a wheelchair): A little  Standing up from a chair using your arms (e.g. wheelchair or bedside chair): A little  To walk in hospital room: A lot  Climbing 3-5 steps with railing: A lot  Basic Mobility - Total Score: 16    Encounter Problems       Encounter Problems (Active)       Balance       STG - Maintains dynamic standing balance with upper extremity support x 5' with dual task independently  (Progressing)       Start:  03/12/25    Expected End:  03/26/25               Mobility       STG - Patient will ambulate with 2WW 25' mod I  (Progressing)       Start:   03/12/25    Expected End:  03/26/25            STG - Patient will ascend and descend 5 stairs with cane SBA  (Progressing)       Start:  03/12/25    Expected End:  03/26/25               PT Transfers       STG - Patient will perform bed mobility independent  (Progressing)       Start:  03/12/25    Expected End:  03/26/25            STG - Patient will transfer sit to and from stand mod I  (Progressing)       Start:  03/12/25    Expected End:  03/26/25               Pain - Adult              Education Documentation  Precautions, taught by Marlyn Tellez PT at 3/12/2025  3:37 PM.  Learner: Patient  Readiness: Acceptance  Method: Explanation  Response: Verbalizes Understanding  Comment: Importance of progressing mobility as tolerated, staff assist for all mobility at this time with use of 2WW    Mobility Training, taught by Marlyn Tellez PT at 3/12/2025  3:37 PM.  Learner: Patient  Readiness: Acceptance  Method: Explanation  Response: Verbalizes Understanding  Comment: Importance of progressing mobility as tolerated, staff assist for all mobility at this time with use of 2WW    Education Comments  No comments found.

## 2025-03-12 NOTE — PROGRESS NOTES
Subjective Data:  Patient continue with significant pain to her left lower extremity.  Swelling has improved postprocedure.    Overnight Events:    No acute events reported overnight.     Objective Data:  Last Recorded Vitals:  Vitals:    03/12/25 0430 03/12/25 0700 03/12/25 0800 03/12/25 0912   BP: 93/50 111/71 (!) 104/47 106/59   BP Location: Right arm   Right arm   Patient Position: Lying   Lying   Pulse: 83  89    Resp: 16  15    Temp: 36.8 °C (98.2 °F)   37.2 °C (98.9 °F)   TempSrc: Temporal   Temporal   SpO2: 100%  100%    Weight:       Height:           Last Labs:  CBC - 3/12/2025:  6:24 AM  20.5 10.0 356    31.5      CMP - 3/12/2025:  6:24 AM  8.8 5.1 10 --- 0.4   4.7 2.4 13 88      PTT - 3/7/2025:  4:16 PM  1.4   15.0 27     TROPHS   Date/Time Value Ref Range Status   03/07/2025 01:26 PM 8 0 - 13 ng/L Final   08/24/2024 04:44 PM 5 0 - 13 ng/L Final     BNP   Date/Time Value Ref Range Status   03/07/2025 01:26 PM 46 0 - 99 pg/mL Final   08/24/2024 04:44 PM 15 0 - 99 pg/mL Final     HGBA1C   Date/Time Value Ref Range Status   04/26/2021 07:15 AM 5.5 4.3 - 5.6 % Final     Comment:     American Diabetes Association guidelines indicate that patients with HgbA1c in   the range 5.7-6.4% are at increased risk for development of diabetes, and   intervention by lifestyle modification may be beneficial. HgbA1c greater or   equal to 6.5% is considered diagnostic of diabetes.     VLDL   Date/Time Value Ref Range Status   01/09/2023 09:59 AM 32 0 - 40 mg/dL Final      Last I/O:  I/O last 3 completed shifts:  In: 947.8 (17.8 mL/kg) [P.O.:300; I.V.:647.8 (12.2 mL/kg)]  Out: 1120 (21.1 mL/kg) [Urine:1100 (0.6 mL/kg/hr); Blood:20]  Weight: 53.2 kg     Past Cardiology Tests (Last 3 Years):  EKG:  ECG 12 lead 03/07/2025 (Preliminary) NSR, 74 bpm no acute ischemic changes.         Echo:  Transthoracic Echo (TTE) Complete 08/26/2024  CONCLUSIONS:   1. The left ventricular systolic function is normal, with a visually estimated  ejection fraction of 55-60%.   2. Spectral Doppler shows an impaired relaxation pattern of left ventricular diastolic filling.   3. There is no evidence of left ventricular hypertrophy.   4. There is normal right ventricular global systolic function.   5. The left atrium is moderately dilated.     Ejection Fractions:        EF   Date/Time Value Ref Range Status   08/26/2024 11:32 AM 58 %        Cath:  Invasive vascular procedure (3/11/2025):  CONCLUSIONS:   1. Deep vein thrombosis.   2. Successful mechanical thrombectomy from the left common iliac vein to the left popliteal vein.   3. Balloon venoplasty of the left common iliac vein with a 8 mm x 40 mm balloon.   4. Continue therapeutic anticoagulation with heparin drip_can be transition to venous thromboembolism dosed direct oral anticoagulant.   5. Bedrest until tomorrow AM when figure of 8 suture will be removed.     Stress Test:  No results found for this or any previous visit from the past 1095 days.     Imaging:  Vascular US LE Venous Duplex (03/10/25):  **CRITICAL RESULT**  Critical Result: Left DEIV, profunda and peroneal veins acute DVT.  Notification called to Cinthya Bowen PA-C on 3/10/2025 at 10:42:47 AM. Acknowledged critical results notification communicated via in person by Kary COSTELLO.     PRELIMINARY CONCLUSIONS:  Right Lower Venous: No evidence of acute deep vein thrombus visualized in the right lower extremity.  Left Lower Venous: There is acute occlusive deep vein thrombosis visualized in the distal external iliac, proximal femoral, mid femoral, distal femoral, popliteal, posterior tibial and peroneal veins. There is acute non-occlusive deep vein thrombosis visualized in the profunda and common femoral veins. Unable to adequately visualize IVC and left common iliac vein. Technically difficult study due to edema and patient inability to position.     Imaging & Doppler Findings:     Right                 Compressible Thrombus         Flow  Distal External Iliac                       Spontaneous/Phasic  CFV                       Yes        None   Spontaneous/Phasic  PFV                       Yes        None  FV Proximal               Yes        None   Spontaneous/Phasic  FV Mid                    Yes        None  FV Distal                 Yes        None  Popliteal                 Yes        None   Spontaneous/Phasic  Peroneal                  Yes        None  PTV                       Yes        None        Left                  Compress      Thrombus          Flow  Distal External Iliac    No      Acute occlusive      None  CFV                      No    Acute non-occlusive Continuous  PFV                      No    Acute non-occlusive  FV Proximal              No      Acute occlusive      None  FV Mid                   No      Acute occlusive      None  FV Distal                No      Acute occlusive      None  Popliteal                No      Acute occlusive      None  Peroneal                 No      Acute occlusive      None  PTV                      No      Acute occlusive      None     Vascular US LE Venous Duplex (03/07/2025):  FINDINGS:  Partially occlusive thrombus in the common femoral vein. Occlusive  thrombus in the femoral, popliteal, and posterior tibial veins.  Peroneal veins not visualized.      IMPRESSION:  Extensive left lower extremity deep venous thrombosis.     Venous Insufficiency US (02/26/2025):  CONCLUSIONS:  Right Lower Venous: No evidence of acute deep vein thrombus visualized in the right lower extremity.  Left Lower Venous: The left common femoral vein demonstrates normal spontaneous and respirophasic flow.     Comparison:  Compared with study from 8/6/2024, Compared to previous performed in radiology peroneal veins appear patent on today's exam.     Imaging & Doppler Findings:     Right                 Compressible Thrombus        Flow  Distal External Iliac                       Spontaneous/Phasic  CFV                        Yes        None   Spontaneous/Phasic  PFV                       Yes        None  FV Proximal               Yes        None   Spontaneous/Phasic  FV Mid                    Yes        None  FV Distal                 Yes        None  Popliteal                 Yes        None   Spontaneous/Phasic  Peroneal                  Yes        None  PTV                       Yes        None        Left        Flow  CFV  Spontaneous/Phasic  Inpatient Medications:  Scheduled medications   Medication Dose Route Frequency   • acetaminophen  975 mg oral TID   • apixaban  10 mg oral BID    Followed by   • [START ON 3/19/2025] apixaban  5 mg oral BID   • docusate sodium  100 mg oral BID   • gabapentin  300 mg oral BID   • levothyroxine  112 mcg oral Daily   • methocarbamol  500 mg oral q8h AILEEN   • oxyCODONE ER  10 mg oral q12h AILEEN   • pantoprazole  40 mg oral Daily before breakfast     PRN medications   Medication   • melatonin   • morphine   • ondansetron   • oxyCODONE     Continuous Medications   Medication Dose Last Rate   • sodium chloride 0.9%  100 mL/hr 100 mL/hr (03/12/25 1044)       Physical Exam:  Physical Exam  HENT:      Head: Normocephalic.      Nose: Nose normal.      Mouth/Throat:      Mouth: Mucous membranes are moist.   Eyes:      Conjunctiva/sclera: Conjunctivae normal.   Cardiovascular:      Rate and Rhythm: Normal rate.   Pulmonary:      Effort: Pulmonary effort is normal.   Abdominal:      General: Abdomen is flat.   Musculoskeletal:         General: Tenderness present.      Cervical back: Normal range of motion.      Right lower leg: No edema.      Left lower leg: Edema present.   Skin:     General: Skin is warm and dry.   Neurological:      General: No focal deficit present.      Mental Status: She is alert. Mental status is at baseline.   Psychiatric:         Mood and Affect: Mood normal.         Behavior: Behavior normal.          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  She underwent successful mechanical thrombectomy from the left common iliac to the left popliteal, additional balloon venoplasty of the left common iliac (8 mm x 40 mm balloon).  Figure-of-eight suture removed today with no bleeding.  Measurements of the right lower extremity (thigh 42-36.5; calf 40.5 to 33 cm).    Acute DVT of the left lower extremity (Distal ext iliac, common fem to mid/distal/prox fem, pop, peroneal and PTV; unable to visualize IVC)  Hx RLE DVT/PE (8/2024)  Active Uroepithelial cancer  w/ metastasis      Recommend continuing heparin gtt at this time.   In addition, recommend CT to r/o pulmonary embolism given tachycardia, relative hypotension, fever.   NPO @ midnight for thrombectomy.  Daily measurements of b/l LE (6cm above and below the knee).  Case discussed with Dr. Lucas.   Will follow.   Peripheral IV 20 G Right;Anterior Forearm (Active)   Site Assessment Clean;Dry;Intact 03/12/25 0900   Dressing Status Clean;Dry;Occlusive 03/12/25 0900   Number of days: 4       Code Status:  Full Code    I spent *** minutes in the professional and overall care of this patient.        Cinthya Spaulding PA-C

## 2025-03-12 NOTE — PROGRESS NOTES
[facility] Hospitalist Progress Note       [id number]: Please page me for patient care issues.  [phone]: Please page night hospitalist for any issues.     Terra Alexis  :  1950(74 y.o.)  MRN:  [id number]  PCP: No Assigned PCP Generic Provider, MD  LOS: 5  day(s) since admission    Assessment & Plan  Acute bilateral deep vein thrombosis (DVT) of iliac veins of lower extremities (Multi)    Acute deep vein thrombosis (DVT) of femoral vein of left lower extremity (Multi)      Assessment and Plan:     Terra Alexis is a 74 y.o. female , hx of metastatic uroepithelial cancer, on maintenance Pembro from February note. Admitted with extensive dvt of LLE    #Acute extensive DVT of LLE likely due to malignancy  -s/p thrombectomy (3/11/25). On hep gtt w/ plans to transition to apixaban pending interventional cards final recs  #Cancer related pain-on cocktail of analgesics  #Hypothyroidism  #Metastatic uroepithelial cancer, recurrence.   #Anemia of chronic disease  #Hypothyroid-on synthroid  -CS notes reviewed and recs appreciated: palliative, inerventional cards    Disposition: await test results, await consultant recommendations, and await clinical improvement    DVT Prophylaxis: full anticoagulation hep gtt    Code status: Full Code  Diet: Adult diet Cardiac; 70 gm fat; 2 - 3 grams Sodium    Level of MDM:  High    discussed with nurse, discussed with TCC/SW, I personally examined the patient, and I personally reviewed chart, data, labs radiology reports    Family Communication  Number :   Name of Designated Family Representative:       Total time spent: 50 minutes, of total time providing counseling or in coordination of care. Total time on this day of visit includes record and documentation review before and after visit including documentation and time not explicitly included on EMR time stamp      Subjective:   Interval History:  HPI  The patient complains of LLE pain  The patient feels their symptoms  areimproving  no events or new concerns    Scheduled Meds:acetaminophen, 975 mg, oral, TID  docusate sodium, 100 mg, oral, BID  gabapentin, 300 mg, oral, BID  levothyroxine, 112 mcg, oral, Daily  methocarbamol, 500 mg, oral, q8h AILEEN  oxyCODONE ER, 10 mg, oral, q12h AILEEN  pantoprazole, 40 mg, oral, Daily before breakfast      Continuous Infusions:heparin, 0-4,500 Units/hr, Last Rate: Stopped (03/10/25 1639)      PRN Meds:PRN medications: heparin, melatonin, morphine, ondansetron, oxyCODONE    Review of Systems   All other systems reviewed and are negative.    Interval Pertinent History:  Social History     Tobacco Use    Smoking status: Never    Smokeless tobacco: Never   Substance Use Topics    Alcohol use: Not Currently         Objective:   Patient Vitals for the past 24 hrs:   BP Temp Temp src Pulse Resp SpO2   25 0912 106/59 37.2 °C (98.9 °F) Temporal -- -- --   25 0800 (!) 104/47 -- -- 89 15 100 %   25 0700 111/71 -- -- -- -- --   25 0430 93/50 36.8 °C (98.2 °F) Temporal 83 16 100 %   25 0427 93/50 36.3 °C (97.3 °F) Temporal 77 15 100 %   25 0400 (!) 90/48 -- -- 75 16 100 %   25 0346 -- 36.4 °C (97.5 °F) Temporal 77 -- 100 %   25 0342 96/53 36.4 °C (97.5 °F) Temporal 77 15 100 %   25 0327 -- 36.4 °C (97.5 °F) Temporal -- -- --   25 0326 93/53 36.3 °C (97.4 °F) Temporal 83 15 100 %   25 0000 -- -- -- 90 17 94 %   25 -- -- -- (!) 117 21 91 %   25 1940 86/52 36.7 °C (98.1 °F) Temporal (!) 114 21 96 %   25 1800 101/56 -- -- (!) 118 18 96 %   25 1745 99/59 -- -- 109 16 98 %   25 1730 109/57 -- -- (!) 111 18 97 %   25 1525 118/59 -- -- 88 16 98 %   25 1200 97/54 -- -- 90 16 95 %       Average, Min, and Max for last 24 hours Vitals:  TEMPERATURE:  Temp  Av.6 °C (97.8 °F)  Min: 36.3 °C (97.3 °F)  Max: 37.2 °C (98.9 °F)    RESPIRATIONS RANGE: Resp  Av.8  Min: 15  Max: 21    PULSE RANGE: Pulse  Avg:  93.2  Min: 75  Max: 118    BLOOD PRESSURE RANGE:  Systolic (24hrs), Av , Min:86 , Max:118   ; Diastolic (24hrs), Av, Min:47, Max:71      PULSE OXIMETRY RANGE: SpO2  Av.7 %  Min: 91 %  Max: 100 %  Body mass index is 22.91 kg/m².    I/O last 3 completed shifts:  In: 947.8 (17.8 mL/kg) [P.O.:300; I.V.:647.8 (12.2 mL/kg)]  Out: 1120 (21.1 mL/kg) [Urine:1100 (0.6 mL/kg/hr); Blood:20]  Weight: 53.2 kg   Weight change:      Physical Exam  Vitals and nursing note reviewed.   Constitutional:       Appearance: Normal appearance.   HENT:      Head: Normocephalic and atraumatic.      Right Ear: External ear normal.      Left Ear: External ear normal.      Nose: Nose normal.      Mouth/Throat:      Mouth: Mucous membranes are moist.   Eyes:      General: No scleral icterus.        Right eye: No discharge.         Left eye: No discharge.      Extraocular Movements: Extraocular movements intact.      Conjunctiva/sclera: Conjunctivae normal.      Pupils: Pupils are equal, round, and reactive to light.   Cardiovascular:      Rate and Rhythm: Normal rate and regular rhythm.   Pulmonary:      Effort: Pulmonary effort is normal.      Breath sounds: Normal breath sounds.   Abdominal:      General: Abdomen is flat. Bowel sounds are normal.      Palpations: Abdomen is soft.   Musculoskeletal:         General: Tenderness present. Normal range of motion.      Right lower leg: No edema.      Left lower leg: Edema present.   Skin:     General: Skin is warm and dry.      Capillary Refill: Capillary refill takes less than 2 seconds.   Neurological:      General: No focal deficit present.   Psychiatric:         Mood and Affect: Mood normal.         Thought Content: Thought content normal.         Judgment: Judgment normal.         Lab Results   Component Value Date     (L) 2025    K 4.8 2025    CL 97 (L) 2025    CO2 23 2025    BUN 22 2025    CREATININE 1.10 (H) 2025    GLUCOSE 85 2025     CALCIUM 8.8 03/12/2025    PROT 5.1 (L) 03/10/2025    BILITOT 0.4 03/10/2025    ALKPHOS 88 03/10/2025    AST 10 03/10/2025    ALT 13 03/10/2025       Lab Results   Component Value Date    WBC 20.5 (H) 03/12/2025    HGB 10.0 (L) 03/12/2025    HCT 31.5 (L) 03/12/2025    MCV 89 03/12/2025     03/12/2025    LYMPHOPCT 8.0 03/12/2025    RBC 3.55 (L) 03/12/2025    MCH 28.2 03/12/2025    MCHC 31.7 (L) 03/12/2025    RDW 15.9 (H) 03/12/2025       Lab Results   Component Value Date    TSH 17.97 (H) 03/08/2025     Lab Results   Component Value Date    LACTATE 2.0 09/02/2024    BNP 46 03/07/2025    INR 1.4 (H) 03/07/2025       IMAGES:  Encounter Date: 03/07/25   ECG 12 lead   Result Value    Ventricular Rate 74    Atrial Rate 74    ND Interval 174    QRS Duration 90    QT Interval 382    QTC Calculation(Bazett) 424    P Axis 60    R Axis -3    T Axis 13    QRS Count 12    Q Onset 221    P Onset 134    P Offset 172    T Offset 412    QTC Fredericia 410    Narrative    Normal sinus rhythm  Normal ECG  When compared with ECG of 01-SEP-2024 20:35,  Criteria for Anterior infarct are no longer Present  Criteria for Anterolateral infarct are no longer Present  Criteria for Inferior infarct are no longer Present  ST no longer depressed in Lateral leads  Nonspecific T wave abnormality, improved in Inferior leads  T wave inversion no longer evident in Lateral leads  QT has shortened  See ED provider note for full interpretation and clinical correlation  Confirmed by Estela Gonzales ([id number]) on 3/10/2025 6:56:54 PM     NM Lung perfusion with spect/ct    Result Date: 3/10/2025  Impression: 1. No distinct wedge-shaped segmental perfusion defect seen on SPECT/CT to suggest acute pulmonary embolism (low probability). 2. Low-dose CT demonstrates bilateral trace pleural effusions.   The interpretation above is based on modified PIOPED II and PISAPED criteria.   This study was analyzed and interpreted at [facility]  [place], [place].   MACRO: None     Signed by: Ml Mendoza 3/10/2025 5:09 PM Dictation workstation:   [id number]    XR chest 1 view    Result Date: 3/10/2025  Impression: Suggestion of mild cardiomegaly. Otherwise no acute cardiopulmonary process.     MACRO: None   Signed by: Jenna Watson 3/10/2025 12:12 PM Dictation workstation:   [id number]    Transthoracic Echo (TTE) Complete    Result Date: 3/11/2025   [facility], [address] [place] [address]               Tel [phone] and Fax [phone] TRANSTHORACIC ECHOCARDIOGRAM REPORT  Patient Name:       HARRIS Sandoval Physician:    [id number] Marica Dawson MD Study Date:         3/11/2025           Ordering Provider:    [id number] DESTINEE DURHAM MRN/PID:            [id number]            Fellow: Accession#:         [id number]        Nurse: Date of Birth/Age:  1950 / 74      Sonographer:          Carmen kaur                                     [facility] Gender assigned at  F                   Additional Staff: Birth: Height:             152.40 cm           Admit Date:           3/7/2025 Weight:             53.07 kg            Admission Status:     Inpatient -                                                               Routine BSA / BMI:          1.49 m2 / 22.85     Encounter#:           [id number]                     kg/m2 Blood Pressure:     116/65 mmHg         Department Location:  [facility] Non                                                               Invasive Study Type:    TRANSTHORACIC ECHO (TTE) COMPLETE Diagnosis/ICD: Other pulmonary embolism without acute cor pulmonale-I26.99 Indication:    Pulmonary Embolism CPT Code:      Echo Complete w Full Doppler-[id number] Patient History: Pertinent History: LE Edema. Study Detail: The following Echo studies were performed: 2D,  M-Mode, Doppler and               color flow. Image quality for this study is poor.  PHYSICIAN INTERPRETATION: Left Ventricle: The left ventricular systolic function is normal, with a visually estimated ejection fraction of 65-70%. There is mild concentric left ventricular hypertrophy. The left ventricular cavity size is normal. There is mildly increased posterior left ventricular wall thickness. Left ventricular diastolic filling cannot be determined due to E/A wave fusion. Left Atrium: The left atrial size is normal. Right Ventricle: The right ventricle is normal in size. There is normal right ventricular global systolic function. Right Atrium: The right atrial size was not well visualized. Aortic Valve: The aortic valve is probably trileaflet. The aortic valve dimensionless index is 0.62. There is no evidence of aortic valve regurgitation. The peak instantaneous gradient of the aortic valve is 13 mmHg. The mean gradient of the aortic valve is 7 mmHg. Mitral Valve: The mitral valve is normal in structure. There is trace mitral valve regurgitation. Tricuspid Valve: The tricuspid valve is structurally normal. There is trace tricuspid regurgitation. The right ventricular systolic pressure is unable to be estimated. Pulmonic Valve: The pulmonic valve is not well visualized. There is physiologic pulmonic valve regurgitation. Pericardium: Trivial pericardial effusion. Aorta: The aortic root is normal. Systemic Veins: The inferior vena cava appears normal in size, with IVC inspiratory collapse greater than 50%. In comparison to the previous echocardiogram(s): Compared with study dated 8/26/2024, no significant change.  CONCLUSIONS:  1. The left ventricular systolic function is normal, with a visually estimated ejection fraction of 65-70%.  2. There is normal right ventricular global systolic function. QUANTITATIVE DATA SUMMARY:  2D MEASUREMENTS:          Normal Ranges: IVSd:            1.26 cm  (0.6-1.1cm) LVPWd:            1.15 cm  (0.6-1.1cm) LVIDd:           4.00 cm  (3.9-5.9cm) LVIDs:           2.58 cm LV Mass Index:   112 g/m2 LVEDV Index:     29 ml/m2 LV % FS          35.5 %  LEFT ATRIUM:                  Normal Ranges: LA Vol A4C:        37.7 ml    (22+/-6mL/m2) LA Vol A2C:        31.0 ml LA Vol BP:         35.9 ml LA Vol Index A4C:  25.4 ml/m2 LA Vol Index A2C:  20.8 ml/m2 LA Vol Index BP:   24.2 ml/m2 LA Area A4C:       14.6 cm2 LA Area A2C:       13.9 cm2 LA Major Axis A4C: 4.8 cm LA Major Axis A2C: 5.3 cm LA Volume Index:   24.3 ml/m2 LA Vol A4C:        35.0 ml LA Vol A2C:        30.4 ml LA Vol Index BSA:  22.0 ml/m2  RIGHT ATRIUM:          Normal Ranges: RA Area A4C:  15.6 cm2  M-MODE MEASUREMENTS:         Normal Ranges: Ao Root:             2.90 cm (2.0-3.7cm) LAs:                 3.85 cm (2.7-4.0cm)  LV SYSTOLIC FUNCTION:                      Normal Ranges: EF-A4C View:    61 % (>=55%) EF-A2C View:    64 % EF-Biplane:     60 % EF-Visual:      68 % LV EF Reported: 68 %  LV DIASTOLIC FUNCTION:          Normal Ranges: MV Peak E:             0.93 m/s (0.7-1.2 m/s)  MITRAL VALVE:         Normal Ranges: MV DT:        81 msec (150-240msec)  AORTIC VALVE:                      Normal Ranges: AoV Vmax:                1.80 m/s  (<=1.7m/s) AoV Peak P.9 mmHg (<20mmHg) AoV Mean P.7 mmHg  (1.7-11.5mmHg) LVOT Max Jeferson:            0.99 m/s  (<=1.1m/s) AoV VTI:                 24.82 cm  (18-25cm) LVOT VTI:                15.47 cm LVOT Diameter:           1.99 cm   (1.8-2.4cm) AoV Area, VTI:           1.93 cm2  (2.5-5.5cm2) AoV Area,Vmax:           1.70 cm2  (2.5-4.5cm2) AoV Dimensionless Index: 0.62  RIGHT VENTRICLE: RV Basal 3.10 cm RV Mid   2.20 cm RV Major 6.7 cm  TRICUSPID VALVE/RVSP:          Normal Ranges: Peak TR Velocity:     2.59 m/s RV Syst Pressure:     30 mmHg  (< 30mmHg)  PULMONIC VALVE:          Normal Ranges: PV Max Jeferson:     1.1 m/s  (0.6-0.9m/s) PV Max P.9 mmHg  [id number] Marcia Dawson  MD Electronically signed on 3/11/2025 at 10:34:34 AM  ** Final **     Transthoracic Echo (TTE) Complete    Result Date: 8/26/2024   [facility], [address] [place] [address]                Tel [phone] and Fax [phone] TRANSTHORACIC ECHOCARDIOGRAM REPORT  Patient Name:      HARRIS LUNDBERG      Reading Physician:    [id number] Sha oWods MD Study Date:        8/26/2024            Ordering Provider:    [id number] JASMYN XIAO MRN/PID:           [id number]             Fellow: Accession#:        [id number]         Nurse: Date of Birth/Age: 1950 / 74 years Sonographer:          Kaely RASMUSSEN Gender:            F                    Additional Staff: Height:            149.00 cm            Admit Date: Weight:            58.06 kg             Admission Status:     Inpatient - STAT BSA / BMI:         1.52 m2 / 26.15      Encounter#:           [id number]                    kg/m2 Blood Pressure:    110/65 mmHg          Department Location:  [facility] Non                                                               Invasive Study Type:    TRANSTHORACIC ECHO (TTE) COMPLETE Diagnosis/ICD: Other pulmonary embolism without acute cor pulmonale-I26.99 Indication:    Pulm Embolism, sigmoid tumor of colon CPT Code:      Echo Complete w Full Doppler-[id number] Patient History: Pertinent History: HTN and PE. Malignant tumor of sigmoid colon. Study Detail: The following Echo studies were performed: 2D, M-Mode, Doppler and               color flow.  PHYSICIAN INTERPRETATION: Left Ventricle: The left ventricular systolic function is normal, with a visually estimated ejection fraction of 55-60%. There are no regional left ventricular wall motion abnormalities. The left ventricular cavity size is normal. There is no evidence of left ventricular hypertrophy. Spectral Doppler  shows an impaired relaxation pattern of left ventricular diastolic filling. Left Atrium: The left atrium is moderately dilated. Right Ventricle: The right ventricle is normal in size. There is normal right ventricular global systolic function. Right Atrium: The right atrium is normal in size. Aortic Valve: The aortic valve is trileaflet. There is trace aortic valve regurgitation. The peak instantaneous gradient of the aortic valve is 6.2 mmHg. Mitral Valve: The mitral valve is normal in structure. There is trace mitral valve regurgitation. Tricuspid Valve: The tricuspid valve is structurally normal. There is trace tricuspid regurgitation. Pulmonic Valve: The pulmonic valve is structurally normal. There is physiologic pulmonic valve regurgitation. Pericardium: There is a trivial pericardial effusion. Aorta: The aortic root is normal. The Ao Sinus is 3.40 cm. The Asc Ao is 3.40 cm. Pulmonary Artery: The tricuspid regurgitant velocity is 2.32 m/s, and with an estimated right atrial pressure of 3 mmHg, the estimated pulmonary artery pressure is normal with the RVSP at 24.5 mmHg. Systemic Veins: The inferior vena cava appears to be of normal size. There is IVC inspiratory collapse greater than 50%. In comparison to the previous echocardiogram(s): There are no prior studies on this patient for comparison purposes.  CONCLUSIONS:  1. The left ventricular systolic function is normal, with a visually estimated ejection fraction of 55-60%.  2. Spectral Doppler shows an impaired relaxation pattern of left ventricular diastolic filling.  3. There is no evidence of left ventricular hypertrophy.  4. There is normal right ventricular global systolic function.  5. The left atrium is moderately dilated. QUANTITATIVE DATA SUMMARY: 2D MEASUREMENTS:                         Normal Ranges: Ao Root d:     3.40 cm  (2.0-3.7cm) LAs:           3.60 cm  (2.7-4.0cm) IVSd:          1.10 cm  (0.6-1.1cm) LVPWd:         0.90 cm  (0.6-1.1cm) LVIDd:          4.20 cm  (3.9-5.9cm) LVIDs:         2.90 cm LV Mass Index: 90 g/m2 LVEDV Index:   53 ml/m2 LV % FS        31.0 % LA VOLUME:                               Normal Ranges: LA Vol A4C:        65.1 ml    (22+/-6mL/m2) LA Vol A2C:        63.7 ml LA Vol BP:         65.6 ml LA Vol Index A4C:  42.9ml/m2 LA Vol Index A2C:  41.9 ml/m2 LA Vol Index BP:   43.2 ml/m2 LA Area A4C:       20.9 cm2 LA Area A2C:       20.3 cm2 LA Major Axis A4C: 5.7 cm LA Major Axis A2C: 5.5 cm LA Volume Index:   43.2 ml/m2 RA VOLUME BY A/L METHOD:                               Normal Ranges: RA Vol A4C:        29.5 ml    (8.3-19.5ml) RA Vol Index A4C:  19.4 ml/m2 RA Area A4C:       12.9 cm2 RA Major Axis A4C: 4.8 cm AORTA MEASUREMENTS:                      Normal Ranges: Ao Sinus, d: 3.40 cm (2.1-3.5cm) Asc Ao, d:   3.40 cm (2.1-3.4cm) LV SYSTOLIC FUNCTION BY 2D PLANIMETRY (MOD):                      Normal Ranges: EF-A4C View:    47 % (>=55%) EF-A2C View:    62 % EF-Biplane:     55 % EF-Visual:      58 % LV EF Reported: 58 % LV DIASTOLIC FUNCTION:                               Normal Ranges: MV Peak E:        0.75 m/s    (0.7-1.2 m/s) MV Peak A:        0.78 m/s    (0.42-0.7 m/s) E/A Ratio:        0.96        (1.0-2.2) MV e'             0.075 m/s   (>8.0) MV lateral e'     0.09 m/s MV medial e'      0.06 m/s MV A Dur:         165.00 msec E/e' Ratio:       10.05       (<8.0) PulmV Sys Jeferson:    45.00 cm/s PulmV Saravia Jeferson:   29.10 cm/s PulmV S/D Jeferson:    1.50 PulmV A Revs Jeferson: 27.00 cm/s PulmV A Revs Dur: 106.00 msec MITRAL VALVE:                 Normal Ranges: MV DT: 210 msec (150-240msec) AORTIC VALVE:                         Normal Ranges: AoV Vmax:      1.24 m/s (<=1.7m/s) AoV Peak P.2 mmHg (<20mmHg) LVOT Max Jeferson:  0.76 m/s (<=1.1m/s) LVOT VTI:      18.90 cm LVOT Diameter: 1.80 cm  (1.8-2.4cm) AoV Area,Vmax: 1.57 cm2 (2.5-4.5cm2)  RIGHT VENTRICLE: RV Basal 3.30 cm RV Mid   2.10 cm RV Major 6.3 cm TAPSE:   26.1 mm RV s'    0.12 m/s  TRICUSPID VALVE/RVSP:                             Normal Ranges: Peak TR Velocity: 2.32 m/s Est. RA Pressure: 3 mmHg RV Syst Pressure: 24.5 mmHg (< 30mmHg) IVC Diam:         1.50 cm PULMONIC VALVE:                         Normal Ranges: PV Accel Time: 120 msec (>120ms) PV Max Jeferson:    0.7 m/s  (0.6-0.9m/s) PV Max P.0 mmHg Pulmonary Veins: PulmV A Revs Dur: 106.00 msec PulmV A Revs Jeferson: 27.00 cm/s PulmV Saravia Jeferson:   29.10 cm/s PulmV S/D Jeferson:    1.50 PulmV Sys Jeferson:    45.00 cm/s  [id number] Sha Woods MD Electronically signed on 2024 at 11:56:09 AM  ** Final **    === 25 ===    XR CHEST 1 VIEW    - Impression -  Suggestion of mild cardiomegaly. Otherwise no acute cardiopulmonary  process.      MACRO:  None    Signed by: Jenna Watson 3/10/2025 12:12 PM  Dictation workstation:   [id number]  === 02/10/25 ===    RAD ONC CT SIM IMAGES ONLY  === 25 ===    XR CHEST 1 VIEW    - Impression -  Suggestion of mild cardiomegaly. Otherwise no acute cardiopulmonary  process.      MACRO:  None    Signed by: Jenna Watson 3/10/2025 12:12 PM  Dictation workstation:   [id number]  === 25 ===    MR PELVIS W AND WO CONTRAST    - Impression -  1. Progression of the necrotic left presacral soft tissue mass  consistent with the known local recurrence. The mass currently  measures 9.4 x 5.6 cm in comparison to 7.8 x 4.6 cm. More invasion of  the left side of the sacrum, upper rectum and left vaginal cuff.  2. More conspicuous right lower perirectal nodule measuring 0.9 cm  which would be concerning for disease involvement.  3. Stable prominent left para-aortic lymph node measuring 0.8 cm    MACRO:  None    Signed by: Александр Carrillo 2025 10:30 AM  Dictation workstation:   [id number]    Additional results of the last 24 hours have been reviewed.    Dictated using Dragon Naturally Speaking Medical Version 2.4  Proof read however unrecognized voice recognition errors may have occurred     Electronically  signed by Amelia Ponce DO on 03/12/25 at 10:07 AM

## 2025-03-12 NOTE — CARE PLAN
The patient's goals for the shift include      The clinical goals for the shift include Pt will remain HDS this shift.    Over the shift, the patient did make progress toward the following goals. VS stable, H&H improved, monitored and medicated as ordered this shift.

## 2025-03-12 NOTE — CARE PLAN
The patient's goals for the shift include  get comfortable.    The clinical goals for the shift include Pt will remain HDS throughout shift.

## 2025-03-13 ENCOUNTER — PHARMACY VISIT (OUTPATIENT)
Dept: PHARMACY | Facility: CLINIC | Age: 75
End: 2025-03-13
Payer: COMMERCIAL

## 2025-03-13 ENCOUNTER — APPOINTMENT (OUTPATIENT)
Dept: CARDIOLOGY | Facility: HOSPITAL | Age: 75
DRG: 299 | End: 2025-03-13
Payer: MEDICARE

## 2025-03-13 LAB
ALBUMIN SERPL BCP-MCNC: 2.2 G/DL (ref 3.4–5)
ALBUMIN SERPL BCP-MCNC: 2.4 G/DL (ref 3.4–5)
ANION GAP SERPL CALC-SCNC: 15 MMOL/L (ref 10–20)
ANION GAP SERPL CALC-SCNC: 15 MMOL/L (ref 10–20)
ATRIAL RATE: 74 BPM
BASOPHILS # BLD MANUAL: 0 X10*3/UL (ref 0–0.1)
BASOPHILS # BLD MANUAL: 0 X10*3/UL (ref 0–0.1)
BASOPHILS NFR BLD MANUAL: 0 %
BASOPHILS NFR BLD MANUAL: 0 %
BLOOD EXPIRATION DATE: NORMAL
BUN SERPL-MCNC: 25 MG/DL (ref 6–23)
BUN SERPL-MCNC: 26 MG/DL (ref 6–23)
BURR CELLS BLD QL SMEAR: ABNORMAL
BURR CELLS BLD QL SMEAR: ABNORMAL
CALCIUM SERPL-MCNC: 8.3 MG/DL (ref 8.6–10.3)
CALCIUM SERPL-MCNC: 8.3 MG/DL (ref 8.6–10.3)
CHLORIDE SERPL-SCNC: 96 MMOL/L (ref 98–107)
CHLORIDE SERPL-SCNC: 97 MMOL/L (ref 98–107)
CO2 SERPL-SCNC: 20 MMOL/L (ref 21–32)
CO2 SERPL-SCNC: 21 MMOL/L (ref 21–32)
CREAT SERPL-MCNC: 0.84 MG/DL (ref 0.5–1.05)
CREAT SERPL-MCNC: 0.84 MG/DL (ref 0.5–1.05)
DACRYOCYTES BLD QL SMEAR: ABNORMAL
DACRYOCYTES BLD QL SMEAR: ABNORMAL
DISPENSE STATUS: NORMAL
EGFRCR SERPLBLD CKD-EPI 2021: 73 ML/MIN/1.73M*2
EGFRCR SERPLBLD CKD-EPI 2021: 73 ML/MIN/1.73M*2
EOSINOPHIL # BLD MANUAL: 0 X10*3/UL (ref 0–0.4)
EOSINOPHIL # BLD MANUAL: 0 X10*3/UL (ref 0–0.4)
EOSINOPHIL NFR BLD MANUAL: 0 %
EOSINOPHIL NFR BLD MANUAL: 0 %
ERYTHROCYTE [DISTWIDTH] IN BLOOD BY AUTOMATED COUNT: 16.1 % (ref 11.5–14.5)
ERYTHROCYTE [DISTWIDTH] IN BLOOD BY AUTOMATED COUNT: 16.4 % (ref 11.5–14.5)
GLUCOSE SERPL-MCNC: 138 MG/DL (ref 74–99)
GLUCOSE SERPL-MCNC: 187 MG/DL (ref 74–99)
HCT VFR BLD AUTO: 26.7 % (ref 36–46)
HCT VFR BLD AUTO: 32.7 % (ref 36–46)
HGB BLD-MCNC: 10.5 G/DL (ref 12–16)
HGB BLD-MCNC: 8.8 G/DL (ref 12–16)
IMM GRANULOCYTES # BLD AUTO: 1.83 X10*3/UL (ref 0–0.5)
IMM GRANULOCYTES # BLD AUTO: 2.46 X10*3/UL (ref 0–0.5)
IMM GRANULOCYTES NFR BLD AUTO: 7.7 % (ref 0–0.9)
IMM GRANULOCYTES NFR BLD AUTO: 7.9 % (ref 0–0.9)
LYMPHOCYTES # BLD MANUAL: 0.72 X10*3/UL (ref 0.8–3)
LYMPHOCYTES # BLD MANUAL: 0.92 X10*3/UL (ref 0.8–3)
LYMPHOCYTES NFR BLD MANUAL: 3 %
LYMPHOCYTES NFR BLD MANUAL: 3 %
MAGNESIUM SERPL-MCNC: 1.59 MG/DL (ref 1.6–2.4)
MAGNESIUM SERPL-MCNC: 2.87 MG/DL (ref 1.6–2.4)
MCH RBC QN AUTO: 28.4 PG (ref 26–34)
MCH RBC QN AUTO: 28.6 PG (ref 26–34)
MCHC RBC AUTO-ENTMCNC: 32.1 G/DL (ref 32–36)
MCHC RBC AUTO-ENTMCNC: 33 G/DL (ref 32–36)
MCV RBC AUTO: 86 FL (ref 80–100)
MCV RBC AUTO: 89 FL (ref 80–100)
METAMYELOCYTES # BLD MANUAL: 0.48 X10*3/UL
METAMYELOCYTES NFR BLD MANUAL: 2 %
MONOCYTES # BLD MANUAL: 0.24 X10*3/UL (ref 0.05–0.8)
MONOCYTES # BLD MANUAL: 0.62 X10*3/UL (ref 0.05–0.8)
MONOCYTES NFR BLD MANUAL: 1 %
MONOCYTES NFR BLD MANUAL: 2 %
MYELOCYTES # BLD MANUAL: 0.31 X10*3/UL
MYELOCYTES NFR BLD MANUAL: 1 %
NEUTROPHILS # BLD MANUAL: 21.99 X10*3/UL (ref 1.6–5.5)
NEUTROPHILS # BLD MANUAL: 28.95 X10*3/UL (ref 1.6–5.5)
NEUTS BAND # BLD MANUAL: 2.63 X10*3/UL (ref 0–0.5)
NEUTS BAND # BLD MANUAL: 4.62 X10*3/UL (ref 0–0.5)
NEUTS BAND NFR BLD MANUAL: 11 %
NEUTS BAND NFR BLD MANUAL: 15 %
NEUTS SEG # BLD MANUAL: 19.36 X10*3/UL (ref 1.6–5)
NEUTS SEG # BLD MANUAL: 24.33 X10*3/UL (ref 1.6–5)
NEUTS SEG NFR BLD MANUAL: 79 %
NEUTS SEG NFR BLD MANUAL: 81 %
NRBC BLD MANUAL-RTO: 1 % (ref 0–0)
NRBC BLD-RTO: 0 /100 WBCS (ref 0–0)
NRBC BLD-RTO: 0 /100 WBCS (ref 0–0)
OVALOCYTES BLD QL SMEAR: ABNORMAL
P AXIS: 38 DEGREES
P OFFSET: 185 MS
P ONSET: 132 MS
PHOSPHATE SERPL-MCNC: 2.8 MG/DL (ref 2.5–4.9)
PHOSPHATE SERPL-MCNC: 3.7 MG/DL (ref 2.5–4.9)
PLATELET # BLD AUTO: 349 X10*3/UL (ref 150–450)
PLATELET # BLD AUTO: 460 X10*3/UL (ref 150–450)
POLYCHROMASIA BLD QL SMEAR: ABNORMAL
POTASSIUM SERPL-SCNC: 4.5 MMOL/L (ref 3.5–5.3)
POTASSIUM SERPL-SCNC: 4.7 MMOL/L (ref 3.5–5.3)
PR INTERVAL: 172 MS
PRODUCT BLOOD TYPE: 7300
PRODUCT CODE: NORMAL
Q ONSET: 218 MS
QRS COUNT: 13 BEATS
QRS DURATION: 86 MS
QT INTERVAL: 388 MS
QTC CALCULATION(BAZETT): 430 MS
QTC FREDERICIA: 416 MS
R AXIS: -6 DEGREES
RBC # BLD AUTO: 3.1 X10*6/UL (ref 4–5.2)
RBC # BLD AUTO: 3.67 X10*6/UL (ref 4–5.2)
RBC MORPH BLD: ABNORMAL
RBC MORPH BLD: ABNORMAL
SODIUM SERPL-SCNC: 126 MMOL/L (ref 136–145)
SODIUM SERPL-SCNC: 128 MMOL/L (ref 136–145)
T AXIS: -3 DEGREES
T OFFSET: 412 MS
TARGETS BLD QL SMEAR: ABNORMAL
TOTAL CELLS COUNTED BLD: 100
TOTAL CELLS COUNTED BLD: 100
UNIT ABO: NORMAL
UNIT NUMBER: NORMAL
UNIT RH: NORMAL
UNIT VOLUME: 350
VARIANT LYMPHS # BLD MANUAL: 0.48 X10*3/UL (ref 0–0.3)
VARIANT LYMPHS NFR BLD: 2 %
VENTRICULAR RATE: 74 BPM
WBC # BLD AUTO: 23.9 X10*3/UL (ref 4.4–11.3)
WBC # BLD AUTO: 30.8 X10*3/UL (ref 4.4–11.3)
XM INTEP: NORMAL

## 2025-03-13 PROCEDURE — 2500000004 HC RX 250 GENERAL PHARMACY W/ HCPCS (ALT 636 FOR OP/ED): Performed by: INTERNAL MEDICINE

## 2025-03-13 PROCEDURE — 2060000001 HC INTERMEDIATE ICU ROOM DAILY

## 2025-03-13 PROCEDURE — 80069 RENAL FUNCTION PANEL: CPT | Performed by: INTERNAL MEDICINE

## 2025-03-13 PROCEDURE — RXMED WILLOW AMBULATORY MEDICATION CHARGE

## 2025-03-13 PROCEDURE — 83735 ASSAY OF MAGNESIUM: CPT | Performed by: INTERNAL MEDICINE

## 2025-03-13 PROCEDURE — 2500000002 HC RX 250 W HCPCS SELF ADMINISTERED DRUGS (ALT 637 FOR MEDICARE OP, ALT 636 FOR OP/ED): Performed by: INTERNAL MEDICINE

## 2025-03-13 PROCEDURE — 36415 COLL VENOUS BLD VENIPUNCTURE: CPT | Performed by: INTERNAL MEDICINE

## 2025-03-13 PROCEDURE — 85007 BL SMEAR W/DIFF WBC COUNT: CPT | Performed by: INTERNAL MEDICINE

## 2025-03-13 PROCEDURE — 99232 SBSQ HOSP IP/OBS MODERATE 35: CPT | Performed by: INTERNAL MEDICINE

## 2025-03-13 PROCEDURE — 2500000001 HC RX 250 WO HCPCS SELF ADMINISTERED DRUGS (ALT 637 FOR MEDICARE OP): Performed by: INTERNAL MEDICINE

## 2025-03-13 PROCEDURE — 93005 ELECTROCARDIOGRAM TRACING: CPT

## 2025-03-13 PROCEDURE — 85027 COMPLETE CBC AUTOMATED: CPT | Performed by: INTERNAL MEDICINE

## 2025-03-13 PROCEDURE — 2500000001 HC RX 250 WO HCPCS SELF ADMINISTERED DRUGS (ALT 637 FOR MEDICARE OP): Performed by: PHYSICIAN ASSISTANT

## 2025-03-13 RX ORDER — LACTULOSE 10 G/15ML
20 SOLUTION ORAL 3 TIMES DAILY
Status: DISCONTINUED | OUTPATIENT
Start: 2025-03-13 | End: 2025-03-15 | Stop reason: HOSPADM

## 2025-03-13 RX ORDER — SODIUM CHLORIDE 1000 MG
2000 TABLET, SOLUBLE MISCELLANEOUS
Status: COMPLETED | OUTPATIENT
Start: 2025-03-13 | End: 2025-03-14

## 2025-03-13 RX ORDER — ADHESIVE BANDAGE
10 BANDAGE TOPICAL ONCE
Status: COMPLETED | OUTPATIENT
Start: 2025-03-13 | End: 2025-03-13

## 2025-03-13 RX ORDER — GABAPENTIN 400 MG/1
400 CAPSULE ORAL 3 TIMES DAILY
Qty: 180 CAPSULE | Refills: 1 | Status: SHIPPED | OUTPATIENT
Start: 2025-03-13

## 2025-03-13 RX ORDER — MAGNESIUM SULFATE HEPTAHYDRATE 40 MG/ML
4 INJECTION, SOLUTION INTRAVENOUS ONCE
Status: COMPLETED | OUTPATIENT
Start: 2025-03-13 | End: 2025-03-13

## 2025-03-13 RX ORDER — SODIUM CHLORIDE 9 MG/ML
100 INJECTION, SOLUTION INTRAVENOUS CONTINUOUS
Status: ACTIVE | OUTPATIENT
Start: 2025-03-13 | End: 2025-03-14

## 2025-03-13 RX ORDER — AMOXICILLIN 250 MG
2 CAPSULE ORAL 2 TIMES DAILY
Qty: 120 TABLET | Refills: 0 | Status: SHIPPED | OUTPATIENT
Start: 2025-03-13

## 2025-03-13 RX ORDER — POLYETHYLENE GLYCOL 3350 17 G/17G
17 POWDER, FOR SOLUTION ORAL 2 TIMES DAILY PRN
Qty: 238 G | Refills: 0 | Status: SHIPPED | OUTPATIENT
Start: 2025-03-13

## 2025-03-13 RX ORDER — NALOXONE HYDROCHLORIDE 4 MG/.1ML
4 SPRAY NASAL AS NEEDED
Status: ACTIVE
Start: 2025-03-13

## 2025-03-13 RX ORDER — NALOXONE HYDROCHLORIDE 4 MG/.1ML
4 SPRAY NASAL AS NEEDED
Qty: 2 EACH | Refills: 11 | Status: SHIPPED | OUTPATIENT
Start: 2025-03-13

## 2025-03-13 RX ORDER — OXYCODONE HYDROCHLORIDE 10 MG/1
5-10 TABLET ORAL EVERY 4 HOURS PRN
Qty: 30 TABLET | Refills: 0 | Status: SHIPPED | OUTPATIENT
Start: 2025-03-13

## 2025-03-13 RX ORDER — ACETAMINOPHEN 500 MG
1000 TABLET ORAL EVERY 6 HOURS
Qty: 120 TABLET | Refills: 0 | Status: SHIPPED | OUTPATIENT
Start: 2025-03-13

## 2025-03-13 RX ORDER — DEXAMETHASONE 1 MG/1
TABLET ORAL
Qty: 11 TABLET | Refills: 0 | Status: SHIPPED | OUTPATIENT
Start: 2025-03-13

## 2025-03-13 RX ORDER — METHOCARBAMOL 500 MG/1
1000 TABLET, FILM COATED ORAL EVERY 8 HOURS SCHEDULED
Qty: 180 TABLET | Refills: 1 | Status: SHIPPED | OUTPATIENT
Start: 2025-03-13

## 2025-03-13 RX ORDER — PANTOPRAZOLE SODIUM 40 MG/1
40 TABLET, DELAYED RELEASE ORAL
Qty: 90 TABLET | Refills: 1 | Status: SHIPPED | OUTPATIENT
Start: 2025-03-14

## 2025-03-13 RX ADMIN — METHOCARBAMOL TABLETS 1000 MG: 500 TABLET, COATED ORAL at 21:32

## 2025-03-13 RX ADMIN — ACETAMINOPHEN 975 MG: 325 TABLET ORAL at 21:31

## 2025-03-13 RX ADMIN — DEXAMETHASONE 4 MG: 4 TABLET ORAL at 18:05

## 2025-03-13 RX ADMIN — LEVOTHYROXINE SODIUM 112 MCG: 112 TABLET ORAL at 05:36

## 2025-03-13 RX ADMIN — SENNOSIDES AND DOCUSATE SODIUM 2 TABLET: 50; 8.6 TABLET ORAL at 21:31

## 2025-03-13 RX ADMIN — POLYETHYLENE GLYCOL 3350 17 G: 17 POWDER, FOR SOLUTION ORAL at 21:32

## 2025-03-13 RX ADMIN — PANTOPRAZOLE SODIUM 40 MG: 40 TABLET, DELAYED RELEASE ORAL at 06:19

## 2025-03-13 RX ADMIN — GABAPENTIN 400 MG: 400 CAPSULE ORAL at 08:52

## 2025-03-13 RX ADMIN — LACTULOSE 20 G: 20 SOLUTION ORAL at 15:12

## 2025-03-13 RX ADMIN — GABAPENTIN 400 MG: 400 CAPSULE ORAL at 15:14

## 2025-03-13 RX ADMIN — GABAPENTIN 400 MG: 400 CAPSULE ORAL at 21:31

## 2025-03-13 RX ADMIN — METHOCARBAMOL TABLETS 1000 MG: 500 TABLET, COATED ORAL at 06:19

## 2025-03-13 RX ADMIN — SODIUM CHLORIDE 1000 ML: 9 INJECTION, SOLUTION INTRAVENOUS at 08:50

## 2025-03-13 RX ADMIN — APIXABAN 10 MG: 5 TABLET, FILM COATED ORAL at 08:52

## 2025-03-13 RX ADMIN — SENNOSIDES AND DOCUSATE SODIUM 2 TABLET: 50; 8.6 TABLET ORAL at 08:52

## 2025-03-13 RX ADMIN — DEXAMETHASONE SODIUM PHOSPHATE 10 MG: 10 INJECTION, SOLUTION INTRAMUSCULAR; INTRAVENOUS at 00:59

## 2025-03-13 RX ADMIN — MAGNESIUM HYDROXIDE 10 ML: 400 SUSPENSION ORAL at 12:23

## 2025-03-13 RX ADMIN — DEXAMETHASONE SODIUM PHOSPHATE 10 MG: 10 INJECTION, SOLUTION INTRAMUSCULAR; INTRAVENOUS at 08:51

## 2025-03-13 RX ADMIN — MAGNESIUM SULFATE HEPTAHYDRATE 4 G: 4 INJECTION, SOLUTION INTRAVENOUS at 08:50

## 2025-03-13 RX ADMIN — ACETAMINOPHEN 975 MG: 325 TABLET ORAL at 08:51

## 2025-03-13 RX ADMIN — APIXABAN 10 MG: 5 TABLET, FILM COATED ORAL at 21:31

## 2025-03-13 RX ADMIN — LACTULOSE 20 G: 20 SOLUTION ORAL at 12:23

## 2025-03-13 RX ADMIN — SODIUM CHLORIDE 100 ML/HR: 9 INJECTION, SOLUTION INTRAVENOUS at 16:58

## 2025-03-13 RX ADMIN — Medication 2 G: at 18:02

## 2025-03-13 RX ADMIN — ACETAMINOPHEN 975 MG: 325 TABLET ORAL at 15:12

## 2025-03-13 RX ADMIN — LACTULOSE 20 G: 20 SOLUTION ORAL at 21:31

## 2025-03-13 RX ADMIN — METHOCARBAMOL TABLETS 1000 MG: 500 TABLET, COATED ORAL at 15:13

## 2025-03-13 ASSESSMENT — COGNITIVE AND FUNCTIONAL STATUS - GENERAL
DRESSING REGULAR UPPER BODY CLOTHING: A LITTLE
TURNING FROM BACK TO SIDE WHILE IN FLAT BAD: A LITTLE
MOVING TO AND FROM BED TO CHAIR: A LOT
TOILETING: A LOT
WALKING IN HOSPITAL ROOM: TOTAL
DAILY ACTIVITIY SCORE: 17
STANDING UP FROM CHAIR USING ARMS: A LOT
MOBILITY SCORE: 13
HELP NEEDED FOR BATHING: A LOT
CLIMB 3 TO 5 STEPS WITH RAILING: TOTAL
DRESSING REGULAR LOWER BODY CLOTHING: A LOT

## 2025-03-13 ASSESSMENT — PAIN DESCRIPTION - DESCRIPTORS: DESCRIPTORS: DISCOMFORT

## 2025-03-13 ASSESSMENT — PAIN - FUNCTIONAL ASSESSMENT
PAIN_FUNCTIONAL_ASSESSMENT: 0-10

## 2025-03-13 ASSESSMENT — PAIN DESCRIPTION - ORIENTATION
ORIENTATION: LEFT
ORIENTATION: LEFT

## 2025-03-13 ASSESSMENT — PAIN SCALES - GENERAL
PAINLEVEL_OUTOF10: 1
PAINLEVEL_OUTOF10: 3
PAINLEVEL_OUTOF10: 5 - MODERATE PAIN

## 2025-03-13 ASSESSMENT — PAIN DESCRIPTION - LOCATION: LOCATION: LEG

## 2025-03-13 ASSESSMENT — ACTIVITIES OF DAILY LIVING (ADL): EFFECT OF PAIN ON DAILY ACTIVITIES: HINDERS

## 2025-03-13 NOTE — DISCHARGE SUMMARY
[facility] Hospitalist Discharge Summary and Transition Note           Terra Alexis                  :  1950                     MRN:  [id number]     ADMIT DATE:  3/7/2025            DISCHARGE DATE:  3/14/2025    LOS: 7  day(s) since admission    PRIMARY CARE PHYSICIAN:  No Assigned PCP Generic Provider, MD     VISIT STATUS: Admission     CODE STATUS:  Full Code     DISCHARGE DIAGNOSES:    Assessment & Plan  Acute bilateral deep vein thrombosis (DVT) of iliac veins of lower extremities (Multi)    Acute deep vein thrombosis (DVT) of femoral vein of left lower extremity (Multi)       HOSPITAL COURSE:  Terra Alexis is a 74 y.o. female , hx of recurrent  metastatic uroepithelial cancer, on maintenance Pembro from 2025. Admitted with extensive dvt of LLE s/p  thrombectomy (3/11/25)     #Acute extensive DVT of LLE likely due to malignancy  -s/p thrombectomy (3/11/25). transitioned to apixaban from hep gtt  #Cancer related pain-on cocktail of analgesics and decadron  MRI (25)  1. Progression of the necrotic left presacral soft tissue mass consistent with the known local recurrence. The mass currently measures 9.4 x 5.6 cm in comparison to 7.8 x 4.6 cm. More invasion of the left side of the sacrum, upper rectum and left vaginal cuff. 2. More conspicuous right lower perirectal nodule measuring 0.9 cm which would be concerning for disease involvement. 3. Stable prominent left para-aortic lymph node measuring 0.8 cm  #Hypothyroidism  #Recurrent metastatic uroepithelial cancer  #Anemia of chronic disease not requiring transfusion  #Hypothyroid-on synthroid  #GOC: pt would like to maintain Full code status       PROCEDURES:   extensive dvt of LLE s/p  thrombectomy (3/11/25)  CONSULTANTS:  palliative, interventional cards    COMPLEXITY OF FOLLOW UP:  [] Moderate Complexity: follow up within 7-14 calendar days (98371)  []Severe Complexity: follow up within 7 calendar days (36072)     FOLLOW UP TESTING,  PENDING RESULTS ORREFERRALS AT TRANSITIONAL CARE VISIT:   []Yes   [] No     DISCHARGE MEDICATIONS:        Significant Medication Changes:         Your medication list        START taking these medications        Instructions Last Dose Given Next Dose Due   Eliquis DVT-PE Treat 30D Start 5 mg (74 tabs) tablet  Generic drug: apixaban      Take 2 tablets (10 mg) by mouth 2 times a day for 7 days, then take 1 tablet (5 mg) by mouth 2 times a day.       lactulose 20 gram/30 mL oral solution      Take 30 mL (20 g) by mouth 3 times a day as needed (Constipation).       methocarbamol 500 mg tablet  Commonly known as: Robaxin      Take 2 tablets (1,000 mg) by mouth every 8 hours.       naloxone 4 mg/0.1 mL nasal spray  Commonly known as: Narcan      Administer 1 spray (4 mg) into affected nostril(s) if needed for opioid reversal or respiratory depression. May repeat every 2-3 minutes if needed, alternating nostrils, until medical assistance becomes available.       naloxone 4 mg/0.1 mL nasal spray  Commonly known as: Narcan      Administer 1 spray (4 mg) into affected nostril(s) if needed for opioid reversal. Give 1 spray as a single dose in one nostril. May repeat every 2-3 minutes in alternating nostrils until medical assistance becomes available.       pantoprazole 40 mg EC tablet  Commonly known as: ProtoNix      Take 1 tablet (40 mg) by mouth once daily in the morning. Take before meals. Do not crush, chew, or split.       polyethylene glycol 17 gram/dose powder  Commonly known as: Glycolax, Miralax      Mix 17 g of powder in 4-8 oz of a beverage and drink 2 times a day as needed for constipation.       Senexon-S 8.6-50 mg tablet  Generic drug: sennosides-docusate sodium      Take 2 tablets by mouth 2 times a day.              CHANGE how you take these medications        Instructions Last Dose Given Next Dose Due   acetaminophen 500 mg tablet  Commonly known as: Tylenol  What changed:   medication strength  how much to  take  Another medication with the same name was removed. Continue taking this medication, and follow the directions you see here.      Take 2 tablets (1,000 mg) by mouth every 6 hours.       dexAMETHasone 1 mg tablet  Commonly known as: Decadron  What changed:   medication strength  how much to take  how to take this  when to take this  additional instructions      Take 4 tablets for 1 day, then 3 tablets for 1 day, then 2 tablets for 1 day, then 1 tablet for 1 day, then half tablet for 1 day       gabapentin 400 mg capsule  Commonly known as: Neurontin  What changed:   medication strength  how much to take      Take 1 capsule (400 mg) by mouth 3 times a day.       oxyCODONE 10 mg immediate release tablet  Commonly known as: Roxicodone  What changed: how much to take      Take 0.5-1 tablets (5-10 mg) by mouth every 4 hours if needed for severe pain (7 - 10).              CONTINUE taking these medications        Instructions Last Dose Given Next Dose Due   levothyroxine 25 mcg tablet  Commonly known as: Synthroid, Levoxyl      Take 4 tablets (100 mcg) by mouth early in the morning.. Take on an empty stomach at the same time each day, either 30 to 60 minutes prior to breakfast              STOP taking these medications      hydrOXYzine HCL 10 mg tablet  Commonly known as: Atarax        ondansetron 4 mg tablet  Commonly known as: Zofran                  Where to Get Your Medications        These medications were sent to [facility]  [address], [place] [address]      Hours: 9 AM to 5 PM Mon-Fri Phone: [phone]   acetaminophen 500 mg tablet  dexAMETHasone 1 mg tablet  Eliquis DVT-PE Treat 30D Start 5 mg (74 tabs) tablet  gabapentin 400 mg capsule  lactulose 20 gram/30 mL oral solution  methocarbamol 500 mg tablet  naloxone 4 mg/0.1 mL nasal spray  oxyCODONE 10 mg immediate release tablet  pantoprazole 40 mg EC tablet  polyethylene glycol 17 gram/dose powder  Senexon-S 8.6-50 mg tablet        Information about where to get these medications is not yet available    Ask your nurse or doctor about these medications  naloxone 4 mg/0.1 mL nasal spray           DIET: regular      DISPOSITION:  Home with [facility]     Follow up with No Assigned PCP Generic Provider, MD  .        DISCHARGE TIME: > 30 minutes     Electronically signed by Amelia Ponce DO on 03/14/25 at 9:45 AM      Dictated using [employer] Version 2.4  Proof read however unrecognized voice recognition errors may have occurred

## 2025-03-13 NOTE — PROGRESS NOTES
Palliative Care Social Work Note     Met with pt.  Pt states she is okay, frustrated.   Pt anticipates dc 'soon'.  Discussed completion of Advance Directives.  Pt states she has too much going on to complete now.  Advised pt how to complete documents.  Advised pt that until a new document is executed, document from 2011 is valid.  Pt verbalized understanding.

## 2025-03-13 NOTE — PROGRESS NOTES
Medication Education     Medication education for Terra Alexis was provided to the patient and family for the following medication(s):  naloxone    Medication education provided by a Pharmacist:  How to take and what to do if a dose is missed How the medication works and benefits of taking it    Identified potential barriers to education:  None    Method(s) of Education:  Verbal    An opportunity to ask questions and receive answers was provided.     Luis Hong, Soren, MANPREET, BCPS  Clinical Pharmacy Specialist  Internal Medicine

## 2025-03-13 NOTE — PROGRESS NOTES
[facility] Hospitalist Progress Note       [phone]: Please page me for patient care issues.  [phone]: Please page night hospitalist for any issues.     Terra Alexis  :  1950(74 y.o.)  MRN:  [id number]  PCP: No Assigned PCP Generic Provider, MD  LOS: 6  day(s) since admission    Assessment & Plan  Acute bilateral deep vein thrombosis (DVT) of iliac veins of lower extremities (Multi)    Acute deep vein thrombosis (DVT) of femoral vein of left lower extremity (Multi)      Assessment and Plan:     Terra Alexis is a 74 y.o. female , hx of metastatic uroepithelial cancer, on maintenance Pembro from February note. Admitted with extensive dvt of LLE    #Acute extensive DVT of LLE likely due to malignancy  -s/p thrombectomy (3/11/25). transitioned to apixaban from hep gtt  #Cancer related pain-on cocktail of analgesics and decadron  MRI (25)  1. Progression of the necrotic left presacral soft tissue mass consistent with the known local recurrence. The mass currently measures 9.4 x 5.6 cm in comparison to 7.8 x 4.6 cm. More invasion of the left side of the sacrum, upper rectum and left vaginal cuff. 2. More conspicuous right lower perirectal nodule measuring 0.9 cm which would be concerning for disease involvement. 3. Stable prominent left para-aortic lymph node measuring 0.8 cm  #Hypothyroidism  #Recurrent metastatic uroepithelial cancer  #Anemia of chronic disease not requiring transfusion  #Hypothyroid-on synthroid  #GOC: pt would like to maintain FC status  -CS notes reviewed and recs appreciated: palliative, interventional cards    Disposition: await test results, await consultant recommendations, and await clinical improvement    DVT Prophylaxis: full anticoagulation apixaban    Code status: Full Code  Diet: Adult diet Cardiac; 70 gm fat; 2 - 3 grams Sodium    Level of MDM:  High    discussed with nurse, discussed with TCC/SW, I personally examined the patient, and I personally reviewed chart,  data, labs radiology reports    Family Communication  Number :   Name of Designated Family Representative:       Total time spent: 50 minutes, of total time providing counseling or in coordination of care. Total time on this day of visit includes record and documentation review before and after visit including documentation and time not explicitly included on EMR time stamp      Subjective:   Interval History:  HPI  The patient complains of LLE pain  The patient feels their symptoms areimproving  no events or new concerns    Scheduled Meds:acetaminophen, 975 mg, oral, TID  apixaban, 10 mg, oral, BID   Followed by  [START ON 3/19/2025] apixaban, 5 mg, oral, BID  dexAMETHasone, 10 mg, intravenous, q8h   Followed by  dexAMETHasone, 4 mg, oral, Daily  gabapentin, 400 mg, oral, TID  levothyroxine, 112 mcg, oral, Daily  methocarbamol, 1,000 mg, oral, q8h AILEEN  oxyCODONE ER, 10 mg, oral, q12h AILEEN  pantoprazole, 40 mg, oral, Daily before breakfast  polyethylene glycol, 17 g, oral, BID  sennosides-docusate sodium, 2 tablet, oral, BID      Continuous Infusions:     PRN Meds:PRN medications: melatonin, naloxone, ondansetron, oxyCODONE **OR** oxyCODONE    Review of Systems   All other systems reviewed and are negative.    Interval Pertinent History:  Social History     Tobacco Use    Smoking status: Never    Smokeless tobacco: Never   Substance Use Topics    Alcohol use: Not Currently         Objective:   Patient Vitals for the past 24 hrs:   BP Temp Temp src Pulse Resp SpO2   03/13/25 0520 98/56 35.8 °C (96.4 °F) Temporal 61 12 98 %   03/13/25 0400 -- -- -- 65 12 94 %   03/13/25 0008 98/53 36.4 °C (97.5 °F) -- 81 18 96 %   03/13/25 0000 -- -- -- 85 18 97 %   03/12/25 2239 107/54 -- -- 84 16 93 %   03/12/25 2000 -- -- -- 90 17 95 %   03/12/25 1931 101/54 37 °C (98.6 °F) Temporal 91 20 95 %   03/12/25 1628 102/58 37.3 °C (99.1 °F) Temporal 96 16 100 %   03/12/25 1600 105/53 -- -- 92 19 100 %   03/12/25 1451 118/67 -- -- 103 22  100 %   25 1200 (!) 92/49 36.6 °C (97.9 °F) Temporal 95 18 97 %   25 0912 106/59 37.2 °C (98.9 °F) Temporal -- -- --   25 0800 (!) 104/47 -- -- 89 15 100 %       Average, Min, and Max for last 24 hours Vitals:  TEMPERATURE:  Temp  Av.7 °C (98.1 °F)  Min: 35.8 °C (96.4 °F)  Max: 37.3 °C (99.1 °F)    RESPIRATIONS RANGE: Resp  Av.9  Min: 12  Max: 22    PULSE RANGE: Pulse  Av  Min: 61  Max: 103    BLOOD PRESSURE RANGE:  Systolic (24hrs), Av , Min:92 , Max:118   ; Diastolic (24hrs), Av, Min:47, Max:67      PULSE OXIMETRY RANGE: SpO2  Av.1 %  Min: 93 %  Max: 100 %  Body mass index is 22.91 kg/m².    I/O last 3 completed shifts:  In: 1086.3 (20.4 mL/kg) [P.O.:240; I.V.:846.3 (15.9 mL/kg)]  Out: 975 (18.3 mL/kg) [Urine:975 (0.5 mL/kg/hr)]  Weight: 53.2 kg   Weight change:      Physical Exam  Vitals and nursing note reviewed.   Constitutional:       Appearance: Normal appearance.   HENT:      Head: Normocephalic and atraumatic.      Right Ear: External ear normal.      Left Ear: External ear normal.      Nose: Nose normal.      Mouth/Throat:      Mouth: Mucous membranes are moist.   Eyes:      General: No scleral icterus.        Right eye: No discharge.         Left eye: No discharge.      Extraocular Movements: Extraocular movements intact.      Conjunctiva/sclera: Conjunctivae normal.      Pupils: Pupils are equal, round, and reactive to light.   Cardiovascular:      Rate and Rhythm: Normal rate and regular rhythm.   Pulmonary:      Effort: Pulmonary effort is normal.      Breath sounds: Normal breath sounds.   Abdominal:      General: Abdomen is flat. Bowel sounds are normal.      Palpations: Abdomen is soft.   Musculoskeletal:         General: Tenderness present. Normal range of motion.      Right lower leg: No edema.      Left lower leg: Edema (significantly improved) present.   Skin:     General: Skin is warm and dry.      Capillary Refill: Capillary refill takes less than  2 seconds.   Neurological:      General: No focal deficit present.   Psychiatric:         Mood and Affect: Mood normal.         Thought Content: Thought content normal.         Judgment: Judgment normal.         Lab Results   Component Value Date     (L) 03/13/2025    K 4.7 03/13/2025    CL 97 (L) 03/13/2025    CO2 21 03/13/2025    BUN 25 (H) 03/13/2025    CREATININE 0.84 03/13/2025    GLUCOSE 138 (H) 03/13/2025    CALCIUM 8.3 (L) 03/13/2025    PROT 5.1 (L) 03/10/2025    BILITOT 0.4 03/10/2025    ALKPHOS 88 03/10/2025    AST 10 03/10/2025    ALT 13 03/10/2025       Lab Results   Component Value Date    WBC 23.9 (H) 03/13/2025    HGB 8.8 (L) 03/13/2025    HCT 26.7 (L) 03/13/2025    MCV 86 03/13/2025     03/13/2025    LYMPHOPCT 3.0 03/13/2025    RBC 3.10 (L) 03/13/2025    MCH 28.4 03/13/2025    MCHC 33.0 03/13/2025    RDW 16.1 (H) 03/13/2025       Lab Results   Component Value Date    TSH 17.97 (H) 03/08/2025     Lab Results   Component Value Date    LACTATE 2.0 09/02/2024    BNP 46 03/07/2025    INR 1.4 (H) 03/07/2025       IMAGES:  Encounter Date: 03/07/25   ECG 12 lead   Result Value    Ventricular Rate 74    Atrial Rate 74    NM Interval 174    QRS Duration 90    QT Interval 382    QTC Calculation(Bazett) 424    P Axis 60    R Axis -3    T Axis 13    QRS Count 12    Q Onset 221    P Onset 134    P Offset 172    T Offset 412    QTC Fredericia 410    Narrative    Normal sinus rhythm  Normal ECG  When compared with ECG of 01-SEP-2024 20:35,  Criteria for Anterior infarct are no longer Present  Criteria for Anterolateral infarct are no longer Present  Criteria for Inferior infarct are no longer Present  ST no longer depressed in Lateral leads  Nonspecific T wave abnormality, improved in Inferior leads  T wave inversion no longer evident in Lateral leads  QT has shortened  See ED provider note for full interpretation and clinical correlation  Confirmed by Estela Gonzales ([id number]) on 3/10/2025 6:56:54 PM      NM Lung perfusion with spect/ct    Result Date: 3/10/2025  Impression: 1. No distinct wedge-shaped segmental perfusion defect seen on SPECT/CT to suggest acute pulmonary embolism (low probability). 2. Low-dose CT demonstrates bilateral trace pleural effusions.   The interpretation above is based on modified PIOPED II and PISAPED criteria.   This study was analyzed and interpreted at [place], [place].   MACRO: None     Signed by: Ml Mendoza 3/10/2025 5:09 PM Dictation workstation:   [id number]    XR chest 1 view    Result Date: 3/10/2025  Impression: Suggestion of mild cardiomegaly. Otherwise no acute cardiopulmonary process.     MACRO: None   Signed by: Jenna Watson 3/10/2025 12:12 PM Dictation workstation:   [id number]    Transthoracic Echo (TTE) Complete    Result Date: 3/11/2025   [facility], [address] [place] [address]               Tel [phone] and Fax [phone] TRANSTHORACIC ECHOCARDIOGRAM REPORT  Patient Name:       HARRIS LUNDBERG     Reading Physician:    [id number] Marcia Dawson MD Study Date:         3/11/2025           Ordering Provider:    [id number] DESTINEE DURHAM MRN/PID:            [id number]            Fellow: Accession#:         [id number]        Nurse: Date of Birth/Age:  1950 / 74      Sonographer:          Carmen kaur                                     RDSAMIA Gender assigned at  F                   Additional Staff: Birth: Height:             152.40 cm           Admit Date:           3/7/2025 Weight:             53.07 kg            Admission Status:     Inpatient -                                                               Routine BSA / BMI:          1.49 m2 / 22.85     Encounter#:           [id number]                     kg/m2 Blood Pressure:     116/65 mmHg         Department  Location:  [facility] Non                                                               Invasive Study Type:    TRANSTHORACIC ECHO (TTE) COMPLETE Diagnosis/ICD: Other pulmonary embolism without acute cor pulmonale-I26.99 Indication:    Pulmonary Embolism CPT Code:      Echo Complete w Full [id number] Patient History: Pertinent History: LE Edema. Study Detail: The following Echo studies were performed: 2D, M-Mode, Doppler and               color flow. Image quality for this study is poor.  PHYSICIAN INTERPRETATION: Left Ventricle: The left ventricular systolic function is normal, with a visually estimated ejection fraction of 65-70%. There is mild concentric left ventricular hypertrophy. The left ventricular cavity size is normal. There is mildly increased posterior left ventricular wall thickness. Left ventricular diastolic filling cannot be determined due to E/A wave fusion. Left Atrium: The left atrial size is normal. Right Ventricle: The right ventricle is normal in size. There is normal right ventricular global systolic function. Right Atrium: The right atrial size was not well visualized. Aortic Valve: The aortic valve is probably trileaflet. The aortic valve dimensionless index is 0.62. There is no evidence of aortic valve regurgitation. The peak instantaneous gradient of the aortic valve is 13 mmHg. The mean gradient of the aortic valve is 7 mmHg. Mitral Valve: The mitral valve is normal in structure. There is trace mitral valve regurgitation. Tricuspid Valve: The tricuspid valve is structurally normal. There is trace tricuspid regurgitation. The right ventricular systolic pressure is unable to be estimated. Pulmonic Valve: The pulmonic valve is not well visualized. There is physiologic pulmonic valve regurgitation. Pericardium: Trivial pericardial effusion. Aorta: The aortic root is normal. Systemic Veins: The inferior vena cava appears normal in size, with IVC inspiratory collapse greater than 50%. In  comparison to the previous echocardiogram(s): Compared with study dated 2024, no significant change.  CONCLUSIONS:  1. The left ventricular systolic function is normal, with a visually estimated ejection fraction of 65-70%.  2. There is normal right ventricular global systolic function. QUANTITATIVE DATA SUMMARY:  2D MEASUREMENTS:          Normal Ranges: IVSd:            1.26 cm  (0.6-1.1cm) LVPWd:           1.15 cm  (0.6-1.1cm) LVIDd:           4.00 cm  (3.9-5.9cm) LVIDs:           2.58 cm LV Mass Index:   112 g/m2 LVEDV Index:     29 ml/m2 LV % FS          35.5 %  LEFT ATRIUM:                  Normal Ranges: LA Vol A4C:        37.7 ml    (22+/-6mL/m2) LA Vol A2C:        31.0 ml LA Vol BP:         35.9 ml LA Vol Index A4C:  25.4 ml/m2 LA Vol Index A2C:  20.8 ml/m2 LA Vol Index BP:   24.2 ml/m2 LA Area A4C:       14.6 cm2 LA Area A2C:       13.9 cm2 LA Major Axis A4C: 4.8 cm LA Major Axis A2C: 5.3 cm LA Volume Index:   24.3 ml/m2 LA Vol A4C:        35.0 ml LA Vol A2C:        30.4 ml LA Vol Index BSA:  22.0 ml/m2  RIGHT ATRIUM:          Normal Ranges: RA Area A4C:  15.6 cm2  M-MODE MEASUREMENTS:         Normal Ranges: Ao Root:             2.90 cm (2.0-3.7cm) LAs:                 3.85 cm (2.7-4.0cm)  LV SYSTOLIC FUNCTION:                      Normal Ranges: EF-A4C View:    61 % (>=55%) EF-A2C View:    64 % EF-Biplane:     60 % EF-Visual:      68 % LV EF Reported: 68 %  LV DIASTOLIC FUNCTION:          Normal Ranges: MV Peak E:             0.93 m/s (0.7-1.2 m/s)  MITRAL VALVE:         Normal Ranges: MV DT:        81 msec (150-240msec)  AORTIC VALVE:                      Normal Ranges: AoV Vmax:                1.80 m/s  (<=1.7m/s) AoV Peak P.9 mmHg (<20mmHg) AoV Mean P.7 mmHg  (1.7-11.5mmHg) LVOT Max Jeferson:            0.99 m/s  (<=1.1m/s) AoV VTI:                 24.82 cm  (18-25cm) LVOT VTI:                15.47 cm LVOT Diameter:           1.99 cm   (1.8-2.4cm) AoV Area, VTI:            1.93 cm2  (2.5-5.5cm2) AoV Area,Vmax:           1.70 cm2  (2.5-4.5cm2) AoV Dimensionless Index: 0.62  RIGHT VENTRICLE: RV Basal 3.10 cm RV Mid   2.20 cm RV Major 6.7 cm  TRICUSPID VALVE/RVSP:          Normal Ranges: Peak TR Velocity:     2.59 m/s RV Syst Pressure:     30 mmHg  (< 30mmHg)  PULMONIC VALVE:          Normal Ranges: PV Max Jeferson:     1.1 m/s  (0.6-0.9m/s) PV Max P.9 mmHg  [id number] Marcia Dawson MD Electronically signed on 3/11/2025 at 10:34:34 AM  ** Final **     Transthoracic Echo (TTE) Complete    Result Date: 2024   [facility], [address] [place] [address]                Tel [phone] and Fax [phone] TRANSTHORACIC ECHOCARDIOGRAM REPORT  Patient Name:      HARRIS Sandoval Physician:    [id number] Sha Woods MD Study Date:        2024            Ordering Provider:    [id number] JASMYN XIAO MRN/PID:           [id number]             Fellow: Accession#:        [id number]         Nurse: Date of Birth/Age: 1950 / 74 years Sonographer:          Kaley RASMUSSEN Gender:            F                    Additional Staff: Height:            149.00 cm            Admit Date: Weight:            58.06 kg             Admission Status:     Inpatient - STAT BSA / BMI:         1.52 m2 / 26.15      Encounter#:           [id number]                    kg/m2 Blood Pressure:    110/65 mmHg          Department Location:  [facility] Non                                                               Invasive Study Type:    TRANSTHORACIC ECHO (TTE) COMPLETE Diagnosis/ICD: Other pulmonary embolism without acute cor pulmonale-I26.99 Indication:    Pulm Embolism, sigmoid tumor of colon CPT Code:      Echo Complete w Full Doppler-[id number] Patient History: Pertinent History: HTN and PE. Malignant tumor of sigmoid colon. Study Detail: The  following Echo studies were performed: 2D, M-Mode, Doppler and               color flow.  PHYSICIAN INTERPRETATION: Left Ventricle: The left ventricular systolic function is normal, with a visually estimated ejection fraction of 55-60%. There are no regional left ventricular wall motion abnormalities. The left ventricular cavity size is normal. There is no evidence of left ventricular hypertrophy. Spectral Doppler shows an impaired relaxation pattern of left ventricular diastolic filling. Left Atrium: The left atrium is moderately dilated. Right Ventricle: The right ventricle is normal in size. There is normal right ventricular global systolic function. Right Atrium: The right atrium is normal in size. Aortic Valve: The aortic valve is trileaflet. There is trace aortic valve regurgitation. The peak instantaneous gradient of the aortic valve is 6.2 mmHg. Mitral Valve: The mitral valve is normal in structure. There is trace mitral valve regurgitation. Tricuspid Valve: The tricuspid valve is structurally normal. There is trace tricuspid regurgitation. Pulmonic Valve: The pulmonic valve is structurally normal. There is physiologic pulmonic valve regurgitation. Pericardium: There is a trivial pericardial effusion. Aorta: The aortic root is normal. The Ao Sinus is 3.40 cm. The Asc Ao is 3.40 cm. Pulmonary Artery: The tricuspid regurgitant velocity is 2.32 m/s, and with an estimated right atrial pressure of 3 mmHg, the estimated pulmonary artery pressure is normal with the RVSP at 24.5 mmHg. Systemic Veins: The inferior vena cava appears to be of normal size. There is IVC inspiratory collapse greater than 50%. In comparison to the previous echocardiogram(s): There are no prior studies on this patient for comparison purposes.  CONCLUSIONS:  1. The left ventricular systolic function is normal, with a visually estimated ejection fraction of 55-60%.  2. Spectral Doppler shows an impaired relaxation pattern of left ventricular  diastolic filling.  3. There is no evidence of left ventricular hypertrophy.  4. There is normal right ventricular global systolic function.  5. The left atrium is moderately dilated. QUANTITATIVE DATA SUMMARY: 2D MEASUREMENTS:                         Normal Ranges: Ao Root d:     3.40 cm  (2.0-3.7cm) LAs:           3.60 cm  (2.7-4.0cm) IVSd:          1.10 cm  (0.6-1.1cm) LVPWd:         0.90 cm  (0.6-1.1cm) LVIDd:         4.20 cm  (3.9-5.9cm) LVIDs:         2.90 cm LV Mass Index: 90 g/m2 LVEDV Index:   53 ml/m2 LV % FS        31.0 % LA VOLUME:                               Normal Ranges: LA Vol A4C:        65.1 ml    (22+/-6mL/m2) LA Vol A2C:        63.7 ml LA Vol BP:         65.6 ml LA Vol Index A4C:  42.9ml/m2 LA Vol Index A2C:  41.9 ml/m2 LA Vol Index BP:   43.2 ml/m2 LA Area A4C:       20.9 cm2 LA Area A2C:       20.3 cm2 LA Major Axis A4C: 5.7 cm LA Major Axis A2C: 5.5 cm LA Volume Index:   43.2 ml/m2 RA VOLUME BY A/L METHOD:                               Normal Ranges: RA Vol A4C:        29.5 ml    (8.3-19.5ml) RA Vol Index A4C:  19.4 ml/m2 RA Area A4C:       12.9 cm2 RA Major Axis A4C: 4.8 cm AORTA MEASUREMENTS:                      Normal Ranges: Ao Sinus, d: 3.40 cm (2.1-3.5cm) Asc Ao, d:   3.40 cm (2.1-3.4cm) LV SYSTOLIC FUNCTION BY 2D PLANIMETRY (MOD):                      Normal Ranges: EF-A4C View:    47 % (>=55%) EF-A2C View:    62 % EF-Biplane:     55 % EF-Visual:      58 % LV EF Reported: 58 % LV DIASTOLIC FUNCTION:                               Normal Ranges: MV Peak E:        0.75 m/s    (0.7-1.2 m/s) MV Peak A:        0.78 m/s    (0.42-0.7 m/s) E/A Ratio:        0.96        (1.0-2.2) MV e'             0.075 m/s   (>8.0) MV lateral e'     0.09 m/s MV medial e'      0.06 m/s MV A Dur:         165.00 msec E/e' Ratio:       10.05       (<8.0) PulmV Sys Jeferson:    45.00 cm/s PulmV Saravia Jeferson:   29.10 cm/s PulmV S/D Jeferson:    1.50 PulmV A Revs Jeferson: 27.00 cm/s PulmV A Revs Dur: 106.00 msec MITRAL VALVE:                  Normal Ranges: MV DT: 210 msec (150-240msec) AORTIC VALVE:                         Normal Ranges: AoV Vmax:      1.24 m/s (<=1.7m/s) AoV Peak P.2 mmHg (<20mmHg) LVOT Max Jeferson:  0.76 m/s (<=1.1m/s) LVOT VTI:      18.90 cm LVOT Diameter: 1.80 cm  (1.8-2.4cm) AoV Area,Vmax: 1.57 cm2 (2.5-4.5cm2)  RIGHT VENTRICLE: RV Basal 3.30 cm RV Mid   2.10 cm RV Major 6.3 cm TAPSE:   26.1 mm RV s'    0.12 m/s TRICUSPID VALVE/RVSP:                             Normal Ranges: Peak TR Velocity: 2.32 m/s Est. RA Pressure: 3 mmHg RV Syst Pressure: 24.5 mmHg (< 30mmHg) IVC Diam:         1.50 cm PULMONIC VALVE:                         Normal Ranges: PV Accel Time: 120 msec (>120ms) PV Max Jeferson:    0.7 m/s  (0.6-0.9m/s) PV Max P.0 mmHg Pulmonary Veins: PulmV A Revs Dur: 106.00 msec PulmV A Revs Jeefrson: 27.00 cm/s PulmV Saravia Jeferson:   29.10 cm/s PulmV S/D Jeferson:    1.50 PulmV Sys Jeferson:    45.00 cm/s  [id number] Sha Woods MD Electronically signed on 2024 at 11:56:09 AM  ** Final **    === 25 ===    XR CHEST 1 VIEW    - Impression -  Suggestion of mild cardiomegaly. Otherwise no acute cardiopulmonary  process.      MACRO:  None    Signed by: Jenna Watson 3/10/2025 12:12 PM  Dictation workstation:   [id number]  === 02/10/25 ===    RAD ONC CT SIM IMAGES ONLY  === 25 ===    XR CHEST 1 VIEW    - Impression -  Suggestion of mild cardiomegaly. Otherwise no acute cardiopulmonary  process.      MACRO:  None    Signed by: Jenna Watson 3/10/2025 12:12 PM  Dictation workstation:   [id number]  === 02/13/25 ===    MR PELVIS W AND WO CONTRAST    - Impression -  1. Progression of the necrotic left presacral soft tissue mass  consistent with the known local recurrence. The mass currently  measures 9.4 x 5.6 cm in comparison to 7.8 x 4.6 cm. More invasion of  the left side of the sacrum, upper rectum and left vaginal cuff.  2. More conspicuous right lower perirectal nodule measuring 0.9 cm  which would be concerning for  disease involvement.  3. Stable prominent left para-aortic lymph node measuring 0.8 cm    MACRO:  None    Signed by: Александр Carrillo 2/14/2025 10:30 AM  Dictation workstation:   [id number]    Additional results of the last 24 hours have been reviewed.    Dictated using Yesware Version 2.4  Proof read however unrecognized voice recognition errors may have occurred     Electronically signed by Amelia Ponce DO on 03/13/25 at 7:32 AM

## 2025-03-14 ENCOUNTER — PHARMACY VISIT (OUTPATIENT)
Dept: PHARMACY | Facility: CLINIC | Age: 75
End: 2025-03-14
Payer: COMMERCIAL

## 2025-03-14 ENCOUNTER — TELEPHONE (OUTPATIENT)
Dept: ADMISSION | Facility: HOSPITAL | Age: 75
End: 2025-03-14

## 2025-03-14 ENCOUNTER — APPOINTMENT (OUTPATIENT)
Dept: SURGERY | Facility: CLINIC | Age: 75
End: 2025-03-14
Payer: MEDICARE

## 2025-03-14 DIAGNOSIS — C68.9 UROTHELIAL CARCINOMA (MULTI): Primary | ICD-10-CM

## 2025-03-14 LAB
ALBUMIN SERPL BCP-MCNC: 2.2 G/DL (ref 3.4–5)
ANION GAP SERPL CALC-SCNC: 14 MMOL/L (ref 10–20)
BASOPHILS # BLD AUTO: 0.11 X10*3/UL (ref 0–0.1)
BASOPHILS NFR BLD AUTO: 0.3 %
BUN SERPL-MCNC: 24 MG/DL (ref 6–23)
BURR CELLS BLD QL SMEAR: NORMAL
CALCIUM SERPL-MCNC: 8.2 MG/DL (ref 8.6–10.3)
CHLORIDE SERPL-SCNC: 101 MMOL/L (ref 98–107)
CO2 SERPL-SCNC: 19 MMOL/L (ref 21–32)
CREAT SERPL-MCNC: 0.78 MG/DL (ref 0.5–1.05)
EGFRCR SERPLBLD CKD-EPI 2021: 80 ML/MIN/1.73M*2
EOSINOPHIL # BLD AUTO: 0 X10*3/UL (ref 0–0.4)
EOSINOPHIL NFR BLD AUTO: 0 %
ERYTHROCYTE [DISTWIDTH] IN BLOOD BY AUTOMATED COUNT: 16.3 % (ref 11.5–14.5)
GLUCOSE SERPL-MCNC: 130 MG/DL (ref 74–99)
HCT VFR BLD AUTO: 28.4 % (ref 36–46)
HGB BLD-MCNC: 9.3 G/DL (ref 12–16)
IMM GRANULOCYTES # BLD AUTO: 2.93 X10*3/UL (ref 0–0.5)
IMM GRANULOCYTES NFR BLD AUTO: 8.1 % (ref 0–0.9)
LYMPHOCYTES # BLD AUTO: 1.13 X10*3/UL (ref 0.8–3)
LYMPHOCYTES NFR BLD AUTO: 3.1 %
MAGNESIUM SERPL-MCNC: 2.12 MG/DL (ref 1.6–2.4)
MCH RBC QN AUTO: 28.4 PG (ref 26–34)
MCHC RBC AUTO-ENTMCNC: 32.7 G/DL (ref 32–36)
MCV RBC AUTO: 87 FL (ref 80–100)
MONOCYTES # BLD AUTO: 1.15 X10*3/UL (ref 0.05–0.8)
MONOCYTES NFR BLD AUTO: 3.2 %
NEUTROPHILS # BLD AUTO: 30.65 X10*3/UL (ref 1.6–5.5)
NEUTROPHILS NFR BLD AUTO: 85.3 %
NRBC BLD-RTO: 0 /100 WBCS (ref 0–0)
PHOSPHATE SERPL-MCNC: 2.5 MG/DL (ref 2.5–4.9)
PLATELET # BLD AUTO: 438 X10*3/UL (ref 150–450)
POTASSIUM SERPL-SCNC: 4.2 MMOL/L (ref 3.5–5.3)
RBC # BLD AUTO: 3.27 X10*6/UL (ref 4–5.2)
RBC MORPH BLD: NORMAL
SODIUM SERPL-SCNC: 130 MMOL/L (ref 136–145)
WBC # BLD AUTO: 36 X10*3/UL (ref 4.4–11.3)

## 2025-03-14 PROCEDURE — 2500000002 HC RX 250 W HCPCS SELF ADMINISTERED DRUGS (ALT 637 FOR MEDICARE OP, ALT 636 FOR OP/ED): Performed by: INTERNAL MEDICINE

## 2025-03-14 PROCEDURE — 2500000001 HC RX 250 WO HCPCS SELF ADMINISTERED DRUGS (ALT 637 FOR MEDICARE OP): Performed by: PHYSICIAN ASSISTANT

## 2025-03-14 PROCEDURE — 97116 GAIT TRAINING THERAPY: CPT | Mod: GP,CQ

## 2025-03-14 PROCEDURE — 36415 COLL VENOUS BLD VENIPUNCTURE: CPT | Performed by: INTERNAL MEDICINE

## 2025-03-14 PROCEDURE — 2060000001 HC INTERMEDIATE ICU ROOM DAILY

## 2025-03-14 PROCEDURE — RXMED WILLOW AMBULATORY MEDICATION CHARGE

## 2025-03-14 PROCEDURE — 83735 ASSAY OF MAGNESIUM: CPT | Performed by: INTERNAL MEDICINE

## 2025-03-14 PROCEDURE — 80069 RENAL FUNCTION PANEL: CPT | Performed by: INTERNAL MEDICINE

## 2025-03-14 PROCEDURE — 2500000001 HC RX 250 WO HCPCS SELF ADMINISTERED DRUGS (ALT 637 FOR MEDICARE OP): Performed by: INTERNAL MEDICINE

## 2025-03-14 PROCEDURE — 85025 COMPLETE CBC W/AUTO DIFF WBC: CPT | Performed by: INTERNAL MEDICINE

## 2025-03-14 PROCEDURE — 2500000004 HC RX 250 GENERAL PHARMACY W/ HCPCS (ALT 636 FOR OP/ED): Performed by: INTERNAL MEDICINE

## 2025-03-14 PROCEDURE — 97110 THERAPEUTIC EXERCISES: CPT | Mod: GP,CQ

## 2025-03-14 PROCEDURE — 99232 SBSQ HOSP IP/OBS MODERATE 35: CPT | Performed by: INTERNAL MEDICINE

## 2025-03-14 RX ORDER — HYDROMORPHONE HYDROCHLORIDE 1 MG/ML
0.6 INJECTION, SOLUTION INTRAMUSCULAR; INTRAVENOUS; SUBCUTANEOUS ONCE
Status: COMPLETED | OUTPATIENT
Start: 2025-03-14 | End: 2025-03-14

## 2025-03-14 RX ORDER — LACTULOSE 10 G/15ML
20 SOLUTION ORAL 3 TIMES DAILY PRN
Qty: 473 ML | Refills: 2 | Status: SHIPPED | OUTPATIENT
Start: 2025-03-14

## 2025-03-14 RX ORDER — DEXAMETHASONE SODIUM PHOSPHATE 10 MG/ML
10 INJECTION INTRAMUSCULAR; INTRAVENOUS EVERY 12 HOURS
Status: DISCONTINUED | OUTPATIENT
Start: 2025-03-14 | End: 2025-03-15 | Stop reason: HOSPADM

## 2025-03-14 RX ORDER — GABAPENTIN 300 MG/1
600 CAPSULE ORAL 3 TIMES DAILY
Status: DISCONTINUED | OUTPATIENT
Start: 2025-03-14 | End: 2025-03-15 | Stop reason: HOSPADM

## 2025-03-14 RX ADMIN — Medication 2 G: at 11:44

## 2025-03-14 RX ADMIN — POLYETHYLENE GLYCOL 3350 17 G: 17 POWDER, FOR SOLUTION ORAL at 08:52

## 2025-03-14 RX ADMIN — OXYCODONE HYDROCHLORIDE 10 MG: 5 TABLET ORAL at 15:56

## 2025-03-14 RX ADMIN — DEXAMETHASONE SODIUM PHOSPHATE 10 MG: 10 INJECTION INTRAMUSCULAR; INTRAVENOUS at 11:40

## 2025-03-14 RX ADMIN — GABAPENTIN 400 MG: 400 CAPSULE ORAL at 08:48

## 2025-03-14 RX ADMIN — SODIUM CHLORIDE 100 ML/HR: 9 INJECTION, SOLUTION INTRAVENOUS at 03:32

## 2025-03-14 RX ADMIN — Medication 10 MG: at 21:13

## 2025-03-14 RX ADMIN — OXYCODONE HYDROCHLORIDE 10 MG: 5 TABLET ORAL at 05:42

## 2025-03-14 RX ADMIN — DEXAMETHASONE SODIUM PHOSPHATE 10 MG: 10 INJECTION INTRAMUSCULAR; INTRAVENOUS at 23:40

## 2025-03-14 RX ADMIN — Medication 2 G: at 08:48

## 2025-03-14 RX ADMIN — GABAPENTIN 600 MG: 300 CAPSULE ORAL at 14:18

## 2025-03-14 RX ADMIN — LEVOTHYROXINE SODIUM 112 MCG: 112 TABLET ORAL at 05:42

## 2025-03-14 RX ADMIN — METHOCARBAMOL TABLETS 1000 MG: 500 TABLET, COATED ORAL at 05:41

## 2025-03-14 RX ADMIN — GABAPENTIN 600 MG: 300 CAPSULE ORAL at 21:13

## 2025-03-14 RX ADMIN — SENNOSIDES AND DOCUSATE SODIUM 2 TABLET: 50; 8.6 TABLET ORAL at 08:48

## 2025-03-14 RX ADMIN — APIXABAN 10 MG: 5 TABLET, FILM COATED ORAL at 21:14

## 2025-03-14 RX ADMIN — HYDROMORPHONE HYDROCHLORIDE 0.4 MG: 1 INJECTION, SOLUTION INTRAMUSCULAR; INTRAVENOUS; SUBCUTANEOUS at 08:49

## 2025-03-14 RX ADMIN — HYDROMORPHONE HYDROCHLORIDE 0.6 MG: 1 INJECTION, SOLUTION INTRAMUSCULAR; INTRAVENOUS; SUBCUTANEOUS at 11:40

## 2025-03-14 RX ADMIN — SODIUM CHLORIDE 100 ML/HR: 9 INJECTION, SOLUTION INTRAVENOUS at 14:20

## 2025-03-14 RX ADMIN — METHOCARBAMOL TABLETS 1000 MG: 500 TABLET, COATED ORAL at 21:13

## 2025-03-14 RX ADMIN — LACTULOSE 20 G: 20 SOLUTION ORAL at 14:19

## 2025-03-14 RX ADMIN — METHOCARBAMOL TABLETS 1000 MG: 500 TABLET, COATED ORAL at 14:19

## 2025-03-14 RX ADMIN — DEXAMETHASONE 4 MG: 4 TABLET ORAL at 08:48

## 2025-03-14 RX ADMIN — ACETAMINOPHEN 975 MG: 325 TABLET ORAL at 21:13

## 2025-03-14 RX ADMIN — ACETAMINOPHEN 975 MG: 325 TABLET ORAL at 14:18

## 2025-03-14 RX ADMIN — PANTOPRAZOLE SODIUM 40 MG: 40 TABLET, DELAYED RELEASE ORAL at 06:01

## 2025-03-14 RX ADMIN — LACTULOSE 20 G: 20 SOLUTION ORAL at 08:52

## 2025-03-14 RX ADMIN — OXYCODONE HYDROCHLORIDE 10 MG: 5 TABLET ORAL at 11:40

## 2025-03-14 RX ADMIN — APIXABAN 10 MG: 5 TABLET, FILM COATED ORAL at 08:48

## 2025-03-14 RX ADMIN — ACETAMINOPHEN 975 MG: 325 TABLET ORAL at 08:49

## 2025-03-14 ASSESSMENT — PAIN - FUNCTIONAL ASSESSMENT
PAIN_FUNCTIONAL_ASSESSMENT: 0-10

## 2025-03-14 ASSESSMENT — COGNITIVE AND FUNCTIONAL STATUS - GENERAL
MOVING TO AND FROM BED TO CHAIR: A LITTLE
WALKING IN HOSPITAL ROOM: A LOT
MOVING TO AND FROM BED TO CHAIR: A LITTLE
MOVING TO AND FROM BED TO CHAIR: A LOT
STANDING UP FROM CHAIR USING ARMS: A LOT
TURNING FROM BACK TO SIDE WHILE IN FLAT BAD: A LITTLE
MOVING FROM LYING ON BACK TO SITTING ON SIDE OF FLAT BED WITH BEDRAILS: A LITTLE
HELP NEEDED FOR BATHING: A LOT
TURNING FROM BACK TO SIDE WHILE IN FLAT BAD: A LITTLE
DRESSING REGULAR UPPER BODY CLOTHING: A LITTLE
TURNING FROM BACK TO SIDE WHILE IN FLAT BAD: A LITTLE
MOVING FROM LYING ON BACK TO SITTING ON SIDE OF FLAT BED WITH BEDRAILS: A LITTLE
DRESSING REGULAR LOWER BODY CLOTHING: A LOT
CLIMB 3 TO 5 STEPS WITH RAILING: A LITTLE
STANDING UP FROM CHAIR USING ARMS: A LITTLE
TOILETING: A LITTLE
HELP NEEDED FOR BATHING: A LITTLE
DAILY ACTIVITIY SCORE: 17
MOBILITY SCORE: 18
DRESSING REGULAR UPPER BODY CLOTHING: A LITTLE
WALKING IN HOSPITAL ROOM: A LITTLE
STANDING UP FROM CHAIR USING ARMS: A LOT
MOBILITY SCORE: 15
CLIMB 3 TO 5 STEPS WITH RAILING: A LOT
CLIMB 3 TO 5 STEPS WITH RAILING: A LOT
PERSONAL GROOMING: A LITTLE
MOBILITY SCORE: 15
DRESSING REGULAR LOWER BODY CLOTHING: A LITTLE
TOILETING: A LOT
WALKING IN HOSPITAL ROOM: A LOT
DAILY ACTIVITIY SCORE: 19

## 2025-03-14 ASSESSMENT — PAIN SCALES - GENERAL
PAINLEVEL_OUTOF10: 10 - WORST POSSIBLE PAIN
PAINLEVEL_OUTOF10: 7
PAINLEVEL_OUTOF10: 7
PAINLEVEL_OUTOF10: 5 - MODERATE PAIN
PAINLEVEL_OUTOF10: 9
PAINLEVEL_OUTOF10: 4
PAINLEVEL_OUTOF10: 10 - WORST POSSIBLE PAIN

## 2025-03-14 ASSESSMENT — PAIN DESCRIPTION - LOCATION
LOCATION: LEG
LOCATION: LEG

## 2025-03-14 ASSESSMENT — PAIN DESCRIPTION - ORIENTATION
ORIENTATION: LEFT
ORIENTATION: LEFT

## 2025-03-14 ASSESSMENT — PAIN DESCRIPTION - DESCRIPTORS
DESCRIPTORS: CRAMPING
DESCRIPTORS: ACHING

## 2025-03-14 ASSESSMENT — ACTIVITIES OF DAILY LIVING (ADL): EFFECT OF PAIN ON DAILY ACTIVITIES: HINDERS

## 2025-03-14 NOTE — CARE PLAN
Problem: Pain - Adult  Goal: Verbalizes/displays adequate comfort level or baseline comfort level  Outcome: Progressing     Problem: Discharge Planning  Goal: Discharge to home or other facility with appropriate resources  Outcome: Progressing     Problem: Fall/Injury  Goal: Not fall by end of shift  Outcome: Progressing  Goal: Be free from injury by end of the shift  Outcome: Progressing  Goal: Verbalize understanding of personal risk factors for fall in the hospital  Outcome: Progressing  Goal: Verbalize understanding of risk factor reduction measures to prevent injury from fall in the home  Outcome: Progressing  Goal: Use assistive devices by end of the shift  Outcome: Progressing  Goal: Pace activities to prevent fatigue by end of the shift  Outcome: Progressing

## 2025-03-14 NOTE — PROGRESS NOTES
[facility] Hospitalist Progress Note       [phone]: Please page me for patient care issues.  [phone]: Please page night hospitalist for any issues.     Terra Alexis  :  1950(74 y.o.)  MRN:  [id number]  PCP: No Assigned PCP Generic Provider, MD  LOS: 7  day(s) since admission    Assessment & Plan  Acute bilateral deep vein thrombosis (DVT) of iliac veins of lower extremities (Multi)    Acute deep vein thrombosis (DVT) of femoral vein of left lower extremity (Multi)      Assessment and Plan:     Terra Alexis is a 74 y.o. female , hx of recurrent  metastatic uroepithelial cancer, on maintenance Pembro from 2025. Admitted with extensive dvt of LLE s/p  thrombectomy (3/11/25)    #Acute extensive DVT of LLE likely due to malignancy  -s/p thrombectomy (3/11/25). transitioned to apixaban from hep gtt  #Cancer related pain-on cocktail of analgesics and decadron  -pt has been refusing oxycodone despite intractable pain. Pain regimen compliance encouraged as this is the only thing preventing home discharge. Pt is not interested in SNF placement  -MRI (25)  1. Progression of the necrotic left presacral soft tissue mass consistent with the known local recurrence. The mass currently measures 9.4 x 5.6 cm in comparison to 7.8 x 4.6 cm. More invasion of the left side of the sacrum, upper rectum and left vaginal cuff. 2. More conspicuous right lower perirectal nodule measuring 0.9 cm which would be concerning for disease involvement. 3. Stable prominent left para-aortic lymph node measuring 0.8 cm  #Hypothyroidism  #Recurrent metastatic uroepithelial cancer  #Anemia of chronic disease not requiring transfusion  #Hypothyroid-on synthroid  #GOC: pt would like to maintain FC status  -CS notes reviewed and recs appreciated: palliative, interventional cards    Disposition: await test results, await consultant recommendations, and await clinical improvement    DVT Prophylaxis: full anticoagulation  apixaban    Code status: Full Code  Diet: Adult diet Regular    Level of MDM:  High    discussed with nurse, discussed with TCC/SW, I personally examined the patient, and I personally reviewed chart, data, labs radiology reports    Family Communication  Number :   Name of Designated Family Representative:       Total time spent: 50 minutes, of total time providing counseling or in coordination of care. Total time on this day of visit includes record and documentation review before and after visit including documentation and time not explicitly included on EMR time stamp      Subjective:   Interval History:  HPI  The patient complains of LLE pain  The patient feels their symptoms areimproving  no events or new concerns    Scheduled Meds:acetaminophen, 975 mg, oral, TID  apixaban, 10 mg, oral, BID   Followed by  [START ON 3/19/2025] apixaban, 5 mg, oral, BID  dexAMETHasone, 4 mg, oral, Daily  gabapentin, 400 mg, oral, TID  lactulose, 20 g, oral, TID  levothyroxine, 112 mcg, oral, Daily  methocarbamol, 1,000 mg, oral, q8h AILEEN  pantoprazole, 40 mg, oral, Daily before breakfast  polyethylene glycol, 17 g, oral, BID  sennosides-docusate sodium, 2 tablet, oral, BID  sodium chloride, 2,000 mg, oral, TID      Continuous Infusions:sodium chloride 0.9%, 100 mL/hr, Last Rate: 100 mL/hr (03/14/25 0332)        PRN Meds:PRN medications: melatonin, naloxone, ondansetron, oxyCODONE **OR** oxyCODONE    Review of Systems   All other systems reviewed and are negative.    Interval Pertinent History:  Social History     Tobacco Use    Smoking status: Never    Smokeless tobacco: Never   Substance Use Topics    Alcohol use: Not Currently         Objective:   Patient Vitals for the past 24 hrs:   BP Temp Temp src Pulse Resp SpO2   03/14/25 0428 111/60 36.7 °C (98 °F) Temporal 81 23 97 %   03/14/25 0400 -- -- -- 78 16 99 %   03/14/25 0000 -- -- -- 75 16 98 %   03/13/25 2000 -- -- -- 75 15 99 %   03/13/25 1942 100/60 36.7 °C (98 °F) Temporal  85 18 98 %   25 1600 -- -- -- 74 17 92 %   25 1551 -- 36.1 °C (96.9 °F) Temporal -- -- --   25 1200 -- -- -- 72 15 100 %   25 0843 108/61 35.8 °C (96.4 °F) Temporal 64 16 100 %   25 0800 -- -- -- 54 16 95 %       Average, Min, and Max for last 24 hours Vitals:  TEMPERATURE:  Temp  Av.3 °C (97.3 °F)  Min: 35.8 °C (96.4 °F)  Max: 36.7 °C (98 °F)    RESPIRATIONS RANGE: Resp  Av.9  Min: 15  Max: 23    PULSE RANGE: Pulse  Av.1  Min: 54  Max: 85    BLOOD PRESSURE RANGE:  Systolic (24hrs), Av , Min:100 , Max:111   ; Diastolic (24hrs), Av, Min:60, Max:61      PULSE OXIMETRY RANGE: SpO2  Av.6 %  Min: 92 %  Max: 100 %  Body mass index is 22.91 kg/m².    I/O last 3 completed shifts:  In: 1486.7 (27.9 mL/kg) [I.V.:136.7 (2.6 mL/kg); Blood:350; IV Piggyback:1000]  Out: 200 (3.8 mL/kg) [Urine:200 (0.1 mL/kg/hr)]  Weight: 53.2 kg   Weight change:      Physical Exam  Vitals and nursing note reviewed.   Constitutional:       Appearance: Normal appearance.   HENT:      Head: Normocephalic and atraumatic.      Right Ear: External ear normal.      Left Ear: External ear normal.      Nose: Nose normal.      Mouth/Throat:      Mouth: Mucous membranes are moist.   Eyes:      General: No scleral icterus.        Right eye: No discharge.         Left eye: No discharge.      Extraocular Movements: Extraocular movements intact.      Conjunctiva/sclera: Conjunctivae normal.      Pupils: Pupils are equal, round, and reactive to light.   Cardiovascular:      Rate and Rhythm: Normal rate and regular rhythm.   Pulmonary:      Effort: Pulmonary effort is normal.      Breath sounds: Normal breath sounds.   Abdominal:      General: Abdomen is flat. Bowel sounds are normal.      Palpations: Abdomen is soft.   Musculoskeletal:         General: Tenderness present. Normal range of motion.      Right lower leg: No edema.      Left lower leg: Edema (significantly improved) present.   Skin:      General: Skin is warm and dry.      Capillary Refill: Capillary refill takes less than 2 seconds.   Neurological:      General: No focal deficit present.   Psychiatric:         Mood and Affect: Mood normal.         Thought Content: Thought content normal.         Judgment: Judgment normal.         Lab Results   Component Value Date     (L) 03/14/2025    K 4.2 03/14/2025     03/14/2025    CO2 19 (L) 03/14/2025    BUN 24 (H) 03/14/2025    CREATININE 0.78 03/14/2025    GLUCOSE 130 (H) 03/14/2025    CALCIUM 8.2 (L) 03/14/2025    PROT 5.1 (L) 03/10/2025    BILITOT 0.4 03/10/2025    ALKPHOS 88 03/10/2025    AST 10 03/10/2025    ALT 13 03/10/2025       Lab Results   Component Value Date    WBC 36.0 (H) 03/14/2025    HGB 9.3 (L) 03/14/2025    HCT 28.4 (L) 03/14/2025    MCV 87 03/14/2025     03/14/2025    LYMPHOPCT 3.1 03/14/2025    RBC 3.27 (L) 03/14/2025    MCH 28.4 03/14/2025    MCHC 32.7 03/14/2025    RDW 16.3 (H) 03/14/2025       Lab Results   Component Value Date    TSH 17.97 (H) 03/08/2025     Lab Results   Component Value Date    LACTATE 2.0 09/02/2024    BNP 46 03/07/2025    INR 1.4 (H) 03/07/2025       IMAGES:  Encounter Date: 03/07/25   ECG 12 lead   Result Value    Ventricular Rate 74    Atrial Rate 74    AK Interval 172    QRS Duration 86    QT Interval 388    QTC Calculation(Bazett) 430    P Axis 38    R Axis -6    T Axis -3    QRS Count 13    Q Onset 218    P Onset 132    P Offset 185    T Offset 412    QTC Fredericia 416    Narrative    Normal sinus rhythm  Inferior infarct , age undetermined  Anterior infarct , age undetermined  Abnormal ECG  When compared with ECG of 07-MAR-2025 13:48,  Anterior infarct is now Present  Inferior infarct is now Present     NM Lung perfusion with spect/ct    Result Date: 3/10/2025  Impression: 1. No distinct wedge-shaped segmental perfusion defect seen on SPECT/CT to suggest acute pulmonary embolism (low probability). 2. Low-dose CT demonstrates bilateral  trace pleural effusions.   The interpretation above is based on modified PIOPED II and PISAPED criteria.   This study was analyzed and interpreted at [place], [place].   MACRO: None     Signed by: Ml Mendoza 3/10/2025 5:09 PM Dictation workstation:   [id number]    XR chest 1 view    Result Date: 3/10/2025  Impression: Suggestion of mild cardiomegaly. Otherwise no acute cardiopulmonary process.     MACRO: None   Signed by: Jenna Watson 3/10/2025 12:12 PM Dictation workstation:   [id number]    Transthoracic Echo (TTE) Complete    Result Date: 3/11/2025   [facility], [address] [place] [address]               Tel [phone] and Fax [phone] TRANSTHORACIC ECHOCARDIOGRAM REPORT  Patient Name:       HARRIS Sandoval Physician:    [id number] Marcia Dwason MD Study Date:         3/11/2025           Ordering Provider:    [id number] DESTINEE DURHAM MRN/PID:            [id number]            Fellow: Accession#:         [id number]        Nurse: Date of Birth/Age:  1950 / 74      Sonographer:          Carmen kaur                                     SAMIA Gender assigned at  F                   Additional Staff: Birth: Height:             152.40 cm           Admit Date:           3/7/2025 Weight:             53.07 kg            Admission Status:     Inpatient -                                                               Routine BSA / BMI:          1.49 m2 / 22.85     Encounter#:           [id number]                     kg/m2 Blood Pressure:     116/65 mmHg         Department Location:  [facility] Non                                                               Invasive Study Type:    TRANSTHORACIC ECHO (TTE) COMPLETE Diagnosis/ICD: Other pulmonary embolism without acute cor pulmonale-I26.99 Indication:     Pulmonary Embolism CPT Code:      Echo Complete w Full Doppler-[id number] Patient History: Pertinent History: LE Edema. Study Detail: The following Echo studies were performed: 2D, M-Mode, Doppler and               color flow. Image quality for this study is poor.  PHYSICIAN INTERPRETATION: Left Ventricle: The left ventricular systolic function is normal, with a visually estimated ejection fraction of 65-70%. There is mild concentric left ventricular hypertrophy. The left ventricular cavity size is normal. There is mildly increased posterior left ventricular wall thickness. Left ventricular diastolic filling cannot be determined due to E/A wave fusion. Left Atrium: The left atrial size is normal. Right Ventricle: The right ventricle is normal in size. There is normal right ventricular global systolic function. Right Atrium: The right atrial size was not well visualized. Aortic Valve: The aortic valve is probably trileaflet. The aortic valve dimensionless index is 0.62. There is no evidence of aortic valve regurgitation. The peak instantaneous gradient of the aortic valve is 13 mmHg. The mean gradient of the aortic valve is 7 mmHg. Mitral Valve: The mitral valve is normal in structure. There is trace mitral valve regurgitation. Tricuspid Valve: The tricuspid valve is structurally normal. There is trace tricuspid regurgitation. The right ventricular systolic pressure is unable to be estimated. Pulmonic Valve: The pulmonic valve is not well visualized. There is physiologic pulmonic valve regurgitation. Pericardium: Trivial pericardial effusion. Aorta: The aortic root is normal. Systemic Veins: The inferior vena cava appears normal in size, with IVC inspiratory collapse greater than 50%. In comparison to the previous echocardiogram(s): Compared with study dated 8/26/2024, no significant change.  CONCLUSIONS:  1. The left ventricular systolic function is normal, with a visually estimated ejection fraction of 65-70%.  2. There  is normal right ventricular global systolic function. QUANTITATIVE DATA SUMMARY:  2D MEASUREMENTS:          Normal Ranges: IVSd:            1.26 cm  (0.6-1.1cm) LVPWd:           1.15 cm  (0.6-1.1cm) LVIDd:           4.00 cm  (3.9-5.9cm) LVIDs:           2.58 cm LV Mass Index:   112 g/m2 LVEDV Index:     29 ml/m2 LV % FS          35.5 %  LEFT ATRIUM:                  Normal Ranges: LA Vol A4C:        37.7 ml    (22+/-6mL/m2) LA Vol A2C:        31.0 ml LA Vol BP:         35.9 ml LA Vol Index A4C:  25.4 ml/m2 LA Vol Index A2C:  20.8 ml/m2 LA Vol Index BP:   24.2 ml/m2 LA Area A4C:       14.6 cm2 LA Area A2C:       13.9 cm2 LA Major Axis A4C: 4.8 cm LA Major Axis A2C: 5.3 cm LA Volume Index:   24.3 ml/m2 LA Vol A4C:        35.0 ml LA Vol A2C:        30.4 ml LA Vol Index BSA:  22.0 ml/m2  RIGHT ATRIUM:          Normal Ranges: RA Area A4C:  15.6 cm2  M-MODE MEASUREMENTS:         Normal Ranges: Ao Root:             2.90 cm (2.0-3.7cm) LAs:                 3.85 cm (2.7-4.0cm)  LV SYSTOLIC FUNCTION:                      Normal Ranges: EF-A4C View:    61 % (>=55%) EF-A2C View:    64 % EF-Biplane:     60 % EF-Visual:      68 % LV EF Reported: 68 %  LV DIASTOLIC FUNCTION:          Normal Ranges: MV Peak E:             0.93 m/s (0.7-1.2 m/s)  MITRAL VALVE:         Normal Ranges: MV DT:        81 msec (150-240msec)  AORTIC VALVE:                      Normal Ranges: AoV Vmax:                1.80 m/s  (<=1.7m/s) AoV Peak P.9 mmHg (<20mmHg) AoV Mean P.7 mmHg  (1.7-11.5mmHg) LVOT Max Jeferson:            0.99 m/s  (<=1.1m/s) AoV VTI:                 24.82 cm  (18-25cm) LVOT VTI:                15.47 cm LVOT Diameter:           1.99 cm   (1.8-2.4cm) AoV Area, VTI:           1.93 cm2  (2.5-5.5cm2) AoV Area,Vmax:           1.70 cm2  (2.5-4.5cm2) AoV Dimensionless Index: 0.62  RIGHT VENTRICLE: RV Basal 3.10 cm RV Mid   2.20 cm RV Major 6.7 cm  TRICUSPID VALVE/RVSP:          Normal Ranges: Peak TR Velocity:      2.59 m/s RV Syst Pressure:     30 mmHg  (< 30mmHg)  PULMONIC VALVE:          Normal Ranges: PV Max Jeferson:     1.1 m/s  (0.6-0.9m/s) PV Max P.9 mmHg  [id number] Marcia Dawson MD Electronically signed on 3/11/2025 at 10:34:34 AM  ** Final **     Transthoracic Echo (TTE) Complete    Result Date: 2024   [facility], [address] [place] [address]                Tel [phone] and Fax [phone] TRANSTHORACIC ECHOCARDIOGRAM REPORT  Patient Name:      HARRIS CUNHA TAMIKA      Reading Physician:    [id number] Sha Woods MD Study Date:        2024            Ordering Provider:    [id number] JASMYN XIAO MRN/PID:           [id number]             Fellow: Accession#:        [id number]         Nurse: Date of Birth/Age: 1950 / 74 years Sonographer:          Kaley RASMUSSEN Gender:            F                    Additional Staff: Height:            149.00 cm            Admit Date: Weight:            58.06 kg             Admission Status:     Inpatient - STAT BSA / BMI:         1.52 m2 / 26.15      Encounter#:           [id number]                    kg/m2 Blood Pressure:    110/65 mmHg          Department Location:  [facility] Non                                                               Invasive Study Type:    TRANSTHORACIC ECHO (TTE) COMPLETE Diagnosis/ICD: Other pulmonary embolism without acute cor pulmonale-I26.99 Indication:    Pulm Embolism, sigmoid tumor of colon CPT Code:      Echo Complete w Full Doppler-[id number] Patient History: Pertinent History: HTN and PE. Malignant tumor of sigmoid colon. Study Detail: The following Echo studies were performed: 2D, M-Mode, Doppler and               color flow.  PHYSICIAN INTERPRETATION: Left Ventricle: The left ventricular systolic function is normal, with a visually estimated ejection fraction of 55-60%. There  are no regional left ventricular wall motion abnormalities. The left ventricular cavity size is normal. There is no evidence of left ventricular hypertrophy. Spectral Doppler shows an impaired relaxation pattern of left ventricular diastolic filling. Left Atrium: The left atrium is moderately dilated. Right Ventricle: The right ventricle is normal in size. There is normal right ventricular global systolic function. Right Atrium: The right atrium is normal in size. Aortic Valve: The aortic valve is trileaflet. There is trace aortic valve regurgitation. The peak instantaneous gradient of the aortic valve is 6.2 mmHg. Mitral Valve: The mitral valve is normal in structure. There is trace mitral valve regurgitation. Tricuspid Valve: The tricuspid valve is structurally normal. There is trace tricuspid regurgitation. Pulmonic Valve: The pulmonic valve is structurally normal. There is physiologic pulmonic valve regurgitation. Pericardium: There is a trivial pericardial effusion. Aorta: The aortic root is normal. The Ao Sinus is 3.40 cm. The Asc Ao is 3.40 cm. Pulmonary Artery: The tricuspid regurgitant velocity is 2.32 m/s, and with an estimated right atrial pressure of 3 mmHg, the estimated pulmonary artery pressure is normal with the RVSP at 24.5 mmHg. Systemic Veins: The inferior vena cava appears to be of normal size. There is IVC inspiratory collapse greater than 50%. In comparison to the previous echocardiogram(s): There are no prior studies on this patient for comparison purposes.  CONCLUSIONS:  1. The left ventricular systolic function is normal, with a visually estimated ejection fraction of 55-60%.  2. Spectral Doppler shows an impaired relaxation pattern of left ventricular diastolic filling.  3. There is no evidence of left ventricular hypertrophy.  4. There is normal right ventricular global systolic function.  5. The left atrium is moderately dilated. QUANTITATIVE DATA SUMMARY: 2D MEASUREMENTS:                          Normal Ranges: Ao Root d:     3.40 cm  (2.0-3.7cm) LAs:           3.60 cm  (2.7-4.0cm) IVSd:          1.10 cm  (0.6-1.1cm) LVPWd:         0.90 cm  (0.6-1.1cm) LVIDd:         4.20 cm  (3.9-5.9cm) LVIDs:         2.90 cm LV Mass Index: 90 g/m2 LVEDV Index:   53 ml/m2 LV % FS        31.0 % LA VOLUME:                               Normal Ranges: LA Vol A4C:        65.1 ml    (22+/-6mL/m2) LA Vol A2C:        63.7 ml LA Vol BP:         65.6 ml LA Vol Index A4C:  42.9ml/m2 LA Vol Index A2C:  41.9 ml/m2 LA Vol Index BP:   43.2 ml/m2 LA Area A4C:       20.9 cm2 LA Area A2C:       20.3 cm2 LA Major Axis A4C: 5.7 cm LA Major Axis A2C: 5.5 cm LA Volume Index:   43.2 ml/m2 RA VOLUME BY A/L METHOD:                               Normal Ranges: RA Vol A4C:        29.5 ml    (8.3-19.5ml) RA Vol Index A4C:  19.4 ml/m2 RA Area A4C:       12.9 cm2 RA Major Axis A4C: 4.8 cm AORTA MEASUREMENTS:                      Normal Ranges: Ao Sinus, d: 3.40 cm (2.1-3.5cm) Asc Ao, d:   3.40 cm (2.1-3.4cm) LV SYSTOLIC FUNCTION BY 2D PLANIMETRY (MOD):                      Normal Ranges: EF-A4C View:    47 % (>=55%) EF-A2C View:    62 % EF-Biplane:     55 % EF-Visual:      58 % LV EF Reported: 58 % LV DIASTOLIC FUNCTION:                               Normal Ranges: MV Peak E:        0.75 m/s    (0.7-1.2 m/s) MV Peak A:        0.78 m/s    (0.42-0.7 m/s) E/A Ratio:        0.96        (1.0-2.2) MV e'             0.075 m/s   (>8.0) MV lateral e'     0.09 m/s MV medial e'      0.06 m/s MV A Dur:         165.00 msec E/e' Ratio:       10.05       (<8.0) PulmV Sys Jeferson:    45.00 cm/s PulmV Saravia Jeferson:   29.10 cm/s PulmV S/D Jeferson:    1.50 PulmV A Revs Jeferson: 27.00 cm/s PulmV A Revs Dur: 106.00 msec MITRAL VALVE:                 Normal Ranges: MV DT: 210 msec (150-240msec) AORTIC VALVE:                         Normal Ranges: AoV Vmax:      1.24 m/s (<=1.7m/s) AoV Peak P.2 mmHg (<20mmHg) LVOT Max Jeferson:  0.76 m/s (<=1.1m/s) LVOT VTI:      18.90 cm LVOT  Diameter: 1.80 cm  (1.8-2.4cm) AoV Area,Vmax: 1.57 cm2 (2.5-4.5cm2)  RIGHT VENTRICLE: RV Basal 3.30 cm RV Mid   2.10 cm RV Major 6.3 cm TAPSE:   26.1 mm RV s'    0.12 m/s TRICUSPID VALVE/RVSP:                             Normal Ranges: Peak TR Velocity: 2.32 m/s Est. RA Pressure: 3 mmHg RV Syst Pressure: 24.5 mmHg (< 30mmHg) IVC Diam:         1.50 cm PULMONIC VALVE:                         Normal Ranges: PV Accel Time: 120 msec (>120ms) PV Max Jeferson:    0.7 m/s  (0.6-0.9m/s) PV Max P.0 mmHg Pulmonary Veins: PulmV A Revs Dur: 106.00 msec PulmV A Revs Jeferson: 27.00 cm/s PulmV Saravia Jeferson:   29.10 cm/s PulmV S/D Jeferson:    1.50 PulmV Sys Jeferson:    45.00 cm/s  [id number] Sha Woods MD Electronically signed on 2024 at 11:56:09 AM  ** Final **    === 25 ===    XR CHEST 1 VIEW    - Impression -  Suggestion of mild cardiomegaly. Otherwise no acute cardiopulmonary  process.      MACRO:  None    Signed by: Jenna Watson 3/10/2025 12:12 PM  Dictation workstation:   [id number]  === 02/10/25 ===    RAD ONC CT SIM IMAGES ONLY  === 25 ===    XR CHEST 1 VIEW    - Impression -  Suggestion of mild cardiomegaly. Otherwise no acute cardiopulmonary  process.      MACRO:  None    Signed by: Jenna Watson 3/10/2025 12:12 PM  Dictation workstation:   [id number]  === 25 ===    MR PELVIS W AND WO CONTRAST    - Impression -  1. Progression of the necrotic left presacral soft tissue mass  consistent with the known local recurrence. The mass currently  measures 9.4 x 5.6 cm in comparison to 7.8 x 4.6 cm. More invasion of  the left side of the sacrum, upper rectum and left vaginal cuff.  2. More conspicuous right lower perirectal nodule measuring 0.9 cm  which would be concerning for disease involvement.  3. Stable prominent left para-aortic lymph node measuring 0.8 cm    MACRO:  None    Signed by: Александр Carrillo 2025 10:30 AM  Dictation workstation:   [id number]    Additional results of the last 24 hours have been  reviewed.    Dictated using [employer] Version 2.4  Proof read however unrecognized voice recognition errors may have occurred     Electronically signed by Amelia Ponce DO on 03/14/25 at 7:45 AM

## 2025-03-14 NOTE — TELEPHONE ENCOUNTER
Terra and family called to speak with CARLIE Quinteros CNP.  Family would like to speak with Cinthya regarding plan and possible surgery.  She is currently admitted to [facility].     No FUV scheduled yet.

## 2025-03-14 NOTE — PROGRESS NOTES
"Physical Therapy    Physical Therapy Treatment    Patient Name: Terra Alexis  MRN: [id number]  Department: [address]  Room: [address] [place] [address]  Today's Date: 3/14/2025  Time Calculation  Start Time: 1040  Stop Time: 1110  Time Calculation (min): 30 min         Assessment/Plan   PT Assessment  PT Assessment Results: Decreased strength, Decreased endurance, Impaired balance, Pain  Rehab Prognosis: Excellent  Barriers to Discharge Home: No anticipated barriers  End of Session Communication: Bedside nurse, Care Coordinator  Assessment Comment: continue to recommend Low intensity skilled PT for gait, endurance building, and home safety regarding LLE swelling and pain  End of Session Patient Position: Bed, 3 rail up, Alarm on (all needs within reach, L LE elevated on pillow)     PT Plan  Treatment/Interventions: Bed mobility, Transfer training, Gait training, Stair training, Balance training, Endurance training, Therapeutic exercise  PT Plan: Ongoing PT  PT Frequency: 2 times per week  PT Discharge Recommendations: Low intensity level of continued care  Equipment Recommended upon Discharge: Wheeled walker  PT Recommended Transfer Status: Assist x1  PT - OK to Discharge: Yes      General Visit Information:   PT  Visit  PT Received On: 03/14/25  General  Reason for Referral: Impaired functional mobility; DVT with thrombectomy 3/11  Referred By: Amelia Ponce  Past Medical History Relevant to Rehab: Hypertension  Nephrolitiasis  Cervical cancer, treated with chemo/xrt, then surgery  Invasive uroepithelial cancer, involving upper rectum, vaginal cuff and sacrum.   Hypothyroid  Hx DVT/PE  Peripheral neuropathy  CKD  GERD  Depression  Family/Caregiver Present: No  Prior to Session Communication: Bedside nurse  Patient Position Received: Bed, 3 rail up, Alarm on  General Comment: AXOX3,fiance at bedside, on RA, Tele, purewick,. S/P LLE thrombectomy and with 3-4+ pitting edema. Hx of Urothelial CA with mets. (\"Im in pain, they cant help, I " "cant move that leg, I know that\" very brief short answers loud & in angry fashion. tried to console and let p.t. guide ngoals and ntx, very difficult to educate and progress due to angry appearing state farnaz receptiveness)    Subjective   Precautions:  Precautions  Medical Precautions: Fall precautions (IV, tele)     Date/Time Vitals Session Patient Position Pulse Resp SpO2 BP MAP (mmHg)    03/14/25 1200 --  --  --  --  98 %  --  --                 Objective   Pain:  Pain Assessment  Pain Type: Acute pain, Surgical pain  Pain Location:  (LLE)  Pain Interventions: Ambulation/increased activity, Emotional support, Elevated  Response to Interventions: No change in pain (nursing aware)  Cognition:  Cognition  Orientation Level: Oriented X4  Coordination:  Movements are Fluid and Coordinated: Yes  Postural Control:  Postural Control  Postural Control: Within Functional Limits  Static Sitting Balance  Static Sitting-Balance Support: No upper extremity supported, Feet supported  Static Sitting-Level of Assistance: Independent  Static Standing Balance  Static Standing-Balance Support: Bilateral upper extremity supported  Static Standing-Level of Assistance: Modified independent  Static Standing-Comment/Number of Minutes: 40 seconds x1  Extremity/Trunk Assessments:  Activity Tolerance:     Treatments:  Therapeutic Exercise  Therapeutic Exercise Performed: Yes  Therapeutic Exercise Activity 1: long sitting ankle pumps, quad sets, and sitting heel toe raises and long arc quds, all x10 reps for ROM and motor control LLE, LLE weakness noted with c/o pain ongoing. RN is aware.                             Bed Mobility  Bed Mobility: Yes  Bed Mobility 1  Bed Mobility 1: Supine to sitting  Level of Assistance 1: Close supervision  Bed Mobility 2  Bed Mobility  2: Sitting to supine  Level of Assistance 2: Minimum assistance (B LE)    Ambulation/Gait Training  Ambulation/Gait Training Performed: Yes  Ambulation/Gait Training " 1  Surface 1: Level tile  Device 1: Rolling walker  Assistance 1: Contact guard  Quality of Gait 1: Inconsistent stride length, Antalgic (self limiting secondary to c/o pain)  Comments/Distance (ft) 1: 5ft total with one change in direction, WBAT LLE  Transfers  Transfer: Yes  Transfer 1  Transfer From 1: Sit to, Stand to  Transfer to 1: Sit, Stand  Technique 1: Sit to stand, Stand to sit  Transfer Device 1: Walker  Transfer Level of Assistance 1: Contact guard                          Outcome Measures:  [facility] Basic Mobility  Turning from your back to your side while in a flat bed without using bedrails: A little  Moving from lying on your back to sitting on the side of a flat bed without using bedrails: A little  Moving to and from bed to chair (including a wheelchair): A little  Standing up from a chair using your arms (e.g. wheelchair or bedside chair): A little  To walk in hospital room: A little  Climbing 3-5 steps with railing: A little  Basic Mobility - Total Score: 18    Education Documentation  Body Mechanics, taught by Gela Brothers PTA at 3/14/2025  1:10 PM.  Learner: Patient  Readiness: Acceptance  Method: Explanation  Response: Verbalizes Understanding    Precautions, taught by Gela Brothers PTA at 3/14/2025  1:10 PM.  Learner: Patient  Readiness: Acceptance  Method: Explanation  Response: Verbalizes Understanding    Mobility Training, taught by Gela Brothers PTA at 3/14/2025  1:10 PM.  Learner: Patient  Readiness: Acceptance  Method: Explanation  Response: Verbalizes Understanding    Education Comments  No comments found.        OP EDUCATION:       Encounter Problems       Encounter Problems (Active)       Balance       STG - Maintains dynamic standing balance with upper extremity support x 5' with dual task independently  (Progressing)       Start:  03/12/25    Expected End:  03/26/25               Mobility       STG - Patient will ambulate with 2WW 25' mod I  (Progressing)       Start:  03/12/25     Expected End:  03/26/25            STG - Patient will ascend and descend 5 stairs with cane SBA  (Progressing)       Start:  03/12/25    Expected End:  03/26/25               PT Transfers       STG - Patient will perform bed mobility independent  (Progressing)       Start:  03/12/25    Expected End:  03/26/25            STG - Patient will transfer sit to and from stand mod I  (Progressing)       Start:  03/12/25    Expected End:  03/26/25               Pain - Adult

## 2025-03-14 NOTE — PROGRESS NOTES
03/14/25 1335   Discharge Planning   Living Arrangements Spouse/significant other   Support Systems Spouse/significant other   Assistance Needed Patient is A&OX3, independent in ADLs, ambulates with a cane if needed, no O2, CPAP or Bipap at baseline. No active [facility] agency. Patient reports she has a  PCP out of the [place] office but can't remember her name. Patient was on Eliquis at home but was stopped at the end of February.   Type of Residence Private residence   Number of Stairs to Enter Residence 4   Number of Stairs Within Residence 20   Do you have animals or pets at home? No   Who is requesting discharge planning? Provider   Home or Post Acute Services None   Expected Discharge Disposition Home H  (Patient on room air, PT/ OT recommending low, patient plan remains to go home with new HC for SN, PT, OT. And new [facility] ( referral placed) , ADOD 3/14)   Does the patient need discharge transport arranged? No   Patient Choice   Provider Choice list and CMS website (https://medicare.gov/care-compare#search) for post-acute Quality and Resource Measure Data were provided and reviewed with: Patient   Stroke Family Assessment   Stroke Family Assessment Needed No   Intensity of Service   Intensity of Service 0-30 min     3/14/25 @ 1356: Eliquis script sent to OP pharmacy and cost is $175, patient agreeable to cost.

## 2025-03-15 ENCOUNTER — HOSPITAL ENCOUNTER (OUTPATIENT)
Dept: CARDIOLOGY | Facility: HOSPITAL | Age: 75
Discharge: HOME | DRG: 299 | End: 2025-03-15
Payer: MEDICARE

## 2025-03-15 ENCOUNTER — DOCUMENTATION (OUTPATIENT)
Dept: HOME HEALTH SERVICES | Facility: HOME HEALTH | Age: 75
End: 2025-03-15
Payer: MEDICARE

## 2025-03-15 VITALS
HEIGHT: 60 IN | OXYGEN SATURATION: 98 % | SYSTOLIC BLOOD PRESSURE: 112 MMHG | RESPIRATION RATE: 16 BRPM | WEIGHT: 117.28 LBS | DIASTOLIC BLOOD PRESSURE: 50 MMHG | HEART RATE: 79 BPM | TEMPERATURE: 97.7 F | BODY MASS INDEX: 23.03 KG/M2

## 2025-03-15 LAB
ALBUMIN SERPL BCP-MCNC: 2.3 G/DL (ref 3.4–5)
ANION GAP SERPL CALC-SCNC: 14 MMOL/L (ref 10–20)
BASOPHILS # BLD MANUAL: 0 X10*3/UL (ref 0–0.1)
BASOPHILS NFR BLD MANUAL: 0 %
BUN SERPL-MCNC: 25 MG/DL (ref 6–23)
CALCIUM SERPL-MCNC: 8.4 MG/DL (ref 8.6–10.3)
CHLORIDE SERPL-SCNC: 102 MMOL/L (ref 98–107)
CO2 SERPL-SCNC: 18 MMOL/L (ref 21–32)
CREAT SERPL-MCNC: 0.76 MG/DL (ref 0.5–1.05)
EGFRCR SERPLBLD CKD-EPI 2021: 83 ML/MIN/1.73M*2
EOSINOPHIL # BLD MANUAL: 0 X10*3/UL (ref 0–0.4)
EOSINOPHIL NFR BLD MANUAL: 0 %
ERYTHROCYTE [DISTWIDTH] IN BLOOD BY AUTOMATED COUNT: 16.6 % (ref 11.5–14.5)
GLUCOSE SERPL-MCNC: 100 MG/DL (ref 74–99)
HCT VFR BLD AUTO: 30.6 % (ref 36–46)
HGB BLD-MCNC: 9.8 G/DL (ref 12–16)
IMM GRANULOCYTES # BLD AUTO: 3.43 X10*3/UL (ref 0–0.5)
IMM GRANULOCYTES NFR BLD AUTO: 9.2 % (ref 0–0.9)
LYMPHOCYTES # BLD MANUAL: 0.75 X10*3/UL (ref 0.8–3)
LYMPHOCYTES NFR BLD MANUAL: 2 %
MAGNESIUM SERPL-MCNC: 2.13 MG/DL (ref 1.6–2.4)
MCH RBC QN AUTO: 28.6 PG (ref 26–34)
MCHC RBC AUTO-ENTMCNC: 32 G/DL (ref 32–36)
MCV RBC AUTO: 89 FL (ref 80–100)
METAMYELOCYTES # BLD MANUAL: 0.37 X10*3/UL
METAMYELOCYTES NFR BLD MANUAL: 1 %
MONOCYTES # BLD MANUAL: 0.37 X10*3/UL (ref 0.05–0.8)
MONOCYTES NFR BLD MANUAL: 1 %
MYELOCYTES # BLD MANUAL: 1.87 X10*3/UL
MYELOCYTES NFR BLD MANUAL: 5 %
NEUTROPHILS # BLD MANUAL: 34.03 X10*3/UL (ref 1.6–5.5)
NEUTS BAND # BLD MANUAL: 1.12 X10*3/UL (ref 0–0.5)
NEUTS BAND NFR BLD MANUAL: 3 %
NEUTS SEG # BLD MANUAL: 32.91 X10*3/UL (ref 1.6–5)
NEUTS SEG NFR BLD MANUAL: 88 %
NRBC BLD-RTO: 0 /100 WBCS (ref 0–0)
PHOSPHATE SERPL-MCNC: 3.2 MG/DL (ref 2.5–4.9)
PLATELET # BLD AUTO: 446 X10*3/UL (ref 150–450)
POTASSIUM SERPL-SCNC: 4.9 MMOL/L (ref 3.5–5.3)
RBC # BLD AUTO: 3.43 X10*6/UL (ref 4–5.2)
RBC MORPH BLD: ABNORMAL
SODIUM SERPL-SCNC: 129 MMOL/L (ref 136–145)
TOTAL CELLS COUNTED BLD: 100
WBC # BLD AUTO: 37.4 X10*3/UL (ref 4.4–11.3)

## 2025-03-15 PROCEDURE — 80069 RENAL FUNCTION PANEL: CPT | Performed by: INTERNAL MEDICINE

## 2025-03-15 PROCEDURE — 2500000002 HC RX 250 W HCPCS SELF ADMINISTERED DRUGS (ALT 637 FOR MEDICARE OP, ALT 636 FOR OP/ED): Performed by: INTERNAL MEDICINE

## 2025-03-15 PROCEDURE — 99232 SBSQ HOSP IP/OBS MODERATE 35: CPT | Performed by: INTERNAL MEDICINE

## 2025-03-15 PROCEDURE — 85007 BL SMEAR W/DIFF WBC COUNT: CPT | Performed by: INTERNAL MEDICINE

## 2025-03-15 PROCEDURE — 93005 ELECTROCARDIOGRAM TRACING: CPT

## 2025-03-15 PROCEDURE — 2500000001 HC RX 250 WO HCPCS SELF ADMINISTERED DRUGS (ALT 637 FOR MEDICARE OP): Performed by: INTERNAL MEDICINE

## 2025-03-15 PROCEDURE — 85027 COMPLETE CBC AUTOMATED: CPT | Performed by: INTERNAL MEDICINE

## 2025-03-15 PROCEDURE — 36415 COLL VENOUS BLD VENIPUNCTURE: CPT | Performed by: INTERNAL MEDICINE

## 2025-03-15 PROCEDURE — 2500000004 HC RX 250 GENERAL PHARMACY W/ HCPCS (ALT 636 FOR OP/ED): Performed by: INTERNAL MEDICINE

## 2025-03-15 PROCEDURE — 83735 ASSAY OF MAGNESIUM: CPT | Performed by: INTERNAL MEDICINE

## 2025-03-15 PROCEDURE — 2500000001 HC RX 250 WO HCPCS SELF ADMINISTERED DRUGS (ALT 637 FOR MEDICARE OP): Performed by: PHYSICIAN ASSISTANT

## 2025-03-15 RX ADMIN — LEVOTHYROXINE SODIUM 112 MCG: 112 TABLET ORAL at 05:02

## 2025-03-15 RX ADMIN — ACETAMINOPHEN 975 MG: 325 TABLET ORAL at 08:49

## 2025-03-15 RX ADMIN — METHOCARBAMOL TABLETS 1000 MG: 500 TABLET, COATED ORAL at 13:34

## 2025-03-15 RX ADMIN — METHOCARBAMOL TABLETS 1000 MG: 500 TABLET, COATED ORAL at 05:02

## 2025-03-15 RX ADMIN — SENNOSIDES AND DOCUSATE SODIUM 2 TABLET: 50; 8.6 TABLET ORAL at 08:49

## 2025-03-15 RX ADMIN — PANTOPRAZOLE SODIUM 40 MG: 40 TABLET, DELAYED RELEASE ORAL at 06:09

## 2025-03-15 RX ADMIN — APIXABAN 10 MG: 5 TABLET, FILM COATED ORAL at 08:49

## 2025-03-15 RX ADMIN — GABAPENTIN 600 MG: 300 CAPSULE ORAL at 08:49

## 2025-03-15 RX ADMIN — OXYCODONE HYDROCHLORIDE 5 MG: 5 TABLET ORAL at 08:49

## 2025-03-15 RX ADMIN — DEXAMETHASONE SODIUM PHOSPHATE 10 MG: 10 INJECTION INTRAMUSCULAR; INTRAVENOUS at 12:17

## 2025-03-15 ASSESSMENT — PAIN SCALES - GENERAL: PAINLEVEL_OUTOF10: 4

## 2025-03-15 NOTE — DOCUMENTATION CLARIFICATION NOTE
"    PATIENT:               HARRIS LUNDBERG  ACCT #:                  [id number]  MRN:                       [id number]  :                       1950  ADMIT DATE:       3/7/2025 12:42 PM  DISCH DATE:  RESPONDING PROVIDER #:        [id number]          PROVIDER RESPONSE TEXT:    Iron deficiency anemia    CDI QUERY TEXT:    Clarification        Instruction:    Based on your assessment of the patient and the clinical information, please provide the requested documentation by clicking on the appropriate radio button and enter any additional information if prompted.    Question: Please clarify the type of anemia    When answering this query, please exercise your independent professional judgment. The fact that a question is being asked, does not imply that any particular answer is desired or expected.    The patient's clinical indicators include:  Clinical Information:  74 year old admitted with DVT LLE.    Clinical Indicators:    -Per H&P  \"Acute on chronic anemia.\"    - Per Medicine on 3/8, 3/9, 3/10  \"Anemia, unspecified type\"    -Per Medicine on 3/11, 3/12  \"Anemia of chronic disease\"    Treatment:  PRBC    Risk Factors:  Currently on Keytruda for advanced urothelial cancer with metastasis to bone, rectum.  Heparin drip for DVT.  Options provided:  -- Iron deficiency anemia  -- Acute blood loss anemia  -- Chronic blood loss anemia  -- Anemia 2/2 chemotherapy  -- Anemia 2/2 cancer  -- Anemia 2/2 CKD 3  -- Other - I will add my own diagnosis  -- Refer to Clinical Documentation Reviewer    Query created by: Selina Bynum on 3/13/2025 3:43 PM      Electronically signed by:  HERMINIA CHARLES DO 3/15/2025 1:57 PM          "

## 2025-03-15 NOTE — HH CARE COORDINATION
Home Care received a referral for Nursing, Physical Therapy, and Occupational Therapy. Unfortunately, we are unable to accept and process the referral at this time.    Reason:  Inability to accommodate the patient's needs in a safe and timely manner    Patients, please reach out to the referring provider or your PCP to assist in obtaining an alternative home care agency and/or guidance to meet your needs.    Providers, please reach out to [facility] at [phone] with any questions regarding the declined referral.

## 2025-03-15 NOTE — DOCUMENTATION CLARIFICATION NOTE
"    PATIENT:               HARRIS LUNDBERG  ACCT #:                  [id number]  MRN:                       [id number]  :                       1950  ADMIT DATE:       3/7/2025 12:42 PM  DISCH DATE:  RESPONDING PROVIDER #:        [id number]          PROVIDER RESPONSE TEXT:    I agree with dietician diagnosis of \"Severe Malnutrition\" on 3/10/25    CDI QUERY TEXT:    Clarification        Instruction:    Based on your assessment of the patient and the clinical information, please provide the requested documentation by clicking on the appropriate radio button and enter any additional information if prompted.    Question: Please further clarify this patient nutritional status as    When answering this query, please exercise your independent professional judgment. The fact that a question is being asked, does not imply that any particular answer is desired or expected.    The patient's clinical indicators include:  Clinical Information:  74 year old admitted with acute DVT LLE.    Clinical Indicators:    -Per Registered Dietitian on 3/10  \"Malnutrition Diagnosis  Patient has Malnutrition Diagnosis: Yes  Diagnosis Status: New  Malnutrition Diagnosis: Severe malnutrition related to chronic disease or condition  Related to: poor appetite  As Evidenced by: significant weight loss of 12%/6 months, 24%/1 year, loss of muscle and adipose stores, consuming less than 75% of estimated energy needs >/=1 month\"    -On admission BMI 22.9, weight 50.9kg, 112 lb, height 1.524m.    Treatment: RD consult.  Regular diet.  Assist with meals.  Daily weights.    Risk Factors:  Currently on chemotherapy for invasive urothelial cancer with metastasis to bone, rectum.  Options provided:  -- I agree with dietician diagnosis of \"Severe Malnutrition\" on 3/10/25  -- Other - I will add my own diagnosis  -- Refer to Clinical Documentation Reviewer    Query created by: Selina Bynum on 3/13/2025 3:30 PM      Electronically signed by:  HERMINIA CHARLES DO " 3/15/2025 1:57 PM

## 2025-03-15 NOTE — NURSING NOTE
Discharge instructions reviewed with patient and SO. Pharmacy delivered her home meds a few days ago and they are at their house. All questions answered and paperwork provided. [facility]

## 2025-03-15 NOTE — PROGRESS NOTES
[facility] Hospitalist Progress Note       [id number]: Please page me for patient care issues.  [phone]: Please page night hospitalist for any issues.     Terra Alexis  :  1950(74 y.o.)  MRN:  [id number]  PCP: No Assigned PCP Generic Provider, MD  LOS: 8  day(s) since admission    Assessment & Plan  Acute bilateral deep vein thrombosis (DVT) of iliac veins of lower extremities (Multi)    Acute deep vein thrombosis (DVT) of femoral vein of left lower extremity (Multi)      Assessment and Plan:     Terra Alexis is a 74 y.o. female , hx of recurrent  metastatic uroepithelial cancer, on maintenance Pembro from 2025. Admitted with extensive dvt of LLE s/p  thrombectomy (3/11/25)    #Acute extensive DVT of LLE likely due to malignancy  -s/p thrombectomy (3/11/25). transitioned to apixaban from hep gtt  #Cancer related pain-on cocktail of analgesics and decadron  -pt has been refusing oxycodone despite intractable pain. Pain regimen compliance encouraged as this is the only thing preventing home discharge. Pt is not interested in SNF placement  -MRI (25)  1. Progression of the necrotic left presacral soft tissue mass consistent with the known local recurrence. The mass currently measures 9.4 x 5.6 cm in comparison to 7.8 x 4.6 cm. More invasion of the left side of the sacrum, upper rectum and left vaginal cuff. 2. More conspicuous right lower perirectal nodule measuring 0.9 cm which would be concerning for disease involvement. 3. Stable prominent left para-aortic lymph node measuring 0.8 cm  #Hypothyroidism  #Recurrent metastatic uroepithelial cancer  #Anemia of chronic disease not requiring transfusion  #Hypothyroid-on synthroid  #GOC: pt would like to maintain FC status  -CS notes reviewed and recs appreciated: palliative, interventional cards    Disposition: await test results, await consultant recommendations, and await clinical improvement    DVT Prophylaxis: full anticoagulation  apixaban    Code status: Full Code  Diet: Adult diet Regular    Level of MDM:  High    discussed with nurse, discussed with TCC/SW, I personally examined the patient, and I personally reviewed chart, data, labs radiology reports    Family Communication  Number :   Name of Designated Family Representative:       Total time spent: 50 minutes, of total time providing counseling or in coordination of care. Total time on this day of visit includes record and documentation review before and after visit including documentation and time not explicitly included on EMR time stamp      Subjective:   Interval History:  HPI  The patient complains of LLE pain  The patient feels their symptoms areimproving  no events or new concerns    Scheduled Meds:acetaminophen, 975 mg, oral, TID  apixaban, 10 mg, oral, BID   Followed by  [START ON 3/19/2025] apixaban, 5 mg, oral, BID  dexAMETHasone, 10 mg, intravenous, q12h  gabapentin, 600 mg, oral, TID  lactulose, 20 g, oral, TID  levothyroxine, 112 mcg, oral, Daily  methocarbamol, 1,000 mg, oral, q8h AILEEN  pantoprazole, 40 mg, oral, Daily before breakfast  polyethylene glycol, 17 g, oral, BID  sennosides-docusate sodium, 2 tablet, oral, BID      Continuous Infusions:       PRN Meds:PRN medications: melatonin, naloxone, ondansetron, oxyCODONE **OR** oxyCODONE    Review of Systems   All other systems reviewed and are negative.    Interval Pertinent History:  Social History     Tobacco Use    Smoking status: Never    Smokeless tobacco: Never   Substance Use Topics    Alcohol use: Not Currently         Objective:   Patient Vitals for the past 24 hrs:   BP Temp Temp src Pulse Resp SpO2   03/15/25 0800 -- -- -- 81 15 98 %   03/15/25 0711 112/50 36.5 °C (97.7 °F) Temporal 79 17 97 %   03/15/25 0400 126/69 36.3 °C (97.4 °F) Temporal 64 12 95 %   03/15/25 0000 -- -- -- 60 12 99 %   03/14/25 2353 134/69 36.2 °C (97.1 °F) Temporal 69 14 96 %   03/14/25 2000 -- -- -- 93 17 96 %   03/14/25 1928 127/81  36.5 °C (97.7 °F) Temporal 100 19 99 %   25 1634 110/71 36.3 °C (97.3 °F) Temporal 91 18 98 %   25 1600 -- -- -- 95 17 99 %   25 1200 -- -- -- -- -- 98 %       Average, Min, and Max for last 24 hours Vitals:  TEMPERATURE:  Temp  Av.3 °C (97.4 °F)  Min: 36.2 °C (97.1 °F)  Max: 36.5 °C (97.7 °F)    RESPIRATIONS RANGE: Resp  Avg: 15.7  Min: 12  Max: 19    PULSE RANGE: Pulse  Av.3  Min: 60  Max: 100    BLOOD PRESSURE RANGE:  Systolic (24hrs), Av , Min:110 , Max:134   ; Diastolic (24hrs), Av, Min:50, Max:81      PULSE OXIMETRY RANGE: SpO2  Av.5 %  Min: 95 %  Max: 99 %  Body mass index is 22.91 kg/m².    I/O last 3 completed shifts:  In: 480 (9 mL/kg) [P.O.:480]  Out: 550 (10.3 mL/kg) [Urine:550 (0.3 mL/kg/hr)]  Weight: 53.2 kg   Weight change:      Physical Exam  Vitals and nursing note reviewed.   Constitutional:       Appearance: Normal appearance.   HENT:      Head: Normocephalic and atraumatic.      Right Ear: External ear normal.      Left Ear: External ear normal.      Nose: Nose normal.      Mouth/Throat:      Mouth: Mucous membranes are moist.   Eyes:      General: No scleral icterus.        Right eye: No discharge.         Left eye: No discharge.      Extraocular Movements: Extraocular movements intact.      Conjunctiva/sclera: Conjunctivae normal.      Pupils: Pupils are equal, round, and reactive to light.   Cardiovascular:      Rate and Rhythm: Normal rate and regular rhythm.   Pulmonary:      Effort: Pulmonary effort is normal.      Breath sounds: Normal breath sounds.   Abdominal:      General: Abdomen is flat. Bowel sounds are normal.      Palpations: Abdomen is soft.   Musculoskeletal:         General: Tenderness present. Normal range of motion.      Right lower leg: No edema.      Left lower leg: Edema (significantly improved) present.   Skin:     General: Skin is warm and dry.      Capillary Refill: Capillary refill takes less than 2 seconds.   Neurological:       General: No focal deficit present.   Psychiatric:         Mood and Affect: Mood normal.         Thought Content: Thought content normal.         Judgment: Judgment normal.         Lab Results   Component Value Date     (L) 03/15/2025    K 4.9 03/15/2025     03/15/2025    CO2 18 (L) 03/15/2025    BUN 25 (H) 03/15/2025    CREATININE 0.76 03/15/2025    GLUCOSE 100 (H) 03/15/2025    CALCIUM 8.4 (L) 03/15/2025    PROT 5.1 (L) 03/10/2025    BILITOT 0.4 03/10/2025    ALKPHOS 88 03/10/2025    AST 10 03/10/2025    ALT 13 03/10/2025       Lab Results   Component Value Date    WBC 37.4 (H) 03/15/2025    HGB 9.8 (L) 03/15/2025    HCT 30.6 (L) 03/15/2025    MCV 89 03/15/2025     03/15/2025    LYMPHOPCT 2.0 03/15/2025    RBC 3.43 (L) 03/15/2025    MCH 28.6 03/15/2025    MCHC 32.0 03/15/2025    RDW 16.6 (H) 03/15/2025       Lab Results   Component Value Date    TSH 17.97 (H) 03/08/2025     Lab Results   Component Value Date    LACTATE 2.0 09/02/2024    BNP 46 03/07/2025    INR 1.4 (H) 03/07/2025       IMAGES:  Encounter Date: 03/07/25   ECG 12 lead   Result Value    Ventricular Rate 74    Atrial Rate 74    TN Interval 172    QRS Duration 86    QT Interval 388    QTC Calculation(Bazett) 430    P Axis 38    R Axis -6    T Axis -3    QRS Count 13    Q Onset 218    P Onset 132    P Offset 185    T Offset 412    QTC Fredericia 416    Narrative    Normal sinus rhythm  Inferior infarct , age undetermined  Anterior infarct , age undetermined  Abnormal ECG  When compared with ECG of 07-MAR-2025 13:48,  Anterior infarct is now Present  Inferior infarct is now Present     NM Lung perfusion with spect/ct    Result Date: 3/10/2025  Impression: 1. No distinct wedge-shaped segmental perfusion defect seen on SPECT/CT to suggest acute pulmonary embolism (low probability). 2. Low-dose CT demonstrates bilateral trace pleural effusions.   The interpretation above is based on modified PIOPED II and PISAPED criteria.   This study  was analyzed and interpreted at [place], [place].   MACRO: None     Signed by: Ml Mendoza 3/10/2025 5:09 PM Dictation workstation:   [id number]    XR chest 1 view    Result Date: 3/10/2025  Impression: Suggestion of mild cardiomegaly. Otherwise no acute cardiopulmonary process.     MACRO: None   Signed by: Jenna Watson 3/10/2025 12:12 PM Dictation workstation:   [id number]    Transthoracic Echo (TTE) Complete    Result Date: 3/11/2025   [facility], [address] [place] [address]               Tel [phone] and Fax [phone] TRANSTHORACIC ECHOCARDIOGRAM REPORT  Patient Name:       HARRIS Sandoval Physician:    [id number] Marcia Dawson MD Study Date:         3/11/2025           Ordering Provider:    [id number] DESTINEE DURHAM MRN/PID:            [id number]            Fellow: Accession#:         [id number]        Nurse: Date of Birth/Age:  1950 / 74      Sonographer:          Carmen kaur                                     SAMIA Gender assigned at  F                   Additional Staff: Birth: Height:             152.40 cm           Admit Date:           3/7/2025 Weight:             53.07 kg            Admission Status:     Inpatient -                                                               Routine BSA / BMI:          1.49 m2 / 22.85     Encounter#:           [id number]                     kg/m2 Blood Pressure:     116/65 mmHg         Department Location:  [facility] Non                                                               Invasive Study Type:    TRANSTHORACIC ECHO (TTE) COMPLETE Diagnosis/ICD: Other pulmonary embolism without acute cor pulmonale-I26.99 Indication:    Pulmonary Embolism CPT Code:      Echo Complete w Full [id number] Patient History: Pertinent History: LE Edema. Study  Detail: The following Echo studies were performed: 2D, M-Mode, Doppler and               color flow. Image quality for this study is poor.  PHYSICIAN INTERPRETATION: Left Ventricle: The left ventricular systolic function is normal, with a visually estimated ejection fraction of 65-70%. There is mild concentric left ventricular hypertrophy. The left ventricular cavity size is normal. There is mildly increased posterior left ventricular wall thickness. Left ventricular diastolic filling cannot be determined due to E/A wave fusion. Left Atrium: The left atrial size is normal. Right Ventricle: The right ventricle is normal in size. There is normal right ventricular global systolic function. Right Atrium: The right atrial size was not well visualized. Aortic Valve: The aortic valve is probably trileaflet. The aortic valve dimensionless index is 0.62. There is no evidence of aortic valve regurgitation. The peak instantaneous gradient of the aortic valve is 13 mmHg. The mean gradient of the aortic valve is 7 mmHg. Mitral Valve: The mitral valve is normal in structure. There is trace mitral valve regurgitation. Tricuspid Valve: The tricuspid valve is structurally normal. There is trace tricuspid regurgitation. The right ventricular systolic pressure is unable to be estimated. Pulmonic Valve: The pulmonic valve is not well visualized. There is physiologic pulmonic valve regurgitation. Pericardium: Trivial pericardial effusion. Aorta: The aortic root is normal. Systemic Veins: The inferior vena cava appears normal in size, with IVC inspiratory collapse greater than 50%. In comparison to the previous echocardiogram(s): Compared with study dated 8/26/2024, no significant change.  CONCLUSIONS:  1. The left ventricular systolic function is normal, with a visually estimated ejection fraction of 65-70%.  2. There is normal right ventricular global systolic function. QUANTITATIVE DATA SUMMARY:  2D MEASUREMENTS:          Normal  Ranges: IVSd:            1.26 cm  (0.6-1.1cm) LVPWd:           1.15 cm  (0.6-1.1cm) LVIDd:           4.00 cm  (3.9-5.9cm) LVIDs:           2.58 cm LV Mass Index:   112 g/m2 LVEDV Index:     29 ml/m2 LV % FS          35.5 %  LEFT ATRIUM:                  Normal Ranges: LA Vol A4C:        37.7 ml    (22+/-6mL/m2) LA Vol A2C:        31.0 ml LA Vol BP:         35.9 ml LA Vol Index A4C:  25.4 ml/m2 LA Vol Index A2C:  20.8 ml/m2 LA Vol Index BP:   24.2 ml/m2 LA Area A4C:       14.6 cm2 LA Area A2C:       13.9 cm2 LA Major Axis A4C: 4.8 cm LA Major Axis A2C: 5.3 cm LA Volume Index:   24.3 ml/m2 LA Vol A4C:        35.0 ml LA Vol A2C:        30.4 ml LA Vol Index BSA:  22.0 ml/m2  RIGHT ATRIUM:          Normal Ranges: RA Area A4C:  15.6 cm2  M-MODE MEASUREMENTS:         Normal Ranges: Ao Root:             2.90 cm (2.0-3.7cm) LAs:                 3.85 cm (2.7-4.0cm)  LV SYSTOLIC FUNCTION:                      Normal Ranges: EF-A4C View:    61 % (>=55%) EF-A2C View:    64 % EF-Biplane:     60 % EF-Visual:      68 % LV EF Reported: 68 %  LV DIASTOLIC FUNCTION:          Normal Ranges: MV Peak E:             0.93 m/s (0.7-1.2 m/s)  MITRAL VALVE:         Normal Ranges: MV DT:        81 msec (150-240msec)  AORTIC VALVE:                      Normal Ranges: AoV Vmax:                1.80 m/s  (<=1.7m/s) AoV Peak P.9 mmHg (<20mmHg) AoV Mean P.7 mmHg  (1.7-11.5mmHg) LVOT Max Jeferson:            0.99 m/s  (<=1.1m/s) AoV VTI:                 24.82 cm  (18-25cm) LVOT VTI:                15.47 cm LVOT Diameter:           1.99 cm   (1.8-2.4cm) AoV Area, VTI:           1.93 cm2  (2.5-5.5cm2) AoV Area,Vmax:           1.70 cm2  (2.5-4.5cm2) AoV Dimensionless Index: 0.62  RIGHT VENTRICLE: RV Basal 3.10 cm RV Mid   2.20 cm RV Major 6.7 cm  TRICUSPID VALVE/RVSP:          Normal Ranges: Peak TR Velocity:     2.59 m/s RV Syst Pressure:     30 mmHg  (< 30mmHg)  PULMONIC VALVE:          Normal Ranges: PV Max Jeferson:     1.1  m/s  (0.6-0.9m/s) PV Max P.9 mmHg  [id number] Marcia Dawson MD Electronically signed on 3/11/2025 at 10:34:34 AM  ** Final **     Transthoracic Echo (TTE) Complete    Result Date: 2024   [facility], [address] [place] [address]                Tel [phone] and Fax [phone] TRANSTHORACIC ECHOCARDIOGRAM REPORT  Patient Name:      HARRIS Sandoval Physician:    [id number] Sha Woods MD Study Date:        2024            Ordering Provider:    [id number] JASMYN XIAO MRN/PID:           [id number]             Fellow: Accession#:        [id number]         Nurse: Date of Birth/Age: 1950 / 74 years Sonographer:          Kaley RASMUSSEN Gender:            F                    Additional Staff: Height:            149.00 cm            Admit Date: Weight:            58.06 kg             Admission Status:     Inpatient - STAT BSA / BMI:         1.52 m2 / 26.15      Encounter#:           [id number]                    kg/m2 Blood Pressure:    110/65 mmHg          Department Location:  [facility] Non                                                               Invasive Study Type:    TRANSTHORACIC ECHO (TTE) COMPLETE Diagnosis/ICD: Other pulmonary embolism without acute cor pulmonale-I26.99 Indication:    Pulm Embolism, sigmoid tumor of colon CPT Code:      Echo Complete w Full Doppler-[id number] Patient History: Pertinent History: HTN and PE. Malignant tumor of sigmoid colon. Study Detail: The following Echo studies were performed: 2D, M-Mode, Doppler and               color flow.  PHYSICIAN INTERPRETATION: Left Ventricle: The left ventricular systolic function is normal, with a visually estimated ejection fraction of 55-60%. There are no regional left ventricular wall motion abnormalities. The left ventricular cavity size is normal. There is  no evidence of left ventricular hypertrophy. Spectral Doppler shows an impaired relaxation pattern of left ventricular diastolic filling. Left Atrium: The left atrium is moderately dilated. Right Ventricle: The right ventricle is normal in size. There is normal right ventricular global systolic function. Right Atrium: The right atrium is normal in size. Aortic Valve: The aortic valve is trileaflet. There is trace aortic valve regurgitation. The peak instantaneous gradient of the aortic valve is 6.2 mmHg. Mitral Valve: The mitral valve is normal in structure. There is trace mitral valve regurgitation. Tricuspid Valve: The tricuspid valve is structurally normal. There is trace tricuspid regurgitation. Pulmonic Valve: The pulmonic valve is structurally normal. There is physiologic pulmonic valve regurgitation. Pericardium: There is a trivial pericardial effusion. Aorta: The aortic root is normal. The Ao Sinus is 3.40 cm. The Asc Ao is 3.40 cm. Pulmonary Artery: The tricuspid regurgitant velocity is 2.32 m/s, and with an estimated right atrial pressure of 3 mmHg, the estimated pulmonary artery pressure is normal with the RVSP at 24.5 mmHg. Systemic Veins: The inferior vena cava appears to be of normal size. There is IVC inspiratory collapse greater than 50%. In comparison to the previous echocardiogram(s): There are no prior studies on this patient for comparison purposes.  CONCLUSIONS:  1. The left ventricular systolic function is normal, with a visually estimated ejection fraction of 55-60%.  2. Spectral Doppler shows an impaired relaxation pattern of left ventricular diastolic filling.  3. There is no evidence of left ventricular hypertrophy.  4. There is normal right ventricular global systolic function.  5. The left atrium is moderately dilated. QUANTITATIVE DATA SUMMARY: 2D MEASUREMENTS:                         Normal Ranges: Ao Root d:     3.40 cm  (2.0-3.7cm) LAs:           3.60 cm  (2.7-4.0cm) IVSd:           1.10 cm  (0.6-1.1cm) LVPWd:         0.90 cm  (0.6-1.1cm) LVIDd:         4.20 cm  (3.9-5.9cm) LVIDs:         2.90 cm LV Mass Index: 90 g/m2 LVEDV Index:   53 ml/m2 LV % FS        31.0 % LA VOLUME:                               Normal Ranges: LA Vol A4C:        65.1 ml    (22+/-6mL/m2) LA Vol A2C:        63.7 ml LA Vol BP:         65.6 ml LA Vol Index A4C:  42.9ml/m2 LA Vol Index A2C:  41.9 ml/m2 LA Vol Index BP:   43.2 ml/m2 LA Area A4C:       20.9 cm2 LA Area A2C:       20.3 cm2 LA Major Axis A4C: 5.7 cm LA Major Axis A2C: 5.5 cm LA Volume Index:   43.2 ml/m2 RA VOLUME BY A/L METHOD:                               Normal Ranges: RA Vol A4C:        29.5 ml    (8.3-19.5ml) RA Vol Index A4C:  19.4 ml/m2 RA Area A4C:       12.9 cm2 RA Major Axis A4C: 4.8 cm AORTA MEASUREMENTS:                      Normal Ranges: Ao Sinus, d: 3.40 cm (2.1-3.5cm) Asc Ao, d:   3.40 cm (2.1-3.4cm) LV SYSTOLIC FUNCTION BY 2D PLANIMETRY (MOD):                      Normal Ranges: EF-A4C View:    47 % (>=55%) EF-A2C View:    62 % EF-Biplane:     55 % EF-Visual:      58 % LV EF Reported: 58 % LV DIASTOLIC FUNCTION:                               Normal Ranges: MV Peak E:        0.75 m/s    (0.7-1.2 m/s) MV Peak A:        0.78 m/s    (0.42-0.7 m/s) E/A Ratio:        0.96        (1.0-2.2) MV e'             0.075 m/s   (>8.0) MV lateral e'     0.09 m/s MV medial e'      0.06 m/s MV A Dur:         165.00 msec E/e' Ratio:       10.05       (<8.0) PulmV Sys Jeferson:    45.00 cm/s PulmV Saravia Jeferson:   29.10 cm/s PulmV S/D Jeferson:    1.50 PulmV A Revs Jeferson: 27.00 cm/s PulmV A Revs Dur: 106.00 msec MITRAL VALVE:                 Normal Ranges: MV DT: 210 msec (150-240msec) AORTIC VALVE:                         Normal Ranges: AoV Vmax:      1.24 m/s (<=1.7m/s) AoV Peak P.2 mmHg (<20mmHg) LVOT Max Jeferson:  0.76 m/s (<=1.1m/s) LVOT VTI:      18.90 cm LVOT Diameter: 1.80 cm  (1.8-2.4cm) AoV Area,Vmax: 1.57 cm2 (2.5-4.5cm2)  RIGHT VENTRICLE: RV Basal 3.30 cm RV Mid    2.10 cm RV Major 6.3 cm TAPSE:   26.1 mm RV s'    0.12 m/s TRICUSPID VALVE/RVSP:                             Normal Ranges: Peak TR Velocity: 2.32 m/s Est. RA Pressure: 3 mmHg RV Syst Pressure: 24.5 mmHg (< 30mmHg) IVC Diam:         1.50 cm PULMONIC VALVE:                         Normal Ranges: PV Accel Time: 120 msec (>120ms) PV Max Jeferson:    0.7 m/s  (0.6-0.9m/s) PV Max P.0 mmHg Pulmonary Veins: PulmV A Revs Dur: 106.00 msec PulmV A Revs Jeferson: 27.00 cm/s PulmV Saravia Jeferson:   29.10 cm/s PulmV S/D Jeferson:    1.50 PulmV Sys Jeferson:    45.00 cm/s  [id number] Sha Woods MD Electronically signed on 2024 at 11:56:09 AM  ** Final **    === 25 ===    XR CHEST 1 VIEW    - Impression -  Suggestion of mild cardiomegaly. Otherwise no acute cardiopulmonary  process.      MACRO:  None    Signed by: Jenna Watson 3/10/2025 12:12 PM  Dictation workstation:   [id number]  === 02/10/25 ===    RAD ONC CT SIM IMAGES ONLY  === 25 ===    XR CHEST 1 VIEW    - Impression -  Suggestion of mild cardiomegaly. Otherwise no acute cardiopulmonary  process.      MACRO:  None    Signed by: Jenna Watson 3/10/2025 12:12 PM  Dictation workstation:   [id number]  === 25 ===    MR PELVIS W AND WO CONTRAST    - Impression -  1. Progression of the necrotic left presacral soft tissue mass  consistent with the known local recurrence. The mass currently  measures 9.4 x 5.6 cm in comparison to 7.8 x 4.6 cm. More invasion of  the left side of the sacrum, upper rectum and left vaginal cuff.  2. More conspicuous right lower perirectal nodule measuring 0.9 cm  which would be concerning for disease involvement.  3. Stable prominent left para-aortic lymph node measuring 0.8 cm    MACRO:  None    Signed by: Александр Carrillo 2025 10:30 AM  Dictation workstation:   [id number]    Additional results of the last 24 hours have been reviewed.    Dictated using [employer] Version 2.4  Proof read however unrecognized voice  recognition errors may have occurred     Electronically signed by Amelia Ponce DO on 03/15/25 at 9:11 AM

## 2025-03-15 NOTE — CARE PLAN
The patient's goals for the shift include      The clinical goals for the shift include Patient have managed pain through out shift    Over the shift, the patient did not make progress toward the following goals. Barriers to progression include . Recommendations to address these barriers include .

## 2025-03-17 LAB
ATRIAL RATE: 70 BPM
P AXIS: 39 DEGREES
P OFFSET: 179 MS
P ONSET: 120 MS
PR INTERVAL: 198 MS
Q ONSET: 219 MS
QRS COUNT: 12 BEATS
QRS DURATION: 80 MS
QT INTERVAL: 384 MS
QTC CALCULATION(BAZETT): 414 MS
QTC FREDERICIA: 404 MS
R AXIS: 2 DEGREES
T AXIS: 26 DEGREES
T OFFSET: 411 MS
VENTRICULAR RATE: 70 BPM

## 2025-03-19 LAB
LAB AP ASR DISCLAIMER: NORMAL
LABORATORY COMMENT REPORT: NORMAL
PATH REPORT.COMMENTS IMP SPEC: NORMAL
PATH REPORT.FINAL DX SPEC: NORMAL
PATH REPORT.GROSS SPEC: NORMAL
PATH REPORT.RELEVANT HX SPEC: NORMAL
PATH REPORT.TOTAL CANCER: NORMAL

## 2025-03-19 NOTE — SIGNIFICANT EVENT
Follow Up Phone Call    Outgoing phone call    Spoke to: Terra Alexis Relationship:self   Phone number: [phone]      Outcome: contacted patient/ family   Chief Complaint   Patient presents with    Leg Swelling    Lower Extremity Issue          Diagnosis:Not applicable    States she is still having pain. There is a consult for palliative care. She has home care coming this week. No further questions or concerns.

## 2025-03-19 NOTE — PROGRESS NOTES
MEL Alexis is a 74 y.o. female with history of stage IB2 cervical cancer diagnosed in 1998 s/p chemoRT and completion hysterectomy, KALEY since then, now with urothelial carcinoma. She underwent a robotic nephroureterectomy on 8/15/24. The tumor was invading the mesenteric margin of the sigmoid colon so sigmoid resection with DLI was performed by Dr. Denton. She was readmitted shortly post op for PE and discharged on Lovenox. She represented to the ED again 9/2024 for emesis and abdominal pain and was started on antibiotics for a possible pelvis abscess. She is s/p ileostomy reversal with Dr. Denton on 11/13/24.     She is currently on C11 of pembro but MRI demonstrated progressing local recurrence invasion to sacrum, rectum, and vaginal cuff. She was referred by oncology for evaluation of possible palliative colostomy in anticipation of additional radiation.     Of note, she was recently admitted with extensive DVT of LLE s/p thrombectomy on 3/11/25. She is not sure why she is here today. She did not have any difficulties with dehydration when she had her ileostomy. She still has her dressing on from her thrombectomy and has not seen vascular or cardiology in follow up from her admission.     MRI pelvis 2/14/25:   1. Progression of the necrotic left presacral soft tissue mass consistent with the known local recurrence. The mass currently measures 9.4 x 5.6 cm in comparison to 7.8 x 4.6 cm. More invasion of the left side of the sacrum, upper rectum and left vaginal cuff.  2. More conspicuous right lower perirectal nodule measuring 0.9 cm which would be concerning for disease involvement.  3. Stable prominent left para-aortic lymph node measuring 0.8 cm    CT c/a/p 1/24/25:   Urothelial carcinoma restaging scan. When compared to the prior MRI dated 12/31/2024, there is a stable presacral heterogeneous mass as described above. No definite new sites of metastatic disease. Additional stable chronic and  incidental findings described above.    Colonoscopy 3/5/25 ([facility]):   Solid stool obstructing passage at 25 cm  Severe erythematous, hemorrhagic, ulcerated mucosa with loss of vascular pattern in the proximal rectum, consistent with ischemic colitis or secondary to underlying malignacy with borderline necrotic changes; performed cold forceps biopsy  Indeterminate stricture in the proximal rectum 20 cm from the anal verge; performed cold forceps biopsy    Non-smoker/No ETOH/No Illicit drug use  PMH: as stated above, hypothyroidism, anemia, GERD,   PSH: as stated above  No family history of CRC or IBD    Past Medical History:   Diagnosis Date    Chronic kidney disease     GERD (gastroesophageal reflux disease)     Hypothyroidism     Malignant tumor of sigmoid colon (Multi) 08/21/2024    Nephrolithiasis     Other specified disorders of kidney and ureter 03/04/2022    Ureteral mass    Peripheral neuropathy     Personal history of malignant neoplasm of cervix uteri 11/21/2022    History of malignant neoplasm of cervix    Vision loss        Past Surgical History:   Procedure Laterality Date    INVASIVE VASCULAR PROCEDURE Left 3/11/2025    Procedure: Thrombectomy - Lower Extremity;  Surgeon: Arnoldo Lucas MD;  Location: [facility];  Service: Cardiovascular;  Laterality: Left;    NEPHRECTOMY Left 08/15/2024    ex-lap, left ureteral tumor resection, left nephrectomy, excision of pelvic mass, partial sigmoidectomy with loop ileostomy    OTHER SURGICAL HISTORY  10/07/2021    Hysterectomy       Allergies   Allergen Reactions    Codeine Hallucinations, GI Upset and Nausea/vomiting       Review of Systems   Constitutional:  Negative for activity change, chills, fatigue, fever and unexpected weight change.   Gastrointestinal:  Negative for abdominal pain and rectal pain.       Physical Exam  Vitals reviewed.   Constitutional:       Appearance: Normal appearance.   Pulmonary:      Effort: Pulmonary effort is normal.    Abdominal:      General: There is no distension.      Palpations: There is no mass.      Tenderness: There is no abdominal tenderness.      Hernia: No hernia is present.   Neurological:      Mental Status: She is alert.         Assessment and Plan:   Ms. Alexis was referred to discuss the possibility of a stoma given her recurrent cancer with involvment of rectal/vaginal walls. There is a concern that if she responds well to radiation, that she could be at risk for a fistula. Incomplete colonoscopy, no overt signs or symptoms of obstruction.   Ms. Alexis was not sure why she was referred to me today. Though it has been explained by the rest of her team, she is unclear about treatment options or prognosis. We reviewed that there is a concern for fistula and that if she develops one (I am uncertain what the risk of this is occurring in this setting), she would leak stool from her vagina. We discussed that to make a stoma, she would need to be off blood thinners for a period of time before and after surgery. I have reached out to the team managing her DVT/thrombectomy for their input on the advisability of this and timing. We also discussed that she would need a laparotomy and would end up with either an ileostomy, which she had in the past, and the risks of that (primarily dehydration) or a loop transverse colostomy, and we discussed prolapse of that stoma as an anticipated outcome. She also was very concerned about the permanence of the stoma. We discussed that reversal would only be considered in the context of her overall treatment, ability to have more surgery safely, and the presence or absence of tumor and fistula. It very well could be permanent.   I think she needs to re-visit with her medical oncologist and radiation oncologist to re-discuss her treatment options and prognosis. She will also need follow up for her DVT and recent procedure. I have reached out to all the members of the team to collaborate about  her case. Subsequently, she will be better equipped to  make decisions about how she would like to proceed.

## 2025-03-20 ENCOUNTER — DOCUMENTATION (OUTPATIENT)
Dept: HEMATOLOGY/ONCOLOGY | Facility: CLINIC | Age: 75
End: 2025-03-20

## 2025-03-20 ENCOUNTER — APPOINTMENT (OUTPATIENT)
Dept: SURGERY | Facility: CLINIC | Age: 75
End: 2025-03-20
Payer: MEDICARE

## 2025-03-20 VITALS
WEIGHT: 139.6 LBS | SYSTOLIC BLOOD PRESSURE: 124 MMHG | BODY MASS INDEX: 27.41 KG/M2 | DIASTOLIC BLOOD PRESSURE: 68 MMHG | RESPIRATION RATE: 16 BRPM | HEIGHT: 60 IN | HEART RATE: 98 BPM

## 2025-03-20 DIAGNOSIS — R19.00 PELVIC MASS: ICD-10-CM

## 2025-03-20 DIAGNOSIS — C68.9 UROTHELIAL CANCER (MULTI): ICD-10-CM

## 2025-03-20 PROCEDURE — 3078F DIAST BP <80 MM HG: CPT | Performed by: SURGERY

## 2025-03-20 PROCEDURE — 3008F BODY MASS INDEX DOCD: CPT | Performed by: SURGERY

## 2025-03-20 PROCEDURE — 3074F SYST BP LT 130 MM HG: CPT | Performed by: SURGERY

## 2025-03-20 PROCEDURE — 1111F DSCHRG MED/CURRENT MED MERGE: CPT | Performed by: SURGERY

## 2025-03-20 PROCEDURE — 1157F ADVNC CARE PLAN IN RCRD: CPT | Performed by: SURGERY

## 2025-03-20 PROCEDURE — 99205 OFFICE O/P NEW HI 60 MIN: CPT | Performed by: SURGERY

## 2025-03-20 PROCEDURE — 1159F MED LIST DOCD IN RCRD: CPT | Performed by: SURGERY

## 2025-03-20 PROCEDURE — 1126F AMNT PAIN NOTED NONE PRSNT: CPT | Performed by: SURGERY

## 2025-03-20 ASSESSMENT — ENCOUNTER SYMPTOMS
UNEXPECTED WEIGHT CHANGE: 0
RECTAL PAIN: 0
ABDOMINAL PAIN: 0
CHILLS: 0
ACTIVITY CHANGE: 0
FATIGUE: 0
FEVER: 0

## 2025-03-20 ASSESSMENT — PAIN SCALES - GENERAL: PAINLEVEL_OUTOF10: 0-NO PAIN

## 2025-03-20 NOTE — PROGRESS NOTES
Spoke with patient who is agreeable to a 10:30 am appointment with Dr. Boone on 3/24/25 to clarify care options.

## 2025-03-24 ENCOUNTER — LAB (OUTPATIENT)
Dept: LAB | Facility: CLINIC | Age: 75
End: 2025-03-24
Payer: MEDICARE

## 2025-03-24 ENCOUNTER — OFFICE VISIT (OUTPATIENT)
Dept: HEMATOLOGY/ONCOLOGY | Facility: CLINIC | Age: 75
End: 2025-03-24
Payer: MEDICARE

## 2025-03-24 VITALS
TEMPERATURE: 97.2 F | OXYGEN SATURATION: 95 % | HEART RATE: 109 BPM | DIASTOLIC BLOOD PRESSURE: 62 MMHG | SYSTOLIC BLOOD PRESSURE: 96 MMHG | RESPIRATION RATE: 16 BRPM

## 2025-03-24 DIAGNOSIS — C68.9 UROTHELIAL CARCINOMA: Primary | ICD-10-CM

## 2025-03-24 DIAGNOSIS — C68.9 UROTHELIAL CARCINOMA: ICD-10-CM

## 2025-03-24 LAB
ALBUMIN SERPL BCP-MCNC: 2.6 G/DL (ref 3.4–5)
ALP SERPL-CCNC: 207 U/L (ref 33–136)
ALT SERPL W P-5'-P-CCNC: 9 U/L (ref 7–45)
ANION GAP SERPL CALC-SCNC: 16 MMOL/L (ref 10–20)
AST SERPL W P-5'-P-CCNC: 11 U/L (ref 9–39)
BASOPHILS # BLD AUTO: 0.06 X10*3/UL (ref 0–0.1)
BASOPHILS NFR BLD AUTO: 0.2 %
BILIRUB SERPL-MCNC: 0.7 MG/DL (ref 0–1.2)
BUN SERPL-MCNC: 18 MG/DL (ref 6–23)
CALCIUM SERPL-MCNC: 8.6 MG/DL (ref 8.6–10.6)
CHLORIDE SERPL-SCNC: 97 MMOL/L (ref 98–107)
CO2 SERPL-SCNC: 20 MMOL/L (ref 21–32)
CREAT SERPL-MCNC: 0.78 MG/DL (ref 0.5–1.05)
EGFRCR SERPLBLD CKD-EPI 2021: 80 ML/MIN/1.73M*2
EOSINOPHIL # BLD AUTO: 0.01 X10*3/UL (ref 0–0.4)
EOSINOPHIL NFR BLD AUTO: 0 %
ERYTHROCYTE [DISTWIDTH] IN BLOOD BY AUTOMATED COUNT: 17.7 % (ref 11.5–14.5)
GLUCOSE SERPL-MCNC: 82 MG/DL (ref 74–99)
HCT VFR BLD AUTO: 23.9 % (ref 36–46)
HGB BLD-MCNC: 7.6 G/DL (ref 12–16)
IMM GRANULOCYTES # BLD AUTO: 1.56 X10*3/UL (ref 0–0.5)
IMM GRANULOCYTES NFR BLD AUTO: 4.5 % (ref 0–0.9)
LYMPHOCYTES # BLD AUTO: 1.29 X10*3/UL (ref 0.8–3)
LYMPHOCYTES NFR BLD AUTO: 3.7 %
MCH RBC QN AUTO: 28.5 PG (ref 26–34)
MCHC RBC AUTO-ENTMCNC: 31.8 G/DL (ref 32–36)
MCV RBC AUTO: 90 FL (ref 80–100)
MONOCYTES # BLD AUTO: 1.07 X10*3/UL (ref 0.05–0.8)
MONOCYTES NFR BLD AUTO: 3.1 %
NEUTROPHILS # BLD AUTO: 30.43 X10*3/UL (ref 1.6–5.5)
NEUTROPHILS NFR BLD AUTO: 88.5 %
NRBC BLD-RTO: ABNORMAL /100{WBCS}
PLATELET # BLD AUTO: 406 X10*3/UL (ref 150–450)
POTASSIUM SERPL-SCNC: 4.3 MMOL/L (ref 3.5–5.3)
PROT SERPL-MCNC: 5.3 G/DL (ref 6.4–8.2)
RBC # BLD AUTO: 2.67 X10*6/UL (ref 4–5.2)
SODIUM SERPL-SCNC: 129 MMOL/L (ref 136–145)
WBC # BLD AUTO: 34.4 X10*3/UL (ref 4.4–11.3)

## 2025-03-24 PROCEDURE — 3078F DIAST BP <80 MM HG: CPT | Performed by: STUDENT IN AN ORGANIZED HEALTH CARE EDUCATION/TRAINING PROGRAM

## 2025-03-24 PROCEDURE — 36415 COLL VENOUS BLD VENIPUNCTURE: CPT

## 2025-03-24 PROCEDURE — 99215 OFFICE O/P EST HI 40 MIN: CPT | Performed by: STUDENT IN AN ORGANIZED HEALTH CARE EDUCATION/TRAINING PROGRAM

## 2025-03-24 PROCEDURE — 3074F SYST BP LT 130 MM HG: CPT | Performed by: STUDENT IN AN ORGANIZED HEALTH CARE EDUCATION/TRAINING PROGRAM

## 2025-03-24 PROCEDURE — 1036F TOBACCO NON-USER: CPT | Performed by: STUDENT IN AN ORGANIZED HEALTH CARE EDUCATION/TRAINING PROGRAM

## 2025-03-24 PROCEDURE — 1157F ADVNC CARE PLAN IN RCRD: CPT | Performed by: STUDENT IN AN ORGANIZED HEALTH CARE EDUCATION/TRAINING PROGRAM

## 2025-03-24 PROCEDURE — 84075 ASSAY ALKALINE PHOSPHATASE: CPT

## 2025-03-24 PROCEDURE — 1111F DSCHRG MED/CURRENT MED MERGE: CPT | Performed by: STUDENT IN AN ORGANIZED HEALTH CARE EDUCATION/TRAINING PROGRAM

## 2025-03-24 PROCEDURE — 1125F AMNT PAIN NOTED PAIN PRSNT: CPT | Performed by: STUDENT IN AN ORGANIZED HEALTH CARE EDUCATION/TRAINING PROGRAM

## 2025-03-24 PROCEDURE — 85025 COMPLETE CBC W/AUTO DIFF WBC: CPT

## 2025-03-24 RX ORDER — EPINEPHRINE 0.3 MG/.3ML
0.3 INJECTION SUBCUTANEOUS EVERY 5 MIN PRN
OUTPATIENT
Start: 2025-04-07

## 2025-03-24 RX ORDER — FAMOTIDINE 10 MG/ML
20 INJECTION, SOLUTION INTRAVENOUS ONCE AS NEEDED
OUTPATIENT
Start: 2025-04-07

## 2025-03-24 RX ORDER — EPINEPHRINE 0.3 MG/.3ML
0.3 INJECTION SUBCUTANEOUS EVERY 5 MIN PRN
OUTPATIENT
Start: 2025-03-31

## 2025-03-24 RX ORDER — ONDANSETRON HYDROCHLORIDE 2 MG/ML
8 INJECTION, SOLUTION INTRAVENOUS ONCE
OUTPATIENT
Start: 2025-04-07

## 2025-03-24 RX ORDER — FAMOTIDINE 10 MG/ML
20 INJECTION, SOLUTION INTRAVENOUS ONCE AS NEEDED
OUTPATIENT
Start: 2025-03-31

## 2025-03-24 RX ORDER — DIPHENHYDRAMINE HYDROCHLORIDE 50 MG/ML
50 INJECTION, SOLUTION INTRAMUSCULAR; INTRAVENOUS AS NEEDED
OUTPATIENT
Start: 2025-04-07

## 2025-03-24 RX ORDER — PROCHLORPERAZINE EDISYLATE 5 MG/ML
10 INJECTION INTRAMUSCULAR; INTRAVENOUS EVERY 6 HOURS PRN
OUTPATIENT
Start: 2025-03-31

## 2025-03-24 RX ORDER — PROCHLORPERAZINE MALEATE 10 MG
10 TABLET ORAL EVERY 6 HOURS PRN
OUTPATIENT
Start: 2025-04-07

## 2025-03-24 RX ORDER — PROCHLORPERAZINE MALEATE 10 MG
10 TABLET ORAL EVERY 6 HOURS PRN
OUTPATIENT
Start: 2025-03-31

## 2025-03-24 RX ORDER — ALBUTEROL SULFATE 0.83 MG/ML
3 SOLUTION RESPIRATORY (INHALATION) AS NEEDED
OUTPATIENT
Start: 2025-04-07

## 2025-03-24 RX ORDER — ALBUTEROL SULFATE 0.83 MG/ML
3 SOLUTION RESPIRATORY (INHALATION) AS NEEDED
OUTPATIENT
Start: 2025-03-31

## 2025-03-24 RX ORDER — DIPHENHYDRAMINE HYDROCHLORIDE 50 MG/ML
50 INJECTION, SOLUTION INTRAMUSCULAR; INTRAVENOUS AS NEEDED
OUTPATIENT
Start: 2025-03-31

## 2025-03-24 RX ORDER — PROCHLORPERAZINE EDISYLATE 5 MG/ML
10 INJECTION INTRAMUSCULAR; INTRAVENOUS EVERY 6 HOURS PRN
OUTPATIENT
Start: 2025-04-07

## 2025-03-24 RX ORDER — PALONOSETRON 0.05 MG/ML
0.25 INJECTION, SOLUTION INTRAVENOUS ONCE
OUTPATIENT
Start: 2025-03-31

## 2025-03-24 ASSESSMENT — ENCOUNTER SYMPTOMS
ARTHRALGIAS: 1
CHILLS: 0
LEG SWELLING: 0
CONSTIPATION: 1
NECK PAIN: 0
COUGH: 0
DIFFICULTY URINATING: 0
FATIGUE: 1
VOMITING: 0
HEMATOLOGIC/LYMPHATIC NEGATIVE: 1
DIARRHEA: 0
BLOOD IN STOOL: 0
HEADACHES: 0
NUMBNESS: 1
HEMATURIA: 0
LIGHT-HEADEDNESS: 0
NAUSEA: 0
MYALGIAS: 0
ABDOMINAL PAIN: 1
DYSURIA: 1
ENDOCRINE NEGATIVE: 1
FEVER: 0
DIZZINESS: 0
WOUND: 0
APPETITE CHANGE: 1
SHORTNESS OF BREATH: 0
BACK PAIN: 1
SLEEP DISTURBANCE: 1
FLANK PAIN: 0
UNEXPECTED WEIGHT CHANGE: 1
EYE PROBLEMS: 1

## 2025-03-24 ASSESSMENT — PAIN SCALES - GENERAL: PAINLEVEL_OUTOF10: 10-WORST PAIN EVER

## 2025-03-24 NOTE — PROGRESS NOTES
qwPatient ID: Terra Alexis is a 74 y.o. female.  Diagnosis:  irresectable T4 UC   MedOnc: Dr. Boone   Urologist: Dr. Ross  Gyn: Dr. Nathan Noyola - [facility]     Patient Care Team:  No Assigned Pcp Generic MD Allan as PCP - General (General Practice)  Elgin Boone MD as Consulting Physician (Hematology and Oncology)  Sheyla Kasper MD, MS as Consulting Physician (Vascular Medicine)  Gilma Vicente MD as Consulting Physician (Obstetrics and Gynecology)  Oj Herrera MD as Consulting Physician (Hematology and Oncology)    ONCOLOGIC HISTORY  Patient is referred by Dr. Ross for recently diagnosed invasive carcinoma, may represent urothelial origin vs h/o cervical cancer. Patient presented to the ER in February 2024 with c/o flank pain imaging revealed soft tissue mass encasing the distal segment of the left ureter. Ureteral biopsy performed in IR on 03/22/2024 revealed the above diagnosis.      1998 Cervical cancer, tx Chemoradiation, with weekly cisplatin, and radiation completed on 1/4/99.   2/1999 Total abdominal hysterectomy and bilateral salpingo-oophorectomy, with no residual carcinoma found.   1/23/08 Category 1 mammogram  11/25/08 LGSIL ANTHONY 1 consistent with HPV effect  1/13/09 MID VAGINAL APEX, BIOPSY: MILD VAGINAL INTRAEPITHELIAL NEOPLASIA.  03/2024 - pelvic irresectable distal urothelial mass, bx proven UC  4/11/24 - Cycle 1 EV + Pembro  5/1/24 - Cycle 2 EV + Pembro  5/14/24 - sick visit call  5/23/24 - Scans show AL. C3 EV (reduced to 1.125 mg/kg)+pembro   6/13/24 - C4 EV (1.125 mg/kg) + Pembro  7/5/24 - C5 EV pembro   8/15/24 - nephroureterectomy + sigmoid colon resection  8/24/24 - PE on ACO  10/3/24 - pembro C6  10/28/24 - pembro C7  11/22/24 - ileostomy closure  12/16/24 - pembro C8  1/6/25 - pembro C9   1/27/24 - tentative pembro C10  03/2025 - Admitted with extensive dvt of LLE s/p thrombectomy (3/11/25)     Other Contributing History  Cervical cancer - s/p chemoradiation and  surgery    Review of Systems   Constitutional:  Positive for appetite change, fatigue and unexpected weight change. Negative for chills and fever.   HENT:  Negative.     Eyes:  Positive for eye problems.   Respiratory:  Negative for cough and shortness of breath.    Cardiovascular:  Negative for chest pain and leg swelling.   Gastrointestinal:  Positive for abdominal pain and constipation. Negative for blood in stool, diarrhea, nausea and vomiting.   Endocrine: Negative.    Genitourinary:  Positive for dysuria. Negative for difficulty urinating, hematuria, pelvic pain, vaginal bleeding and vaginal discharge.    Musculoskeletal:  Positive for arthralgias and back pain. Negative for flank pain, myalgias and neck pain.   Skin:  Negative for itching, rash and wound.   Neurological:  Positive for numbness. Negative for dizziness, headaches and light-headedness.   Hematological: Negative.    Psychiatric/Behavioral:  Positive for sleep disturbance.      Objective    BP 96/62 (BP Location: Left arm, Patient Position: Sitting, BP Cuff Size: Adult)   Pulse 109   Temp 36.2 °C (97.2 °F) (Core)   Resp 16   SpO2 95%   BSA: There is no height or weight on file to calculate BSA.    Wt Readings from Last 5 Encounters:   03/20/25 63.3 kg (139 lb 9.6 oz)   03/07/25 53.2 kg (117 lb 4.6 oz)   03/05/25 50.9 kg (112 lb 3.4 oz)   02/28/25 51.3 kg (113 lb)   02/19/25 50.9 kg (112 lb 3.4 oz)     Performance Status:  ECOG Score: 0- Fully active, able to carry on all pre-disease performance w/o restriction.  Karnofsky Score: 90 - Able to carry on normal activity; minor signs or symptoms of disease     Physical Exam  Constitutional: Awake, NAD  ENMT: mucous membranes moist, no apparent injury, no lesions seen  Head/Neck: NCAT  Respiratory/Thorax: CTAB, symmetric   Cardiovascular: RRR, no murmur  Gastrointestinal: Soft, NT, nondistended, +BS  Extremities: No LE edema  Psychological: Appropriate mood and behavior  Skin: no rash noted        Allergies  Allergies   Allergen Reactions    Codeine Hallucinations, GI Upset and Nausea/vomiting      Medications  Current Outpatient Medications   Medication Instructions    acetaminophen (TYLENOL) 1,000 mg, oral, Every 6 hours    apixaban (Eliquis) 5 mg (74 tabs) tablet Take 2 tablets (10 mg) by mouth 2 times a day for 7 days, then take 1 tablet (5 mg) by mouth 2 times a day.    dexAMETHasone (Decadron) 1 mg tablet Take 4 tablets for 1 day, then 3 tablets for 1 day, then 2 tablets for 1 day, then 1 tablet for 1 day, then half tablet for 1 day    gabapentin (NEURONTIN) 400 mg, oral, 3 times daily    lactulose 20 g, oral, 3 times daily PRN    levothyroxine (SYNTHROID, LEVOXYL) 100 mcg, oral, Daily, Take on an empty stomach at the same time each day, either 30 to 60 minutes prior to breakfast    methocarbamol (ROBAXIN) 1,000 mg, oral, Every 8 hours scheduled    naloxone (NARCAN) 4 mg, nasal, As needed, May repeat every 2-3 minutes if needed, alternating nostrils, until medical assistance becomes available.    naloxone (NARCAN) 4 mg, nasal, As needed, Give 1 spray as a single dose in one nostril. May repeat every 2-3 minutes in alternating nostrils until medical assistance becomes available.    oxyCODONE (ROXICODONE) 5-10 mg, oral, Every 4 hours PRN    pantoprazole (PROTONIX) 40 mg, oral, Daily before breakfast, Do not crush, chew, or split.    polyethylene glycol (Glycolax, Miralax) 17 gram/dose powder Mix 17 g of powder in 4-8 oz of a beverage and drink 2 times a day as needed for constipation.    sennosides-docusate sodium (Katherine-Colace) 8.6-50 mg tablet 2 tablets, oral, 2 times daily        Diagnostic Results   Recent Labs  No results found. However, due to the size of the patient record, not all encounters were searched. Please check Results Review for a complete set of results.      Genetics   Signatera 4/11/24 0.45   Signatera 7/5/24 0.00  Signatera 10/28/24 100.33  Signatera 12/16/24  33.52    Pathology  Surgical Path 3/22/24   FINAL DIAGNOSIS   A. Soft tissue, mass, biopsy:    -- Involved by invasive carcinoma. See note.     Note: Patient's imaging finding of a soft tissue mass encasing the distal ureter and remote history of cervical cancer (per clinical notes) is noted. Histological sections show cores of fibroconnective tissue involved by infiltrative nests of high grade carcinoma cells, which are immunohistochemically positive for pancytokeratin AE1/AE3, CAM5.2, GATA3 and p63 and are negative for PAX8 and ER. The morphological and immunohistochemical findings are not entirely specific. Given the clinical context of a distal ureteral mass, this might represent invasive carcinoma of urothelial origin. Clinical correlation is recommended.     Recent Imaging   MRI Pelvis -   1. A 2.9 x 2.5 x 3.6 cm diffusion restricting enhancing mass encasing  the left distal ureteral stent anterior to the left sacral ala,  compatible with known urothelial neoplasm, with similar measurements  on prior CT 07/01/2024 but has decreased in size when compared to  prior MRI from 03/22/2024. The mass completely encases the left  internal iliac artery and vein which are likely occluded.  Left  common iliac vein and left external iliac vein abuts the mass but  remain patent.  There is at least partial encasement of the left S1  and S2 nerve roots. The mass abuts the left sacrum without definite  evidence of osseous extension/destruction.  2. No evidence of lymphadenopathy or other metastatic disease in the  pelvis.      8/15/24 Colon resection + nephro-U  FINAL DIAGNOSIS   A. Soft tissue, left periureteral, biopsy:  -- Invasive poorly differentiated carcinoma with squamous differentiation   B. Colon, sigmoid, segmental resection:  -- Poorly differentiated carcinoma with squamous differentiation involving mesentery and bowel wall  -- Margins of resection are negative for carcinoma  -- Hyperplastic polyp  -- Two lymph  nodes, negative for carcinoma (0/2)  C. Ureter, left, segmental resection:  -- Invasive carcinoma with extensive squamous differentiation (see comment)  -- Invasive carcinoma is present at the margin of resection  -- See cancer summary report  D. Kidney and proximal ureter, left, nephroureterectomy:  -- Atrophic renal parenchyma with marked chronic inflammation, interstitial fibrosis with tubular atrophy, glomerulosclerosis and severe arterio- and arteriolosclerosis         02/13/25 MRI Pelvis   1. Progression of the necrotic left presacral soft tissue mass  consistent with the known local recurrence. The mass currently  measures 9.4 x 5.6 cm in comparison to 7.8 x 4.6 cm. More invasion of  the left side of the sacrum, upper rectum and left vaginal cuff.  2. More conspicuous right lower perirectal nodule measuring 0.9 cm  which would be concerning for disease involvement.  3. Stable prominent left para-aortic lymph node measuring 0.8 cm    Assessment/Plan   Terra Alexis is a 74 y.o.  female with hx cervical cancer s/p chemoRT 1998, now found with pelvic irresectable distal urothelial mass, bx proven UC.   Post EV/pembro with tumor shrinkage (around 30%). Underwent nephro-U + colon resection with residual tumor in path - pT4 with therapy changes. Unfortunately with recurrent progressive tumor, with significant intra-abdominal/pelvic malignancy.  This is concordant with radiographic an clinical progression  She was seen by Rad Onc and Gi Surgery teams. With risk of fistual with XRT, surgical options would include laparotomy and would end up with either an ileostomy, which she had in the past, and the risks of that (primarily dehydration) or a loop transverse colostomy, and we discussed prolapse of that stoma as an anticipated outcome.   We again reviewed goals of care, tx options, outcomes and prognosis.   From med onc perspective, there are few tx options and this time we would favor platin-containing  "regimen. We also reviewed comfort care/hospice. Pt is not interested in \"not treating her cancer\". Her neuropathy makes her ineligible for cisplatin. If we proceed with chemo, pt understands the risks including risk for occlusion if further progression. Ms Alexis not open to surgery at this time and wants to try new line of systemic treatment and see. Will proceed with carbo-gem and plan to start next week with labs today.   Updating the MD team of this conversation.       I saw and evaluated the patient. I personally obtained the key and critical portions of the history and physical exam or was physically present for key and critical portions performed by the resident/fellow. I reviewed the resident/fellow's documentation and discussed the patient with the resident/fellow. I agree with the resident/fellow's medical decision making as documented in the note.   Elgin Boone MD MSc FACP  [facility] Chair in Cancer Research   in Medicine [employer]  Director Clinical  Medical Oncology Research Program   [facility] / [facility]  Cell [phone]  Office [phone]      "

## 2025-03-24 NOTE — PROGRESS NOTES
Red chemo bag given to patient & all contents reviewed. Aware to always complete lab work a few days before each chemotherapy appointment. Has thermometer at home & aware to report temperature of 100.4 F or greater. Instructed to present yellow fever card if sent to the ER. Given med information sheets on gemcitabine/carboplatin & reviewed side effects, their management, & handling of body fluids & waste. No further questions at this time. Sent to . Agreeable to 3/31 start & labs today.

## 2025-03-25 ENCOUNTER — TELEPHONE (OUTPATIENT)
Dept: HEMATOLOGY/ONCOLOGY | Facility: CLINIC | Age: 75
End: 2025-03-25
Payer: MEDICARE

## 2025-03-25 DIAGNOSIS — D63.8 ANEMIA IN OTHER CHRONIC DISEASES CLASSIFIED ELSEWHERE: Primary | ICD-10-CM

## 2025-03-25 RX ORDER — ONDANSETRON HYDROCHLORIDE 8 MG/1
8 TABLET, FILM COATED ORAL EVERY 8 HOURS PRN
Qty: 30 TABLET | Refills: 5 | Status: SHIPPED | OUTPATIENT
Start: 2025-03-25

## 2025-03-25 RX ORDER — PROCHLORPERAZINE MALEATE 10 MG
10 TABLET ORAL EVERY 6 HOURS PRN
Qty: 30 TABLET | Refills: 5 | Status: SHIPPED | OUTPATIENT
Start: 2025-03-25

## 2025-03-25 NOTE — TELEPHONE ENCOUNTER
Left message on identifiable voicemail.    Per CARLIE aBrnard NP, requested to have labs drawn to check on need for potential blood transfusion.    Contact information provided and request for call back to confirm message received.

## 2025-03-25 NOTE — TELEPHONE ENCOUNTER
Return call placed to Terra on verifiable line re scheduling transfusion this Thursday at 11 am. While calling patient returned the call and we were  able to connect. She will come to the [facility] Wednesday before noon to have the blood worlk completed for the scheduled transfusion on Thursday. She is in agreement with this plan at this time.   She vocalized understanding with read back.

## 2025-03-25 NOTE — TELEPHONE ENCOUNTER
Call placed to patient per Cinthya brady, patient needs to have a blood transfusion. We [facility] available at [facility] on Thursday 3/27/25 at 11 am. She will need to come to [place] to have lab work completed on Wednesday 3/26 to accommodate that.     She is asked to call back to confirm her acceptance of this appointment.     A detailed message was left for her to return the call on a verifiable line.

## 2025-03-26 ENCOUNTER — APPOINTMENT (OUTPATIENT)
Dept: UROLOGY | Facility: CLINIC | Age: 75
End: 2025-03-26
Payer: MEDICARE

## 2025-03-26 ENCOUNTER — LAB (OUTPATIENT)
Dept: LAB | Facility: CLINIC | Age: 75
End: 2025-03-26
Payer: MEDICARE

## 2025-03-26 ENCOUNTER — TELEPHONE (OUTPATIENT)
Dept: HEMATOLOGY/ONCOLOGY | Facility: CLINIC | Age: 75
End: 2025-03-26

## 2025-03-26 DIAGNOSIS — C66.2 CANCER OF LEFT URETER (MULTI): Primary | ICD-10-CM

## 2025-03-26 DIAGNOSIS — D63.8 ANEMIA IN OTHER CHRONIC DISEASES CLASSIFIED ELSEWHERE: ICD-10-CM

## 2025-03-26 LAB
ABO GROUP (TYPE) IN BLOOD: NORMAL
ANTIBODY SCREEN: NORMAL
RH FACTOR (ANTIGEN D): NORMAL

## 2025-03-26 PROCEDURE — 36415 COLL VENOUS BLD VENIPUNCTURE: CPT

## 2025-03-26 PROCEDURE — 86901 BLOOD TYPING SEROLOGIC RH(D): CPT

## 2025-03-26 PROCEDURE — 1111F DSCHRG MED/CURRENT MED MERGE: CPT | Performed by: STUDENT IN AN ORGANIZED HEALTH CARE EDUCATION/TRAINING PROGRAM

## 2025-03-26 PROCEDURE — 99215 OFFICE O/P EST HI 40 MIN: CPT | Performed by: STUDENT IN AN ORGANIZED HEALTH CARE EDUCATION/TRAINING PROGRAM

## 2025-03-26 PROCEDURE — G2211 COMPLEX E/M VISIT ADD ON: HCPCS | Performed by: STUDENT IN AN ORGANIZED HEALTH CARE EDUCATION/TRAINING PROGRAM

## 2025-03-26 PROCEDURE — 86923 COMPATIBILITY TEST ELECTRIC: CPT

## 2025-03-26 PROCEDURE — 1125F AMNT PAIN NOTED PAIN PRSNT: CPT | Performed by: STUDENT IN AN ORGANIZED HEALTH CARE EDUCATION/TRAINING PROGRAM

## 2025-03-26 PROCEDURE — 1157F ADVNC CARE PLAN IN RCRD: CPT | Performed by: STUDENT IN AN ORGANIZED HEALTH CARE EDUCATION/TRAINING PROGRAM

## 2025-03-26 PROCEDURE — 1159F MED LIST DOCD IN RCRD: CPT | Performed by: STUDENT IN AN ORGANIZED HEALTH CARE EDUCATION/TRAINING PROGRAM

## 2025-03-26 RX ORDER — DIPHENHYDRAMINE HYDROCHLORIDE 50 MG/ML
50 INJECTION, SOLUTION INTRAMUSCULAR; INTRAVENOUS AS NEEDED
OUTPATIENT
Start: 2025-03-26

## 2025-03-26 RX ORDER — ALBUTEROL SULFATE 0.83 MG/ML
3 SOLUTION RESPIRATORY (INHALATION) AS NEEDED
OUTPATIENT
Start: 2025-03-26

## 2025-03-26 RX ORDER — EPINEPHRINE 0.3 MG/.3ML
0.3 INJECTION SUBCUTANEOUS EVERY 5 MIN PRN
OUTPATIENT
Start: 2025-03-26

## 2025-03-26 RX ORDER — FAMOTIDINE 10 MG/ML
20 INJECTION, SOLUTION INTRAVENOUS ONCE AS NEEDED
OUTPATIENT
Start: 2025-03-26

## 2025-03-26 ASSESSMENT — PAIN SCALES - GENERAL: PAINLEVEL_OUTOF10: 10-WORST PAIN EVER

## 2025-03-26 NOTE — PROGRESS NOTES
Subjective   Patient ID: Terra Alexis is a 74 y.o. female.      HPI  74 y.o. female presents s/p nephroureterectomy, robot assisted, on 8/15/2024. She has a history of cervical cancer and is status post TAHBSO in 1999, treated with chemo and radiation also.     She was admitted for a ileostomy closure on 11/13/2024. She was discharged on 11/22/2024.    Dr. Boone saw 3/24  the patient 1 day ago for  recurrent progressive tumor, with significant intra-abdominal/pelvic malignancy and is going to proceed with carbo-gem therapy and plan to start next week     She saw Dr. Torrez, colorectal surgery, to discuss the possibility of a stoma given her recurrent cancer with involvment of rectal/vaginal walls.     Patient complaints she has pressure sx and has limited functionality, along with significant left lower extremity swelling secondary to compression by the mass.    MRI pelvis 2/14/2025  IMPRESSION:  1. Progression of the necrotic left presacral soft tissue mass  consistent with the known local recurrence. The mass currently  measures 9.4 x 5.6 cm in comparison to 7.8 x 4.6 cm. More invasion of  the left side of the sacrum, upper rectum and left vaginal cuff.  2. More conspicuous right lower perirectal nodule measuring 0.9 cm  which would be concerning for disease involvement.  3. Stable prominent left para-aortic lymph node measuring 0.8 cm    CT Abdomen Pelvis completed on 11/17/2024:  IMPRESSION:  1. Status post ileostomy reversal and ileoileal anastomosis. Contrast  is visualized throughout the proximal small bowel, however does not  traverse the ileo ileal anastomosis. There is mild thickening of the  distal ileum, just proximally to the anastomosis with a punctate  focus of adjacent localized gas which may be postsurgical, however  can not entirely exclude small leak. Distal to the anastomosis, there  is collapse of the ileal loops described in detail above.  2. Contrast present throughout the colon which  can be related to  prior contrast study. There is a gas and stool containing collection  anterior to the rectosigmoid anastomotic site with clear  communication with the adjacent anastomosis. This may represent a  blind-ending portion of end to side colorectal anastomosis or can  represent a contained leak from perforation. Correlate with surgical  details.  3. An additional loculated thick-walled collection in the presacral  region is slightly smaller when compared with the prior exam, and may  also communicate with and/or arise from the rectosigmoid anastomosis.  4. There is no discrete enteric contrast extravasation.  5. Moderate volume simple fluid ascites and small volume  pneumoperitoneum over the anterior hepatic and omental borders,  likely postsurgical.  6. Small bilateral pleural effusions with overlying pulmonary  consolidation, likely atelectatic however superimposed infection is  not excluded.  7. Mild body wall edema and postsurgical changes of the ventral  midline and right anterior abdomen.      Surgical Pathology Exam collected on 08/15/2024:  FINAL DIAGNOSIS   A. Soft tissue, left periureteral, biopsy:  -- Invasive poorly differentiated carcinoma with squamous differentiation     B. Colon, sigmoid, segmental resection:  -- Poorly differentiated carcinoma with squamous differentiation involving mesentery and bowel wall  -- Margins of resection are negative for carcinoma  -- Hyperplastic polyp  -- Two lymph nodes, negative for carcinoma (0/2)     C. Ureter, left, segmental resection:  -- Invasive carcinoma with extensive squamous differentiation (see comment)  -- Invasive carcinoma is present at the margin of resection  -- See cancer summary report     D. Kidney and proximal ureter, left, nephroureterectomy:  -- Atrophic renal parenchyma with marked chronic inflammation, interstitial fibrosis with tubular atrophy, glomerulosclerosis and severe arterio- and arteriolosclerosis       Review of  Systems  A complete review of systems was performed. All systems are noted to be negative unless indicated in the history of present illness, impression, active problem list, or past histories.     Objective   Physical Exam    Assessment/Plan   Diagnoses and all orders for this visit:  Cancer of left ureter (Multi)        74 y.o. female presents s/p nephroureterectomy, robot assisted, on 8/15/2024. She has a history of cervical cancer and is status post TAHBSO in 1999, treated with chemo and radiation also.  She was admitted for a ileostomy closure on 11/13/2024. She was discharged on 11/22/2024.    I previously  reviewed the surgical pathology collected on 08/15/2024. In addition I reviewed the CT abdomen and pelvis that was preformed on 11/17/2024. I have advised the patient that there is an area that appears to be a mass that is fluid filled. Another area is suspicious for a mass, this is reduced compared to a recent image. I have sent a message to Dr. Boone in regards to the images.     Today I reviewed the patients recent MRI that shows progression of the necrotic left presacral soft tissue mass consistent with the known local recurrence. The mass currently measures 9.4 x 5.6 cm in comparison to 7.8 x 4.6 cm. More invasion of the left side of the sacrum, upper rectum and left vaginal cuff.    I reviewed Dr. Boone's and Dr. Torrez's notes and their recommendations. I discussed with the patient and her partner that surgery does not have any role at this point, and that radiation at this point is likely a desperate measure with a  lot of expected morbidity and unclear benefit, making it not an ideal option for her.     She continue to be motivated for any treatment although she understands that she might have to go to hospice soon, but is refusing this at this time being.    Plan:  Continue systemic therapy treatments with Dr. Boone  No role for any surgery at this point   FUV as needed.     I spent 45 min  on direct patient care including time to review the medical records, face to face time with the patient and fiance, coordinating care and documentation.      Scribe Attestation   By signing my name below, I, Puja Ronen Haq  Scribe attestation that this documentation has been prepared under the direction and in the presence of Teddy Ross MD.

## 2025-03-27 ENCOUNTER — INFUSION (OUTPATIENT)
Dept: HEMATOLOGY/ONCOLOGY | Facility: CLINIC | Age: 75
End: 2025-03-27
Payer: MEDICARE

## 2025-03-27 VITALS
HEART RATE: 76 BPM | WEIGHT: 137.13 LBS | OXYGEN SATURATION: 100 % | BODY MASS INDEX: 26.78 KG/M2 | DIASTOLIC BLOOD PRESSURE: 66 MMHG | TEMPERATURE: 97.7 F | RESPIRATION RATE: 18 BRPM | SYSTOLIC BLOOD PRESSURE: 110 MMHG

## 2025-03-27 DIAGNOSIS — D63.8 ANEMIA IN OTHER CHRONIC DISEASES CLASSIFIED ELSEWHERE: ICD-10-CM

## 2025-03-27 PROCEDURE — P9040 RBC LEUKOREDUCED IRRADIATED: HCPCS

## 2025-03-27 PROCEDURE — 36430 TRANSFUSION BLD/BLD COMPNT: CPT

## 2025-03-27 ASSESSMENT — PAIN SCALES - GENERAL: PAINLEVEL_OUTOF10: 9

## 2025-03-31 ENCOUNTER — INFUSION (OUTPATIENT)
Dept: HEMATOLOGY/ONCOLOGY | Facility: CLINIC | Age: 75
End: 2025-03-31
Payer: MEDICARE

## 2025-03-31 ENCOUNTER — OFFICE VISIT (OUTPATIENT)
Dept: HEMATOLOGY/ONCOLOGY | Facility: CLINIC | Age: 75
End: 2025-03-31
Payer: MEDICARE

## 2025-03-31 ENCOUNTER — APPOINTMENT (OUTPATIENT)
Dept: HEMATOLOGY/ONCOLOGY | Facility: CLINIC | Age: 75
End: 2025-03-31
Payer: MEDICARE

## 2025-03-31 VITALS
SYSTOLIC BLOOD PRESSURE: 91 MMHG | DIASTOLIC BLOOD PRESSURE: 57 MMHG | OXYGEN SATURATION: 95 % | RESPIRATION RATE: 18 BRPM | TEMPERATURE: 98.4 F | HEART RATE: 107 BPM

## 2025-03-31 DIAGNOSIS — C68.9 UROTHELIAL CARCINOMA: ICD-10-CM

## 2025-03-31 DIAGNOSIS — C68.9 UROTHELIAL CARCINOMA: Primary | ICD-10-CM

## 2025-03-31 PROCEDURE — 96375 TX/PRO/DX INJ NEW DRUG ADDON: CPT | Mod: INF

## 2025-03-31 PROCEDURE — 3078F DIAST BP <80 MM HG: CPT

## 2025-03-31 PROCEDURE — 1159F MED LIST DOCD IN RCRD: CPT

## 2025-03-31 PROCEDURE — 99214 OFFICE O/P EST MOD 30 MIN: CPT

## 2025-03-31 PROCEDURE — 1125F AMNT PAIN NOTED PAIN PRSNT: CPT

## 2025-03-31 PROCEDURE — 1157F ADVNC CARE PLAN IN RCRD: CPT

## 2025-03-31 PROCEDURE — 2500000004 HC RX 250 GENERAL PHARMACY W/ HCPCS (ALT 636 FOR OP/ED): Performed by: STUDENT IN AN ORGANIZED HEALTH CARE EDUCATION/TRAINING PROGRAM

## 2025-03-31 PROCEDURE — 96367 TX/PROPH/DG ADDL SEQ IV INF: CPT

## 2025-03-31 PROCEDURE — 1111F DSCHRG MED/CURRENT MED MERGE: CPT

## 2025-03-31 PROCEDURE — 99214 OFFICE O/P EST MOD 30 MIN: CPT | Mod: 25

## 2025-03-31 PROCEDURE — 96413 CHEMO IV INFUSION 1 HR: CPT

## 2025-03-31 PROCEDURE — 96417 CHEMO IV INFUS EACH ADDL SEQ: CPT

## 2025-03-31 PROCEDURE — 3074F SYST BP LT 130 MM HG: CPT

## 2025-03-31 PROCEDURE — 1036F TOBACCO NON-USER: CPT

## 2025-03-31 RX ORDER — EPINEPHRINE 0.3 MG/.3ML
0.3 INJECTION SUBCUTANEOUS EVERY 5 MIN PRN
Status: DISCONTINUED | OUTPATIENT
Start: 2025-03-31 | End: 2025-03-31 | Stop reason: HOSPADM

## 2025-03-31 RX ORDER — ALBUTEROL SULFATE 0.83 MG/ML
3 SOLUTION RESPIRATORY (INHALATION) AS NEEDED
Status: DISCONTINUED | OUTPATIENT
Start: 2025-03-31 | End: 2025-03-31 | Stop reason: HOSPADM

## 2025-03-31 RX ORDER — DIPHENHYDRAMINE HYDROCHLORIDE 50 MG/ML
50 INJECTION, SOLUTION INTRAMUSCULAR; INTRAVENOUS AS NEEDED
Status: DISCONTINUED | OUTPATIENT
Start: 2025-03-31 | End: 2025-03-31 | Stop reason: HOSPADM

## 2025-03-31 RX ORDER — FAMOTIDINE 10 MG/ML
20 INJECTION, SOLUTION INTRAVENOUS ONCE AS NEEDED
Status: DISCONTINUED | OUTPATIENT
Start: 2025-03-31 | End: 2025-03-31 | Stop reason: HOSPADM

## 2025-03-31 RX ORDER — PROCHLORPERAZINE MALEATE 10 MG
10 TABLET ORAL EVERY 6 HOURS PRN
Status: DISCONTINUED | OUTPATIENT
Start: 2025-03-31 | End: 2025-03-31 | Stop reason: HOSPADM

## 2025-03-31 RX ORDER — PROCHLORPERAZINE EDISYLATE 5 MG/ML
10 INJECTION INTRAMUSCULAR; INTRAVENOUS EVERY 6 HOURS PRN
Status: DISCONTINUED | OUTPATIENT
Start: 2025-03-31 | End: 2025-03-31 | Stop reason: HOSPADM

## 2025-03-31 RX ORDER — PALONOSETRON 0.05 MG/ML
0.25 INJECTION, SOLUTION INTRAVENOUS ONCE
Status: COMPLETED | OUTPATIENT
Start: 2025-03-31 | End: 2025-03-31

## 2025-03-31 RX ADMIN — FOSAPREPITANT 150 MG: 150 INJECTION, POWDER, LYOPHILIZED, FOR SOLUTION INTRAVENOUS at 09:20

## 2025-03-31 RX ADMIN — GEMCITABINE 1641.6 MG: 38 INJECTION, SOLUTION INTRAVENOUS at 10:00

## 2025-03-31 RX ADMIN — PALONOSETRON HYDROCHLORIDE 0.25 MG: 0.25 INJECTION INTRAVENOUS at 09:00

## 2025-03-31 RX ADMIN — DEXAMETHASONE SODIUM PHOSPHATE 12 MG: 10 INJECTION, SOLUTION INTRAMUSCULAR; INTRAVENOUS at 09:03

## 2025-03-31 RX ADMIN — CARBOPLATIN 390 MG: 600 INJECTION, SOLUTION INTRAVENOUS at 11:29

## 2025-03-31 ASSESSMENT — ENCOUNTER SYMPTOMS
ARTHRALGIAS: 1
MYALGIAS: 0
LEG SWELLING: 0
NUMBNESS: 1
CONSTIPATION: 0
HEADACHES: 0
HEMATURIA: 0
APPETITE CHANGE: 1
FATIGUE: 1
FLANK PAIN: 0
CHILLS: 0
HEMATOLOGIC/LYMPHATIC NEGATIVE: 1
VOMITING: 0
ABDOMINAL PAIN: 1
ENDOCRINE NEGATIVE: 1
DIARRHEA: 1
DIZZINESS: 0
BACK PAIN: 0
WOUND: 0
LIGHT-HEADEDNESS: 0
SHORTNESS OF BREATH: 0
UNEXPECTED WEIGHT CHANGE: 1
FEVER: 0
NECK PAIN: 0
NAUSEA: 0
DIFFICULTY URINATING: 0
COUGH: 0
BLOOD IN STOOL: 0
EYE PROBLEMS: 1

## 2025-03-31 ASSESSMENT — PAIN SCALES - GENERAL: PAINLEVEL_OUTOF10: 6

## 2025-03-31 NOTE — PROGRESS NOTES
qwPatient ID: Terra Alexis is a 74 y.o. female.  Diagnosis:  irresectable T4 UC   MedOnc: Dr. Boone   Urologist: Dr. Ross  Gyn: Dr. Nathan Noyola - [facility]     Patient Care Team:  No Assigned Pcp Generic MD Allan as PCP - General (General Practice)  Elgin Boone MD as Consulting Physician (Hematology and Oncology)  Sheyla Kasper MD, MS as Consulting Physician (Vascular Medicine)  Gilma Vicente MD as Consulting Physician (Obstetrics and Gynecology)  Oj Herrera MD as Consulting Physician (Hematology and Oncology)    ONCOLOGIC HISTORY  Patient is referred by Dr. Ross for recently diagnosed invasive carcinoma, may represent urothelial origin vs h/o cervical cancer. Patient presented to the ER in February 2024 with c/o flank pain imaging revealed soft tissue mass encasing the distal segment of the left ureter. Ureteral biopsy performed in IR on 03/22/2024 revealed the above diagnosis.      1998 Cervical cancer, tx Chemoradiation, with weekly cisplatin, and radiation completed on 1/4/99.   2/1999 Total abdominal hysterectomy and bilateral salpingo-oophorectomy, with no residual carcinoma found.   1/23/08 Category 1 mammogram  11/25/08 LGSIL ANTHONY 1 consistent with HPV effect  1/13/09 MID VAGINAL APEX, BIOPSY: MILD VAGINAL INTRAEPITHELIAL NEOPLASIA.  03/2024 - pelvic irresectable distal urothelial mass, bx proven UC  4/11/24 - Cycle 1 EV + Pembro  5/1/24 - Cycle 2 EV + Pembro  5/14/24 - sick visit call  5/23/24 - Scans show MT. C3 EV (reduced to 1.125 mg/kg)+pembro   6/13/24 - C4 EV (1.125 mg/kg) + Pembro  7/5/24 - C5 EV pembro   8/15/24 - nephroureterectomy + sigmoid colon resection  8/24/24 - PE on ACO  10/3/24 - pembro C6  10/28/24 - pembro C7  11/22/24 - ileostomy closure  12/16/24 - pembro C8  1/6/25 - pembro C9   1/27/24 - tentative pembro C10  03/2025 - Admitted with extensive dvt of LLE s/p thrombectomy (3/11/25)   3/31/25 - C1D1 carbo-gem        Other Contributing History  Cervical cancer -  s/p chemoradiation and surgery    Review of Systems   Constitutional:  Positive for appetite change, fatigue and unexpected weight change. Negative for chills and fever.   HENT:           Dry mouth   Eyes:  Positive for eye problems.   Respiratory:  Negative for cough and shortness of breath.    Cardiovascular:  Negative for chest pain and leg swelling.   Gastrointestinal:  Positive for abdominal pain and diarrhea. Negative for blood in stool, constipation, nausea and vomiting.   Endocrine: Negative.    Genitourinary:  Negative for difficulty urinating, hematuria, pelvic pain, vaginal bleeding and vaginal discharge.    Musculoskeletal:  Positive for arthralgias. Negative for back pain, flank pain, myalgias and neck pain.   Skin:  Negative for itching, rash and wound.   Neurological:  Positive for numbness. Negative for dizziness, headaches and light-headedness.   Hematological: Negative.      Objective    BP 91/57 (BP Location: Right arm, Patient Position: Sitting, BP Cuff Size: Adult)   Pulse 107   Temp 36.9 °C (98.4 °F) (Core)   Resp 18   SpO2 95%   BSA: There is no height or weight on file to calculate BSA.    Wt Readings from Last 5 Encounters:   03/27/25 62.2 kg (137 lb 2 oz)   03/20/25 63.3 kg (139 lb 9.6 oz)   03/07/25 53.2 kg (117 lb 4.6 oz)   03/05/25 50.9 kg (112 lb 3.4 oz)   02/28/25 51.3 kg (113 lb)     Performance Status:  ECOG Score: 0- Fully active, able to carry on all pre-disease performance w/o restriction.  Karnofsky Score: 90 - Able to carry on normal activity; minor signs or symptoms of disease     Physical Exam  Constitutional: Awake, NAD  ENMT: mucous membranes moist, no apparent injury, no lesions seen  Head/Neck: NCAT  Respiratory/Thorax: CTAB, symmetric   Cardiovascular: RRR, no murmur  Gastrointestinal: Soft, NT, nondistended, +BS  Extremities: +3 LLE edema   Psychological: Appropriate mood and behavior  Skin: no rash noted       Allergies  Allergies   Allergen Reactions    Codeine  Hallucinations, GI Upset and Nausea/vomiting      Medications  Current Outpatient Medications   Medication Instructions    acetaminophen (TYLENOL) 1,000 mg, oral, Every 6 hours    apixaban (Eliquis) 5 mg (74 tabs) tablet Take 2 tablets (10 mg) by mouth 2 times a day for 7 days, then take 1 tablet (5 mg) by mouth 2 times a day.    dexAMETHasone (Decadron) 1 mg tablet Take 4 tablets for 1 day, then 3 tablets for 1 day, then 2 tablets for 1 day, then 1 tablet for 1 day, then half tablet for 1 day    gabapentin (NEURONTIN) 400 mg, oral, 3 times daily    lactulose 20 g, oral, 3 times daily PRN    levothyroxine (SYNTHROID, LEVOXYL) 100 mcg, oral, Daily, Take on an empty stomach at the same time each day, either 30 to 60 minutes prior to breakfast    methocarbamol (ROBAXIN) 1,000 mg, oral, Every 8 hours scheduled    naloxone (NARCAN) 4 mg, nasal, As needed, May repeat every 2-3 minutes if needed, alternating nostrils, until medical assistance becomes available.    naloxone (NARCAN) 4 mg, nasal, As needed, Give 1 spray as a single dose in one nostril. May repeat every 2-3 minutes in alternating nostrils until medical assistance becomes available.    ondansetron (ZOFRAN) 8 mg, oral, Every 8 hours PRN    oxyCODONE (ROXICODONE) 5-10 mg, oral, Every 4 hours PRN    pantoprazole (PROTONIX) 40 mg, oral, Daily before breakfast, Do not crush, chew, or split.    polyethylene glycol (Glycolax, Miralax) 17 gram/dose powder Mix 17 g of powder in 4-8 oz of a beverage and drink 2 times a day as needed for constipation.    prochlorperazine (COMPAZINE) 10 mg, oral, Every 6 hours PRN    sennosides-docusate sodium (Katherine-Colace) 8.6-50 mg tablet 2 tablets, oral, 2 times daily        Diagnostic Results   Recent Labs  No results found. However, due to the size of the patient record, not all encounters were searched. Please check Results Review for a complete set of results.      Genetics   Signatera 4/11/24 0.45   Signatera 7/5/24  0.00  Signatera 10/28/24 100.33  Signatera 12/16/24 33.52    Pathology  Surgical Path 3/22/24   FINAL DIAGNOSIS   A. Soft tissue, mass, biopsy:    -- Involved by invasive carcinoma. See note.     Note: Patient's imaging finding of a soft tissue mass encasing the distal ureter and remote history of cervical cancer (per clinical notes) is noted. Histological sections show cores of fibroconnective tissue involved by infiltrative nests of high grade carcinoma cells, which are immunohistochemically positive for pancytokeratin AE1/AE3, CAM5.2, GATA3 and p63 and are negative for PAX8 and ER. The morphological and immunohistochemical findings are not entirely specific. Given the clinical context of a distal ureteral mass, this might represent invasive carcinoma of urothelial origin. Clinical correlation is recommended.     Recent Imaging   MRI Pelvis -   1. A 2.9 x 2.5 x 3.6 cm diffusion restricting enhancing mass encasing  the left distal ureteral stent anterior to the left sacral ala,  compatible with known urothelial neoplasm, with similar measurements  on prior CT 07/01/2024 but has decreased in size when compared to  prior MRI from 03/22/2024. The mass completely encases the left  internal iliac artery and vein which are likely occluded.  Left  common iliac vein and left external iliac vein abuts the mass but  remain patent.  There is at least partial encasement of the left S1  and S2 nerve roots. The mass abuts the left sacrum without definite  evidence of osseous extension/destruction.  2. No evidence of lymphadenopathy or other metastatic disease in the  pelvis.      8/15/24 Colon resection + nephro-U  FINAL DIAGNOSIS   A. Soft tissue, left periureteral, biopsy:  -- Invasive poorly differentiated carcinoma with squamous differentiation   B. Colon, sigmoid, segmental resection:  -- Poorly differentiated carcinoma with squamous differentiation involving mesentery and bowel wall  -- Margins of resection are negative  for carcinoma  -- Hyperplastic polyp  -- Two lymph nodes, negative for carcinoma (0/2)  C. Ureter, left, segmental resection:  -- Invasive carcinoma with extensive squamous differentiation (see comment)  -- Invasive carcinoma is present at the margin of resection  -- See cancer summary report  D. Kidney and proximal ureter, left, nephroureterectomy:  -- Atrophic renal parenchyma with marked chronic inflammation, interstitial fibrosis with tubular atrophy, glomerulosclerosis and severe arterio- and arteriolosclerosis         02/13/25 MRI Pelvis   1. Progression of the necrotic left presacral soft tissue mass  consistent with the known local recurrence. The mass currently  measures 9.4 x 5.6 cm in comparison to 7.8 x 4.6 cm. More invasion of  the left side of the sacrum, upper rectum and left vaginal cuff.  2. More conspicuous right lower perirectal nodule measuring 0.9 cm  which would be concerning for disease involvement.  3. Stable prominent left para-aortic lymph node measuring 0.8 cm      Assessment/Plan   Terra Alexis is a 74 y.o.  female with hx cervical cancer s/p chemoRT 1998, now found with pelvic irresectable distal urothelial mass, bx proven UC.   Post EV/pembro with tumor shrinkage (around 30%). Underwent nephro-U + colon resection with residual tumor in path - pT4 with therapy changes. Unfortunately with recurrent progressive tumor, with significant intra-abdominal/pelvic malignancy.  This is concordant with radiographic an clinical progression  She was seen by Rad Onc and GI Surgery teams. With risk of fistula with XRT, surgical options would include laparotomy and would end up with either an ileostomy, which she had in the past, and the risks of that (primarily dehydration) or a loop transverse colostomy, and we discussed prolapse of that stoma as an anticipated outcome.   We again reviewed goals of care, tx options, outcomes and prognosis.   From med onc perspective, there are few tx  "options and this time we would favor platin-containing regimen. We also reviewed comfort care/hospice. Pt is not interested in \"not treating her cancer\". Her neuropathy makes her ineligible for cisplatin. If we proceed with chemo, pt understands the risks including risk for occlusion if further progression. Ms Aelxis is not open to surgery at this time and wants to try new line of systemic treatment and see. Updating the MD team of this conversation.   C1D1 carbo-gem today.       Patient verbalizes understanding of above plan. Time provided for patient's questions. Patient instructed to reach out for any new concerning issues.     Cinthya Barnard, MSN, APRN-CNP  Acute Care Nurse Practitioner   Genitourinary () Oncology  [facility]  Phone: [phone]   "

## 2025-03-31 NOTE — PROGRESS NOTES
Patient reported burning with the gemcitabine infusion. Contacted Cinthya Barnard NP and was instructed to decrease the rate by 1/2. Patient able to tolerate this rate with a hot pack at IV site.

## 2025-04-03 ENCOUNTER — HOSPITAL ENCOUNTER (INPATIENT)
Facility: HOSPITAL | Age: 75
LOS: 2 days | Discharge: HOSPICE | End: 2025-04-05
Attending: STUDENT IN AN ORGANIZED HEALTH CARE EDUCATION/TRAINING PROGRAM | Admitting: STUDENT IN AN ORGANIZED HEALTH CARE EDUCATION/TRAINING PROGRAM
Payer: OTHER MISCELLANEOUS

## 2025-04-03 ENCOUNTER — HOSPITAL ENCOUNTER (OUTPATIENT)
Facility: HOSPITAL | Age: 75
Setting detail: OBSERVATION
Discharge: HOSPICE | End: 2025-04-03
Attending: STUDENT IN AN ORGANIZED HEALTH CARE EDUCATION/TRAINING PROGRAM | Admitting: STUDENT IN AN ORGANIZED HEALTH CARE EDUCATION/TRAINING PROGRAM
Payer: MEDICARE

## 2025-04-03 ENCOUNTER — APPOINTMENT (OUTPATIENT)
Dept: CARDIOLOGY | Facility: HOSPITAL | Age: 75
End: 2025-04-03
Payer: MEDICARE

## 2025-04-03 VITALS
OXYGEN SATURATION: 98 % | HEART RATE: 82 BPM | HEIGHT: 69 IN | WEIGHT: 148 LBS | DIASTOLIC BLOOD PRESSURE: 55 MMHG | BODY MASS INDEX: 21.92 KG/M2 | TEMPERATURE: 98.1 F | SYSTOLIC BLOOD PRESSURE: 113 MMHG | RESPIRATION RATE: 18 BRPM

## 2025-04-03 DIAGNOSIS — F32.9 CURRENT EPISODE OF MAJOR DEPRESSIVE DISORDER WITHOUT PRIOR EPISODE, UNSPECIFIED DEPRESSION EPISODE SEVERITY: ICD-10-CM

## 2025-04-03 DIAGNOSIS — M79.18 MUSCULOSKELETAL PAIN: ICD-10-CM

## 2025-04-03 DIAGNOSIS — R62.7 FAILURE TO THRIVE IN ADULT: ICD-10-CM

## 2025-04-03 DIAGNOSIS — E87.8 ELECTROLYTE ABNORMALITY: ICD-10-CM

## 2025-04-03 DIAGNOSIS — A49.9 URINARY TRACT INFECTION, BACTERIAL: ICD-10-CM

## 2025-04-03 DIAGNOSIS — R33.8 ACUTE URINARY RETENTION: ICD-10-CM

## 2025-04-03 DIAGNOSIS — G89.3 CANCER RELATED PAIN: ICD-10-CM

## 2025-04-03 DIAGNOSIS — R19.7 DIARRHEA, UNSPECIFIED TYPE: ICD-10-CM

## 2025-04-03 DIAGNOSIS — R19.7 DIARRHEA, UNSPECIFIED TYPE: Primary | ICD-10-CM

## 2025-04-03 DIAGNOSIS — Z93.2 ILEOSTOMY IN PLACE (MULTI): ICD-10-CM

## 2025-04-03 DIAGNOSIS — R53.1 WEAKNESS: ICD-10-CM

## 2025-04-03 DIAGNOSIS — N39.0 URINARY TRACT INFECTION, BACTERIAL: ICD-10-CM

## 2025-04-03 DIAGNOSIS — I82.412 ACUTE DEEP VEIN THROMBOSIS (DVT) OF FEMORAL VEIN OF LEFT LOWER EXTREMITY: Primary | ICD-10-CM

## 2025-04-03 DIAGNOSIS — K59.03 DRUG INDUCED CONSTIPATION: ICD-10-CM

## 2025-04-03 DIAGNOSIS — I82.492 ACUTE EMBOLISM AND THROMBOSIS OF OTHER SPECIFIED DEEP VEIN OF LEFT LOWER EXTREMITY: ICD-10-CM

## 2025-04-03 DIAGNOSIS — C68.9 UROTHELIAL CARCINOMA: ICD-10-CM

## 2025-04-03 LAB
ALBUMIN SERPL BCP-MCNC: 2.9 G/DL (ref 3.4–5)
ALP SERPL-CCNC: 229 U/L (ref 33–136)
ALT SERPL W P-5'-P-CCNC: 11 U/L (ref 7–45)
ANION GAP SERPL CALC-SCNC: 19 MMOL/L (ref 10–20)
APPEARANCE UR: ABNORMAL
AST SERPL W P-5'-P-CCNC: 18 U/L (ref 9–39)
BACTERIA #/AREA URNS AUTO: ABNORMAL /HPF
BASOPHILS # BLD AUTO: 0.02 X10*3/UL (ref 0–0.1)
BASOPHILS NFR BLD AUTO: 0.1 %
BILIRUB SERPL-MCNC: 0.8 MG/DL (ref 0–1.2)
BILIRUB UR STRIP.AUTO-MCNC: NEGATIVE MG/DL
BUN SERPL-MCNC: 24 MG/DL (ref 6–23)
CALCIUM SERPL-MCNC: 9 MG/DL (ref 8.6–10.3)
CHLORIDE SERPL-SCNC: 96 MMOL/L (ref 98–107)
CO2 SERPL-SCNC: 15 MMOL/L (ref 21–32)
COLOR UR: YELLOW
CREAT SERPL-MCNC: 1 MG/DL (ref 0.5–1.05)
EGFRCR SERPLBLD CKD-EPI 2021: 60 ML/MIN/1.73M*2
EOSINOPHIL # BLD AUTO: 0.02 X10*3/UL (ref 0–0.4)
EOSINOPHIL NFR BLD AUTO: 0.1 %
ERYTHROCYTE [DISTWIDTH] IN BLOOD BY AUTOMATED COUNT: 17.8 % (ref 11.5–14.5)
GLUCOSE SERPL-MCNC: 76 MG/DL (ref 74–99)
GLUCOSE UR STRIP.AUTO-MCNC: NORMAL MG/DL
HCT VFR BLD AUTO: 26.4 % (ref 36–46)
HGB BLD-MCNC: 8.6 G/DL (ref 12–16)
HOLD SPECIMEN: NORMAL
IMM GRANULOCYTES # BLD AUTO: 0.48 X10*3/UL (ref 0–0.5)
IMM GRANULOCYTES NFR BLD AUTO: 1.9 % (ref 0–0.9)
KETONES UR STRIP.AUTO-MCNC: NEGATIVE MG/DL
LDH SERPL L TO P-CCNC: 300 U/L (ref 84–246)
LEUKOCYTE ESTERASE UR QL STRIP.AUTO: ABNORMAL
LIPASE SERPL-CCNC: 11 U/L (ref 9–82)
LYMPHOCYTES # BLD AUTO: 0.27 X10*3/UL (ref 0.8–3)
LYMPHOCYTES NFR BLD AUTO: 1.1 %
MAGNESIUM SERPL-MCNC: 1.57 MG/DL (ref 1.6–2.4)
MCH RBC QN AUTO: 29.3 PG (ref 26–34)
MCHC RBC AUTO-ENTMCNC: 32.6 G/DL (ref 32–36)
MCV RBC AUTO: 90 FL (ref 80–100)
MONOCYTES # BLD AUTO: 0.03 X10*3/UL (ref 0.05–0.8)
MONOCYTES NFR BLD AUTO: 0.1 %
MUCOUS THREADS #/AREA URNS AUTO: ABNORMAL /LPF
NEUTROPHILS # BLD AUTO: 24.46 X10*3/UL (ref 1.6–5.5)
NEUTROPHILS NFR BLD AUTO: 96.7 %
NITRITE UR QL STRIP.AUTO: ABNORMAL
NRBC BLD-RTO: 0 /100 WBCS (ref 0–0)
PH UR STRIP.AUTO: 6.5 [PH]
PHOSPHATE SERPL-MCNC: 2.9 MG/DL (ref 2.5–4.9)
PLATELET # BLD AUTO: 333 X10*3/UL (ref 150–450)
POTASSIUM SERPL-SCNC: 3.8 MMOL/L (ref 3.5–5.3)
PROT SERPL-MCNC: 5.8 G/DL (ref 6.4–8.2)
PROT UR STRIP.AUTO-MCNC: ABNORMAL MG/DL
RBC # BLD AUTO: 2.94 X10*6/UL (ref 4–5.2)
RBC # UR STRIP.AUTO: ABNORMAL MG/DL
RBC #/AREA URNS AUTO: ABNORMAL /HPF
SODIUM SERPL-SCNC: 126 MMOL/L (ref 136–145)
SP GR UR STRIP.AUTO: 1.02
URATE SERPL-MCNC: 4.2 MG/DL (ref 2.3–6.7)
UROBILINOGEN UR STRIP.AUTO-MCNC: NORMAL MG/DL
WBC # BLD AUTO: 25.3 X10*3/UL (ref 4.4–11.3)
WBC #/AREA URNS AUTO: ABNORMAL /HPF

## 2025-04-03 PROCEDURE — 80053 COMPREHEN METABOLIC PANEL: CPT | Performed by: NURSE PRACTITIONER

## 2025-04-03 PROCEDURE — 96366 THER/PROPH/DIAG IV INF ADDON: CPT

## 2025-04-03 PROCEDURE — 84100 ASSAY OF PHOSPHORUS: CPT | Performed by: NURSE PRACTITIONER

## 2025-04-03 PROCEDURE — 83735 ASSAY OF MAGNESIUM: CPT | Performed by: NURSE PRACTITIONER

## 2025-04-03 PROCEDURE — 99222 1ST HOSP IP/OBS MODERATE 55: CPT | Performed by: NURSE PRACTITIONER

## 2025-04-03 PROCEDURE — 96361 HYDRATE IV INFUSION ADD-ON: CPT

## 2025-04-03 PROCEDURE — 2500000004 HC RX 250 GENERAL PHARMACY W/ HCPCS (ALT 636 FOR OP/ED): Performed by: STUDENT IN AN ORGANIZED HEALTH CARE EDUCATION/TRAINING PROGRAM

## 2025-04-03 PROCEDURE — 81003 URINALYSIS AUTO W/O SCOPE: CPT | Performed by: NURSE PRACTITIONER

## 2025-04-03 PROCEDURE — 2500000002 HC RX 250 W HCPCS SELF ADMINISTERED DRUGS (ALT 637 FOR MEDICARE OP, ALT 636 FOR OP/ED): Performed by: NURSE PRACTITIONER

## 2025-04-03 PROCEDURE — 1150000001 HC HOSPICE PRIVATE ROOM DAILY

## 2025-04-03 PROCEDURE — 36415 COLL VENOUS BLD VENIPUNCTURE: CPT | Performed by: NURSE PRACTITIONER

## 2025-04-03 PROCEDURE — 96365 THER/PROPH/DIAG IV INF INIT: CPT | Mod: 59

## 2025-04-03 PROCEDURE — 87086 URINE CULTURE/COLONY COUNT: CPT | Mod: GEALAB | Performed by: NURSE PRACTITIONER

## 2025-04-03 PROCEDURE — 99285 EMERGENCY DEPT VISIT HI MDM: CPT | Mod: 25 | Performed by: STUDENT IN AN ORGANIZED HEALTH CARE EDUCATION/TRAINING PROGRAM

## 2025-04-03 PROCEDURE — 83690 ASSAY OF LIPASE: CPT | Performed by: NURSE PRACTITIONER

## 2025-04-03 PROCEDURE — 84550 ASSAY OF BLOOD/URIC ACID: CPT | Performed by: NURSE PRACTITIONER

## 2025-04-03 PROCEDURE — 2500000001 HC RX 250 WO HCPCS SELF ADMINISTERED DRUGS (ALT 637 FOR MEDICARE OP): Performed by: STUDENT IN AN ORGANIZED HEALTH CARE EDUCATION/TRAINING PROGRAM

## 2025-04-03 PROCEDURE — G0378 HOSPITAL OBSERVATION PER HR: HCPCS

## 2025-04-03 PROCEDURE — 96368 THER/DIAG CONCURRENT INF: CPT

## 2025-04-03 PROCEDURE — 2500000004 HC RX 250 GENERAL PHARMACY W/ HCPCS (ALT 636 FOR OP/ED): Performed by: NURSE PRACTITIONER

## 2025-04-03 PROCEDURE — 93005 ELECTROCARDIOGRAM TRACING: CPT

## 2025-04-03 PROCEDURE — 96375 TX/PRO/DX INJ NEW DRUG ADDON: CPT

## 2025-04-03 PROCEDURE — 85025 COMPLETE CBC W/AUTO DIFF WBC: CPT | Performed by: NURSE PRACTITIONER

## 2025-04-03 PROCEDURE — 83615 LACTATE (LD) (LDH) ENZYME: CPT | Performed by: NURSE PRACTITIONER

## 2025-04-03 RX ORDER — MAGNESIUM SULFATE HEPTAHYDRATE 40 MG/ML
2 INJECTION, SOLUTION INTRAVENOUS ONCE
Status: COMPLETED | OUTPATIENT
Start: 2025-04-03 | End: 2025-04-03

## 2025-04-03 RX ORDER — PROCHLORPERAZINE EDISYLATE 5 MG/ML
10 INJECTION INTRAMUSCULAR; INTRAVENOUS EVERY 6 HOURS PRN
Status: CANCELLED | OUTPATIENT
Start: 2025-04-03

## 2025-04-03 RX ORDER — BISACODYL 10 MG/1
10 SUPPOSITORY RECTAL AS NEEDED
Status: DISCONTINUED | OUTPATIENT
Start: 2025-04-03 | End: 2025-04-03

## 2025-04-03 RX ORDER — DEXAMETHASONE SODIUM PHOSPHATE 10 MG/ML
8 INJECTION INTRAMUSCULAR; INTRAVENOUS ONCE
Status: COMPLETED | OUTPATIENT
Start: 2025-04-03 | End: 2025-04-03

## 2025-04-03 RX ORDER — GLYCOPYRROLATE 0.2 MG/ML
0.2 INJECTION INTRAMUSCULAR; INTRAVENOUS EVERY 4 HOURS PRN
Status: DISCONTINUED | OUTPATIENT
Start: 2025-04-03 | End: 2025-04-05 | Stop reason: HOSPADM

## 2025-04-03 RX ORDER — HALOPERIDOL LACTATE 5 MG/ML
1 INJECTION, SOLUTION INTRAMUSCULAR EVERY 4 HOURS PRN
Status: DISCONTINUED | OUTPATIENT
Start: 2025-04-03 | End: 2025-04-03 | Stop reason: HOSPADM

## 2025-04-03 RX ORDER — DEXAMETHASONE SODIUM PHOSPHATE 10 MG/ML
8 INJECTION INTRAMUSCULAR; INTRAVENOUS ONCE
Status: DISCONTINUED | OUTPATIENT
Start: 2025-04-03 | End: 2025-04-03

## 2025-04-03 RX ORDER — ONDANSETRON HYDROCHLORIDE 2 MG/ML
4 INJECTION, SOLUTION INTRAVENOUS EVERY 6 HOURS PRN
Status: CANCELLED | OUTPATIENT
Start: 2025-04-03

## 2025-04-03 RX ORDER — POTASSIUM CHLORIDE 20 MEQ/1
40 TABLET, EXTENDED RELEASE ORAL ONCE
Status: COMPLETED | OUTPATIENT
Start: 2025-04-03 | End: 2025-04-03

## 2025-04-03 RX ORDER — CEFTRIAXONE 1 G/50ML
1 INJECTION, SOLUTION INTRAVENOUS ONCE
Status: COMPLETED | OUTPATIENT
Start: 2025-04-03 | End: 2025-04-03

## 2025-04-03 RX ORDER — GLYCOPYRROLATE 0.2 MG/ML
0.2 INJECTION INTRAMUSCULAR; INTRAVENOUS EVERY 4 HOURS PRN
Status: DISCONTINUED | OUTPATIENT
Start: 2025-04-03 | End: 2025-04-03 | Stop reason: HOSPADM

## 2025-04-03 RX ORDER — LORAZEPAM 2 MG/ML
0.5 INJECTION INTRAMUSCULAR EVERY 4 HOURS PRN
Status: DISCONTINUED | OUTPATIENT
Start: 2025-04-03 | End: 2025-04-03 | Stop reason: HOSPADM

## 2025-04-03 RX ORDER — GABAPENTIN 300 MG/1
300 CAPSULE ORAL 3 TIMES DAILY
Status: DISCONTINUED | OUTPATIENT
Start: 2025-04-03 | End: 2025-04-03 | Stop reason: HOSPADM

## 2025-04-03 RX ORDER — ONDANSETRON HYDROCHLORIDE 2 MG/ML
4 INJECTION, SOLUTION INTRAVENOUS EVERY 6 HOURS PRN
Status: DISCONTINUED | OUTPATIENT
Start: 2025-04-03 | End: 2025-04-04

## 2025-04-03 RX ORDER — PROCHLORPERAZINE EDISYLATE 5 MG/ML
10 INJECTION INTRAMUSCULAR; INTRAVENOUS EVERY 6 HOURS PRN
Status: DISCONTINUED | OUTPATIENT
Start: 2025-04-03 | End: 2025-04-03 | Stop reason: HOSPADM

## 2025-04-03 RX ORDER — GABAPENTIN 300 MG/1
300 CAPSULE ORAL 3 TIMES DAILY
Status: CANCELLED | OUTPATIENT
Start: 2025-04-03

## 2025-04-03 RX ORDER — HALOPERIDOL LACTATE 5 MG/ML
1 INJECTION, SOLUTION INTRAMUSCULAR EVERY 4 HOURS PRN
Status: CANCELLED | OUTPATIENT
Start: 2025-04-03

## 2025-04-03 RX ORDER — PROCHLORPERAZINE EDISYLATE 5 MG/ML
10 INJECTION INTRAMUSCULAR; INTRAVENOUS EVERY 6 HOURS PRN
Status: DISCONTINUED | OUTPATIENT
Start: 2025-04-03 | End: 2025-04-04

## 2025-04-03 RX ORDER — LEVOTHYROXINE SODIUM 100 UG/1
100 TABLET ORAL DAILY
Status: DISCONTINUED | OUTPATIENT
Start: 2025-04-04 | End: 2025-04-03 | Stop reason: HOSPADM

## 2025-04-03 RX ORDER — VENLAFAXINE HYDROCHLORIDE 75 MG/1
75 CAPSULE, EXTENDED RELEASE ORAL
Status: DISCONTINUED | OUTPATIENT
Start: 2025-04-04 | End: 2025-04-05 | Stop reason: HOSPADM

## 2025-04-03 RX ORDER — LEVOTHYROXINE SODIUM 100 UG/1
100 TABLET ORAL DAILY
Status: DISCONTINUED | OUTPATIENT
Start: 2025-04-04 | End: 2025-04-05 | Stop reason: HOSPADM

## 2025-04-03 RX ORDER — DIPHENOXYLATE HYDROCHLORIDE AND ATROPINE SULFATE 2.5; .025 MG/1; MG/1
1 TABLET ORAL 4 TIMES DAILY PRN
Status: CANCELLED | OUTPATIENT
Start: 2025-04-03

## 2025-04-03 RX ORDER — ONDANSETRON HYDROCHLORIDE 2 MG/ML
4 INJECTION, SOLUTION INTRAVENOUS EVERY 6 HOURS PRN
Status: DISCONTINUED | OUTPATIENT
Start: 2025-04-03 | End: 2025-04-03 | Stop reason: HOSPADM

## 2025-04-03 RX ORDER — AMOXICILLIN 250 MG
2 CAPSULE ORAL 2 TIMES DAILY PRN
Status: DISCONTINUED | OUTPATIENT
Start: 2025-04-03 | End: 2025-04-03

## 2025-04-03 RX ORDER — LORAZEPAM 2 MG/ML
0.5 INJECTION INTRAMUSCULAR EVERY 4 HOURS PRN
Status: CANCELLED | OUTPATIENT
Start: 2025-04-03

## 2025-04-03 RX ORDER — DIPHENOXYLATE HYDROCHLORIDE AND ATROPINE SULFATE 2.5; .025 MG/1; MG/1
1 TABLET ORAL 4 TIMES DAILY PRN
Status: DISCONTINUED | OUTPATIENT
Start: 2025-04-03 | End: 2025-04-03 | Stop reason: HOSPADM

## 2025-04-03 RX ORDER — ACETAMINOPHEN 325 MG/1
650 TABLET ORAL EVERY 6 HOURS
Status: CANCELLED | OUTPATIENT
Start: 2025-04-03

## 2025-04-03 RX ORDER — ONDANSETRON HYDROCHLORIDE 2 MG/ML
4 INJECTION, SOLUTION INTRAVENOUS ONCE
Status: COMPLETED | OUTPATIENT
Start: 2025-04-03 | End: 2025-04-03

## 2025-04-03 RX ORDER — LEVOTHYROXINE SODIUM 100 UG/1
100 TABLET ORAL DAILY
Status: CANCELLED | OUTPATIENT
Start: 2025-04-04

## 2025-04-03 RX ORDER — LORAZEPAM 2 MG/ML
0.5 INJECTION INTRAMUSCULAR EVERY 4 HOURS PRN
Status: DISCONTINUED | OUTPATIENT
Start: 2025-04-03 | End: 2025-04-04

## 2025-04-03 RX ORDER — DEXAMETHASONE SODIUM PHOSPHATE 10 MG/ML
4 INJECTION INTRAMUSCULAR; INTRAVENOUS EVERY 12 HOURS
Status: DISCONTINUED | OUTPATIENT
Start: 2025-04-04 | End: 2025-04-03 | Stop reason: HOSPADM

## 2025-04-03 RX ORDER — HALOPERIDOL LACTATE 5 MG/ML
1 INJECTION, SOLUTION INTRAMUSCULAR EVERY 4 HOURS PRN
Status: DISCONTINUED | OUTPATIENT
Start: 2025-04-03 | End: 2025-04-04

## 2025-04-03 RX ORDER — DIPHENOXYLATE HYDROCHLORIDE AND ATROPINE SULFATE 2.5; .025 MG/1; MG/1
1 TABLET ORAL 4 TIMES DAILY PRN
Status: DISCONTINUED | OUTPATIENT
Start: 2025-04-03 | End: 2025-04-05 | Stop reason: HOSPADM

## 2025-04-03 RX ORDER — GABAPENTIN 300 MG/1
300 CAPSULE ORAL 3 TIMES DAILY
Status: DISCONTINUED | OUTPATIENT
Start: 2025-04-03 | End: 2025-04-05 | Stop reason: HOSPADM

## 2025-04-03 RX ORDER — ACETAMINOPHEN 325 MG/1
650 TABLET ORAL EVERY 6 HOURS
Status: DISCONTINUED | OUTPATIENT
Start: 2025-04-03 | End: 2025-04-05 | Stop reason: HOSPADM

## 2025-04-03 RX ORDER — ACETAMINOPHEN 325 MG/1
650 TABLET ORAL EVERY 6 HOURS
Status: DISCONTINUED | OUTPATIENT
Start: 2025-04-03 | End: 2025-04-03 | Stop reason: HOSPADM

## 2025-04-03 RX ORDER — GLYCOPYRROLATE 0.2 MG/ML
0.2 INJECTION INTRAMUSCULAR; INTRAVENOUS EVERY 4 HOURS PRN
Status: CANCELLED | OUTPATIENT
Start: 2025-04-03

## 2025-04-03 RX ADMIN — ONDANSETRON 4 MG: 2 INJECTION, SOLUTION INTRAMUSCULAR; INTRAVENOUS at 15:29

## 2025-04-03 RX ADMIN — POTASSIUM CHLORIDE 40 MEQ: 1500 TABLET, EXTENDED RELEASE ORAL at 11:38

## 2025-04-03 RX ADMIN — SODIUM CHLORIDE 1000 ML: 9 INJECTION, SOLUTION INTRAVENOUS at 10:45

## 2025-04-03 RX ADMIN — ACETAMINOPHEN 650 MG: 325 TABLET ORAL at 21:45

## 2025-04-03 RX ADMIN — ACETAMINOPHEN 650 MG: 325 TABLET ORAL at 17:50

## 2025-04-03 RX ADMIN — GABAPENTIN 300 MG: 300 CAPSULE ORAL at 20:54

## 2025-04-03 RX ADMIN — HYDROMORPHONE HYDROCHLORIDE 0.5 MG: 1 INJECTION, SOLUTION INTRAMUSCULAR; INTRAVENOUS; SUBCUTANEOUS at 15:29

## 2025-04-03 RX ADMIN — MAGNESIUM SULFATE HEPTAHYDRATE 2 G: 40 INJECTION, SOLUTION INTRAVENOUS at 11:38

## 2025-04-03 RX ADMIN — SODIUM CHLORIDE 1000 ML: 9 INJECTION, SOLUTION INTRAVENOUS at 12:20

## 2025-04-03 RX ADMIN — CEFTRIAXONE SODIUM 1 G: 1 INJECTION, SOLUTION INTRAVENOUS at 12:52

## 2025-04-03 RX ADMIN — DEXAMETHASONE SODIUM PHOSPHATE 8 MG: 10 INJECTION INTRAMUSCULAR; INTRAVENOUS at 17:56

## 2025-04-03 SDOH — SOCIAL STABILITY: SOCIAL INSECURITY: DO YOU FEEL UNSAFE GOING BACK TO THE PLACE WHERE YOU ARE LIVING?: NO

## 2025-04-03 SDOH — SOCIAL STABILITY: SOCIAL INSECURITY: ABUSE: ADULT

## 2025-04-03 SDOH — SOCIAL STABILITY: SOCIAL INSECURITY: DOES ANYONE TRY TO KEEP YOU FROM HAVING/CONTACTING OTHER FRIENDS OR DOING THINGS OUTSIDE YOUR HOME?: NO

## 2025-04-03 SDOH — SOCIAL STABILITY: SOCIAL INSECURITY: HAS ANYONE EVER THREATENED TO HURT YOUR FAMILY OR YOUR PETS?: NO

## 2025-04-03 SDOH — ECONOMIC STABILITY: FOOD INSECURITY: WITHIN THE PAST 12 MONTHS, THE FOOD YOU BOUGHT JUST DIDN'T LAST AND YOU DIDN'T HAVE MONEY TO GET MORE.: NEVER TRUE

## 2025-04-03 SDOH — SOCIAL STABILITY: SOCIAL INSECURITY: HAVE YOU HAD ANY THOUGHTS OF HARMING ANYONE ELSE?: NO

## 2025-04-03 SDOH — ECONOMIC STABILITY: INCOME INSECURITY: IN THE PAST 12 MONTHS HAS THE ELECTRIC, GAS, OIL, OR WATER COMPANY THREATENED TO SHUT OFF SERVICES IN YOUR HOME?: NO

## 2025-04-03 SDOH — ECONOMIC STABILITY: HOUSING INSECURITY: IN THE LAST 12 MONTHS, WAS THERE A TIME WHEN YOU WERE NOT ABLE TO PAY THE MORTGAGE OR RENT ON TIME?: NO

## 2025-04-03 SDOH — ECONOMIC STABILITY: HOUSING INSECURITY: AT ANY TIME IN THE PAST 12 MONTHS, WERE YOU HOMELESS OR LIVING IN A SHELTER (INCLUDING NOW)?: NO

## 2025-04-03 SDOH — ECONOMIC STABILITY: FOOD INSECURITY: WITHIN THE PAST 12 MONTHS, YOU WORRIED THAT YOUR FOOD WOULD RUN OUT BEFORE YOU GOT THE MONEY TO BUY MORE.: NEVER TRUE

## 2025-04-03 SDOH — SOCIAL STABILITY: SOCIAL INSECURITY: WITHIN THE LAST YEAR, HAVE YOU BEEN AFRAID OF YOUR PARTNER OR EX-PARTNER?: NO

## 2025-04-03 SDOH — SOCIAL STABILITY: SOCIAL INSECURITY: WERE YOU ABLE TO COMPLETE ALL THE BEHAVIORAL HEALTH SCREENINGS?: YES

## 2025-04-03 SDOH — SOCIAL STABILITY: SOCIAL INSECURITY: DO YOU FEEL ANYONE HAS EXPLOITED OR TAKEN ADVANTAGE OF YOU FINANCIALLY OR OF YOUR PERSONAL PROPERTY?: NO

## 2025-04-03 SDOH — SOCIAL STABILITY: SOCIAL INSECURITY: HAVE YOU HAD THOUGHTS OF HARMING ANYONE ELSE?: NO

## 2025-04-03 SDOH — SOCIAL STABILITY: SOCIAL INSECURITY: ARE YOU OR HAVE YOU BEEN THREATENED OR ABUSED PHYSICALLY, EMOTIONALLY, OR SEXUALLY BY ANYONE?: NO

## 2025-04-03 SDOH — ECONOMIC STABILITY: FOOD INSECURITY: HOW HARD IS IT FOR YOU TO PAY FOR THE VERY BASICS LIKE FOOD, HOUSING, MEDICAL CARE, AND HEATING?: NOT HARD AT ALL

## 2025-04-03 SDOH — ECONOMIC STABILITY: TRANSPORTATION INSECURITY: IN THE PAST 12 MONTHS, HAS LACK OF TRANSPORTATION KEPT YOU FROM MEDICAL APPOINTMENTS OR FROM GETTING MEDICATIONS?: NO

## 2025-04-03 SDOH — SOCIAL STABILITY: SOCIAL INSECURITY: WITHIN THE LAST YEAR, HAVE YOU BEEN HUMILIATED OR EMOTIONALLY ABUSED IN OTHER WAYS BY YOUR PARTNER OR EX-PARTNER?: NO

## 2025-04-03 SDOH — SOCIAL STABILITY: SOCIAL INSECURITY: ARE THERE ANY APPARENT SIGNS OF INJURIES/BEHAVIORS THAT COULD BE RELATED TO ABUSE/NEGLECT?: NO

## 2025-04-03 SDOH — ECONOMIC STABILITY: HOUSING INSECURITY: IN THE PAST 12 MONTHS, HOW MANY TIMES HAVE YOU MOVED WHERE YOU WERE LIVING?: 0

## 2025-04-03 ASSESSMENT — COGNITIVE AND FUNCTIONAL STATUS - GENERAL
DRESSING REGULAR UPPER BODY CLOTHING: A LITTLE
PATIENT BASELINE BEDBOUND: NO
MOVING TO AND FROM BED TO CHAIR: A LOT
HELP NEEDED FOR BATHING: A LITTLE
MOVING FROM LYING ON BACK TO SITTING ON SIDE OF FLAT BED WITH BEDRAILS: A LITTLE
DRESSING REGULAR UPPER BODY CLOTHING: A LITTLE
HELP NEEDED FOR BATHING: A LITTLE
CLIMB 3 TO 5 STEPS WITH RAILING: A LOT
MOVING TO AND FROM BED TO CHAIR: A LOT
TURNING FROM BACK TO SIDE WHILE IN FLAT BAD: A LITTLE
DRESSING REGULAR LOWER BODY CLOTHING: A LITTLE
TOILETING: A LITTLE
DAILY ACTIVITIY SCORE: 20
PATIENT BASELINE BEDBOUND: NO
WALKING IN HOSPITAL ROOM: A LOT
MOVING FROM LYING ON BACK TO SITTING ON SIDE OF FLAT BED WITH BEDRAILS: A LITTLE
WALKING IN HOSPITAL ROOM: TOTAL
TURNING FROM BACK TO SIDE WHILE IN FLAT BAD: A LITTLE
MOVING FROM LYING ON BACK TO SITTING ON SIDE OF FLAT BED WITH BEDRAILS: A LITTLE
MOBILITY SCORE: 14
DAILY ACTIVITIY SCORE: 20
CLIMB 3 TO 5 STEPS WITH RAILING: TOTAL
TURNING FROM BACK TO SIDE WHILE IN FLAT BAD: A LITTLE
MOVING TO AND FROM BED TO CHAIR: A LOT
WALKING IN HOSPITAL ROOM: A LOT
TOILETING: A LITTLE
DAILY ACTIVITIY SCORE: 20
TOILETING: A LITTLE
DRESSING REGULAR LOWER BODY CLOTHING: A LITTLE
DRESSING REGULAR LOWER BODY CLOTHING: A LITTLE
STANDING UP FROM CHAIR USING ARMS: A LOT
STANDING UP FROM CHAIR USING ARMS: A LOT
MOBILITY SCORE: 14
CLIMB 3 TO 5 STEPS WITH RAILING: A LOT
MOBILITY SCORE: 12
DRESSING REGULAR UPPER BODY CLOTHING: A LITTLE
STANDING UP FROM CHAIR USING ARMS: A LOT
HELP NEEDED FOR BATHING: A LITTLE

## 2025-04-03 ASSESSMENT — PAIN DESCRIPTION - LOCATION: LOCATION: LEG

## 2025-04-03 ASSESSMENT — LIFESTYLE VARIABLES
AUDIT-C TOTAL SCORE: 0
AUDIT-C TOTAL SCORE: 0
HOW OFTEN DO YOU HAVE 6 OR MORE DRINKS ON ONE OCCASION: NEVER
EVER HAD A DRINK FIRST THING IN THE MORNING TO STEADY YOUR NERVES TO GET RID OF A HANGOVER: NO
HAVE YOU EVER FELT YOU SHOULD CUT DOWN ON YOUR DRINKING: NO
EVER FELT BAD OR GUILTY ABOUT YOUR DRINKING: NO
HOW OFTEN DO YOU HAVE 6 OR MORE DRINKS ON ONE OCCASION: NEVER
AUDIT-C TOTAL SCORE: 0
SKIP TO QUESTIONS 9-10: 1
HAVE PEOPLE ANNOYED YOU BY CRITICIZING YOUR DRINKING: NO
HOW MANY STANDARD DRINKS CONTAINING ALCOHOL DO YOU HAVE ON A TYPICAL DAY: PATIENT DOES NOT DRINK
HOW MANY STANDARD DRINKS CONTAINING ALCOHOL DO YOU HAVE ON A TYPICAL DAY: PATIENT DOES NOT DRINK
HOW OFTEN DO YOU HAVE A DRINK CONTAINING ALCOHOL: NEVER
SKIP TO QUESTIONS 9-10: 1
TOTAL SCORE: 0
HOW OFTEN DO YOU HAVE A DRINK CONTAINING ALCOHOL: NEVER
AUDIT-C TOTAL SCORE: 0

## 2025-04-03 ASSESSMENT — PAIN SCALES - GENERAL
PAINLEVEL_OUTOF10: 0 - NO PAIN
PAINLEVEL_OUTOF10: 0 - NO PAIN
PAINLEVEL_OUTOF10: 4
PAINLEVEL_OUTOF10: 0 - NO PAIN

## 2025-04-03 ASSESSMENT — PATIENT HEALTH QUESTIONNAIRE - PHQ9
2. FEELING DOWN, DEPRESSED OR HOPELESS: NEARLY EVERY DAY
1. LITTLE INTEREST OR PLEASURE IN DOING THINGS: NEARLY EVERY DAY
SUM OF ALL RESPONSES TO PHQ9 QUESTIONS 1 & 2: 6
1. LITTLE INTEREST OR PLEASURE IN DOING THINGS: NEARLY EVERY DAY
SUM OF ALL RESPONSES TO PHQ9 QUESTIONS 1 & 2: 6
2. FEELING DOWN, DEPRESSED OR HOPELESS: NEARLY EVERY DAY

## 2025-04-03 ASSESSMENT — ACTIVITIES OF DAILY LIVING (ADL)
WALKS IN HOME: NEEDS ASSISTANCE
BATHING: NEEDS ASSISTANCE
ADEQUATE_TO_COMPLETE_ADL: YES
HEARING - LEFT EAR: FUNCTIONAL
GROOMING: INDEPENDENT
JUDGMENT_ADEQUATE_SAFELY_COMPLETE_DAILY_ACTIVITIES: YES
ASSISTIVE_DEVICE: WALKER
PATIENT'S MEMORY ADEQUATE TO SAFELY COMPLETE DAILY ACTIVITIES?: YES
HEARING - RIGHT EAR: FUNCTIONAL
FEEDING YOURSELF: INDEPENDENT
DRESSING YOURSELF: NEEDS ASSISTANCE
LACK_OF_TRANSPORTATION: NO
HEARING - RIGHT EAR: FUNCTIONAL
ASSISTIVE_DEVICE: WALKER
TOILETING: NEEDS ASSISTANCE
TOILETING: NEEDS ASSISTANCE
BATHING: NEEDS ASSISTANCE
JUDGMENT_ADEQUATE_SAFELY_COMPLETE_DAILY_ACTIVITIES: YES
WALKS IN HOME: NEEDS ASSISTANCE
LACK_OF_TRANSPORTATION: NO
DRESSING YOURSELF: NEEDS ASSISTANCE
LACK_OF_TRANSPORTATION: NO
PATIENT'S MEMORY ADEQUATE TO SAFELY COMPLETE DAILY ACTIVITIES?: YES
HEARING - LEFT EAR: FUNCTIONAL
FEEDING YOURSELF: INDEPENDENT
LACK_OF_TRANSPORTATION: NO
GROOMING: INDEPENDENT
LACK_OF_TRANSPORTATION: NO
ADEQUATE_TO_COMPLETE_ADL: YES

## 2025-04-03 ASSESSMENT — COLUMBIA-SUICIDE SEVERITY RATING SCALE - C-SSRS
6. HAVE YOU EVER DONE ANYTHING, STARTED TO DO ANYTHING, OR PREPARED TO DO ANYTHING TO END YOUR LIFE?: NO
1. IN THE PAST MONTH, HAVE YOU WISHED YOU WERE DEAD OR WISHED YOU COULD GO TO SLEEP AND NOT WAKE UP?: NO
6. HAVE YOU EVER DONE ANYTHING, STARTED TO DO ANYTHING, OR PREPARED TO DO ANYTHING TO END YOUR LIFE?: NO
2. HAVE YOU ACTUALLY HAD ANY THOUGHTS OF KILLING YOURSELF?: NO
2. HAVE YOU ACTUALLY HAD ANY THOUGHTS OF KILLING YOURSELF?: NO
1. IN THE PAST MONTH, HAVE YOU WISHED YOU WERE DEAD OR WISHED YOU COULD GO TO SLEEP AND NOT WAKE UP?: NO

## 2025-04-03 ASSESSMENT — ENCOUNTER SYMPTOMS
DIARRHEA: 1
VOMITING: 0
ABDOMINAL PAIN: 1
NAUSEA: 0
APPETITE CHANGE: 1
SHORTNESS OF BREATH: 0

## 2025-04-03 ASSESSMENT — PAIN - FUNCTIONAL ASSESSMENT
PAIN_FUNCTIONAL_ASSESSMENT: 0-10
PAIN_FUNCTIONAL_ASSESSMENT: 0-10

## 2025-04-03 NOTE — PROGRESS NOTES
04/03/25 1543   Discharge Planning   Living Arrangements Spouse/significant other  (home with jake Slater)   Support Systems Spouse/significant other   Assistance Needed A&OX4; dependent on assist for all ADLs with walker but now very limited; doesn't drive; room air baseline and currently room air; PCP Dr Anisha Yoo   Type of Residence Private residence   Number of Stairs to Enter Residence 5   Number of Stairs Within Residence 14  (1/2 bath on main level. 14 up to full bathroom)   Do you have animals or pets at home? No   Who is requesting discharge planning? Provider   Expected Discharge Disposition [facility]  (Patient is requesting new hospice [facility]. Pending workup home with hospice vs placement)   Does the patient need discharge transport arranged? Yes   RoundTrip coordination needed? Yes   Has discharge transport been arranged? No   Financial Resource Strain   How hard is it for you to pay for the very basics like food, housing, medical care, and heating? Not hard   Housing Stability   In the last 12 months, was there a time when you were not able to pay the mortgage or rent on time? N   In the past 12 months, how many times have you moved where you were living? 0   At any time in the past 12 months, were you homeless or living in a shelter (including now)? N   Transportation Needs   In the past 12 months, has lack of transportation kept you from medical appointments or from getting medications? no   In the past 12 months, has lack of transportation kept you from meetings, work, or from getting things needed for daily living? No   Intensity of Service   Intensity of Service 0-30 min     04/03/2025 1545pm  Spoke with patient and patient's jake bedside in ED.

## 2025-04-03 NOTE — H&P
History and Physical Note  Patient: Terra Alexis   Age: 74 y.o. Gender: female     History of Present Illness:   Admission Reason: No chief complaint on file.    HPI:   Terra Alexis is a 74 y.o. female with a medical history of cervical cancer and recent diagnosis of invasive urothelial cancer, s/p EV/Pembro with tumor shrinkage, followed by nephrectomy and colon resection, with recurrent progression of the cancer. She was started on chemotherapy on Monday and today presented to [facility] ED with c/o weakness, nonbloody diarrhea, weakness, fatigue, shakiness, and intolerance to oral intake. She reports LLE pain with edema. She recently had a thrombectomy on this leg and on Apixaban for management. She does report a recent fall, landing on her left hip. She also reports intermittent burning with urination with occasional visible blood in urine. No fevers, breathing difficulties, chest pain, palpitations, N/V. In the ED, T. 36.5, , RR 18, /55, Pox 98%. Glucose 76, sodium 126, potassium 3.8, BUN 24, creatinine 1.00. WBC 25.3, hgb 8.6, plts 333. Magnesium 1.57. UA consistent with UTI. Given Ceftriaxone. Patient expressed desire for comfort measures at this time and declined further imaging. She is now dnr cc with consult to hospice. She is dc and readmitted under hospice.     Review of Systems:  Review of Systems   Constitutional:  Positive for appetite change.   Respiratory:  Negative for shortness of breath.    Cardiovascular:  Negative for chest pain.   Gastrointestinal:  Positive for abdominal pain and diarrhea. Negative for nausea and vomiting.   All other systems reviewed and are negative.      Allergies:   Allergies   Allergen Reactions    Codeine Hallucinations, GI Upset and Nausea/vomiting       Past Medical History:  Past Medical History:   Diagnosis Date    Chronic kidney disease     GERD (gastroesophageal reflux disease)     Hypothyroidism     Malignant tumor of sigmoid colon (Multi) 08/21/2024     Nephrolithiasis     Other specified disorders of kidney and ureter 03/04/2022    Ureteral mass    Peripheral neuropathy     Personal history of malignant neoplasm of cervix uteri 11/21/2022    History of malignant neoplasm of cervix    Vision loss        Past Surgical History:   Past Surgical History:   Procedure Laterality Date    INVASIVE VASCULAR PROCEDURE Left 3/11/2025    Procedure: Thrombectomy - Lower Extremity;  Surgeon: Arnoldo Lucas MD;  Location: [facility] Cardiac Cath Lab;  Service: Cardiovascular;  Laterality: Left;    NEPHRECTOMY Left 08/15/2024    ex-lap, left ureteral tumor resection, left nephrectomy, excision of pelvic mass, partial sigmoidectomy with loop ileostomy    OTHER SURGICAL HISTORY  10/07/2021    Hysterectomy       Family History:  Family History   Problem Relation Name Age of Onset    Macular degeneration Mother      Glaucoma Other grandparent     Macular degeneration Other grandparent        Social History:  Social History     Tobacco Use   Smoking Status Never   Smokeless Tobacco Never       Social History     Substance and Sexual Activity   Alcohol Use Not Currently       Social History     Substance and Sexual Activity   Drug Use Never       Home Medications:  Current Outpatient Medications   Medication Instructions    acetaminophen (TYLENOL) 1,000 mg, oral, Every 6 hours    apixaban (Eliquis) 5 mg (74 tabs) tablet Take 2 tablets (10 mg) by mouth 2 times a day for 7 days, then take 1 tablet (5 mg) by mouth 2 times a day.    dexAMETHasone (Decadron) 1 mg tablet Take 4 tablets for 1 day, then 3 tablets for 1 day, then 2 tablets for 1 day, then 1 tablet for 1 day, then half tablet for 1 day    gabapentin (NEURONTIN) 400 mg, oral, 3 times daily    lactulose 20 g, oral, 3 times daily PRN    levothyroxine (SYNTHROID, LEVOXYL) 100 mcg, oral, Daily, Take on an empty stomach at the same time each day, either 30 to 60 minutes prior to breakfast    methocarbamol (ROBAXIN) 1,000 mg, oral, Every 8  hours scheduled    naloxone (NARCAN) 4 mg, nasal, As needed, May repeat every 2-3 minutes if needed, alternating nostrils, until medical assistance becomes available.    naloxone (NARCAN) 4 mg, nasal, As needed, Give 1 spray as a single dose in one nostril. May repeat every 2-3 minutes in alternating nostrils until medical assistance becomes available.    ondansetron (ZOFRAN) 8 mg, oral, Every 8 hours PRN    oxyCODONE (ROXICODONE) 5-10 mg, oral, Every 4 hours PRN    pantoprazole (PROTONIX) 40 mg, oral, Daily before breakfast, Do not crush, chew, or split.    polyethylene glycol (Glycolax, Miralax) 17 gram/dose powder Mix 17 g of powder in 4-8 oz of a beverage and drink 2 times a day as needed for constipation.    prochlorperazine (COMPAZINE) 10 mg, oral, Every 6 hours PRN    sennosides-docusate sodium (Katherine-Colace) 8.6-50 mg tablet 2 tablets, oral, 2 times daily       Vitals:  Visit Vitals  OB Status Hysterectomy   Smoking Status Never       Physical Exam  Constitutional: Awake, alert, NAD.  Eyes: PERRLA, EOMI. No erythema or exudate. No proptosis or lid lag.   ENMT: MMM, no nasal congestion, no oral lesions, oropharynx clear without tonsillar erythema or exudate.  Head/Neck: NCAT, neck supple. Full active ROM of the neck. No thyromegaly or mass. No JVP.  Respiratory/Thorax: CTAB, no increased work of breathing, no increased respiratory effort, no wheeze, rales, or rhonchi.  Cardiovascular: Regular rate and rhythm, normal S1 and S2, no murmurs, rubs, or gallops.  Gastrointestinal: Soft, nontender to palpation, nondistended, no guarding or rebound, normoactive bowel sounds  Musculoskeletal: generalized weakness  Extremities: 2+ RPs, no cyanosis or edema  Neurological: Aox3, generalized weakness  Lymphatic: + edema in lower extremities  Skin: Warm and well perfused.     Labs and Imaging Results:  Lab Results   Component Value Date    WBC 25.3 (H) 04/03/2025       Invasive vascular procedure     [facility]  [facility], Cath Lab, [id number] [address]    Cardiovascular Catheterization Report    Patient Name:      HARRIS LUNDBERG      Performing Physician:  [id number] Arnoldo Lucas MD  Study Date:        3/11/2025            Verifying Physician:   [id number] Arnoldo Lucas MD  MRN/PID:           [id number]             Cardiologist/Co-scrub:  Accession#:        [id number]         Ordering Physician:    [id number] AMY GOMEZ  Date of Birth/Age: 1950 / 74 years Fellow:  Gender:            F                    Fellow:  Encounter#:        [id number]       Study: Peripheral Intervention       Indications:  HARRIS LUNDBERG is a 75 year old female who presents with deep vein thrombosis.     Deep Vein Thrombosis:     Under ultrasound guidance, we accessed the left popliteal vein. We predilated with a 16F dilator and placed a 13F Inari ClotTriever sheath. Post procedure, the venous sheath was pulled and pressure applied via figure-of-8.    We traversed the thrombotic occlusion using a stiff-angled Glidewire with MPA guide catheter support. True lumen was confirmed with selective injection. We then exchanged the wire for a Supracore wire for the intervention. We performed 4 thrombectomy runs with the ClotTriever from the left common iliac vein into the left popliteal vein. Subsequent venogram performed to confirm improved outflow.     Residual stenosis at the level of the left common iliac vein was noted, a 10 mm x 40 mm balloon was used for balloon venoplasty with residual stenosis still present. At this point, I stopped the procedure since I do not have any venous stents. May need to bring the patient back at a later date for a venogram and IVUS assessment of her left common iliac vein.     [facility]  Personnel:  +----------------+---------+  Name            Duty       +----------------+---------+  Arnoldo Lucas MD 1  +----------------+---------+       +----------+  Contrast:   +----------+  Omnipaque:  +----------+       Complications:  No in-lab complications observed.     Cardiac Cath Post Procedure Notes:  Post Procedure Diagnosis: Deep vein thrombosis.  Blood Loss:               Estimated blood loss during the procedure was 50 mls.  Specimens Removed:        Number of specimen(s) removed: none.    ____________________________________________________________________________________  CONCLUSIONS:   1. Deep vein thrombosis.   2. Successful mechanical thrombectomy from the left common iliac vein to the left popliteal vein.   3. Balloon venoplasty of the left common iliac vein with a 8 mm x 40 mm balloon.   4. Continue therapeutic anticoagulation with heparin drip_can be transition to venous thromboembolism dosed direct oral anticoagulant.   5. Bedrest until tomorrow AM when figure of 8 suture will be removed.    ICD 10 Codes:  Acute embolism and thrombosis of other specified deep vein of left lower extremity-I82.492     CPT Codes:  Extirpation of material, unilateral vein-32057; Extirpation of material, unilateral secondary vessel Subsegment [id number]     [id number] Arnoldo Lucas MD  Performing Physician  Electronically signed by [id number] Arnoldo Lucas MD on 3/11/2025 at 6:55:16 PM         ** Final (Updated) **      Assessment and Plan:    Assessment & Plan    Terra Alexis is a 74 y.o. female with a medical history of cervical cancer and recent diagnosis of invasive urothelial cancer, s/p EV/Pembro with tumor shrinkage, followed by nephrectomy and colon resection, with recurrent progression of the cancer. She was started on chemotherapy on Monday and today presented to [facility] ED with c/o weakness, nonbloody diarrhea, weakness, fatigue, shakiness, and intolerance to oral intake. She reports LLE pain with  edema. She recently had a thrombectomy on this leg and on Apixaban for management. She does report a recent fall, landing on her left hip. She also reports intermittent burning with urination with occasional visible blood in urine. No fevers, breathing difficulties, chest pain, palpitations, N/V. In the ED, T. 36.5, , RR 18, /55, Pox 98%. Glucose 76, sodium 126, potassium 3.8, BUN 24, creatinine 1.00. WBC 25.3, hgb 8.6, plts 333. Magnesium 1.57. UA consistent with UTI. Given Ceftriaxone. Patient expressed desire for comfort measures at this time and declined further imaging. She is now dnr cc with consult to hospice. She is dc and readmitted under hospice.     Advanced Urothelial Cancer with Complications  -Necrotic pre-sacral tumor with invasion into left sacrum, rectum, and left vaginal cuff  -Intolerance to chemotherapy with diarrhea, nausea, weakness, and poor appetite  -Pain uncontrolled in LLE - currently 8/10  -Prognosis poor and requesting transition to comfort care  -Palliative/[facility] consulted  -Symptom management: Dilaudid for pain, Robinul for secretions, Lomotil for diarrhea, Haldol, Ativan for agitation/anxiety, and Zofran/Compazine for N/V.   -Ordered rojas placed for comfort  -Supportive care    Dispo: admit under hospice    Claudia Paris MD  Hospitalist

## 2025-04-03 NOTE — PROGRESS NOTES
Is This A New Presentation, Or A Follow-Up?: Phototherapy Treatment Terra Alexis is a 74 year old female with history of  cervical cancer s/p chemoRT 1998, now found with pelvic irresectable distal urothelial mass, bx proven UC.   Post EV/pembro with tumor shrinkage (around 30%). Underwent nephro-U + colon resection with residual tumor in path - pT4 with therapy changes. Unfortunately with recurrent progressive tumor, with significant intra-abdominal/pelvic malignancy      Patient presented to the ER in February 2024 with c/o flank pain imaging revealed soft tissue mass encasing the distal segment of the left ureter. Ureteral biopsy performed in IR on 03/22/2024 revealed invasive carcinoma.  A. Soft tissue, mass, biopsy:    -- Involved by invasive carcinoma. See note.   Note: Patient's imaging finding of a soft tissue mass encasing the distal ureter and remote history of cervical cancer (per clinical notes) is noted. Histological sections show cores of fibroconnective tissue involved by infiltrative nests of high grade carcinoma cells, which are immunohistochemically positive for pancytokeratin AE1/AE3, CAM5.2, GATA3 and p63 and are negative for PAX8 and ER. The morphological and immunohistochemical findings are not entirely specific. Given the clinical context of a distal ureteral mass, this might represent invasive carcinoma of urothelial origin. Clinical correlation is recommended.    2/25/2024 CT Abd/Pelvis   1.  Soft tissue mass encasing the distal segment of the left ureter as above which corresponds to the area of previously reported distal ureteral mass on prior study from 12/06/2021 with associated severe  hydronephrosis and proximal hydroureter.  2. Hemorrhagic products within the left ureter proximal to the mass extending to the left renal pelvis.  3. Severe left renal atrophy.  4. Postoperative changes related to prior hysterectomy. Correlation with surgical history recommended.  5. Suspect cholelithiasis. Correlation with ultrasound findings suggested.  6.  Additional detailed findings as above.    3/01/2024 Operation: Cystoscopy and left ureteroscopy with ureteral biopsy and ureteral stent insertion with retrograde pyelogram   Ureter, left, biopsy:  --Minute fragments of denuded mucosa with crushed atypical cells (see note).     8/15/2024 Operation:  Exploration Laparotomy, Open Left Ureteral Tumor Resection and Excision of pelvic mass, Partial sigmoidectomy, loop ileostomy  Pathology:  A. Soft tissue, left periureteral, biopsy:  -- Invasive poorly differentiated carcinoma with squamous differentiation     B. Colon, sigmoid, segmental resection:  -- Poorly differentiated carcinoma with squamous differentiation involving mesentery and bowel wall  -- Margins of resection are negative for carcinoma  -- Hyperplastic polyp  -- Two lymph nodes, negative for carcinoma (0/2)     C. Ureter, left, segmental resection:  -- Invasive carcinoma with extensive squamous differentiation (see comment)  -- Invasive carcinoma is present at the margin of resection  -- See cancer summary report     D. Kidney and proximal ureter, left, nephroureterectomy:  -- Atrophic renal parenchyma with marked chronic inflammation, interstitial fibrosis with tubular atrophy, glomerulosclerosis and severe arterio- and arteriolosclerosis    11/13/2024 Operation:  Closure Ileostomy     12/31/2024 MRI Pelvis w/wo contrast  1. Large 8.3 cm heterogeneous, necrotic mass in the left presacral region is consistent with locally recurrent malignancy. The mass compresses the left L5 nerve root and encases the left S1 nerve root, as well as invades the severely narrows the left proximal external iliac artery and vein, encases and occludes the left internal iliac vessels. Suspect there is also involvement of the left vaginal cuff.  2. Trace pelvic free fluid and pelvic edema is likely reactive to the above findings, as detailed above.  3. Mildly prominent single versus 2 adjacent low-level para-aortic lymph node(s),  which may be reactive or metastatic.    1/24/2025 CT Chest/Abd/Pelvis with contrast  Urothelial carcinoma restaging scan. When compared to the prior MRI dated 12/31/2024, there is a stable presacral heterogeneous mass as described above. No definite new sites of metastatic disease.  Additional stable chronic and incidental findings described above.      2/14/2025 MRI Pelvis w/wo contrast  1. Progression of the necrotic left presacral soft tissue mass consistent with the known local recurrence. The mass currently measures 9.4 x 5.6 cm in comparison to 7.8 x 4.6 cm. More invasion of the left side of the sacrum, upper rectum and left vaginal cuff.  2. More conspicuous right lower perirectal nodule measuring 0.9 cm which would be concerning for disease involvement.  3. Stable prominent left para-aortic lymph node measuring 0.8 cm    3/05/2025 Colonoscopy to RectoSigmoid Colon:  Findings  Solid stool obstructing passage at 25 cm  Severe, segmental atrophic, erythematous, friable, hemorrhagic and ulcerated mucosa with loss of vascular pattern measuring 5 mm in the proximal rectum 20 cm from the anal verge, consistent with ischemic colitis; performed cold forceps biopsy  Indeterminate stricture (traversable) in the proximal rectum 20 cm from the anal verge; performed cold forceps biopsy    Pathology:  A. RECTUM BIOPSY:   Ulcerated colonic mucosa with acute inflammatory exudate.  See comment.      B. RECTUM BIOPSY:   Colonic mucosa with ulceration and features suggestive of ischemic pattern of injury.  Separate fragments of colonic mucosa with no significant diagnostic alteration.        Oncology History  03/2024 - pelvic irresectable distal urothelial mass, bx proven UC  4/11/24 - Cycle 1 EV + Pembro  5/1/24 - Cycle 2 EV + Pembro  5/14/24 - sick visit call  5/23/24 - Scans show [place]. C3 EV (reduced to 1.125 mg/kg)+pembro   6/13/24 - C4 EV (1.125 mg/kg) + Pembro  7/5/24 - C5 EV pembro   8/15/24 - nephroureterectomy + sigmoid  colon resection  8/24/24 - PE on ACO  10/3/24 - pembro C6  10/28/24 - pembro C7  11/22/24 - ileostomy closure  12/16/24 - pembro C8  1/6/25 - pembro C9   1/27/24 - tentative pembro C10  03/2025 - Admitted with extensive dvt of LLE s/p thrombectomy (3/11/25)   3/31/25 - C1D1 carbo-gem        Past Medical History  Extensive DVT of LLE 3/2025   Cervical cancer 1998     Surgical History  Thombectomy 3/11/2025   Total abdominal hysterectomy and bilateral salpingo-oophorectomy 2/1999  Ileostomy Closure 11/2024      Social History  Smoking:   ETOH:    Family History        Review of Systems  Constitutional: Negative for fever, chills, anorexia, weight loss, malaise     ENMT: Negative for nasal discharge, congestion, ear pain, mouth pain, throat pain     Respiratory: Negative for cough, hemoptysis, wheezing, shortness of breath     Cardiac: Negative for chest pain, dyspnea on exertion, orthopnea, palpitations, syncope     Gastrointestinal: Negative for nausea, vomiting, diarrhea, constipation, abdominal pain,     Genitourinary: Negative for discharge, dysuria, flank pain, frequency, hematuria     Musculoskeletal: Negative for decreased ROM, pain, swelling, weakness     Neurological: Negative for dizziness, confusion, headache, seizures, syncope     Psychiatric: Negative for mood changes, anxiety, hallucinations, sleep changes, suicidal ideas     Skin: Negative for mass, pain, itching, rash, ulcer     Endocrine: Negative for heat intolerance, cold intolerance, excessive sweating, polyuria, excess thirst     Hematologic/Lymph: Negative for anemia, bruising, easy bleeding, night sweats, petechiae, history of DVT/PE or cancer     Allergic/Immunologic: Negative for anaphylaxis, itchy/ teary eyes, itching, sneezing, swelling    Physical Exam  Constitutional: Well developed, awake/alert/oriented x3, no distress, alert and cooperative             Eyes: Sclera anicteric, no conjunctival inflammation, conjugate gaze    ENMT: mucous  membranes moist, no apparent injury,            Head/Neck: Neck supple, no apparent injury, No JVD, trachea midline, no bruits              Respiratory/Thorax: Patent airways, CTAB, normal breath sounds with good chest expansion, thorax symmetric         Cardiovascular: Regular, rate and rhythm, no murmurs, normal S1 and S2         Gastrointestinal: Nondistended, soft, non-tender, no rebound tenderness or guarding, no masses palpable, no organomegaly, +BS, no bruits               Extremities: normal extremities, no cyanosis edema, contusions or wounds, 2+ femoral pulses B/L              Neurological: alert and oriented x3, normal strength, Normal gait          Lymphatic: No palpable inguinal lymphadenopathy   Psychological: Appropriate mood and behavior         Skin: Warm and dry, no lesions, no rashes                Anorectal:      Impression:      Plan:

## 2025-04-03 NOTE — CONSULTS
PALLIATIVE MEDICINE CONSULT   Attending Physician: Mary Land DO  Reason For Consult: Assistance with determining goals of care, complex medical decision making, code status discussion, and symptom management if indicated.    INTRODUCTIONS   Patient's capacity: Good and is ableto participate in the visit  Family present:  Significant other: Gregoria     SUBJECTIVE   History of the Present Illness  Terra Alexis is a 74 y.o. 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 Dx with extensive LLE DVT about a month ago.    Cancer History  Urothelial vs cervical cancer  -originally diagnosed with cervical CA in 1998, s/p chemoRT  -March 2024: found to have pelvic/ urothelial mass  -Aug 2024: s/sp nephroureterectomy + sigmoid colon resection  -Nov 2024: ileostomy closure  -now s/p 9 cycles Pembro    The patient presented to the ED on from home with a chief complaint of continuous diarrhea, worsened left leg pain, and inability to ambulate. The pt received new chemo on 3/31 and it made her feel horrible. She has decided that she no longer wants to pursue chemotherapy. Gregoria states he doesn't feel like she would be strong enough for continued treatment and  that they were told radiation will only make things worse. Palliative Medicine was consulted to determine goals of care and assist with connection to appropriate resources.      The history was obtained from the  patient, sig other,   and record review due to pt needing some assistance with fine details.     Review of Systems/Symptoms:   + Continuous diarrhea, distressing urinary and bowel incontinence,  severe pain that is worsening in the LLE, can't walk, depressed, anxious at times, decreased appetite, nausea, and impaired sleep. Denies Shortness of breath.     Past Medical History  See  HPI    Past Surgical History   has a past surgical history that includes Other surgical history (10/07/2021); Nephrectomy (Left, 08/15/2024); and Invasive Vascular Procedure (Left, 3/11/2025).    Family Medical History  Family History   Problem Relation Name Age of Onset    Macular degeneration Mother      Glaucoma Other grandparent     Macular degeneration Other grandparent        Home Medications  Reviewed    Allergies  Allergies   Allergen Reactions    Codeine Hallucinations, GI Upset and Nausea/vomiting       Social History  The patient is in a relationship with long time partner Gregoria. She reports that she has never smoked. She has never used smokeless tobacco. She reports that she does not currently use alcohol. She reports that she does not use drugs..      Episcopalian and Spirituality:  Sikh and has been in communication with her Christianity.   She Denies spiritual needs at this time    ADVANCE CARE PLANNING AND COUNSELING   Voluntary Advance Care Planning  conversation took place with: Patient    Counseling and Support Provided  Counseling provided on clinical condition, including diagnoses Advanced Cancer , poor prognosis, and care plan options.  Reviewed advanced directive concepts  Education on hospice services and benefits  Resuscitation options with respect to QOL, disease stage, and expected outcomes.  Patient/family verbalized understanding of this information.    All questions were answered to their satisfaction.  Support and empathy was provided throughout the conversation.    Serious Illness Perception  Serious Illness: Advanced Cancer    Impression of disease and prognosis: severe and will get worse regardless of medical care    Concerns:  difficulty ambulating and may not be able to get back home.     Health preferences and priorities for jaren EOL: comfort    Rating of family knowledge about pt's disease, priorities and wishes: Good    Resuscitation Assessment   They find  express that CPR and  "intubation are non beneficial due to poor expected outcomes re survival and quality of life    Advanced Directive Documents/ Proxy  Has Living Will and HCPOA documents on file     Emergency Contact Information  Extended Emergency Contact Information  Primary Emergency Contact: SHAISTA REGALADO  Address[address] [place] [address] [place]  Home Phone: [phone]  Work Phone: [phone]  Mobile Phone: [phone]  Relation: Significant Other   needed? No  Secondary Emergency Contact: DULCE KLINE  Mobile Phone: [phone]  Relation: Other   needed? No        OBJECTIVE   Current Inpatient Medications    Continuous medications    PRN medications     Last Recorded Vitals  Blood pressure 121/78, pulse 87, temperature 36.8 °C (98.2 °F), resp. rate 16, height 1.6 m (5' 3\"), weight 63 kg (139 lb), SpO2 100%.  7 Day Weight Change: Unable to Calculate   Body mass index is 24.62 kg/m².   Weight change:      Relevant Results Reviewed   Lab Results   Component Value Date    WBC 25.3 (H) 04/03/2025    HGB 8.6 (L) 04/03/2025    HCT 26.4 (L) 04/03/2025    MCV 90 04/03/2025     04/03/2025      Lab Results   Component Value Date    GLUCOSE 76 04/03/2025    CALCIUM 9.0 04/03/2025     (L) 04/03/2025    K 3.8 04/03/2025    CO2 15 (L) 04/03/2025    CL 96 (L) 04/03/2025    BUN 24 (H) 04/03/2025    CREATININE 1.00 04/03/2025      Lab Results   Component Value Date    ALT 11 04/03/2025    AST 18 04/03/2025    ALKPHOS 229 (H) 04/03/2025    BILITOT 0.8 04/03/2025      Lab Results   Component Value Date    ALBUMIN 2.9 (L) 04/03/2025      Serum creatinine: 1 mg/dL 04/03/25 1047  Estimated creatinine clearance: 44.8 mL/min     Relevant Imaging Reviewed   Imaging  No results found.    Cardiology, Vascular, and Other Imaging  No other imaging results found for the past 7 days       Physical Exam   GENERAL: Ill appearing,  in distress of pain, fatigued looking  SKIN: dry, " pale   HEENT:  mmd   LUNGS: Unlabored resps on room air   MS: generalized weakness  NEURO: Oriented x 3  PSYCH: mood and behavior appropriate    ASSESSMENT AND PLAN   Impression  This is a terminally ill 74 y.o. year old who is here due to uncontrolled cancer and cancer treatment related pain and diarrhea.     Goals of Care: Comfort and quality of life    Code Status: changed to  DNR Comfort Measures Only    Rational for Terminal Condition: The patients condition is expected to end in death within the next 6 months, the condition cannot be cured, and the condition will result in the patients death if life sustaining treatment is not administered.     Prognosis: Poor     Hospice Eligibility: Advanced Cancer      PLAN     #General  -Transition to comfort care  -Hospice consult ordered with [facility] who will see today   -GIP admission strongly recommended under hospice care  -General comfort medications ordered  -Symptom mgmt   -DC planning as patient does not feel that she could make it at home    #Cancer related pain 2/2 necrotic pre sacral tumor w invasion into L  sacrum,  rectum and left vaginal cuff  -Uncontrolled   -Position for comfort  -Dilaudid 0.3 mg to 0.5 mg every hour as needed for pain  -8 mg IV Decadron now and then 4 mg IV every 12 hrs  -Will transition to oral pain regimen when symptoms are more controlled  -Resume home gabapentin 300 mg 3 times a day  -Scheduled Tylenol 650 mg every 6 hours  -Music therapy     #Diarreah 2/2 complication of cancer and cancer treatment   -Status post multiple liters of fluid in the ED  -PRN Lomotil  -If regimen fails,    #Nausea 2/2 complication of cancer and cancer treatment   -antiemetics zofran and compazine PRN  -If regimen fails, consider haldol    #Insomnia  -Manage distressing symptoms  -As needed Ativan    # Depression and anxiety  -Manage distressing symptoms  -Music therapy  -Start SNRI  -Family support     #Comfort of Elimination of Urine   -Alexis ordered      Thank you for asking Palliative Care to assist with care of this patient.    Update provided to: The Primary team and the bedside nurse    MEDICAL COMPLEXITY    Medical complexity with high due to progressing metastatic cancer with uncontrolled pain, ongoing diarrhea as a side effect of recent chemotherapy, requiring parenteral controlled substances for symptom management, decision to pursue hospice care, and decision not to resuscitate due to poor prognosis.    CONTACT INFORMATION   Chacha Betancourt CNP, [facility]  Palliative Medicine  [employer] Secure chat

## 2025-04-03 NOTE — CARE PLAN
Palliative Care Social Work Note    [facility]  See [facility] care NP note for details.

## 2025-04-03 NOTE — ED PROVIDER NOTES
EMERGENCY DEPARTMENT ENCOUNTER      Pt Name: Terra Alexis  MRN: [id number]  Birthdate 1950  Date of evaluation: 4/3/2025  Provider: ISSAC Dowell    CHIEF COMPLAINT       Chief Complaint   Patient presents with    Diarrhea     Continuous since chemo tx on monday       HISTORY OF PRESENT ILLNESS    Terra Alexis is a 74 y.o. female who presents to the emergency department via EMS for evaluation of nonbloody diarrhea and muscle cramping onset Monday after receiving her first round of a new chemotherapy for her pelvic cancer.  She states that she has been able to tolerate p.o. but feels like it is all coming back out.  She has taken her medications compliantly.  She is taken no medications for the symptoms.  She endorses associated fatigue and weakness.  She does live at home with her significant other.  She denies any trauma, fall, injury, recent travel, large crowds, known sick contacts, no precipitating factor besides chemotherapy, smoking, illicit drug use or excessive alcohol use.  She denies any fevers, chills, chest pain, shortness breath, nausea, vomiting, abdominal pain, urinary symptoms or any additional symptoms or complaints at this time.  She states that her lower extremity edema is at her baseline and she recently had a DVT removed from the left lower extremity has no complaints with this at this time and she takes her Eliquis compliantly.    Nursing Notes were reviewed.    History provided by patient.  No  used.    REVIEW OF SYSTEMS     ROS is otherwise negative unless stated above.    PAST MEDICAL HISTORY     Past Medical History:   Diagnosis Date    Chronic kidney disease     GERD (gastroesophageal reflux disease)     Hypothyroidism     Malignant tumor of sigmoid colon (Multi) 08/21/2024    Nephrolithiasis     Other specified disorders of kidney and ureter 03/04/2022    Ureteral mass    Peripheral neuropathy     Personal history of malignant neoplasm of  cervix uteri 11/21/2022    History of malignant neoplasm of cervix    Vision loss        SURGICAL HISTORY       Past Surgical History:   Procedure Laterality Date    INVASIVE VASCULAR PROCEDURE Left 3/11/2025    Procedure: Thrombectomy - Lower Extremity;  Surgeon: Arnoldo Lucas MD;  Location: [facility];  Service: Cardiovascular;  Laterality: Left;    NEPHRECTOMY Left 08/15/2024    ex-lap, left ureteral tumor resection, left nephrectomy, excision of pelvic mass, partial sigmoidectomy with loop ileostomy    OTHER SURGICAL HISTORY  10/07/2021    Hysterectomy       ALLERGIES     Codeine    FAMILY HISTORY       Family History   Problem Relation Name Age of Onset    Macular degeneration Mother      Glaucoma Other grandparent     Macular degeneration Other grandparent         SOCIAL HISTORY       Social History     Socioeconomic History    Marital status:    Tobacco Use    Smoking status: Never    Smokeless tobacco: Never   Vaping Use    Vaping status: Never Used   Substance and Sexual Activity    Alcohol use: Not Currently    Drug use: Never    Sexual activity: Defer     Social Drivers of Health     Financial Resource Strain: Low Risk  (4/3/2025)    Overall Financial Resource Strain (CARDIA)     Difficulty of Paying Living Expenses: Not hard at all   Food Insecurity: No Food Insecurity (3/7/2025)    Hunger Vital Sign     Worried About Running Out of Food in the Last Year: Never true     Ran Out of Food in the Last Year: Never true   Transportation Needs: No Transportation Needs (4/3/2025)    PRAPARE - Transportation     Lack of Transportation (Medical): No     Lack of Transportation (Non-Medical): No   Social Connections: Feeling Socially Integrated (9/25/2024)    OASIS : Social Isolation     Frequency of experiencing loneliness or isolation: Never   Intimate Partner Violence: Not At Risk (3/8/2025)    Humiliation, Afraid, Rape, and Kick questionnaire     Fear of Current or Ex-Partner: No      Emotionally Abused: No     Physically Abused: No     Sexually Abused: No   Housing Stability: Low Risk  (4/3/2025)    Housing Stability Vital Sign     Unable to Pay for Housing in the Last Year: No     Number of Times Moved in the Last Year: 0     Homeless in the Last Year: No       PHYSICAL EXAM   VS: As documented in the triage note and EMR flowsheet from this visit were reviewed.    GEN: NAD, nontoxic, chronically ill-appearing, elderly, resting comfortably in ER cart without difficulty or dyspnea  EYES: PERRLA  HEENT: Airway patent, dry mucous membranes noted  CARD: Tachycardic rate, regular rhythm, nontender chest, no crepitus deformities, no JVD, no murmurs rubs or gallops ; bilateral lower extremity +1 to +2 pitting edema noted.  Positive pulses bilaterally throughout.  Capillary refill less than 3 seconds.  No abnormal redness, warmth, tenderness or swelling noted to bilateral lower extremities.  PULMONARY: Clear all lung fields. Moving air well, Nonlabored, no accessory muscle use, able to speak complete sentences  ABDOMEN: Abdomen soft, non-distended, no rebound, no guarding. Bowel sounds normal in all 4 quadrants. No tenderness to palpation.  No CVA tenderness.  No masses or organomegaly noted.  No evidence of peritonitis. Negative Galindo's and McBurney's point tenderness.    : deferred  MUSK: Spine appears normal, range of motion is not limited, no muscle or joint tenderness. Strength 5 out of 5 equal bilaterally throughout.  Generalized weakness noted.  No step-offs, deformities or additional signs of trauma noted.  No spinal/midline tenderness to palpation  SKIN: Skin normal color for race, warm, dry and intact. No evidence of trauma. No rash noted.  NEURO: Alert and oriented x 3, speech is clear, no obvious deficits noted.  GCS 15  PSYCH: Alert and oriented to person, place, time/situation. normal mood and affect. No apparent risk to self or others. Thoughts are linear.  Does not appear  decompensated.  Does not appear internally stimulated.  LYMPH: No adenopathy or splenomegaly. No cervical, supraclavicular or inguinal lymphadenopathy.    DIAGNOSTIC RESULTS   RADIOLOGY:   Non-plain film images such as CT, Ultrasound and MRI are read by the radiologist. Plain radiographic images are visualized and preliminarily interpreted by myself with the below findings: None      Interpretation per the Radiologist below, if available at the time of this note:    No orders to display         ED BEDSIDE ULTRASOUND:   Performed by myself - none    LABS:  Labs Reviewed   CBC WITH AUTO DIFFERENTIAL - Abnormal       Result Value    WBC 25.3 (*)     nRBC 0.0      RBC 2.94 (*)     Hemoglobin 8.6 (*)     Hematocrit 26.4 (*)     MCV 90      MCH 29.3      MCHC 32.6      RDW 17.8 (*)     Platelets 333      Neutrophils % 96.7      Immature Granulocytes %, Automated 1.9 (*)     Lymphocytes % 1.1      Monocytes % 0.1      Eosinophils % 0.1      Basophils % 0.1      Neutrophils Absolute 24.46 (*)     Immature Granulocytes Absolute, Automated 0.48      Lymphocytes Absolute 0.27 (*)     Monocytes Absolute 0.03 (*)     Eosinophils Absolute 0.02      Basophils Absolute 0.02     MAGNESIUM - Abnormal    Magnesium 1.57 (*)    COMPREHENSIVE METABOLIC PANEL - Abnormal    Glucose 76      Sodium 126 (*)     Potassium 3.8      Chloride 96 (*)     Bicarbonate 15 (*)     Anion Gap 19      Urea Nitrogen 24 (*)     Creatinine 1.00      eGFR 60 (*)     Calcium 9.0      Albumin 2.9 (*)     Alkaline Phosphatase 229 (*)     Total Protein 5.8 (*)     AST 18      Bilirubin, Total 0.8      ALT 11     URINALYSIS WITH REFLEX CULTURE AND MICROSCOPIC - Abnormal    Color, Urine Yellow      Appearance, Urine Turbid (*)     Specific Gravity, Urine 1.018      pH, Urine 6.5      Protein, Urine 50 (1+) (*)     Glucose, Urine Normal      Blood, Urine 0.03 (TRACE) (*)     Ketones, Urine NEGATIVE      Bilirubin, Urine NEGATIVE      Urobilinogen, Urine Normal       Nitrite, Urine 1+ (*)     Leukocyte Esterase, Urine 75 Alice/uL (*)    LACTATE DEHYDROGENASE - Abnormal     (*)    MICROSCOPIC ONLY, URINE - Abnormal    WBC, Urine 11-20 (*)     RBC, Urine NONE      Bacteria, Urine 4+ (*)     Mucus, Urine 4+     LIPASE - Normal    Lipase 11      Narrative:     Venipuncture immediately after or during the administration of Metamizole may lead to falsely low results. Testing should be performed immediately prior to Metamizole dosing.   PHOSPHORUS - Normal    Phosphorus 2.9     URIC ACID - Normal    Uric Acid 4.2     URINE CULTURE   URINALYSIS WITH REFLEX CULTURE AND MICROSCOPIC    Narrative:     The following orders were created for panel order Urinalysis with Reflex Culture and Microscopic.  Procedure                               Abnormality         Status                     ---------                               -----------         ------                     Urinalysis with Reflex C...[[id number]]  Abnormal            Final result               Extra Urine Gray Tube[[id number]]                            In process                   Please view results for these tests on the individual orders.   EXTRA URINE GRAY TUBE       All other labs were within normal range or not returned as of this dictation.    EMERGENCY DEPARTMENT COURSE/MDM:   Vitals:    Vitals:    04/03/25 1100 04/03/25 1200 04/03/25 1400 04/03/25 1600   BP: 110/78 117/67 121/78    BP Location:       Pulse: (!) 105 86 87    Resp:  18 16    Temp:  36.8 °C (98.2 °F)     TempSrc:       SpO2: 100% 100% 100% 100%   Weight:       Height:           I reviewed the patient's triage vitals.    This is a 74-year-old female presents ED for evaluation of diarrhea and muscle cramping in the setting of having a new chemotherapy administration on Monday of this week.  She is resting comfortably in the ER cart without difficulty or dyspnea.  She is placed on continuous cardiac monitor and pulse oximetry.  She has no abdominal  tenderness to palpation and her assessment is otherwise benign and reassuring - mild tachycardia likely in setting of dehydration.  Mucous membranes are dry but I do not believe she is overtly dehydrated.  She is given IV fluids.    Workup was reviewed.  She is initiated on Rocephin IV for UTI.  Labs not consistent with tumor lysis syndrome.  She has a baseline hyponatremia and is otherwise not altered off her baseline or seizing, no indication for hypertonic saline.  Electrolytes are replaced.  Upon reevaluation she states that she does not feel better, cannot tolerate p.o. and feels too weak to be safely discharged home.  She states that she can no longer take care of herself.  She states she did develop left lower quadrant abdominal pain but patient states that she would no longer want to do chemotherapy and would like to pursue palliative/hospice care.  I did let her oncologist Dr. Boone know this and consulted hospice/palliative care agreed to evaluate patient emergency department.  Patient states that even if we performed a CT abdomen pelvis to assess for the underlying cause of her pain she would not want any additional intervention therefore we held off on excessive radiation at this time.  She is given medications for her pain.  She was initially tachycardic upon ED arrival, improved after IV hydration and remained otherwise hemodynamically stable throughout ED course of care.  She is agreeable for admission to hospice service.    ED Course as of 04/03/25 1620   Thu Apr 03, 2025   1512 Patient is a 74-year-old female with metastatic cancer presents to the emergency department diarrhea.  She is at the point where she just does not want to do it anymore.  She states she is foregoing treatment and called hospice 3 days ago to sign up.  But now she is unable to care for self.  She is too shaky at home.  She states she is not can to make it 6 months and does not want any further treatment.  On my assessment she  did have left lower quadrant abdominal pain.  I offered CT imaging but she states that she would not do anything if we found something regardless and just wants to be made comfortable.  Treated with 0.5 mg of Dilaudid.  Patient will be admitted with palliative care on consult for hospice. [HD]      ED Course User Index  [HD] Mary Land DO         Diagnoses as of 04/03/25 1620   Diarrhea, unspecified type   Musculoskeletal pain   Electrolyte abnormality   Failure to thrive in adult   Urinary tract infection, bacterial   Weakness       Patient was counseled regarding labs, imaging, likely diagnosis, and plan. All questions were answered.     ------------------------------------------------------------------  EKG Interpretation: Sinus tachycardia rate of 105, , QRS 82, QTc 391 without evidence of STEMI or ischemia    Differential Diagnoses Considered: Dehydration, electrolyte abnormality, chemotherapy reaction, tumor lysis syndrome, musculoskeletal pain/spasms, failure to thrive    Chronic Medical Conditions Significantly Affecting Care: None    External Records Reviewed: I reviewed recent and relevant outside records including: PCP notes, prior discharge summary, previous radiologic studies, HIE    Escalation of Care:  Appropriate for admission to GIP/hospice service for further management,  Dr. Paris    Social Determinants of Health Significantly Affecting Care:  None    Prescription Drug Consideration: N/A    Diagnostic testing considered: Inserted imaging studies but no indication for excessive radiation at this time    Discussion of Management with Other Providers:   I discussed the patient/results with: Admitting team, palliative care, hospice      ------------------------------------------------------------------  ED Medications administered this visit:    Medications   glycopyrrolate (Robinul) 200 mcg/mL injection 0.2 mg (has no administration in time range)   LORazepam (Ativan) injection 0.5 mg (has  no administration in time range)   haloperidol lactate (Haldol) injection 1 mg (has no administration in time range)   acetaminophen (Tylenol) tablet 650 mg (has no administration in time range)   HYDROmorphone (Dilaudid) injection 0.3 mg (has no administration in time range)   HYDROmorphone (Dilaudid) injection 0.5 mg (has no administration in time range)   ondansetron (Zofran) injection 4 mg (has no administration in time range)   prochlorperazine (Compazine) injection 10 mg (has no administration in time range)   dexAMETHasone (PF) (Decadron) injection 4 mg (has no administration in time range)   dexAMETHasone (PF) (Decadron) injection 8 mg (has no administration in time range)   diphenoxylate-atropine (Lomotil) 2.5-0.025 mg per tablet 1 tablet (has no administration in time range)   sodium chloride 0.9 % bolus 1,000 mL (0 mL intravenous Stopped 4/3/25 1207)   magnesium sulfate 2 g in sterile water for injection 50 mL (0 g intravenous Stopped 4/3/25 1458)   potassium chloride CR (Klor-Con M20) ER tablet 40 mEq (40 mEq oral Given 4/3/25 1138)   sodium chloride 0.9 % bolus 1,000 mL (0 mL intravenous Stopped 4/3/25 1458)   cefTRIAXone (Rocephin) 1 g in dextrose (iso) IV 50 mL (0 g intravenous Stopped 4/3/25 1458)   HYDROmorphone (Dilaudid) injection 0.5 mg (0.5 mg intravenous Given 4/3/25 1529)   ondansetron (Zofran) injection 4 mg (4 mg intravenous Given 4/3/25 1529)       New Prescriptions from this visit:    New Prescriptions    No medications on file       Follow-up:  No follow-up provider specified.      Final Impression:   1. Diarrhea, unspecified type    2. Musculoskeletal pain    3. Electrolyte abnormality    4. Failure to thrive in adult    5. Urinary tract infection, bacterial    6. Weakness          Renetta Carey, APRN-CNP    This patient was staffed with ED Attending Dr. Land to review the plan of care during ED course.    (Please note that portions of this note were completed with a voice  recognition program.  Efforts were made to edit the dictations but occasionally words are mis-transcribed.)     Renetta Carey, APRN-CNP  04/03/25 5319

## 2025-04-03 NOTE — DISCHARGE SUMMARY
Discharge Diagnosis  Diarrhea, unspecified type    Discharge Meds     Medication List      ASK your doctor about these medications     acetaminophen 500 mg tablet; Commonly known as: Tylenol; Take 2 tablets   (1,000 mg) by mouth every 6 hours.   dexAMETHasone 1 mg tablet; Commonly known as: Decadron; Take 4 tablets   for 1 day, then 3 tablets for 1 day, then 2 tablets for 1 day, then 1   tablet for 1 day, then half tablet for 1 day   Eliquis DVT-PE Treat 30D Start 5 mg (74 tabs) tablet; Generic drug:   apixaban; Take 2 tablets (10 mg) by mouth 2 times a day for 7 days, then   take 1 tablet (5 mg) by mouth 2 times a day.   gabapentin 400 mg capsule; Commonly known as: Neurontin; Take 1 capsule   (400 mg) by mouth 3 times a day.   lactulose 20 gram/30 mL oral solution; Take 30 mL (20 g) by mouth 3   times a day as needed (Constipation).   levothyroxine 25 mcg tablet; Commonly known as: Synthroid, Levoxyl; Take   4 tablets (100 mcg) by mouth early in the morning.. Take on an empty   stomach at the same time each day, either 30 to 60 minutes prior to   breakfast   methocarbamol 500 mg tablet; Commonly known as: Robaxin; Take 2 tablets   (1,000 mg) by mouth every 8 hours.   * naloxone 4 mg/0.1 mL nasal spray; Commonly known as: Narcan;   Administer 1 spray (4 mg) into affected nostril(s) if needed for opioid   reversal or respiratory depression. May repeat every 2-3 minutes if   needed, alternating nostrils, until medical assistance becomes available.   * naloxone 4 mg/0.1 mL nasal spray; Commonly known as: Narcan;   Administer 1 spray (4 mg) into affected nostril(s) if needed for opioid   reversal. Give 1 spray as a single dose in one nostril. May repeat every   2-3 minutes in alternating nostrils until medical assistance becomes   available.   ondansetron 8 mg tablet; Commonly known as: Zofran; Take 1 tablet (8 mg)   by mouth every 8 hours if needed for nausea or vomiting.   oxyCODONE 10 mg immediate release tablet;  Commonly known as: Roxicodone;   Take 0.5-1 tablets (5-10 mg) by mouth every 4 hours if needed for severe   pain (7 - 10).   pantoprazole 40 mg EC tablet; Commonly known as: ProtoNix; Take 1 tablet   (40 mg) by mouth once daily in the morning. Take before meals. Do not   crush, chew, or split.   polyethylene glycol 17 gram/dose powder; Commonly known as: Glycolax,   Miralax; Mix 17 g of powder in 4-8 oz of a beverage and drink 2 times a   day as needed for constipation.   prochlorperazine 10 mg tablet; Commonly known as: Compazine; Take 1   tablet (10 mg) by mouth every 6 hours if needed for nausea or vomiting.   Senexon-S 8.6-50 mg tablet; Generic drug: sennosides-docusate sodium;   Take 2 tablets by mouth 2 times a day.  * This list has 2 medication(s) that are the same as other medications   prescribed for you. Read the directions carefully, and ask your doctor or   other care provider to review them with you.       Test Results Pending At Discharge  Pending Labs       Order Current Status    Extra Urine Gray Tube In process    Urinalysis with Reflex Culture and Microscopic In process    Urine Culture In process            Hospital Course   Terra Alexis is a 74 y.o. female with a medical history of cervical cancer and recent diagnosis of invasive urothelial cancer, s/p EV/Pembro with tumor shrinkage, followed by nephrectomy and colon resection, with recurrent progression of the cancer. She was started on chemotherapy on Monday and today presented to [facility] ED with c/o weakness, nonbloody diarrhea, weakness, fatigue, shakiness, and intolerance to oral intake. She reports LLE pain with edema. She recently had a thrombectomy on this leg and on Apixaban for management. She does report a recent fall, landing on her left hip. She also reports intermittent burning with urination with occasional visible blood in urine. No fevers, breathing difficulties, chest pain, palpitations, N/V. In the ED, T. 36.5, , RR 18, BP  103/55, Pox 98%. Glucose 76, sodium 126, potassium 3.8, BUN 24, creatinine 1.00. WBC 25.3, hgb 8.6, plts 333. Magnesium 1.57. UA consistent with UTI. Given Ceftriaxone. Patient expressed desire for comfort measures at this time and declined further imaging. She is now dnr cc with consult to hospice. She is dc and readmitted under hospice.     Pertinent Physical Exam At Time of Discharge  Physical Exam  Constitutional: Awake, alert, NAD.  Eyes: PERRLA, EOMI. No erythema or exudate. No proptosis or lid lag.   ENMT: MMM, no nasal congestion, no oral lesions, oropharynx clear without tonsillar erythema or exudate.  Head/Neck: NCAT, neck supple. Full active ROM of the neck. No thyromegaly or mass. No JVP.  Respiratory/Thorax: CTAB, no increased work of breathing, no increased respiratory effort, no wheeze, rales, or rhonchi.  Cardiovascular: Regular rate and rhythm, normal S1 and S2, no murmurs, rubs, or gallops.  Gastrointestinal: Soft, nontender to palpation, nondistended, no guarding or rebound, normoactive bowel sounds  Musculoskeletal: generalized weakness  Extremities: 2+ RPs, no cyanosis or edema  Neurological: Aox3, generalized weakness  Lymphatic: + edema in lower extremities  Skin: Warm and well perfused.     Outpatient Follow-Up  Future Appointments   Date Time Provider Department Center   4/7/2025  2:00 PM INF 01 MINOFF [id number] [facility]   4/11/2025  1:00 PM Anisha Yoo APRN-CNP [id number] [facility]   4/15/2025  1:20 PM Sha Blas MD [id number] [facility]   4/21/2025 10:30 AM Cinthya Spaulding PA-C [id number] [facility]   4/21/2025  1:00 PM BASIL Rubio-CNP [id number] [facility]   4/21/2025  1:30 PM INF 04 MINOFF [id number] [facility]   5/28/2025  2:00 PM Sheyla Kasper MD, MS [id number] [facility]         Claudia Paris MD

## 2025-04-03 NOTE — H&P
History Of Present Illness  Terra Alexis is a 74 y.o. female with a medical history of cervical cancer and recent diagnosis of invasive urothelial cancer, s/p EV/Pembro with tumor shrinkage, followed by nephrectomy and colon resection, with recurrent progression of the cancer. She was started on chemotherapy on Monday and today presented to [facility] ED with c/o weakness, nonbloody diarrhea, weakness, fatigue, shakiness, and intolerance to oral intake. She reports LLE pain with edema. She recently had a thrombectomy on this leg and on Apixaban for management. She does report a recent fall, landing on her left hip. She also reports intermittent burning with urination with occasional visible blood in urine. No fevers, breathing difficulties, chest pain, palpitations, N/V. In the ED, T. 36.5, , RR 18, /55, Pox 98%. Glucose 76, sodium 126, potassium 3.8, BUN 24, creatinine 1.00. WBC 25.3, hgb 8.6, plts 333. Magnesium 1.57. UA consistent with UTI. Given Ceftriaxone. Patient expressed desire for comfort measures at this time and declined further imaging. Palliative APRN and HWR consulted. She was seen and examined in her room in ED. She is uncomfortable, rating pain in LLE 8/10 and constant.      Past Medical History  Hypertension  Nephrolitiasis  Cervical cancer, treated with chemo/xrt, then surgery  Invasive urothelial cancer, involving upper rectum, vaginal cuff and sacrum.   Hypothyroid  Hx DVT/PE  Peripheral neuropathy  CKD  GERD  Depression    Surgical History  JEMMA/BSO  Colonoscopy  Left nephrectomy  Partial sigmoid resection, loop ileostomy  Loop ileostomy closeure  Left Lower Extremity Thrombectomy      Social History  She reports that she has never smoked. She has never used smokeless tobacco. She reports that she does not currently use alcohol. She reports that she does not use drugs.    Family History  Family History   Problem Relation Name Age of Onset    Macular degeneration Mother      Glaucoma  "Other grandparent     Macular degeneration Other grandparent         Allergies  Codeine    Review of Systems  Constitutional: Denies fever, + chills, fatigue, weight loss/gain  HEENT: Denies ear ache, sore throat, nasal drainage  Eyes: Denies double vision; + blurred vision  Respiratory: Denies cough, shortness or breath, wheezing  Cardiovascular: Denies chest pain, palpitations, shortness of breath with exertion, + BLE edema  GI: See HPI  : See HPI  Musculoskeletal: See HPI  Endocrine: Denies cold/heat intolerance, excessive thirst, excessive hunger  Neuro: Denies dizziness, lightheadedness, seizures, headaches  Psychiatric: Denies anxiety, depression, self-harm  Skin: Denies wounds, rashes, lesions  Hematologic: Denies easy bruising, easy bleeding, clotting disorder      Physical Exam  Constitutional: A&O x 3; anxious; tearful and agitated at times; mostly cooperative  Eyes: EOM's intact  HEENT: Normocephalic, Atraumatic. Oral mucosa moist.   Neck: Supple. No JVD, lymphadenopathy.   Lungs: CTAB with fair air movement. Respirations even and unlabored on room air.   Heart: RRR  Abdomen: Soft, non-tender, non-distended, +BS  MS/Extremities: GONZALEZ equally x 4 with weakness, LLE>RLE. BLE pitting edema. Peripheral pulses intact bilaterally.   Neuro: A&O x3; no focal deficits; gross motor and sensation intact.   Skin: Warm and dry. No rashes or lesions. Pale.   Psych: Appropriate.       Last Recorded Vitals  Blood pressure 125/75, pulse 86, temperature 36.8 °C (98.2 °F), resp. rate 18, height 1.6 m (5' 3\"), weight 63 kg (139 lb), SpO2 100%.    Relevant Results  Results for orders placed or performed during the hospital encounter of 04/03/25 (from the past 24 hours)   CBC and Auto Differential   Result Value Ref Range    WBC 25.3 (H) 4.4 - 11.3 x10*3/uL    nRBC 0.0 0.0 - 0.0 /100 WBCs    RBC 2.94 (L) 4.00 - 5.20 x10*6/uL    Hemoglobin 8.6 (L) 12.0 - 16.0 g/dL    Hematocrit 26.4 (L) 36.0 - 46.0 %    MCV 90 80 - 100 fL    " MCH 29.3 26.0 - 34.0 pg    MCHC 32.6 32.0 - 36.0 g/dL    RDW 17.8 (H) 11.5 - 14.5 %    Platelets 333 150 - 450 x10*3/uL    Neutrophils % 96.7 40.0 - 80.0 %    Immature Granulocytes %, Automated 1.9 (H) 0.0 - 0.9 %    Lymphocytes % 1.1 13.0 - 44.0 %    Monocytes % 0.1 2.0 - 10.0 %    Eosinophils % 0.1 0.0 - 6.0 %    Basophils % 0.1 0.0 - 2.0 %    Neutrophils Absolute 24.46 (H) 1.60 - 5.50 x10*3/uL    Immature Granulocytes Absolute, Automated 0.48 0.00 - 0.50 x10*3/uL    Lymphocytes Absolute 0.27 (L) 0.80 - 3.00 x10*3/uL    Monocytes Absolute 0.03 (L) 0.05 - 0.80 x10*3/uL    Eosinophils Absolute 0.02 0.00 - 0.40 x10*3/uL    Basophils Absolute 0.02 0.00 - 0.10 x10*3/uL   Magnesium   Result Value Ref Range    Magnesium 1.57 (L) 1.60 - 2.40 mg/dL   Comprehensive metabolic panel   Result Value Ref Range    Glucose 76 74 - 99 mg/dL    Sodium 126 (L) 136 - 145 mmol/L    Potassium 3.8 3.5 - 5.3 mmol/L    Chloride 96 (L) 98 - 107 mmol/L    Bicarbonate 15 (L) 21 - 32 mmol/L    Anion Gap 19 10 - 20 mmol/L    Urea Nitrogen 24 (H) 6 - 23 mg/dL    Creatinine 1.00 0.50 - 1.05 mg/dL    eGFR 60 (L) >60 mL/min/1.73m*2    Calcium 9.0 8.6 - 10.3 mg/dL    Albumin 2.9 (L) 3.4 - 5.0 g/dL    Alkaline Phosphatase 229 (H) 33 - 136 U/L    Total Protein 5.8 (L) 6.4 - 8.2 g/dL    AST 18 9 - 39 U/L    Bilirubin, Total 0.8 0.0 - 1.2 mg/dL    ALT 11 7 - 45 U/L   Lipase   Result Value Ref Range    Lipase 11 9 - 82 U/L   Phosphorus   Result Value Ref Range    Phosphorus 2.9 2.5 - 4.9 mg/dL   Uric acid   Result Value Ref Range    Uric Acid 4.2 2.3 - 6.7 mg/dL   LDH, Lactate dehydrogenase   Result Value Ref Range     (H) 84 - 246 U/L   Urinalysis with Reflex Culture and Microscopic   Result Value Ref Range    Color, Urine Yellow Light-Yellow, Yellow, Dark-Yellow    Appearance, Urine Turbid (N) Clear    Specific Gravity, Urine 1.018 1.005 - 1.035    pH, Urine 6.5 5.0, 5.5, 6.0, 6.5, 7.0, 7.5, 8.0    Protein, Urine 50 (1+) (A) NEGATIVE, 10  (TRACE), 20 (TRACE) mg/dL    Glucose, Urine Normal Normal mg/dL    Blood, Urine 0.03 (TRACE) (A) NEGATIVE mg/dL    Ketones, Urine NEGATIVE NEGATIVE mg/dL    Bilirubin, Urine NEGATIVE NEGATIVE mg/dL    Urobilinogen, Urine Normal Normal mg/dL    Nitrite, Urine 1+ (A) NEGATIVE    Leukocyte Esterase, Urine 75 Alice/uL (A) NEGATIVE   Microscopic Only, Urine   Result Value Ref Range    WBC, Urine 11-20 (A) 1-5, NONE /HPF    RBC, Urine NONE NONE, 1-2, 3-5 /HPF    Bacteria, Urine 4+ (A) NONE SEEN /HPF    Mucus, Urine 4+ Reference range not established. /LPF     *Note: Due to a large number of results and/or encounters for the requested time period, some results have not been displayed. A complete set of results can be found in Results Review.      Scheduled medications  acetaminophen, 650 mg, oral, q6h  [START ON 4/4/2025] dexAMETHasone, 4 mg, intravenous, q12h  dexAMETHasone, 8 mg, intravenous, Once      Continuous medications     PRN medications  PRN medications: diphenoxylate-atropine, glycopyrrolate, haloperidol lactate, HYDROmorphone, HYDROmorphone, LORazepam, ondansetron, prochlorperazine      Assessment/Plan   Assessment & Plan      74 y.o. female with a medical history of cervical cancer and recent diagnosis of invasive urothelial cancer, s/p EV/Pembro with tumor shrinkage, followed by nephrectomy and colon resection, with recurrent progression of the cancer. Presented to [facility] ED with c/o weakness, nonbloody diarrhea, weakness, fatigue, shakiness, and intolerance to oral intake.     Advanced Urothelial Cancer with Complications  -Necrotic pre-sacral tumor with invasion into left sacrum, rectum, and left vaginal cuff  -Intolerance to chemotherapy with diarrhea, nausea, weakness, and poor appetite  -Pain uncontrolled in LLE - currently 8/10  -Prognosis poor and requesting transition to comfort care  -Palliative/HWR consulted  -Symptom management: Dilaudid for pain, Robinul for secretions, Lomotil for diarrhea, Haldol,  Ativan for agitation/anxiety, and Zofran/Compazine for N/V.   -Ordered rojas placed for comfort  -Supportive care    Disposition  -Plan of care discussed with attending, Dr. Paris and Hospice RN.   -Palliative APRN will follow.   -Meets inpatient criteria for GIP with her advanced cancer.     Marielos John, APRN-CNP

## 2025-04-04 ENCOUNTER — TELEPHONE (OUTPATIENT)
Dept: ADMISSION | Facility: HOSPITAL | Age: 75
End: 2025-04-04

## 2025-04-04 LAB
ATRIAL RATE: 74 BPM
P AXIS: 38 DEGREES
P OFFSET: 185 MS
P ONSET: 132 MS
PR INTERVAL: 172 MS
Q ONSET: 218 MS
QRS COUNT: 13 BEATS
QRS DURATION: 86 MS
QT INTERVAL: 388 MS
QTC CALCULATION(BAZETT): 430 MS
QTC FREDERICIA: 416 MS
R AXIS: -6 DEGREES
T AXIS: -3 DEGREES
T OFFSET: 412 MS
VENTRICULAR RATE: 74 BPM

## 2025-04-04 PROCEDURE — 2500000004 HC RX 250 GENERAL PHARMACY W/ HCPCS (ALT 636 FOR OP/ED): Performed by: NURSE PRACTITIONER

## 2025-04-04 PROCEDURE — 2500000002 HC RX 250 W HCPCS SELF ADMINISTERED DRUGS (ALT 637 FOR MEDICARE OP, ALT 636 FOR OP/ED): Performed by: STUDENT IN AN ORGANIZED HEALTH CARE EDUCATION/TRAINING PROGRAM

## 2025-04-04 PROCEDURE — 2500000001 HC RX 250 WO HCPCS SELF ADMINISTERED DRUGS (ALT 637 FOR MEDICARE OP): Performed by: STUDENT IN AN ORGANIZED HEALTH CARE EDUCATION/TRAINING PROGRAM

## 2025-04-04 PROCEDURE — 99233 SBSQ HOSP IP/OBS HIGH 50: CPT | Performed by: NURSE PRACTITIONER

## 2025-04-04 PROCEDURE — 99232 SBSQ HOSP IP/OBS MODERATE 35: CPT | Performed by: STUDENT IN AN ORGANIZED HEALTH CARE EDUCATION/TRAINING PROGRAM

## 2025-04-04 PROCEDURE — 99232 SBSQ HOSP IP/OBS MODERATE 35: CPT | Performed by: NURSE PRACTITIONER

## 2025-04-04 PROCEDURE — 1150000001 HC HOSPICE PRIVATE ROOM DAILY

## 2025-04-04 PROCEDURE — 2500000002 HC RX 250 W HCPCS SELF ADMINISTERED DRUGS (ALT 637 FOR MEDICARE OP, ALT 636 FOR OP/ED): Performed by: NURSE PRACTITIONER

## 2025-04-04 PROCEDURE — 2500000001 HC RX 250 WO HCPCS SELF ADMINISTERED DRUGS (ALT 637 FOR MEDICARE OP): Performed by: NURSE PRACTITIONER

## 2025-04-04 RX ORDER — LORAZEPAM 0.5 MG/1
0.5 TABLET ORAL EVERY 4 HOURS PRN
Status: DISCONTINUED | OUTPATIENT
Start: 2025-04-04 | End: 2025-04-05 | Stop reason: HOSPADM

## 2025-04-04 RX ORDER — HYOSCYAMINE SULFATE 0.12 MG/1
0.12 TABLET, ORALLY DISINTEGRATING ORAL EVERY 4 HOURS PRN
Status: DISCONTINUED | OUTPATIENT
Start: 2025-04-04 | End: 2025-04-05 | Stop reason: HOSPADM

## 2025-04-04 RX ORDER — ONDANSETRON 4 MG/1
8 TABLET, FILM COATED ORAL EVERY 8 HOURS PRN
Status: DISCONTINUED | OUTPATIENT
Start: 2025-04-04 | End: 2025-04-05 | Stop reason: HOSPADM

## 2025-04-04 RX ORDER — DEXAMETHASONE 4 MG/1
4 TABLET ORAL DAILY
Status: DISCONTINUED | OUTPATIENT
Start: 2025-04-04 | End: 2025-04-05 | Stop reason: HOSPADM

## 2025-04-04 RX ORDER — OXYCODONE HYDROCHLORIDE 10 MG/1
10 TABLET ORAL EVERY 4 HOURS PRN
Status: DISCONTINUED | OUTPATIENT
Start: 2025-04-04 | End: 2025-04-05 | Stop reason: HOSPADM

## 2025-04-04 RX ORDER — OXYCODONE HYDROCHLORIDE 5 MG/1
5 TABLET ORAL EVERY 4 HOURS PRN
Status: DISCONTINUED | OUTPATIENT
Start: 2025-04-04 | End: 2025-04-05 | Stop reason: HOSPADM

## 2025-04-04 RX ORDER — PANTOPRAZOLE SODIUM 40 MG/1
40 TABLET, DELAYED RELEASE ORAL
Status: DISCONTINUED | OUTPATIENT
Start: 2025-04-05 | End: 2025-04-05 | Stop reason: HOSPADM

## 2025-04-04 RX ORDER — PROCHLORPERAZINE MALEATE 5 MG
10 TABLET ORAL EVERY 6 HOURS PRN
Status: DISCONTINUED | OUTPATIENT
Start: 2025-04-04 | End: 2025-04-05 | Stop reason: HOSPADM

## 2025-04-04 RX ORDER — HALOPERIDOL 1 MG/1
1 TABLET ORAL EVERY 4 HOURS PRN
Status: DISCONTINUED | OUTPATIENT
Start: 2025-04-04 | End: 2025-04-05 | Stop reason: HOSPADM

## 2025-04-04 RX ADMIN — GABAPENTIN 300 MG: 300 CAPSULE ORAL at 10:08

## 2025-04-04 RX ADMIN — DIPHENOXYLATE HYDROCHLORIDE AND ATROPINE SULFATE 1 TABLET: 2.5; .025 TABLET ORAL at 12:04

## 2025-04-04 RX ADMIN — LEVOTHYROXINE SODIUM 100 MCG: 100 TABLET ORAL at 05:44

## 2025-04-04 RX ADMIN — VENLAFAXINE HYDROCHLORIDE 75 MG: 75 CAPSULE, EXTENDED RELEASE ORAL at 10:08

## 2025-04-04 RX ADMIN — DEXAMETHASONE 4 MG: 4 TABLET ORAL at 12:05

## 2025-04-04 RX ADMIN — APIXABAN 5 MG: 5 TABLET, FILM COATED ORAL at 20:53

## 2025-04-04 RX ADMIN — ACETAMINOPHEN 650 MG: 325 TABLET ORAL at 03:31

## 2025-04-04 RX ADMIN — LORAZEPAM 0.5 MG: 0.5 TABLET ORAL at 23:02

## 2025-04-04 RX ADMIN — APIXABAN 5 MG: 5 TABLET, FILM COATED ORAL at 12:05

## 2025-04-04 RX ADMIN — ACETAMINOPHEN 650 MG: 325 TABLET ORAL at 21:41

## 2025-04-04 RX ADMIN — DIPHENOXYLATE HYDROCHLORIDE AND ATROPINE SULFATE 1 TABLET: 2.5; .025 TABLET ORAL at 17:24

## 2025-04-04 RX ADMIN — GABAPENTIN 300 MG: 300 CAPSULE ORAL at 17:24

## 2025-04-04 RX ADMIN — ACETAMINOPHEN 650 MG: 325 TABLET ORAL at 12:05

## 2025-04-04 RX ADMIN — ACETAMINOPHEN 650 MG: 325 TABLET ORAL at 17:25

## 2025-04-04 RX ADMIN — GABAPENTIN 300 MG: 300 CAPSULE ORAL at 20:53

## 2025-04-04 ASSESSMENT — COGNITIVE AND FUNCTIONAL STATUS - GENERAL
WALKING IN HOSPITAL ROOM: TOTAL
CLIMB 3 TO 5 STEPS WITH RAILING: TOTAL
WALKING IN HOSPITAL ROOM: TOTAL
STANDING UP FROM CHAIR USING ARMS: A LOT
HELP NEEDED FOR BATHING: A LOT
PERSONAL GROOMING: A LITTLE
DAILY ACTIVITIY SCORE: 13
DRESSING REGULAR LOWER BODY CLOTHING: A LOT
TOILETING: TOTAL
MOVING FROM LYING ON BACK TO SITTING ON SIDE OF FLAT BED WITH BEDRAILS: A LITTLE
MOBILITY SCORE: 9
CLIMB 3 TO 5 STEPS WITH RAILING: TOTAL
DRESSING REGULAR UPPER BODY CLOTHING: A LOT
TOILETING: TOTAL
MOVING TO AND FROM BED TO CHAIR: A LOT
DAILY ACTIVITIY SCORE: 13
TURNING FROM BACK TO SIDE WHILE IN FLAT BAD: A LITTLE
HELP NEEDED FOR BATHING: A LOT
MOVING FROM LYING ON BACK TO SITTING ON SIDE OF FLAT BED WITH BEDRAILS: A LITTLE
EATING MEALS: A LITTLE
PERSONAL GROOMING: A LITTLE
DRESSING REGULAR UPPER BODY CLOTHING: A LOT
DRESSING REGULAR LOWER BODY CLOTHING: A LOT
MOBILITY SCORE: 12
MOVING TO AND FROM BED TO CHAIR: TOTAL
EATING MEALS: A LITTLE
TURNING FROM BACK TO SIDE WHILE IN FLAT BAD: A LOT
STANDING UP FROM CHAIR USING ARMS: TOTAL

## 2025-04-04 ASSESSMENT — PAIN SCALES - GENERAL
PAINLEVEL_OUTOF10: 0 - NO PAIN
PAINLEVEL_OUTOF10: 0 - NO PAIN

## 2025-04-04 ASSESSMENT — PAIN - FUNCTIONAL ASSESSMENT: PAIN_FUNCTIONAL_ASSESSMENT: 0-10

## 2025-04-04 ASSESSMENT — ACTIVITIES OF DAILY LIVING (ADL): LACK_OF_TRANSPORTATION: NO

## 2025-04-04 NOTE — PROGRESS NOTES
"PALLIATIVE DAILY PROGRESS NOTE                                                                                                                                             Significant other at bedside.  SUBJECTIVE                                                                                                                                                                                                                                                    Pt feeling comfortable. Feels worried about going home. [place] that the catheter caused some discomfort when it was \"jammed in\" and is a little achy sometimes.     OBJECTIVE                                                                                                                                                                                                                                                      Physical Exam  GENERAL: awake, alert, eating breakfast, seems to have energy today   SKIN: dry, pale   HEENT:  mmd   LUNGS: Unlabored resps on room air   MS: generalized weakness  NEURO: Oriented x 3  PSYCH: mood and behavior appropriate    Last Recorded Vitals  Visit Vitals  BP 87/51 (BP Location: Left arm, Patient Position: Lying) Comment: RN made aware   Pulse 62   Temp 36.4 °C (97.5 °F) (Temporal)   Resp 18   SpO2 97%   Smoking Status Never        Relevant Results  Scheduled medications  acetaminophen, 650 mg, oral, q6h  apixaban, 5 mg, oral, BID  dexAMETHasone, 4 mg, oral, Daily  gabapentin, 300 mg, oral, TID  levothyroxine, 100 mcg, oral, Daily  [START ON 4/5/2025] pantoprazole, 40 mg, oral, Daily before breakfast  venlafaxine XR, 75 mg, oral, Daily with breakfast      Continuous medications     PRN medications  PRN medications: diphenoxylate-atropine, glycopyrrolate, haloperidol, hyoscyamine, LORazepam, ondansetron, oxyCODONE, oxyCODONE, prochlorperazine    Results for orders placed or performed during the hospital encounter of 04/03/25 (from the past " 24 hours)   CBC and Auto Differential   Result Value Ref Range    WBC 25.3 (H) 4.4 - 11.3 x10*3/uL    nRBC 0.0 0.0 - 0.0 /100 WBCs    RBC 2.94 (L) 4.00 - 5.20 x10*6/uL    Hemoglobin 8.6 (L) 12.0 - 16.0 g/dL    Hematocrit 26.4 (L) 36.0 - 46.0 %    MCV 90 80 - 100 fL    MCH 29.3 26.0 - 34.0 pg    MCHC 32.6 32.0 - 36.0 g/dL    RDW 17.8 (H) 11.5 - 14.5 %    Platelets 333 150 - 450 x10*3/uL    Neutrophils % 96.7 40.0 - 80.0 %    Immature Granulocytes %, Automated 1.9 (H) 0.0 - 0.9 %    Lymphocytes % 1.1 13.0 - 44.0 %    Monocytes % 0.1 2.0 - 10.0 %    Eosinophils % 0.1 0.0 - 6.0 %    Basophils % 0.1 0.0 - 2.0 %    Neutrophils Absolute 24.46 (H) 1.60 - 5.50 x10*3/uL    Immature Granulocytes Absolute, Automated 0.48 0.00 - 0.50 x10*3/uL    Lymphocytes Absolute 0.27 (L) 0.80 - 3.00 x10*3/uL    Monocytes Absolute 0.03 (L) 0.05 - 0.80 x10*3/uL    Eosinophils Absolute 0.02 0.00 - 0.40 x10*3/uL    Basophils Absolute 0.02 0.00 - 0.10 x10*3/uL   Magnesium   Result Value Ref Range    Magnesium 1.57 (L) 1.60 - 2.40 mg/dL   Comprehensive metabolic panel   Result Value Ref Range    Glucose 76 74 - 99 mg/dL    Sodium 126 (L) 136 - 145 mmol/L    Potassium 3.8 3.5 - 5.3 mmol/L    Chloride 96 (L) 98 - 107 mmol/L    Bicarbonate 15 (L) 21 - 32 mmol/L    Anion Gap 19 10 - 20 mmol/L    Urea Nitrogen 24 (H) 6 - 23 mg/dL    Creatinine 1.00 0.50 - 1.05 mg/dL    eGFR 60 (L) >60 mL/min/1.73m*2    Calcium 9.0 8.6 - 10.3 mg/dL    Albumin 2.9 (L) 3.4 - 5.0 g/dL    Alkaline Phosphatase 229 (H) 33 - 136 U/L    Total Protein 5.8 (L) 6.4 - 8.2 g/dL    AST 18 9 - 39 U/L    Bilirubin, Total 0.8 0.0 - 1.2 mg/dL    ALT 11 7 - 45 U/L   Lipase   Result Value Ref Range    Lipase 11 9 - 82 U/L   Phosphorus   Result Value Ref Range    Phosphorus 2.9 2.5 - 4.9 mg/dL   Uric acid   Result Value Ref Range    Uric Acid 4.2 2.3 - 6.7 mg/dL   LDH, Lactate dehydrogenase   Result Value Ref Range     (H) 84 - 246 U/L   Urinalysis with Reflex Culture and Microscopic    Result Value Ref Range    Color, Urine Yellow Light-Yellow, Yellow, Dark-Yellow    Appearance, Urine Turbid (N) Clear    Specific Gravity, Urine 1.018 1.005 - 1.035    pH, Urine 6.5 5.0, 5.5, 6.0, 6.5, 7.0, 7.5, 8.0    Protein, Urine 50 (1+) (A) NEGATIVE, 10 (TRACE), 20 (TRACE) mg/dL    Glucose, Urine Normal Normal mg/dL    Blood, Urine 0.03 (TRACE) (A) NEGATIVE mg/dL    Ketones, Urine NEGATIVE NEGATIVE mg/dL    Bilirubin, Urine NEGATIVE NEGATIVE mg/dL    Urobilinogen, Urine Normal Normal mg/dL    Nitrite, Urine 1+ (A) NEGATIVE    Leukocyte Esterase, Urine 75 Alice/uL (A) NEGATIVE   Extra Urine Gray Tube   Result Value Ref Range    Extra Tube Hold for add-ons.    Microscopic Only, Urine   Result Value Ref Range    WBC, Urine 11-20 (A) 1-5, NONE /HPF    RBC, Urine NONE NONE, 1-2, 3-5 /HPF    Bacteria, Urine 4+ (A) NONE SEEN /HPF    Mucus, Urine 4+ Reference range not established. /LPF     *Note: Due to a large number of results and/or encounters for the requested time period, some results have not been displayed. A complete set of results can be found in Results Review.        Imaging  No results found.    Cardiology, Vascular, and Other Imaging  No other imaging results found for the past 2 days   }    ASSESSMENT AND PLAN   Impression  This is a terminally ill 74 y.o. year old who is here admitted under [facility] hospice care for cancer related symptom management. She was unable to gain control over symptoms at home thus requiring inpatient care.      Goals of Care: Comfort and quality of life     Code Status: DNR Comfort Measures Only     Prognosis: weeks to months      Hospice Eligibility: Advanced Cancer      PLAN    #General  -General comfort medications ordered  -Symptom mgmt   -DC planning home w hospice v nursing home private pay with hospice     #Cancer related pain 2/2 necrotic pre sacral tumor w invasion into L sacrum,  rectum and left vaginal cuff  -controlled s/p steroids  -resume oxycodone 5-10mg Q4hrs PRN    -s/p 8mg IV decaderon on 4/3; Continue Decadron 4mg daily   -Continue  gabapentin 300 mg 3 times a day  -New start venlafaxine 75g ER may help with pain   -Scheduled Tylenol 650 mg every 6 hours  -Music therapy      #Diarreah 2/2 complication of cancer and cancer treatment   -stable and not distressing at this time   -2 loose stools since last evening   -PRN Lomotil  -If regimen fails, add loperamide     #Nausea 2/2 complication of cancer and cancer treatment   -denies at this time  -antiemetics zofran and compazine PRN  -If regimen fails, consider haldol     #Insomnia  -slept well last night since better symptom control   -continue other symptom mgmt   -As needed Ativan     #Depression and anxiety  -Improved since other symptoms are better  -continue to manage distressing symptoms  -Music therapy  -Start SNRI venlafaxine  -Family support      #Comfort of Elimination of Urine   -Reuben thomson        Thank you for asking Palliative Care to assist with care of this patient.     Update provided to: The Primary team about plan and anticipated needs       MEDICAL COMPLEXITY    Medical complexity with high due to progressing metastatic cancer with pain and diarrhea as a side effect of cancer and  recent chemotherapy, decision to elect hospice care, and decision not to resuscitate due to poor prognosis.     CONTACT INFORMATION   Chacha Betancourt CNP  Palliative Medicine  [facility] [employer] [facility]

## 2025-04-04 NOTE — TELEPHONE ENCOUNTER
LIVIER Kinney called and said Terra is in the hospital currently and will not be at the infusion appointment on Monday 4/7 and will discuss further infusions at her 4/21 appointment with Cinthya Kinney. Messaged team and [facility].

## 2025-04-04 NOTE — CONSULTS
Nutrition Note:   Nutrition Assessment       Patient is a 74 y.o. female with h/o advanced urothelial cancer w/ complications, now admitted under hospice.    RDN received nursing admission screen (MST=2; unsure of weight loss).    Will defer full nutritional assessment given pt is now CMO w/ hospice. No nutritional intervention indicated. Please re-consult if GOC change.      Dietary Orders (From admission, onward)       Start     Ordered    04/03/25 1818  Adult diet Regular  Diet effective now        Comments: For pleasure/comfort feeding   Question:  Diet type  Answer:  Regular    04/03/25 1817 04/03/25 1818  May Participate in Room Service  ( ROOM SERVICE MAY PARTICIPATE)  Once        Question:  .  Answer:  Yes    04/03/25 1817               Time Spent (min): 15 minutes

## 2025-04-04 NOTE — PROGRESS NOTES
Patient: Terra Alexis  Room/bed: 136/136-A  Admitted on: 4/3/2025    Age: 74 y.o.   Gender: female  Code Status:  DNR Comfort Measures Only   Admitting Dx: No admission diagnoses are documented for this encounter.    MRN: [id number]  PCP: No Assigned PCP Generic Provider, MD       Subjective   Seen and examined in her room this AM. Awake and alert. Sitting up in bed eating her breakfast. Fiancee at bedside. She states she feels better today. RN states did not require pain medication in the night. Still reports constant ache in her LLE. Requests to continue on Apixaban.     Objective    Physical Exam   Constitutional: A&O x 3; NAD; cooperative; chronic ill appearance.   Eyes: EOM's intact  HEENT: Normocephalic, Atraumatic. Oral mucosa moist.   Neck: Supple. No JVD, lymphadenopathy.   Lungs: CTAB with fair air movement. Respirations even and unlabored on room air.   Heart: RRR  Abdomen: Soft, non-tender, non-distended, +BS  MS/Extremities: GONZALEZ equally x 4 with weakness, LLE>RLE. BLE pitting edema. Peripheral pulses intact bilaterally.   Neuro: A&O x3; no focal deficits; gross motor and sensation intact.   Skin: Warm and dry. No rashes or lesions. Pale.   Psych: Appropriate.      Temp:  [36.4 °C (97.5 °F)-36.7 °C (98.1 °F)] 36.4 °C (97.5 °F)  Heart Rate:  [62-82] 62  Resp:  [14-18] 18  BP: ()/(47-55) 87/51    There were no vitals filed for this visit.          I/Os    Intake/Output Summary (Last 24 hours) at 4/4/2025 1721  Last data filed at 4/4/2025 0500  Gross per 24 hour   Intake 120 ml   Output 400 ml   Net -280 ml       Labs:   Results from last 72 hours   Lab Units 04/03/25  1047   SODIUM mmol/L 126*   POTASSIUM mmol/L 3.8   CHLORIDE mmol/L 96*   CO2 mmol/L 15*   BUN mg/dL 24*   CREATININE mg/dL 1.00   GLUCOSE mg/dL 76   CALCIUM mg/dL 9.0   ANION GAP mmol/L 19   EGFR mL/min/1.73m*2 60*   PHOSPHORUS mg/dL 2.9      Results from last 72 hours   Lab Units 04/03/25  1047   WBC AUTO x10*3/uL 25.3*   HEMOGLOBIN  g/dL 8.6*   HEMATOCRIT % 26.4*   PLATELETS AUTO x10*3/uL 333   NEUTROS PCT AUTO % 96.7   LYMPHS PCT AUTO % 1.1   MONOS PCT AUTO % 0.1   EOS PCT AUTO % 0.1      Lab Results   Component Value Date    CALCIUM 9.0 04/03/2025    PHOS 2.9 04/03/2025      Micro/ID:   No results found for the last 90 days.    .ID  Lab Results   Component Value Date    URINECULTURE Normal genitourinary bradly 08/13/2024       Images:       Meds    Scheduled medications  acetaminophen, 650 mg, oral, q6h  apixaban, 5 mg, oral, BID  dexAMETHasone, 4 mg, oral, Daily  gabapentin, 300 mg, oral, TID  levothyroxine, 100 mcg, oral, Daily  [START ON 4/5/2025] pantoprazole, 40 mg, oral, Daily before breakfast  venlafaxine XR, 75 mg, oral, Daily with breakfast      Continuous medications     PRN medications  PRN medications: diphenoxylate-atropine, glycopyrrolate, haloperidol, hyoscyamine, LORazepam, ondansetron, oxyCODONE, oxyCODONE, prochlorperazine     Assessment and Plan    74 y.o. female with a medical history of cervical cancer and recent diagnosis of invasive urothelial cancer, s/p EV/Pembro with tumor shrinkage, followed by nephrectomy and colon resection, with recurrent progression of the cancer. Presented to [facility] ED with c/o weakness, nonbloody diarrhea, weakness, fatigue, shakiness, and intolerance to oral intake.      Advanced Urothelial Cancer with Complications  -Necrotic pre-sacral tumor with invasion into left sacrum, rectum, and left vaginal cuff  -Intolerance to chemotherapy with diarrhea, nausea, weakness, and poor appetite  -Prognosis poor and requesting transition to comfort care  -On steroids.   -Palliative/HWR consulted  -Symptom management: Dilaudid for pain, Robinul for secretions, Lomotil for diarrhea, Haldol, Ativan for agitation/anxiety, and Zofran/Compazine for N/V.   -Alexis placed for comfort  -No significant pain issues (LLE) overnight but does report constant aching pain  -Continue with supportive care with  Hospice/Palliative following    LLE DVT  S/P recent thrombectomy  Apixaban resumed    Diarrhea  -Likely secondary to chemotherapy  -Persistent today  -On Lomotil  -Hold all laxatives and stool softeners at this time.     Depression/Anxiety  -Mood improved with symptom control  -Started on Venlafaxine  -Music therapy/supportive care.      Disposition  -Plan of care discussed with attending, Dr. Paris, Palliative ARPN, and Hospice RN.   -DNR-CC  -Being re-assessed by Hospice and will discharge to home in AM with Hospice to provide symptom control.         Marielos John, APRN-CNP

## 2025-04-04 NOTE — CONSULTS
PALLIATIVE MEDICINE CONSULT   Attending Physician: Claudia Paris MD  Reason For Consult: Assistance with symptom management    INTRODUCTIONS   Patient's capacity: Good and is ableto participate in the visit  Family present:  Significant other: Gregoria     SUBJECTIVE   History of the Present Illness  Terra Alexis is a 74 y.o. 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 Dx with extensive LLE DVT about a month ago.    Cancer History  Urothelial vs cervical cancer  -originally diagnosed with cervical CA in 1998, s/p chemoRT  -March 2024: found to have pelvic/ urothelial mass  -Aug 2024: s/sp nephroureterectomy + sigmoid colon resection  -Nov 2024: ileostomy closure  -now s/p 9 cycles Pembro    The patient presented to the ED on from home with a chief complaint of continuous diarrhea, worsened left leg pain, and inability to ambulate. The pt received new chemo on 3/31 and it made her feel horrible. She has decided that she no longer wants to pursue chemotherapy. Gregoria states he doesn't feel like she would be strong enough for continued treatment and  that they were told radiation will only make things worse. Palliative Medicine was consulted to determine goals of care and assist with connection to appropriate resources.  She elected to pursue hospice care and is now readmitted under the care of hospice of [facility].  Palliative was reconsulted to manage symptoms.     The history was obtained from the  patient, sig other,   and record review due to pt needing some assistance with fine details.     Review of Systems/Symptoms:   + Continuous diarrhea, distressing urinary and bowel incontinence,  severe pain that is worsening in the LLE, can't walk, depressed, anxious at times, decreased appetite, nausea, and impaired sleep.  Denies Shortness of breath.     Past Medical History  See HPI    Past Surgical History   has a past surgical history that includes Other surgical history (10/07/2021); Nephrectomy (Left, 08/15/2024); and Invasive Vascular Procedure (Left, 3/11/2025).    Family Medical History  Family History   Problem Relation Name Age of Onset    Macular degeneration Mother      Glaucoma Other grandparent     Macular degeneration Other grandparent        Home Medications  Reviewed    Allergies  Allergies   Allergen Reactions    Codeine Hallucinations, GI Upset and Nausea/vomiting       Social History  The patient is in a relationship with long time partner Gregoria. She reports that she has never smoked. She has never used smokeless tobacco. She reports that she does not currently use alcohol. She reports that she does not use drugs..      Worship and Spirituality:  Taoist and has been in communication with her [employer].   She Denies spiritual needs at this time    OBJECTIVE   Current Inpatient Medications  acetaminophen, 650 mg, oral, q6h  gabapentin, 300 mg, oral, TID  [START ON 4/4/2025] levothyroxine, 100 mcg, oral, Daily  [START ON 4/4/2025] venlafaxine XR, 75 mg, oral, Daily with breakfast      Continuous medications     PRN medications  PRN medications: diphenoxylate-atropine, glycopyrrolate, haloperidol lactate, HYDROmorphone, HYDROmorphone, LORazepam, ondansetron, prochlorperazine   Last Recorded Vitals  There were no vitals taken for this visit.  7 Day Weight Change: Unable to Calculate   There is no height or weight on file to calculate BMI.   Weight change:      Relevant Results Reviewed   Lab Results   Component Value Date    WBC 25.3 (H) 04/03/2025    HGB 8.6 (L) 04/03/2025    HCT 26.4 (L) 04/03/2025    MCV 90 04/03/2025     04/03/2025      Lab Results   Component Value Date    GLUCOSE 76 04/03/2025    CALCIUM 9.0 04/03/2025     (L) 04/03/2025    K 3.8 04/03/2025    CO2 15 (L) 04/03/2025    CL 96 (L)  04/03/2025    BUN 24 (H) 04/03/2025    CREATININE 1.00 04/03/2025      Lab Results   Component Value Date    ALT 11 04/03/2025    AST 18 04/03/2025    ALKPHOS 229 (H) 04/03/2025    BILITOT 0.8 04/03/2025      Lab Results   Component Value Date    ALBUMIN 2.9 (L) 04/03/2025      Serum creatinine: 1 mg/dL 04/03/25 1047  Estimated creatinine clearance: 52.4 mL/min     Relevant Imaging Reviewed   Imaging  No results found.    Cardiology, Vascular, and Other Imaging  No other imaging results found for the past 7 days       Physical Exam   GENERAL: Ill appearing,  in distress of pain, fatigued looking  SKIN: dry, pale   HEENT:  mmd   LUNGS: Unlabored resps on room air   MS: generalized weakness  NEURO: Oriented x 3  PSYCH: mood and behavior appropriate    ASSESSMENT AND PLAN   Impression  This is a terminally ill 74 y.o. year old who is here admitted under [facility] hospice care for cancer related symptom management. She was unable to gain control over symptoms at home thus requiring inpatient care.     Goals of Care: Comfort and quality of life    Code Status: DNR Comfort Measures Only    Prognosis: Poor     Hospice Eligibility: Advanced Cancer      PLAN     #General  -General comfort medications ordered  -Symptom mgmt   -DC planning in collaboration w hospice  as patient does not feel she and her sig other can manage at home     #Cancer related pain 2/2 necrotic pre sacral tumor w invasion into L  sacrum,  rectum and left vaginal cuff  -Uncontrolled   -Position for comfort  -Dilaudid 0.3 mg to 0.5 mg every hour as needed for pain  -8 mg IV Decadron x1 and then 4 mg IV every 12 hrs  -Will transition to oral pain regimen when symptoms are more controlled  -Resume home gabapentin 300 mg 3 times a day  -New start venlafaxine may help with pain   -Scheduled Tylenol 650 mg every 6 hours  -Music therapy     #Diarreah 2/2 complication of cancer and cancer treatment   -Status post multiple liters of fluid in the ED  -PRN Lomotil  -If  regimen fails, add loperamide    #Nausea 2/2 complication of cancer and cancer treatment   -antiemetics zofran and compazine PRN  -If regimen fails, consider haldol    #Insomnia  -Manage distressing symptoms  -As needed Ativan    # Depression and anxiety  -Manage distressing symptoms  -Music therapy  -Start SNRI venlafaxine  -Family support     #Comfort of Elimination of Urine   -Alexis ordered     Thank you for asking Palliative Care to assist with care of this patient.    Update provided to: The Primary team and the bedside nurse    MEDICAL COMPLEXITY    Medical complexity with high due to progressing metastatic cancer with uncontrolled pain, ongoing diarrhea as a side effect of recent chemotherapy, requiring parenteral controlled substances for symptom management, decision to elect hospice care, and decision not to resuscitate due to poor prognosis.    CONTACT INFORMATION   Chacha Betancourt CNP, [facility]  Palliative Medicine  3 day [employer] Secure chat

## 2025-04-04 NOTE — CARE PLAN
The patient's goals for the shift include  use call light for assistance     The clinical goals for the shift include Pt will remain comfortable throughout shift

## 2025-04-04 NOTE — PROGRESS NOTES
04/04/25 0729   Discharge Planning   Living Arrangements Spouse/significant other  (home with jake Slater)   Support Systems Spouse/significant other   Assistance Needed A&OX4; dependent on assist for all ADLs with walker but now very limited; doesn't drive; room air baseline and currently room air; PCP Dr Anisha Yoo   Type of Residence Private residence   Number of Stairs to Enter Residence 5   Number of Stairs Within Residence 14  (1/2 bath on main level. 14 up to full bathroom)   Do you have animals or pets at home? No   Who is requesting discharge planning? Provider   Expected Discharge Disposition [facility]  (Patient is GIP with Hospice [facility])   Does the patient need discharge transport arranged? Yes   RoundTrip coordination needed? Yes   Has discharge transport been arranged? No   Financial Resource Strain   How hard is it for you to pay for the very basics like food, housing, medical care, and heating? Not hard   Housing Stability   In the last 12 months, was there a time when you were not able to pay the mortgage or rent on time? N   In the past 12 months, how many times have you moved where you were living? 0   At any time in the past 12 months, were you homeless or living in a shelter (including now)? N   Transportation Needs   In the past 12 months, has lack of transportation kept you from medical appointments or from getting medications? no   In the past 12 months, has lack of transportation kept you from meetings, work, or from getting things needed for daily living? No

## 2025-04-05 VITALS
TEMPERATURE: 97.3 F | SYSTOLIC BLOOD PRESSURE: 133 MMHG | DIASTOLIC BLOOD PRESSURE: 73 MMHG | OXYGEN SATURATION: 98 % | RESPIRATION RATE: 18 BRPM | HEART RATE: 64 BPM

## 2025-04-05 PROCEDURE — 2500000001 HC RX 250 WO HCPCS SELF ADMINISTERED DRUGS (ALT 637 FOR MEDICARE OP): Performed by: STUDENT IN AN ORGANIZED HEALTH CARE EDUCATION/TRAINING PROGRAM

## 2025-04-05 PROCEDURE — 2500000002 HC RX 250 W HCPCS SELF ADMINISTERED DRUGS (ALT 637 FOR MEDICARE OP, ALT 636 FOR OP/ED): Performed by: STUDENT IN AN ORGANIZED HEALTH CARE EDUCATION/TRAINING PROGRAM

## 2025-04-05 PROCEDURE — 2500000004 HC RX 250 GENERAL PHARMACY W/ HCPCS (ALT 636 FOR OP/ED): Performed by: NURSE PRACTITIONER

## 2025-04-05 PROCEDURE — 99239 HOSP IP/OBS DSCHRG MGMT >30: CPT | Performed by: NURSE PRACTITIONER

## 2025-04-05 PROCEDURE — 99239 HOSP IP/OBS DSCHRG MGMT >30: CPT | Performed by: STUDENT IN AN ORGANIZED HEALTH CARE EDUCATION/TRAINING PROGRAM

## 2025-04-05 PROCEDURE — 2500000002 HC RX 250 W HCPCS SELF ADMINISTERED DRUGS (ALT 637 FOR MEDICARE OP, ALT 636 FOR OP/ED): Performed by: NURSE PRACTITIONER

## 2025-04-05 RX ORDER — VENLAFAXINE HYDROCHLORIDE 75 MG/1
75 CAPSULE, EXTENDED RELEASE ORAL
Qty: 30 CAPSULE | Refills: 0 | Status: SHIPPED | OUTPATIENT
Start: 2025-04-06

## 2025-04-05 RX ORDER — AMOXICILLIN 250 MG
2 CAPSULE ORAL 2 TIMES DAILY
Start: 2025-04-05

## 2025-04-05 RX ORDER — ACETAMINOPHEN 500 MG
1000 TABLET ORAL EVERY 8 HOURS PRN
Start: 2025-04-05

## 2025-04-05 RX ADMIN — GABAPENTIN 300 MG: 300 CAPSULE ORAL at 09:07

## 2025-04-05 RX ADMIN — VENLAFAXINE HYDROCHLORIDE 75 MG: 75 CAPSULE, EXTENDED RELEASE ORAL at 09:07

## 2025-04-05 RX ADMIN — ACETAMINOPHEN 650 MG: 325 TABLET ORAL at 04:24

## 2025-04-05 RX ADMIN — DIPHENOXYLATE HYDROCHLORIDE AND ATROPINE SULFATE 1 TABLET: 2.5; .025 TABLET ORAL at 13:36

## 2025-04-05 RX ADMIN — APIXABAN 5 MG: 5 TABLET, FILM COATED ORAL at 09:16

## 2025-04-05 RX ADMIN — GABAPENTIN 300 MG: 300 CAPSULE ORAL at 15:17

## 2025-04-05 RX ADMIN — DEXAMETHASONE 4 MG: 4 TABLET ORAL at 09:07

## 2025-04-05 RX ADMIN — DIPHENOXYLATE HYDROCHLORIDE AND ATROPINE SULFATE 1 TABLET: 2.5; .025 TABLET ORAL at 18:45

## 2025-04-05 RX ADMIN — ACETAMINOPHEN 650 MG: 325 TABLET ORAL at 15:17

## 2025-04-05 RX ADMIN — LEVOTHYROXINE SODIUM 100 MCG: 100 TABLET ORAL at 05:32

## 2025-04-05 ASSESSMENT — COGNITIVE AND FUNCTIONAL STATUS - GENERAL
MOBILITY SCORE: 12
WALKING IN HOSPITAL ROOM: TOTAL
TURNING FROM BACK TO SIDE WHILE IN FLAT BAD: A LITTLE
STANDING UP FROM CHAIR USING ARMS: A LOT
HELP NEEDED FOR BATHING: A LOT
DRESSING REGULAR LOWER BODY CLOTHING: A LOT
MOVING TO AND FROM BED TO CHAIR: A LOT
EATING MEALS: A LITTLE
CLIMB 3 TO 5 STEPS WITH RAILING: TOTAL
DRESSING REGULAR UPPER BODY CLOTHING: A LOT
TOILETING: TOTAL
MOVING FROM LYING ON BACK TO SITTING ON SIDE OF FLAT BED WITH BEDRAILS: A LITTLE
DAILY ACTIVITIY SCORE: 13
PERSONAL GROOMING: A LITTLE

## 2025-04-05 ASSESSMENT — RESPIRATORY DISTRESS OBSERVATION SCALE (RDOS)
RESTLESS NONPURPOSEFUL MOVEMENTS: 0 - NONE
RESTLESS NONPURPOSEFUL MOVEMENTS: 0 - NONE
GRUNTING AT END OF EXPIRATION: 0 - NONE
PARADOXICAL BREATHING PATTERN: 0 - NONE
ACCESSORY MUSCLE RISE IN CLAVICLE DURING INSPIRATION: 0 - NONE
GRUNTING AT END OF EXPIRATION: 0 - NONE
HEART RATE PER MINUTE: 0 - <90 BEATS
RESPIRATORY RATE PER MINUTE: 0 - <19 BREATHS
RESTLESS NONPURPOSEFUL MOVEMENTS: 0 - NONE
INVOLUNTARY NASAL FLARING: 0 - NONE
RDOS TOTAL SCORE: 0
HEART RATE PER MINUTE: 0 - <90 BEATS
ACCESSORY MUSCLE RISE IN CLAVICLE DURING INSPIRATION: 0 - NONE
PARADOXICAL BREATHING PATTERN: 0 - NONE
LOOK OF FEAR: 0 - NONE
ACCESSORY MUSCLE RISE IN CLAVICLE DURING INSPIRATION: 0 - NONE
LOOK OF FEAR: 0 - NONE
INVOLUNTARY NASAL FLARING: 0 - NONE
PARADOXICAL BREATHING PATTERN: 0 - NONE
RESPIRATORY RATE PER MINUTE: 0 - <19 BREATHS
ACCESSORY MUSCLE RISE IN CLAVICLE DURING INSPIRATION: 0 - NONE
RESPIRATORY RATE PER MINUTE: 0 - <19 BREATHS
GRUNTING AT END OF EXPIRATION: 0 - NONE
LOOK OF FEAR: 0 - NONE
HEART RATE PER MINUTE: 0 - <90 BEATS
PARADOXICAL BREATHING PATTERN: 0 - NONE
LOOK OF FEAR: 0 - NONE
RDOS TOTAL SCORE: 0
RESTLESS NONPURPOSEFUL MOVEMENTS: 0 - NONE
INVOLUNTARY NASAL FLARING: 0 - NONE
INVOLUNTARY NASAL FLARING: 0 - NONE
RESPIRATORY RATE PER MINUTE: 0 - <19 BREATHS
HEART RATE PER MINUTE: 0 - <90 BEATS
GRUNTING AT END OF EXPIRATION: 0 - NONE

## 2025-04-05 ASSESSMENT — PAIN - FUNCTIONAL ASSESSMENT: PAIN_FUNCTIONAL_ASSESSMENT: 0-10

## 2025-04-05 ASSESSMENT — PAIN SCALES - GENERAL: PAINLEVEL_OUTOF10: 0 - NO PAIN

## 2025-04-05 NOTE — PROGRESS NOTES
04/05/25 1100   Discharge Planning   Assistance Needed This patient is hospice GIP and needs a nurse from R to arrange discharge weather it is home with hospice, to a facility with hospice or to [facility]- [facility] RN will need to speak with patient's family regarding next site of care. Per MD she is stable for DC.

## 2025-04-05 NOTE — DISCHARGE SUMMARY
Discharge Diagnosis  Metastatic Urothelial Cancer    Issues Requiring Follow-Up  Hospice Management for symptom control.     Test Results Pending At Discharge  Pending Labs      No current pending labs.          Hospital Course  74 y.o. female with a medical history of cervical cancer and recent diagnosis of invasive urothelial cancer, s/p EV/Pembro with tumor shrinkage, followed by nephrectomy and colon resection, with recurrent progression of the cancer. Presented to [facility] ED with c/o weakness, nonbloody diarrhea, weakness, fatigue, shakiness, and intolerance to oral intake. Symptoms likely related to intolerance to chemotherapy. Prognosis poor and requesting transition to comfort care. On steroids. Palliative/[facility] consulted. Provided symptom management: Dilaudid for pain, Robinul for secretions, Lomotil for diarrhea, Haldol, Ativan for agitation/anxiety, and Zofran/Compazine for N/V. Alexis placed for comfort. On Apixaban for recent LLE DVT, s/p thrombectomy. Placed on lomotil for diarrhea; now resolved. Started on Effexor for anxiety/depression. No significant pain issues (LLE) after admission but does report constant aching pain. Evaluated by Hospice and plan initiated for discharge to home with Hospice for management.      Disposition  Plan of care discussed with attending, Dr. Paris, and Hospice RN. She was seen and examined in her room this AM. Awake and alert. More talkative today. Eating better. She denies chest pain, breathing difficulties, abdominal pain, N/V/C, fever, or chills.  Diarrhea improved. Medications reviewed and reconciled. Scripts prepared as needed. Greater than 35 minutes spent in coordination of care, including physical examination, chart review, medication reconciliation, collaboration with providers, staff, and family.     Pertinent Physical Exam At Time of Discharge  Physical Exam  Constitutional: A&O x 3; NAD; cooperative; chronic ill appearance. Talkative today.  Eyes: EOM's  intact  HEENT: Normocephalic, Atraumatic. Oral mucosa moist.   Neck: Supple. No JVD, lymphadenopathy.   Lungs: CTAB with fair air movement. Respirations even and unlabored on room air.   Heart: RRR  Abdomen: Soft, non-tender, non-distended, +BS  MS/Extremities: GONZALEZ equally x 4 with weakness, LLE>RLE. BLE pitting edema. Peripheral pulses intact bilaterally.   Neuro: A&O x3; no focal deficits; gross motor and sensation intact.   Skin: Warm and dry. No rashes or lesions. Pale.   Psych: Appropriate.      Home Medications     Medication List      START taking these medications    · apixaban 5 mg tablet; Commonly known as: Eliquis; Take 1 tablet (5 mg)   by mouth 2 times a day.; Replaces: Eliquis DVT-PE Treat 30D Start 5 mg (74   tabs) tablet  · venlafaxine XR 75 mg 24 hr capsule; Commonly known as: Effexor-XR; Take   1 capsule (75 mg) by mouth once daily with breakfast. Do not crush or   chew.; Start taking on: April 6, 2025     CHANGE how you take these medications    · acetaminophen 500 mg tablet; Commonly known as: Tylenol; Take 2 tablets   (1,000 mg) by mouth every 8 hours if needed for mild pain (1 - 3).; What   changed: when to take this, reasons to take this  · naloxone 4 mg/0.1 mL nasal spray; Commonly known as: Narcan; Administer   1 spray (4 mg) into affected nostril(s) if needed for opioid reversal or   respiratory depression. May repeat every 2-3 minutes if needed,   alternating nostrils, until medical assistance becomes available.; What   changed: Another medication with the same name was removed. Continue   taking this medication, and follow the directions you see here.  · sennosides-docusate sodium 8.6-50 mg tablet; Commonly known as:   Katherine-Colace; Take 2 tablets by mouth 2 times a day. Hold for loose   stool/diarrhea; What changed: additional instructions     CONTINUE taking these medications    · dexAMETHasone 1 mg tablet; Commonly known as: Decadron; Take 4 tablets   for 1 day, then 3 tablets for 1  day, then 2 tablets for 1 day, then 1   tablet for 1 day, then half tablet for 1 day  · gabapentin 400 mg capsule; Commonly known as: Neurontin; Take 1 capsule   (400 mg) by mouth 3 times a day.  · levothyroxine 25 mcg tablet; Commonly known as: Synthroid, Levoxyl; Take   4 tablets (100 mcg) by mouth early in the morning.. Take on an empty   stomach at the same time each day, either 30 to 60 minutes prior to   breakfast  · methocarbamol 500 mg tablet; Commonly known as: Robaxin; Take 2 tablets   (1,000 mg) by mouth every 8 hours.  · ondansetron 8 mg tablet; Commonly known as: Zofran; Take 1 tablet (8 mg)   by mouth every 8 hours if needed for nausea or vomiting.  · oxyCODONE 10 mg immediate release tablet; Commonly known as: Roxicodone;   Take 0.5-1 tablets (5-10 mg) by mouth every 4 hours if needed for severe   pain (7 - 10).  · pantoprazole 40 mg EC tablet; Commonly known as: ProtoNix; Take 1 tablet   (40 mg) by mouth once daily in the morning. Take before meals. Do not   crush, chew, or split.  · polyethylene glycol 17 gram/dose powder; Commonly known as: Glycolax,   Miralax; Mix 17 g of powder in 4-8 oz of a beverage and drink 2 times a   day as needed for constipation.  · prochlorperazine 10 mg tablet; Commonly known as: Compazine; Take 1   tablet (10 mg) by mouth every 6 hours if needed for nausea or vomiting.     STOP taking these medications    · Eliquis DVT-PE Treat 30D Start 5 mg (74 tabs) tablet; Generic drug:   apixaban; Replaced by: apixaban 5 mg tablet  · lactulose 20 gram/30 mL oral solution       Outpatient Follow-Up  Future Appointments  Date Time Provider Department [place]  4/11/2025  1:00 PM ISSAC Col[place]res [id number] [facility]  4/15/2025  1:20 PM Sha Blas MD [id number] [facility]  4/21/2025 10:30 AM Cinthya Spaulding PA-C [id number] [place]  4/21/2025  1:00 PM ISSAC Rubio [id number] [place]  4/21/2025  1:30 PM INF 04 MINOFF [id number] [place]  5/28/2025  2:00 PM Sheyla Kasper,  MD, MS [facility]      Marielos John, APRN-CNP      I saw and evaluated the patient and discussed the care with NP above on 4/5/25 I agree with the findings and plan as documented in the note above with changes noted below     Constitutional: Awake, alert, NAD.  Eyes: PERRLA, EOMI. No erythema or exudate. No proptosis or lid lag.   ENMT: MMM, no nasal congestion, no oral lesions, oropharynx clear without tonsillar erythema or exudate.  Head/Neck: NCAT, neck supple. Full active ROM of the neck. No thyromegaly or mass. No JVP.  Respiratory/Thorax: CTAB, no increased work of breathing, no increased respiratory effort, no wheeze, rales, or rhonchi.  Cardiovascular: Regular rate and rhythm, normal S1 and S2, no murmurs, rubs, or gallops.  Gastrointestinal: Soft, nontender to palpation, nondistended, no guarding or rebound, normoactive bowel sounds  Musculoskeletal: generalized weakness  Extremities: 2+ RPs, no cyanosis or edema  Neurological: Aox3, generalized weakness  Lymphatic: + edema in lower extremities  Skin: Warm and well perfused.        Terra Alexis is a 74 y.o. female with a medical history of cervical cancer and recent diagnosis of invasive urothelial cancer, s/p EV/Pembro with tumor shrinkage, followed by nephrectomy and colon resection, with recurrent progression of the cancer. She was started on chemotherapy on Monday and today presented to [facility] ED with c/o weakness, nonbloody diarrhea, weakness, fatigue, shakiness, and intolerance to oral intake. She reports LLE pain with edema. She recently had a thrombectomy on this leg and on Apixaban for management. She does report a recent fall, landing on her left hip. She also reports intermittent burning with urination with occasional visible blood in urine. No fevers, breathing difficulties, chest pain, palpitations, N/V. In the ED, T. 36.5, , RR 18, /55, Pox 98%. Glucose 76, sodium 126, potassium 3.8, BUN 24, creatinine 1.00. WBC 25.3, hgb 8.6,  plts 333. Magnesium 1.57. UA consistent with UTI. Given Ceftriaxone. Patient expressed desire for comfort measures at this time and declined further imaging. She is now dnr cc with consult to hospice. She is dc and readmitted under hospice. She is dc home with home hospice at this time.     The patient was discharged in satisfactory condition.   Medications and side effect profile reviewed with patient.  More than 30 minutes were spent in coordinating patient discharge

## 2025-04-05 NOTE — CARE PLAN
The patient's goals for the shift include      The clinical goals for the shift include Patient will remain free from injury      Problem: Pain - Adult  Goal: Verbalizes/displays adequate comfort level or baseline comfort level  Outcome: Progressing     Problem: Safety - Adult  Goal: Free from fall injury  Outcome: Progressing     Problem: Discharge Planning  Goal: Discharge to home or other facility with appropriate resources  Outcome: Progressing     Problem: Chronic Conditions and Co-morbidities  Goal: Patient's chronic conditions and co-morbidity symptoms are monitored and maintained or improved  Outcome: Progressing     Problem: Nutrition  Goal: Nutrient intake appropriate for maintaining nutritional needs  Outcome: Progressing

## 2025-04-05 NOTE — NURSING NOTE
RN Hospice Note     Visit type: Routine visit for discharge planning     Comments/recommendations: Met with pt and son at the bedside. They confirm that DME has been delivered to the home. [facility] to pickup pt at 7pm.     Follow up nurse visit for tomorrow. Please call [phone] for questions/concerns or pt death.      Discharge Planning:  Patient to be discharged to: Home     Plan of care reviewed with patient/family members: significant other Whit  Plan of care reviewed with hospital staff members: Marielos Pedraza, Veda Ware RN     Please notify [facility] [place] of any changes in condition. Thank you.  Office:  [phone] (8 am-6:30 pm M-F and 8 am-4:30 pm weekends and holidays)              [phone] (6:30 pm-8 am M-F and 4:30 pm-8 am weekends and holidays)     Holli Roberson RN

## 2025-04-06 LAB — BACTERIA UR CULT: ABNORMAL

## 2025-04-07 ENCOUNTER — APPOINTMENT (OUTPATIENT)
Dept: HEMATOLOGY/ONCOLOGY | Facility: CLINIC | Age: 75
End: 2025-04-07
Payer: MEDICARE

## 2025-04-11 ENCOUNTER — APPOINTMENT (OUTPATIENT)
Dept: PRIMARY CARE | Facility: CLINIC | Age: 75
End: 2025-04-11
Payer: MEDICARE

## 2025-04-15 ENCOUNTER — APPOINTMENT (OUTPATIENT)
Dept: SURGERY | Facility: CLINIC | Age: 75
End: 2025-04-15
Payer: MEDICARE

## 2025-04-16 LAB
ATRIAL RATE: 105 BPM
P AXIS: 58 DEGREES
P OFFSET: 188 MS
P ONSET: 144 MS
PR INTERVAL: 156 MS
Q ONSET: 222 MS
QRS COUNT: 17 BEATS
QRS DURATION: 82 MS
QT INTERVAL: 296 MS
QTC CALCULATION(BAZETT): 391 MS
QTC FREDERICIA: 356 MS
R AXIS: 1 DEGREES
T AXIS: 57 DEGREES
T OFFSET: 370 MS
VENTRICULAR RATE: 105 BPM

## 2025-04-21 ENCOUNTER — APPOINTMENT (OUTPATIENT)
Dept: HEMATOLOGY/ONCOLOGY | Facility: CLINIC | Age: 75
End: 2025-04-21
Payer: MEDICARE

## 2025-04-21 ENCOUNTER — APPOINTMENT (OUTPATIENT)
Dept: CARDIOLOGY | Facility: HOSPITAL | Age: 75
End: 2025-04-21
Payer: MEDICARE

## 2025-05-28 ENCOUNTER — APPOINTMENT (OUTPATIENT)
Dept: CARDIOLOGY | Facility: HOSPITAL | Age: 75
End: 2025-05-28
Payer: MEDICARE

## (undated) DEVICE — SUTURE, SILK, 0, 24 IN, SUTUPAK, BLACK

## (undated) DEVICE — DRAPE, SHEET, MINOR PROCEDURE, T, PEDIATRIC, 100X122X77

## (undated) DEVICE — PAD, GROUNDING, ELECTROSURGICAL, W/9 FT CABLE, POLYHESIVE II, ADULT, LF

## (undated) DEVICE — CLIP, LIGATING, HORIZON, LARGE, TITANIUM

## (undated) DEVICE — Device

## (undated) DEVICE — CUTTER, PROX LINEAR, 75MM, REG TISSUE, W/ SAFETY LOCK OUT

## (undated) DEVICE — STAPLER, ECHELON 3000, 45MM STD

## (undated) DEVICE — GOWN, ASTOUND, XL

## (undated) DEVICE — PLEDGET, PTFE, SOFT, LARGE, 3/8 X 3/16 X 1/16 IN

## (undated) DEVICE — DRAPE, SHEET, FAN FOLDED, HALF, 44 X 58 IN, DISPOSABLE, LF, STERILE

## (undated) DEVICE — STOPCOCK, 4 WAY, SMALL BODY, W/SWIVEL, ULTRA, LIPD RESISTANT, LUER LOCK, MALE, LF

## (undated) DEVICE — DRAPE, INSTRUMENT, W/POUCH, STERI DRAPE, 7 X 11 IN, DISPOSABLE, STERILE

## (undated) DEVICE — KIT, ROBOTIC, CUSTOM UHC

## (undated) DEVICE — TRAY, SURESTEP, URINE METER, 14FR, SILICONE

## (undated) DEVICE — DRAPE, TOWEL, STERI DRAPE, 17 X 11 IN, PLASTIC, STERILE

## (undated) DEVICE — INFLATION DEVICE, BASIXCOMPAX, 30 ATM/BAR, 20ML  MAP152, 24IN TUBING

## (undated) DEVICE — TUBE, SALEM SUMP, 16 FR X 48IN, ENFIT

## (undated) DEVICE — SUTURE, SILK, 3-0, 30 IN, BR SH, BLACK

## (undated) DEVICE — MANIFOLD, 4 PORT NEPTUNE STANDARD

## (undated) DEVICE — DRAIN, WOUND, FLAT, HUBLESS, FULL LENGTH PERFORATION, 10 MM X 20 CM, SILICONE

## (undated) DEVICE — GOWN, SURGICAL, SMARTGOWN, XLARGE, STERILE

## (undated) DEVICE — CATHETER, GUIDING, LAUNCHER, 6 FR, MP 1

## (undated) DEVICE — SUTURE, MONOCRYL, 4-0, 18 IN, PS2, UNDYED

## (undated) DEVICE — FORCEPS, BIPOLAR FENESTRATED XI

## (undated) DEVICE — SUTURE, SILK, 2-0, TIES, 12-30 IN, BLACK

## (undated) DEVICE — SUTURE, PROLENE, 3-0, 36 IN, MHMH

## (undated) DEVICE — ADHESIVE, SKIN, LIQUIBAND EXCEED

## (undated) DEVICE — NEEDLE, ELECTRODE, SUBDERMAL, PAIRED, 2.0 LEAD, DISP

## (undated) DEVICE — TAPE, UMBILICAL, 1/8 X 30 IN, MULTIPACK, COTTON, WHITE

## (undated) DEVICE — STAPLER, LINEAR, 3.5 60MM, RELOADABLE, BLUE

## (undated) DEVICE — INSERT, EVERGRIP 61

## (undated) DEVICE — COVER, TIP HOT SHEARS ENDOWRIST

## (undated) DEVICE — GEL, ULTRASOUND, AQUASONIC 100, 20 GM, STERILE

## (undated) DEVICE — SPONGE, LAP, XRAY DECT, 4IN X 18IN, W/MASTER DMT, STERILE

## (undated) DEVICE — SPONGE, DISSECTOR, PEANUT, 3/8, STERILE 5 FOAM HOLDER"

## (undated) DEVICE — TOWEL, OR, XRAY DETECT 5 PK, WHITE, 17X26, W/DMT TAG, ST

## (undated) DEVICE — ACCESS KIT, S-MAK MINI, 5FR 10CM 0.018IN 40CM, NT/PT, ECHO ENHANCE NEEDLE

## (undated) DEVICE — KIT, CELL SAVER, W/COLLECTION SET, 225ML WASH SET

## (undated) DEVICE — COVER, CART, 45 X 27 X 48 IN, CLEAR

## (undated) DEVICE — SUTURE, PDSII, 1, TP-1, VIL, MONO, 48LP

## (undated) DEVICE — CATHETER, BALLOON, PTA, DORADO, 10 X 4 X 80

## (undated) DEVICE — CLIP, LIGATING, HEM-O-LOCK, MLX, LARGE, LF, PURPLE

## (undated) DEVICE — SUTURE, GUT, 0, 36 IN, CT-1, BROWN

## (undated) DEVICE — DRAPE, SHEET, CARDIOVASCULAR, ANTIMICROBIAL, W/ANESTHESIA SCREEN, IOBAN 2, STERI DRAPE, 107 X 133 IN, DISPOSABLE, FABRIC, BLUE, STERILE

## (undated) DEVICE — FORCEPS, PROGRASP, DAVINCI XI

## (undated) DEVICE — LOOP, VESSEL, MAXI, YELLOW

## (undated) DEVICE — RETRACTOR, GLASSMAN, VISCERA, FISH, SMALL, STERILE

## (undated) DEVICE — EVACUATOR, WOUND, SUCTION, CLOSED, JACKSON-PRATT, 100 CC, SILICONE

## (undated) DEVICE — EXTENSION SET W/MALE LUER LOCK ADAPTER, VOLUME 2.4ML

## (undated) DEVICE — SUTURE, PROLENE, 3-0, 48 IN, SH, DA, BLUE

## (undated) DEVICE — INSERT, CLAMP, SURGICAL, SOFT/TRACTION, STEALTH, 1 MM

## (undated) DEVICE — DRAPE, INCISE, ANTIMICROBIAL, IOBAN 2, LARGE, 30 X 32 IN

## (undated) DEVICE — SYRINGE, 60 CC, LUER LOCK, MONOJECT, W/O CAP, LF

## (undated) DEVICE — APPLICATOR, CHLORAPREP, W/ORANGE TINT, 26ML

## (undated) DEVICE — SHEATH, RETRACE URETERAL ACCESS, 10-12FR 35CM

## (undated) DEVICE — COVER, TABLE, 44 X 75 IN, DISPOSABLE, LF, STERILE

## (undated) DEVICE — GUIDEWIRE, STIFF SHAFT, ANGLE TIP, .035 DIA, 260 CM,  3 CM TIP"

## (undated) DEVICE — SCISSORS, MONOPOLAR, CURVED, 8MM

## (undated) DEVICE — SUTURE, VICRYL, 3-0, 27 IN, SH

## (undated) DEVICE — ELECTRODE, GROUND PLATE

## (undated) DEVICE — PAD, GROUNDING, ELECTROSURGICAL, DUAL

## (undated) DEVICE — DRESSING, MEPILEX, BORDER, SACRUM, 8.7 X 9.8 IN

## (undated) DEVICE — LIGASURE IMPACT, 18CM

## (undated) DEVICE — SPONGE, HEMOSTATIC, CELLULOSE, SURGICEL, FIBRILLAR, 2 X 4 IN

## (undated) DEVICE — DRAPE, COLUMN, DAVINCI XI

## (undated) DEVICE — PROBE, PRASS, STANDARD

## (undated) DEVICE — SPONGE, HEMOSTATIC, GELATIN, SURGIFOAM, 8 X 12.5 CM X 10 MM

## (undated) DEVICE — PROTECTOR, NERVE, ULNAR, PINK

## (undated) DEVICE — COVER, TABLE, UHC

## (undated) DEVICE — COVER, MAYO STAND, W/PAD, 23 IN, DISPOSABLE, PLASTIC, LF, STERILE

## (undated) DEVICE — FORCEP, BIOPSY, URETEROSCOPIC, FLEXIBLE, PIRANHA, 1 MM, 15 CM

## (undated) DEVICE — GUIDEWIRE, ULTRA TRACK, HYBRID, 0.038 IN STRAIGHT TIP

## (undated) DEVICE — SPONGE, HEMOSTATIC, CELLULOSE, SURGICEL, 2 X 14 IN

## (undated) DEVICE — DEVICE, SUTURE RETENTION, FLOWSTATSIS

## (undated) DEVICE — ACCESS PORT, 12MM, 100MM LENGTH, LOW PROFILE W/BLADELESS OPTICAL TIP

## (undated) DEVICE — DRAPE, FLUID WARMER

## (undated) DEVICE — CLIP, LIGATING, HORIZON, MEDIUM, TITANIUM

## (undated) DEVICE — CATHETER TRAY, SURESTEP, 16FR, URINE METER W/STATLOCK

## (undated) DEVICE — SUTURE, SILK, 3-0, 30 IN, MULTIPACK, BLACK

## (undated) DEVICE — OBTURATOR, BLADELESS , SU

## (undated) DEVICE — TROCAR, OPTICAL, BLADELESS, 12MM, THREADED, 100MM LENGTH

## (undated) DEVICE — CAUTERY, PENCIL, PUSH BUTTON, SMOKE EVAC, 70MM

## (undated) DEVICE — SPONGE, HEMOSTAT, SURGICEL FIBRILLAR, ABS, 4 X 4, LF

## (undated) DEVICE — DRAPE, ARM XI

## (undated) DEVICE — LUBRICANT, ELECTROLUBE, F/ELECTRODE TIPS

## (undated) DEVICE — STAPLER, SKIN PROXIMATE, 35 WIDE

## (undated) DEVICE — CUTTER,  PROXIMATE LINEAR RELOAD, 55MM, BLUE

## (undated) DEVICE — PITCHER, GRADUATE, 32 OZ (1200CC), STERILE

## (undated) DEVICE — SPONGE, LAP, XRAY DECT, 18IN X 18IN, W/MASTER DMT, STERILE

## (undated) DEVICE — DRAPE, PAD, INSTRUMENT, MAGNETIC, MEDIUM, 10 X 16 IN, DISPOSABLE

## (undated) DEVICE — COVER, EQUIPMENT, SOLUTION, SLUSH, 112 X 168 CM, LF, STERILE

## (undated) DEVICE — TIP,  ELECTRODE COATED INSULATED, EXTENDED, LF

## (undated) DEVICE — CLIPPER, SURGICAL BLADE ASSEMBLY, GENERAL PURPOSE, SINGLE USE

## (undated) DEVICE — SUTURE, VICRYL, 2-0, 27 IN, CT-1, VIOLET

## (undated) DEVICE — ANGIOPLASTY PACK, HEMOSTASIS VALVE PHD, SMALL BORE

## (undated) DEVICE — SUTURE, PROLENE, 5-0, 36 IN, C-1, CV-11, BLUE

## (undated) DEVICE — CONTAINER, SPECIMEN, 120 ML, STERILE

## (undated) DEVICE — LOOP, VESSEL, MAXI, BLUE

## (undated) DEVICE — SUTURE, PROLENE, 6-0, 30 IN, C-1, CV-11, BLUE

## (undated) DEVICE — VESSEL LOOP, RED MAXI, 2 CARD

## (undated) DEVICE — CLIP, LIGATING, W/ADHESIVE PAD, MEDIUM, TITANIUM

## (undated) DEVICE — CUTTER,  PROX LINEAR, 55MM, REG TISSUE, W/ SAFETY LOCK OUT

## (undated) DEVICE — DRESSING, ADHESIVE, ISLAND, TELFA, 4 X 14 IN

## (undated) DEVICE — SUTURE, SILK, 2-0, 30 IN, SH, BLACK

## (undated) DEVICE — TUBING SET, TRI-LUMEN, FILTERED, F/AIRSEAL

## (undated) DEVICE — NEEDLE, SPINAL, LUMBAR PUNCTURE, NEONATAL, 22 G X 2.5 IN, BLACK HUB

## (undated) DEVICE — INSERT, CLAMP, SURGICAL, SOFT/TRACTION, STEALTH, 5 MM

## (undated) DEVICE — TROCAR, KII OPTICAL BLADELESS 5MM Z THREAD 100MM LNGTH

## (undated) DEVICE — CATHETER, URETERAL, OPEN END, 5 FR, 70 CM

## (undated) DEVICE — DRESSING, ADHESIVE, ISLAND, TELFA, 4 X 10 IN

## (undated) DEVICE — DISSECTOR, BLUNT TIP, 5MM

## (undated) DEVICE — CONNECTOR, STRAIGHT, 0.375 X 0.375 IN

## (undated) DEVICE — SEAL, UNIVERSAL 5-8MM  XI

## (undated) DEVICE — DRAPE, TOWEL, SURGICAL, O.R, 17 X 24IN, STERILE, BLUE

## (undated) DEVICE — ELECTRODE, CORKSCREW NEEDLE 1.5M LENGTH

## (undated) DEVICE — CUTTER, PROXIMATE LINEAR RELOAD, 75MM, BLUE

## (undated) DEVICE — BAG, DECANTER

## (undated) DEVICE — SUTURE, PROLENE, 4-0, TAPER POINT, SH/SH BLUE 36IN

## (undated) DEVICE — DRAPE, SHEET, ENDOSCOPY, GENERAL, FENESTRATED, ARMBOARD COVER, 98 X 123.5 IN, DISPOSABLE, LF, STERILE

## (undated) DEVICE — COVER, PROBE, PULL UP ULTRASOUND KIT, 5 X 48

## (undated) DEVICE — SHEATH, CLOTTRIEVER, 16FR

## (undated) DEVICE — SEALANT, HEMOSTATIC, FLOSEAL, 10 ML

## (undated) DEVICE — GUIDEWIRE, ULTRA TRACK, HYBRID, 0.035 IN X 150CM